# Patient Record
Sex: FEMALE | Race: WHITE | NOT HISPANIC OR LATINO | Employment: OTHER | URBAN - METROPOLITAN AREA
[De-identification: names, ages, dates, MRNs, and addresses within clinical notes are randomized per-mention and may not be internally consistent; named-entity substitution may affect disease eponyms.]

---

## 2018-04-02 ENCOUNTER — OFFICE VISIT (OUTPATIENT)
Dept: PRIMARY CARE | Facility: CLINIC | Age: 83
End: 2018-04-02
Payer: MEDICARE

## 2018-04-02 VITALS
TEMPERATURE: 98.2 F | HEIGHT: 61 IN | WEIGHT: 129 LBS | HEART RATE: 92 BPM | SYSTOLIC BLOOD PRESSURE: 128 MMHG | OXYGEN SATURATION: 98 % | BODY MASS INDEX: 24.35 KG/M2 | DIASTOLIC BLOOD PRESSURE: 84 MMHG | RESPIRATION RATE: 16 BRPM

## 2018-04-02 DIAGNOSIS — M54.16 LUMBAR RADICULOPATHY, ACUTE: Primary | ICD-10-CM

## 2018-04-02 PROBLEM — J30.9 ALLERGIC RHINITIS: Chronic | Status: ACTIVE | Noted: 2018-04-02

## 2018-04-02 PROBLEM — M48.061 LUMBAR SPINAL STENOSIS: Chronic | Status: ACTIVE | Noted: 2018-04-02

## 2018-04-02 PROBLEM — M48.061 LUMBAR SPINAL STENOSIS: Status: ACTIVE | Noted: 2018-04-02

## 2018-04-02 PROBLEM — G47.00 INSOMNIA: Chronic | Status: ACTIVE | Noted: 2018-04-02

## 2018-04-02 PROBLEM — J44.9 CHRONIC OBSTRUCTIVE PULMONARY DISEASE (CMS/HCC): Status: ACTIVE | Noted: 2018-04-02

## 2018-04-02 PROBLEM — M81.0 OSTEOPOROSIS: Chronic | Status: ACTIVE | Noted: 2018-04-02

## 2018-04-02 PROBLEM — B44.81 ALLERGIC BRONCHOPULMONARY ASPERGILLOSIS (CMS/HCC): Status: ACTIVE | Noted: 2018-04-02

## 2018-04-02 PROBLEM — G47.00 INSOMNIA: Status: ACTIVE | Noted: 2018-04-02

## 2018-04-02 PROBLEM — B44.81 ALLERGIC BRONCHOPULMONARY ASPERGILLOSIS (CMS/HCC): Chronic | Status: ACTIVE | Noted: 2018-04-02

## 2018-04-02 PROBLEM — I10 HYPERTENSION: Chronic | Status: ACTIVE | Noted: 2018-04-02

## 2018-04-02 PROBLEM — G47.33 OBSTRUCTIVE SLEEP APNEA SYNDROME: Status: ACTIVE | Noted: 2018-04-02

## 2018-04-02 PROBLEM — I10 HYPERTENSION: Status: ACTIVE | Noted: 2018-04-02

## 2018-04-02 PROBLEM — J44.9 CHRONIC OBSTRUCTIVE PULMONARY DISEASE (CMS/HCC): Chronic | Status: ACTIVE | Noted: 2018-04-02

## 2018-04-02 PROBLEM — G47.33 OBSTRUCTIVE SLEEP APNEA SYNDROME: Chronic | Status: ACTIVE | Noted: 2018-04-02

## 2018-04-02 PROBLEM — M81.0 OSTEOPOROSIS: Status: ACTIVE | Noted: 2018-04-02

## 2018-04-02 PROBLEM — J30.9 ALLERGIC RHINITIS: Status: ACTIVE | Noted: 2018-04-02

## 2018-04-02 PROCEDURE — 99214 OFFICE O/P EST MOD 30 MIN: CPT | Performed by: FAMILY MEDICINE

## 2018-04-02 RX ORDER — AMLODIPINE BESYLATE 10 MG/1
10 TABLET ORAL DAILY
COMMUNITY
Start: 2016-08-16 | End: 2021-03-01 | Stop reason: ALTCHOICE

## 2018-04-02 RX ORDER — HYDROGEN PEROXIDE 3 %
20 SOLUTION, NON-ORAL MISCELLANEOUS DAILY PRN
COMMUNITY
End: 2023-08-21

## 2018-04-02 RX ORDER — FLUTICASONE PROPIONATE 50 MCG
2 SPRAY, SUSPENSION (ML) NASAL DAILY
COMMUNITY
Start: 2015-03-04 | End: 2019-06-08

## 2018-04-02 RX ORDER — DULOXETIN HYDROCHLORIDE 20 MG/1
20 CAPSULE, DELAYED RELEASE ORAL 2 TIMES DAILY
COMMUNITY
Start: 2017-03-01 | End: 2018-04-02 | Stop reason: ALTCHOICE

## 2018-04-02 RX ORDER — VIT C/E/ZN/COPPR/LUTEIN/ZEAXAN 250MG-90MG
1 CAPSULE ORAL DAILY
COMMUNITY
Start: 2011-06-30

## 2018-04-02 RX ORDER — TIOTROPIUM BROMIDE 18 UG/1
1 CAPSULE ORAL; RESPIRATORY (INHALATION) DAILY
COMMUNITY
Start: 2014-10-14 | End: 2018-04-02 | Stop reason: ALTCHOICE

## 2018-04-02 RX ORDER — DIAZEPAM 5 MG/1
5 TABLET ORAL EVERY 12 HOURS PRN
Qty: 28 TABLET | Refills: 0 | Status: SHIPPED | OUTPATIENT
Start: 2018-04-02 | End: 2019-06-20 | Stop reason: ALTCHOICE

## 2018-04-02 RX ORDER — BUDESONIDE AND FORMOTEROL FUMARATE DIHYDRATE 160; 4.5 UG/1; UG/1
2 AEROSOL RESPIRATORY (INHALATION) 2 TIMES DAILY
Refills: 11 | COMMUNITY
Start: 2017-12-28 | End: 2019-06-08

## 2018-04-02 RX ORDER — CLOBETASOL PROPIONATE 0.5 MG/G
1 CREAM TOPICAL 3 TIMES DAILY
Refills: 3 | COMMUNITY
Start: 2018-02-20 | End: 2018-04-02 | Stop reason: SINTOL

## 2018-04-02 RX ORDER — LORAZEPAM 1 MG/1
1 TABLET ORAL NIGHTLY PRN
Refills: 5 | COMMUNITY
Start: 2018-03-05 | End: 2018-04-12 | Stop reason: SDUPTHER

## 2018-04-02 RX ORDER — DEXTROMETHORPHAN HYDROBROMIDE, GUAIFENESIN 5; 100 MG/5ML; MG/5ML
2 LIQUID ORAL 3 TIMES DAILY
COMMUNITY
End: 2019-06-08

## 2018-04-02 RX ORDER — DOCUSATE SODIUM 100 MG/1
100 CAPSULE, LIQUID FILLED ORAL 2 TIMES DAILY PRN
COMMUNITY

## 2018-04-02 RX ORDER — PSYLLIUM HUSK 0.4 G
1 CAPSULE ORAL DAILY
COMMUNITY
Start: 2011-06-30

## 2018-04-02 RX ORDER — POLYETHYLENE GLYCOL 3350 17 G/17G
17 POWDER, FOR SOLUTION ORAL DAILY
COMMUNITY
End: 2018-04-02 | Stop reason: ALTCHOICE

## 2018-04-02 RX ORDER — MONTELUKAST SODIUM 10 MG/1
10 TABLET ORAL DAILY
COMMUNITY
Start: 2015-10-05 | End: 2018-04-02 | Stop reason: ALTCHOICE

## 2018-04-02 ASSESSMENT — ENCOUNTER SYMPTOMS
BACK PAIN: 1
PARESTHESIAS: 0
JOINT SWELLING: 0
FEVER: 0
PARESIS: 0
NUMBNESS: 0
WEAKNESS: 1
CHILLS: 0
LEG PAIN: 1
ROS GI COMMENTS: NO FECAL INCONTINENCE.
DIFFICULTY URINATING: 0
NECK PAIN: 0
NECK STIFFNESS: 0
TINGLING: 0

## 2018-04-02 ASSESSMENT — ACTIVITIES OF DAILY LIVING (ADL)
PATIENT'S MEMORY ADEQUATE TO SAFELY COMPLETE DAILY ACTIVITIES?: YES
ADEQUATE_TO_COMPLETE_ADL: YES

## 2018-04-02 NOTE — PROGRESS NOTES
Henry Linares MD    [facility]  [address], [address]  [place] [address]  Phone: [phone]  Fax: [phone]     History of Present Illness   Subjective     Patient ID: Madelyn Ruiz is a 87 y.o. female.    Back Pain   This is a new problem. The pain is present in the gluteal. The quality of the pain is described as aching (Sharp). The pain radiates to the right thigh and left thigh. The pain is at a severity of 10/10. The pain is severe. The symptoms are aggravated by standing, sitting and coughing (Sneezing). Associated symptoms include leg pain and weakness. Pertinent negatives include no fever, numbness, paresis, paresthesias or tingling. She has tried NSAIDs and heat for the symptoms. The treatment provided mild relief.   No injury.   Past Medical/Surgical/Family/Social History     The following have been reviewed and updated as appropriate in this visit:  Allergies  Meds  Problems         Past Medical History:   Diagnosis Date   • Allergic bronchopulmonary aspergillosis (CMS/[facility]) ([facility]) 4/2/2018    Allergist: Dr. Arriaza. Stable FEV1 in 8/2015. IgE levels and eosinophils stable in 8/2015. Managed with Fusenra and Symbicort.   • Allergic rhinitis 4/2/2018    Pulmonologist: Dr. Walker. Managed with Flonase.   • Chronic obstructive pulmonary disease (CMS/[facility]) ([facility]) 4/2/2018    Pulmonologist: Dr. Walker. Seen on PFTs in hospital in 2014. Controlled with Symbicort.   • Hypertension 4/2/2018    Managed on Amlodipine. Advised to follow a low sodium diet and keep BMI < 25. Advised to exercise for 2.5 hours per week. Instructed to take home blood pressure readings and call if consistently above 140/90.    • Insomnia 4/2/2018    Managed with Lorazepam as needed.   • Lumbar spinal stenosis 4/2/2018    Neurosurgeon: Dr. Miguel. MRI with Central and lateral recess spinal stenosis. Has Spondylolisthesis at L4/L5. S/P epidural injection by Dr. Martin with some relief in  past. Had Lumbar fusion and Lumbar laminectomy by Dr. Miguel in 4/2015.   • Obstructive sleep apnea syndrome 4/2/2018    Mild STACIA noted in sleep study 7/2015. Treated with nasal CPAP automatic with pressure range 5-10 CM H2O.   • Osteoporosis 4/2/2018    Rheumatologist: Dr. Bradley. Dexa scan 7/2016 with osteoporosis of lumbar spine LS-2.5, LH-2.1, RH -2.2. Has been on Fosamax in the past for 8 years. Worsened by Prednisone in past. Continue vitamin D, calcium and weight bearing exercise. DEXA in 7/2017 with LS -1.7, LH -2.2, and RH -2.0. Next due in 7/2019. Seen by Dr. Bradley in 8/2015 who agreed with initiating Prolia.   • Vertebral compression fracture (CMS/HCC) (HCC)     At T12 level       Past Surgical History:   Procedure Laterality Date   • APPENDECTOMY     • CARPAL TUNNEL RELEASE Bilateral    • CATARACT EXTRACTION W/  INTRAOCULAR LENS IMPLANT Bilateral    • HYSTERECTOMY  1972   • LUMBAR LAMINECTOMY  04/21/2015    S/P lumbar fusion   • TONSILLECTOMY         Family History   Problem Relation Age of Onset   • Glaucoma Mother    • Macular degeneration Mother    • Hypertension Mother    • Lung cancer Father    • Heart disease Brother    • Breast cancer Mother's Sister        Social History     Social History   • Marital status:      Spouse name: N/A   • Number of children: N/A   • Years of education: N/A     Occupational History   • Retired      Former Teacher     Social History Main Topics   • Smoking status: Never Smoker   • Smokeless tobacco: Never Used   • Alcohol use Yes      Comment: Rarely   • Drug use: No   • Sexual activity: Not on file     Other Topics Concern   • Not on file     Social History Narrative   • No narrative on file      Allergies and Medications     Allergies   Allergen Reactions   • Mepolizumab Rash   • Aspirin      Other reaction(s): Vomiting   • Morphine      Other reaction(s): Vomiting   • Oxycodone      Other reaction(s): Vomiting         Current Outpatient Prescriptions:   •   acetaminophen (TYLENOL ARTHRITIS PAIN) 650 mg 8 hr tablet, Take 2 tablets by mouth 3 (three) times a day., Disp: , Rfl:   •  amLODIPine (NORVASC) 10 mg tablet, Take 10 mg by mouth daily., Disp: , Rfl:   •  benralizumab 30 mg/mL syringe, Inject 30 mg under the skin See administration instructions for further information. Use as directed by Dr. Arriaza, Disp: , Rfl:   •  calcium carbonate (calcium carbonate) 600 mg calcium (1,500 mg) tablet, Take 1 tablet by mouth 2 (two) times a day., Disp: , Rfl:   •  cholecalciferol, vitamin D3, (VITAMIN D3) 2,000 unit tablet, Take 2,000 IU by mouth daily., Disp: , Rfl:   •  denosumab (PROLIA) 60 mg/mL syringe, Inject 60 mg under the skin See administration instructions for further information. Take every 6 months, Disp: , Rfl:   •  docusate sodium (COLACE) 100 mg capsule, Take 100 mg by mouth daily as needed for constipation., Disp: , Rfl:   •  esomeprazole (NexIUM) 20 mg capsule, Take 20 mg by mouth daily. Take 30 minutes prior to meal., Disp: , Rfl:   •  fluticasone (FLONASE) 50 mcg/actuation nasal spray, Administer 2 puffs into each nostril daily., Disp: , Rfl:   •  FOLIC ACID/MULTIVIT,IRON, (CENTRUM ORAL), Take 1 tablet by mouth daily., Disp: , Rfl:   •  LORazepam (ATIVAN) 1 mg tablet, Take 1 mg by mouth nightly as needed for sleep., Disp: , Rfl: 5  •  SYMBICORT 160-4.5 mcg/actuation inhaler, Inhale 2 puffs 2 (two) times a day., Disp: , Rfl: 11  •  diazePAM (VALIUM) 5 mg tablet, Take 1 tablet (5 mg total) by mouth every 12 (twelve) hours as needed for anxiety for up to 14 days., Disp: 28 tablet, Rfl: 0   Review of Systems       Review of Systems   Constitutional: Negative for chills and fever.   Gastrointestinal:        No fecal incontinence.   Genitourinary: Negative for difficulty urinating.        No incontinence.   Musculoskeletal: Positive for back pain and gait problem. Negative for joint swelling, neck pain and neck stiffness.   Neurological: Positive for weakness.  Negative for tingling, numbness and paresthesias.      Physical Examination       Objective     Vitals:    04/02/18 1314   BP: 128/84   Pulse: 92   Resp: 16   Temp: 36.8 °C (98.2 °F)   SpO2: 98%       Physical Exam   Constitutional: She is cooperative.   Neck: Full passive range of motion without pain.   Musculoskeletal:        Lumbar back: She exhibits tenderness and spasm. She exhibits normal range of motion, no bony tenderness and no deformity.   SLR bilaterally is negative   Neurological: She is alert. She has normal strength. Gait normal.   Bilateral Lower extremity motor strength is 5/5. Positive SLR bilaterally.      Laboratory Results     Lab Results   Component Value Date    WBC 5.23 08/29/2017    HGB 12.2 08/29/2017    HCT 37.2 08/29/2017    MCV 93.9 08/29/2017     08/29/2017        Chemistry        Component Value Date/Time     08/03/2017 1155    K 3.9 08/03/2017 1155     08/03/2017 1155    CO2 25 08/03/2017 1155    BUN 15 08/03/2017 1155    CREATININE 1.0 08/03/2017 1155        Component Value Date/Time    CALCIUM 9.0 08/03/2017 1155    ALKPHOS 95 08/03/2017 1155    AST 17 08/03/2017 1155    ALT 15 08/03/2017 1155    BILITOT 0.6 08/03/2017 1155            Lab Results   Component Value Date    CHOL 248 (H) 11/18/2015     Lab Results   Component Value Date    HDL 92 11/18/2015     Lab Results   Component Value Date    LDLCALC 133 (H) 11/18/2015     Lab Results   Component Value Date    TRIG 117 11/18/2015     No results found for: CHOLHDL    Lab Results   Component Value Date    TSH 2.08 11/18/2015       Lab Results   Component Value Date    HGBA1C 5.6 11/18/2015        Assessment and Plan       Assessment/Plan     Problem List     Lumbar radiculopathy, acute - Primary    Current Assessment & Plan     History of lumbar spinal stenosis. Now worse. Will get MRI of lumbar spine to further evaluate. Further recommendations to follow. Use Diazepam up to 2 times per day for its muscle relaxant  effect. Cautioned regarding driving.         Relevant Orders    MRI LUMBAR SPINE WITHOUT CONTRAST          No Follow-up on file.    Orders Placed This Encounter   Procedures   • MRI LUMBAR SPINE WITHOUT CONTRAST     Standing Status:   Future     Standing Expiration Date:   4/2/2019     Order Specific Question:   Does the Patient have a Pacemaker?     Answer:   No     Order Specific Question:   Does the patient have any metal implanted in the body from surgery?     Answer:   No        Henry Linares MD  4/2/2018

## 2018-04-02 NOTE — ASSESSMENT & PLAN NOTE
History of lumbar spinal stenosis. Now worse. Will get MRI of lumbar spine to further evaluate. Further recommendations to follow. Use Diazepam up to 2 times per day for its muscle relaxant effect. Cautioned regarding driving.

## 2018-04-03 ENCOUNTER — APPOINTMENT (OUTPATIENT)
Dept: RADIOLOGY | Facility: CLINIC | Age: 83
End: 2018-04-03
Attending: FAMILY MEDICINE
Payer: MEDICARE

## 2018-04-03 DIAGNOSIS — M54.16 LUMBAR RADICULOPATHY, ACUTE: ICD-10-CM

## 2018-04-05 ENCOUNTER — TELEPHONE (OUTPATIENT)
Dept: PRIMARY CARE | Facility: CLINIC | Age: 83
End: 2018-04-05

## 2018-04-05 NOTE — TELEPHONE ENCOUNTER
Pt left vm regarding her mri results.. She is still having the leg pain no change since her ov per patient.

## 2018-04-06 NOTE — TELEPHONE ENCOUNTER
Discussed results of MRI with patient today.  She has worsening spinal stenosis at L3-L4.  This correlates with her pain in the upper thigh region.  Referred to Dr. Wesley You for further evaluation and possible epidural steroid injection.

## 2018-04-12 DIAGNOSIS — F41.9 ANXIETY: Primary | ICD-10-CM

## 2018-04-13 RX ORDER — LORAZEPAM 1 MG/1
TABLET ORAL
Qty: 30 TABLET | Refills: 5 | Status: SHIPPED | OUTPATIENT
Start: 2018-04-13 | End: 2018-10-12 | Stop reason: SDUPTHER

## 2018-04-20 ENCOUNTER — APPOINTMENT (OUTPATIENT)
Dept: LAB | Facility: HOSPITAL | Age: 83
End: 2018-04-20
Attending: INTERNAL MEDICINE
Payer: MEDICARE

## 2018-04-20 ENCOUNTER — TRANSCRIBE ORDERS (OUTPATIENT)
Dept: LAB | Facility: HOSPITAL | Age: 83
End: 2018-04-20

## 2018-04-20 DIAGNOSIS — Z79.899 NEED FOR PROPHYLACTIC CHEMOTHERAPY: ICD-10-CM

## 2018-04-20 DIAGNOSIS — M81.0 SENILE OSTEOPOROSIS: ICD-10-CM

## 2018-04-20 DIAGNOSIS — E55.9 AVITAMINOSIS D: ICD-10-CM

## 2018-04-20 DIAGNOSIS — M81.0 SENILE OSTEOPOROSIS: Primary | ICD-10-CM

## 2018-04-20 LAB
25(OH)D3 SERPL-MCNC: 59 NG/ML (ref 30–100)
ALBUMIN SERPL-MCNC: 4 G/DL (ref 3.4–5)
ALP SERPL-CCNC: 33 IU/L (ref 35–126)
ALT SERPL-CCNC: 18 IU/L (ref 11–54)
ANION GAP SERPL CALC-SCNC: 6 MEQ/L (ref 3–15)
AST SERPL-CCNC: 17 IU/L (ref 15–41)
BASOPHILS # BLD: 0 K/UL (ref 0.01–0.1)
BASOPHILS NFR BLD: 0 %
BILIRUB SERPL-MCNC: 0.5 MG/DL (ref 0.3–1.2)
BUN SERPL-MCNC: 19 MG/DL (ref 8–20)
CALCIUM SERPL-MCNC: 9.9 MG/DL (ref 8.9–10.3)
CHLORIDE SERPL-SCNC: 105 MMOL/L (ref 98–109)
CO2 SERPL-SCNC: 29 MMOL/L (ref 22–32)
CREAT SERPL-MCNC: 0.7 MG/DL (ref 0.6–1.1)
DIFFERENTIAL METHOD BLD: ABNORMAL
EOSINOPHIL # BLD: 0 K/UL (ref 0.04–0.36)
EOSINOPHIL NFR BLD: 0 %
ERYTHROCYTE [DISTWIDTH] IN BLOOD BY AUTOMATED COUNT: 14.3 % (ref 11.7–14.4)
GFR SERPL CREATININE-BSD FRML MDRD: >60 ML/MIN/1.73M*2
GLUCOSE SERPL-MCNC: 81 MG/DL (ref 70–99)
HCT VFR BLDCO AUTO: 39 % (ref 35–45)
HGB BLD-MCNC: 12.9 G/DL (ref 11.8–15.7)
IMM GRANULOCYTES # BLD AUTO: 0.05 K/UL (ref 0–0.08)
IMM GRANULOCYTES NFR BLD AUTO: 0.7 %
LYMPHOCYTES # BLD: 1.69 K/UL (ref 1.2–3.5)
LYMPHOCYTES NFR BLD: 23.9 %
MCH RBC QN AUTO: 31.2 PG (ref 28–33.2)
MCHC RBC AUTO-ENTMCNC: 33.1 G/DL (ref 32.2–35.5)
MCV RBC AUTO: 94.2 FL (ref 83–98)
MONOCYTES # BLD: 0.83 K/UL (ref 0.28–0.8)
MONOCYTES NFR BLD: 11.7 %
NEUTROPHILS # BLD: 4.51 K/UL (ref 1.7–7)
NEUTS SEG NFR BLD: 63.7 %
NRBC BLD-RTO: 0 %
PDW BLD AUTO: 10.8 FL (ref 9.4–12.3)
PLATELET # BLD AUTO: 282 K/UL (ref 150–369)
POTASSIUM SERPL-SCNC: 4.4 MMOL/L (ref 3.6–5.1)
PROT SERPL-MCNC: 6.1 G/DL (ref 6–8.2)
RBC # BLD AUTO: 4.14 M/UL (ref 3.93–5.22)
SODIUM SERPL-SCNC: 140 MMOL/L (ref 136–144)
WBC # BLD AUTO: 7.08 K/UL (ref 3.8–10.5)

## 2018-04-20 PROCEDURE — 82306 VITAMIN D 25 HYDROXY: CPT

## 2018-04-20 PROCEDURE — 85025 COMPLETE CBC W/AUTO DIFF WBC: CPT

## 2018-04-20 PROCEDURE — 36415 COLL VENOUS BLD VENIPUNCTURE: CPT

## 2018-04-20 PROCEDURE — 80053 COMPREHEN METABOLIC PANEL: CPT

## 2018-05-03 ENCOUNTER — APPOINTMENT (OUTPATIENT)
Dept: RADIOLOGY | Facility: CLINIC | Age: 83
End: 2018-05-03
Attending: FAMILY MEDICINE
Payer: MEDICARE

## 2018-05-03 ENCOUNTER — CLINICAL SUPPORT (OUTPATIENT)
Dept: LAB | Facility: CLINIC | Age: 83
End: 2018-05-03
Attending: FAMILY MEDICINE
Payer: MEDICARE

## 2018-05-03 ENCOUNTER — OFFICE VISIT (OUTPATIENT)
Dept: PRIMARY CARE | Facility: CLINIC | Age: 83
End: 2018-05-03
Payer: MEDICARE

## 2018-05-03 VITALS
RESPIRATION RATE: 16 BRPM | HEIGHT: 61 IN | WEIGHT: 126.2 LBS | DIASTOLIC BLOOD PRESSURE: 74 MMHG | HEART RATE: 97 BPM | SYSTOLIC BLOOD PRESSURE: 128 MMHG | OXYGEN SATURATION: 95 % | TEMPERATURE: 97.8 F | BODY MASS INDEX: 23.83 KG/M2

## 2018-05-03 DIAGNOSIS — R10.84 GENERALIZED ABDOMINAL PAIN: ICD-10-CM

## 2018-05-03 DIAGNOSIS — R10.84 GENERALIZED ABDOMINAL PAIN: Primary | ICD-10-CM

## 2018-05-03 LAB
ALBUMIN SERPL-MCNC: 3.9 G/DL (ref 3.4–5)
ALP SERPL-CCNC: 55 IU/L (ref 35–126)
ALT SERPL-CCNC: 15 IU/L (ref 11–54)
AMYLASE SERPL-CCNC: 29 U/L (ref 28–100)
ANION GAP SERPL CALC-SCNC: 9 MEQ/L (ref 3–15)
AST SERPL-CCNC: 18 IU/L (ref 15–41)
BASOPHILS # BLD: 0.01 K/UL (ref 0.01–0.1)
BASOPHILS NFR BLD: 0.2 %
BILIRUB SERPL-MCNC: 0.5 MG/DL (ref 0.3–1.2)
BUN SERPL-MCNC: 18 MG/DL (ref 8–20)
CALCIUM SERPL-MCNC: 8.9 MG/DL (ref 8.9–10.3)
CHLORIDE SERPL-SCNC: 102 MMOL/L (ref 98–109)
CO2 SERPL-SCNC: 26 MMOL/L (ref 22–32)
CREAT SERPL-MCNC: 1.1 MG/DL (ref 0.6–1.1)
DIFFERENTIAL METHOD BLD: ABNORMAL
EOSINOPHIL # BLD: 0 K/UL (ref 0.04–0.36)
EOSINOPHIL NFR BLD: 0 %
ERYTHROCYTE [DISTWIDTH] IN BLOOD BY AUTOMATED COUNT: 14.4 % (ref 11.7–14.4)
GFR SERPL CREATININE-BSD FRML MDRD: 47.1 ML/MIN/1.73M*2
GLUCOSE SERPL-MCNC: 88 MG/DL (ref 70–99)
HCT VFR BLDCO AUTO: 39.6 % (ref 35–45)
HGB BLD-MCNC: 12.6 G/DL (ref 11.8–15.7)
IMM GRANULOCYTES # BLD AUTO: 0.04 K/UL (ref 0–0.08)
IMM GRANULOCYTES NFR BLD AUTO: 0.6 %
LIPASE SERPL-CCNC: 17 U/L (ref 20–51)
LYMPHOCYTES # BLD: 1.04 K/UL (ref 1.2–3.5)
LYMPHOCYTES NFR BLD: 16.1 %
MCH RBC QN AUTO: 31.3 PG (ref 28–33.2)
MCHC RBC AUTO-ENTMCNC: 31.8 G/DL (ref 32.2–35.5)
MCV RBC AUTO: 98.3 FL (ref 83–98)
MONOCYTES # BLD: 0.49 K/UL (ref 0.28–0.8)
MONOCYTES NFR BLD: 7.6 %
NEUTROPHILS # BLD: 4.86 K/UL (ref 1.7–7)
NEUTS SEG NFR BLD: 75.5 %
NRBC BLD-RTO: 0 %
PDW BLD AUTO: 10.6 FL (ref 9.4–12.3)
PLATELET # BLD AUTO: 231 K/UL (ref 150–369)
POTASSIUM SERPL-SCNC: 4.1 MMOL/L (ref 3.6–5.1)
PROT SERPL-MCNC: 6.2 G/DL (ref 6–8.2)
RBC # BLD AUTO: 4.03 M/UL (ref 3.93–5.22)
SODIUM SERPL-SCNC: 137 MMOL/L (ref 136–144)
WBC # BLD AUTO: 6.44 K/UL (ref 3.8–10.5)

## 2018-05-03 PROCEDURE — 82150 ASSAY OF AMYLASE: CPT

## 2018-05-03 PROCEDURE — 99214 OFFICE O/P EST MOD 30 MIN: CPT | Performed by: FAMILY MEDICINE

## 2018-05-03 PROCEDURE — 83690 ASSAY OF LIPASE: CPT

## 2018-05-03 PROCEDURE — 80053 COMPREHEN METABOLIC PANEL: CPT

## 2018-05-03 PROCEDURE — 36415 COLL VENOUS BLD VENIPUNCTURE: CPT

## 2018-05-03 PROCEDURE — 74022 RADEX COMPL AQT ABD SERIES: CPT

## 2018-05-03 PROCEDURE — 85025 COMPLETE CBC W/AUTO DIFF WBC: CPT

## 2018-05-03 ASSESSMENT — ENCOUNTER SYMPTOMS
CONSTIPATION: 1
FREQUENCY: 0
FLANK PAIN: 0
ABDOMINAL PAIN: 1
VOMITING: 0
DYSURIA: 0
FEVER: 0
DIARRHEA: 0
UNEXPECTED WEIGHT CHANGE: 0
COUGH: 0
ABDOMINAL DISTENTION: 0
NAUSEA: 1
BLOOD IN STOOL: 0
HEMATURIA: 0
SHORTNESS OF BREATH: 0

## 2018-05-03 NOTE — ASSESSMENT & PLAN NOTE
Unclear cause at this time. Check cbc, cmp, amylase, and lipase. Check obstruction series. Further recommendations to follow.

## 2018-05-03 NOTE — PROGRESS NOTES
"   Henry Linares MD    [facility]  [address], [address]  [place] [address]  Phone: [phone]  Fax: [phone]     History of Present Illness   Subjective     Patient ID: Madelyn Ruiz is a 87 y.o. female.    Abdominal Pain   This is a new problem. Episode onset: 3 days ago. The onset quality is gradual. The problem has been gradually worsening. The pain is located in the epigastric region. The abdominal pain does not radiate. Associated symptoms include constipation and nausea. Pertinent negatives include no diarrhea, dysuria, fever, frequency, hematuria or vomiting. Associated symptoms comments: Bloated, Had nausea yesterday, \"gassy\". Nothing aggravates the pain.      Past Medical/Surgical/Family/Social History     The following have been reviewed and updated as appropriate in this visit:  Meds         Past Medical History:   Diagnosis Date   • Allergic bronchopulmonary aspergillosis (CMS/[facility]) ([facility]) 4/2/2018    Allergist: Dr. Arriaza. Stable FEV1 in 8/2015. IgE levels and eosinophils stable in 8/2015. Managed with Fusenra and Symbicort.   • Allergic rhinitis 4/2/2018    Pulmonologist: Dr. Walker. Managed with Flonase.   • Chronic obstructive pulmonary disease (CMS/[facility]) ([facility]) 4/2/2018    Pulmonologist: Dr. Walker. Seen on PFTs in hospital in 2014. Controlled with Symbicort.   • Hypertension 4/2/2018    Managed on Amlodipine. Advised to follow a low sodium diet and keep BMI < 25. Advised to exercise for 2.5 hours per week. Instructed to take home blood pressure readings and call if consistently above 140/90.    • Insomnia 4/2/2018    Managed with Lorazepam as needed.   • Lumbar spinal stenosis 4/2/2018    Neurosurgeon: Dr. Miguel. MRI with Central and lateral recess spinal stenosis. Has Spondylolisthesis at L4/L5. S/P epidural injection by Dr. Martin with some relief in past. Had Lumbar fusion and Lumbar laminectomy by Dr. Miguel in 4/2015.   • Obstructive sleep apnea " syndrome 4/2/2018    Mild STACIA noted in sleep study 7/2015. Treated with nasal CPAP automatic with pressure range 5-10 CM H2O.   • Osteoporosis 4/2/2018    Rheumatologist: Dr. Bradley. Dexa scan 7/2016 with osteoporosis of lumbar spine LS-2.5, LH-2.1, RH -2.2. Has been on Fosamax in the past for 8 years. Worsened by Prednisone in past. Continue vitamin D, calcium and weight bearing exercise. DEXA in 7/2017 with LS -1.7, LH -2.2, and RH -2.0. Next due in 7/2019. Seen by Dr. Bradley in 8/2015 who agreed with initiating Prolia.   • Vertebral compression fracture (CMS/HCC) (HCC)     At T12 level       Past Surgical History:   Procedure Laterality Date   • APPENDECTOMY     • CARPAL TUNNEL RELEASE Bilateral    • CATARACT EXTRACTION W/  INTRAOCULAR LENS IMPLANT Bilateral    • HYSTERECTOMY  1972   • LUMBAR LAMINECTOMY  04/21/2015    S/P lumbar fusion   • TONSILLECTOMY         Family History   Problem Relation Age of Onset   • Glaucoma Mother    • Macular degeneration Mother    • Hypertension Mother    • Lung cancer Father    • Heart disease Brother    • Breast cancer Mother's Sister        Social History     Social History   • Marital status:      Spouse name: N/A   • Number of children: N/A   • Years of education: N/A     Occupational History   • Retired      Former Teacher     Social History Main Topics   • Smoking status: Never Smoker   • Smokeless tobacco: Never Used   • Alcohol use Yes      Comment: Rarely   • Drug use: No   • Sexual activity: Not on file     Other Topics Concern   • Not on file     Social History Narrative   • No narrative on file      Allergies and Medications     Allergies   Allergen Reactions   • Mepolizumab Rash   • Aspirin      Other reaction(s): Vomiting   • Morphine      Other reaction(s): Vomiting   • Oxycodone      Other reaction(s): Vomiting         Current Outpatient Prescriptions:   •  acetaminophen (TYLENOL ARTHRITIS PAIN) 650 mg 8 hr tablet, Take 2 tablets by mouth 3 (three) times  a day., Disp: , Rfl:   •  amLODIPine (NORVASC) 10 mg tablet, Take 10 mg by mouth daily., Disp: , Rfl:   •  benralizumab 30 mg/mL syringe, Inject 30 mg under the skin See administration instructions for further information. Use as directed by Dr. Arriaza, Disp: , Rfl:   •  calcium carbonate (calcium carbonate) 600 mg calcium (1,500 mg) tablet, Take 1 tablet by mouth 2 (two) times a day., Disp: , Rfl:   •  cholecalciferol, vitamin D3, (VITAMIN D3) 2,000 unit tablet, Take 2,000 IU by mouth daily., Disp: , Rfl:   •  denosumab (PROLIA) 60 mg/mL syringe, Inject 60 mg under the skin See administration instructions for further information. Take every 6 months, Disp: , Rfl:   •  docusate sodium (COLACE) 100 mg capsule, Take 100 mg by mouth daily as needed for constipation., Disp: , Rfl:   •  esomeprazole (NexIUM) 20 mg capsule, Take 20 mg by mouth daily. Take 30 minutes prior to meal., Disp: , Rfl:   •  fluticasone (FLONASE) 50 mcg/actuation nasal spray, Administer 2 puffs into each nostril daily., Disp: , Rfl:   •  FOLIC ACID/MULTIVIT,IRON, (CENTRUM ORAL), Take 1 tablet by mouth daily., Disp: , Rfl:   •  LORazepam (ATIVAN) 1 mg tablet, TAKE 1 TABLET BY MOUTH AT BEDTIME AS NEEDED, Disp: 30 tablet, Rfl: 5  •  SYMBICORT 160-4.5 mcg/actuation inhaler, Inhale 2 puffs 2 (two) times a day., Disp: , Rfl: 11   Review of Systems       Review of Systems   Constitutional: Negative for fever and unexpected weight change.   Respiratory: Negative for cough and shortness of breath.    Cardiovascular: Negative for chest pain.   Gastrointestinal: Positive for abdominal pain, constipation and nausea. Negative for abdominal distention, blood in stool, diarrhea and vomiting.   Genitourinary: Negative for decreased urine volume, dysuria, flank pain, frequency, hematuria and urgency.   Skin: Negative for pallor.      Physical Examination       Objective     Vitals:    05/03/18 1328   BP: 128/74   Pulse: 97   Resp: 16   Temp: 36.6 °C (97.8 °F)  "  SpO2: 95%       Wt Readings from Last 3 Encounters:   05/03/18 57.2 kg (126 lb 3.2 oz)   04/03/18 57.6 kg (127 lb)   04/02/18 58.5 kg (129 lb)       Ht Readings from Last 3 Encounters:   05/03/18 1.549 m (5' 1\")   04/02/18 1.549 m (5' 1\")       BP Readings from Last 3 Encounters:   05/03/18 128/74   04/02/18 128/84       Physical Exam   Constitutional: She appears well-developed and well-nourished. She is cooperative.  Non-toxic appearance. No distress.   Cardiovascular: Normal rate, regular rhythm, S1 normal and S2 normal.  Exam reveals no S3 and no S4.    No murmur heard.  Pulmonary/Chest: Effort normal and breath sounds normal. No tachypnea. No respiratory distress.   Abdominal: Soft. Normal aorta and bowel sounds are normal. She exhibits no distension, no abdominal bruit, no ascites, no pulsatile midline mass and no mass. There is no hepatosplenomegaly. There is generalized tenderness. There is no rebound, no guarding, no CVA tenderness, no tenderness at McBurney's point and negative Campuzano's sign. Hernia confirmed negative in the ventral area.   Has generalized tenderness  But worse in the epigastric region.   Lymphadenopathy:   No edema bilateral    Neurological: She is alert.      Laboratory Results     Lab Results   Component Value Date    WBC 7.08 04/20/2018    HGB 12.9 04/20/2018    HCT 39.0 04/20/2018    MCV 94.2 04/20/2018     04/20/2018          Chemistry        Component Value Date/Time     04/20/2018 1107    K 4.4 04/20/2018 1107     04/20/2018 1107    CO2 29 04/20/2018 1107    BUN 19 04/20/2018 1107    CREATININE 0.7 04/20/2018 1107        Component Value Date/Time    CALCIUM 9.9 04/20/2018 1107    ALKPHOS 33 (L) 04/20/2018 1107    AST 17 04/20/2018 1107    ALT 18 04/20/2018 1107    BILITOT 0.5 04/20/2018 1107            Lab Results   Component Value Date    GLUCOSE 81 04/20/2018    CALCIUM 9.9 04/20/2018     04/20/2018    K 4.4 04/20/2018    CO2 29 04/20/2018     " 04/20/2018    BUN 19 04/20/2018    CREATININE 0.7 04/20/2018       Lab Results   Component Value Date    CHOL 248 (H) 11/18/2015     Lab Results   Component Value Date    HDL 92 11/18/2015     Lab Results   Component Value Date    LDLCALC 133 (H) 11/18/2015     Lab Results   Component Value Date    TRIG 117 11/18/2015     No results found for: CHOLHDL    Lab Results   Component Value Date    TSH 2.08 11/18/2015       Lab Results   Component Value Date    HGBA1C 5.6 11/18/2015      Assessment and Plan       Assessment/Plan     Problem List        Other    Generalized abdominal pain - Primary    Current Assessment & Plan     Unclear cause at this time. Check cbc, cmp, amylase, and lipase. Check obstruction series. Further recommendations to follow.                No Follow-up on file.    No orders of the defined types were placed in this encounter.       Henry Linares MD  5/3/2018

## 2018-05-04 NOTE — PROGRESS NOTES
Discuss results of the x-ray with the patient.  Told the patient that she may have a localized ileus or mild enterocolitis.  She was informed that if she gets worse over the weekend she should go to urgent care or the emergency room for further evaluation.  The best next step would to be repeating the obstruction series to see if it is worsening in any way.  She agrees with the plan of action at this time.

## 2018-06-06 ENCOUNTER — OFFICE VISIT (OUTPATIENT)
Dept: PRIMARY CARE | Facility: CLINIC | Age: 83
End: 2018-06-06
Attending: FAMILY MEDICINE
Payer: MEDICARE

## 2018-06-06 VITALS
HEART RATE: 84 BPM | SYSTOLIC BLOOD PRESSURE: 118 MMHG | RESPIRATION RATE: 16 BRPM | OXYGEN SATURATION: 98 % | DIASTOLIC BLOOD PRESSURE: 76 MMHG | WEIGHT: 127 LBS | TEMPERATURE: 97.9 F | HEIGHT: 61 IN | BODY MASS INDEX: 23.98 KG/M2

## 2018-06-06 DIAGNOSIS — B44.81 ALLERGIC BRONCHOPULMONARY ASPERGILLOSIS (CMS/HCC): Chronic | ICD-10-CM

## 2018-06-06 DIAGNOSIS — J43.9 PULMONARY EMPHYSEMA, UNSPECIFIED EMPHYSEMA TYPE (CMS/HCC): Chronic | ICD-10-CM

## 2018-06-06 DIAGNOSIS — Z00.00 MEDICARE ANNUAL WELLNESS VISIT, INITIAL: Primary | ICD-10-CM

## 2018-06-06 PROCEDURE — G0439 PPPS, SUBSEQ VISIT: HCPCS | Performed by: FAMILY MEDICINE

## 2018-06-06 RX ORDER — NORTRIPTYLINE HYDROCHLORIDE 10 MG/1
3 CAPSULE ORAL NIGHTLY
Refills: 0 | COMMUNITY
Start: 2018-04-27 | End: 2018-06-06 | Stop reason: SINTOL

## 2018-06-06 RX ORDER — GABAPENTIN 300 MG/1
1 CAPSULE ORAL 3 TIMES DAILY
Refills: 0 | COMMUNITY
Start: 2018-05-09 | End: 2018-06-06 | Stop reason: SINTOL

## 2018-06-06 ASSESSMENT — ENCOUNTER SYMPTOMS
ABDOMINAL PAIN: 0
CONFUSION: 0
HEMATURIA: 0
VOMITING: 0
CHILLS: 0
EYE ITCHING: 0
TROUBLE SWALLOWING: 0
COUGH: 0
SPEECH DIFFICULTY: 0
NECK PAIN: 0
DYSURIA: 0
HEADACHES: 0
DIARRHEA: 0
SINUS PRESSURE: 0
DYSPHORIC MOOD: 0
FEVER: 0
DIFFICULTY URINATING: 0
EYE DISCHARGE: 0
APPETITE CHANGE: 0
SHORTNESS OF BREATH: 0
SORE THROAT: 0
FATIGUE: 0
NUMBNESS: 0
LIGHT-HEADEDNESS: 0
ARTHRALGIAS: 0
SLEEP DISTURBANCE: 0
FREQUENCY: 0
MYALGIAS: 0
BRUISES/BLEEDS EASILY: 0
DIZZINESS: 0
UNEXPECTED WEIGHT CHANGE: 0
WHEEZING: 0
CHEST TIGHTNESS: 0
BLOOD IN STOOL: 0
EYE PAIN: 0
BACK PAIN: 0
PALPITATIONS: 0
JOINT SWELLING: 0
RHINORRHEA: 0
CONSTIPATION: 0
WEAKNESS: 0
NERVOUS/ANXIOUS: 0
EYE REDNESS: 0
NAUSEA: 0

## 2018-06-06 ASSESSMENT — MINI COG
TOTAL SCORE: 5
COMPLETED: YES

## 2018-06-06 NOTE — PROGRESS NOTES
Subjective     Madelyn Ruiz is a 87 y.o. female who presents for an initial preventive physical exam.     Comprehensive Medical and Social History  Patient Active Problem List   Diagnosis   • Allergic bronchopulmonary aspergillosis (CMS/[facility]) ([facility])   • Allergic rhinitis   • Asthma   • Chronic obstructive pulmonary disease (CMS/[facility]) ([facility])   • Hypertension   • Insomnia   • Lumbar spinal stenosis   • Obstructive sleep apnea syndrome   • Osteoporosis   • Lumbar radiculopathy, acute   • Generalized abdominal pain   • Medicare annual wellness visit, initial     Past Medical History:   Diagnosis Date   • Allergic bronchopulmonary aspergillosis (CMS/[facility]) ([facility]) 4/2/2018    Allergist: Dr. Arriaza. Stable FEV1 in 8/2015. IgE levels and eosinophils stable in 8/2015. Managed with Fusenra and Symbicort.   • Allergic rhinitis 4/2/2018    Pulmonologist: Dr. Walker. Managed with Flonase.   • Chronic obstructive pulmonary disease (CMS/[facility]) ([facility]) 4/2/2018    Pulmonologist: Dr. Walker. Seen on PFTs in hospital in 2014. Controlled with Symbicort.   • Hypertension 4/2/2018    Managed on Amlodipine. Advised to follow a low sodium diet and keep BMI < 25. Advised to exercise for 2.5 hours per week. Instructed to take home blood pressure readings and call if consistently above 140/90.    • Insomnia 4/2/2018    Managed with Lorazepam as needed.   • Lumbar spinal stenosis 4/2/2018    Neurosurgeon: Dr. Miguel. MRI with Central and lateral recess spinal stenosis. Has Spondylolisthesis at L4/L5. S/P epidural injection by Dr. Martin with some relief in past. Had Lumbar fusion and Lumbar laminectomy by Dr. Miguel in 4/2015.   • Obstructive sleep apnea syndrome 4/2/2018    Mild STACIA noted in sleep study 7/2015. Treated with nasal CPAP automatic with pressure range 5-10 CM H2O.   • Osteoporosis 4/2/2018    Rheumatologist: Dr. Bradley. Dexa scan 7/2016 with osteoporosis of lumbar spine LS-2.5, LH-2.1, RH -2.2. Has been on Fosamax in the past  for 8 years. Worsened by Prednisone in past. Continue vitamin D, calcium and weight bearing exercise. DEXA in 7/2017 with LS -1.7, LH -2.2, and RH -2.0. Next due in 7/2019. Seen by Dr. Bradley in 8/2015 who agreed with initiating Prolia.   • Vertebral compression fracture (CMS/HCC) (HCC)     At T12 level     Past Surgical History:   Procedure Laterality Date   • APPENDECTOMY     • CARPAL TUNNEL RELEASE Bilateral    • CATARACT EXTRACTION W/  INTRAOCULAR LENS IMPLANT Bilateral    • HYSTERECTOMY  1972   • LUMBAR LAMINECTOMY  04/21/2015    S/P lumbar fusion   • TONSILLECTOMY       Allergies   Allergen Reactions   • Mepolizumab Rash   • Aspirin      Other reaction(s): Vomiting   • Morphine      Other reaction(s): Vomiting   • Oxycodone      Other reaction(s): Vomiting     Current Outpatient Prescriptions   Medication Sig Dispense Refill   • acetaminophen (TYLENOL ARTHRITIS PAIN) 650 mg 8 hr tablet Take 2 tablets by mouth 3 (three) times a day.     • amLODIPine (NORVASC) 10 mg tablet Take 10 mg by mouth daily.     • benralizumab 30 mg/mL syringe Inject 30 mg under the skin See administration instructions for further information. Use as directed by Dr. Arriaza     • calcium carbonate (calcium carbonate) 600 mg calcium (1,500 mg) tablet Take 1 tablet by mouth 2 (two) times a day.     • cholecalciferol, vitamin D3, (VITAMIN D3) 2,000 unit tablet Take 2,000 IU by mouth daily.     • denosumab (PROLIA) 60 mg/mL syringe Inject 60 mg under the skin See administration instructions for further information. Take every 6 months     • docusate sodium (COLACE) 100 mg capsule Take 100 mg by mouth daily as needed for constipation.     • esomeprazole (NexIUM) 20 mg capsule Take 20 mg by mouth daily. Take 30 minutes prior to meal.     • fluticasone (FLONASE) 50 mcg/actuation nasal spray Administer 2 puffs into each nostril daily.     • FOLIC ACID/MULTIVIT,IRON, (CENTRUM ORAL) Take 1 tablet by mouth daily.     • LORazepam (ATIVAN) 1 mg  tablet TAKE 1 TABLET BY MOUTH AT BEDTIME AS NEEDED 30 tablet 5   • SYMBICORT 160-4.5 mcg/actuation inhaler Inhale 2 puffs 2 (two) times a day.  11     No current facility-administered medications for this visit.      Social History     Social History   • Marital status:      Spouse name: N/A   • Number of children: N/A   • Years of education: N/A     Occupational History   • Retired      Former Teacher     Social History Main Topics   • Smoking status: Never Smoker   • Smokeless tobacco: Never Used   • Alcohol use Yes      Comment: Rarely   • Drug use: No   • Sexual activity: Not on file     Other Topics Concern   • Not on file     Social History Narrative   • No narrative on file     Review of Systems   Constitutional: Negative for appetite change, chills, fatigue, fever and unexpected weight change.   HENT: Negative for congestion, ear pain, hearing loss, nosebleeds, postnasal drip, rhinorrhea, sinus pressure, sneezing, sore throat and trouble swallowing.    Eyes: Negative for pain, discharge, redness, itching and visual disturbance.   Respiratory: Negative for cough, chest tightness, shortness of breath and wheezing.    Cardiovascular: Negative for chest pain, palpitations and leg swelling.   Gastrointestinal: Negative for abdominal pain, blood in stool, constipation, diarrhea, nausea and vomiting.   Endocrine: Negative for cold intolerance and heat intolerance.   Genitourinary: Negative for difficulty urinating, dysuria, frequency, hematuria, urgency, vaginal bleeding and vaginal discharge.   Musculoskeletal: Negative for arthralgias, back pain, gait problem, joint swelling, myalgias and neck pain.   Skin: Negative for rash.   Allergic/Immunologic: Negative for environmental allergies.   Neurological: Negative for dizziness, syncope, speech difficulty, weakness, light-headedness, numbness and headaches.   Hematological: Does not bruise/bleed easily.   Psychiatric/Behavioral: Negative for confusion,  "dysphoric mood and sleep disturbance. The patient is not nervous/anxious.          Objective   Vitals  Vitals:    06/06/18 1037   BP: 118/76   BP Location: Left upper arm   Patient Position: Sitting   Pulse: 84   Resp: 16   Temp: 36.6 °C (97.9 °F)   TempSrc: Oral   SpO2: 98%   Weight: 57.6 kg (127 lb)   Height: 1.549 m (5' 1\")     Body mass index is 24 kg/m².    Physical Exam   Constitutional: She appears well-developed and well-nourished.   HENT:   Head: Normocephalic and atraumatic.   Right Ear: Tympanic membrane, external ear and ear canal normal.   Left Ear: Tympanic membrane, external ear and ear canal normal.   Nose: No mucosal edema or rhinorrhea. Right sinus exhibits no maxillary sinus tenderness and no frontal sinus tenderness. Left sinus exhibits no maxillary sinus tenderness and no frontal sinus tenderness.   Mouth/Throat: Uvula is midline and mucous membranes are normal. No oral lesions. No oropharyngeal exudate or posterior oropharyngeal erythema. No tonsillar exudate.   Eyes: EOM and lids are normal. Pupils are equal, round, and reactive to light. Right eye exhibits no discharge and no exudate. Left eye exhibits no discharge and no exudate. Right conjunctiva is not injected. Left conjunctiva is not injected. No scleral icterus.   Neck: Normal range of motion. Neck supple. Carotid bruit is not present. No thyroid mass and no thyromegaly present.   Cardiovascular: Normal rate, regular rhythm and normal heart sounds.  Exam reveals no S3 and no S4.    No murmur heard.  Pulses:       Popliteal pulses are 2+ on the right side, and 2+ on the left side.        Dorsalis pedis pulses are 2+ on the right side, and 2+ on the left side.   Pulmonary/Chest: Effort normal and breath sounds normal. She exhibits no tenderness.   Abdominal: Soft. Normal appearance and bowel sounds are normal. There is no hepatosplenomegaly. There is no tenderness. There is no rebound and no guarding. No hernia.   Lymphadenopathy:     She " has no cervical adenopathy.   Neurological: She is alert. She has normal strength.   Skin: Skin is warm and dry. No rash noted.   Psychiatric: She has a normal mood and affect. Her speech is normal and behavior is normal. Judgment and thought content normal. Cognition and memory are normal.         Advanced Care Plan  Does patient have advance directive?: Yes       Patient has Advance Directive: Patient has Advance Directive, has not brought in                             PHQ  Over the Past 2 Weeks, Have You Felt Down, Depressed or Hopeless?: no  Over the Past 2 Weeks, Have You Felt Little Interest or Pleasure In Doing Things?: no                                          Mini Cog  Completed: Yes  Score: 5  Result: Negative    Get Up and Go  Result: Pass      STEADI Falls Risk  One or more falls in the last year: No           Has trouble stepping up onto a curb: No   Advised to use a cane or walker to get around safely: No   Often has to rush to the toilet: No   Feels unsteady when walking: No   Has lost some feeling in feet: No   Often feels sad or depressed: No   Steadies self on furniture while walking at home: No   Takes medication that makes him/her feel lightheaded or more tired than usual: No   Worried about falling: No   Takes medicine to sleep or improve mood: No   Needs to push with hands when rising from a chair: No   Falls screen completed: Yes     Hearing and Vision Screening  No exam data present      Immunization History   Administered Date(s) Administered   • Influenza Split High Dose Preservative Free IM 11/03/2011, 11/13/2012, 10/09/2013, 10/30/2014, 11/17/2015, 09/30/2016, 09/25/2017   • Influenza Vaccine Quadrivalent 3 Yr And Older 09/18/2017   • Influenza, Unspecified 10/30/2014, 11/17/2015, 09/30/2016, 09/25/2017   • MMRV 06/05/2014   • Pneumococcal Conjugate 08/09/1996   • Pneumococcal Conjugate 13-Valent 11/17/2015   • Pneumococcal Polysaccharide 10/30/2014   • Tdap 03/22/2012   • Varicella  06/05/2014   • Zoster 01/02/2006, 01/02/2016     Health Maintenance   Topic Date Due   • Medicare Annual Wellness VIsit  06/30/2012   • Annual Falls Risk Screening  06/06/2019   • DTaP, Tdap, and Td Vaccines (2 - Td) 03/22/2022   • HPV Vaccines  Aged Out   • Meningococcal Vaccine  Aged Out   • Zoster Vaccines  Completed   • HIB Vaccines  Aged Out   • IPV Vaccines  Aged Out   • Influenza Vaccine  Completed   • Pneumococcal PPSV23/PCV13 65+ Years/ Low and Medium Risk  Completed           Assessment/Plan   Problem List Items Addressed This Visit        Other    Allergic bronchopulmonary aspergillosis (CMS/[facility]) ([facility]) (Chronic)    Chronic obstructive pulmonary disease (CMS/[facility]) ([facility]) (Chronic)    Medicare annual wellness visit, initial - Primary     The patient is currently stable. Bloodwork was not needed at this time. The patient should walk for 2.5 hours per week. All the recommend vaccinations are up to date. Should get Shingrix at pharmacy. The  patient should be evaluated by the ophthalmologist every year and dentist every 6 months. The patient was advised to protect the skin by applying suntan lotion containing an SPF > 30 every 2 hours while in sun or after swimming. Instructed to avoid prolonged direct sun exposure as much as possible. The patient's body mass index is normal. The patient's gynecological examination and mammogram are no longer needed. The patient's DEXA scan is up to date. The patient's colonoscopy is not needed based upon age. Advised to consume 1000 mg of calcium daily through the diet. If the patient is unable to consume 1000 mg through the diet, then taking calcium supplements is an acceptable alternative. The patient was informed not to consume more than 500 mg of a calcium supplement at a time.                      See Patient Instructions (the written plan) which was given to the patient for PPPS and health risk factors with interventions.

## 2018-06-06 NOTE — PATIENT INSTRUCTIONS
Women's Personalized Prevention Plan Services (PPPS)      Preventive Services Checklist (Assumes Average Risk Unless Otherwise Noted)  Preventive Service Target Population and Frequency Last Done Date Due   Abd Aortic Aneurysm Screening Family history of AAA (covered once)  Not needed   Alcohol Misuse Screening As necessary for those at risk (covered annually) 06/2018 06/2019   Breast Cancer Screening Mammogram every 1-2yrs 50-69yo; every 1-2yrs 35-48yo if patient desires after weighing potential harms and benefits (covered once 35-38yo, annually >=41yo)  Declines at this time   Cholesterol Screening Both risk factors: 1) >=19yo and 2)  increased risk coronary artery disease (covered every 5 years) 11/2015 Not needed   Colorectal Cancer Screening >=51yo: Colonoscopy every 10 years, FIT annually or Cologuard every 3 years (up to 86yo for Cologuard)  Not needed   Diabetes Screening Any 1 risk factor: hypertension, dyslipidemia, obesity, high glucose; or Any 2 risk factors: >=66 yo, overweight, family history diabetes, history gestational diabetes or delivery of infant >9lbs (covered every 6 months) 05/2018 05/2019   Glaucoma Screening Any 1 risk factor: 1) diabetes, 2) family history glaucoma, 3)  >=51yo, 4)  American >=66yo (covered annually) 01/2018 01/2019   Hepatitis C Screening Any 1 risk factor: 1) born between 5292-0010, 2) blood transfusion before 1992, 3) current or past injection drug use (covered once for average risk, annually for high risk)  Not needed   Lung Cancer Screening Low-dose chest CT if all 3 risk factors: 1) 55-78yo, 2) smoker or quit within last 15y, 3) >=30 pack years (covered annually)  Not needed   Osteoporosis Screening >=66yo (covered every 24 months)  <66yo if any 1 risk factor: 1) estrogen deficient and at risk for osteoporosis; 2) established osteoporosis on treatment; 3) x-rays show possible osteoporosis, osteopenia or vertebral  "fractures; 4) on steroid or planning to start; 5) primary hyperparathyroidism (covered as medically necessary) 07/18/2017 07/2019   Sexually Transmitted Diseases (STDs) As necessary chlamydia, gonorrhea, syphilis, hepatitis B (covered annually if not pregnant, more often in pregnancy)  HIV if any 1 risk factor present: 1) <14yo or >64yo and at increased risk, 2) 15-64yo and ask for it, or 3) pregnant (covered annually if not pregnant, up to 3x in pregnancy)  Low risk   Vaccine: Hepatitis B As necessary if medium or high risk (series covered once)  Not needed   Vaccine: Influenza All patients without contraindications (covered annually.) Fall 2017 Fall 2018   Vaccine: Pneumococcal Pneumovax + Prevnar (both covered, >=11 months apart) Prevnar   11/17/2015  Pneumovax  10/30/2014 Done   Vaccine: Zoster (Shingles) >= 59yo without contraindications (covered once by Part D) 1/2006 Should get Shingrix at pharmacy   Other Services TDAP 03/22/2012 03/2022            Women's Personalized Prevention Plan Services (PPPS)              Prints to [facility]                                         Health Risk Factors and Interventions  \"x\" Indicates risk is associated with this patient Risk Factor Recommended Interventions    n/a Obesity     Hypertension Low sodium diet and continue medication   n/a Diabetes    n/a Fall Risk    n/a Tobacco Use     Other:        The patient is currently stable. Bloodwork was not needed at this time. The patient should walk for 2.5 hours per week. All the recommend vaccinations are up to date. Should get Shingrix at pharmacy. The  patient should be evaluated by the ophthalmologist every year and dentist every 6 months. The patient was advised to protect the skin by applying suntan lotion containing an SPF > 30 every 2 hours while in sun or after swimming. Instructed to avoid prolonged direct sun exposure as much as possible. The patient's body mass index is normal. The patient's gynecological examination and " mammogram are no longer needed. The patient's DEXA scan is up to date. The patient's colonoscopy is not needed based upon age. Advised to consume 1000 mg of calcium daily through the diet. If the patient is unable to consume 1000 mg through the diet, then taking calcium supplements is an acceptable alternative. The patient was informed not to consume more than 500 mg of a calcium supplement at a time.

## 2018-06-06 NOTE — ASSESSMENT & PLAN NOTE
The patient is currently stable. Bloodwork was not needed at this time. The patient should walk for 2.5 hours per week. All the recommend vaccinations are up to date. Should get Shingrix at pharmacy. The  patient should be evaluated by the ophthalmologist every year and dentist every 6 months. The patient was advised to protect the skin by applying suntan lotion containing an SPF > 30 every 2 hours while in sun or after swimming. Instructed to avoid prolonged direct sun exposure as much as possible. The patient's body mass index is normal. The patient's gynecological examination and mammogram are no longer needed. The patient's DEXA scan is up to date. The patient's colonoscopy is not needed based upon age. Advised to consume 1000 mg of calcium daily through the diet. If the patient is unable to consume 1000 mg through the diet, then taking calcium supplements is an acceptable alternative. The patient was informed not to consume more than 500 mg of a calcium supplement at a time.

## 2018-07-12 ENCOUNTER — TRANSCRIBE ORDERS (OUTPATIENT)
Dept: LAB | Facility: CLINIC | Age: 83
End: 2018-07-12

## 2018-07-12 ENCOUNTER — APPOINTMENT (OUTPATIENT)
Dept: LAB | Facility: CLINIC | Age: 83
End: 2018-07-12
Attending: DERMATOLOGY
Payer: MEDICARE

## 2018-07-12 DIAGNOSIS — D68.62 LUPUS ANTICOAGULANT SYNDROME (CMS/HCC): ICD-10-CM

## 2018-07-12 DIAGNOSIS — L30.8 OTHER SPECIFIED DERMATITIS (CODE): ICD-10-CM

## 2018-07-12 DIAGNOSIS — I77.6 ARTERITIS (CMS/HCC): ICD-10-CM

## 2018-07-12 DIAGNOSIS — Z11.59 ENCOUNTER FOR SCREENING FOR OTHER VIRAL DISEASES (CODE): ICD-10-CM

## 2018-07-12 DIAGNOSIS — R79.82 ELEVATED C-REACTIVE PROTEIN (CRP): ICD-10-CM

## 2018-07-12 DIAGNOSIS — L40.59 OTHER PSORIATIC ARTHROPATHY: ICD-10-CM

## 2018-07-12 DIAGNOSIS — I77.6 ARTERITIS (CMS/HCC): Primary | ICD-10-CM

## 2018-07-12 LAB
ALBUMIN SERPL-MCNC: 4.4 G/DL (ref 3.4–5)
ALP SERPL-CCNC: 47 IU/L (ref 35–126)
ALT SERPL-CCNC: 23 IU/L (ref 11–54)
ANION GAP SERPL CALC-SCNC: 9 MEQ/L (ref 3–15)
AST SERPL-CCNC: 24 IU/L (ref 15–41)
BACTERIA URNS QL MICRO: ABNORMAL /UL
BILIRUB SERPL-MCNC: 0.4 MG/DL (ref 0.3–1.2)
BILIRUB UR QL STRIP.AUTO: NEGATIVE MG/DL
BUN SERPL-MCNC: 17 MG/DL (ref 8–20)
CALCIUM SERPL-MCNC: 10.4 MG/DL (ref 8.9–10.3)
CAOX CRY URNS QL MICRO: 2 /HPF
CHLORIDE SERPL-SCNC: 108 MMOL/L (ref 98–109)
CLARITY UR REFRACT.AUTO: CLEAR
CO2 SERPL-SCNC: 23 MMOL/L (ref 22–32)
COLOR UR AUTO: YELLOW
CREAT SERPL-MCNC: 0.8 MG/DL (ref 0.6–1.1)
ERYTHROCYTE [DISTWIDTH] IN BLOOD BY AUTOMATED COUNT: 13.7 % (ref 11.7–14.4)
ERYTHROCYTE [SEDIMENTATION RATE] IN BLOOD BY WESTERGREN METHOD: 16 MM/HR
GFR SERPL CREATININE-BSD FRML MDRD: >60 ML/MIN/1.73M*2
GLUCOSE SERPL-MCNC: 110 MG/DL (ref 70–99)
GLUCOSE UR STRIP.AUTO-MCNC: NEGATIVE MG/DL
HCT VFR BLDCO AUTO: 42.2 % (ref 35–45)
HGB BLD-MCNC: 13.6 G/DL (ref 11.8–15.7)
HGB UR QL STRIP.AUTO: ABNORMAL
HYALINE CASTS #/AREA URNS LPF: ABNORMAL /LPF
KETONES UR STRIP.AUTO-MCNC: NEGATIVE MG/DL
LEUKOCYTE ESTERASE UR QL STRIP.AUTO: NEGATIVE
MCH RBC QN AUTO: 30.4 PG (ref 28–33.2)
MCHC RBC AUTO-ENTMCNC: 32.2 G/DL (ref 32.2–35.5)
MCV RBC AUTO: 94.4 FL (ref 83–98)
NITRITE UR QL STRIP.AUTO: NEGATIVE
PDW BLD AUTO: 10.5 FL (ref 9.4–12.3)
PH UR STRIP.AUTO: 6 [PH]
PLATELET # BLD AUTO: 293 K/UL (ref 150–369)
POTASSIUM SERPL-SCNC: 4.7 MMOL/L (ref 3.6–5.1)
PROT SERPL-MCNC: 6.2 G/DL (ref 6–8.2)
PROT UR QL STRIP.AUTO: NEGATIVE
RBC # BLD AUTO: 4.47 M/UL (ref 3.93–5.22)
RBC #/AREA URNS HPF: ABNORMAL /HPF
SODIUM SERPL-SCNC: 140 MMOL/L (ref 136–144)
SP GR UR REFRACT.AUTO: 1.02
SQUAMOUS URNS QL MICRO: 1 /HPF
UROBILINOGEN UR STRIP-ACNC: 0.2 EU/DL
WBC # BLD AUTO: 6.58 K/UL (ref 3.8–10.5)
WBC #/AREA URNS HPF: ABNORMAL /HPF

## 2018-07-12 PROCEDURE — 85302 CLOT INHIBIT PROT C ANTIGEN: CPT

## 2018-07-12 PROCEDURE — 86706 HEP B SURFACE ANTIBODY: CPT

## 2018-07-12 PROCEDURE — 86618 LYME DISEASE ANTIBODY: CPT

## 2018-07-12 PROCEDURE — 81001 URINALYSIS AUTO W/SCOPE: CPT

## 2018-07-12 PROCEDURE — 85305 CLOT INHIBIT PROT S TOTAL: CPT

## 2018-07-12 PROCEDURE — 86038 ANTINUCLEAR ANTIBODIES: CPT

## 2018-07-12 PROCEDURE — 36415 COLL VENOUS BLD VENIPUNCTURE: CPT

## 2018-07-12 PROCEDURE — 83516 IMMUNOASSAY NONANTIBODY: CPT

## 2018-07-12 PROCEDURE — 85027 COMPLETE CBC AUTOMATED: CPT

## 2018-07-12 PROCEDURE — 86431 RHEUMATOID FACTOR QUANT: CPT

## 2018-07-12 PROCEDURE — 85303 CLOT INHIBIT PROT C ACTIVITY: CPT

## 2018-07-12 PROCEDURE — 80053 COMPREHEN METABOLIC PANEL: CPT

## 2018-07-12 PROCEDURE — 86160 COMPLEMENT ANTIGEN: CPT

## 2018-07-12 PROCEDURE — 85305 CLOT INHIBIT PROT S TOTAL: CPT | Mod: 59

## 2018-07-12 PROCEDURE — 85652 RBC SED RATE AUTOMATED: CPT

## 2018-07-12 PROCEDURE — 86803 HEPATITIS C AB TEST: CPT

## 2018-07-12 PROCEDURE — 83516 IMMUNOASSAY NONANTIBODY: CPT | Mod: 59

## 2018-07-13 ENCOUNTER — APPOINTMENT (OUTPATIENT)
Dept: LAB | Facility: HOSPITAL | Age: 83
End: 2018-07-13
Attending: DERMATOLOGY
Payer: MEDICARE

## 2018-07-13 ENCOUNTER — TRANSCRIBE ORDERS (OUTPATIENT)
Dept: LAB | Facility: HOSPITAL | Age: 83
End: 2018-07-13

## 2018-07-13 ENCOUNTER — LAB REQUISITION (OUTPATIENT)
Dept: LAB | Facility: HOSPITAL | Age: 83
End: 2018-07-13
Payer: MEDICARE

## 2018-07-13 DIAGNOSIS — D68.62 LUPUS ANTICOAGULANT SYNDROME (CMS/HCC): ICD-10-CM

## 2018-07-13 DIAGNOSIS — Z11.59 SCREENING FOR VIRAL DISEASE: ICD-10-CM

## 2018-07-13 DIAGNOSIS — I77.6 ARTERITIS (CMS/HCC): Primary | ICD-10-CM

## 2018-07-13 DIAGNOSIS — L30.8 OTHER SPECIFIED DERMATITIS (CODE): ICD-10-CM

## 2018-07-13 DIAGNOSIS — L30.8: ICD-10-CM

## 2018-07-13 DIAGNOSIS — I77.6 ARTERITIS (CMS/HCC): ICD-10-CM

## 2018-07-13 LAB
ANA SER QL IA: NEGATIVE
B BURGDOR AB SER IA-ACNC: 0.23 RATIO
C3 SERPL-MCNC: 89 MG/DL (ref 80–200)
C4 SERPL-MCNC: 17 MG/DL (ref 16–45)
HBV SURFACE AB SER QL: NONREACTIVE MIU/ML
HCV AB SER QL: NONREACTIVE S/CO
RHEUMATOID FACT SERPL-ACNC: <20 IU/ML

## 2018-07-13 PROCEDURE — 82595 ASSAY OF CRYOGLOBULIN: CPT

## 2018-07-13 PROCEDURE — 36415 COLL VENOUS BLD VENIPUNCTURE: CPT

## 2018-07-13 PROCEDURE — 85610 PROTHROMBIN TIME: CPT | Performed by: DERMATOLOGY

## 2018-07-15 LAB — CRYOGLOB SER QL 3D COLD INC: NEGATIVE

## 2018-07-17 LAB
MYELOPEROXIDASE AB SER IA-ACNC: <0.3 U/ML
PROTEINASE3 AB SER IA-ACNC: <0.7 U/ML

## 2018-07-18 LAB
APTT 1H NP PPP: 26.7 SEC (ref 23–35)
APTT PPP: 22 SEC. (ref 23–35)
PROT C AG ACT/NOR PPP IA: 146 % (ref 70–140)
PROT S AG ACT/NOR PPP IA: 132 % (ref 70–140)
PROTHROMBIN TIME: 12.1 SEC. (ref 12.2–14.5)
SCREEN DRVVT: 32 SEC. (ref 31–44)

## 2018-07-19 LAB
PROT C ACT/NOR PPP: 177 % (ref 85–185)
PROT S ACT/NOR PPP: 128 % (ref 65–140)

## 2018-07-20 LAB — LA PPP-IMP: NORMAL

## 2018-08-05 ENCOUNTER — TELEPHONE (OUTPATIENT)
Dept: PRIMARY CARE | Facility: CLINIC | Age: 83
End: 2018-08-05

## 2018-08-06 NOTE — TELEPHONE ENCOUNTER
I called patient she spoke with the on call physician she has sqiggly lines both eyes hasn't had since she has an appt with dr lopez's associate tomorrow.   She just wanted to let you know she will call with an update tomorrow.     Patient called back today she wanted to let you know that she saw the ophthalmologist and she was told she had a Ocular Migraine and no need to call her back.

## 2018-09-13 ENCOUNTER — TELEPHONE (OUTPATIENT)
Dept: PRIMARY CARE | Facility: CLINIC | Age: 83
End: 2018-09-13

## 2018-09-13 PROBLEM — R10.84 GENERALIZED ABDOMINAL PAIN: Status: RESOLVED | Noted: 2018-05-03 | Resolved: 2018-09-13

## 2018-09-13 NOTE — TELEPHONE ENCOUNTER
Patient told that she can use clobetasol cream for the rest of her body for the rash.  She should not apply it to the face.  She can get over-the-counter 1% hydrocortisone cream to use in that area.  If the rash persists and does not resolve should be seen in the office for further evaluation.

## 2018-09-13 NOTE — TELEPHONE ENCOUNTER
Pt has has a rash on her arm ,legs face and chest. This has been going on for 3 days and was told to reach out to her PCP for further attention per .     Pt applied Clobetasol cream 0.05 % to the rash jennifer and that helps . Pt would like a callback

## 2018-10-12 DIAGNOSIS — F41.9 ANXIETY: ICD-10-CM

## 2018-10-15 RX ORDER — LORAZEPAM 1 MG/1
TABLET ORAL
Qty: 30 TABLET | Refills: 5 | Status: SHIPPED | OUTPATIENT
Start: 2018-10-15 | End: 2019-04-23 | Stop reason: SDUPTHER

## 2018-10-16 ENCOUNTER — TRANSCRIBE ORDERS (OUTPATIENT)
Dept: SCHEDULING | Age: 83
End: 2018-10-16

## 2018-10-16 DIAGNOSIS — M47.27 OTHER SPONDYLOSIS WITH RADICULOPATHY, LUMBOSACRAL REGION: Primary | ICD-10-CM

## 2018-10-17 ENCOUNTER — HOSPITAL ENCOUNTER (OUTPATIENT)
Dept: RADIOLOGY | Facility: CLINIC | Age: 83
Discharge: HOME | End: 2018-10-17
Attending: NEUROLOGICAL SURGERY
Payer: MEDICARE

## 2018-10-17 DIAGNOSIS — M47.27 OTHER SPONDYLOSIS WITH RADICULOPATHY, LUMBOSACRAL REGION: ICD-10-CM

## 2018-11-13 ENCOUNTER — TELEPHONE (OUTPATIENT)
Dept: PRIMARY CARE | Facility: CLINIC | Age: 83
End: 2018-11-13

## 2018-11-13 NOTE — TELEPHONE ENCOUNTER
Patient called - She's interested in getting a life alert/medical alert button system installed in her home.     She is asking for your recommendation on which company to use.

## 2018-11-14 NOTE — TELEPHONE ENCOUNTER
I left a detailed message on pt's voice mail One Call Alert 24/7 monitoring [phone] or [employer]   I asked patient to confirm this message and if she has any questions.

## 2018-11-14 NOTE — TELEPHONE ENCOUNTER
Patient called back to confirm that she received your voice mail. She confirmed the 1-800 # and will call to set it up.

## 2019-04-23 DIAGNOSIS — F41.9 ANXIETY: ICD-10-CM

## 2019-04-23 RX ORDER — LORAZEPAM 1 MG/1
TABLET ORAL
Qty: 30 TABLET | Refills: 5 | Status: SHIPPED | OUTPATIENT
Start: 2019-04-23 | End: 2019-10-24 | Stop reason: SDUPTHER

## 2019-04-25 ENCOUNTER — TELEPHONE (OUTPATIENT)
Dept: PRIMARY CARE | Facility: CLINIC | Age: 84
End: 2019-04-25

## 2019-04-25 NOTE — TELEPHONE ENCOUNTER
Patient was calling about the Shingrix   I left a detailed message on patient's cell that she has Medicare and she has to get it at the pharmacy she would have to try several pharmacy because it has been on  backorder for some time .  I asked patient to call and speak with me I will be here to 430 pm.

## 2019-05-02 NOTE — TELEPHONE ENCOUNTER
· I took a call from patient's daughter Shelley Bingham seeing if the prescription for the Shingrix would be faxed today I told her it will be faxed within the next 30 minutes she was fine with that I faxed the prescription I received the conformation I scanned in the chart.

## 2019-05-02 NOTE — TELEPHONE ENCOUNTER
· Patient is requesting a Prescription to be faxed to [facility] to have her Shingrix admiinistered by them.There Fax # [phone].   · I will call her it is faxed and I have received the conifrmation.

## 2019-05-29 ENCOUNTER — TELEPHONE (OUTPATIENT)
Dept: PRIMARY CARE | Facility: CLINIC | Age: 84
End: 2019-05-29

## 2019-05-29 NOTE — TELEPHONE ENCOUNTER
· I received vaccine information today from [facility] she received her Shingrix #1 I needed to clarify the date it was 5-24-19 I spoke with Martha .

## 2019-06-08 ENCOUNTER — HOSPITAL ENCOUNTER (INPATIENT)
Facility: HOSPITAL | Age: 84
LOS: 4 days | Discharge: HOME | DRG: 175 | End: 2019-06-12
Attending: EMERGENCY MEDICINE | Admitting: INTERNAL MEDICINE
Payer: MEDICARE

## 2019-06-08 ENCOUNTER — APPOINTMENT (EMERGENCY)
Dept: RADIOLOGY | Facility: HOSPITAL | Age: 84
DRG: 175 | End: 2019-06-08
Attending: EMERGENCY MEDICINE
Payer: MEDICARE

## 2019-06-08 DIAGNOSIS — R09.02 HYPOXIA: Primary | ICD-10-CM

## 2019-06-08 DIAGNOSIS — J45.51 SEVERE PERSISTENT ASTHMA WITH EXACERBATION: ICD-10-CM

## 2019-06-08 PROBLEM — J45.901 ASTHMA EXACERBATION: Status: ACTIVE | Noted: 2019-06-08

## 2019-06-08 PROBLEM — K21.9 GERD (GASTROESOPHAGEAL REFLUX DISEASE): Status: ACTIVE | Noted: 2019-06-08

## 2019-06-08 LAB
ANION GAP SERPL CALC-SCNC: 12 MEQ/L (ref 3–15)
BASE EXCESS BLDV CALC-SCNC: -2.3 MEQ/L
BASOPHILS # BLD: 0 K/UL (ref 0.01–0.1)
BASOPHILS NFR BLD: 0 %
BNP SERPL-MCNC: 111 PG/ML
BUN SERPL-MCNC: 27 MG/DL (ref 8–20)
CALCIUM SERPL-MCNC: 9.4 MG/DL (ref 8.9–10.3)
CHLORIDE SERPL-SCNC: 106 MEQ/L (ref 98–109)
CO2 BLDV-SCNC: 23.7 MEQ/L (ref 22–32)
CO2 SERPL-SCNC: 20 MEQ/L (ref 22–32)
CREAT SERPL-MCNC: 1 MG/DL
DIFFERENTIAL METHOD BLD: ABNORMAL
EOSINOPHIL # BLD: 0 K/UL (ref 0.04–0.36)
EOSINOPHIL NFR BLD: 0 %
ERYTHROCYTE [DISTWIDTH] IN BLOOD BY AUTOMATED COUNT: 14 % (ref 11.7–14.4)
FIO2 ON VENT: ABNORMAL %
GFR SERPL CREATININE-BSD FRML MDRD: 52.4 ML/MIN/1.73M*2
GLUCOSE SERPL-MCNC: 154 MG/DL (ref 70–99)
HCO3 BLDV-SCNC: 22.5 MEQ/L (ref 21–28)
HCT VFR BLDCO AUTO: 42.2 %
HGB BLD-MCNC: 13.7 G/DL
IMM GRANULOCYTES # BLD AUTO: 0.05 K/UL (ref 0–0.08)
IMM GRANULOCYTES NFR BLD AUTO: 0.6 %
INHALED O2 CONCENTRATION: ABNORMAL %
LYMPHOCYTES # BLD: 0.59 K/UL (ref 1.2–3.5)
LYMPHOCYTES NFR BLD: 7.5 %
MCH RBC QN AUTO: 30.8 PG (ref 28–33.2)
MCHC RBC AUTO-ENTMCNC: 32.5 G/DL (ref 32.2–35.5)
MCV RBC AUTO: 94.8 FL (ref 83–98)
MONOCYTES # BLD: 0.26 K/UL (ref 0.28–0.8)
MONOCYTES NFR BLD: 3.3 %
NEUTROPHILS # BLD: 6.97 K/UL (ref 1.7–7)
NEUTS SEG NFR BLD: 88.6 %
NRBC BLD-RTO: 0 %
PCO2 BLDV: 38 MM HG (ref 41–51)
PDW BLD AUTO: 10 FL (ref 9.4–12.3)
PH BLDV: 7.38 [PH] (ref 7.32–7.42)
PLATELET # BLD AUTO: 304 K/UL
PO2 BLDV: 61 MM HG (ref 25–40)
POTASSIUM SERPL-SCNC: 3.8 MEQ/L (ref 3.6–5.1)
RBC # BLD AUTO: 4.45 M/UL (ref 3.93–5.22)
SODIUM SERPL-SCNC: 138 MEQ/L (ref 136–144)
TROPONIN I SERPL-MCNC: <0.02 NG/ML
WBC # BLD AUTO: 7.87 K/UL

## 2019-06-08 PROCEDURE — 80048 BASIC METABOLIC PNL TOTAL CA: CPT | Performed by: PHYSICIAN ASSISTANT

## 2019-06-08 PROCEDURE — 96374 THER/PROPH/DIAG INJ IV PUSH: CPT

## 2019-06-08 PROCEDURE — 71046 X-RAY EXAM CHEST 2 VIEWS: CPT

## 2019-06-08 PROCEDURE — 99223 1ST HOSP IP/OBS HIGH 75: CPT | Performed by: INTERNAL MEDICINE

## 2019-06-08 PROCEDURE — 99285 EMERGENCY DEPT VISIT HI MDM: CPT | Mod: 25

## 2019-06-08 PROCEDURE — 83880 ASSAY OF NATRIURETIC PEPTIDE: CPT | Performed by: PHYSICIAN ASSISTANT

## 2019-06-08 PROCEDURE — 94640 AIRWAY INHALATION TREATMENT: CPT

## 2019-06-08 PROCEDURE — 84484 ASSAY OF TROPONIN QUANT: CPT | Performed by: PHYSICIAN ASSISTANT

## 2019-06-08 PROCEDURE — 63700000 HC SELF-ADMINISTRABLE DRUG: Performed by: INTERNAL MEDICINE

## 2019-06-08 PROCEDURE — 85025 COMPLETE CBC W/AUTO DIFF WBC: CPT | Performed by: PHYSICIAN ASSISTANT

## 2019-06-08 PROCEDURE — 36415 COLL VENOUS BLD VENIPUNCTURE: CPT | Performed by: PHYSICIAN ASSISTANT

## 2019-06-08 PROCEDURE — 63600000 HC DRUGS/DETAIL CODE: Performed by: PHYSICIAN ASSISTANT

## 2019-06-08 PROCEDURE — 82803 BLOOD GASES ANY COMBINATION: CPT | Performed by: PHYSICIAN ASSISTANT

## 2019-06-08 PROCEDURE — 25000000 HC PHARMACY GENERAL: Performed by: INTERNAL MEDICINE

## 2019-06-08 PROCEDURE — 25000000 HC PHARMACY GENERAL: Performed by: PHYSICIAN ASSISTANT

## 2019-06-08 PROCEDURE — 21400000 HC ROOM AND CARE CCU/INTERMEDIATE

## 2019-06-08 PROCEDURE — 93005 ELECTROCARDIOGRAM TRACING: CPT | Performed by: HOSPITALIST

## 2019-06-08 RX ORDER — FLUTICASONE PROPIONATE 50 MCG
1 SPRAY, SUSPENSION (ML) NASAL DAILY
Status: DISCONTINUED | OUTPATIENT
Start: 2019-06-09 | End: 2019-06-12 | Stop reason: HOSPADM

## 2019-06-08 RX ORDER — BENZONATATE 100 MG/1
100 CAPSULE ORAL EVERY 6 HOURS PRN
Status: DISCONTINUED | OUTPATIENT
Start: 2019-06-08 | End: 2019-06-10

## 2019-06-08 RX ORDER — DEXTROSE 50 % IN WATER (D50W) INTRAVENOUS SYRINGE
25 AS NEEDED
Status: DISCONTINUED | OUTPATIENT
Start: 2019-06-08 | End: 2019-06-12 | Stop reason: HOSPADM

## 2019-06-08 RX ORDER — BENZONATATE 200 MG/1
CAPSULE ORAL
COMMUNITY
Start: 2019-06-08 | End: 2023-08-21

## 2019-06-08 RX ORDER — CETIRIZINE HYDROCHLORIDE 5 MG/1
5 TABLET ORAL DAILY
Status: DISCONTINUED | OUTPATIENT
Start: 2019-06-08 | End: 2019-06-12 | Stop reason: HOSPADM

## 2019-06-08 RX ORDER — ACETAMINOPHEN 650 MG/1
650 SUPPOSITORY RECTAL EVERY 4 HOURS PRN
Status: DISCONTINUED | OUTPATIENT
Start: 2019-06-08 | End: 2019-06-12 | Stop reason: HOSPADM

## 2019-06-08 RX ORDER — DULOXETIN HYDROCHLORIDE 60 MG/1
60 CAPSULE, DELAYED RELEASE ORAL NIGHTLY
Refills: 0 | COMMUNITY
Start: 2019-03-22

## 2019-06-08 RX ORDER — NITROGLYCERIN 0.4 MG/1
0.4 TABLET SUBLINGUAL EVERY 5 MIN PRN
Status: DISCONTINUED | OUTPATIENT
Start: 2019-06-08 | End: 2019-06-12 | Stop reason: HOSPADM

## 2019-06-08 RX ORDER — IPRATROPIUM BROMIDE AND ALBUTEROL SULFATE 2.5; .5 MG/3ML; MG/3ML
3 SOLUTION RESPIRATORY (INHALATION) ONCE
Status: COMPLETED | OUTPATIENT
Start: 2019-06-08 | End: 2019-06-08

## 2019-06-08 RX ORDER — FLUTICASONE PROPIONATE 50 MCG
1 SPRAY, SUSPENSION (ML) NASAL DAILY PRN
COMMUNITY
End: 2023-08-21

## 2019-06-08 RX ORDER — LORAZEPAM 1 MG/1
1 TABLET ORAL NIGHTLY PRN
Status: DISCONTINUED | OUTPATIENT
Start: 2019-06-08 | End: 2019-06-12 | Stop reason: HOSPADM

## 2019-06-08 RX ORDER — CELECOXIB 200 MG/1
200 CAPSULE ORAL 2 TIMES DAILY
Status: DISCONTINUED | OUTPATIENT
Start: 2019-06-08 | End: 2019-06-12 | Stop reason: HOSPADM

## 2019-06-08 RX ORDER — GUAIFENESIN 600 MG/1
1200 TABLET, EXTENDED RELEASE ORAL 2 TIMES DAILY
Status: DISCONTINUED | OUTPATIENT
Start: 2019-06-08 | End: 2019-06-12 | Stop reason: HOSPADM

## 2019-06-08 RX ORDER — ATROPINE SULFATE 0.1 MG/ML
0.5 INJECTION INTRAVENOUS EVERY 5 MIN PRN
Status: DISCONTINUED | OUTPATIENT
Start: 2019-06-08 | End: 2019-06-12 | Stop reason: HOSPADM

## 2019-06-08 RX ORDER — DOCUSATE SODIUM 100 MG/1
100 CAPSULE, LIQUID FILLED ORAL NIGHTLY
Status: DISCONTINUED | OUTPATIENT
Start: 2019-06-08 | End: 2019-06-12 | Stop reason: HOSPADM

## 2019-06-08 RX ORDER — MINERAL OIL
180 ENEMA (ML) RECTAL NIGHTLY
COMMUNITY

## 2019-06-08 RX ORDER — ALENDRONATE SODIUM 70 MG/1
TABLET ORAL
Refills: 11 | COMMUNITY
Start: 2019-04-13 | End: 2019-06-08

## 2019-06-08 RX ORDER — DULOXETIN HYDROCHLORIDE 60 MG/1
60 CAPSULE, DELAYED RELEASE ORAL NIGHTLY
Status: DISCONTINUED | OUTPATIENT
Start: 2019-06-08 | End: 2019-06-12 | Stop reason: HOSPADM

## 2019-06-08 RX ORDER — ACETAMINOPHEN 325 MG/1
650 TABLET ORAL EVERY 4 HOURS PRN
Status: DISCONTINUED | OUTPATIENT
Start: 2019-06-08 | End: 2019-06-12 | Stop reason: HOSPADM

## 2019-06-08 RX ORDER — IPRATROPIUM BROMIDE AND ALBUTEROL SULFATE 2.5; .5 MG/3ML; MG/3ML
3 SOLUTION RESPIRATORY (INHALATION)
Status: DISCONTINUED | OUTPATIENT
Start: 2019-06-08 | End: 2019-06-12 | Stop reason: HOSPADM

## 2019-06-08 RX ORDER — ENOXAPARIN SODIUM 100 MG/ML
40 INJECTION SUBCUTANEOUS
Status: DISCONTINUED | OUTPATIENT
Start: 2019-06-09 | End: 2019-06-09

## 2019-06-08 RX ORDER — ACETAMINOPHEN 650 MG/20.3ML
650 LIQUID ORAL EVERY 4 HOURS PRN
Status: DISCONTINUED | OUTPATIENT
Start: 2019-06-08 | End: 2019-06-12 | Stop reason: HOSPADM

## 2019-06-08 RX ORDER — ALBUTEROL SULFATE 0.83 MG/ML
2.5 SOLUTION RESPIRATORY (INHALATION) ONCE
Status: COMPLETED | OUTPATIENT
Start: 2019-06-08 | End: 2019-06-08

## 2019-06-08 RX ORDER — IBUPROFEN 200 MG
16-32 TABLET ORAL AS NEEDED
Status: DISCONTINUED | OUTPATIENT
Start: 2019-06-08 | End: 2019-06-12 | Stop reason: HOSPADM

## 2019-06-08 RX ORDER — PANTOPRAZOLE SODIUM 40 MG/1
40 TABLET, DELAYED RELEASE ORAL DAILY
Status: DISCONTINUED | OUTPATIENT
Start: 2019-06-09 | End: 2019-06-12 | Stop reason: HOSPADM

## 2019-06-08 RX ORDER — DIPHENHYDRAMINE HCL 25 MG
25 CAPSULE ORAL NIGHTLY
Status: DISCONTINUED | OUTPATIENT
Start: 2019-06-08 | End: 2019-06-12 | Stop reason: HOSPADM

## 2019-06-08 RX ORDER — IPRATROPIUM BROMIDE 0.5 MG/2.5ML
0.5 SOLUTION RESPIRATORY (INHALATION) CONTINUOUS
Status: DISCONTINUED | OUTPATIENT
Start: 2019-06-08 | End: 2019-06-12 | Stop reason: HOSPADM

## 2019-06-08 RX ORDER — DIPHENHYDRAMINE HCL 25 MG
25 CAPSULE ORAL NIGHTLY
COMMUNITY
End: 2022-08-01 | Stop reason: HOSPADM

## 2019-06-08 RX ORDER — CELECOXIB 200 MG/1
CAPSULE ORAL
Refills: 0 | COMMUNITY
Start: 2019-05-20 | End: 2019-06-12 | Stop reason: HOSPADM

## 2019-06-08 RX ORDER — AMLODIPINE BESYLATE 10 MG/1
10 TABLET ORAL DAILY
Status: DISCONTINUED | OUTPATIENT
Start: 2019-06-09 | End: 2019-06-12 | Stop reason: HOSPADM

## 2019-06-08 RX ORDER — DEXTROSE 40 %
15-30 GEL (GRAM) ORAL AS NEEDED
Status: DISCONTINUED | OUTPATIENT
Start: 2019-06-08 | End: 2019-06-12 | Stop reason: HOSPADM

## 2019-06-08 RX ORDER — ALENDRONATE SODIUM 70 MG/1
70 TABLET ORAL WEEKLY
COMMUNITY
End: 2020-01-29 | Stop reason: ALTCHOICE

## 2019-06-08 RX ORDER — BUDESONIDE 0.5 MG/2ML
0.5 INHALANT ORAL
Status: DISCONTINUED | OUTPATIENT
Start: 2019-06-08 | End: 2019-06-12 | Stop reason: HOSPADM

## 2019-06-08 RX ADMIN — DULOXETINE HYDROCHLORIDE 60 MG: 60 CAPSULE, DELAYED RELEASE ORAL at 23:33

## 2019-06-08 RX ADMIN — ALBUTEROL SULFATE 2.5 MG: 2.5 SOLUTION RESPIRATORY (INHALATION) at 17:20

## 2019-06-08 RX ADMIN — DIPHENHYDRAMINE HYDROCHLORIDE 25 MG: 25 CAPSULE ORAL at 23:33

## 2019-06-08 RX ADMIN — CELECOXIB 200 MG: 200 CAPSULE ORAL at 23:33

## 2019-06-08 RX ADMIN — LORAZEPAM 1 MG: 1 TABLET ORAL at 23:33

## 2019-06-08 RX ADMIN — BUDESONIDE 0.5 MG: 0.5 INHALANT RESPIRATORY (INHALATION) at 23:20

## 2019-06-08 RX ADMIN — IPRATROPIUM BROMIDE AND ALBUTEROL SULFATE 3 ML: 2.5; .5 SOLUTION RESPIRATORY (INHALATION) at 18:45

## 2019-06-08 RX ADMIN — IPRATROPIUM BROMIDE AND ALBUTEROL SULFATE 3 ML: 2.5; .5 SOLUTION RESPIRATORY (INHALATION) at 23:21

## 2019-06-08 RX ADMIN — METHYLPREDNISOLONE SODIUM SUCCINATE 60 MG: 125 INJECTION, POWDER, FOR SOLUTION INTRAMUSCULAR; INTRAVENOUS at 17:35

## 2019-06-08 RX ADMIN — DOCUSATE SODIUM 100 MG: 100 CAPSULE, LIQUID FILLED ORAL at 23:33

## 2019-06-08 RX ADMIN — IPRATROPIUM BROMIDE 0.5 MG: 0.5 SOLUTION RESPIRATORY (INHALATION) at 17:20

## 2019-06-08 RX ADMIN — GUAIFENESIN 1200 MG: 600 TABLET, EXTENDED RELEASE ORAL at 23:34

## 2019-06-08 RX ADMIN — BENZONATATE 100 MG: 100 CAPSULE ORAL at 23:34

## 2019-06-08 ASSESSMENT — ENCOUNTER SYMPTOMS
HEMATURIA: 0
CHILLS: 0
FEVER: 0
COLOR CHANGE: 0
DIZZINESS: 0
PALPITATIONS: 0
VOMITING: 0
SHORTNESS OF BREATH: 1
BACK PAIN: 0
SORE THROAT: 0
SEIZURES: 0
ABDOMINAL PAIN: 0
COUGH: 1
DYSURIA: 0
ARTHRALGIAS: 0

## 2019-06-08 ASSESSMENT — COGNITIVE AND FUNCTIONAL STATUS - GENERAL
CLIMB 3 TO 5 STEPS WITH RAILING: 4 - NONE
TOILETING: 4 - NONE
DRESSING REGULAR UPPER BODY CLOTHING: 4 - NONE
WALKING IN HOSPITAL ROOM: 4 - NONE
DRESSING REGULAR LOWER BODY CLOTHING: 4 - NONE
HELP NEEDED FOR BATHING: 4 - NONE
MOVING TO AND FROM BED TO CHAIR: 4 - NONE
STANDING UP FROM CHAIR USING ARMS: 4 - NONE
EATING MEALS: 4 - NONE
HELP NEEDED FOR PERSONAL GROOMING: 4 - NONE

## 2019-06-08 NOTE — ED PROVIDER NOTES
HPI     Chief Complaint   Patient presents with   • Cough       Pt is an 89 y/o female, PMHx of allergic bronchopulmonary aspergillosis, COPD vs asthma- [facility] HTN, STACIA, p/w c/o cough, sob. Pt states she has had 1 week of cough productive of white sputum. She feels SOB at baseline and it is worse w/ exertion. She denies any chest pain, palpitations, dizziness, leg swelling, fever. She has however noticed 10 pound weight gain in past 7 months since starting Celebrex. She takes Fasengra for her asthma IM injection every 2 months but was 3 weeks late to get most recent injection. She is taking tessalon perles and is on 4th day of prednisone 30mg.            Patient History     Past Medical History:   Diagnosis Date   • Allergic bronchopulmonary aspergillosis (CMS/Formerly [facility]) 4/2/2018    Allergist: Dr. Arriaza. Stable FEV1 in 8/2015. IgE levels and eosinophils stable in 8/2015. Managed with Fusenra and Symbicort.   • Allergic rhinitis 4/2/2018    Pulmonologist: Dr. Walker. Managed with Flonase.   • Chronic obstructive pulmonary disease (CMS/[facility]ly [facility]) ([facility]ly [facility]) 4/2/2018    Pulmonologist: Dr. Walker. Seen on PFTs in hospital in 2014. Controlled with Symbicort.   • Hypertension 4/2/2018    Managed on Amlodipine. Advised to follow a low sodium diet and keep BMI < 25. Advised to exercise for 2.5 hours per week. Instructed to take home blood pressure readings and call if consistently above 140/90.    • Insomnia 4/2/2018    Managed with Lorazepam as needed.   • Lumbar spinal stenosis 4/2/2018    Neurosurgeon: Dr. Miguel. MRI with Central and lateral recess spinal stenosis. Has Spondylolisthesis at L4/L5. S/P epidural injection by Dr. Martin with some relief in past. Had Lumbar fusion and Lumbar laminectomy by Dr. Miguel in 4/2015.   • Obstructive sleep apnea syndrome 4/2/2018    Mild STACIA noted in sleep study 7/2015. Treated with nasal CPAP automatic with pressure range 5-10 CM H2O.   • Osteoporosis 4/2/2018     Rheumatologist: Dr. Bradley. Dexa scan 7/2016 with osteoporosis of lumbar spine LS-2.5, LH-2.1, RH -2.2. Has been on Fosamax in the past for 8 years. Worsened by Prednisone in past. Continue vitamin D, calcium and weight bearing exercise. DEXA in 7/2017 with LS -1.7, LH -2.2, and RH -2.0. Next due in 7/2019. Seen by Dr. Bradley in 8/2015 who agreed with initiating Prolia.   • Vertebral compression fracture (CMS/HCC) (HCC)     At T12 level       Past Surgical History:   Procedure Laterality Date   • APPENDECTOMY     • CARPAL TUNNEL RELEASE Bilateral    • CATARACT EXTRACTION W/  INTRAOCULAR LENS IMPLANT Bilateral    • HYSTERECTOMY  1972   • LUMBAR LAMINECTOMY  04/21/2015    S/P lumbar fusion   • TONSILLECTOMY         Family History   Problem Relation Age of Onset   • Glaucoma Mother    • Macular degeneration Mother    • Hypertension Mother    • Lung cancer Father    • Heart disease Brother    • Breast cancer Mother's Sister        Social History   Substance Use Topics   • Smoking status: Never Smoker   • Smokeless tobacco: Never Used   • Alcohol use Yes      Comment: Rarely       Systems Reviewed from Nursing Triage:  Tobacco  Allergies  Meds  Problems  Med Hx  Surg Hx  Soc Hx         Review of Systems     Review of Systems   Constitutional: Negative for chills and fever.   HENT: Negative for ear pain and sore throat.    Eyes: Negative for visual disturbance.   Respiratory: Positive for cough and shortness of breath.    Cardiovascular: Negative for chest pain, palpitations and leg swelling.   Gastrointestinal: Negative for abdominal pain and vomiting.   Genitourinary: Negative for dysuria and hematuria.   Musculoskeletal: Negative for arthralgias and back pain.   Skin: Negative for color change and rash.   Neurological: Negative for dizziness, seizures and syncope.   All other systems reviewed and are negative.       Physical Exam     ED Triage Vitals [06/08/19 1639]   Temp Heart Rate Resp BP SpO2   37.2 °C  (98.9 °F) (!) 105 (!) 28 113/73 (!) 89 %      Temp Source Heart Rate Source Patient Position BP Location FiO2 (%) (Set)   Tympanic -- -- -- --                 Patient Vitals for the past 24 hrs:   BP Temp Temp src Pulse Resp SpO2 Weight   06/08/19 1712 - - - - - 95 % -   06/08/19 1639 113/73 37.2 °C (98.9 °F) Tympanic (!) 105 (!) 28 (!) 89 % 56.7 kg (125 lb)           Physical Exam   Constitutional: She is oriented to person, place, and time. She appears well-developed and well-nourished. No distress.   Mildly tachypneic but speaks in full sentences   HENT:   Head: Normocephalic and atraumatic.   Eyes: Conjunctivae are normal.   Neck: Normal range of motion.   Cardiovascular: Normal rate, regular rhythm and intact distal pulses.    Pulmonary/Chest: No accessory muscle usage. Tachypnea noted. She is in respiratory distress. She has wheezes. She has rhonchi.   Abdominal: Soft. She exhibits no distension and no mass. There is no tenderness.   Musculoskeletal: Normal range of motion. She exhibits no edema, tenderness or deformity.   Neurological: She is alert and oriented to person, place, and time.   No motor deficit   Skin: Skin is warm and dry.   Psychiatric: She has a normal mood and affect. Her behavior is normal.   Nursing note and vitals reviewed.           Procedures    ED Course & MDM     Labs Reviewed   BASIC METABOLIC PANEL - Abnormal        Result Value    Sodium 138      Potassium 3.8      Chloride 106      CO2 20 (*)     BUN 27 (*)     Creatinine 1.0      Glucose 154 (*)     Calcium 9.4      eGFR 52.4 (*)     Anion Gap 12     B-TYPE NATRIURETIC PEPTIDE - Abnormal      (*)    BLOOD GAS, VENOUS - Abnormal     pH, Venous 7.38      pCO2, Venous 38 (*)     pO2, Venous 61 (*)     HCO3, Venous 22.5      Base Excess, Venous -2.3      Source Of Oxygen 2L      TCO2, Venous 23.7      FIO2 2L     DIFF COUNT - Abnormal     Differential Type Auto      nRBC 0.0      Immature Granulocytes 0.6      Neutrophils 88.6       Lymphocytes 7.5      Monocytes 3.3      Eosinophils 0.0      Basophils 0.0      Immature Granulocytes, Absolute 0.05      Neutrophils, Absolute 6.97      Lymphocytes, Absolute 0.59 (*)     Monocytes, Absolute 0.26 (*)     Eosinophils, Absolute 0.00 (*)     Basophils, Absolute 0.00 (*)    TROPONIN I - Normal    Troponin I <0.02     CBC - Normal    WBC 7.87      RBC 4.45      Hemoglobin 13.7      Hematocrit 42.2      MCV 94.8      MCH 30.8      MCHC 32.5      RDW 14.0      Platelets 304      MPV 10.0     CBC AND DIFFERENTIAL    Narrative:     The following orders were created for panel order CBC and differential.  Procedure                               Abnormality         Status                     ---------                               -----------         ------                     CBC[[id number]]                           Normal              Final result               Diff Count[[id number]]                    Abnormal            Final result                 Please view results for these tests on the individual orders.   RAINBOW DRAW PANEL    Narrative:     The following orders were created for panel order [place] Draw Panel.  Procedure                               Abnormality         Status                     ---------                               -----------         ------                     RAINBOW LT BLUE[[id number]]                                   In process                 [place] Blood Culture[[id number]]                             In process                   Please view results for these tests on the individual orders.   RAINBOW LT BLUE   RAINBOW BLOOD CULTURE       X-RAY CHEST 2 VIEWS    (Results Pending)         MDM  5:05 PM  Pt tachypneic with diffuse wheezing/rhonchi seems c/w asthma exacerbation. Will give nebs, iv steroids, and re-assess. No need for BIPAP or NRB on initial eval. Will check labs, ekg, cxr and monitor     7:59 PM  Re-evaluated and pt subjectively feels improved although O2 88-92%  on RA (per daughter baseline high 90's). Agreeable to admission, d/w on call [place], Dr. Gan at [facility], and recommends continuing nebs, steroids prn       Clinical Impressions as of Jun 08 2020   Hypoxia   Severe persistent asthma with exacerbation        Wallace Austin, CECILIO CALLAHAN  06/08/19 2020

## 2019-06-09 ENCOUNTER — APPOINTMENT (INPATIENT)
Dept: RADIOLOGY | Facility: HOSPITAL | Age: 84
DRG: 175 | End: 2019-06-09
Attending: HOSPITALIST
Payer: MEDICARE

## 2019-06-09 PROBLEM — J96.01 ACUTE HYPOXEMIC RESPIRATORY FAILURE (CMS/HCC): Status: ACTIVE | Noted: 2019-06-08

## 2019-06-09 PROBLEM — R09.02 HYPOXIA: Status: RESOLVED | Noted: 2019-06-08 | Resolved: 2019-06-09

## 2019-06-09 PROBLEM — R93.89 ABNORMAL CHEST X-RAY: Status: ACTIVE | Noted: 2019-06-09

## 2019-06-09 LAB
ATRIAL RATE: 101
P AXIS: 52
PR INTERVAL: 124
QRS DURATION: 78
QT INTERVAL: 352
QTC CALCULATION(BAZETT): 456
R AXIS: 11
T WAVE AXIS: 29
VENTRICULAR RATE: 101

## 2019-06-09 PROCEDURE — 99233 SBSQ HOSP IP/OBS HIGH 50: CPT | Performed by: HOSPITALIST

## 2019-06-09 PROCEDURE — 200200 PR NO CHARGE: Performed by: HOSPITALIST

## 2019-06-09 PROCEDURE — 25000000 HC PHARMACY GENERAL: Performed by: INTERNAL MEDICINE

## 2019-06-09 PROCEDURE — 63700000 HC SELF-ADMINISTRABLE DRUG: Performed by: HOSPITALIST

## 2019-06-09 PROCEDURE — 21400000 HC ROOM AND CARE CCU/INTERMEDIATE

## 2019-06-09 PROCEDURE — 63600000 HC DRUGS/DETAIL CODE: Performed by: INTERNAL MEDICINE

## 2019-06-09 PROCEDURE — 63600105 HC IODINE BASED CONTRAST: Performed by: HOSPITALIST

## 2019-06-09 PROCEDURE — 71260 CT THORAX DX C+: CPT

## 2019-06-09 PROCEDURE — 63700000 HC SELF-ADMINISTRABLE DRUG: Performed by: INTERNAL MEDICINE

## 2019-06-09 RX ADMIN — IPRATROPIUM BROMIDE AND ALBUTEROL SULFATE 3 ML: 2.5; .5 SOLUTION RESPIRATORY (INHALATION) at 20:32

## 2019-06-09 RX ADMIN — BENZONATATE 100 MG: 100 CAPSULE ORAL at 11:26

## 2019-06-09 RX ADMIN — LORAZEPAM 1 MG: 1 TABLET ORAL at 22:01

## 2019-06-09 RX ADMIN — APIXABAN 10 MG: 5 TABLET, FILM COATED ORAL at 15:00

## 2019-06-09 RX ADMIN — AMLODIPINE BESYLATE 10 MG: 10 TABLET ORAL at 11:29

## 2019-06-09 RX ADMIN — DULOXETINE HYDROCHLORIDE 60 MG: 60 CAPSULE, DELAYED RELEASE ORAL at 22:01

## 2019-06-09 RX ADMIN — APIXABAN 10 MG: 5 TABLET, FILM COATED ORAL at 20:31

## 2019-06-09 RX ADMIN — IPRATROPIUM BROMIDE AND ALBUTEROL SULFATE 3 ML: 2.5; .5 SOLUTION RESPIRATORY (INHALATION) at 04:30

## 2019-06-09 RX ADMIN — PANTOPRAZOLE SODIUM 40 MG: 40 TABLET, DELAYED RELEASE ORAL at 11:25

## 2019-06-09 RX ADMIN — METHYLPREDNISOLONE SODIUM SUCCINATE 30 MG: 40 INJECTION, POWDER, FOR SOLUTION INTRAMUSCULAR; INTRAVENOUS at 01:21

## 2019-06-09 RX ADMIN — IOHEXOL 75 ML: 300 INJECTION, SOLUTION INTRAVENOUS at 12:00

## 2019-06-09 RX ADMIN — FLUTICASONE PROPIONATE 1 SPRAY: 50 SPRAY, METERED NASAL at 11:26

## 2019-06-09 RX ADMIN — BENZONATATE 100 MG: 100 CAPSULE ORAL at 22:04

## 2019-06-09 RX ADMIN — GUAIFENESIN 1200 MG: 600 TABLET, EXTENDED RELEASE ORAL at 11:27

## 2019-06-09 RX ADMIN — DIPHENHYDRAMINE HYDROCHLORIDE 25 MG: 25 CAPSULE ORAL at 22:01

## 2019-06-09 RX ADMIN — GUAIFENESIN 1200 MG: 600 TABLET, EXTENDED RELEASE ORAL at 20:32

## 2019-06-09 RX ADMIN — METHYLPREDNISOLONE SODIUM SUCCINATE 30 MG: 40 INJECTION, POWDER, FOR SOLUTION INTRAMUSCULAR; INTRAVENOUS at 15:01

## 2019-06-09 RX ADMIN — IPRATROPIUM BROMIDE AND ALBUTEROL SULFATE 3 ML: 2.5; .5 SOLUTION RESPIRATORY (INHALATION) at 15:02

## 2019-06-09 RX ADMIN — BUDESONIDE 0.5 MG: 0.5 INHALANT RESPIRATORY (INHALATION) at 20:32

## 2019-06-09 RX ADMIN — CELECOXIB 200 MG: 200 CAPSULE ORAL at 11:27

## 2019-06-09 RX ADMIN — BUDESONIDE 0.5 MG: 0.5 INHALANT RESPIRATORY (INHALATION) at 11:35

## 2019-06-09 RX ADMIN — IPRATROPIUM BROMIDE AND ALBUTEROL SULFATE 3 ML: 2.5; .5 SOLUTION RESPIRATORY (INHALATION) at 11:35

## 2019-06-09 RX ADMIN — CELECOXIB 200 MG: 200 CAPSULE ORAL at 20:32

## 2019-06-09 NOTE — NURSING NOTE
Patient's daughter and granddaughter at bedside. Family members had many questions about diagnosis and treatment for patient. Ok per patient to discuss plan of care. Written education reviewed on pulmonary embolisms and Eliquis. Reviewed plan of care. All questions answered. Patient's daughter verbalized that she was appreciative and had a good understanding of the patient's situation. Encouraged patient and family members to reach out if they had any further questions.

## 2019-06-09 NOTE — ASSESSMENT & PLAN NOTE
Follows with Dr. Miguel, neurosurgery, and Dr. You, pain management  Continue CELEBREX and CYMBALTA

## 2019-06-09 NOTE — PLAN OF CARE
Problem: Patient Care Overview  Goal: Plan of Care Review  Outcome: Ongoing (interventions implemented as appropriate)   06/09/19 1956   Coping/Psychosocial   Plan Of Care Reviewed With patient   Plan of Care Review   Progress no change   Outcome Summary CT chest RLL PE. See provider orders for eliquis initiation       Problem: Chronic Obstructive Pulmonary Disease (Adult)  Goal: Signs and Symptoms of Listed Potential Problems Will be Absent, Minimized or Managed (Chronic Obstructive Pulmonary Disease)  Outcome: Ongoing (interventions implemented as appropriate)      Problem: Asthma (Adult)  Goal: Signs and Symptoms of Listed Potential Problems Will be Absent, Minimized or Managed (Asthma)  Outcome: Ongoing (interventions implemented as appropriate)

## 2019-06-09 NOTE — PROGRESS NOTES
"   Hospital Medicine Service -  Daily Progress Note       SUBJECTIVE   Interval History: Pt reports feeling better than on admission. She says she sounded \"like a train\" on arrival to ED. Denies dyspnea at rest currently     OBJECTIVE      Vital signs in last 24 hours:  Temp:  [36.3 °C (97.4 °F)-37.2 °C (98.9 °F)] 37 °C (98.6 °F)  Heart Rate:  [] 108  Resp:  [16-28] 22  BP: (113-163)/(68-85) 163/78  No intake or output data in the 24 hours ending 06/09/19 1016    PHYSICAL EXAMINATION      Physical Exam   Constitutional: No distress.   HENT:   Head: Normocephalic and atraumatic.   Neck: Neck supple.   Cardiovascular: Normal heart sounds.    tachycardic   Pulmonary/Chest: Effort normal. No respiratory distress.   Exp wheezes and rhonchi  No rales  No distress   Abdominal: Soft. Bowel sounds are normal. There is no tenderness.   Musculoskeletal: She exhibits no edema.   Neurological: She is alert.   tremulous   Skin: Skin is warm and dry. No rash noted.   Nursing note and vitals reviewed.     LINES, CATHETERS, DRAINS, AIRWAYS, AND WOUNDS   Lines, Drains, Airways, Wounds:  Peripheral IV 06/08/19 Left Hand (Active)   Number of days: 1       Comments:      LABS / IMAGING / TELE      Labs  I have reviewed the patient's labs to the time of note. No new clinical concern.    Imaging  I have independently reviewed the pertinent imaging from the last 24 hrs.    ECG/Telemetry  Patient is not on telemetry.    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, known to Dr Arriaza at [place]  Recently discontinued symbicort by Dr Arriaza   Doesn't have rescue inhaler or nebs at home - will consult CM for neb  Continue DUONEB QID, SOLUMEDROL 30mg q 12hrs, PULMICORT BID, MUCINEX BID, TESSALON PERELES prn     Abnormal chest x-ray   Assessment & Plan    Will get CT chest to eval " abnormality on CXR  Sputum culture  Hold antibiotics unless she spikes a fever, develops leukocytosis or condition worsens despite above treatment plan     GERD (gastroesophageal reflux disease)   Assessment & Plan    Continue PPI     Lumbar spinal stenosis   Assessment & Plan    Follows with Dr. Miguel, neurosurgery, and Dr. You, pain management  Continue CELEBREX and CYMBALTA       Essential hypertension   Assessment & Plan    Continue AMLODIPINE     Allergic rhinitis   Assessment & Plan    Continue H1B and Benadryl          VTE Assessment: [place] VTE Score: 4  VTE Prophylaxis Plan: lovenox  Code Status: Full Code  Estimated Discharge Date: 6/10/2019  Disposition Planning: to home in 1-2 days     Ly Kapadia, DO  6/9/2019

## 2019-06-09 NOTE — CONSULTS
The patient is a 88-year-old female who was admitted to [facility] yesterday.  At that time she presented to the emergency room with increasing shortness of breath associated with a cough.    Over the course the last week the patient is noted increasing fatigue.  She also is noted an increase in her shortness of breath with less activity.  She noted a cough which was productive of a clear phlegm.  She denied coughing of blood.  She denied any fevers ..  She denied any chest pain or palpitations.   She contacted her pulmonologist, Dr. Arriaza who referred her to the emergency room her symptoms did not improve.  The patient had some steroids at home from prior therapy, she self medicated with some steroids without improvement     She then using Symbicort.  This had been   held 1 month  ago   by Dr Arriaza.  Again without improvement Therefore, due to the shortness of breath, cough and some audible wheezing she presented to the ER for evaluation in the emergency room a chest x-ray was obtained.  This revealed a nodular opacity in the left.  She then underwent a CT scan of the chest this morning.  This revealed an acute proximal segmental right upper lobe pulmonary embolism with low clot burden.  There was no evidence of right heart strain.  There are healing rib fractures with callus formation.  There was a nonspecific new 12 mm superior segment right lower lobe groundglass focus.    The patient denied coughing of blood.  She denied any chest pain on deep inspiration.  She denied any prior history of bleeding or clotting disorders.  She denied any known family history of bleeding or clotting disorders.  She states she had been ambulatory and at her baseline level of activity.  She does not take any hormonal therapy .,She denied any recent travel history    Due to the PE we are asked to evaluate the patient    PMH-allergic rhinitis, ABPA, hypertension, spinal stenosis, sleep apnea, osteoporosis    PSH-status post  appendectomy, status post bilateral carpal tunnel repair, status post bilateral cataracts, status post hysterectomy, status post lumbar laminectomy, status post tonsillectomy    SH-The patient lives alone with assistance from her children.  She denies any tobacco use.  She has   at least one glass of wine daily.  She is a retired teacher    FH-Mother  at age 97 of old age.  Father  of lung cancer with a history of tobacco use.  One brother alive with heart disease.  One brother .  One daughter  of heart disease.  Five children alive and well.  There is no extended family history of blood clotting disorders that she   is aware of.    Allergies- morphine, oxycodone, mepolizimib    Medications-Norvasc 10 mg daily, Eliquis 10 mg twice daily, Pulmicort, Celebrex 200 mg twice daily, Zyrtec 5 mg daily, Benadryl 25 mg at bedtime, Colace 100 mg daily Cymbalta 60 mg at bedtime Flonase, Mucinex 1200 mg twice daily, duo nebs, Solu-Medrol, Protonix 40 mg daily    Review of systems-markable for the increasing shortness of breath.  Has been associated with cough productive of a clear phlegm.  This is increased over the last week.  She also notes increasing fatigue.  She denies any blurry vision or double vision.  She denies any change in her hearing.  She denies coughing of blood.  She denies any chest pain or palpitations.  She denies any difficulty swallowing.  She denies any nausea or vomiting.  She denies any abdominal pain or cramping.  She denies any bright red blood per rectum or black tarry stool.  She denies any change in the caliber of her stool.  She denies any difficulty urinating or burning on urination.  She denies seeing any urine.  She denies any loss of bowel or bladder control.  She denies any weight loss.  She denies any bruising or bleeding.  She denies any ravel history.  She denies any leg swelling.  She denies any history of trauma.  She has not been sedentary otherwise  unremarkable    Physical exam awake and alert female looking younger than her stated age.  Skin: Warm dry.  No rash.  No petechiae.  No ecchymoses  HEENT: Sclera white conjunctiva pink.  No thrush  Heart: Regular rate and rhythm  Lungs: Without rales rhonchi or wheeze  Abdomen: Soft.  Bowel sounds present.  Rotund.  No hepatosplenomegaly.  Incisions well-healed No tenderness.  No rebound  Extremities: Without cyanosis clubbing or edema.  No tenderness.  No cords  Back: Without CVA or spinal tenderness.  Well-healed incision  Lymphatic: Without enlargements in the axillary inguinal cervical or supraclavicular spaces  Breast: Without masses or discharge  Neurologic: Nonfocal    Laboratory data-sodium 138 potassium 3.8 chloride 106 CO2 20 BUN 27 creatinine 1.0 glucose 154 calcium 9.4 ammonia <0,02  WBC 7.87 hemoglobin 13.7 hematocrit 42.2 MCV 95 4000.  88 segs 7 lymphs 3 mono    Problem #1 pulmonary embolism-the etiology is not clear.  She had not been sedentary   There is no history of trauma.  She had not taken hormonal therapy.  There is no family history    Therefore, we will obtain a hypercoagulable work-up for an otherwise unprovoked clot.  She has been placed on liquids as per the [facility] physician.  This seems appropriate.  I would obtain a baseline echocardiogram although there is no evidence of heart strain on the CT scan.  I would obtain a doppler ultrasound of her lower extremities to rule out a DVT    At the minimum the patient would be recommended to take 6 months of anticoagulation pending the further work-up.  She will however discuss this further with her primary pulmonologist Dr. Arriaza   I would consider a follow-up CT scan at a 4 to 6-month interval..The patient was informed that should she ever have a second clot she would likely need lifelong anticoagulation.    If she is ever in a prolonged sedentary position she should notify 1 of her physicians for pulse prophylactic intervention    Problem  #2ABPA-remains on therapy as per Dr. Arriaza at [facility] with fasema     Problem #3 shortness of breath-he has been placed on steroids.  She remains on inhalers as well.  She is on Mucinex.    Problem #4 history of spinal stenosis-undergoing surgical intervention.  I would have her out of bed as tolerated.    Problem #5 osteoporosis-this managed by her primary physician team.    Problem #6 elevated glucose-we will need to be monitored carefully with the addition of steroids    Problem #7 elevated BUN-encourage oral hydration.    Problem #8 fatigue-this likely exacerbated by the PE.  Will be monitored.  I would have a physical therapy evaluation when she has stabilized    Problem #9 level of care-the patient is full code

## 2019-06-09 NOTE — H&P
Hospital Medicine Service -  History & Physical        CHIEF COMPLAINT   SOB     HISTORY OF PRESENT ILLNESS      Madelyn Ruiz is a 88 y.o. female with a past medical history of ABPA, allergic rhinitis, asthma follows with Dr. Arriaza at [place] and gets Faserna every other month, HTN, spinal stenosis who presents with SOB.    Patient's daughter and son-in-law are bedside.  Patient states that all week she has been coughing and wheezing.  The cough is productive of clear sputum.  She says she feels very tired.  Per daughter she has had shortness of breath with light activity.  Denies any fevers and has occasional chills.  On Wednesday she started herself on leftover prednisone 3-10 mg tablets daily for the past 4 days and Tessalon Perles.  They attempted to call Dr. Arriaza's office yesterday but was unable to reach anyone.    Patient states that she saw Dr. Arriaza about a month ago around May 13.  At that time she had PFTs done and Dr. Arriaza took her off of Symbicort saying he thought it had no therapeutic value at the dose she was that but she could use it as needed.  She also received her Faserna injection but it was 3 weeks late due to insurance issues.  She says a week after stopping the Symbicort she felt like she needed with oxygen when breathing so she started to take Symbicort again but not on a consistent basis and she saw a small difference.  She does have a rescue inhaler but has  so did not use that.  She came in today because of continued shortness of breath and wheezing.    She denies chest pain.  Denies abdominal pain, nausea/vomiting/diarrhea, dark or tarry or bloody stools or urinary symptoms.  She has had a headache since her shortness of breath started.  She also knows she right ear pain with coughing which has improved.    Last time she was on steroids was last .    In ED she was given neb treatment and 60 mg of IV Solu-Medrol.  She states that audible wheezing and shortness of  breath has improved.    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 Arriaza. Stable FEV1 in 8/2015. IgE levels and eosinophils stable in 8/2015. Managed with Faserna and Symbicort.   • Allergic rhinitis 4/2/2018    Pulmonologist: Dr. Walker. Managed with Flonase.   • Asthma     per Dr. Ray Arriaza [facility]. Gets Faserna injections every other month   • Chronic obstructive pulmonary disease (CMS/HCC) ([facility]) 4/2/2018    Pulmonologist: Dr. Walker. Seen on PFTs in hospital in 2014. Controlled with Symbicort.   • Hypertension 4/2/2018    Managed on Amlodipine. Advised to follow a low sodium diet and keep BMI < 25. Advised to exercise for 2.5 hours per week. Instructed to take home blood pressure readings and call if consistently above 140/90.    • Insomnia 4/2/2018    Managed with Lorazepam as needed.   • Lumbar spinal stenosis 4/2/2018    Neurosurgeon: Dr. Miguel. MRI with Central and lateral recess spinal stenosis. Has Spondylolisthesis at L4/L5. S/P epidural injection by Dr. Martin with some relief in past. Had Lumbar fusion and Lumbar laminectomy by Dr. Miguel in 4/2015.   • Obstructive sleep apnea syndrome 4/2/2018    Mild STACIA noted in sleep study 7/2015. Treated with nasal CPAP automatic with pressure range 5-10 CM H2Ox couple months. Off now   • Osteoporosis 4/2/2018    Rheumatologist: Dr. Bradley. Dexa scan 7/2016 with osteoporosis of lumbar spine LS-2.5, LH-2.1, RH -2.2. Has been on Fosamax in the past for 8 years. Worsened by Prednisone in past. Continue vitamin D, calcium and weight bearing exercise. DEXA in 7/2017 with LS -1.7, LH -2.2, and RH -2.0. Next due in 7/2019. Seen by Dr. Bradley in 8/2015 who agreed with initiating Prolia.   • Vertebral compression fracture (CMS/[facility]) ([facility])     At T12 level       Past Surgical History:   Procedure Laterality Date   • APPENDECTOMY     •  CARPAL TUNNEL RELEASE Bilateral    • CATARACT EXTRACTION W/  INTRAOCULAR LENS IMPLANT Bilateral    • HYSTERECTOMY  1972   • LUMBAR LAMINECTOMY  04/21/2015    S/P lumbar fusion   • TONSILLECTOMY         PCP: Henry Linares MD    MEDICATIONS      Prior to Admission medications      Prescriptions Prior to Admission   Medication Sig Dispense Refill Last Dose   • alendronate (FOSAMAX) 70 mg tablet Take 70 mg by mouth once a week. Take on an empty stomach and do not lie down for the next 30 min. On WEdnesdays      • benzonatate (TESSALON) 200 mg capsule TAKE 1 CAPSULE BY MOUTH 3 TIMES DAILY AS NEEDED FOR COUGH.      • diphenhydrAMINE (BENADRYL) 25 mg capsule Take 25 mg by mouth nightly.      • fexofenadine (ALLEGRA) 180 mg tablet Take 180 mg by mouth daily.      • fluticasone propionate (FLONASE) 50 mcg/actuation nasal spray Administer 1 spray into each nostril daily.      • amLODIPine (NORVASC) 10 mg tablet Take 10 mg by mouth daily.      • calcium carbonate (calcium carbonate) 600 mg calcium (1,500 mg) tablet Take 1 tablet by mouth 2 (two) times a day.      • celecoxib (celeBREX) 200 mg capsule TAKE 1 CAPSULE(S) TWICE A DAY BY ORAL ROUTE WITH MEALS.  0    • cholecalciferol, vitamin D3, (VITAMIN D3 ORAL) Take by mouth daily.        • docusate sodium (COLACE) 100 mg capsule Take 100 mg by mouth nightly.        • DULoxetine (CYMBALTA) 60 mg capsule TAKE 1 CAPSULE BY MOUTH EVERYDAY AT BEDTIME  0    • esomeprazole (NexIUM) 20 mg capsule Take 20 mg by mouth daily. Take 30 minutes prior to meal.      • FOLIC ACID/MULTIVIT,IRON, (CENTRUM ORAL) Take 1 tablet by mouth daily.      • LORazepam (ATIVAN) 1 mg tablet TAKE 1 TABLET BY MOUTH EVERY DAY AT BEDTIME AS NEEDED 30 tablet 5          ALLERGIES      Mepolizumab; Morphine; and Oxycodone    FAMILY HISTORY      Family History   Problem Relation Age of Onset   • Glaucoma Mother    • Macular degeneration Mother    • Hypertension Mother    • Lung cancer Father    • Heart disease  Brother    • Breast cancer Mother's Sister        SOCIAL HISTORY      Social History     Social History   • Marital status:      Spouse name: N/A   • Number of children: N/A   • Years of education: N/A     Occupational History   • Retired      Former Teacher     Social History Main Topics   • Smoking status: Never Smoker   • Smokeless tobacco: Never Used   • Alcohol use Yes      Comment: Rarely   • Drug use: No   • Sexual activity: Not Asked     Other Topics Concern   • None     Social History Narrative   • None       REVIEW OF SYSTEMS      All other systems reviewed and negative except as noted in HPI    PHYSICAL EXAMINATION      Temp:  [37.2 °C (98.9 °F)] 37.2 °C (98.9 °F)  Heart Rate:  [105-110] 110  Resp:  [22-28] 22  BP: (113-134)/(68-73) 134/68  Body mass index is 23.62 kg/m².    Physical Exam   Constitutional: She is oriented to person, place, and time. She appears well-developed and well-nourished. No distress.   HENT:   Head: Normocephalic and atraumatic.   Mouth/Throat: Oropharynx is clear and moist. No oropharyngeal exudate.   Eyes: Pupils are equal, round, and reactive to light. Conjunctivae and EOM are normal. No scleral icterus.   Neck: Normal range of motion. Neck supple. No JVD present.   Cardiovascular: Regular rhythm, normal heart sounds and intact distal pulses.    No murmur heard.  Pulmonary/Chest: No respiratory distress. She has wheezes. She has no rales.   Bilateral mild inspiratory and expiratory wheezes   Abdominal: Soft. Bowel sounds are normal. She exhibits no distension. There is no tenderness. There is no rebound and no guarding.   Musculoskeletal: Normal range of motion. She exhibits no edema.   Neurological: She is alert and oriented to person, place, and time. No cranial nerve deficit.   No focal deficits   Skin: Skin is warm and dry.   Psychiatric: She has a normal mood and affect.   Nursing note and vitals reviewed.      LABS / IMAGING / EKG        Labs  I have reviewed the  patient's pertinent labs.  Significant abnormals are BUN 27, .    Imaging  I have independently reviewed the patient's pertinent imaging for this hospital visit.  CXR reviewed by me- no focal lung opacities. Heart size normal     ECG/Telemetry  I have independently reviewed the ECG.   June 8 2019 16:57PM- Sinus tachy 101bpm. Normal intervals. No acute ischemic changes    ASSESSMENT AND PLAN           Hypoxia   Assessment & Plan    A: SpO2 89% on RA at rest.   On 2L - 3L NC sating ~ 97%    P:   Wean off O2 as tolerated.  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  Likely exacerbation multifactorial- received Fasenra 3 weeks late, taken off SYMBICORT in mid May and later with intermittent use, also recent weather changes.   Doesn't have rescue inhaler or nebs at home    CXR- no focal opacities noted  Pulse ox on RA at rest- 89 %    P:  Continue nebs- DUONEB QID  SOLUMEDROL 30mg q 12hrs  PULMICORT BID  MUCINEX BID  TESSALON PERELES prn    Advised patient to get new Rx for ALBUTEROL inhaler and neb to use at home if needed.  Continue to follow up with Dr. Arriaza        Essential hypertension   Assessment & Plan    Continue AMLODIPINE     Allergic rhinitis   Assessment & Plan    Continue H1B and Benadryl     GERD (gastroesophageal reflux disease)   Assessment & Plan    Continue PPI     Lumbar spinal stenosis   Assessment & Plan    Follows with Dr. Miguel, neurosurgery, and Dr. You, pain management    Continue CELEBREX and CYMBALTA  Patient concerned about taking TYLENOL because she was told not to take it with use of CELEBREX. She also was told not to take NSAIDS. I advised her TYLENOL is ok to take but she can confirm with   Avelino if she is concerned          VTE Assessment: Padua VTE Score: 4  VTE Prophylaxis Plan: LOVENOX  Code Status: Full Code  Estimated Discharge Date: 6/10/2019  Disposition Plannin to 2 day stay     Dorothy Mcintosh DO  2019

## 2019-06-09 NOTE — PROGRESS NOTES
Called by radiology for results of CT chest. Pt with RLL pulmonary embolism, moderate size with no evidence of heart strain. Abnormality on CXR likely anterior rib fracture with callous formation. Results reviewed with pt and we discussed starting a blood thinner. Will start eliquis 10 mg BID x 7 days for loading dose and consult hematology. Will hold off on echocardiogram unless recommended by heme

## 2019-06-09 NOTE — ED ATTESTATION NOTE
I have personally seen and examined the patient.  I reviewed and agree with physician assistant / nurse practitioner’s assessment and plan of care, with the following exceptions: None  My examination, assessment, and plan of care of Madelyn Ruiz is as follows:      Hx eosinophilic asthma, pulm at [facility]  Doing well until one week ago  Started with productive cough, wheezing, tachypnea, fatigue  Started self on tessalon and steroids Wednesday  NO resolution- comes to ED for worsening exertional dyspnea, wheezing  Hypoxia upon arrival  Diffuse wheeze  RRR  NO LE edema    CXR- no infiltrate  Labs noted  Started on steroids, nebs with some improvement, but not resolution  Will require oxygen support, frequent nebs, steroids.    I was physically present for the key/critical portions of the following procedures: None       Erika Huber DO  06/08/19 2052

## 2019-06-09 NOTE — ASSESSMENT & PLAN NOTE
Will get CT chest to eval abnormality on CXR  Sputum culture  Hold antibiotics unless she spikes a fever, develops leukocytosis or condition worsens despite above treatment plan

## 2019-06-09 NOTE — ASSESSMENT & PLAN NOTE
A: SpO2 89% on RA at rest.   On 2L - 3L NC sating ~ 97%    P:   Wean off O2 as tolerated.  Check ambulatory pulse ox on room air prior to discharge

## 2019-06-09 NOTE — PLAN OF CARE
Problem: Patient Care Overview  Goal: Plan of Care Review  Outcome: Ongoing (interventions implemented as appropriate)   06/09/19 0511   Coping/Psychosocial   Plan Of Care Reviewed With patient   Plan of Care Review   Progress no change   Outcome Summary vss; denies pain; 2L NC; iv steroids; nebs; independent in room       Problem: Chronic Obstructive Pulmonary Disease (Adult)  Goal: Signs and Symptoms of Listed Potential Problems Will be Absent, Minimized or Managed (Chronic Obstructive Pulmonary Disease)  Outcome: Ongoing (interventions implemented as appropriate)      Problem: Asthma (Adult)  Goal: Signs and Symptoms of Listed Potential Problems Will be Absent, Minimized or Managed (Asthma)  Outcome: Ongoing (interventions implemented as appropriate)

## 2019-06-09 NOTE — ASSESSMENT & PLAN NOTE
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

## 2019-06-10 PROBLEM — I26.99 PULMONARY EMBOLISM (CMS/HCC): Status: ACTIVE | Noted: 2019-06-09

## 2019-06-10 LAB
ANION GAP SERPL CALC-SCNC: 14 MEQ/L (ref 3–15)
BUN SERPL-MCNC: 22 MG/DL (ref 8–20)
CALCIUM SERPL-MCNC: 9.3 MG/DL (ref 8.9–10.3)
CHLORIDE SERPL-SCNC: 105 MEQ/L (ref 98–109)
CO2 SERPL-SCNC: 20 MEQ/L (ref 22–32)
CREAT SERPL-MCNC: 0.8 MG/DL
ERYTHROCYTE [DISTWIDTH] IN BLOOD BY AUTOMATED COUNT: 14.2 % (ref 11.7–14.4)
F2 C.20210G>A GENO BLD/T: NORMAL
F5 P.R506Q BLD/T QL: NORMAL
GFR SERPL CREATININE-BSD FRML MDRD: >60 ML/MIN/1.73M*2
GLUCOSE SERPL-MCNC: 82 MG/DL (ref 70–99)
HCT VFR BLDCO AUTO: 41.3 %
HGB BLD-MCNC: 13.6 G/DL
MCH RBC QN AUTO: 31.4 PG (ref 28–33.2)
MCHC RBC AUTO-ENTMCNC: 32.9 G/DL (ref 32.2–35.5)
MCV RBC AUTO: 95.4 FL (ref 83–98)
PDW BLD AUTO: 9.7 FL (ref 9.4–12.3)
PLATELET # BLD AUTO: 296 K/UL
POTASSIUM SERPL-SCNC: 4.4 MEQ/L (ref 3.6–5.1)
RBC # BLD AUTO: 4.33 M/UL (ref 3.93–5.22)
SODIUM SERPL-SCNC: 139 MEQ/L (ref 136–144)
TSH SERPL DL<=0.05 MIU/L-ACNC: 0.32 MIU/L (ref 0.34–5.6)
WBC # BLD AUTO: 10.29 K/UL

## 2019-06-10 PROCEDURE — 85613 RUSSELL VIPER VENOM DILUTED: CPT | Performed by: INTERNAL MEDICINE

## 2019-06-10 PROCEDURE — 63700000 HC SELF-ADMINISTRABLE DRUG: Performed by: HOSPITALIST

## 2019-06-10 PROCEDURE — 99233 SBSQ HOSP IP/OBS HIGH 50: CPT | Performed by: HOSPITALIST

## 2019-06-10 PROCEDURE — 21400000 HC ROOM AND CARE CCU/INTERMEDIATE

## 2019-06-10 PROCEDURE — 86146 BETA-2 GLYCOPROTEIN ANTIBODY: CPT | Performed by: INTERNAL MEDICINE

## 2019-06-10 PROCEDURE — 84165 PROTEIN E-PHORESIS SERUM: CPT | Performed by: INTERNAL MEDICINE

## 2019-06-10 PROCEDURE — 85305 CLOT INHIBIT PROT S TOTAL: CPT | Performed by: INTERNAL MEDICINE

## 2019-06-10 PROCEDURE — 84443 ASSAY THYROID STIM HORMONE: CPT | Performed by: INTERNAL MEDICINE

## 2019-06-10 PROCEDURE — 63700000 HC SELF-ADMINISTRABLE DRUG: Performed by: INTERNAL MEDICINE

## 2019-06-10 PROCEDURE — 81241 F5 GENE: CPT | Performed by: INTERNAL MEDICINE

## 2019-06-10 PROCEDURE — 80048 BASIC METABOLIC PNL TOTAL CA: CPT | Performed by: HOSPITALIST

## 2019-06-10 PROCEDURE — 36415 COLL VENOUS BLD VENIPUNCTURE: CPT | Performed by: INTERNAL MEDICINE

## 2019-06-10 PROCEDURE — 85302 CLOT INHIBIT PROT C ANTIGEN: CPT | Performed by: INTERNAL MEDICINE

## 2019-06-10 PROCEDURE — 81240 F2 GENE: CPT | Performed by: INTERNAL MEDICINE

## 2019-06-10 PROCEDURE — 85611 PROTHROMBIN TEST: CPT | Performed by: INTERNAL MEDICINE

## 2019-06-10 PROCEDURE — 86334 IMMUNOFIX E-PHORESIS SERUM: CPT | Performed by: INTERNAL MEDICINE

## 2019-06-10 PROCEDURE — 85730 THROMBOPLASTIN TIME PARTIAL: CPT | Performed by: INTERNAL MEDICINE

## 2019-06-10 PROCEDURE — 85027 COMPLETE CBC AUTOMATED: CPT | Performed by: HOSPITALIST

## 2019-06-10 PROCEDURE — 85300 ANTITHROMBIN III ACTIVITY: CPT | Performed by: INTERNAL MEDICINE

## 2019-06-10 PROCEDURE — 63600000 HC DRUGS/DETAIL CODE: Performed by: INTERNAL MEDICINE

## 2019-06-10 PROCEDURE — 85598 HEXAGNAL PHOSPH PLTLT NEUTRL: CPT | Performed by: INTERNAL MEDICINE

## 2019-06-10 PROCEDURE — 85303 CLOT INHIBIT PROT C ACTIVITY: CPT | Performed by: INTERNAL MEDICINE

## 2019-06-10 PROCEDURE — 25000000 HC PHARMACY GENERAL: Performed by: INTERNAL MEDICINE

## 2019-06-10 RX ORDER — BENZONATATE 100 MG/1
200 CAPSULE ORAL EVERY 6 HOURS PRN
Status: DISCONTINUED | OUTPATIENT
Start: 2019-06-10 | End: 2019-06-12 | Stop reason: HOSPADM

## 2019-06-10 RX ADMIN — GUAIFENESIN 1200 MG: 600 TABLET, EXTENDED RELEASE ORAL at 21:06

## 2019-06-10 RX ADMIN — METHYLPREDNISOLONE SODIUM SUCCINATE 30 MG: 40 INJECTION, POWDER, FOR SOLUTION INTRAMUSCULAR; INTRAVENOUS at 13:30

## 2019-06-10 RX ADMIN — IPRATROPIUM BROMIDE AND ALBUTEROL SULFATE 3 ML: 2.5; .5 SOLUTION RESPIRATORY (INHALATION) at 04:46

## 2019-06-10 RX ADMIN — GUAIFENESIN 1200 MG: 600 TABLET, EXTENDED RELEASE ORAL at 09:20

## 2019-06-10 RX ADMIN — BUDESONIDE 0.5 MG: 0.5 INHALANT RESPIRATORY (INHALATION) at 21:07

## 2019-06-10 RX ADMIN — DIPHENHYDRAMINE HYDROCHLORIDE 25 MG: 25 CAPSULE ORAL at 21:05

## 2019-06-10 RX ADMIN — FLUTICASONE PROPIONATE 1 SPRAY: 50 SPRAY, METERED NASAL at 09:18

## 2019-06-10 RX ADMIN — METHYLPREDNISOLONE SODIUM SUCCINATE 30 MG: 40 INJECTION, POWDER, FOR SOLUTION INTRAMUSCULAR; INTRAVENOUS at 00:39

## 2019-06-10 RX ADMIN — CELECOXIB 200 MG: 200 CAPSULE ORAL at 09:19

## 2019-06-10 RX ADMIN — BUDESONIDE 0.5 MG: 0.5 INHALANT RESPIRATORY (INHALATION) at 13:27

## 2019-06-10 RX ADMIN — DULOXETINE HYDROCHLORIDE 60 MG: 60 CAPSULE, DELAYED RELEASE ORAL at 21:05

## 2019-06-10 RX ADMIN — IPRATROPIUM BROMIDE AND ALBUTEROL SULFATE 3 ML: 2.5; .5 SOLUTION RESPIRATORY (INHALATION) at 16:32

## 2019-06-10 RX ADMIN — APIXABAN 10 MG: 5 TABLET, FILM COATED ORAL at 09:19

## 2019-06-10 RX ADMIN — AMLODIPINE BESYLATE 10 MG: 10 TABLET ORAL at 09:18

## 2019-06-10 RX ADMIN — APIXABAN 10 MG: 5 TABLET, FILM COATED ORAL at 21:05

## 2019-06-10 RX ADMIN — IPRATROPIUM BROMIDE AND ALBUTEROL SULFATE 3 ML: 2.5; .5 SOLUTION RESPIRATORY (INHALATION) at 21:06

## 2019-06-10 RX ADMIN — IPRATROPIUM BROMIDE AND ALBUTEROL SULFATE 3 ML: 2.5; .5 SOLUTION RESPIRATORY (INHALATION) at 00:40

## 2019-06-10 RX ADMIN — CELECOXIB 200 MG: 200 CAPSULE ORAL at 21:06

## 2019-06-10 RX ADMIN — IPRATROPIUM BROMIDE AND ALBUTEROL SULFATE 3 ML: 2.5; .5 SOLUTION RESPIRATORY (INHALATION) at 13:28

## 2019-06-10 RX ADMIN — CETIRIZINE HYDROCHLORIDE 5 MG: 5 TABLET ORAL at 09:20

## 2019-06-10 RX ADMIN — BENZONATATE 200 MG: 100 CAPSULE ORAL at 21:05

## 2019-06-10 RX ADMIN — BENZONATATE 100 MG: 100 CAPSULE ORAL at 09:23

## 2019-06-10 RX ADMIN — PANTOPRAZOLE SODIUM 40 MG: 40 TABLET, DELAYED RELEASE ORAL at 09:20

## 2019-06-10 NOTE — PROGRESS NOTES
Patient: Madelyn Ruiz  Location: [address] [address]  MRN: [id number]  Today's date: 6/10/2019    Attempted to see patient for therapy. Unable due to patient at test or procedure (to rule out  B DVT ).

## 2019-06-10 NOTE — PROGRESS NOTES
Patient: Madelyn Ruiz  Location: [address] [address]  MRN: [id number]  Today's date: 6/10/2019    Attempted to see patient for therapy. Unable due to patient at test or procedure (r/o DVT).

## 2019-06-10 NOTE — PROGRESS NOTES
Hospital Medicine Service -  Daily Progress Note       SUBJECTIVE   Interval History: Feeling better today, coughing more overnight asking for increased tessalon dose. No new complaints/concerns.      OBJECTIVE      Vital signs in last 24 hours:  Temp:  [36.4 °C (97.5 °F)-37.2 °C (98.9 °F)] 36.4 °C (97.5 °F)  Heart Rate:  [] 110  Resp:  [18] 18  BP: (124-150)/(72-92) 127/74    Intake/Output Summary (Last 24 hours) at 06/10/19 1625  Last data filed at 06/10/19 1400   Gross per 24 hour   Intake             1440 ml   Output                0 ml   Net             1440 ml       PHYSICAL EXAMINATION      Physical Exam   Constitutional: She is oriented to person, place, and time. She appears well-developed and well-nourished. No distress.   HENT:   Head: Normocephalic and atraumatic.   Mouth/Throat: Oropharynx is clear and moist.   Eyes: Conjunctivae are normal. No scleral icterus.   Neck: Neck supple. No JVD present.   Cardiovascular: Regular rhythm and normal heart sounds.  Tachycardia present.    No murmur heard.  Pulmonary/Chest: Effort normal. She has wheezes (RUL/RLL). She has no rales.   + scattered ronchi   Abdominal: Soft. Bowel sounds are normal. She exhibits no distension. There is no tenderness.   Musculoskeletal: She exhibits no edema, tenderness or deformity.   Neurological: She is alert and oriented to person, place, and time. No cranial nerve deficit.   Skin: Skin is warm and dry. Capillary refill takes less than 2 seconds. No rash noted. No erythema.   Psychiatric: She has a normal mood and affect. Her behavior is normal.      LINES, CATHETERS, DRAINS, AIRWAYS, AND WOUNDS   Lines, Drains, Airways, Wounds:  Peripheral IV 06/08/19 Left Hand (Active)   Number of days: 2       Comments: PIV for access     LABS / IMAGING / TELE      Labs    Results from last 7 days  Lab Units 06/10/19  1108   SODIUM mEQ/L 139   POTASSIUM mEQ/L 4.4   CHLORIDE mEQ/L 105   CO2 mEQ/L 20*   BUN mg/dL 22*   CREATININE mg/dL  0.8   GLUCOSE mg/dL 82   CALCIUM mg/dL 9.3       Results from last 7 days  Lab Units 06/10/19  1108   WBC K/uL 10.29   HEMOGLOBIN g/dL 13.6   HEMATOCRIT % 41.3   PLATELETS K/uL 296         Imaging  X-ray Chest 2 Views    Result Date: 6/9/2019  Narrative: CLINICAL HISTORY: Cough COMMENT: PA and lateral views of the chest were obtained and compared to prior study from 2018. There is a nodular opacity in the left lung base on the frontal view.  There is no pleural effusion or pneumothorax.  There is no focal consolidation.  There is surgical hardware in the lumbar spine.     Impression: IMPRESSION: Nodular opacity in the left midlung field.  Further evaluation with chest CT may be helpful.    Ct Chest With Iv Contrast    Result Date: 6/9/2019  Narrative: CLINICAL HISTORY: Left lung nodular opacity on chest radiograph.  Acute hypoxemic respiratory failure. COMPARISON: Chest radiograph 6/8/2019.  CT chest 9/11/2014 COMMENT: TECHNIQUE: CT of the chest was performed with the patient in the supine position. Images reconstructed/reformatted in the axial, coronal and  sagittal plane. CT DOSE:  One or more dose reduction techniques (e.g. automated exposure control, adjustment of the mA and/or kV according to patient size, use of iterative reconstruction technique) utilized for this examination. INTRAVENOUS CONTRAST: 75 mL of Omnipaque 350 intravenous contrast was administered without event. LUNG, LARGE AIRWAYS AND PLEURA: There is no nodule/mass corresponding to nodular opacity seen on prior chest radiograph.  Mild bilateral lower lobe as well as right upper lobe and medial segment right middle lobe linear scarring and/or subsegmental atelectasis.  There is a 12 mm groundglass focus involving the medial aspect of the superior segment of the right lower lobe abutting the pleura, best seen on image 33 of the axial lung series, likely infectious or inflammatory in etiology.  Stable nodular right apical pleural parenchymal  scarring.  Stable chronic small focus of mucus plugging is seen in the lateral segment right lower lobe (series 1 image 81). CHEST WALL AND LOWER NECK: within normal limits. MEDIASTINUM AND AMADA: No lymphadenopathy.  Esophageal wall thickening, likely related to gastroesophageal reflux disease. HEART: Left atrial enlargement with overall top normal heart size.  No right ventricular enlargement or bowing of the interventricular septum. No pericardial effusion.  Aortic and mitral valve annular calcifications.  No evidence for right heart strain. VESSELS: There are central filling defects in the right right upper lobe proximal segmental pulmonary arterial branches, most compatible with acute emboli.  There are mild linear central filling defects in the right lower lobe pulmonary arterial branch with slight involvement of the posterior segment right lower lobe pulmonary arterial branch, suggestive of subacute to chronic pulmonary embolism with element of recanalization.  Main pulmonary trunk remains normal in caliber.  No thoracic aortic aneurysm or dissection, noting atherosclerotic calcification of the thoracic aorta and coronary arteries. 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  Chronic mild wedge compression deformity of the T2 and T12 vertebral bodies.     Impression: IMPRESSION: 1.  Acute proximal segmental right upper lobe pulmonary emboli, noting overall very low clot burden.  No evidence for right heart strain. 2.  Old healed left fourth, fifth and sixth rib fractures with callus formation. This corresponds to the nodular opacity seen on chest radiograph. 3.  Nonspecific new 12 mm superior segment right lower lobe groundglass focus, almost certainly postinfectious/postinflammatory,  but can be followed per current 2017 Fleischner guidelines. Fleischner Society 2017 Guidelines for Management of Incidentally Detected Pulmonary Nodules in Adults. (Subsolid Nodules) Single ground glass: <6 mm - No routine follow-up >/= 6 mm - CT at 6-12 months to confirm persistence, then CT every 2 years until 5 years Finding:    Acute pulmonary embolism   Acuity: Critical  Status:  CLOSED The results were critically read back with Dr. Ly Kapadia on 6/9/2019 11:46 AM. I certify that I have reviewed this examination and agree with this report. Donald Rinaldi MD      ECG/Telemetry  I have independently reviewed the telemetry. Significant findings include Sinus tachycardia .    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, known to Dr Arriaza at [place]  - Continue ICS/IBDA  - Supplemental O2 to maintain spO2 > 90%   - Sputum cx   - IV steroids - taper down at d/c      Pulmonary embolism (CMS/HCC) (HCC)   Assessment & Plan    - CT chest showed moderate PE  - Heme-onc consulted   - Continue on Eliquis 10mg BID x 7 days, then 5mg BID thereafter  - Follow-up with pulmonary outpatient for repeat CT scan in 4-6months  - LE US pending, TTE pending      GERD (gastroesophageal reflux disease)   Assessment & Plan    - Continue PPI      Lumbar spinal stenosis   Assessment & Plan    - Follows with Dr. Miguel, neurosurgery, and Dr. You, pain management  - Continue celebrex, cymbalta   - PT/OT        Essential hypertension   Assessment & Plan    - Continue amlodipine  - BP controlled           VTE Assessment: Padua VTE Score: 4  VTE Prophylaxis Plan: Eliquis  Code Status: Full Code  Estimated Discharge Date: 6/12/2019  Disposition Planning: Improving, anticipate another 1-2 days of hospitalization      Tamela Hernandez,   6/10/2019

## 2019-06-10 NOTE — PATIENT CARE CONFERENCE
Care Progression Rounds Note  Date: 6/10/2019  Time: 10:27 AM     Patient Name: Madelyn Ruiz     Medical Record Number: [id number]   YOB: 1930  Sex: Female      Room/Bed: 05    Admitting Diagnosis: Hypoxia [R09.02]  Severe persistent asthma with exacerbation [J45.51]   Admit Date/Time: 6/8/2019  4:47 PM    Primary Diagnosis: Acute hypoxemic respiratory failure (CMS/HCC)  Principal Problem: Acute hypoxemic respiratory failure (CMS/HCC)    GMLOS: pending  Anticipated Discharge Date: 6/10/2019    AM-PAC  Mobility Score: 24    Discharge Planning:  Concerns To Be Addressed: no discharge needs identified    Barriers to Discharge:  Barriers to Discharge: Test pending, Medical issues not resolved  Comment: echo; B/L CHASITY CONTEH; positive Darrin    Participants:  social work/services, , dietitian/nutrition services, nursing, physical therapy

## 2019-06-10 NOTE — ASSESSMENT & PLAN NOTE
- Follows with Dr. Miguel, neurosurgery, and Dr. You, pain management  - Continue celebrex, cymbalta   - PT/OT

## 2019-06-10 NOTE — PLAN OF CARE
Problem: Patient Care Overview  Goal: Plan of Care Review  Outcome: Ongoing (interventions implemented as appropriate)   06/10/19 0494   Coping/Psychosocial   Plan Of Care Reviewed With patient;daughter   Plan of Care Review   Progress progress toward functional goals as expected   Outcome Summary VSS. Continues on eliquis.        Problem: Chronic Obstructive Pulmonary Disease (Adult)  Goal: Signs and Symptoms of Listed Potential Problems Will be Absent, Minimized or Managed (Chronic Obstructive Pulmonary Disease)  Outcome: Ongoing (interventions implemented as appropriate)      Problem: Asthma (Adult)  Goal: Signs and Symptoms of Listed Potential Problems Will be Absent, Minimized or Managed (Asthma)  Outcome: Ongoing (interventions implemented as appropriate)

## 2019-06-10 NOTE — ASSESSMENT & PLAN NOTE
- CT chest showed moderate PE  - Heme-onc consulted   - Continue on Eliquis 10mg BID x 7 days, then 5mg BID thereafter  - Follow-up with pulmonary outpatient for repeat CT scan in 4-6months  - LE US pending, TTE pending

## 2019-06-10 NOTE — PROGRESS NOTES
The patient continues to complain of a cough.  It is productive of a clear phlegm.  She denies coughing of blood.  She states at home her cough is controlled with Tessalon 200 mg Here at the hospital she is on 100 mg tablets. She denies any increasing shortness of breath,in  fact she feels her breathing may be improved..  She was able to sleep relatively flat in bed.  She denies any back pain or bone pain.  She denies any fevers or chills.  She denies any nausea or vomiting.  She is not enjoying the food at the hospital.  She has not had a recent bowel movement does not feel constipated.  She denies any difficulty urinating or burning on urination.     Physical exam awake and alert female looking younger than her stated age.Afebrile  Skin: Warm dry.  No rash.  No petechiae.  No ecchymoses  HEENT: Sclera white conjunctiva pink.  No thrush  Heart: Regular rate and rhythm  Lungs: Without rales rhonchi or wheeze  Abdomen: Soft.  Bowel sounds present.  Rotund.  No hepatosplenomegaly.  Incisions well-healed No tenderness.  No rebound  Extremities: Without cyanosis clubbing or edema.  No tenderness.  No cords     Laboratory data-TSH-0.32     Problem #1 pulmonary embolism-the etiology is not clear.  She had not been sedentary   There is no history of trauma.  She had not taken hormonal therapy.  There is no family history     Therefore, we will obtain a hypercoagulable work-up for an otherwise unprovoked clot.  She has been placed on eliquis  as per the [facility] physician.  This seems appropriate.  I would obtain a baseline echocardiogram although there is no evidence of heart strain on the CT scan.  I would obtain a doppler ultrasound of her lower extremities to rule out a DVT.  Otherwise we have no indication of where the clot began     At the minimum the patient would be recommended to take 6 months of anticoagulation pending the further work-up.  She will however discuss this further with her primary pulmonologist Dr. Arriaza    I would consider a follow-up CT scan at a 4 to 6-month interval..The patient was informed that should she ever have a second clot she would likely need lifelong anticoagulation.     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 Arriaza at [facility] with fasema      Problem #3 shortness of breath-she has been placed on steroids.  She remains on inhalers as well.  She is on Mucinex.  She does feel improved this morning.  However she continues to have a cough.  She is asking to have the Tessalon increased to 200 mg tablets.     Problem #4 history of spinal stenosis-  I would have her out of bed as tolerated.     Problem #5 osteoporosis-this managed by her primary physician team.     Problem #6 elevated glucose-we will need to be monitored carefully with the addition of steroids     Problem #7 elevated BUN-I would encourage oral hydration.     Problem #8 fatigue-this likely exacerbated by the PE.   .  I would have a physical therapy evaluation when she has stabilized.  Her TSH is low.     Problem #9 level of care-the patient is full code

## 2019-06-10 NOTE — ASSESSMENT & PLAN NOTE
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

## 2019-06-10 NOTE — PLAN OF CARE
Problem: Patient Care Overview  Goal: Discharge Needs Assessment  Outcome: Ongoing (interventions implemented as appropriate)   06/10/19 1417   DC Needs Assessment   Concerns To Be Addressed care coordination/care conferences   Readmission Within The Last 30 Days no previous admission in last 30 days   Provider Choice List(s) Given yes   Anticipated Discharge Disposition home with home health services   Type of Home Care Services nursing   Community Agency Name(s) [facility]   Current Discharge Risk lives alone   Current Health   Anticipated Changes Related to Illness none   Activity/Self Care ROS   Equipment Currently Used at Home none   Met with patient and daughter Brigid ( also goes by Demetria). Patient lives alone, independent, drives. Patient denies having any dme, had home care in the remote past. Patient confirmed pcp, confirmed pharmacy is [facility]. Patient started on Eliquis, patient confirmed her prescription plan is [employer]. Agrees to home care, referred to [facility].

## 2019-06-11 ENCOUNTER — APPOINTMENT (INPATIENT)
Dept: CARDIOLOGY | Facility: HOSPITAL | Age: 84
DRG: 175 | End: 2019-06-11
Attending: HOSPITALIST
Payer: MEDICARE

## 2019-06-11 LAB
ALBUMIN MFR UR ELPH: 54.2 % (ref 48–62)
ALBUMIN SERPL ELPH-MCNC: 3.2 G/DL (ref 3.2–4.5)
ALBUMIN/GLOB SERPL: 1.2 {RATIO} (ref 1.1–2.1)
ALPHA1 GLOB MFR SERPL ELPH: 3.4 % (ref 2.1–4.8)
ALPHA1 GLOB SERPL ELPH-MCNC: 0.2 G/DL (ref 0.15–0.32)
ALPHA2 GLOB MFR UR ELPH: 17.6 % (ref 9.7–16.2)
ALPHA2 GLOB SERPL ELPH-MCNC: 1.04 G/DL (ref 0.7–1.1)
AORTIC ROOT ANNULUS - M-MODE: 2.8 CM
B-GLOBULIN SERPL ELPH-MCNC: 0.89 G/DL (ref 0.73–1.3)
BETA1 GLOB MFR UR ELPH: 15.1 % (ref 10.8–17.9)
BSA FOR ECHO PROCEDURE: 1.59 M2
BSA FOR ECHO PROCEDURE: 1.59 M2
E WAVE DECELERATION TIME: 127 MS
E/A RATIO: 0.6
E/E' RATIO: 7.8
E/LAT E' RATIO: 5.5
FRACTIONAL SHORTENING: 39.9 %
GAMMA GLOB MFR UR ELPH: 9.8 % (ref 9–23)
GAMMA GLOB SERPL ELPH-MCNC: 0.58 G/DL (ref 0.6–1.6)
INTERVENTRICULAR SEPTUM: 0.92 CM
IVC-EXP: 0.76 CM
IVC-INS: 0.31 CM
LAD 2D: 3.9 CM
LEFT INTERNAL DIMENSION IN SYSTOLE: 2.56 CM (ref 2.28–3.44)
LEFT VENTRICULAR INTERNAL DIMENSION IN DIASTOLE: 4.26 CM (ref 3.83–5.31)
LEFT VENTRICULAR POSTERIOR WALL IN END DIASTOLE: 0.77 CM (ref 0.49–0.91)
LVOT 2D: 1.6 CM
LVOT A: 2.01 CM2
MV E'TISSUE VEL-LAT: 0.08 M/S
MV E'TISSUE VEL-MED: 0.06 M/S
MV PEAK A VEL: 0.7 M/S
MV PEAK E VEL: 0.43 M/S
MV VALVE AREA P 1/2 METHOD: 5.95 CM2
POSTERIOR WALL: 0.77 CM
PROT PATTERN SERPL ELPH-IMP: NORMAL
PROT SERPL-MCNC: 5.9 G/DL (ref 6–8.2)
TR MAX PG: 23 MMHG
TRICUSPID VALVE PEAK REGURGITATION VELOCITY: 2.41 M/S
Z-SCORE OF LEFT VENTRICULAR DIMENSION IN END DIASTOLE: -0.57
Z-SCORE OF LEFT VENTRICULAR DIMENSION IN END SYSTOLE: -0.7
Z-SCORE OF LEFT VENTRICULAR POSTERIOR WALL IN END DIASTOLE: 0.73

## 2019-06-11 PROCEDURE — 21400000 HC ROOM AND CARE CCU/INTERMEDIATE

## 2019-06-11 PROCEDURE — 97165 OT EVAL LOW COMPLEX 30 MIN: CPT | Mod: GO

## 2019-06-11 PROCEDURE — 93306 TTE W/DOPPLER COMPLETE: CPT

## 2019-06-11 PROCEDURE — 63600000 HC DRUGS/DETAIL CODE: Performed by: INTERNAL MEDICINE

## 2019-06-11 PROCEDURE — 25000000 HC PHARMACY GENERAL: Performed by: INTERNAL MEDICINE

## 2019-06-11 PROCEDURE — 93970 EXTREMITY STUDY: CPT

## 2019-06-11 PROCEDURE — 63700000 HC SELF-ADMINISTRABLE DRUG: Performed by: INTERNAL MEDICINE

## 2019-06-11 PROCEDURE — 63700000 HC SELF-ADMINISTRABLE DRUG: Performed by: HOSPITALIST

## 2019-06-11 PROCEDURE — 99232 SBSQ HOSP IP/OBS MODERATE 35: CPT | Performed by: HOSPITALIST

## 2019-06-11 PROCEDURE — 97161 PT EVAL LOW COMPLEX 20 MIN: CPT | Mod: GP

## 2019-06-11 RX ORDER — PREDNISONE 10 MG/1
40 TABLET ORAL DAILY
Status: DISCONTINUED | OUTPATIENT
Start: 2019-06-12 | End: 2019-06-12 | Stop reason: HOSPADM

## 2019-06-11 RX ADMIN — APIXABAN 10 MG: 5 TABLET, FILM COATED ORAL at 20:40

## 2019-06-11 RX ADMIN — GUAIFENESIN 1200 MG: 600 TABLET, EXTENDED RELEASE ORAL at 20:41

## 2019-06-11 RX ADMIN — IPRATROPIUM BROMIDE AND ALBUTEROL SULFATE 3 ML: 2.5; .5 SOLUTION RESPIRATORY (INHALATION) at 17:52

## 2019-06-11 RX ADMIN — DOCUSATE SODIUM 100 MG: 100 CAPSULE, LIQUID FILLED ORAL at 20:41

## 2019-06-11 RX ADMIN — CELECOXIB 200 MG: 200 CAPSULE ORAL at 20:40

## 2019-06-11 RX ADMIN — IPRATROPIUM BROMIDE AND ALBUTEROL SULFATE 3 ML: 2.5; .5 SOLUTION RESPIRATORY (INHALATION) at 10:07

## 2019-06-11 RX ADMIN — AMLODIPINE BESYLATE 10 MG: 10 TABLET ORAL at 10:05

## 2019-06-11 RX ADMIN — METHYLPREDNISOLONE SODIUM SUCCINATE 30 MG: 40 INJECTION, POWDER, FOR SOLUTION INTRAMUSCULAR; INTRAVENOUS at 01:22

## 2019-06-11 RX ADMIN — FLUTICASONE PROPIONATE 1 SPRAY: 50 SPRAY, METERED NASAL at 10:06

## 2019-06-11 RX ADMIN — BUDESONIDE 0.5 MG: 0.5 INHALANT RESPIRATORY (INHALATION) at 20:41

## 2019-06-11 RX ADMIN — METHYLPREDNISOLONE SODIUM SUCCINATE 30 MG: 40 INJECTION, POWDER, FOR SOLUTION INTRAMUSCULAR; INTRAVENOUS at 13:47

## 2019-06-11 RX ADMIN — IPRATROPIUM BROMIDE AND ALBUTEROL SULFATE 3 ML: 2.5; .5 SOLUTION RESPIRATORY (INHALATION) at 01:22

## 2019-06-11 RX ADMIN — BUDESONIDE 0.5 MG: 0.5 INHALANT RESPIRATORY (INHALATION) at 10:07

## 2019-06-11 RX ADMIN — GUAIFENESIN 1200 MG: 600 TABLET, EXTENDED RELEASE ORAL at 10:05

## 2019-06-11 RX ADMIN — DIPHENHYDRAMINE HYDROCHLORIDE 25 MG: 25 CAPSULE ORAL at 20:41

## 2019-06-11 RX ADMIN — IPRATROPIUM BROMIDE AND ALBUTEROL SULFATE 3 ML: 2.5; .5 SOLUTION RESPIRATORY (INHALATION) at 13:47

## 2019-06-11 RX ADMIN — DULOXETINE HYDROCHLORIDE 60 MG: 60 CAPSULE, DELAYED RELEASE ORAL at 22:14

## 2019-06-11 RX ADMIN — PANTOPRAZOLE SODIUM 40 MG: 40 TABLET, DELAYED RELEASE ORAL at 10:06

## 2019-06-11 RX ADMIN — CETIRIZINE HYDROCHLORIDE 5 MG: 5 TABLET ORAL at 10:06

## 2019-06-11 RX ADMIN — IPRATROPIUM BROMIDE AND ALBUTEROL SULFATE 3 ML: 2.5; .5 SOLUTION RESPIRATORY (INHALATION) at 20:41

## 2019-06-11 RX ADMIN — IPRATROPIUM BROMIDE AND ALBUTEROL SULFATE 3 ML: 2.5; .5 SOLUTION RESPIRATORY (INHALATION) at 04:46

## 2019-06-11 RX ADMIN — CELECOXIB 200 MG: 200 CAPSULE ORAL at 10:06

## 2019-06-11 RX ADMIN — APIXABAN 10 MG: 5 TABLET, FILM COATED ORAL at 10:06

## 2019-06-11 ASSESSMENT — COGNITIVE AND FUNCTIONAL STATUS - GENERAL
HELP NEEDED FOR PERSONAL GROOMING: 4 - NONE
DRESSING REGULAR UPPER BODY CLOTHING: 3 - A LITTLE
WALKING IN HOSPITAL ROOM: 4 - NONE
HELP NEEDED FOR BATHING: 3 - A LITTLE
CLIMB 3 TO 5 STEPS WITH RAILING: 3 - A LITTLE
DRESSING REGULAR LOWER BODY CLOTHING: 4 - NONE
STANDING UP FROM CHAIR USING ARMS: 4 - NONE
EATING MEALS: 4 - NONE
TOILETING: 4 - NONE
MOVING TO AND FROM BED TO CHAIR: 4 - NONE

## 2019-06-11 NOTE — PATIENT CARE CONFERENCE
Care Progression Rounds Note  Date: 6/11/2019  Time: 10:27 AM     Patient Name: Madelyn Ruiz     Medical Record Number: [id number]   YOB: 1930  Sex: Female      Room/Bed: 0549    Admitting Diagnosis: Hypoxia [R09.02]  Severe persistent asthma with exacerbation [J45.51]   Admit Date/Time: 6/8/2019  4:47 PM    Primary Diagnosis: Acute hypoxemic respiratory failure (CMS/HCC)  Principal Problem: Acute hypoxemic respiratory failure (CMS/HCC)    GMLOS: 4.3  Anticipated Discharge Date: 6/12/2019    AM-PAC  Mobility Score: 24    Discharge Planning:  Concerns To Be Addressed: care coordination/care conferences  Anticipated Discharge Disposition: home with home health services  Type of Home Care Services: nursing    Barriers to Discharge:  Barriers to Discharge: Test pending, Medical issues not resolved, Consult pending  Comment: needs room air SAT checked on ambulation, needs PT c/s    Participants:  , dietitian/nutrition services, nursing, physical therapy

## 2019-06-11 NOTE — HOSPITAL COURSE
Madelyn ANDERSON is a 88 y.o. female admitted on 6/8/2019 with Hypoxia [R09.02]  Severe persistent asthma with exacerbation [J45.51]. Principal problem is Acute hypoxemic respiratory failure (CMS/[facility]).    Madelyn ANDERSON has a past medical history of Allergic bronchopulmonary aspergillosis (CMS/[facility]) (04/02/2018); Allergic rhinitis (4/2/2018); Asthma; Chronic obstructive pulmonary disease (CMS/[facility]) ([facility]) (4/2/2018); Hypertension (4/2/2018); Insomnia (4/2/2018); Lumbar spinal stenosis (4/2/2018); Obstructive sleep apnea syndrome (4/2/2018); Osteoporosis (4/2/2018); and Vertebral compression fracture (CMS/[facility]) ([facility]).

## 2019-06-11 NOTE — PLAN OF CARE
Problem: Patient Care Overview  Goal: Plan of Care Review  Outcome: Ongoing (interventions implemented as appropriate)   06/11/19 1430   Coping/Psychosocial   Plan Of Care Reviewed With patient;family   Plan of Care Review   Outcome Summary PT Syed 6/11 - Pt. grossly close supervision for functional mobility 2* doing activity on Room air for first time. pt. remained at 96% on Room air throughout all activity. Recommend d/c home w/ home health.       Problem: Chronic Obstructive Pulmonary Disease (Adult)  Goal: Signs and Symptoms of Listed Potential Problems Will be Absent, Minimized or Managed (Chronic Obstructive Pulmonary Disease)  Outcome: Ongoing (interventions implemented as appropriate)      Problem: Acute Therapy Services Goal & Intervention Plan  Goal: Gait Training Goal  Outcome: Ongoing (interventions implemented as appropriate)    Goal: Stairs Goal  Outcome: Ongoing (interventions implemented as appropriate)

## 2019-06-11 NOTE — PATIENT CARE CONFERENCE
Care Progression Rounds Note  Date: 6/11/2019  Time: 2:51 PM     Patient Name: Madelyn Ruiz     Medical Record Number: [id number]   YOB: 1930  Sex: Female      Room/Bed: 0549    Admitting Diagnosis: Hypoxia [R09.02]  Severe persistent asthma with exacerbation [J45.51]   Admit Date/Time: 6/8/2019  4:47 PM    Primary Diagnosis: Acute hypoxemic respiratory failure (CMS/HCC)  Principal Problem: Acute hypoxemic respiratory failure (CMS/HCC)    GMLOS: 4.3  Anticipated Discharge Date: 6/11/2019    AM-PAC  Mobility Score: 23    Discharge Planning:  Concerns To Be Addressed: care coordination/care conferences  Anticipated Discharge Disposition: home with home health services  Type of Home Care Services: nursing    Barriers to Discharge:  Barriers to Discharge: None  Comment: Room air SAT 95% while ambulating, possible d/c later today    Participants:  nursing

## 2019-06-11 NOTE — PATIENT CARE CONFERENCE
Care Progression Rounds Note  Date: 6/11/2019  Time: 10:27 AM     Patient Name: Madelyn Ruiz     Medical Record Number: [id number]   YOB: 1930  Sex: Female      Room/Bed: 0549    Admitting Diagnosis: Hypoxia [R09.02]  Severe persistent asthma with exacerbation [J45.51]   Admit Date/Time: 6/8/2019  4:47 PM    Primary Diagnosis: Acute hypoxemic respiratory failure (CMS/HCC)  Principal Problem: Acute hypoxemic respiratory failure (CMS/HCC)    GMLOS: 4.3  Anticipated Discharge Date: 6/12/2019    AM-PAC  Mobility Score: 24    Discharge Planning:  Concerns To Be Addressed: care coordination/care conferences  Anticipated Discharge Disposition: home with home health services  Type of Home Care Services: nursing    Barriers to Discharge:  Barriers to Discharge: Test pending, Medical issues not resolved, Consult pending  Comment: needs room air SAT checked on ambulation, needs PT c/s    Participants:  , dietitian/nutrition services, nursing, physical therapy

## 2019-06-11 NOTE — PLAN OF CARE
Problem: Patient Care Overview  Goal: Plan of Care Review  Outcome: Ongoing (interventions implemented as appropriate)   06/11/19 1539   Coping/Psychosocial   Plan Of Care Reviewed With patient   Plan of Care Review   Progress improving   Outcome Summary Pt. maintaining O2 saturation of 95% while ambulating on room air.       Problem: Chronic Obstructive Pulmonary Disease (Adult)  Goal: Signs and Symptoms of Listed Potential Problems Will be Absent, Minimized or Managed (Chronic Obstructive Pulmonary Disease)  Outcome: Ongoing (interventions implemented as appropriate)   06/11/19 1539   Chronic Obstructive Pulmonary Disease   Problems Assessed (Chronic Obstructive Pulmonary Disease (COPD)) all   Problems Present (Chronic Obstructive Pulmonary Disease (COPD)) situational response       Problem: Asthma (Adult)  Goal: Signs and Symptoms of Listed Potential Problems Will be Absent, Minimized or Managed (Asthma)  Outcome: Ongoing (interventions implemented as appropriate)   06/11/19 1539   Asthma   Problems Assessed (Asthma) all   Problems Present (Asthma) situational response

## 2019-06-11 NOTE — PROGRESS NOTES
The patient continues to complain of a cough productive of a clear phlegm.  She denies coughing of blood.    However, overall it is improved.  She does continue on Tessalon as needed.  She denies any increasing shortness of breath,in  fact she feels her breathing may be improved.  She does continue on O2 supplements..  She was able to sleep relatively flat in bed.  She denies any back pain or bone pain.  She denies any fevers or chills.  She denies any nausea or vomiting.  She is not enjoying the food at the hospital.  She has not had a recent bowel movement does not feel constipated.  She denies any difficulty urinating or burning on urination.     Physical exam awake and alert female looking younger than her stated age.Afebrile  Skin: Warm dry.  No rash.  No petechiae.  No ecchymoses  HEENT: Sclera white conjunctiva pink.  No thrush  Heart: Regular rate and rhythm  Lungs: Without rales rhonchi or wheeze  Abdomen: Soft.  Bowel sounds present.  Rotund.  No hepatosplenomegaly.  Incisions well-healed No tenderness.  No rebound  Extremities: Without cyanosis clubbing or edema.  No tenderness.  No cords     Laboratory data-- , factor V Leiden -negative prothrombin gene mutation-negative     Problem #1 pulmonary embolism-the etiology is not clear.  She had not been sedentary   There is no history of trauma.  She had not taken hormonal therapy.  There is no family history     Therefore, we will obtain a hypercoagulable work-up for an otherwise unprovoked clot.  She has been placed on eliquis  as per the [facility] physician.  This seems appropriate.  I would obtain a baseline echocardiogram although there is no evidence of heart strain on the CT scan.  I would obtain a doppler ultrasound of her lower extremities to rule out a DVT.  Otherwise we have no indication of where the clot began     At the minimum the patient would be recommended to take 6 months of anticoagulation pending the further work-up.  She will  however discuss this further with her primary pulmonologist Dr. Arriaza   I would consider a follow-up CT scan at a 4 to 6-month interval..The patient was informed that should she ever have a second clot she would likely need lifelong anticoagulation.     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 Arriaza at [facility] with fasema      Problem #3 shortness of breath-she has been placed on steroids.  She remains on inhalers as well.  She is on Mucinex.  She does feel improved this morning.  However she continues to have a cough.    I would check her oxygen saturation on room air at rest and with ambulation.  She previously did not have home O2.     Problem #4 history of spinal stenosis-  I would have her out of bed as tolerated.     Problem #5 osteoporosis-this managed by her primary physician team.     Problem #6 elevated glucose-we will need to be monitored carefully with the addition of steroids     Problem #7 elevated BUN-I would encourage oral hydration.     Problem #8 fatigue-this likely exacerbated by the PE.   .  I would have a physical therapy evaluation when she has stabilized.  Her TSH is low.     Problem #9 level of care-the patient is full code

## 2019-06-11 NOTE — PROGRESS NOTES
Patient: Madelyn Ruiz  Location: [facility] 5A 0549  MRN: [id number]  Today's date: 6/11/2019    Pt. Returned to bed w/ radiology in room to begin test.  No acute distress      Hospital Course  Madelyn ANDERSON is a 88 y.o. female admitted on 6/8/2019 with Hypoxia [R09.02]  Severe persistent asthma with exacerbation [J45.51]. Principal problem is Acute hypoxemic respiratory failure (CMS/[facility]).    Madelyn ANDERSON has a past medical history of Allergic bronchopulmonary aspergillosis (CMS/[facility]) (04/02/2018); Allergic rhinitis (4/2/2018); Asthma; Chronic obstructive pulmonary disease (CMS/[facility]) ([facility]) (4/2/2018); Hypertension (4/2/2018); Insomnia (4/2/2018); Lumbar spinal stenosis (4/2/2018); Obstructive sleep apnea syndrome (4/2/2018); Osteoporosis (4/2/2018); and Vertebral compression fracture (CMS/[facility]) ([facility]).          Therapy Pain/Vitals     Row Name 06/11/19 1048 06/11/19 1108       Pain/Comfort/Sleep    Pain Charting Type Pain Assessment  --    Presence of Pain denies  --    Pain Rating (0-10): Rest 0  --    Pain Rating (0-10): Activity 0  --       Vital Signs    Pulse  -- (!)  120    SpO2 98 % 96 %    Patient Activity At rest Other (Comment)   Amb. ~180ft + 4steps    Oxygen Therapy Supplemental oxygen None (Room air)    O2 Delivery Method Nasal cannula  --    O2 Flow Rate (L/min) 2 L/min  --    /67 (!)  184/83   nurse made aware of high BP    Patient Position Lying Lying       Patient Observation    Observations  -- Denies dizziness/LH          Prior Living Environment      Most Recent Value   Living Environment Comment  Pt lives in a town house alone with 3 DANNY(railing), FFSU, and stall shower (with GB), Pt dtr/son-in-law lives in [address] [facility] 2nd floor HAN,and 2 DANNY   Equipment Currently Used at Home  none          Prior Level of Function      Most Recent Value   Ambulation  independent   Transferring  independent   Toileting  independent   Bathing  independent   Dressing  independent   Eating  independent  "  Communication  understands/communicates without difficulty   Swallowing  swallows foods/liquids without difficulty   Equipment Currently Used at Home  none   Prior Functional Level Comment  Pt previuosly IND with ADLs/IADLs, dtr visits frequently and helps with IADLs occasionally                PT Evaluation - 06/11/19 1048        Session Details    Document Type initial evaluation    Mode of Treatment co-treatment;physical therapy    Patient/Family Observations pt. received in bed. Son-In-Law in room       Time Calculation    Start Time 1048    Stop Time 1109    Time Calculation (min) 21 min       General Information    Patient Profile Reviewed? yes    Existing Precautions/Restrictions fall       Coping Strategies    Trust Relationship/Rapport care explained;questions encouraged;thoughts/feelings acknowledged       Orientation Log    Comment AAOx3       Cognition/Psychosocial    Safety Awareness intact       Sensory    Sensory General Assessment no sensation deficits identified       Range of Motion (ROM)    Comment, General Range of Motion BLE WFL       Manual Muscle Testing (MMT)    Comment BLE WFL -- grossly 4/5       Bed Mobility    [place], Supine to Sit independent    [place], Sit to Supine independent    Assistive Device (Bed Mobility) head of bed elevated    Comment (Bed Mobility) pt. indep. w/ managing bed mobility.        Sit to Stand Transfer    [place], Sit to Stand Transfer distant supervision;verbal cues    Verbal Cues safety    Assistive Device --   no AD       Stand to Sit Transfer    [place], Stand to Sit Transfer distant supervision;verbal cues    Verbal Cues safety    Assistive Device --   no Ad       Gait Training    [place], Gait close supervision    Assistive Device --   no Ad    Distance in Feet 180 feet    Gait Pattern Utilized step-through    Gait Deviations Identified decreased gait speed    Comment pt. amb. slower than reported \"normal\" speed. pt. able to amb " ~180 ft w/ close supervision 2* walking on room air for first time.        Stairs Training    Early, Stairs close supervision    Assistive Device railing    Handrail Location left side (ascending);right side (descending)    Number of Stairs 4    Ascending Stairs Technique step-over-step    Descending Stairs Technique step-to-step    Comment pt. able to maintain SpO2 ~96% on Room air throughout Amb. & stairs activity       AM-PAC (TM) - Mobility (Current Function)    Turning from your back to your side while in a flat bed without using bedrails? 4 - None    Moving from lying on your back to sitting on the side of a flat bed without using bedrails? 4 - None    Moving to and from a bed to a chair? 4 - None    Standing up from a chair using your arms? 4 - None    To walk in a hospital room? 4 - None    Climbing 3-5 steps with a railing? 3 - A Little    AM-PAC (TM) Mobility Score 23       PT Clinical Impression    Patient's Goals For Discharge return to all previous roles/activities    Family Goals For Discharge patient able to return to all previous activities/roles    Plan For Care Reviewed: Physical Therapy PT plan for care discussed with patient;PT plan for care discussed with family    System Pathology/Pathophysiology Noted pulmonary;musculoskeletal    Impairments Found (PT Eval) aerobic capacity/endurance;gait, locomotion, and balance    Functional Limitations in Following Categories (PT Eval) self-care;home management    Rehab Potential/Prognosis good, to achieve stated therapy goals    PT Frequency of Treatment 3-5 times per week    Problem List impaired balance;impaired motor control   dec. aerobic capacity    Anticipated Equipment Needs at Discharge none    PT Recommended Discharge Disposition home with home health    Daily Outcome Statement pt. grossly close supervision for functional mobility 2* activity on Room air for first time at [facility]. pt. very independent and seems antsy to get moving and keep  moving. Recommend d/c home w/ home health          Pain Assessment/Intervention  Pain Charting Type: Pain Assessment             Education provided this session. See the Patient Education summary report for full details.    PT Care Plan Goals      Most Recent Value   Stair Goal, PT   PT STG: Stairs  modified independence   PT STG: Number of Stairs  10   PT STG Assistive Device: Stairs  railing, left, railing, right      PT Care Plan Goals      Most Recent Value   Gait Goal   Level of Cross  independent   Assistive Device: Gait Training  -- [no AD]   Distance Goal: Gait Training (feet)  100 feet

## 2019-06-11 NOTE — PROGRESS NOTES
Patient started on Eliquis. This is listed as formulary for her prescription coverage. Phoned patient's pharmacy , Eliquis is in stock.

## 2019-06-11 NOTE — PLAN OF CARE
Problem: Patient Care Overview  Goal: Plan of Care Review  Outcome: Ongoing (interventions implemented as appropriate)   06/11/19 1045   Coping/Psychosocial   Plan Of Care Reviewed With patient   Plan of Care Review   Progress progress toward functional goals as expected   Outcome Summary OT eval complete. Pt supervision with functional transfers and LB ADLs. Santiago with bed mobility with use of bed rails and HOB elevated. Pt would benefit from returning to her own home with assist/home health to increase safety and maximize IND with ADLs/Reduce the risk of falls.      Goal: Individualization & Mutuality  Outcome: Outcome(s) Achieved Date Met: 06/11/19      Problem: Acute Therapy Services Goal & Intervention Plan  Goal: Bed Mobility Goal  Outcome: Ongoing (interventions implemented as appropriate)    Goal: Lower Body Dressing Goal  Outcome: Ongoing (interventions implemented as appropriate)    Goal: Transfer Training Goal  Outcome: Ongoing (interventions implemented as appropriate)      Problem: Self-Care Deficit (Adult,Obstetrics,Pediatric)  Goal: Identify Related Risk Factors and Signs and Symptoms  Outcome: Outcome(s) Achieved Date Met: 06/11/19

## 2019-06-11 NOTE — PROGRESS NOTES
Patient: Madelyn Ruiz  Location: [facility] 5A 0549  MRN: [id number]  Today's date: 6/11/2019     Pt left supine in recliner with call bell nearby. Hand off to nursing.     Hospital Course  Madelyn ANDERSON is a 88 y.o. female admitted on 6/8/2019 with Hypoxia [R09.02]  Severe persistent asthma with exacerbation [J45.51]. Principal problem is Acute hypoxemic respiratory failure (CMS/[facility]).    Madelyn ANDERSON has a past medical history of Allergic bronchopulmonary aspergillosis (CMS/[facility]) (04/02/2018); Allergic rhinitis (4/2/2018); Asthma; Chronic obstructive pulmonary disease (CMS/HCC) ([facility]) (4/2/2018); Hypertension (4/2/2018); Insomnia (4/2/2018); Lumbar spinal stenosis (4/2/2018); Obstructive sleep apnea syndrome (4/2/2018); Osteoporosis (4/2/2018); and Vertebral compression fracture (CMS/[facility]) ([facility]).          Therapy Pain/Vitals     Row Name 06/11/19 1048 06/11/19 1108       Pain/Comfort/Sleep    Pain Charting Type Pain Assessment  --    Presence of Pain denies  --    Pain Rating (0-10): Rest 0  --    Pain Rating (0-10): Activity 0  --       Vital Signs    Pulse  -- (!)  120    SpO2 98 % 96 %    Patient Activity At rest Other (Comment)   Amb. ~180ft + 4steps    Oxygen Therapy Supplemental oxygen None (Room air)    O2 Delivery Method Nasal cannula  --    O2 Flow Rate (L/min) 2 L/min  --    /67 (!)  184/83   nurse made aware of high BP    Patient Position Lying Lying       Patient Observation    Observations  -- Denies dizziness/LH          Prior Living Environment      Most Recent Value   Living Environment Comment  Pt lives in a town house alone with 3 DANYN(railing), FFSU, and stall shower (with GB), Pt dtr/son-in-law lives in [address] [facility] 2nd floor HAN,and 2 DANNY   Equipment Currently Used at Home  none          Prior Level of Function      Most Recent Value   Ambulation  independent   Transferring  independent   Toileting  independent   Bathing  independent   Dressing  independent   Eating  independent    Communication  understands/communicates without difficulty   Swallowing  swallows foods/liquids without difficulty   Equipment Currently Used at Home  none   Prior Functional Level Comment  Pt previously IND with ADLs/IADLs, dtr visits frequently and helps with IADLs occasionally                OT Evaluation - 06/11/19 1045        Session Details    Document Type initial evaluation    Mode of Treatment occupational therapy    Patient/Family Observations Pt recieved supine in head elevated bed.        Time Calculation    Start Time 1045    Stop Time 1108    Time Calculation (min) 23 min       General Information    Patient Profile Reviewed? yes    Existing Precautions/Restrictions fall       Coping Strategies    Trust Relationship/Rapport care explained       Orientation Log    Comment AAOx3       Cognition/Psychosocial    Safety Awareness intact       Sensory    Sensory General Assessment no sensation deficits identified       Range of Motion (ROM)    General Range of Motion no range of motion deficits identified    Comment, General Range of Motion B UE WFL       Manual Muscle Testing (MMT)    General MMT Assessment no strength deficits identified    Comment B UE: grossly 4/5       Bed Mobility    Bed Mobility supine to sit;sit to supine    [place], Supine to Sit modified independence    [place], Sit to Supine modified independence    Assistive Device (Bed Mobility) head of bed elevated;bed rails    Comment (Bed Mobility) Pt Melissa with bed mobility with head of bed elevated and use of bed rails.        Transfers    Comment Pt supervision with functional transfers with no AD. VCs for technique and safety.       Sit to Stand Transfer    [place], Sit to Stand Transfer verbal cues;1 person assist;supervision    Verbal Cues safety;technique;hand placement    Comment no AD, Pt instructed on safety techniques with STS transfers to prevent falls.       Stand to Sit Transfer    [place], Stand to Sit  Transfer supervision;1 person assist;verbal cues    Verbal Cues technique;safety;hand placement    Comment no AD       Lower Body Dressing    Lower Body Dressing Tasks don;doff;socks    Lower Body Dressing Position edge of bed sitting    Lower Body Dressing [place] supervision       AM-PAC (TM) - ADL (Current Function)    Putting on and taking off regular lower body clothing? 4 - None    Bathing? 3 - A Little    Toileting? 4 - None    Putting on/taking off regular upper body clothing? 3 - A Little   2* to line mgmt    How much help for taking care of personal grooming? 4 - None    Eating meals? 4 - None    AM-PAC (TM) ADL Score 22       OT Clinical Impression    Patient's Goals For Discharge return to all previous roles/activities    Plan For Care Reviewed: Occupational Therapy OT plan for care discussed with patient    Impairments Found (OT Eval) aerobic capacity/endurance    Rehab Potential/Prognosis: Occupational Therapy good, to achieve stated therapy goals    OT Frequency of Treatment 3-5 times per week    Problem List: Occupational Therapy strength decreased;impaired balance    Anticipated Equipment Needs at Discharge bathing equipment;bedside commode;dressing equipment;tub bench    OT Recommended Discharge Disposition home with assist;home with home health    Daily Outcome Statement [facility] 22. OT eval complete. Pt supervision with functional transfers and LB ADLs. [place] with bed mobility with use of bed rails and HOB elevated. Pt would benefit from returning to her own home with assist/home health to increase safety and maximize IND with ADLs/Reduce the risk of falls.                         Education provided this session. See the Patient Education summary report for full details.    OT Care Plan Goals      Most Recent Value   Bed Mobility Goal   Time to Achieve Goal: Bed Mobility  by discharge   Goal Activity: Bed Mobility  all bed mobility activities   Level of [place] Goal: Bed Mobility   independent   Goal: Bed Mobility  no AD used   Goal Outcome: Bed Mobility  goal ongoing   Lower Body Dressing Goal   Time to Achieve Goal: Lower Body Dressing  by discharge   Level of [place]  independent   Goal Outcome: Lower Body Dressing  goal ongoing   Transfer Goal   Time to Achieve Goal: Transfer Training  by discharge   Goal Activity: Transfer Training  all transfers   Level of [place] Goal: Transfer Training  independent   Goal: Transfer Training  Utilizing ECon techniques   Goal Outcome: Transfer Training  goal ongoing

## 2019-06-11 NOTE — PATIENT CARE CONFERENCE
Care Progression Rounds Note  Date: 6/11/2019  Time: 10:39 AM     Patient Name: Madelyn Ruiz     Medical Record Number: [id number]   YOB: 1930  Sex: Female      Room/Bed: 0549    Admitting Diagnosis: Hypoxia [R09.02]  Severe persistent asthma with exacerbation [J45.51]   Admit Date/Time: 6/8/2019  4:47 PM    Primary Diagnosis: Acute hypoxemic respiratory failure (CMS/HCC)  Principal Problem: Acute hypoxemic respiratory failure (CMS/HCC)    GMLOS: 4.3  Anticipated Discharge Date: 6/12/2019    AM-PAC  Mobility Score: 24    Discharge Planning:  Concerns To Be Addressed: care coordination/care conferences  Anticipated Discharge Disposition: home with home health services  Type of Home Care Services: nursing    Barriers to Discharge:  Barriers to Discharge: Test pending, Medical issues not resolved, Consult pending  Comment: needs room air SAT checked on ambulation, needs PT c/s    Participants:  , dietitian/nutrition services, nursing, physical therapy

## 2019-06-11 NOTE — PLAN OF CARE
Problem: Patient Care Overview  Goal: Plan of Care Review  Outcome: Ongoing (interventions implemented as appropriate)   06/11/19 0518   Coping/Psychosocial   Plan Of Care Reviewed With patient;family   Plan of Care Review   Progress progress toward functional goals as expected   Outcome Summary Patient remains on 2L NC. She is receiving breathing treatments around the clock. Also still receiving solumedrol. VSS      Goal: Individualization & Mutuality  Outcome: Ongoing (interventions implemented as appropriate)      Problem: Chronic Obstructive Pulmonary Disease (Adult)  Goal: Signs and Symptoms of Listed Potential Problems Will be Absent, Minimized or Managed (Chronic Obstructive Pulmonary Disease)  Outcome: Ongoing (interventions implemented as appropriate)      Problem: Asthma (Adult)  Goal: Signs and Symptoms of Listed Potential Problems Will be Absent, Minimized or Managed (Asthma)  Outcome: Ongoing (interventions implemented as appropriate)

## 2019-06-12 VITALS
HEART RATE: 98 BPM | BODY MASS INDEX: 24.35 KG/M2 | HEIGHT: 61 IN | SYSTOLIC BLOOD PRESSURE: 127 MMHG | WEIGHT: 129 LBS | DIASTOLIC BLOOD PRESSURE: 76 MMHG | TEMPERATURE: 98.7 F | OXYGEN SATURATION: 99 % | RESPIRATION RATE: 20 BRPM

## 2019-06-12 LAB
ANION GAP SERPL CALC-SCNC: 9 MEQ/L (ref 3–15)
APTT PPP: 32 SEC (ref 23–35)
BUN SERPL-MCNC: 15 MG/DL (ref 8–20)
CALCIUM SERPL-MCNC: 9 MG/DL (ref 8.9–10.3)
CHLORIDE SERPL-SCNC: 103 MEQ/L (ref 98–109)
CO2 SERPL-SCNC: 28 MEQ/L (ref 22–32)
CONFIRM APTT STACLOT: NEGATIVE
CONFIRM DRVVT/NORMAL: <=1.1 %
CREAT SERPL-MCNC: 0.8 MG/DL
ERYTHROCYTE [DISTWIDTH] IN BLOOD BY AUTOMATED COUNT: 14 % (ref 11.7–14.4)
GFR SERPL CREATININE-BSD FRML MDRD: >60 ML/MIN/1.73M*2
GLUCOSE SERPL-MCNC: 112 MG/DL (ref 70–99)
HCT VFR BLDCO AUTO: 44 %
HGB BLD-MCNC: 13.9 G/DL
INTERPRETATION SERPL IFE-IMP: NORMAL
MCH RBC QN AUTO: 30.5 PG (ref 28–33.2)
MCHC RBC AUTO-ENTMCNC: 31.6 G/DL (ref 32.2–35.5)
MCV RBC AUTO: 96.7 FL (ref 83–98)
PDW BLD AUTO: 9.9 FL (ref 9.4–12.3)
PLATELET # BLD AUTO: 279 K/UL
POTASSIUM SERPL-SCNC: 3.8 MEQ/L (ref 3.6–5.1)
PROT C AG ACT/NOR PPP IA: 116 % (ref 70–140)
PROT S AG ACT/NOR PPP IA: 118 % NORMAL (ref 70–140)
PROTHROMBIN TIME: 16.4 SEC (ref 12.2–14.5)
PT IMM NP PPP: 14.4 SEC (ref 12.2–14.5)
RBC # BLD AUTO: 4.55 M/UL (ref 3.93–5.22)
SCREEN DRVVT: 66 SEC (ref 31–44)
SODIUM SERPL-SCNC: 140 MEQ/L (ref 136–144)
WBC # BLD AUTO: 9.55 K/UL

## 2019-06-12 PROCEDURE — 63700000 HC SELF-ADMINISTRABLE DRUG: Performed by: HOSPITALIST

## 2019-06-12 PROCEDURE — 85027 COMPLETE CBC AUTOMATED: CPT | Performed by: HOSPITALIST

## 2019-06-12 PROCEDURE — 99239 HOSP IP/OBS DSCHRG MGMT >30: CPT | Performed by: HOSPITALIST

## 2019-06-12 PROCEDURE — 63700000 HC SELF-ADMINISTRABLE DRUG: Performed by: INTERNAL MEDICINE

## 2019-06-12 PROCEDURE — 25000000 HC PHARMACY GENERAL: Performed by: INTERNAL MEDICINE

## 2019-06-12 PROCEDURE — 80048 BASIC METABOLIC PNL TOTAL CA: CPT | Performed by: HOSPITALIST

## 2019-06-12 PROCEDURE — 36415 COLL VENOUS BLD VENIPUNCTURE: CPT | Performed by: HOSPITALIST

## 2019-06-12 RX ORDER — GUAIFENESIN 600 MG/1
1200 TABLET, EXTENDED RELEASE ORAL 2 TIMES DAILY
Qty: 28 TABLET | Refills: 0 | Status: SHIPPED | OUTPATIENT
Start: 2019-06-12 | End: 2019-06-20 | Stop reason: ALTCHOICE

## 2019-06-12 RX ORDER — ALBUTEROL SULFATE 0.83 MG/ML
2.5 SOLUTION RESPIRATORY (INHALATION) EVERY 6 HOURS PRN
Qty: 125 VIAL | Refills: 0 | Status: SHIPPED | OUTPATIENT
Start: 2019-06-12 | End: 2023-08-21

## 2019-06-12 RX ORDER — ALBUTEROL SULFATE 90 UG/1
2 INHALANT RESPIRATORY (INHALATION) EVERY 4 HOURS PRN
Qty: 8.5 G | Refills: 0 | Status: SHIPPED | OUTPATIENT
Start: 2019-06-12 | End: 2023-01-26 | Stop reason: SDUPTHER

## 2019-06-12 RX ORDER — PREDNISONE 10 MG/1
TABLET ORAL
Qty: 26 TABLET | Refills: 0 | Status: SHIPPED | OUTPATIENT
Start: 2019-06-12 | End: 2019-12-13

## 2019-06-12 RX ADMIN — IPRATROPIUM BROMIDE AND ALBUTEROL SULFATE 3 ML: 2.5; .5 SOLUTION RESPIRATORY (INHALATION) at 09:22

## 2019-06-12 RX ADMIN — AMLODIPINE BESYLATE 10 MG: 10 TABLET ORAL at 09:22

## 2019-06-12 RX ADMIN — APIXABAN 10 MG: 5 TABLET, FILM COATED ORAL at 09:21

## 2019-06-12 RX ADMIN — GUAIFENESIN 1200 MG: 600 TABLET, EXTENDED RELEASE ORAL at 09:22

## 2019-06-12 RX ADMIN — PANTOPRAZOLE SODIUM 40 MG: 40 TABLET, DELAYED RELEASE ORAL at 09:22

## 2019-06-12 RX ADMIN — BUDESONIDE 0.5 MG: 0.5 INHALANT RESPIRATORY (INHALATION) at 09:22

## 2019-06-12 RX ADMIN — CETIRIZINE HYDROCHLORIDE 5 MG: 5 TABLET ORAL at 09:23

## 2019-06-12 RX ADMIN — FLUTICASONE PROPIONATE 1 SPRAY: 50 SPRAY, METERED NASAL at 09:23

## 2019-06-12 RX ADMIN — PREDNISONE 40 MG: 10 TABLET ORAL at 09:21

## 2019-06-12 RX ADMIN — CELECOXIB 200 MG: 200 CAPSULE ORAL at 09:21

## 2019-06-12 RX ADMIN — IPRATROPIUM BROMIDE AND ALBUTEROL SULFATE 3 ML: 2.5; .5 SOLUTION RESPIRATORY (INHALATION) at 04:36

## 2019-06-12 RX ADMIN — IPRATROPIUM BROMIDE AND ALBUTEROL SULFATE 3 ML: 2.5; .5 SOLUTION RESPIRATORY (INHALATION) at 00:58

## 2019-06-12 NOTE — NURSING NOTE
Madelyn ANDERSON Rogeriodawnnefrancisco discharged to home via wheelchair, and is accompanied by family.   IV site removed. Dressing is clean, dry, and intact.  D/c instructions reviewed with patient, who verbalizes understanding.    Belongings and d/c instructions sent with patient.   VSS. Patient denies pain.

## 2019-06-12 NOTE — ASSESSMENT & PLAN NOTE
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 Arriaza

## 2019-06-12 NOTE — PROGRESS NOTES
The patient overall feels improved.  Her pulse ox was checked on room air at rest and with ambulation and was adequate.  She is therefore no longer on oxygen supplementation.  She does continue to  complain of a cough productive of a clear phlegm.  She denies coughing of blood.    However, overall it is improved.  She does continue on Tessalon as needed      She denies any back pain or bone pain.  She denies any fevers or chills.  She denies any nausea or vomiting.  She is not enjoying the food at the hospital.  Denies any abdominal pain or cramping.  She denies any difficulty urinating or burning on urinationn.     Physical exam awake and alert female looking younger than her stated age.Afebrile  Skin: Warm dry.  No rash.  No petechiae.  No ecchymoses  HEENT: Sclera white conjunctiva pink.  No thrush  Heart: Regular rate and rhythm  Lungs: Bilateral wheezing.  No rales or rhonchi  Abdomen: Soft.  Bowel sounds present.  Rotund.  No hepatosplenomegaly.  Incisions well-healed No tenderness.  No rebound  Extremities: Without cyanosis clubbing or edema.  No tenderness.  No cords     Laboratory data--sodium 140 potassium 3.8 chloride 103 CO2 28 BUN 15 creatinine 0.8 calcium 9.0  WBC 9.5 hemoglobin 13.9 hematocrit 44 MCV 96 platelets 279,000,  Factor V Leiden -negative prothrombin gene mutation-negative   SPEP-hypogammaglobulinemia, IEP pending     Problem #1 pulmonary embolism-the etiology is not clear.  She had not been sedentary   There was no history of trauma.  She had not taken hormonal therapy.  There is no family history    To date the hypercoagulable work-up is pending    She is currently on Eliquis.  She is tolerating this well.  A Doppler ultrasound showed no evidence of DVT.  The echocardiogram showed no evidence of right heart strain.  The source of the clot remains elusive.  Abdominal imaging may be helpful to rule out any abdominal or pelvic source of clot.     At the minimum the patient would be  recommended to take 6 months of anticoagulation pending the further work-up.  She will however discuss this further with her primary pulmonologist Dr. Arriaza   I would consider a follow-up CT scan at a 4 to 6-month interval..The patient was informed that should she ever have a second clot she would likely need lifelong anticoagulation.     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 Arriaza at [facility] with fasema      Problem #3 shortness of breath-she has been placed on steroids.  She remains on inhalers as well.  She is on Mucinex.  She does feel improved this morning.  However she also continue on Tessalon Perles as needed for her cough.    Her pulse ox on room air at rest and with ambulation is apparently adequate off supplemental oxygen.     Problem #4 history of spinal stenosis-  I would have her out of bed as tolerated.     Problem #5 osteoporosis-this managed by her primary physician team.     Problem #6 elevated glucose-we will need to be monitored carefully with the addition of steroids     Problem #7 elevated BUN-I would encourage oral hydration.     Problem #8 fatigue-this likely exacerbated by the PE.   .  I would have a physical therapy evaluation when she has stabilized.  Her TSH is low.     Problem #9 level of care-the patient is full code

## 2019-06-12 NOTE — PLAN OF CARE
Problem: Patient Care Overview  Goal: Plan of Care Review  Outcome: Ongoing (interventions implemented as appropriate)   06/12/19 8853   Coping/Psychosocial   Plan Of Care Reviewed With patient   Plan of Care Review   Progress improving   Outcome Summary Patient remains on room air with exp wheeze. She is still receiving neb treatments around the clock. She is now transitioned to PO steroids. D/C home today.        Problem: Chronic Obstructive Pulmonary Disease (Adult)  Goal: Signs and Symptoms of Listed Potential Problems Will be Absent, Minimized or Managed (Chronic Obstructive Pulmonary Disease)  Outcome: Ongoing (interventions implemented as appropriate)      Problem: Asthma (Adult)  Goal: Signs and Symptoms of Listed Potential Problems Will be Absent, Minimized or Managed (Asthma)  Outcome: Ongoing (interventions implemented as appropriate)

## 2019-06-12 NOTE — ASSESSMENT & PLAN NOTE
- Follows with Dr. Miguel, neurosurgery, and Dr. You, pain management  - Continue celebrex, cymbalta   - PT/OT, rec home with home health

## 2019-06-12 NOTE — PROGRESS NOTES
[facility]  Received referral via [facility]  Pt aware of choice, chooses [facility]  Normally lives alone, but will be staying with her daughter, Shelley Raya, initially  Referral completed for SN and PT with 06/13/2019 home care contact  Jesi Khoury RN pager [phone]             Addendum  Pt has been ordered Nebulizer treatments for home  She has an Nebulizer, but cannot remember where it is  Checked with Nimisha Anne at [facility]  Per Nimisha the original Neb was from [facility] and it is too soon to have a new one that is covered by Medicare  Informed pt's daughter, Shelley Raya, that if they cannot find pt's Neb, they will need to private pay for one through the Internet or at a pharmacy  Jesi Khoury RN pager [phone]

## 2019-06-12 NOTE — PROGRESS NOTES
Hospital Medicine Service -  Daily Progress Note       SUBJECTIVE   Interval History: Feeling better even today, off of supplemental O2. Visitors at bedside. No acute complaints/concerns.      OBJECTIVE      Vital signs in last 24 hours:  Temp:  [36.3 °C (97.4 °F)-37.1 °C (98.7 °F)] 36.7 °C (98.1 °F)  Heart Rate:  [] 106  Resp:  [18-20] 18  BP: (125-184)/(65-94) 163/94    Intake/Output Summary (Last 24 hours) at 06/11/19 2022  Last data filed at 06/11/19 1854   Gross per 24 hour   Intake              800 ml   Output                0 ml   Net              800 ml       PHYSICAL EXAMINATION      Physical Exam   Constitutional: She is oriented to person, place, and time. She appears well-developed and well-nourished. No distress.   HENT:   Head: Normocephalic and atraumatic.   Mouth/Throat: Oropharynx is clear and moist.   Eyes: Conjunctivae are normal. No scleral icterus.   Neck: Neck supple. No JVD present.   Cardiovascular: Regular rhythm and normal heart sounds. Normal heart rate  No murmur heard.  Pulmonary/Chest: Effort normal. She has no wheezes. She has no rales.   + scattered ronchi   Abdominal: Soft. Bowel sounds are normal. She exhibits no distension. There is no tenderness.   Musculoskeletal: She exhibits no edema, tenderness or deformity.   Neurological: She is alert and oriented to person, place, and time. No cranial nerve deficit.   Skin: Skin is warm and dry. Capillary refill takes less than 2 seconds. No rash noted. No erythema.   Psychiatric: She has a normal mood and affect. Her behavior is normal.   LINES, CATHETERS, DRAINS, AIRWAYS, AND WOUNDS   Lines, Drains, Airways, Wounds:  Peripheral IV 06/08/19 Left Hand (Active)   Number of days: 2       Comments: PIV for access     LABS / IMAGING / TELE      Labs    Results from last 7 days  Lab Units 06/10/19  1108   SODIUM mEQ/L 139   POTASSIUM mEQ/L 4.4   CHLORIDE mEQ/L 105   CO2 mEQ/L 20*   BUN mg/dL 22*   CREATININE mg/dL 0.8   GLUCOSE mg/dL 82    CALCIUM mg/dL 9.3       Results from last 7 days  Lab Units 06/10/19  1108   WBC K/uL 10.29   HEMOGLOBIN g/dL 13.6   HEMATOCRIT % 41.3   PLATELETS K/uL 296         Imaging  X-ray Chest 2 Views    Result Date: 6/9/2019  Narrative: CLINICAL HISTORY: Cough COMMENT: PA and lateral views of the chest were obtained and compared to prior study from 2018. There is a nodular opacity in the left lung base on the frontal view.  There is no pleural effusion or pneumothorax.  There is no focal consolidation.  There is surgical hardware in the lumbar spine.     Impression: IMPRESSION: Nodular opacity in the left midlung field.  Further evaluation with chest CT may be helpful.    Ct Chest With Iv Contrast    Result Date: 6/9/2019  Narrative: CLINICAL HISTORY: Left lung nodular opacity on chest radiograph.  Acute hypoxemic respiratory failure. COMPARISON: Chest radiograph 6/8/2019.  CT chest 9/11/2014 COMMENT: TECHNIQUE: CT of the chest was performed with the patient in the supine position. Images reconstructed/reformatted in the axial, coronal and  sagittal plane. CT DOSE:  One or more dose reduction techniques (e.g. automated exposure control, adjustment of the mA and/or kV according to patient size, use of iterative reconstruction technique) utilized for this examination. INTRAVENOUS CONTRAST: 75 mL of Omnipaque 350 intravenous contrast was administered without event. LUNG, LARGE AIRWAYS AND PLEURA: There is no nodule/mass corresponding to nodular opacity seen on prior chest radiograph.  Mild bilateral lower lobe as well as right upper lobe and medial segment right middle lobe linear scarring and/or subsegmental atelectasis.  There is a 12 mm groundglass focus involving the medial aspect of the superior segment of the right lower lobe abutting the pleura, best seen on image 33 of the axial lung series, likely infectious or inflammatory in etiology.  Stable nodular right apical pleural parenchymal scarring.  Stable chronic  small focus of mucus plugging is seen in the lateral segment right lower lobe (series 1 image 81). CHEST WALL AND LOWER NECK: within normal limits. MEDIASTINUM AND AMADA: No lymphadenopathy.  Esophageal wall thickening, likely related to gastroesophageal reflux disease. HEART: Left atrial enlargement with overall top normal heart size.  No right ventricular enlargement or bowing of the interventricular septum. No pericardial effusion.  Aortic and mitral valve annular calcifications.  No evidence for right heart strain. VESSELS: There are central filling defects in the right right upper lobe proximal segmental pulmonary arterial branches, most compatible with acute emboli.  There are mild linear central filling defects in the right lower lobe pulmonary arterial branch with slight involvement of the posterior segment right lower lobe pulmonary arterial branch, suggestive of subacute to chronic pulmonary embolism with element of recanalization.  Main pulmonary trunk remains normal in caliber.  No thoracic aortic aneurysm or dissection, noting atherosclerotic calcification of the thoracic aorta and coronary arteries. 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  Chronic mild wedge compression deformity of the T2 and T12 vertebral bodies.     Impression: IMPRESSION: 1.  Acute proximal segmental right upper lobe pulmonary emboli, noting overall very low clot burden.  No evidence for right heart strain. 2.  Old healed left fourth, fifth and sixth rib fractures with callus formation. This corresponds to the nodular opacity seen on chest radiograph. 3.  Nonspecific new 12 mm superior segment right lower lobe groundglass focus, almost certainly postinfectious/postinflammatory, but can be followed per  current 2017 Fleischner guidelines. Fleischner Society 2017 Guidelines for Management of Incidentally Detected Pulmonary Nodules in Adults. (Subsolid Nodules) Single ground glass: <6 mm - No routine follow-up >/= 6 mm - CT at 6-12 months to confirm persistence, then CT every 2 years until 5 years Finding:    Acute pulmonary embolism   Acuity: Critical  Status:  CLOSED The results were critically read back with Dr. Ly Kapadia on 6/9/2019 11:46 AM. I certify that I have reviewed this examination and agree with this report. Donald Rinaldi MD      ECG/Telemetry  I have independently reviewed the telemetry. Significant findings include Sinus tachycardia .    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, known to Dr Arriaza at [place]  - Continue ICS/IBDA  - Supplemental O2 to maintain spO2 > 90% - oxygenating well on RA toda  - Taper to oral steroids today for likely d/c tomorrow if continues to do well     Pulmonary embolism (CMS/HCC) (HCC)   Assessment & Plan    - CT chest showed moderate PE  - Heme-onc consulted   - Continue on Eliquis 10mg BID x 7 days, then 5mg BID thereafter  - Follow-up with pulmonary outpatient for repeat CT scan in 4-6months  - LE US negative, TTE with preserved EF, mild diastolic dysfunction     GERD (gastroesophageal reflux disease)   Assessment & Plan    - Continue PPI      Lumbar spinal stenosis   Assessment & Plan    - Follows with Dr. Miguel, neurosurgery, and Dr. You, pain management  - Continue celebrex, cymbalta   - PT/OT        Essential hypertension   Assessment & Plan    - Continue amlodipine  - BP controlled           VTE Assessment: [place] VTE Score: 4  VTE Prophylaxis Plan: Eliquis  Code Status: Full Code  Estimated Discharge Date: 6/12/2019  Disposition Planning: Improving, likely d/c tomorrow      Tamela Hernandez, DO  6/11/2019

## 2019-06-12 NOTE — PATIENT CARE CONFERENCE
Care Progression Rounds Note  Date: 6/12/2019  Time: 10:34 AM     Patient Name: Madelyn Ruiz     Medical Record Number: [id number]   YOB: 1930  Sex: Female      Room/Bed: 0549    Admitting Diagnosis: Hypoxia [R09.02]  Severe persistent asthma with exacerbation [J45.51]   Admit Date/Time: 6/8/2019  4:47 PM    Primary Diagnosis: Acute hypoxemic respiratory failure (CMS/HCC)  Principal Problem: Acute hypoxemic respiratory failure (CMS/HCC)    GMLOS: 4.3  Anticipated Discharge Date: 6/12/2019    AM-PAC  Mobility Score: 23    Discharge Planning:  Concerns To Be Addressed: care coordination/care conferences  Anticipated Discharge Disposition: home with home health services  Type of Home Care Services: nursing    Barriers to Discharge:  Barriers to Discharge: None  Comment: Room air SAT 95% while ambulating, possible d/c later today    Participants:  , advanced practice provider, social work/services, nursing, dietitian/nutrition services, physical therapy

## 2019-06-12 NOTE — ASSESSMENT & PLAN NOTE
- CT chest showed moderate PE  - Heme-onc input reviewed and appreciated, recommend at least 6 months of anticoagulation pending further work-up  - Continue on Eliquis 10mg BID x 7 days, then 5mg BID thereafter  - Follow-up with pulmonary outpatient for repeat CT scan in 4-6months  - LE US negative, TTE with preserved EF, mild diastolic dysfunction  - Hypercoagulable work up pending - f/u outpatient  - patient to follow up with her Pulmonologist and PCP. Can f/u with Heme/Onc as needed

## 2019-06-12 NOTE — DISCHARGE INSTRUCTIONS
ELIQUIS dosing for pulmonary embolism -take 2 tablets (10 mg) twice daily for 7 more doses THEN take 1 tablet (5 mg) twice daily.    Hematology recommends you continue Eliquis for at least 6 months pending further work-up.  However your pulmonologist can determine the duration.  You will need a repeat CT chest in 4-6 months.    You can follow-up with your primary care provider or hematology (Dr. Bhakta) for pending hypercoagulable work-up.    Celebrex has been discontinued as this could increase your risk of bleeding with the Eliquis.  Discussed with your physician an alternative medication.    You may take acetaminophen (Tylenol) as needed for pain.  Follow package directions.    Prednisone taper -   PREDNISONE TAPERING INSTRUCTIONS    40mg (4 pills) daily for 2 days, then  30mg (3 pills) daily for 3 days, then  20mg (2 pills) daily for 3 days, then  10mg (1 pill) daily for 3 days, then stop.  Take with FOOD.    In addition to Pulmonary Embolism, the following was also noted on results:   - Nonspecific new 12 mm superior segment right lower lobe groundglass focus,  almost certainly postinfectious/postinflammatory, but can be followed per  current 2017 Fleischner guidelines.      Fleischner Society 2017 Guidelines for Management of Incidentally Detected  Pulmonary Nodules in Adults. (Subsolid Nodules)    Single ground glass:  <6 mm - No routine follow-up  >/= 6 mm - CT at 6-12 months to confirm persistence, then CT every 2 years until  5 years - This can be done with your Pulmonologist

## 2019-06-12 NOTE — DISCHARGE SUMMARY
Hospital Medicine Service -  Inpatient Discharge Summary        BRIEF OVERVIEW   Admitting Provider: Dorothy Mcintosh DO  Attending Provider: Tamela Hernandez DO Attending phys phone: [phone]    PCP: Henry Linares [phone]    Admission Date: 6/8/2019  Discharge Date: 6/12/2019     DISCHARGE DIAGNOSES      Primary Discharge Diagnosis  Acute hypoxemic respiratory failure (CMS/HCC)    Secondary Discharge Diagnoses  Active Hospital Problems    Diagnosis Date Noted   • Acute hypoxemic respiratory failure (CMS/HCC) 06/08/2019     Priority: High   • Pulmonary embolism (CMS/HCC) ([facility]) 06/09/2019     Priority: Medium   • GERD (gastroesophageal reflux disease) 06/08/2019   • Essential hypertension 04/02/2018   • Lumbar spinal stenosis 04/02/2018      Resolved Hospital Problems    Diagnosis Date Noted Date Resolved   • Hypoxia 06/08/2019 06/09/2019       Problem List on Day of Discharge  * Acute hypoxemic respiratory failure (CMS/[facility])   Assessment & Plan    - Stable, on Room Air  - Likely multifactorial w/ asthma exacerbation, pulmonary embolism   - 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 Arriaza     Pulmonary embolism (CMS/[facility]) ([facility])   Assessment & Plan    - CT chest showed moderate PE  - Heme-onc input reviewed and appreciated, recommend at least 6 months of anticoagulation pending further work-up  - Continue on Eliquis 10mg BID x 7 days, then 5mg BID thereafter  - Follow-up with pulmonary outpatient for repeat CT scan in 4-6months  - LE US negative, TTE with preserved EF, mild diastolic dysfunction  - Hypercoagulable work up pending - f/u outpatient  - patient to follow up with her Pulmonologist and PCP. Can f/u with Heme/Onc as needed     GERD (gastroesophageal reflux disease)   Assessment & Plan    - Continue PPI      Lumbar spinal stenosis   Assessment & Plan    -  Follows with Dr. Miguel, neurosurgery, and Dr. You, pain management  - Continue celebrex, cymbalta   - PT/OT, rec home with home health       Essential hypertension   Assessment & Plan    - Continue amlodipine  - BP controlled        SUMMARY OF HOSPITALIZATION      Presenting Problem/History of Present Illness  Hypoxia    This is a 88 y.o. year-old female admitted on 2019 with Hypoxia [R09.02]  Severe persistent asthma with exacerbation [J45.51].    Madelyn Ruiz is a 88 y.o. female with a past medical history of ABPA, allergic rhinitis, asthma follows with Dr. Arriaza at [place] and gets Faserna every other month, HTN, spinal stenosis who presented to [facility] on 2019 with SOB.    The patient reported coughing and wheezing x1 week.  She also felt very fatigued.    Patient states that she saw Dr. Arriaza about a month ago around May 13.  At that time she had PFTs done and Dr. Arriaza took her off of Symbicort saying he thought it had no therapeutic value at the dose she was that but she could use it as needed.  She also received her Faserna injection but it was 3 weeks late due to insurance issues.  She says a week after stopping the Symbicort she felt like she needed with oxygen when breathing so she started to take Symbicort again but not on a consistent basis and she saw a small difference.  She does have a rescue inhaler but has  so did not use that.    Hospital Course    In the emergency room, patient was given a nebulizer treatment and 60 mg of IV Solu-Medrol.  She reported some improvement in her symptoms.  Chest x-ray showed no focal consolidation, nodular opacity in the left lung base on frontal review -no further evaluation was chest CT may be helpful..  The patient was admitted for asthma exacerbation.  She was placed on IV steroids, duo nebs and Pulmicort nebs.  CT chest was completed and revealed acute proximal segmental right upper lobe pulmonary emboli, no evidence for  right heart strain.  On Eliquis 10 mg twice daily x7 days then 5 mg twice daily.  Hematology was consulted and hypercoagulable work-up was ordered and is pending.  Bilateral lower extremities were negative for DVT.  Echocardiogram was unremarkable.  Patient's symptoms have been improving.  She has been stable on room air x24 hours and has no pain.    She is stable for discharge home on Eliquis.  She will continue prednisone taper on discharge.    She is instructed to follow-up with her primary care provider or hematology for results of hypercoagulable work-up.  She will follow-up with her pulmonologist who will determine duration of anticoagulation and monitor follow-up CT chest in 4 to 6 months.    Exam on Day of Discharge  Physical Exam   Constitutional: Oriented to person, place, and time. Elderly female. Appears well-developed and well-nourished. No distress.   HENT:   Head: Normocephalic and atraumatic.   Eyes: No scleral icterus.   Cardiovascular: Normal rate and regular rhythm.    Pulmonary/Chest: some coarseness.  No wheezing apprciated  Abdominal: Soft. Bowel sounds are normal.   Musculoskeletal:  no edema.   Neurological: alert and oriented to person, place, and time.   Skin: Skin is warm and dry.   Psychiatric: Normal mood and affect      Consults During Admission  IP CONSULT TO HEMATOLOGY    DISCHARGE MEDICATIONS        Medication List      START taking these medications    * albuterol HFA 90 mcg/actuation inhaler  Commonly known as:  VENTOLIN HFA  Inhale 2 puffs every 4 (four) hours as needed for wheezing or shortness of breath.  Dose:  2 puff     * albuterol 2.5 mg /3 mL (0.083 %) nebulizer solution  Take 3 mL (2.5 mg total) by nebulization every 6 (six) hours as needed for wheezing or shortness of breath.  Dose:  2.5 mg     apixaban 5 mg tablet  Commonly known as:  ELIQUIS  Take 2 tablets (10 mg) by mouth 2 (tow) times daily x 7 doses THEN take 1 tablet (5 mg) 2 times daily     guaiFENesin 600 mg 12  hr tablet  Commonly known as:  MUCINEX  Take 2 tablets (1,200 mg total) by mouth 2 (two) times a day for 7 days.  Dose:  1200 mg     predniSONE 10 mg tablet  Commonly known as:  DELTASONE  40 mg daily x 2 days, 30 mg daily x 3 days, 20 mg daily x 3 days, 10 mg daily x 3 days        * This list has 2 medication(s) that are the same as other medications prescribed for you. Read the directions carefully, and ask your doctor or other care provider to review them with you.            CONTINUE taking these medications    alendronate 70 mg tablet  Commonly known as:  FOSAMAX  Take 70 mg by mouth once a week. Take on an empty stomach and do not lie down for the next 30 min. On WEdnesdays  Dose:  70 mg     amLODIPine 10 mg tablet  Commonly known as:  NORVASC  Take 10 mg by mouth daily.  Dose:  10 mg     benzonatate 200 mg capsule  Commonly known as:  TESSALON  TAKE 1 CAPSULE BY MOUTH 3 TIMES DAILY AS NEEDED FOR COUGH.     calcium carbonate 600 mg calcium (1,500 mg) tablet  Take 1 tablet by mouth 2 (two) times a day.  Dose:  1 tablet  Generic drug:  calcium carbonate     CENTRUM ORAL  Take 1 tablet by mouth daily.  Dose:  1 tablet     COLACE 100 mg capsule  Take 100 mg by mouth nightly.  Dose:  100 mg  Generic drug:  docusate sodium     diphenhydrAMINE 25 mg capsule  Commonly known as:  BENADRYL  Take 25 mg by mouth nightly.  Dose:  25 mg     DULoxetine 60 mg capsule  Commonly known as:  CYMBALTA  TAKE 1 CAPSULE BY MOUTH EVERYDAY AT BEDTIME     esomeprazole 20 mg capsule  Commonly known as:  NexIUM  Take 20 mg by mouth daily. Take 30 minutes prior to meal.  Dose:  20 mg     fexofenadine 180 mg tablet  Commonly known as:  ALLEGRA  Take 180 mg by mouth daily.  Dose:  180 mg     fluticasone propionate 50 mcg/actuation nasal spray  Commonly known as:  FLONASE  Administer 1 spray into each nostril daily.  Dose:  1 spray     LORazepam 1 mg tablet  Commonly known as:  ATIVAN  TAKE 1 TABLET BY MOUTH EVERY DAY AT BEDTIME AS NEEDED      VITAMIN D3 ORAL  Take by mouth daily.        STOP taking these medications    celecoxib 200 mg capsule  Commonly known as:  celeBREX            Instructions for after discharge     Call provider for:  difficulty breathing, headache or visual disturbances       Call provider for:  extreme fatigue       Call provider for:  severe uncontrolled pain       Discharge diet       Diet Type / Texture:  Regular    Follow-up with Provider:       Instructions for follow-up:  in 2-3 weeks    Follow-up with Provider:       Instructions for follow-up:  as needed - see below    Follow-up with primary physician (PCP)       Instructions for follow-up:  within 1 week    Post-Discharge Activity: Normal activity as tolerated.       Normal activity as tolerated.             PROCEDURES / LABS / IMAGING        Other Procedures    6/11/2019 - Ultrasound venous leg bilateral  Interpretation Summary     No evidence of deep vein thrombus (DVT) in either lower extremity.         6/11/2019 - Transthoracic echocardiogram  Interpretation Summary     · Estimated EF = 55- 60%. Grade I diastolic dysfunction. Dysfunction consistent with impaired relaxation.  · Normal-sized right ventricular cavity with preserved systolic function.       Pertinent Labs    Results from last 7 days  Lab Units 06/12/19  0418 06/10/19  1108 06/08/19  1714   WBC K/uL 9.55 10.29 7.87   HEMOGLOBIN g/dL 13.9 13.6 13.7   HEMATOCRIT % 44.0 41.3 42.2   PLATELETS K/uL 279 296 304         Results from last 7 days  Lab Units 06/12/19  0418 06/10/19  1108 06/08/19  1714   SODIUM mEQ/L 140 139 138   POTASSIUM mEQ/L 3.8 4.4 3.8   CHLORIDE mEQ/L 103 105 106   CO2 mEQ/L 28 20* 20*   BUN mg/dL 15 22* 27*   CREATININE mg/dL 0.8 0.8 1.0   GLUCOSE mg/dL 112* 82 154*   CALCIUM mg/dL 9.0 9.3 9.4           Pertinent Imaging  X-ray Chest 2 Views    Result Date: 6/9/2019  IMPRESSION: Nodular opacity in the left midlung field.  Further evaluation with chest CT may be helpful.    Ct Chest With Iv  Contrast    Result Date: 6/9/2019  IMPRESSION: 1.  Acute proximal segmental right upper lobe pulmonary emboli, noting overall very low clot burden.  No evidence for right heart strain. 2.  Old healed left fourth, fifth and sixth rib fractures with callus formation. This corresponds to the nodular opacity seen on chest radiograph. 3.  Nonspecific new 12 mm superior segment right lower lobe groundglass focus, almost certainly postinfectious/postinflammatory, but can be followed per current 2017 Fleischner guidelines. Fleischner Society 2017 Guidelines for Management of Incidentally Detected Pulmonary Nodules in Adults. (Subsolid Nodules) Single ground glass: <6 mm - No routine follow-up >/= 6 mm - CT at 6-12 months to confirm persistence, then CT every 2 years until 5 years Finding:    Acute pulmonary embolism   Acuity: Critical  Status:  CLOSED The results were critically read back with Dr. Ly Kapadia on 6/9/2019 11:46 AM. I certify that I have reviewed this examination and agree with this report. Donald Rinaldi MD      OUTPATIENT  FOLLOW-UP / REFERRALS / PENDING TESTS        Outpatient Follow-Up Appointments  PCP  Pulmonary  Hematology      Referrals  Home Care    Test Results Pending at Discharge  Unresulted Labs     Start     Ordered    06/12/19 0926  STACLOT  Once      06/12/19 0925    06/10/19 0600  Protein Elect, Serum w/reflx Immuno  Morning draw      06/09/19 1300    06/10/19 0600  Antithrombin III Activity  Morning draw      06/09/19 1300    06/10/19 0600  Beta-2 glycoprotein antibodies  Morning draw      06/09/19 1300    06/10/19 0600  Lupus Anticoagulant w/ Intrpn  Morning draw      06/09/19 1300    06/10/19 0600  Protein C activity  Morning draw      06/09/19 1300    06/10/19 0600  Protein C Antigen  Morning draw      06/09/19 1300    06/10/19 0600  Protein S activity  Morning draw      06/09/19 1300    06/10/19 0600  Protein S Antigen Total  Morning draw      06/09/19 1300    06/10/19 0600  Lupus  Anticoagulant Interpretation  PROCEDURE ONCE      06/10/19 0000    06/09/19 1018  Sputum culture / smear Expectorated Sputum  Once      06/09/19 1017    06/09/19 1018  Sputum Gram Stain (Lab Only) Expectorated Sputum  PROCEDURE ONCE      06/09/19 1017          Important Issues to Address in Follow-Up  Follow up for hypercoagulable work up    DISCHARGE DISPOSITION      Disposition: Home     Code Status At Discharge: Full Code    Physician Order for Life-Sustaining Treatment Document Status      No documents found

## 2019-06-13 ENCOUNTER — PATIENT OUTREACH (OUTPATIENT)
Dept: CASE MANAGEMENT | Facility: CLINIC | Age: 84
End: 2019-06-13

## 2019-06-13 LAB
AT III ACT/NOR PPP CHRO: 115 % (ref 90–120)
B2 GLYCOPROT1 IGA SERPL IA-ACNC: <9.4 SAU
B2 GLYCOPROT1 IGG SERPL IA-ACNC: <9.4 SGU
B2 GLYCOPROT1 IGM SERPL IA-ACNC: <9.4 SMU
LA PPP-IMP: NORMAL
PROT C ACT/NOR PPP: 166 % (ref 85–185)
PROT S ACT/NOR PPP: 94 % (ref 65–140)

## 2019-06-17 NOTE — PROGRESS NOTES
NAME: Madelyn Ruiz    MRN: [id number]    YOB: 1930    EVENT DETAILS    Discharging Facility: [facility]  Date of Admission: 06/08/19  Date of Discharge: 06/12/19  Discharge Instructions Reviewed?: Yes         Reason for Admission:  Acute Hypoxemic Respiratory Failure, PE      HPI: Spoke with daughter. Patient went to the ED with SOB & fatigue . She had been coughing and wheezing x 1 week.    Patient was taken off of daily Symbicort in May but the patient was instructed to use it PRN.  She also received her Fasenra injection  3 weeks late due to insurance issues.  Chest x-ray showed no focal consolidation, nodular opacity in the left lung base on frontal review .  CT chest  revealed acute proximal segmental right upper lobe pulmonary emboli, no evidence for right heart strain. Bilateral lower extremities were negative for DVT.       Patient is not reporting fever, chills, weakness, dizziness, lightheadedness,  chest pain, increased shortness of breath, nausea, vomiting, constipation or diarrhea.    Patient is concerned about stopping the Celebrex because the medication was managing her spinal stenosis pain.     Patient is tolerating food and fluids. Patient was able to locate & is using her nebulizer.         MEDICATION REVIEW:    Reported by:: Family Member (Spoke with daughter, Susan Raya)  Prescriptions Filled?: Yes     Was a medication discrepancy indentified?: No     Medication understanding?: Yes     Medication Adherence?: Yes     Any side effects from medication?: No       Medication review Reviewed, see medication history    Additional Comments: Discontinue: Celebrex 200 mg      FOLLOW-UP TESTS/PROCEDURES: CT Chest in 4-6 months        HOME MANAGEMENT    Living Arrangement: Alone (Staying with daughter)       HOME CARE SERVICES    Receiving Home Care Services: Yes  Type of Home Care Services: Home PT, Home Nursing  Home Care Agency: [facility]        DURABLE MEDICAL EQUIPMENT    Durable  Medical Equipment: Other (Nebulizer)  Oxygen Use: No            BARRIERS TO CARE    SELF-CARE    Living Arrangement: Alone (Staying with daughter)       SOCIOECONOMIC    Financial Problems?: No     Transportation Issues?: No          INTERVENTION/CARE COORDINATION    Interventions/ Care Coordination: Assisted patient in the scheduling of an appointment     PCP Appt: 06/201/2019    Specialist Appt.: Dr. Arriaza: 6/24/2019    PLAN OF CARE:    Reviewed signs/symptoms of worsening condition or complication that necessitate a call to the Physician's office.  Educated patient on access to care.  RN phone number given for future care management needs.       Anjelica Davis RN

## 2019-06-17 NOTE — PROGRESS NOTES
NAME: Madelyn Ruiz    MRN: [id number]    YOB: 1930    EVENT DETAILS    Discharging Facility: [facility]  Date of Admission: 06/08/19  Date of Discharge: 06/12/19            Reason for Admission:  Hypoxia      I made 2 attempts for a TCM post hospital discharge outreach to this patient. The attempts were made within the 48 hour period post D/C. I was not able to speak with the patient but the attempts satisfied the initial requirement for transitions of care.       PLAN OF CARE:      RN phone number given for future care management needs.       Anjelica Davis RN

## 2019-06-20 ENCOUNTER — OFFICE VISIT (OUTPATIENT)
Dept: PRIMARY CARE | Facility: CLINIC | Age: 84
End: 2019-06-20
Payer: MEDICARE

## 2019-06-20 VITALS
SYSTOLIC BLOOD PRESSURE: 138 MMHG | OXYGEN SATURATION: 97 % | HEIGHT: 61 IN | DIASTOLIC BLOOD PRESSURE: 90 MMHG | HEART RATE: 81 BPM | BODY MASS INDEX: 24.43 KG/M2 | WEIGHT: 129.4 LBS | RESPIRATION RATE: 16 BRPM | TEMPERATURE: 98 F

## 2019-06-20 DIAGNOSIS — I26.99 OTHER ACUTE PULMONARY EMBOLISM WITHOUT ACUTE COR PULMONALE: Primary | ICD-10-CM

## 2019-06-20 PROBLEM — K21.9 GERD (GASTROESOPHAGEAL REFLUX DISEASE): Chronic | Status: ACTIVE | Noted: 2019-06-08

## 2019-06-20 PROBLEM — M54.16 LUMBAR RADICULOPATHY, ACUTE: Status: RESOLVED | Noted: 2018-04-02 | Resolved: 2019-06-20

## 2019-06-20 PROBLEM — J96.01 ACUTE HYPOXEMIC RESPIRATORY FAILURE (CMS/HCC): Status: RESOLVED | Noted: 2019-06-08 | Resolved: 2019-06-20

## 2019-06-20 PROCEDURE — 99495 TRANSJ CARE MGMT MOD F2F 14D: CPT | Performed by: FAMILY MEDICINE

## 2019-06-20 ASSESSMENT — ENCOUNTER SYMPTOMS
CONSTIPATION: 0
HEMATURIA: 0
VOMITING: 0
ABDOMINAL PAIN: 0
COUGH: 0
BLOOD IN STOOL: 0
FLANK PAIN: 0
WHEEZING: 0
DIARRHEA: 0
STRIDOR: 0
FEVER: 0
CHILLS: 0
SHORTNESS OF BREATH: 0
CHEST TIGHTNESS: 0
BRUISES/BLEEDS EASILY: 0
FATIGUE: 0
HEADACHES: 0
PALPITATIONS: 0
NAUSEA: 0
DYSURIA: 0

## 2019-06-20 NOTE — PATIENT INSTRUCTIONS
Problem List Items Addressed This Visit     Pulmonary embolism (CMS/HCC) (HCC) - Primary     Stable at this time. Continue Eliquis 2 times per day at 5 mg. Follow up with Dr. Bhakta for results of hypercoagulable workup. No active bleeding.  No oxygen requirement. Complete prednisone taper. Call if any worsening

## 2019-06-20 NOTE — PROGRESS NOTES
Henry Linares MD    [facility]  [address], [address]  [place] [address]  Phone: [phone]  Fax: [phone]     History of Present Illness     Subjective     Patient ID: Madelyn Ruiz is a 88 y.o. female.    Madelyn Ruiz is a 88 y.o. female with a past medical history of ABPA, allergic rhinitis, asthma follows with Dr. Arriaza at [facility] and gets Faserna every other month, HTN, spinal stenosis who presented to [facility] on 2019 with SOB.    The patient reported coughing and wheezing x1 week.  She also felt very fatigued.     Patient states that she saw Dr. Arriaza about a month ago around May 13.  At that time she had PFTs done and Dr. Arriaza took her off of Symbicort saying he thought it had no therapeutic value at the dose she was that but she could use it as needed.  She also received her Faserna injection but it was 3 weeks late due to insurance issues.  She says a week after stopping the Symbicort she felt like she needed with oxygen when breathing so she started to take Symbicort again but not on a consistent basis and she saw a small difference.  She does have a rescue inhaler but has  so did not use that.     Hospital Course     In the emergency room, patient was given a nebulizer treatment and 60 mg of IV Solu-Medrol.  She reported some improvement in her symptoms.  Chest x-ray showed no focal consolidation, nodular opacity in the left lung base on frontal review -no further evaluation was chest CT may be helpful..  The patient was admitted for asthma exacerbation.  She was placed on IV steroids, duo nebs and Pulmicort nebs.  CT chest was completed and revealed acute proximal segmental right upper lobe pulmonary emboli, no evidence for right heart strain.  On Eliquis 10 mg twice daily x7 days then 5 mg twice daily.  Hematology was consulted and hypercoagulable work-up was ordered and is pending.  Bilateral lower extremities were  negative for DVT.  Echocardiogram was unremarkable.  Patient's symptoms have been improving.  She has been stable on room air x24 hours and has no pain.     She is stable for discharge home on Eliquis.  She will continue prednisone taper on discharge.     She is instructed to follow-up with her primary care provider or hematology for results of hypercoagulable work-up.  She will follow-up with her pulmonologist who will determine duration of anticoagulation and monitor follow-up CT chest in 4 to 6 months.    Since discharge, no bleeding from Eliquis. No hematuria or in the stool. No epistaxis. Dyspnea is improving. No fever, or chills or sweats. No nausea or vomiting. Normal appetite. No chest pain. Tapering off Prednisone. Last dose is on 06/23/2019. Using Albuterol as needed. No sputum. No antibiotics.          Past Medical/Surgical/Family/Social History       The following have been reviewed and updated as appropriate in this visit:  Allergies  Meds  Problems         Past Medical History:   Diagnosis Date   • Allergic bronchopulmonary aspergillosis (CMS/LTAC, located within [facility]) 04/02/2018    Allergist: Dr. Arriaza. Stable FEV1 in 8/2015. IgE levels and eosinophils stable in 8/2015. Managed with Faserna and Symbicort.   • Allergic rhinitis 4/2/2018    Pulmonologist: Dr. Walker. Managed with Flonase.   • Asthma     per Dr. Ray Arriaza [facility]. Gets Faserna injections every other month   • Chronic obstructive pulmonary disease (CMS/[facility], located within [facility]) (LTAC, located within [facility]) 4/2/2018    Pulmonologist: Dr. Walker. Seen on PFTs in hospital in 2014. Controlled with Symbicort.   • Hypertension 4/2/2018    Managed on Amlodipine. Advised to follow a low sodium diet and keep BMI < 25. Advised to exercise for 2.5 hours per week. Instructed to take home blood pressure readings and call if consistently above 140/90.    • Insomnia 4/2/2018    Managed with Lorazepam as needed.   • Lumbar spinal stenosis 4/2/2018    Neurosurgeon: Dr. Miguel. MRI with Central and lateral recess  spinal stenosis. Has Spondylolisthesis at L4/L5. S/P epidural injection by Dr. Martin with some relief in past. Had Lumbar fusion and Lumbar laminectomy by Dr. Miguel in 4/2015.   • Obstructive sleep apnea syndrome 4/2/2018    Mild STACIA noted in sleep study 7/2015. Treated with nasal CPAP automatic with pressure range 5-10 CM H2Ox couple months. Off now   • Osteoporosis 4/2/2018    Rheumatologist: Dr. Bradley.  Dexa scan 7/2016 with osteoporosis of lumbar spine LS-2.5, LH-2.1, RH -2.2.  Has been on Fosamax in the past for 8 years.  Worsened by Prednisone in past.  Continue vitamin D, calcium and weight bearing exercise. DEXA in 7/2017 with LS -1.7, LH -2.2, and RH -2.0.  Next due in 7/2019.  Seen by Dr. Bradley in 8/2015 who agreed with initiating Prolia. Took for 2 years. N   • Vertebral compression fracture (CMS/HCC) (HCC)     At T12 level       Past Surgical History:   Procedure Laterality Date   • APPENDECTOMY     • CARPAL TUNNEL RELEASE Bilateral    • CATARACT EXTRACTION W/  INTRAOCULAR LENS IMPLANT Bilateral    • HYSTERECTOMY  1972   • LUMBAR LAMINECTOMY  04/21/2015    S/P lumbar fusion   • TONSILLECTOMY         Family History   Problem Relation Age of Onset   • Glaucoma Mother    • Macular degeneration Mother    • Hypertension Mother    • Lung cancer Father    • Heart disease Brother    • Breast cancer Mother's Sister        Social History     Social History   • Marital status:      Spouse name: N/A   • Number of children: N/A   • Years of education: N/A     Occupational History   • Retired      Former Teacher     Social History Main Topics   • Smoking status: Never Smoker   • Smokeless tobacco: Never Used   • Alcohol use Yes      Comment: Rarely   • Drug use: No   • Sexual activity: Not on file     Other Topics Concern   • Not on file     Social History Narrative   • No narrative on file       Patient Care Team:  Henry Linares MD as PCP - General  Ray Arriaza MD as Referring Physician  Livia  Aye NATION MD as Consulting Physician (Hematology and Oncology)     Allergies and Medications       Allergies   Allergen Reactions   • Mepolizumab Rash   • Morphine      Other reaction(s): Vomiting   • Oxycodone      Other reaction(s): Vomiting       Current Outpatient Prescriptions   Medication Sig Dispense Refill   • albuterol 2.5 mg /3 mL (0.083 %) nebulizer solution Take 3 mL (2.5 mg total) by nebulization every 6 (six) hours as needed for wheezing or shortness of breath. 125 vial 0   • albuterol HFA (VENTOLIN HFA) 90 mcg/actuation inhaler Inhale 2 puffs every 4 (four) hours as needed for wheezing or shortness of breath. 8.5 g 0   • alendronate (FOSAMAX) 70 mg tablet Take 70 mg by mouth once a week. Take on an empty stomach and do not lie down for the next 30 min. On WEdnesdays     • amLODIPine (NORVASC) 10 mg tablet Take 10 mg by mouth daily.     • apixaban (ELIQUIS) 5 mg tablet Take 2 tablets (10 mg) by mouth 2 (tow) times daily x 7 doses THEN take 1 tablet (5 mg) 2 times daily 60 tablet 0   • benralizumab (FASENRA) 30 mg/mL syringe subcutaneous syringe Inject 30 mg under the skin every 2 (two) months.     • benzonatate (TESSALON) 200 mg capsule TAKE 1 CAPSULE BY MOUTH 3 TIMES DAILY AS NEEDED FOR COUGH.     • calcium carbonate (calcium carbonate) 600 mg calcium (1,500 mg) tablet Take 1 tablet by mouth 2 (two) times a day.     • cholecalciferol, vitamin D3, (VITAMIN D3 ORAL) Take by mouth daily.       • diphenhydrAMINE (BENADRYL) 25 mg capsule Take 25 mg by mouth nightly.     • docusate sodium (COLACE) 100 mg capsule Take 100 mg by mouth nightly.       • DULoxetine (CYMBALTA) 60 mg capsule TAKE 1 CAPSULE BY MOUTH EVERYDAY AT BEDTIME  0   • esomeprazole (NexIUM) 20 mg capsule Take 20 mg by mouth daily. Take 30 minutes prior to meal.     • fexofenadine (ALLEGRA) 180 mg tablet Take 180 mg by mouth daily.     • fluticasone propionate (FLONASE) 50 mcg/actuation nasal spray Administer 1 spray into each nostril daily.   "   • FOLIC ACID/MULTIVIT,IRON, (CENTRUM ORAL) Take 1 tablet by mouth daily.     • LORazepam (ATIVAN) 1 mg tablet TAKE 1 TABLET BY MOUTH EVERY DAY AT BEDTIME AS NEEDED 30 tablet 5   • predniSONE (DELTASONE) 10 mg tablet 40 mg daily x 2 days, 30 mg daily x 3 days, 20 mg daily x 3 days, 10 mg daily x 3 days 26 tablet 0     No current facility-administered medications for this visit.           Review of Systems       Review of Systems   Constitutional: Negative for chills, fatigue and fever.   Respiratory: Negative for cough, chest tightness, shortness of breath, wheezing and stridor.    Cardiovascular: Negative for chest pain and palpitations.   Gastrointestinal: Negative for abdominal pain, blood in stool, constipation, diarrhea, nausea and vomiting.   Genitourinary: Negative for dysuria, flank pain, hematuria and urgency.   Neurological: Negative for headaches.   Hematological: Does not bruise/bleed easily.        Physical Examination       Objective     Vitals:    06/20/19 1419   BP: 138/90   Pulse: 81   Resp: 16   Temp: 36.7 °C (98 °F)   SpO2: 97%       Pulse Readings from Last 5 Encounters:   06/20/19 81   06/12/19 98   06/06/18 84   05/03/18 97   04/02/18 92       Wt Readings from Last 5 Encounters:   06/20/19 58.7 kg (129 lb 6.4 oz)   06/11/19 58.5 kg (129 lb)   10/17/18 57.6 kg (127 lb)   06/06/18 57.6 kg (127 lb)   05/03/18 57.2 kg (126 lb 3.2 oz)       Ht Readings from Last 5 Encounters:   06/20/19 1.549 m (5' 1\")   06/11/19 1.549 m (5' 0.98\")   06/06/18 1.549 m (5' 1\")   05/03/18 1.549 m (5' 1\")   04/02/18 1.549 m (5' 1\")       BP Readings from Last 5 Encounters:   06/20/19 138/90   06/12/19 127/76   06/06/18 118/76   05/03/18 128/74   04/02/18 128/84       Physical Exam   Constitutional: She is oriented to person, place, and time. She appears well-developed and well-nourished.   Cardiovascular: Normal rate, regular rhythm and normal heart sounds.  Exam reveals no gallop.    No murmur " heard.  Pulmonary/Chest: Effort normal and breath sounds normal. No respiratory distress. She has no rales.   Abdominal: Soft. Bowel sounds are normal. She exhibits no distension. There is no tenderness. There is no rebound and no guarding.   Neurological: She is alert and oriented to person, place, and time.   Psychiatric: She has a normal mood and affect. Her behavior is normal. Judgment and thought content normal.        Laboratory Results     Lab Results   Component Value Date    WBC 9.55 06/12/2019    WBC 10.29 06/10/2019    WBC 7.87 06/08/2019    HGB 13.9 06/12/2019    HGB 13.6 06/10/2019    HGB 13.7 06/08/2019    HCT 44.0 06/12/2019    HCT 41.3 06/10/2019    HCT 42.2 06/08/2019    MCV 96.7 06/12/2019    MCV 95.4 06/10/2019    MCV 94.8 06/08/2019     06/12/2019     06/10/2019     06/08/2019        Lab Results   Component Value Date    GLUCOSE 112 (H) 06/12/2019    GLUCOSE 82 06/10/2019    GLUCOSE 154 (H) 06/08/2019    CALCIUM 9.0 06/12/2019    CALCIUM 9.3 06/10/2019    CALCIUM 9.4 06/08/2019     06/12/2019     06/10/2019     06/08/2019    K 3.8 06/12/2019    K 4.4 06/10/2019    K 3.8 06/08/2019    CO2 28 06/12/2019    CO2 20 (L) 06/10/2019    CO2 20 (L) 06/08/2019     06/12/2019     06/10/2019     06/08/2019    BUN 15 06/12/2019    BUN 22 (H) 06/10/2019    BUN 27 (H) 06/08/2019    CREATININE 0.8 06/12/2019    CREATININE 0.8 06/10/2019    CREATININE 1.0 06/08/2019    ALKPHOS 47 07/12/2018    ALKPHOS 55 05/03/2018    ALKPHOS 33 (L) 04/20/2018    AST 24 07/12/2018    AST 18 05/03/2018    AST 17 04/20/2018    ALT 23 07/12/2018    ALT 15 05/03/2018    ALT 18 04/20/2018    BILITOT 0.4 07/12/2018    BILITOT 0.5 05/03/2018    BILITOT 0.5 04/20/2018    ALBUMIN 4.4 07/12/2018    ALBUMIN 3.9 05/03/2018    ALBUMIN 4.0 04/20/2018       Lab Results   Component Value Date    CHOL 248 (H) 11/18/2015     Lab Results   Component Value Date    HDL 92 11/18/2015     Lab  Results   Component Value Date    LDLCALC 133 (H) 11/18/2015     Lab Results   Component Value Date    TRIG 117 11/18/2015       Lab Results   Component Value Date    TSH 0.32 (L) 06/10/2019    TSH 2.08 11/18/2015    TSH 2.16 08/21/2015       Lab Results   Component Value Date    HGBA1C 5.6 11/18/2015    HGBA1C 5.6 04/08/2015       Lab Results   Component Value Date    HEPCAB Nonreactive 07/12/2018     Results for orders placed during the hospital encounter of 06/08/19   X-RAY CHEST 2 VIEWS    Narrative CLINICAL HISTORY: Cough    COMMENT: PA and lateral views of the chest were obtained and compared to prior  study from 2018.    There is a nodular opacity in the left lung base on the frontal view.  There is  no pleural effusion or pneumothorax.  There is no focal consolidation.  There is  surgical hardware in the lumbar spine.      Impression IMPRESSION: Nodular opacity in the left midlung field.  Further evaluation with  chest CT may be helpful.     Results for orders placed during the hospital encounter of 06/08/19   CT CHEST WITH IV CONTRAST    Narrative CLINICAL HISTORY: Left lung nodular opacity on chest radiograph.  Acute  hypoxemic respiratory failure.    COMPARISON: Chest radiograph 6/8/2019.  CT chest 9/11/2014    COMMENT:    TECHNIQUE: CT of the chest was performed with the patient in the supine  position. Images reconstructed/reformatted in the axial, coronal and  sagittal  plane.  CT DOSE:  One or more dose reduction techniques (e.g. automated exposure  control, adjustment of the mA and/or kV according to patient size, use of  iterative reconstruction technique) utilized for this examination.  INTRAVENOUS CONTRAST: 75 mL of Omnipaque 350 intravenous contrast was  administered without event.    LUNG, LARGE AIRWAYS AND PLEURA: There is no nodule/mass corresponding to nodular  opacity seen on prior chest radiograph.  Mild bilateral lower lobe as well as  right upper lobe and medial segment right middle lobe  linear scarring and/or  subsegmental atelectasis.  There is a 12 mm groundglass focus involving the  medial aspect of the superior segment of the right lower lobe abutting the  pleura, best seen on image 33 of the axial lung series, likely infectious or  inflammatory in etiology.  Stable nodular right apical pleural parenchymal  scarring.  Stable chronic small focus of mucus plugging is seen in the lateral  segment right lower lobe (series 1 image 81).  CHEST WALL AND LOWER NECK: within normal limits.  MEDIASTINUM AND AMADA: No lymphadenopathy.  Esophageal wall thickening, likely  related to gastroesophageal reflux disease.  HEART: Left atrial enlargement with overall top normal heart size.  No right  ventricular enlargement or bowing of the interventricular septum. No pericardial  effusion.  Aortic and mitral valve annular calcifications.  No evidence for  right heart strain.  VESSELS: There are central filling defects in the right right upper lobe  proximal segmental pulmonary arterial branches, most compatible with acute  emboli.  There are mild linear central filling defects in the right lower lobe  pulmonary arterial branch with slight involvement of the posterior segment right  lower lobe pulmonary arterial branch, suggestive of subacute to chronic  pulmonary embolism with element of recanalization.  Main pulmonary trunk remains  normal in caliber.  No thoracic aortic aneurysm or dissection, noting  atherosclerotic calcification of the thoracic aorta and coronary arteries.  UPPER ABDOMEN: Stable large hepatic cyst measuring up to 5.4 cm, unchanged since  2014  BONES: Healing anterior left rib fractures involving the fourth, fifth and six  ribs, with prominent callus formation which likely accounts for nodular opacity  seen on chest radiograph.  Chronic mild wedge compression deformity of the T2  and T12 vertebral bodies.      Impression IMPRESSION:  1.  Acute proximal segmental right upper lobe pulmonary  emboli, noting overall  very low clot burden.  No evidence for right heart strain.  2.  Old healed left fourth, fifth and sixth rib fractures with callus formation.  This corresponds to the nodular opacity seen on chest radiograph.  3.  Nonspecific new 12 mm superior segment right lower lobe groundglass focus,  almost certainly postinfectious/postinflammatory, but can be followed per  current 2017 Fleischner guidelines.      Fleischner Society 2017 Guidelines for Management of Incidentally Detected  Pulmonary Nodules in Adults. (Subsolid Nodules)    Single ground glass:  <6 mm - No routine follow-up  >/= 6 mm - CT at 6-12 months to confirm persistence, then CT every 2 years until  5 years      Finding:    Acute pulmonary embolism   Acuity: Critical  Status:  CLOSED    The results were critically read back with Dr. Ly Kapadia on 6/9/2019 11:46  AM.    I certify that I have reviewed this examination and agree with this report.  Donald Rinaldi MD         Venous ultrasound was negative for DVT.      Results for orders placed during the hospital encounter of 06/08/19   Transthoracic Echo (TTE) Complete    Narrative · Estimated EF = 55- 60%. Grade I diastolic dysfunction. Dysfunction   consistent with impaired relaxation.  · Normal-sized right ventricular cavity with preserved systolic function.          .    Immunization History   Administered Date(s) Administered   • Influenza Split High Dose Preservative Free IM 11/03/2011, 11/13/2012, 10/09/2013, 10/30/2014, 11/17/2015, 09/30/2016, 09/25/2017   • Influenza Vaccine Quadrivalent 3 Yr And Older 09/18/2017, 10/22/2018   • Influenza, Unspecified 10/30/2014, 11/17/2015, 09/30/2016, 09/25/2017   • MMRV 06/05/2014   • Pneumococcal Conjugate 08/09/1996   • Pneumococcal Conjugate 13-Valent 11/17/2015   • Pneumococcal Polysaccharide 10/30/2014   • Tdap 03/22/2012   • Varicella 06/05/2014   • Zoster 01/02/2006, 01/02/2016   • Zoster Vaccine Recombinant Adjuvanted (Shingrix)  2019         Health Maintenance Topics with due status: Overdue       Topic Date Due    Annual Falls Risk Screening 1996    Mammogram 10/22/2018    Medicare Annual Wellness Visit 2019     Health Maintenance Topics with due status: Not Due       Topic Last Completion Date    DTaP, Tdap, and Td Vaccines 2012    DEXA Scan 2017    Zoster Vaccine 2019     Health Maintenance Topics with due status: Completed / Addressed / Aged Out       Topic Last Completion Date    Varicella Vaccines 2014    Pneumococcal 2015    Pneumococcal (65 years and older) 2015    Influenza Vaccine 10/22/2018    Meningococcal ACWY Aged Out    HIB Vaccines Aged Out    IPV Vaccines Aged Out    HPV Vaccines Aged Out        Assessment and Plan       Assessment/Plan     Problem List Items Addressed This Visit     Pulmonary embolism (CMS/HCC) (HCC) - Primary    Current Assessment & Plan     Stable at this time. Continue Eliquis 2 times per day at 5 mg. Follow up with Dr. Bhakta for results of hypercoagulable workup. No active bleeding.  No oxygen requirement. Complete prednisone taper. Call if any worsening               Return if symptoms worsen or fail to improve.    No orders of the defined types were placed in this encounter.             Henry Linares MD    2019       Ultrasound venous leg bilateral   Order# [id number]   Reading physician: Raza Chery MD Ordering physician: Tamela Hernandez DO Study date: 19   Patient Information     Name  Madelyn Ruiz MRN  [id number] Sex  Female   1930   Department     Name Address Phone    [place] [address] [facility] [address] [phone]    Reason For Exam   Priority: Routine   PE; r/o DVT   Interpretation Summary     No evidence of deep vein thrombus (DVT) in either lower extremity.   Study Details     Study Details Venous duplex examination using B-mode, color Doppler and spectral Doppler.     "  Vascular Findings     Right Lower Venous All veins demonstrate normal flow and wall to wall coaptation and no evidence of echogenic filling defects.      Left Lower Venous All veins demonstrate normal flow and wall to wall coaptation and no evidence of echogenic filling defects.                                    Vitals     Height Weight BSA (Calculated - sq m) BP Pulse   1.549 m (5' 0.98\") 58.5 kg (129 lb) 1.59 sq meters 127/76 98   Performing Sonographer/Tech     Performed by: Aye Griggs RVT on 6/11/2019 10:07 AM   Signed     Electronically signed by Raza Chery MD on 6/11/19 at 1653 EDT     Results for orders placed during the hospital encounter of 06/08/19   Ultrasound venous leg bilateral    Narrative No evidence of deep vein thrombus (DVT) in either lower extremity.       "

## 2019-06-20 NOTE — ASSESSMENT & PLAN NOTE
Stable at this time. Continue Eliquis 2 times per day at 5 mg. Follow up with Dr. Bhakta for results of hypercoagulable workup. No active bleeding.  No oxygen requirement. Complete prednisone taper. Call if any worsening

## 2019-06-26 ENCOUNTER — TELEPHONE (OUTPATIENT)
Dept: PRIMARY CARE | Facility: CLINIC | Age: 84
End: 2019-06-26

## 2019-06-26 NOTE — TELEPHONE ENCOUNTER
· I called Evita Brown back I told her per Dr Linares unknown cause at this time awaiting hyper-coaguable workup She said that patient is being discharged from home care still doing physical therapy and she is going to follow up with Dr Bhakat

## 2019-06-26 NOTE — TELEPHONE ENCOUNTER
· I took a call from Evita Brown [facility] she is at the patient's home and she wanted to ask patient had a Pulmonary Embolism of unknown cause Was it a Cardiac Event or Afib ? She can be reached on her cell [phone].

## 2019-07-26 ENCOUNTER — TRANSCRIBE ORDERS (OUTPATIENT)
Dept: SCHEDULING | Age: 84
End: 2019-07-26

## 2019-07-26 DIAGNOSIS — M81.0 AGE-RELATED OSTEOPOROSIS WITHOUT CURRENT PATHOLOGICAL FRACTURE: Primary | ICD-10-CM

## 2019-07-30 ENCOUNTER — HOSPITAL ENCOUNTER (OUTPATIENT)
Dept: RADIOLOGY | Facility: CLINIC | Age: 84
Discharge: HOME | End: 2019-07-30
Attending: INTERNAL MEDICINE
Payer: MEDICARE

## 2019-07-30 DIAGNOSIS — M81.0 AGE-RELATED OSTEOPOROSIS WITHOUT CURRENT PATHOLOGICAL FRACTURE: ICD-10-CM

## 2019-07-30 PROCEDURE — 77080 DXA BONE DENSITY AXIAL: CPT

## 2019-08-02 ENCOUNTER — TELEPHONE (OUTPATIENT)
Dept: PRIMARY CARE | Facility: CLINIC | Age: 84
End: 2019-08-02

## 2019-08-02 NOTE — TELEPHONE ENCOUNTER
Josephine Ratliff from [facility] HIM medical records in [place] wanted to let Dr Linares know that she is going to scan medical records from Dr Aye Bhakta into the patient's chart. She wanted to email them to me but I told her I cannot accept emailed records. I asked her if she could fax them but she told me she rather scan them.

## 2019-10-24 DIAGNOSIS — F41.9 ANXIETY: ICD-10-CM

## 2019-10-24 RX ORDER — LORAZEPAM 1 MG/1
TABLET ORAL
Qty: 30 TABLET | Refills: 5 | Status: SHIPPED | OUTPATIENT
Start: 2019-10-24 | End: 2020-04-23

## 2019-10-24 NOTE — TELEPHONE ENCOUNTER
· Lorazepam 1 mg  4-23-19 #30 5 RF '  · Dr Linares I tried to search this in the [facility] I logged out 3 x they are telling to change criteria which I have and I also put patient's address in and nothing my apologize.

## 2019-10-24 NOTE — TELEPHONE ENCOUNTER
Medicine Refill Request    Last Office Visit: 6/20/2019  Next Office Visit: 10/25/2019        Current Outpatient Medications:   •  albuterol 2.5 mg /3 mL (0.083 %) nebulizer solution, Take 3 mL (2.5 mg total) by nebulization every 6 (six) hours as needed for wheezing or shortness of breath., Disp: 125 vial, Rfl: 0  •  albuterol HFA (VENTOLIN HFA) 90 mcg/actuation inhaler, Inhale 2 puffs every 4 (four) hours as needed for wheezing or shortness of breath., Disp: 8.5 g, Rfl: 0  •  alendronate (FOSAMAX) 70 mg tablet, Take 70 mg by mouth once a week. Take on an empty stomach and do not lie down for the next 30 min. On WEdnesdays, Disp: , Rfl:   •  amLODIPine (NORVASC) 10 mg tablet, Take 10 mg by mouth daily., Disp: , Rfl:   •  apixaban (ELIQUIS) 5 mg tablet, Take 2 tablets (10 mg) by mouth 2 (tow) times daily x 7 doses THEN take 1 tablet (5 mg) 2 times daily, Disp: 60 tablet, Rfl: 0  •  benralizumab (FASENRA) 30 mg/mL syringe subcutaneous syringe, Inject 30 mg under the skin every 2 (two) months., Disp: , Rfl:   •  benzonatate (TESSALON) 200 mg capsule, TAKE 1 CAPSULE BY MOUTH 3 TIMES DAILY AS NEEDED FOR COUGH., Disp: , Rfl:   •  calcium carbonate (calcium carbonate) 600 mg calcium (1,500 mg) tablet, Take 1 tablet by mouth 2 (two) times a day., Disp: , Rfl:   •  cholecalciferol, vitamin D3, (VITAMIN D3 ORAL), Take by mouth daily.  , Disp: , Rfl:   •  diphenhydrAMINE (BENADRYL) 25 mg capsule, Take 25 mg by mouth nightly., Disp: , Rfl:   •  docusate sodium (COLACE) 100 mg capsule, Take 100 mg by mouth nightly.  , Disp: , Rfl:   •  DULoxetine (CYMBALTA) 60 mg capsule, TAKE 1 CAPSULE BY MOUTH EVERYDAY AT BEDTIME, Disp: , Rfl: 0  •  esomeprazole (NexIUM) 20 mg capsule, Take 20 mg by mouth daily. Take 30 minutes prior to meal., Disp: , Rfl:   •  fexofenadine (ALLEGRA) 180 mg tablet, Take 180 mg by mouth daily., Disp: , Rfl:   •  fluticasone propionate (FLONASE) 50 mcg/actuation nasal spray, Administer 1 spray into each nostril  daily., Disp: , Rfl:   •  FOLIC ACID/MULTIVIT,IRON, (CENTRUM ORAL), Take 1 tablet by mouth daily., Disp: , Rfl:   •  LORazepam (ATIVAN) 1 mg tablet, TAKE 1 TABLET BY MOUTH EVERY DAY AT BEDTIME AS NEEDED, Disp: 30 tablet, Rfl: 5  •  predniSONE (DELTASONE) 10 mg tablet, 40 mg daily x 2 days, 30 mg daily x 3 days, 20 mg daily x 3 days, 10 mg daily x 3 days, Disp: 26 tablet, Rfl: 0      BP Readings from Last 3 Encounters:   06/20/19 138/90   06/12/19 127/76   06/06/18 118/76       Recent Lab results:  Lab Results   Component Value Date    CHOL 248 (H) 11/18/2015   ,   Lab Results   Component Value Date    HDL 92 11/18/2015   ,   Lab Results   Component Value Date    LDLCALC 133 (H) 11/18/2015   ,   Lab Results   Component Value Date    TRIG 117 11/18/2015        Lab Results   Component Value Date    GLUCOSE 112 (H) 06/12/2019   ,   Lab Results   Component Value Date    HGBA1C 5.6 11/18/2015         Lab Results   Component Value Date    CREATININE 0.8 06/12/2019       Lab Results   Component Value Date    TSH 0.32 (L) 06/10/2019

## 2019-10-25 ENCOUNTER — CLINICAL SUPPORT (OUTPATIENT)
Dept: PRIMARY CARE | Facility: CLINIC | Age: 84
End: 2019-10-25
Payer: MEDICARE

## 2019-10-25 VITALS — TEMPERATURE: 98.8 F

## 2019-10-25 DIAGNOSIS — Z23 NEED FOR INFLUENZA VACCINATION: Primary | ICD-10-CM

## 2019-10-25 PROCEDURE — 90653 IIV ADJUVANT VACCINE IM: CPT | Performed by: FAMILY MEDICINE

## 2019-10-25 PROCEDURE — G0008 ADMIN INFLUENZA VIRUS VAC: HCPCS | Performed by: FAMILY MEDICINE

## 2019-12-02 ENCOUNTER — TRANSCRIBE ORDERS (OUTPATIENT)
Dept: LAB | Facility: HOSPITAL | Age: 84
End: 2019-12-02

## 2019-12-02 ENCOUNTER — APPOINTMENT (OUTPATIENT)
Dept: LAB | Facility: HOSPITAL | Age: 84
End: 2019-12-02
Attending: INTERNAL MEDICINE
Payer: MEDICARE

## 2019-12-02 DIAGNOSIS — I26.99 OTHER PULMONARY EMBOLISM WITHOUT ACUTE COR PULMONALE: Primary | ICD-10-CM

## 2019-12-02 DIAGNOSIS — M81.0 AGE-RELATED OSTEOPOROSIS WITHOUT CURRENT PATHOLOGICAL FRACTURE: ICD-10-CM

## 2019-12-02 DIAGNOSIS — M48.062 SPINAL STENOSIS, LUMBAR REGION WITH NEUROGENIC CLAUDICATION: ICD-10-CM

## 2019-12-02 DIAGNOSIS — I26.99 OTHER PULMONARY EMBOLISM WITHOUT ACUTE COR PULMONALE: ICD-10-CM

## 2019-12-02 DIAGNOSIS — M81.0 AGE-RELATED OSTEOPOROSIS WITHOUT CURRENT PATHOLOGICAL FRACTURE: Primary | ICD-10-CM

## 2019-12-02 DIAGNOSIS — Z79.899 OTHER LONG TERM (CURRENT) DRUG THERAPY: ICD-10-CM

## 2019-12-02 LAB
ALBUMIN SERPL-MCNC: 4 G/DL (ref 3.4–5)
ALP SERPL-CCNC: 54 IU/L (ref 35–126)
ALT SERPL-CCNC: 25 IU/L (ref 11–54)
ANION GAP SERPL CALC-SCNC: 9 MEQ/L (ref 3–15)
AST SERPL-CCNC: 20 IU/L (ref 15–41)
BASOPHILS # BLD: 0 K/UL (ref 0.01–0.1)
BASOPHILS NFR BLD: 0 %
BILIRUB SERPL-MCNC: 0.5 MG/DL (ref 0.3–1.2)
BUN SERPL-MCNC: 22 MG/DL (ref 8–20)
CALCIUM SERPL-MCNC: 9.1 MG/DL (ref 8.9–10.3)
CHLORIDE SERPL-SCNC: 107 MEQ/L (ref 98–109)
CO2 SERPL-SCNC: 23 MEQ/L (ref 22–32)
CREAT SERPL-MCNC: 0.9 MG/DL
DIFFERENTIAL METHOD BLD: ABNORMAL
EOSINOPHIL # BLD: 0 K/UL (ref 0.04–0.36)
EOSINOPHIL NFR BLD: 0 %
ERYTHROCYTE [DISTWIDTH] IN BLOOD BY AUTOMATED COUNT: 13.6 % (ref 11.7–14.4)
GFR SERPL CREATININE-BSD FRML MDRD: 59.1 ML/MIN/1.73M*2
GLUCOSE SERPL-MCNC: 80 MG/DL (ref 70–99)
HCT VFR BLDCO AUTO: 39.2 %
HGB BLD-MCNC: 13.1 G/DL (ref 11.8–15.7)
IMM GRANULOCYTES # BLD AUTO: 0.06 K/UL (ref 0–0.08)
IMM GRANULOCYTES NFR BLD AUTO: 0.7 %
LYMPHOCYTES # BLD: 1.61 K/UL (ref 1.2–3.5)
LYMPHOCYTES NFR BLD: 19.3 %
MCH RBC QN AUTO: 32.1 PG (ref 28–33.2)
MCHC RBC AUTO-ENTMCNC: 33.4 G/DL (ref 32.2–35.5)
MCV RBC AUTO: 96.1 FL (ref 83–98)
MONOCYTES # BLD: 0.8 K/UL (ref 0.28–0.8)
MONOCYTES NFR BLD: 9.6 %
NEUTROPHILS # BLD: 5.87 K/UL (ref 1.7–7)
NEUTS SEG NFR BLD: 70.4 %
NRBC BLD-RTO: 0 %
PDW BLD AUTO: 10.1 FL (ref 9.4–12.3)
PLATELET # BLD AUTO: 280 K/UL
POTASSIUM SERPL-SCNC: 4.1 MEQ/L (ref 3.6–5.1)
PROT SERPL-MCNC: 6.3 G/DL (ref 6–8.2)
RBC # BLD AUTO: 4.08 M/UL (ref 3.93–5.22)
SODIUM SERPL-SCNC: 139 MEQ/L (ref 136–144)
WBC # BLD AUTO: 8.34 K/UL

## 2019-12-02 PROCEDURE — 80053 COMPREHEN METABOLIC PANEL: CPT

## 2019-12-02 PROCEDURE — 82523 COLLAGEN CROSSLINKS: CPT

## 2019-12-02 PROCEDURE — 84165 PROTEIN E-PHORESIS SERUM: CPT

## 2019-12-02 PROCEDURE — 36415 COLL VENOUS BLD VENIPUNCTURE: CPT

## 2019-12-02 PROCEDURE — 86147 CARDIOLIPIN ANTIBODY EA IG: CPT | Mod: 59

## 2019-12-02 PROCEDURE — 86334 IMMUNOFIX E-PHORESIS SERUM: CPT

## 2019-12-02 PROCEDURE — 85025 COMPLETE CBC W/AUTO DIFF WBC: CPT

## 2019-12-02 PROCEDURE — 82784 ASSAY IGA/IGD/IGG/IGM EACH: CPT

## 2019-12-02 PROCEDURE — 83883 ASSAY NEPHELOMETRY NOT SPEC: CPT

## 2019-12-03 LAB
ALBUMIN MFR UR ELPH: 53.4 % (ref 48–62)
ALBUMIN SERPL ELPH-MCNC: 3.37 G/DL (ref 3.2–4.5)
ALBUMIN/GLOB SERPL: 1.2 {RATIO} (ref 1.1–2.1)
ALPHA1 GLOB MFR SERPL ELPH: 3.3 % (ref 2.1–4.8)
ALPHA1 GLOB SERPL ELPH-MCNC: 0.21 G/DL (ref 0.15–0.32)
ALPHA2 GLOB MFR UR ELPH: 17.7 % (ref 9.7–16.2)
ALPHA2 GLOB SERPL ELPH-MCNC: 1.11 G/DL (ref 0.7–1.1)
B-GLOBULIN SERPL ELPH-MCNC: 0.92 G/DL (ref 0.73–1.3)
BETA1 GLOB MFR UR ELPH: 14.5 % (ref 10.8–17.9)
GAMMA GLOB MFR UR ELPH: 11.1 % (ref 9–23)
GAMMA GLOB SERPL ELPH-MCNC: 0.7 G/DL (ref 0.6–1.6)
IGA SERPL-MCNC: 91 MG/DL (ref 85–385)
IGG SERPL-MCNC: 474 MG/DL (ref 750–1560)
IGM SERPL-MCNC: 60 MG/DL (ref 46–295)
PROT PATTERN SERPL ELPH-IMP: NORMAL
PROT SERPL-MCNC: 6.3 G/DL (ref 6–8.2)

## 2019-12-04 LAB
COLLAGEN CTX SERPL-MCNC: 476 PG/ML
INTERPRETATION SERPL IFE-IMP: NORMAL

## 2019-12-05 LAB
CARDIOLIPIN IGA SER IA-ACNC: <9.4 APL U/ML
CARDIOLIPIN IGG SER IA-ACNC: <9.4 GPL U/ML
CARDIOLIPIN IGM SER IA-ACNC: <9.4 MPL U/ML

## 2019-12-13 ENCOUNTER — TRANSCRIBE ORDERS (OUTPATIENT)
Dept: SCHEDULING | Age: 84
End: 2019-12-13

## 2019-12-13 DIAGNOSIS — I26.99 OTHER PULMONARY EMBOLISM WITHOUT ACUTE COR PULMONALE: Primary | ICD-10-CM

## 2019-12-16 ENCOUNTER — HOSPITAL ENCOUNTER (OUTPATIENT)
Dept: RADIOLOGY | Facility: HOSPITAL | Age: 84
Discharge: HOME | End: 2019-12-16
Attending: INTERNAL MEDICINE
Payer: MEDICARE

## 2019-12-16 DIAGNOSIS — I26.99 OTHER PULMONARY EMBOLISM WITHOUT ACUTE COR PULMONALE: ICD-10-CM

## 2019-12-16 LAB
KAPPA LC SERPL-MCNC: 11.56 MG/L (ref 8.96–34.28)
KAPPA LC/LAMBDA SER: 1.18 {RATIO} (ref 0.64–1.83)
LAMBDA LC SERPL-MCNC: 9.8 MG/L (ref 5.7–26.3)

## 2019-12-16 PROCEDURE — 63600105 HC IODINE BASED CONTRAST: Performed by: INTERNAL MEDICINE

## 2019-12-16 PROCEDURE — 71260 CT THORAX DX C+: CPT

## 2019-12-16 RX ADMIN — IOHEXOL 110 ML: 300 INJECTION, SOLUTION INTRAVENOUS at 08:45

## 2020-01-29 RX ORDER — ZOLEDRONIC ACID 5 MG/100ML
5 INJECTION, SOLUTION INTRAVENOUS SEE ADMIN INSTRUCTIONS
COMMUNITY
End: 2023-08-21

## 2020-06-19 ENCOUNTER — HOSPITAL ENCOUNTER (OUTPATIENT)
Dept: RADIOLOGY | Facility: CLINIC | Age: 85
Discharge: HOME | End: 2020-06-19
Attending: NURSE PRACTITIONER
Payer: MEDICARE

## 2020-06-19 ENCOUNTER — TRANSCRIBE ORDERS (OUTPATIENT)
Dept: RADIOLOGY | Facility: CLINIC | Age: 85
End: 2020-06-19

## 2020-06-19 ENCOUNTER — TRANSCRIBE ORDERS (OUTPATIENT)
Dept: LAB | Facility: CLINIC | Age: 85
End: 2020-06-19

## 2020-06-19 ENCOUNTER — APPOINTMENT (OUTPATIENT)
Dept: LAB | Facility: CLINIC | Age: 85
End: 2020-06-19
Attending: INTERNAL MEDICINE
Payer: MEDICARE

## 2020-06-19 DIAGNOSIS — M54.50 LOW BACK PAIN: Primary | ICD-10-CM

## 2020-06-19 DIAGNOSIS — I26.99 OTHER PULMONARY EMBOLISM WITHOUT ACUTE COR PULMONALE: ICD-10-CM

## 2020-06-19 DIAGNOSIS — I26.99 OTHER PULMONARY EMBOLISM WITHOUT ACUTE COR PULMONALE: Primary | ICD-10-CM

## 2020-06-19 DIAGNOSIS — M54.50 LOW BACK PAIN: ICD-10-CM

## 2020-06-19 LAB
ALBUMIN SERPL-MCNC: 3.9 G/DL (ref 3.4–5)
ALP SERPL-CCNC: 43 IU/L (ref 35–126)
ALT SERPL-CCNC: 25 IU/L (ref 11–54)
ANION GAP SERPL CALC-SCNC: 8 MEQ/L (ref 3–15)
AST SERPL-CCNC: 22 IU/L (ref 15–41)
BASOPHILS # BLD: 0 K/UL (ref 0.01–0.1)
BASOPHILS NFR BLD: 0 %
BILIRUB SERPL-MCNC: 0.8 MG/DL (ref 0.3–1.2)
BUN SERPL-MCNC: 16 MG/DL (ref 8–20)
CALCIUM SERPL-MCNC: 9.1 MG/DL (ref 8.9–10.3)
CHLORIDE SERPL-SCNC: 109 MEQ/L (ref 98–109)
CO2 SERPL-SCNC: 25 MEQ/L (ref 22–32)
CREAT SERPL-MCNC: 0.9 MG/DL (ref 0.6–1.1)
DIFFERENTIAL METHOD BLD: ABNORMAL
EOSINOPHIL # BLD: 0 K/UL (ref 0.04–0.36)
EOSINOPHIL NFR BLD: 0 %
ERYTHROCYTE [DISTWIDTH] IN BLOOD BY AUTOMATED COUNT: 14.5 % (ref 11.7–14.4)
GFR SERPL CREATININE-BSD FRML MDRD: 59.1 ML/MIN/1.73M*2
GLUCOSE SERPL-MCNC: 88 MG/DL (ref 70–99)
HCT VFR BLDCO AUTO: 39 % (ref 35–45)
HGB BLD-MCNC: 12.5 G/DL (ref 11.8–15.7)
IMM GRANULOCYTES # BLD AUTO: 0.05 K/UL (ref 0–0.08)
IMM GRANULOCYTES NFR BLD AUTO: 0.9 %
LYMPHOCYTES # BLD: 1.44 K/UL (ref 1.2–3.5)
LYMPHOCYTES NFR BLD: 25.2 %
MCH RBC QN AUTO: 32.1 PG (ref 28–33.2)
MCHC RBC AUTO-ENTMCNC: 32.1 G/DL (ref 32.2–35.5)
MCV RBC AUTO: 100.3 FL (ref 83–98)
MONOCYTES # BLD: 0.6 K/UL (ref 0.28–0.8)
MONOCYTES NFR BLD: 10.5 %
NEUTROPHILS # BLD: 3.63 K/UL (ref 1.7–7)
NEUTS SEG NFR BLD: 63.4 %
NRBC BLD-RTO: 0 %
PDW BLD AUTO: 10.1 FL (ref 9.4–12.3)
PLATELET # BLD AUTO: 314 K/UL (ref 150–369)
POTASSIUM SERPL-SCNC: 4.3 MEQ/L (ref 3.6–5.1)
PROT SERPL-MCNC: 5.9 G/DL (ref 6–8.2)
RBC # BLD AUTO: 3.89 M/UL (ref 3.93–5.22)
SODIUM SERPL-SCNC: 142 MEQ/L (ref 136–144)
WBC # BLD AUTO: 5.72 K/UL (ref 3.8–10.5)

## 2020-06-19 PROCEDURE — 85025 COMPLETE CBC W/AUTO DIFF WBC: CPT

## 2020-06-19 PROCEDURE — 86334 IMMUNOFIX E-PHORESIS SERUM: CPT

## 2020-06-19 PROCEDURE — 82784 ASSAY IGA/IGD/IGG/IGM EACH: CPT

## 2020-06-19 PROCEDURE — 80053 COMPREHEN METABOLIC PANEL: CPT

## 2020-06-19 PROCEDURE — 36415 COLL VENOUS BLD VENIPUNCTURE: CPT

## 2020-06-19 PROCEDURE — 72110 X-RAY EXAM L-2 SPINE 4/>VWS: CPT

## 2020-06-20 LAB
IGA SERPL-MCNC: 107 MG/DL (ref 84.5–499)
IGG SERPL-MCNC: 444 MG/DL (ref 537–1535)
IGM SERPL-MCNC: 63 MG/DL (ref 35–242)

## 2020-06-22 ENCOUNTER — APPOINTMENT (OUTPATIENT)
Dept: LAB | Facility: CLINIC | Age: 85
End: 2020-06-22
Attending: INTERNAL MEDICINE
Payer: MEDICARE

## 2020-06-22 ENCOUNTER — TRANSCRIBE ORDERS (OUTPATIENT)
Dept: SCHEDULING | Age: 85
End: 2020-06-22

## 2020-06-22 DIAGNOSIS — I26.99 OTHER PULMONARY EMBOLISM WITHOUT ACUTE COR PULMONALE: ICD-10-CM

## 2020-06-22 DIAGNOSIS — I26.99 OTHER PULMONARY EMBOLISM WITHOUT ACUTE COR PULMONALE: Primary | ICD-10-CM

## 2020-06-22 LAB — INTERPRETATION SERPL IFE-IMP: NORMAL

## 2020-06-22 PROCEDURE — 85300 ANTITHROMBIN III ACTIVITY: CPT

## 2020-06-22 PROCEDURE — 86334 IMMUNOFIX E-PHORESIS SERUM: CPT

## 2020-06-22 PROCEDURE — 83883 ASSAY NEPHELOMETRY NOT SPEC: CPT

## 2020-06-22 PROCEDURE — 36415 COLL VENOUS BLD VENIPUNCTURE: CPT

## 2020-06-23 LAB
KAPPA LC SERPL-MCNC: 12.32 MG/L (ref 8.96–34.28)
KAPPA LC/LAMBDA SER: 1.45 {RATIO} (ref 0.64–1.83)
LAMBDA LC SERPL-MCNC: 8.5 MG/L (ref 5.7–26.3)

## 2020-06-24 LAB — INTERPRETATION SERPL IFE-IMP: NORMAL

## 2020-06-25 LAB — AT III ACT/NOR PPP CHRO: 100 % (ref 90–120)

## 2020-06-29 ENCOUNTER — OFFICE VISIT (OUTPATIENT)
Dept: PRIMARY CARE | Facility: CLINIC | Age: 85
End: 2020-06-29
Payer: MEDICARE

## 2020-06-29 VITALS
WEIGHT: 134.2 LBS | OXYGEN SATURATION: 98 % | BODY MASS INDEX: 25.34 KG/M2 | SYSTOLIC BLOOD PRESSURE: 132 MMHG | DIASTOLIC BLOOD PRESSURE: 90 MMHG | RESPIRATION RATE: 16 BRPM | HEIGHT: 61 IN | HEART RATE: 98 BPM | TEMPERATURE: 97.9 F

## 2020-06-29 DIAGNOSIS — B44.81 ALLERGIC BRONCHOPULMONARY ASPERGILLOSIS (CMS/HCC): Chronic | ICD-10-CM

## 2020-06-29 DIAGNOSIS — I26.99 OTHER ACUTE PULMONARY EMBOLISM WITHOUT ACUTE COR PULMONALE: Chronic | ICD-10-CM

## 2020-06-29 DIAGNOSIS — I70.0 CALCIFICATION OF AORTA (CMS/HCC): Primary | Chronic | ICD-10-CM

## 2020-06-29 DIAGNOSIS — J43.9 PULMONARY EMPHYSEMA, UNSPECIFIED EMPHYSEMA TYPE (CMS/HCC): Chronic | ICD-10-CM

## 2020-06-29 PROCEDURE — 99213 OFFICE O/P EST LOW 20 MIN: CPT | Performed by: FAMILY MEDICINE

## 2020-06-29 RX ORDER — DEXAMETHASONE 4 MG/1
2 TABLET ORAL 2 TIMES DAILY
COMMUNITY
Start: 2020-04-23 | End: 2023-01-26 | Stop reason: ENTERED-IN-ERROR

## 2020-06-29 RX ORDER — TIOTROPIUM BROMIDE INHALATION SPRAY 3.12 UG/1
2 SPRAY, METERED RESPIRATORY (INHALATION) DAILY
COMMUNITY
Start: 2020-03-31

## 2020-06-29 RX ORDER — PREGABALIN 25 MG/1
50 CAPSULE ORAL 2 TIMES DAILY
COMMUNITY
End: 2023-02-10 | Stop reason: ALTCHOICE

## 2020-06-29 RX ORDER — AZITHROMYCIN 250 MG/1
250 TABLET, FILM COATED ORAL 3 TIMES WEEKLY
Qty: 36 TABLET | Refills: 0 | Status: SHIPPED | OUTPATIENT
Start: 2020-06-29 | End: 2021-03-01 | Stop reason: ALTCHOICE

## 2020-06-29 RX ORDER — DULOXETIN HYDROCHLORIDE 30 MG/1
30 CAPSULE, DELAYED RELEASE ORAL DAILY
COMMUNITY
Start: 2020-05-05 | End: 2023-01-26 | Stop reason: ALTCHOICE

## 2020-06-29 ASSESSMENT — ENCOUNTER SYMPTOMS
COUGH: 0
SHORTNESS OF BREATH: 0
ARTHRALGIAS: 0
SPEECH DIFFICULTY: 0
HEADACHES: 0
WHEEZING: 0
CONFUSION: 0
DIZZINESS: 0
UNEXPECTED WEIGHT CHANGE: 0
PALPITATIONS: 0
MYALGIAS: 0
CHEST TIGHTNESS: 0
WEAKNESS: 0
LIGHT-HEADEDNESS: 0

## 2020-06-29 NOTE — PATIENT INSTRUCTIONS
Problem List Items Addressed This Visit     Allergic bronchopulmonary aspergillosis (CMS/HCC) (Chronic)     Stable         Relevant Medications    FLOVENT  mcg/actuation inhaler    SPIRIVA RESPIMAT 2.5 mcg/actuation mist inhaler    Chronic obstructive pulmonary disease (CMS/HCC) (Chronic)     Stable         Relevant Medications    FLOVENT  mcg/actuation inhaler    SPIRIVA RESPIMAT 2.5 mcg/actuation mist inhaler    Pulmonary embolism (CMS/HCC) (Chronic)     Improved at this time. Now off Apixaban.         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

## 2020-06-29 NOTE — PROGRESS NOTES
Henry Linares MD    [facility]  [address], [address]  [place] [address]  Phone: [phone]  Fax: [phone]     History of Present Illness     Subjective     Patient ID: Madelyn Ruiz is a 89 y.o. female.    Patient with aortic calcifications of the abdominal aorta. Here for evaluation.       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 Arriaza.  Stable FEV1 in 8/2015.  IgE levels and eosinophils stable in 8/2015.  Managed with Fasenra, Azithromycin and Flovent.   • Allergic rhinitis 4/2/2018    Pulmonologist: Dr. Walker. Managed with Flonase.   • Calcification of aorta (CMS/HCC) 6/29/2020    Seen incidentally on CT scan.   • Chronic obstructive pulmonary disease (CMS/[facility]) 4/2/2018    Pulmonologist: Dr. Walker.  Seen on PFTs in hospital in 2014.  Controlled with Spiriva and FLovent.   • GERD (gastroesophageal reflux disease) 6/8/2019    Managed with Esomeprazole.  Should take medication 30 minutes prior to meal.  Advised to avoid caffeine, carbonated beverages, and should not eat within 3 hours of going to sleep.  Advised to reduce BMI < 25.    • Hypertension 4/2/2018    Managed on Amlodipine. Advised to follow a low sodium diet and keep BMI < 25. Advised to exercise for 2.5 hours per week. Instructed to take home blood pressure readings and call if consistently above 140/90.    • Insomnia 4/2/2018    Managed with Lorazepam as needed.   • Lumbar spinal stenosis 4/2/2018    Neurosurgeon: Dr. Miguel. MRI with Central and lateral recess spinal stenosis. Has Spondylolisthesis at L4/L5. S/P epidural injection by Dr. Martin with some relief in past. Had Lumbar fusion and Lumbar laminectomy by Dr. Miguel in 4/2015.   • Obstructive sleep  apnea syndrome 4/2/2018    Mild STACIA noted in sleep study 7/2015. Treated with nasal CPAP automatic with pressure range 5-10 CM H2Ox couple months. Off now   • Osteoporosis 4/2/2018    Rheumatologist: Dr. Bradley.  Dexa scan 7/2016 with osteoporosis of lumbar spine LS-2.5, LH-2.1, RH -2.2.  Has been on Fosamax in the past for 8 years.  Worsened by Prednisone in past.  Continue vitamin D, calcium and weight bearing exercise. DEXA in 7/2017 with LS -1.7, LH -2.2, and RH -2.0.  Next due in 7/2019.  Seen by Dr. Bradley in 8/2015 who agreed with initiating Prolia. Took for 2 years. N   • Pulmonary embolism (CMS/HCC) 6/9/2019    Hematologist: Dr. Bhakta Diagnosed in 06/2019 in right upper lobe. Vascular ultrasound negative of bilateral lower extremities. Managed on Eliquis. Hypercoagulable workup was negative for beta-2 glycoprotein's, RIGO, Antithrombin III, lupus anticoagulant, protein C activity protein S activity, and prothrombin gene mutation, and factor V Leiden. Now off Apixaban as CT scan in 12/2019 was without pu   • Vertebral compression fracture (CMS/HCC)     At T12 level       Past Surgical History:   Procedure Laterality Date   • APPENDECTOMY     • CARPAL TUNNEL RELEASE Bilateral    • CATARACT EXTRACTION W/  INTRAOCULAR LENS IMPLANT Bilateral    • HYSTERECTOMY  1972   • LUMBAR LAMINECTOMY  04/21/2015    S/P lumbar fusion   • TONSILLECTOMY         Family History   Problem Relation Age of Onset   • Glaucoma Biological Mother    • Macular degeneration Biological Mother    • Hypertension Biological Mother    • Lung cancer Biological Father    • Heart disease Biological Brother    • Breast cancer Mother's Sister        Social History     Tobacco Use   • Smoking status: Never Smoker   • Smokeless tobacco: Never Used   Substance Use Topics   • Alcohol use: Yes     Comment: Rarely   • Drug use: No       Patient Care Team:  Henry Linares MD as PCP - General  Ray Arriaza MD as Referring Physician  Aye Bhakta  MD as Consulting Physician (Hematology and Oncology)  Wesley Walker MD as Consulting Physician (Pulmonary)  Fausto Bradley MD as Consulting Physician (Rheumatology)     Allergies and Medications       Allergies   Allergen Reactions   • Mepolizumab Rash   • Morphine      Other reaction(s): Vomiting   • Oxycodone      Other reaction(s): Vomiting       Current Outpatient Medications   Medication Sig Dispense Refill   • albuterol 2.5 mg /3 mL (0.083 %) nebulizer solution Take 3 mL (2.5 mg total) by nebulization every 6 (six) hours as needed for wheezing or shortness of breath. 125 vial 0   • albuterol HFA (VENTOLIN HFA) 90 mcg/actuation inhaler Inhale 2 puffs every 4 (four) hours as needed for wheezing or shortness of breath. 8.5 g 0   • amLODIPine (NORVASC) 10 mg tablet Take 10 mg by mouth daily.     • azithromycin (ZITHROMAX) 250 mg tablet Take 1 tablet (250 mg total) by mouth 3 (three) times a week (Mon, Wed, Fri). Take 2 tablets the first day, then 1 tablet daily for 4 days. 36 tablet 0   • benralizumab (FASENRA) 30 mg/mL syringe subcutaneous syringe Inject 30 mg under the skin every 2 (two) months.     • benzonatate (TESSALON) 200 mg capsule TAKE 1 CAPSULE BY MOUTH 3 TIMES DAILY AS NEEDED FOR COUGH.     • calcium carbonate (calcium carbonate) 600 mg calcium (1,500 mg) tablet Take 1 tablet by mouth 2 (two) times a day.     • cholecalciferol, vitamin D3, (VITAMIN D3 ORAL) Take by mouth daily.       • diphenhydrAMINE (BENADRYL) 25 mg capsule Take 25 mg by mouth nightly.     • docusate sodium (COLACE) 100 mg capsule Take 100 mg by mouth nightly.       • DULoxetine (CYMBALTA) 30 mg capsule Take 30 mg by mouth daily.     • DULoxetine (CYMBALTA) 60 mg capsule TAKE 1 CAPSULE BY MOUTH EVERYDAY AT BEDTIME  0   • esomeprazole (NexIUM) 20 mg capsule Take 20 mg by mouth daily. Take 30 minutes prior to meal.     • fexofenadine (ALLEGRA) 180 mg tablet Take 180 mg by mouth daily.     • FLOVENT  mcg/actuation inhaler  "Inhale 2 puffs 2 (two) times a day.     • fluticasone propionate (FLONASE) 50 mcg/actuation nasal spray Administer 1 spray into each nostril daily.     • FOLIC ACID/MULTIVIT,IRON, (CENTRUM ORAL) Take 1 tablet by mouth daily.     • LORazepam (ATIVAN) 1 mg tablet TAKE 1 TABLET BY MOUTH EVERY DAY AT BEDTIME AS NEEDED 30 tablet 5   • pregabalin (LYRICA) 25 mg capsule Take 25 mg by mouth nightly.     • SPIRIVA RESPIMAT 2.5 mcg/actuation mist inhaler Inhale 2 puffs daily.     • zoledronic acid (RECLAST) IVPB Infuse 5 mg into a venous catheter See admin instr. 5 mg once yearly       No current facility-administered medications for this visit.           Review of Systems       Review of Systems   Constitutional: Negative for unexpected weight change.   Eyes: Negative for visual disturbance.   Respiratory: Negative for cough, chest tightness, shortness of breath and wheezing.    Cardiovascular: Negative for chest pain, palpitations and leg swelling.   Musculoskeletal: Negative for arthralgias and myalgias.   Neurological: Negative for dizziness, syncope, speech difficulty, weakness, light-headedness and headaches.   Psychiatric/Behavioral: Negative for confusion.        Physical Examination       Objective     Vitals:    06/29/20 1059   BP: 132/90   Pulse: 98   Resp: 16   Temp: 36.6 °C (97.9 °F)   SpO2: 98%       Pulse Readings from Last 5 Encounters:   06/29/20 98   06/20/19 81   06/12/19 98   06/06/18 84   05/03/18 97       Wt Readings from Last 5 Encounters:   06/29/20 60.9 kg (134 lb 3.2 oz)   06/20/19 58.7 kg (129 lb 6.4 oz)   06/11/19 58.5 kg (129 lb)   10/17/18 57.6 kg (127 lb)   06/06/18 57.6 kg (127 lb)       Ht Readings from Last 5 Encounters:   06/29/20 1.537 m (5' 0.5\")   06/20/19 1.549 m (5' 1\")   06/11/19 1.549 m (5' 0.98\")   06/06/18 1.549 m (5' 1\")   05/03/18 1.549 m (5' 1\")       BP Readings from Last 5 Encounters:   06/29/20 132/90   06/20/19 138/90   06/12/19 127/76   06/06/18 118/76   05/03/18 128/74 "       BMI Readings from Last 4 Encounters:   06/29/20 25.78 kg/m²   06/20/19 24.45 kg/m²   06/11/19 24.39 kg/m²   10/17/18 24.00 kg/m²       Physical Exam   Constitutional: She appears well-developed and well-nourished. She is cooperative.   Neck: No JVD present. Carotid bruit is not present.   Cardiovascular: Normal rate, regular rhythm, S1 normal, S2 normal and normal heart sounds. Exam reveals no gallop, no S3 and no S4.   No murmur heard.  Pulses:       Dorsalis pedis pulses are 2+ on the right side, and 2+ on the left side.        Posterior tibial pulses are 2+ on the right side, and 2+ on the left side.   No edema bilaterally   Pulmonary/Chest: Effort normal and breath sounds normal. No accessory muscle usage. No respiratory distress. She has no wheezes. She has no rhonchi. She has no rales.   Abdominal: Soft. Normal aorta and bowel sounds are normal. There is no splenomegaly or hepatomegaly. There is no tenderness. There is no rebound and no guarding.   Musculoskeletal: She exhibits no edema.   Neurological: She is alert.        Laboratory Results     Lab Results   Component Value Date    WBC 5.72 06/19/2020    WBC 8.34 12/02/2019    WBC 9.55 06/12/2019    HGB 12.5 06/19/2020    HGB 13.1 12/02/2019    HGB 13.9 06/12/2019    HCT 39.0 06/19/2020    HCT 39.2 12/02/2019    HCT 44.0 06/12/2019    .3 (H) 06/19/2020    MCV 96.1 12/02/2019    MCV 96.7 06/12/2019     06/19/2020     12/02/2019     06/12/2019        Lab Results   Component Value Date    GLUCOSE 88 06/19/2020    GLUCOSE 80 12/02/2019    GLUCOSE 112 (H) 06/12/2019    CALCIUM 9.1 06/19/2020    CALCIUM 9.1 12/02/2019    CALCIUM 9.0 06/12/2019     06/19/2020     12/02/2019     06/12/2019    K 4.3 06/19/2020    K 4.1 12/02/2019    K 3.8 06/12/2019    CO2 25 06/19/2020    CO2 23 12/02/2019    CO2 28 06/12/2019     06/19/2020     12/02/2019     06/12/2019    BUN 16 06/19/2020    BUN 22 (H)  12/02/2019    BUN 15 06/12/2019    CREATININE 0.9 06/19/2020    CREATININE 0.9 12/02/2019    CREATININE 0.8 06/12/2019    ALKPHOS 43 06/19/2020    ALKPHOS 54 12/02/2019    ALKPHOS 47 07/12/2018    AST 22 06/19/2020    AST 20 12/02/2019    AST 24 07/12/2018    ALT 25 06/19/2020    ALT 25 12/02/2019    ALT 23 07/12/2018    BILITOT 0.8 06/19/2020    BILITOT 0.5 12/02/2019    BILITOT 0.4 07/12/2018    ALBUMIN 3.9 06/19/2020    ALBUMIN 4.0 12/02/2019    ALBUMIN 4.4 07/12/2018       Lab Results   Component Value Date    CHOL 248 (H) 11/18/2015     Lab Results   Component Value Date    HDL 92 11/18/2015     Lab Results   Component Value Date    LDLCALC 133 (H) 11/18/2015     Lab Results   Component Value Date    TRIG 117 11/18/2015       Lab Results   Component Value Date    TSH 0.32 (L) 06/10/2019    TSH 2.08 11/18/2015    TSH 2.16 08/21/2015     Lab Results   Component Value Date    FREET4 0.79 11/18/2015         Lab Results   Component Value Date    HGBA1C 5.6 11/18/2015    HGBA1C 5.6 04/08/2015         Lab Results   Component Value Date    HEPCAB Nonreactive 07/12/2018       Immunization History   Administered Date(s) Administered   • Influenza Split High Dose Preservative Free IM 11/03/2011, 11/13/2012, 10/09/2013, 10/30/2014, 11/17/2015, 09/30/2016, 09/25/2017   • Influenza Vaccine 65 And Older Preservative Free 10/25/2019   • Influenza Vaccine Quadrivalent 3 Yr And Older 09/18/2017, 10/22/2018   • Influenza, Unspecified 10/30/2014, 11/17/2015, 09/30/2016, 09/25/2017   • MMRV 06/05/2014   • Pneumococcal Conjugate 08/09/1996   • Pneumococcal Conjugate 13-Valent 11/17/2015   • Pneumococcal Polysaccharide 10/30/2014   • Tdap 03/22/2012   • Varicella 06/05/2014   • Zoster 01/02/2006, 01/02/2016   • Zoster Vaccine Recombinant Adjuvanted (Shingrix) 05/24/2019, 09/20/2019         Health Maintenance Topics with due status: Overdue       Topic Date Due    Medicare Annual Wellness Visit 06/06/2019     Health Maintenance Topics  with due status: Not Due       Topic Last Completion Date    DTaP, Tdap, and Td Vaccines 03/22/2012    DEXA Scan 07/30/2019    Annual Falls Risk Screening 06/29/2020     Health Maintenance Topics with due status: Completed       Topic Last Completion Date    Pneumococcal (65 years and older) 11/17/2015    Zoster Vaccine 09/20/2019    Influenza Vaccine 10/25/2019     Health Maintenance Topics with due status: Aged Out       Topic Date Due    Meningococcal ACWY Aged Out    Varicella Vaccines Aged Out    HIB Vaccines Aged Out    IPV Vaccines Aged Out    HPV Vaccines Aged Out    Pneumococcal Aged Out     Health Maintenance Topics with due status: Discontinued       Topic Date Due    Mammogram Discontinued        Assessment and Plan       Assessment/Plan     Problem List Items Addressed This Visit     Allergic bronchopulmonary aspergillosis (CMS/HCC) (Chronic)    Overview     · Allergist: Dr. Arriaza.   · Stable FEV1 in 8/2015.   · IgE levels and eosinophils stable in 8/2015.   · Managed with Fasenra, Azithromycin and Flovent.         Current Assessment & Plan     Stable         Relevant Medications    FLOVENT  mcg/actuation inhaler    SPIRIVA RESPIMAT 2.5 mcg/actuation mist inhaler    Chronic obstructive pulmonary disease (CMS/HCC) (Chronic)    Overview     · Pulmonologist: Dr. Walker.   · Seen on PFTs in hospital in 2014.   · Controlled with Spiriva and FLovent.         Current Assessment & Plan     Stable         Relevant Medications    FLOVENT  mcg/actuation inhaler    SPIRIVA RESPIMAT 2.5 mcg/actuation mist inhaler    Pulmonary embolism (CMS/HCC) (Chronic)    Overview     · Hematologist: Dr. Bhakta  · Diagnosed in 06/2019 in right upper lobe.  · Vascular ultrasound negative of bilateral lower extremities.  · Managed on Eliquis.  · Hypercoagulable workup was negative for beta-2 glycoprotein's, RIGO, Antithrombin III, lupus anticoagulant, protein C activity protein S activity, and prothrombin gene  mutation, and factor V Leiden.  · Now off Apixaban as CT scan in 12/2019 was without pulmonary embolism.         Current Assessment & Plan     Improved at this time. Now off Apixaban.         Calcification of aorta (CMS/HCC) - Primary (Chronic)    Overview     · Seen incidentally on CT scan.         Current Assessment & Plan     Stable and no intervention needed.               Return in about 3 months (around 9/29/2020) for Medicare annual wellness exam.    No orders of the defined types were placed in this encounter.             Henry Linares MD    6/29/2020

## 2020-08-03 ENCOUNTER — TELEMEDICINE (OUTPATIENT)
Dept: PRIMARY CARE | Facility: CLINIC | Age: 85
End: 2020-08-03
Payer: MEDICARE

## 2020-08-03 DIAGNOSIS — R49.0 HOARSENESS OF VOICE: Primary | ICD-10-CM

## 2020-08-03 PROCEDURE — 99442 PR PHYS/QHP TELEPHONE EVALUATION 11-20 MIN: CPT | Mod: 95 | Performed by: FAMILY MEDICINE

## 2020-08-03 RX ORDER — DILTIAZEM HYDROCHLORIDE 60 MG/1
2 TABLET, FILM COATED ORAL 2 TIMES DAILY
COMMUNITY
Start: 2020-07-15

## 2020-08-03 ASSESSMENT — ENCOUNTER SYMPTOMS
CHILLS: 0
SINUS PRESSURE: 0
MYALGIAS: 0
DIARRHEA: 0
ABDOMINAL PAIN: 0
FEVER: 0
SHORTNESS OF BREATH: 0
FATIGUE: 0
CHEST TIGHTNESS: 0
COUGH: 1
VOMITING: 0
WHEEZING: 0
ARTHRALGIAS: 0
SORE THROAT: 0
NAUSEA: 0
EYE REDNESS: 0
NECK STIFFNESS: 0
RHINORRHEA: 0
SINUS PAIN: 0
CONFUSION: 0

## 2020-08-03 NOTE — PROGRESS NOTES
Henry Linares MD    [facility]  [address], [address]  [place] [address]  Phone: [phone]  Fax: [phone]     History of Present Illness     Subjective     Patient ID: Madelyn Ruiz is a 89 y.o. female.    Verification of Patient Location:  The patient affirms they are currently located in the following state:[place]     Request for Consent:  You and I are about to have a telemedicine check-in or visit. This is allowed because you are already my patient, and you have requested it.  This telemedicine visit will be billed to your health insurance or you, if you are self-insured.  You understand you will be responsible for any copayments or coinsurances that apply to your telemedicine visit.  Before starting our telemedicine visit, I am required to get your consent for this virtual check-in or visit by telemedicine. Do you consent?      Patient Response to Request for Consent: Yes        URI    This is a new problem. Episode onset: 2 weeks ago. There has been no fever. Associated symptoms include coughing. Pertinent negatives include no abdominal pain, chest pain, congestion, diarrhea, ear pain, nausea, rash, rhinorrhea, sinus pain, sneezing, sore throat, vomiting or wheezing. Associated symptoms comments: Hoarseness. Treatments tried: albuerol and symbicort. also Spiriva.         Review of Systems   Constitutional: Negative for chills, fatigue and fever.   HENT: Negative for congestion, ear pain, rhinorrhea, sinus pressure, sinus pain, sneezing and sore throat.         Hoarseness   Eyes: Negative for redness.   Respiratory: Positive for cough. Negative for chest tightness, shortness of breath and wheezing.    Cardiovascular: Negative for chest pain.   Gastrointestinal: Negative for abdominal pain, diarrhea, nausea and vomiting.   Musculoskeletal: Negative for arthralgias, myalgias and neck stiffness.   Skin: Negative for rash.   Psychiatric/Behavioral:  Negative for confusion.          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 Arriaza.  Stable FEV1 in 8/2015.  IgE levels and eosinophils stable in 8/2015.  Managed with Fasenra, Azithromycin and Flovent.   • Allergic rhinitis 4/2/2018    Pulmonologist: Dr. Walker. Managed with Flonase.   • Calcification of aorta (CMS/[facility]) 6/29/2020    Seen incidentally on CT scan.   • Chronic obstructive pulmonary disease (CMS/[facility]) 4/2/2018    Pulmonologist: Dr. Walker.  Seen on PFTs in hospital in 2014.  Controlled with Spiriva and FLovent.   • GERD (gastroesophageal reflux disease) 6/8/2019    Managed with Esomeprazole.  Should take medication 30 minutes prior to meal.  Advised to avoid caffeine, carbonated beverages, and should not eat within 3 hours of going to sleep.  Advised to reduce BMI < 25.    • Hypertension 4/2/2018    Managed on Amlodipine. Advised to follow a low sodium diet and keep BMI < 25. Advised to exercise for 2.5 hours per week. Instructed to take home blood pressure readings and call if consistently above 140/90.    • Insomnia 4/2/2018    Managed with Lorazepam as needed.   • Lumbar spinal stenosis 4/2/2018    Neurosurgeon: Dr. Miguel. MRI with Central and lateral recess spinal stenosis. Has Spondylolisthesis at L4/L5. S/P epidural injection by Dr. Martin with some relief in past. Had Lumbar fusion and Lumbar laminectomy by Dr. Miguel in 4/2015.   • Obstructive sleep apnea syndrome 4/2/2018    Mild STACIA noted in sleep study 7/2015. Treated with nasal CPAP automatic with pressure range 5-10 CM H2Ox couple months. Off now   • Osteoporosis 4/2/2018    Rheumatologist: Dr. Bradley.  Dexa scan 7/2016 with osteoporosis of lumbar spine LS-2.5, LH-2.1, RH -2.2.  Has been on Fosamax in  the past for 8 years.  Worsened by Prednisone in past.  Continue vitamin D, calcium and weight bearing exercise. DEXA in 7/2017 with LS -1.7, LH -2.2, and RH -2.0.  Next due in 7/2019.  Seen by Dr. Bradley in 8/2015 who agreed with initiating Prolia. Took for 2 years. N   • Pulmonary embolism (CMS/HCC) 6/9/2019    Hematologist: Dr. Bhakta Diagnosed in 06/2019 in right upper lobe. Vascular ultrasound negative of bilateral lower extremities. Managed on Eliquis. Hypercoagulable workup was negative for beta-2 glycoprotein's, RIGO, Antithrombin III, lupus anticoagulant, protein C activity protein S activity, and prothrombin gene mutation, and factor V Leiden. Now off Apixaban as CT scan in 12/2019 was without pu   • Vertebral compression fracture (CMS/HCC)     At T12 level       Past Surgical History:   Procedure Laterality Date   • APPENDECTOMY     • CARPAL TUNNEL RELEASE Bilateral    • CATARACT EXTRACTION W/  INTRAOCULAR LENS IMPLANT Bilateral    • HYSTERECTOMY  1972   • LUMBAR LAMINECTOMY  04/21/2015    S/P lumbar fusion   • TONSILLECTOMY         Family History   Problem Relation Age of Onset   • Glaucoma Biological Mother    • Macular degeneration Biological Mother    • Hypertension Biological Mother    • Lung cancer Biological Father    • Heart disease Biological Brother    • Breast cancer Mother's Sister        Social History     Tobacco Use   • Smoking status: Never Smoker   • Smokeless tobacco: Never Used   Substance Use Topics   • Alcohol use: Yes     Comment: Rarely   • Drug use: No       Patient Care Team:  Henry Linares MD as PCP - General  Ray Arriaza MD as Referring Physician  Aye Bhakta MD as Consulting Physician (Hematology and Oncology)  Wesley Walker MD as Consulting Physician (Pulmonary)  Fausto Bradley MD as Consulting Physician (Rheumatology)     Allergies and Medications       Allergies   Allergen Reactions   • Mepolizumab Rash   • Morphine      Other reaction(s): Vomiting   •  Oxycodone      Other reaction(s): Vomiting       Current Outpatient Medications   Medication Sig Dispense Refill   • albuterol 2.5 mg /3 mL (0.083 %) nebulizer solution Take 3 mL (2.5 mg total) by nebulization every 6 (six) hours as needed for wheezing or shortness of breath. 125 vial 0   • albuterol HFA (VENTOLIN HFA) 90 mcg/actuation inhaler Inhale 2 puffs every 4 (four) hours as needed for wheezing or shortness of breath. 8.5 g 0   • amLODIPine (NORVASC) 10 mg tablet Take 10 mg by mouth daily.     • azithromycin (ZITHROMAX) 250 mg tablet Take 1 tablet (250 mg total) by mouth 3 (three) times a week (Mon, Wed, Fri). Take 2 tablets the first day, then 1 tablet daily for 4 days. 36 tablet 0   • benralizumab (FASENRA) 30 mg/mL syringe subcutaneous syringe Inject 30 mg under the skin every 2 (two) months.     • benzonatate (TESSALON) 200 mg capsule TAKE 1 CAPSULE BY MOUTH 3 TIMES DAILY AS NEEDED FOR COUGH.     • calcium carbonate (calcium carbonate) 600 mg calcium (1,500 mg) tablet Take 1 tablet by mouth 2 (two) times a day.     • cholecalciferol, vitamin D3, (VITAMIN D3 ORAL) Take by mouth daily.       • diphenhydrAMINE (BENADRYL) 25 mg capsule Take 25 mg by mouth nightly.     • docusate sodium (COLACE) 100 mg capsule Take 100 mg by mouth nightly.       • DULoxetine (CYMBALTA) 30 mg capsule Take 30 mg by mouth daily.     • DULoxetine (CYMBALTA) 60 mg capsule TAKE 1 CAPSULE BY MOUTH EVERYDAY AT BEDTIME  0   • esomeprazole (NexIUM) 20 mg capsule Take 20 mg by mouth daily. Take 30 minutes prior to meal.     • fexofenadine (ALLEGRA) 180 mg tablet Take 180 mg by mouth daily.     • FLOVENT  mcg/actuation inhaler Inhale 2 puffs 2 (two) times a day.     • fluticasone propionate (FLONASE) 50 mcg/actuation nasal spray Administer 1 spray into each nostril daily.     • FOLIC ACID/MULTIVIT,IRON, (CENTRUM ORAL) Take 1 tablet by mouth daily.     • LORazepam (ATIVAN) 1 mg tablet TAKE 1 TABLET BY MOUTH EVERY DAY AT BEDTIME  "AS NEEDED 30 tablet 5   • pregabalin (LYRICA) 25 mg capsule Take 25 mg by mouth nightly.     • SPIRIVA RESPIMAT 2.5 mcg/actuation mist inhaler Inhale 2 puffs daily.     • SYMBICORT 80-4.5 mcg/actuation inhaler Inhale 2 puffs 2 (two) times a day.     • zoledronic acid (RECLAST) IVPB Infuse 5 mg into a venous catheter See admin instr. 5 mg once yearly       No current facility-administered medications for this visit.                 Physical Examination       Objective     There were no vitals filed for this visit.    Pulse Readings from Last 5 Encounters:   06/29/20 98   06/20/19 81   06/12/19 98   06/06/18 84   05/03/18 97       Wt Readings from Last 5 Encounters:   06/29/20 60.9 kg (134 lb 3.2 oz)   06/20/19 58.7 kg (129 lb 6.4 oz)   06/11/19 58.5 kg (129 lb)   10/17/18 57.6 kg (127 lb)   06/06/18 57.6 kg (127 lb)       Ht Readings from Last 5 Encounters:   06/29/20 1.537 m (5' 0.5\")   06/20/19 1.549 m (5' 1\")   06/11/19 1.549 m (5' 0.98\")   06/06/18 1.549 m (5' 1\")   05/03/18 1.549 m (5' 1\")       BP Readings from Last 5 Encounters:   06/29/20 132/90   06/20/19 138/90   06/12/19 127/76   06/06/18 118/76   05/03/18 128/74       BMI Readings from Last 4 Encounters:   06/29/20 25.78 kg/m²   06/20/19 24.45 kg/m²   06/11/19 24.39 kg/m²   10/17/18 24.00 kg/m²          Laboratory Results     Lab Results   Component Value Date    WBC 5.72 06/19/2020    WBC 8.34 12/02/2019    WBC 9.55 06/12/2019    HGB 12.5 06/19/2020    HGB 13.1 12/02/2019    HGB 13.9 06/12/2019    HCT 39.0 06/19/2020    HCT 39.2 12/02/2019    HCT 44.0 06/12/2019    .3 (H) 06/19/2020    MCV 96.1 12/02/2019    MCV 96.7 06/12/2019     06/19/2020     12/02/2019     06/12/2019        Lab Results   Component Value Date    GLUCOSE 88 06/19/2020    GLUCOSE 80 12/02/2019    GLUCOSE 112 (H) 06/12/2019    CALCIUM 9.1 06/19/2020    CALCIUM 9.1 12/02/2019    CALCIUM 9.0 06/12/2019     06/19/2020     12/02/2019     " 06/12/2019    K 4.3 06/19/2020    K 4.1 12/02/2019    K 3.8 06/12/2019    CO2 25 06/19/2020    CO2 23 12/02/2019    CO2 28 06/12/2019     06/19/2020     12/02/2019     06/12/2019    BUN 16 06/19/2020    BUN 22 (H) 12/02/2019    BUN 15 06/12/2019    CREATININE 0.9 06/19/2020    CREATININE 0.9 12/02/2019    CREATININE 0.8 06/12/2019    ALKPHOS 43 06/19/2020    ALKPHOS 54 12/02/2019    ALKPHOS 47 07/12/2018    AST 22 06/19/2020    AST 20 12/02/2019    AST 24 07/12/2018    ALT 25 06/19/2020    ALT 25 12/02/2019    ALT 23 07/12/2018    BILITOT 0.8 06/19/2020    BILITOT 0.5 12/02/2019    BILITOT 0.4 07/12/2018    ALBUMIN 3.9 06/19/2020    ALBUMIN 4.0 12/02/2019    ALBUMIN 4.4 07/12/2018       Lab Results   Component Value Date    CHOL 248 (H) 11/18/2015     Lab Results   Component Value Date    HDL 92 11/18/2015     Lab Results   Component Value Date    LDLCALC 133 (H) 11/18/2015     Lab Results   Component Value Date    TRIG 117 11/18/2015       Lab Results   Component Value Date    TSH 0.32 (L) 06/10/2019    TSH 2.08 11/18/2015    TSH 2.16 08/21/2015     Lab Results   Component Value Date    FREET4 0.79 11/18/2015       Lab Results   Component Value Date    HGBA1C 5.6 11/18/2015    HGBA1C 5.6 04/08/2015         Lab Results   Component Value Date    HEPCAB Nonreactive 07/12/2018       No results found for: MG      Immunization History   Administered Date(s) Administered   • Influenza Split High Dose Preservative Free IM 11/03/2011, 11/13/2012, 10/09/2013, 10/30/2014, 11/17/2015, 09/30/2016, 09/25/2017   • Influenza Vaccine 65 And Older Preservative Free 10/25/2019   • Influenza Vaccine Quadrivalent 3 Yr And Older 09/18/2017, 10/22/2018   • Influenza, Unspecified 10/30/2014, 11/17/2015, 09/30/2016, 09/25/2017   • MMRV 06/05/2014   • Pneumococcal Conjugate 08/09/1996   • Pneumococcal Conjugate 13-Valent 11/17/2015   • Pneumococcal Polysaccharide 10/30/2014   • Tdap 03/22/2012   • Varicella 06/05/2014   •  Zoster 01/02/2006, 01/02/2016   • Zoster Vaccine Recombinant Adjuvanted (Shingrix) 05/24/2019, 09/20/2019         Health Maintenance Topics with due status: Overdue       Topic Date Due    Medicare Annual Wellness Visit 06/06/2019    Influenza Vaccine 08/01/2020     Health Maintenance Topics with due status: Not Due       Topic Last Completion Date    DTaP, Tdap, and Td Vaccines 03/22/2012    DEXA Scan 07/30/2019     Health Maintenance Topics with due status: Completed       Topic Last Completion Date    Pneumococcal (65 years and older) 11/17/2015    Zoster Vaccine 09/20/2019    Annual Falls Risk Screening 06/29/2020     Health Maintenance Topics with due status: Aged Out       Topic Date Due    Meningococcal ACWY Aged Out    Varicella Vaccines Aged Out    HIB Vaccines Aged Out    IPV Vaccines Aged Out    HPV Vaccines Aged Out    Pneumococcal Aged Out     Health Maintenance Topics with due status: Discontinued       Topic Date Due    Mammogram Discontinued        Assessment and Plan       Assessment/Plan     Problem List Items Addressed This Visit     Hoarseness of voice - Primary    Current Assessment & Plan     Patient with hoarseness of the voice of unclear etiology.  Does not appear infectious at this time.  Already takes medication for reflux.  Has been on 2 steroid inhalers per her pulmonologist.  Perhaps she has thrush of the vocal cords.  I think the next step is to get visualization of the vocal cords.  Patient has been referred to ear nose and throat for evaluation and nasolaryngoscopy.  Await work-up and recommendations.               Return if symptoms worsen or fail to improve.    No orders of the defined types were placed in this encounter.             Henry Linares MD    8/3/2020        Time Spent in Medical Discussion During This Encounter:  14 minutes

## 2020-08-03 NOTE — PATIENT INSTRUCTIONS
Problem List Items Addressed This Visit     Hoarseness of voice - Primary     Patient with hoarseness of the voice of unclear etiology.  Does not appear infectious at this time.  Already takes medication for reflux.  Has been on 2 steroid inhalers per her pulmonologist.  Perhaps she has thrush of the vocal cords.  I think the next step is to get visualization of the vocal cords.  Patient has been referred to ear nose and throat for evaluation and nasolaryngoscopy.  Await work-up and recommendations.

## 2020-08-03 NOTE — ASSESSMENT & PLAN NOTE
Patient with hoarseness of the voice of unclear etiology.  Does not appear infectious at this time.  Already takes medication for reflux.  Has been on 2 steroid inhalers per her pulmonologist.  Perhaps she has thrush of the vocal cords.  I think the next step is to get visualization of the vocal cords.  Patient has been referred to ear nose and throat for evaluation and nasolaryngoscopy.  Await work-up and recommendations.

## 2020-09-30 DIAGNOSIS — F41.9 ANXIETY: ICD-10-CM

## 2020-09-30 RX ORDER — LORAZEPAM 1 MG/1
1 TABLET ORAL NIGHTLY PRN
Qty: 30 TABLET | Refills: 5 | Status: SHIPPED | OUTPATIENT
Start: 2020-09-30 | End: 2021-04-22 | Stop reason: SDUPTHER

## 2020-10-02 ENCOUNTER — DOCUMENTATION (OUTPATIENT)
Dept: PRIMARY CARE | Facility: CLINIC | Age: 85
End: 2020-10-02

## 2020-10-02 ENCOUNTER — OFFICE VISIT (OUTPATIENT)
Dept: PRIMARY CARE | Facility: CLINIC | Age: 85
End: 2020-10-02
Payer: MEDICARE

## 2020-10-02 VITALS
HEIGHT: 61 IN | SYSTOLIC BLOOD PRESSURE: 118 MMHG | HEART RATE: 101 BPM | BODY MASS INDEX: 24.84 KG/M2 | RESPIRATION RATE: 16 BRPM | OXYGEN SATURATION: 98 % | WEIGHT: 131.6 LBS | DIASTOLIC BLOOD PRESSURE: 86 MMHG | TEMPERATURE: 98 F

## 2020-10-02 DIAGNOSIS — Z23 NEED FOR INFLUENZA VACCINATION: ICD-10-CM

## 2020-10-02 DIAGNOSIS — I10 ESSENTIAL HYPERTENSION: ICD-10-CM

## 2020-10-02 DIAGNOSIS — Z00.00 MEDICARE ANNUAL WELLNESS VISIT, INITIAL: Primary | ICD-10-CM

## 2020-10-02 PROBLEM — J43.9 PULMONARY EMPHYSEMA (CMS/HCC): Status: ACTIVE | Noted: 2018-04-02

## 2020-10-02 PROCEDURE — G0439 PPPS, SUBSEQ VISIT: HCPCS | Performed by: FAMILY MEDICINE

## 2020-10-02 PROCEDURE — G0008 ADMIN INFLUENZA VIRUS VAC: HCPCS | Performed by: FAMILY MEDICINE

## 2020-10-02 PROCEDURE — 90653 IIV ADJUVANT VACCINE IM: CPT | Performed by: FAMILY MEDICINE

## 2020-10-02 SDOH — HEALTH STABILITY: MENTAL HEALTH: HOW OFTEN DO YOU HAVE SIX OR MORE DRINKS ON ONE OCCASION?: NEVER

## 2020-10-02 SDOH — HEALTH STABILITY: MENTAL HEALTH: HOW OFTEN DO YOU HAVE A DRINK CONTAINING ALCOHOL?: MONTHLY OR LESS

## 2020-10-02 SDOH — ECONOMIC STABILITY: FOOD INSECURITY: WITHIN THE PAST 12 MONTHS, YOU WORRIED THAT YOUR FOOD WOULD RUN OUT BEFORE YOU GOT THE MONEY TO BUY MORE.: NEVER TRUE

## 2020-10-02 SDOH — ECONOMIC STABILITY: FOOD INSECURITY: WITHIN THE PAST 12 MONTHS, THE FOOD YOU BOUGHT JUST DIDN'T LAST AND YOU DIDN'T HAVE MONEY TO GET MORE.: NEVER TRUE

## 2020-10-02 SDOH — HEALTH STABILITY: PHYSICAL HEALTH: ON AVERAGE, HOW MANY DAYS PER WEEK DO YOU ENGAGE IN MODERATE TO STRENUOUS EXERCISE (LIKE A BRISK WALK)?: 0 DAYS

## 2020-10-02 SDOH — HEALTH STABILITY: MENTAL HEALTH: HOW MANY DRINKS CONTAINING ALCOHOL DO YOU HAVE ON A TYPICAL DAY WHEN YOU ARE DRINKING?: 1 OR 2

## 2020-10-02 SDOH — ECONOMIC STABILITY: FOOD INSECURITY: HOW HARD IS IT FOR YOU TO PAY FOR THE VERY BASICS LIKE FOOD, HOUSING, MEDICAL CARE, AND HEATING?: NOT HARD AT ALL

## 2020-10-02 SDOH — HEALTH STABILITY: PHYSICAL HEALTH: ON AVERAGE, HOW MANY MINUTES DO YOU ENGAGE IN EXERCISE AT THIS LEVEL?: 0 MIN

## 2020-10-02 SDOH — ECONOMIC STABILITY: TRANSPORTATION INSECURITY: IN THE PAST 12 MONTHS, HAS LACK OF TRANSPORTATION KEPT YOU FROM MEDICAL APPOINTMENTS OR FROM GETTING MEDICATIONS?: NO

## 2020-10-02 ASSESSMENT — ENCOUNTER SYMPTOMS
APPETITE CHANGE: 0
LIGHT-HEADEDNESS: 0
SHORTNESS OF BREATH: 0
JOINT SWELLING: 0
DIFFICULTY URINATING: 0
UNEXPECTED WEIGHT CHANGE: 0
CHILLS: 0
SLEEP DISTURBANCE: 0
NUMBNESS: 0
RHINORRHEA: 0
NECK PAIN: 0
CONFUSION: 0
COUGH: 0
FATIGUE: 0
WEAKNESS: 0
DIARRHEA: 0
EYE ITCHING: 0
CHEST TIGHTNESS: 0
HEADACHES: 0
FEVER: 0
PALPITATIONS: 0
EYE DISCHARGE: 0
WHEEZING: 0
BACK PAIN: 0
EYE PAIN: 0
VOMITING: 0
TROUBLE SWALLOWING: 0
DYSPHORIC MOOD: 0
BLOOD IN STOOL: 0
NAUSEA: 0
FREQUENCY: 0
DIZZINESS: 0
NERVOUS/ANXIOUS: 0
HEMATURIA: 0
DYSURIA: 0
SORE THROAT: 0
SPEECH DIFFICULTY: 0
MYALGIAS: 0
EYE REDNESS: 0
SINUS PRESSURE: 0
ABDOMINAL PAIN: 0
BRUISES/BLEEDS EASILY: 0
ARTHRALGIAS: 0
CONSTIPATION: 0

## 2020-10-02 ASSESSMENT — ACTIVITIES OF DAILY LIVING (ADL): LACK_OF_TRANSPORTATION: NO

## 2020-10-02 ASSESSMENT — MINI COG
COMPLETED: YES
TOTAL SCORE: 5

## 2020-10-02 ASSESSMENT — PATIENT HEALTH QUESTIONNAIRE - PHQ9: DEPRESSION UNABLE TO ASSESS: NO

## 2020-10-02 NOTE — PROGRESS NOTES
Subjective     Madelyn Ruiz is a 89 y.o. female who presents for a subsequent annual wellness visit.     Patient Care Team:  Henry Linares MD as PCP - General  Ray Arriaza MD as Referring Physician  Aye Bhakta MD as Consulting Physician (Hematology and Oncology)  Wesley Walker MD as Consulting Physician (Pulmonary)  Fausto Bradley MD as Consulting Physician (Rheumatology)    Comprehensive Medical and Social History  Patient Active Problem List   Diagnosis   • Allergic bronchopulmonary aspergillosis (CMS/HCC)   • Asthma   • Pulmonary emphysema (CMS/HCC)   • Essential hypertension   • Insomnia   • Lumbar spinal stenosis   • Obstructive sleep apnea syndrome   • Osteoporosis   • Medicare annual wellness visit, initial   • GERD (gastroesophageal reflux disease)   • Pulmonary embolism (CMS/HCC)   • Calcification of aorta (CMS/HCC)   • Hoarseness of voice     Past Medical History:   Diagnosis Date   • Allergic bronchopulmonary aspergillosis (CMS/HCC) 4/2/2018    Allergist: Dr. Arriaza.  Stable FEV1 in 8/2015.  IgE levels and eosinophils stable in 8/2015.  Managed with Fasenra, Azithromycin and Flovent.   • Allergic rhinitis 4/2/2018    Pulmonologist: Dr. Walker. Managed with Flonase.   • Calcification of aorta (CMS/HCC) 6/29/2020    Seen incidentally on CT scan.   • Chronic obstructive pulmonary disease (CMS/[facility]) 4/2/2018    Pulmonologist: Dr. Walker.  Seen on PFTs in hospital in 2014.  Controlled with Spiriva and FLovent.   • GERD (gastroesophageal reflux disease) 6/8/2019    Managed with Esomeprazole.  Should take medication 30 minutes prior to meal.  Advised to avoid caffeine, carbonated beverages, and should not eat within 3 hours of going to sleep.  Advised to reduce BMI < 25.    • Hypertension 4/2/2018    Managed on Amlodipine. Advised to follow a low sodium diet and keep BMI < 25. Advised to exercise for 2.5 hours per week. Instructed to take home blood pressure readings and call if  consistently above 140/90.    • Insomnia 4/2/2018    Managed with Lorazepam as needed.   • Lumbar spinal stenosis 4/2/2018    Neurosurgeon: Dr. Miguel. MRI with Central and lateral recess spinal stenosis. Has Spondylolisthesis at L4/L5. S/P epidural injection by Dr. Martin with some relief in past. Had Lumbar fusion and Lumbar laminectomy by Dr. Miguel in 4/2015.   • Obstructive sleep apnea syndrome 4/2/2018    Mild STACIA noted in sleep study 7/2015. Treated with nasal CPAP automatic with pressure range 5-10 CM H2Ox couple months. Off now   • Osteoporosis 4/2/2018    Rheumatologist: Dr. Bradley.  Dexa scan 7/2016 with osteoporosis of lumbar spine LS-2.5, LH-2.1, RH -2.2.  Has been on Fosamax in the past for 8 years.  Worsened by Prednisone in past.  Continue vitamin D, calcium and weight bearing exercise. DEXA in 7/2017 with LS -1.7, LH -2.2, and RH -2.0.  Next due in 7/2019.  Seen by Dr. Bradley in 8/2015 who agreed with initiating Prolia. Took for 2 years. N   • Pulmonary embolism (CMS/HCC) 6/9/2019    Hematologist: Dr. Bhakta Diagnosed in 06/2019 in right upper lobe. Vascular ultrasound negative of bilateral lower extremities. Managed on Eliquis. Hypercoagulable workup was negative for beta-2 glycoprotein's, RIGO, Antithrombin III, lupus anticoagulant, protein C activity protein S activity, and prothrombin gene mutation, and factor V Leiden. Now off Apixaban as CT scan in 12/2019 was without pu   • Vertebral compression fracture (CMS/HCC)     At T12 level     Past Surgical History:   Procedure Laterality Date   • APPENDECTOMY     • CARPAL TUNNEL RELEASE Bilateral    • CATARACT EXTRACTION W/  INTRAOCULAR LENS IMPLANT Bilateral    • HYSTERECTOMY  1972   • LUMBAR LAMINECTOMY  04/21/2015    S/P lumbar fusion   • TONSILLECTOMY       Allergies   Allergen Reactions   • Mepolizumab Rash   • Morphine      Other reaction(s): Vomiting   • Oxycodone      Other reaction(s): Vomiting     Current Outpatient Medications    Medication Sig Dispense Refill   • albuterol 2.5 mg /3 mL (0.083 %) nebulizer solution Take 3 mL (2.5 mg total) by nebulization every 6 (six) hours as needed for wheezing or shortness of breath. 125 vial 0   • albuterol HFA (VENTOLIN HFA) 90 mcg/actuation inhaler Inhale 2 puffs every 4 (four) hours as needed for wheezing or shortness of breath. 8.5 g 0   • amLODIPine (NORVASC) 10 mg tablet Take 10 mg by mouth daily.     • azithromycin (ZITHROMAX) 250 mg tablet Take 1 tablet (250 mg total) by mouth 3 (three) times a week (Mon, Wed, Fri). Take 2 tablets the first day, then 1 tablet daily for 4 days. 36 tablet 0   • benralizumab (FASENRA) 30 mg/mL syringe subcutaneous syringe Inject 30 mg under the skin every 2 (two) months.     • benzonatate (TESSALON) 200 mg capsule TAKE 1 CAPSULE BY MOUTH 3 TIMES DAILY AS NEEDED FOR COUGH.     • calcium carbonate (calcium carbonate) 600 mg calcium (1,500 mg) tablet Take 1 tablet by mouth 2 (two) times a day.     • cholecalciferol, vitamin D3, (VITAMIN D3 ORAL) Take by mouth daily.       • diphenhydrAMINE (BENADRYL) 25 mg capsule Take 25 mg by mouth nightly.     • docusate sodium (COLACE) 100 mg capsule Take 100 mg by mouth nightly.       • DULoxetine (CYMBALTA) 30 mg capsule Take 30 mg by mouth daily.     • DULoxetine (CYMBALTA) 60 mg capsule TAKE 1 CAPSULE BY MOUTH EVERYDAY AT BEDTIME  0   • esomeprazole (NexIUM) 20 mg capsule Take 20 mg by mouth daily. Take 30 minutes prior to meal.     • fexofenadine (ALLEGRA) 180 mg tablet Take 180 mg by mouth daily.     • FLOVENT  mcg/actuation inhaler Inhale 2 puffs 2 (two) times a day.     • fluticasone propionate (FLONASE) 50 mcg/actuation nasal spray Administer 1 spray into each nostril daily.     • FOLIC ACID/MULTIVIT,IRON, (CENTRUM ORAL) Take 1 tablet by mouth daily.     • LORazepam (ATIVAN) 1 mg tablet Take 1 tablet (1 mg total) by mouth nightly as needed for anxiety or sleep. 30 tablet 5   • pregabalin (LYRICA) 25 mg  capsule Take 25 mg by mouth nightly.     • SPIRIVA RESPIMAT 2.5 mcg/actuation mist inhaler Inhale 2 puffs daily.     • SYMBICORT 80-4.5 mcg/actuation inhaler Inhale 2 puffs 2 (two) times a day.     • zoledronic acid (RECLAST) IVPB Infuse 5 mg into a venous catheter See admin instr. 5 mg once yearly       No current facility-administered medications for this visit.      Social History     Tobacco Use   • Smoking status: Never Smoker   • Smokeless tobacco: Never Used   Substance Use Topics   • Alcohol use: Yes     Frequency: Monthly or less     Drinks per session: 1 or 2     Binge frequency: Never     Comment: Rarely   • Drug use: No     Family History   Problem Relation Age of Onset   • Glaucoma Biological Mother    • Macular degeneration Biological Mother    • Hypertension Biological Mother    • Lung cancer Biological Father    • Heart disease Biological Brother    • Breast cancer Mother's Sister    • No Known Problems Biological Son    • No Known Problems Biological Son    • No Known Problems Biological Daughter    • No Known Problems Biological Daughter    • No Known Problems Biological Daughter      Review of Systems   Constitutional: Negative for appetite change, chills, fatigue, fever and unexpected weight change.   HENT: Negative for congestion, ear pain, hearing loss, nosebleeds, postnasal drip, rhinorrhea, sinus pressure, sneezing, sore throat and trouble swallowing.    Eyes: Negative for pain, discharge, redness, itching and visual disturbance.   Respiratory: Negative for cough, chest tightness, shortness of breath and wheezing.    Cardiovascular: Negative for chest pain, palpitations and leg swelling.   Gastrointestinal: Negative for abdominal pain, blood in stool, constipation, diarrhea, nausea and vomiting.   Endocrine: Negative for cold intolerance and heat intolerance.   Genitourinary: Negative for difficulty urinating, dysuria, frequency, hematuria, urgency, vaginal bleeding and vaginal discharge.  "  Musculoskeletal: Negative for arthralgias, back pain, gait problem, joint swelling, myalgias and neck pain.   Skin: Negative for rash.   Allergic/Immunologic: Negative for environmental allergies.   Neurological: Negative for dizziness, syncope, speech difficulty, weakness, light-headedness, numbness and headaches.   Hematological: Does not bruise/bleed easily.   Psychiatric/Behavioral: Negative for confusion, dysphoric mood and sleep disturbance. The patient is not nervous/anxious.          Objective   Vitals  Vitals:    10/02/20 1014   BP: 118/86   Pulse: (!) 101   Resp: 16   Temp: 36.7 °C (98 °F)   TempSrc: Oral   SpO2: 98%   Weight: 59.7 kg (131 lb 9.6 oz)  Comment: with shoes   Height: 1.537 m (5' 0.5\")  Comment: with shoes     Body mass index is 25.28 kg/m².    Wt Readings from Last 3 Encounters:   10/02/20 59.7 kg (131 lb 9.6 oz)   06/29/20 60.9 kg (134 lb 3.2 oz)   06/20/19 58.7 kg (129 lb 6.4 oz)         Physical Exam  Constitutional:       Appearance: Normal appearance. She is well-developed.   HENT:      Head: Normocephalic and atraumatic.      Right Ear: Tympanic membrane, ear canal and external ear normal.      Left Ear: Tympanic membrane, ear canal and external ear normal.      Mouth/Throat:      Pharynx: No oropharyngeal exudate or posterior oropharyngeal erythema.   Eyes:      General: Lids are normal.      Conjunctiva/sclera: Conjunctivae normal.      Pupils: Pupils are equal, round, and reactive to light.   Neck:      Musculoskeletal: Full passive range of motion without pain, normal range of motion and neck supple.      Thyroid: No thyromegaly.      Vascular: No carotid bruit.   Cardiovascular:      Rate and Rhythm: Normal rate and regular rhythm.      Pulses:           Popliteal pulses are 2+ on the right side and 2+ on the left side.        Dorsalis pedis pulses are 2+ on the right side and 2+ on the left side.      Heart sounds: Normal heart sounds. No murmur. No gallop.    Pulmonary:      " Effort: Pulmonary effort is normal. No tachypnea, accessory muscle usage or respiratory distress.      Breath sounds: Normal breath sounds. No decreased breath sounds, wheezing, rhonchi or rales.   Abdominal:      General: Bowel sounds are normal. There is no distension.      Palpations: Abdomen is soft.      Tenderness: There is no abdominal tenderness. There is no guarding or rebound.      Hernia: No hernia is present.   Lymphadenopathy:      Cervical: No cervical adenopathy.   Skin:     General: Skin is warm and dry.      Findings: No rash.   Neurological:      Mental Status: She is alert.   Psychiatric:         Mood and Affect: Mood is not anxious or depressed.         Speech: Speech normal.         Behavior: Behavior normal. Behavior is cooperative.         Advanced Care Plan  Does patient have advance directive?: Yes(patient brought today)       Patient has Advance Directive: Advance Directive in physical chart                             PHQ  Will the patient answer the depression risk questions?: No                                                 Mini Cog  Completed: Yes  Score: 5  Result: Negative      Get Up and Go  Result: Pass    STEADI Falls Risk  One or more falls in the last year: No           Has trouble stepping up onto a curb: No   Advised to use a cane or walker to get around safely: No   Often has to rush to the toilet: No   Feels unsteady when walking: No   Has lost some feeling in feet: No   Often feels sad or depressed: No   Steadies self on furniture while walking at home: No   Takes medication that makes him/her feel lightheaded or more tired than usual: No   Worried about falling: No   Takes medicine to sleep or improve mood: No   Needs to push with hands when rising from a chair: No   Falls screen completed: Yes     Immunization History   Administered Date(s) Administered   • Influenza Split High Dose Preservative Free IM 11/03/2011, 11/13/2012, 10/09/2013, 10/30/2014, 11/17/2015,  09/30/2016, 09/25/2017   • Influenza Vaccine 65 And Older Preservative Free 10/25/2019   • Influenza Vaccine Quadrivalent 3 Yr And Older 09/18/2017, 10/22/2018   • Influenza, Unspecified 10/30/2014, 11/17/2015, 09/30/2016, 09/25/2017   • MMRV 06/05/2014   • Pneumococcal Conjugate 08/09/1996   • Pneumococcal Conjugate 13-Valent 11/17/2015   • Pneumococcal Polysaccharide 10/30/2014, 09/11/2017   • Tdap 03/22/2012   • Varicella 06/05/2014   • Zoster 01/02/2006, 01/02/2016   • Zoster Vaccine Recombinant Adjuvanted (Shingrix) 05/24/2019, 09/20/2019       Health Maintenance Topics with due status: Overdue       Topic Date Due    Medicare Annual Wellness Visit 06/06/2019    Influenza Vaccine 08/01/2020     Health Maintenance Topics with due status: Not Due       Topic Last Completion Date    DTaP, Tdap, and Td Vaccines 03/22/2012    DEXA Scan 07/30/2019     Health Maintenance Topics with due status: Completed       Topic Last Completion Date    Pneumococcal (65 years and older) 09/11/2017    Hepatitis C Screening 07/12/2018    Zoster Vaccine 09/20/2019    Annual Falls Risk Screening 10/02/2020     Health Maintenance Topics with due status: Aged Out       Topic Date Due    Meningococcal ACWY Aged Out    Varicella Vaccines Aged Out    HIB Vaccines Aged Out    IPV Vaccines Aged Out    HPV Vaccines Aged Out    Pneumococcal Aged Out     Health Maintenance Topics with due status: Discontinued       Topic Date Due    Mammogram Discontinued       Lab Results   Component Value Date    WBC 5.72 06/19/2020    WBC 8.34 12/02/2019    WBC 9.55 06/12/2019    HGB 12.5 06/19/2020    HGB 13.1 12/02/2019    HGB 13.9 06/12/2019    HCT 39.0 06/19/2020    HCT 39.2 12/02/2019    HCT 44.0 06/12/2019    .3 (H) 06/19/2020    MCV 96.1 12/02/2019    MCV 96.7 06/12/2019     06/19/2020     12/02/2019     06/12/2019       Lab Results   Component Value Date    GLUCOSE 88 06/19/2020    GLUCOSE 80 12/02/2019    GLUCOSE 112 (H)  06/12/2019    CALCIUM 9.1 06/19/2020    CALCIUM 9.1 12/02/2019    CALCIUM 9.0 06/12/2019     06/19/2020     12/02/2019     06/12/2019    K 4.3 06/19/2020    K 4.1 12/02/2019    K 3.8 06/12/2019    CO2 25 06/19/2020    CO2 23 12/02/2019    CO2 28 06/12/2019     06/19/2020     12/02/2019     06/12/2019    BUN 16 06/19/2020    BUN 22 (H) 12/02/2019    BUN 15 06/12/2019    CREATININE 0.9 06/19/2020    CREATININE 0.9 12/02/2019    CREATININE 0.8 06/12/2019       Lab Results   Component Value Date    ALT 25 06/19/2020    ALT 25 12/02/2019    ALT 23 07/12/2018    AST 22 06/19/2020    AST 20 12/02/2019    AST 24 07/12/2018    ALKPHOS 43 06/19/2020    ALKPHOS 54 12/02/2019    ALKPHOS 47 07/12/2018    BILITOT 0.8 06/19/2020    BILITOT 0.5 12/02/2019    BILITOT 0.4 07/12/2018       Lab Results   Component Value Date    CHOL 248 (H) 11/18/2015     Lab Results   Component Value Date    HDL 92 11/18/2015     Lab Results   Component Value Date    LDLCALC 133 (H) 11/18/2015     Lab Results   Component Value Date    TRIG 117 11/18/2015       Lab Results   Component Value Date    TSH 0.32 (L) 06/10/2019    TSH 2.08 11/18/2015    TSH 2.16 08/21/2015       Lab Results   Component Value Date    HEPCAB Nonreactive 07/12/2018       Hearing and Vision Screening  No exam data present  See HRA for relevant hearing screening response.    Assessment/Plan   Diagnoses and all orders for this visit:    Medicare annual wellness visit, initial (Primary)  Assessment & Plan:  · The patient is currently stable.   · Bloodwork was ordered including a complete blood count, complete metabolic profile, thyroid stimulating hormone.   · Further recommendations will be provided to the patient based on the results of the blood tests.   · The patient should walk for 2.5 hours per week.   · All the recommend vaccinations are not up to date. FLU shot given today.   · The patient should be evaluated by the ophthalmologist every  year and dentist every 6 months.   · The patient was advised to protect the skin by applying suntan lotion containing an SPF > 30 every 2 hours while in sun or after swimming. Instructed to avoid prolonged direct sun exposure as much as possible.   · The patient's body mass index is normal.   · The patient's DEXA scan is up to date.   · Advised to consume 1000 mg of calcium daily through the diet. If the patient is unable to consume 1000 mg through the diet, then taking calcium supplements is an acceptable alternative. The patient was informed not to consume more than 500 mg of a calcium supplement at a time.         Essential hypertension  -     CBC and Differential; Future  -     Comprehensive metabolic panel; Future  -     TSH 3rd Generation; Future    Need for influenza vaccination  -     Influenza vaccine 65 and older IM preservative free      See Patient Instructions (the written plan) which was given to the patient for PPPS and health risk factors with interventions.

## 2020-10-02 NOTE — PROGRESS NOTES
Coding Clarification ([facility]) - [facility]  Note       DATE: 10/2/2020    RE: Madelyn ANDERSON Sara  : 1930      Under the direction of Henry Linares MD, the following adjustments were made to this patients Problem List:        Specified Code: J43.9 Code Description: Pulmonary emphysema, unspecified emphysema type   Clarification Type EMR System Encounter Type Date of Service Provider   Code Specificity Epic Office visit 20 Henry Linares   Rationale: The documentation in the encounter states Pulmonary emphysema, unspecified emphysema type        Original Code: F44.9

## 2020-10-02 NOTE — ASSESSMENT & PLAN NOTE
· The patient is currently stable.   · Bloodwork was ordered including a complete blood count, complete metabolic profile, thyroid stimulating hormone.   · Further recommendations will be provided to the patient based on the results of the blood tests.   · The patient should walk for 2.5 hours per week.   · All the recommend vaccinations are not up to date. FLU shot given today.   · The patient should be evaluated by the ophthalmologist every year and dentist every 6 months.   · The patient was advised to protect the skin by applying suntan lotion containing an SPF > 30 every 2 hours while in sun or after swimming. Instructed to avoid prolonged direct sun exposure as much as possible.   · The patient's body mass index is normal.   · The patient's DEXA scan is up to date.   · Advised to consume 1000 mg of calcium daily through the diet. If the patient is unable to consume 1000 mg through the diet, then taking calcium supplements is an acceptable alternative. The patient was informed not to consume more than 500 mg of a calcium supplement at a time.

## 2020-10-02 NOTE — PATIENT INSTRUCTIONS
Women's Personalized Prevention Plan Services (PPPS)       Preventive Services Checklist (Assumes Average Risk Unless Otherwise Noted)  Preventive Service Target Population and Frequency Last Done Date Due   Abd Aortic Aneurysm Screening Family history of AAA (covered once)  Not needed     Alcohol Misuse Screening As necessary for those at risk (covered annually) 10/02/2020 10/2021   Breast Cancer Screening Mammogram every 1-2yrs 50-71yo; every 1-2yrs 35-50yo if patient desires after weighing potential harms and benefits (covered once 35-40yo, annually >=41yo)  Not needed    Cholesterol Screening Both risk factors: 1) >=19yo and 2)  increased risk coronary artery disease (covered every 5 years)  Not needed    Colorectal Cancer Screening >=51yo: Colonoscopy every 10 years, FIT annually or Cologuard every 3 years (up to 86yo for Cologuard)  Not needed    Diabetes Screening Any 1 risk factor: hypertension, dyslipidemia, obesity, high glucose; or Any 2 risk factors: >=64 yo, overweight, family history diabetes, history gestational diabetes or delivery of infant >9lbs (covered every 6 months)  Ordered    Glaucoma Screening Any 1 risk factor: 1) diabetes, 2) family history glaucoma, 3)  >=51yo, 4)  American >=64yo (covered annually)  12/2020   Hepatitis C Screening Any 1 risk factor: 1) born between 5188-4388, 2) blood transfusion before 1992, 3) current or past injection drug use (covered once for average risk, annually for high risk)  07/12/2018   Lung Cancer Screening Low-dose chest CT if all 3 risk factors: 1) 55-78yo, 2) smoker or quit within last 15y, 3) >=30 pack years (covered annually)  Not needed    Osteoporosis Screening >=64yo (covered every 24 months)  <64yo if any 1 risk factor: 1) estrogen deficient and at risk for osteoporosis; 2) established osteoporosis on treatment; 3) x-rays show possible osteoporosis, osteopenia or vertebral fractures; 4) on steroid or planning to start; 5)  "primary hyperparathyroidism (covered as medically necessary) 07/30/2019 07/2021   Sexually Transmitted Diseases (STDs) As necessary chlamydia, gonorrhea, syphilis, hepatitis B (covered annually if not pregnant, more often in pregnancy)  HIV if any 1 risk factor present: 1) <14yo or >64yo and at increased risk, 2) 15-64yo and ask for it, or 3) pregnant (covered annually if not pregnant, up to 3x in pregnancy)  Low Risk    Vaccine: Hepatitis B As necessary if medium or high risk (series covered once)   Not needed     Vaccine: Influenza All patients without contraindications (covered annually.) 10/02/2020 Fall 2021   Vaccine: Pneumococcal Pneumovax + Prevnar (both covered, >=11 months apart) Prevnar   11/17/2015  Pneumovax  10/30/2014   Done    Vaccine: Shingrix (Shingles) >= 51yo without contraindications             (2 doses, 2 months apart) Zostavax  2006  Shingrix   05/24/2019 09/20/2019 Done    TDAP Every 10 years   03/22/2012 03/2022                                    Women's Personalized Prevention Plan Services (PPPS)                                                          Prints to [facility]                                          Health Risk Factors and Interventions  \"x\" Indicates risk is associated with this patient Risk Factor Recommended Interventions    N/A   Obesity     x Hypertension Low sodium diet and take medication     N/A  Diabetes    N/A  Fall Risk    N/A  Tobacco Use                 Problem List Items Addressed This Visit     Essential hypertension - Primary    Relevant Orders    CBC and Differential    Comprehensive metabolic panel    TSH 3rd Generation      Other Visit Diagnoses     Need for influenza vaccination        Relevant Orders    Influenza vaccine 65 and older IM preservative free              Health Risk Factors with Personalized Education:  ----------------------------------------------------------------------------------------------------------------------  Stress " management:  Understanding Your Stress  · Think about how you know when you’re stressed.  · Think about how your thoughts or behaviors are different when you’re stressed.  · Think about what triggers stress for you.  · Think about how you handle stress, and whether you’re making unhealthy choices in response to stress (like smoking, drinking alcohol or overeating).  Managing Stress  · Take a break from the stressful situation.  · Reduce your stress levels by exercising, talking with family/friends, ensuring adequate sleep.  · Consider meditation or yoga.  · Make sure you plan time to do things you enjoy.  · Let your PCP know if you’re having problems limiting your stress.  ----------------------------------------------------------------------------------------------------------------------  Controlling Your Blood Pressure  · Maintain a normal weight (body mass index between 18.5 and 24.9).  · Eat more fruit, vegetables and low-fat dairy.  · Eat less saturated fat and total fat.  · Lower your sodium (salt) intake.  Try to stay under 1500 mg per day, but if you cannot get your intake to be that low, at least lower it by 1000 mg.  · Stay active.  Try to get at least 90 to 150 minutes of exercise per week.  Try brisk walking, swimming, bicycling or dancing.  · Limit alcohol intake.  When you do consume alcohol, drink no more than 1 drink per day.  · If you have been prescribed medication, take it regularly and exactly as prescribed.  Let your PCP know if you have any problems or questions about your medication.  · Check your blood pressure at home or at the store.  Write down your readings and share them with your PCP  ----------------------------------------------------------------------------------------------------------------------  Controlling Your Cholesterol  · Reduce the amount of saturated and trans fat in your diet.  Limit intake of red meat.  Consume only low-fat or non-fat/skim dairy.  Limit fried food.   Cook with vegetable oils.  · Reduce your intake of sugary foods, sugary drinks and alcohol.  · Eat a diet high in fruit, vegetables and whole grains.  · Get protein from fish, poultry and a small portion of nuts.  · Stay active.  Try to get at least 90 to 150 minutes of exercise per week.  Try brisk walking, swimming, bicycling or dancing.  · Maintain a healthy weight by balancing your diet and exercise.  · If you have been prescribed medication, take it regularly and exactly as prescribed. Let your PCP know if you have any problems or questions about your medication.  · It’s important to know your cholesterol numbers.  When recommended by your PCP, get the cholesterol blood test.  ---------------------------------------------------------------------------------------------------------------------   Controlling Your Osteoporosis, Strengthening Your Bones  · Try to get at least 90 to 150 minutes of weight-bearing exercise per week.  · Ensure intake of at least 1200mg of calcium per day.  Eat foods high in calcium like milk and other dairy, green vegetables, fruit, canned fish with soft and edible bones, nuts, calcium-set tofu.  Some foods are calcium-fortified, like bread, cereal, fruit juices and mineral water.  · Help your body make vitamin D by getting 10-15 minutes per day of sunlight.    · Ensure intake of at least 600IU of vitamin D per day.  Eat foods high in vitamin D like oily fish (salmon, sardines, mackerel) and eggs.  Some foods are fortified with vitamin D, like dairy and cereals.  · Avoid high amounts of caffeine and salt, since they can cause the body to loose calcium.  · Limit alcohol intake, since it is associated with weaker bones and is associated with falls and fractures.  · Limit intake of fizzy drinks.  · If you have been prescribed medication, take it regularly and exactly as prescribed.  Let your PCP know if you have any problems or questions about your  medication  ----------------------------------------------------------------------------------------------------------------------  Reducing Your Risk of Falls  · Tell your PCP if any of your medications make you feel tired, dizzy, lightheaded or off-balance.  · Maintain coordination, flexibility and balance by ensuring regular physical activity.  · Limit alcohol intake to 1 drink per day.  Consider avoiding all alcohol intake.  · Ensure good vision.  Visit an ophthalmologist or optometrist regularly for vision screening or to make sure your glasses / contact lens prescription is correct.  If you need glasses or contacts, wear them.  When you get new glasses or contacts, take time to get used to them.  Do not wear sunglasses or tinted lenses when indoors.  · Ensure good hearing.  Have your hearing checked if you are having trouble hearing, or family and friends think you cannot hear them.  If you need a hearing aid, be sure it fits well and wear it.  · Get enough rest.  Ensure about 7-9 hours of sleep every day.  · Get up slowly from your bed or chairs.  Do not start walking until you are sure you feel steady.  · Wear non-skid, rubber-soled, low-heeled shoes.  Do not walk in socks, or in shoes and slippers with smooth soles.  · If your PCP or therapist recommends using a cane or walker, use it regularly.  · Make your home safer.  Increase lighting throughout the house, especially at the top and bottom of stairs.  Ensure lighting is easily turned on when getting up in the middle of the night.  Make sure there are two secure rails on all stairs.  Install grab bars in the bathtub / shower and near the toilet.  Consider using a shower chair and / or a hand-held shower.  · Spread sand or salt on icy surfaces.  Beware of wet surfaces, which can be icy.  · Tell your PCP if you have fallen.

## 2020-10-14 ENCOUNTER — APPOINTMENT (OUTPATIENT)
Dept: LAB | Facility: CLINIC | Age: 85
End: 2020-10-14
Attending: FAMILY MEDICINE
Payer: MEDICARE

## 2020-10-14 DIAGNOSIS — I10 ESSENTIAL HYPERTENSION: ICD-10-CM

## 2020-10-14 LAB
ALBUMIN SERPL-MCNC: 3.9 G/DL (ref 3.4–5)
ALP SERPL-CCNC: 46 IU/L (ref 35–126)
ALT SERPL-CCNC: 20 IU/L (ref 11–54)
ANION GAP SERPL CALC-SCNC: 14 MEQ/L (ref 3–15)
AST SERPL-CCNC: 19 IU/L (ref 15–41)
BASOPHILS # BLD: 0.01 K/UL (ref 0.01–0.1)
BASOPHILS NFR BLD: 0.1 %
BILIRUB SERPL-MCNC: 0.5 MG/DL (ref 0.3–1.2)
BUN SERPL-MCNC: 17 MG/DL (ref 8–20)
CALCIUM SERPL-MCNC: 9.3 MG/DL (ref 8.9–10.3)
CHLORIDE SERPL-SCNC: 105 MEQ/L (ref 98–109)
CO2 SERPL-SCNC: 20 MEQ/L (ref 22–32)
CREAT SERPL-MCNC: 1 MG/DL (ref 0.6–1.1)
DIFFERENTIAL METHOD BLD: ABNORMAL
EOSINOPHIL # BLD: 0 K/UL (ref 0.04–0.36)
EOSINOPHIL NFR BLD: 0 %
ERYTHROCYTE [DISTWIDTH] IN BLOOD BY AUTOMATED COUNT: 14.3 % (ref 11.7–14.4)
GFR SERPL CREATININE-BSD FRML MDRD: 52.2 ML/MIN/1.73M*2
GLUCOSE SERPL-MCNC: 117 MG/DL (ref 70–99)
HCT VFR BLDCO AUTO: 41.9 % (ref 35–45)
HGB BLD-MCNC: 13.1 G/DL (ref 11.8–15.7)
IMM GRANULOCYTES # BLD AUTO: 0.06 K/UL (ref 0–0.08)
IMM GRANULOCYTES NFR BLD AUTO: 0.9 %
LYMPHOCYTES # BLD: 1.48 K/UL (ref 1.2–3.5)
LYMPHOCYTES NFR BLD: 21.9 %
MCH RBC QN AUTO: 32.3 PG (ref 28–33.2)
MCHC RBC AUTO-ENTMCNC: 31.3 G/DL (ref 32.2–35.5)
MCV RBC AUTO: 103.2 FL (ref 83–98)
MONOCYTES # BLD: 0.47 K/UL (ref 0.28–0.8)
MONOCYTES NFR BLD: 7 %
NEUTROPHILS # BLD: 4.74 K/UL (ref 1.7–7)
NEUTS SEG NFR BLD: 70.1 %
NRBC BLD-RTO: 0 %
PDW BLD AUTO: 10.6 FL (ref 9.4–12.3)
PLATELET # BLD AUTO: 262 K/UL (ref 150–369)
POTASSIUM SERPL-SCNC: 3.7 MEQ/L (ref 3.6–5.1)
PROT SERPL-MCNC: 5.8 G/DL (ref 6–8.2)
RBC # BLD AUTO: 4.06 M/UL (ref 3.93–5.22)
SODIUM SERPL-SCNC: 139 MEQ/L (ref 136–144)
TSH SERPL DL<=0.05 MIU/L-ACNC: 1.91 MIU/L (ref 0.34–5.6)
WBC # BLD AUTO: 6.76 K/UL (ref 3.8–10.5)

## 2020-10-14 PROCEDURE — 84443 ASSAY THYROID STIM HORMONE: CPT

## 2020-10-14 PROCEDURE — 36415 COLL VENOUS BLD VENIPUNCTURE: CPT

## 2020-10-14 PROCEDURE — 80053 COMPREHEN METABOLIC PANEL: CPT

## 2020-10-14 PROCEDURE — 85025 COMPLETE CBC W/AUTO DIFF WBC: CPT

## 2020-10-15 ENCOUNTER — DOCUMENTATION (OUTPATIENT)
Dept: PRIMARY CARE | Facility: CLINIC | Age: 85
End: 2020-10-15

## 2020-10-15 NOTE — PROGRESS NOTES
Patient notified of blood test results.  All tests are stable except for glucose of 117.  However patient was not fasting for this test.  Therefore not concerning.  No new changes recommended at this time.

## 2021-01-27 ENCOUNTER — TELEPHONE (OUTPATIENT)
Dept: PRIMARY CARE | Facility: CLINIC | Age: 86
End: 2021-01-27

## 2021-01-28 NOTE — TELEPHONE ENCOUNTER
· Patient's daughter Shelley Raya was calling regarding the covid vaccine and she had sign her Mom up last week   · I left a message with Shelley Raya that I will follow up with her regarding her message .  · I took a call from the patient this morning and explained to her that she will receive a ticket via her My Chart and to check it daily for the covid vaccine and that is where she will be able to do so I told her that I didn't want to leave a long detailed message on her daughter's cell last evening and I was going to call her back today she said no need to she will make her aware of.

## 2021-01-29 DIAGNOSIS — Z23 ENCOUNTER FOR IMMUNIZATION: ICD-10-CM

## 2021-02-05 ENCOUNTER — IMMUNIZATION (OUTPATIENT)
Dept: IMMUNIZATION | Facility: CLINIC | Age: 86
End: 2021-02-05

## 2021-03-01 ENCOUNTER — OFFICE VISIT (OUTPATIENT)
Dept: PRIMARY CARE | Facility: CLINIC | Age: 86
End: 2021-03-01
Payer: MEDICARE

## 2021-03-01 VITALS
RESPIRATION RATE: 16 BRPM | BODY MASS INDEX: 24.84 KG/M2 | WEIGHT: 131.6 LBS | TEMPERATURE: 98.3 F | OXYGEN SATURATION: 96 % | HEIGHT: 61 IN | SYSTOLIC BLOOD PRESSURE: 130 MMHG | HEART RATE: 88 BPM | DIASTOLIC BLOOD PRESSURE: 74 MMHG

## 2021-03-01 DIAGNOSIS — I26.99 OTHER ACUTE PULMONARY EMBOLISM WITHOUT ACUTE COR PULMONALE: Primary | Chronic | ICD-10-CM

## 2021-03-01 DIAGNOSIS — I10 ESSENTIAL HYPERTENSION: Chronic | ICD-10-CM

## 2021-03-01 DIAGNOSIS — J43.9 PULMONARY EMPHYSEMA, UNSPECIFIED EMPHYSEMA TYPE (CMS/HCC): ICD-10-CM

## 2021-03-01 PROBLEM — R49.0 HOARSENESS OF VOICE: Status: RESOLVED | Noted: 2020-08-03 | Resolved: 2021-03-01

## 2021-03-01 PROCEDURE — 99213 OFFICE O/P EST LOW 20 MIN: CPT | Performed by: FAMILY MEDICINE

## 2021-03-01 RX ORDER — HYDROCHLOROTHIAZIDE 12.5 MG/1
12.5 TABLET ORAL DAILY
Qty: 90 TABLET | Refills: 3 | Status: SHIPPED | OUTPATIENT
Start: 2021-03-01 | End: 2021-06-24 | Stop reason: ALTCHOICE

## 2021-03-01 RX ORDER — TRAMADOL HYDROCHLORIDE 50 MG/1
50 TABLET ORAL DAILY PRN
COMMUNITY
Start: 2021-01-16 | End: 2023-08-24 | Stop reason: HOSPADM

## 2021-03-01 ASSESSMENT — ENCOUNTER SYMPTOMS
PALPITATIONS: 0
UNEXPECTED WEIGHT CHANGE: 0
NAUSEA: 0
SHORTNESS OF BREATH: 0
ABDOMINAL PAIN: 0
VOMITING: 0
COUGH: 0
DIARRHEA: 0
FEVER: 0
FATIGUE: 0
CHILLS: 0
CHEST TIGHTNESS: 0
CONSTIPATION: 0
WHEEZING: 0

## 2021-03-01 NOTE — PATIENT INSTRUCTIONS
Problem List Items Addressed This Visit     Essential hypertension (Chronic)     Leg swelling most likely due to Amlodipine. Stop Amlodipine. Start HCTZ 12.5 mg once daily.         Relevant Medications    hydrochlorothiazide (HYDRODIURIL) 12.5 mg tablet    Pulmonary embolism (CMS/HCC) - Primary (Chronic)     Stable off medication          Pulmonary emphysema (CMS/HCC)     Follow up with Dr. Walker as needed.

## 2021-03-01 NOTE — PROGRESS NOTES
Henry Linares MD    [facility]  [address], [address]  [place] [address]  Phone: [phone]  Fax: [phone]     History of Present Illness     Subjective     Patient ID: Madelyn Ruiz is a 89 y.o. female.    Edema of both legs and feet over last 8 weeks. No pain. No redness. No chest pain or shortness of breath. No injury to leg. On Amlodipine. Worse at end of the day and better in the am. No history of deep venous thrombosis.        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 Arriaza.  Stable FEV1 in 8/2015.  IgE levels and eosinophils stable in 8/2015.  Managed with Fasenra, Azithromycin and Flovent.   • Allergic rhinitis 4/2/2018    Pulmonologist: Dr. Walker. Managed with Flonase.   • Calcification of aorta (CMS/formerly [facility]) 6/29/2020    Seen incidentally on CT scan.   • Chronic obstructive pulmonary disease (CMS/formerly [facility]) 4/2/2018    Pulmonologist: Dr. Walker.  Seen on PFTs in hospital in 2014.  Controlled with Spiriva and FLovent.   • Essential hypertension 4/2/2018    Managed on HCTZ.  Advised to follow a low sodium diet and keep BMI < 25.  Advised to exercise for 2.5 hours per week.  Instructed to take home blood pressure readings and call if consistently above 140/90.     • GERD (gastroesophageal reflux disease) 6/8/2019    Managed with Esomeprazole.  Should take medication 30 minutes prior to meal.  Advised to avoid caffeine, carbonated beverages, and should not eat within 3 hours of going to sleep.  Advised to reduce BMI < 25.    • Hypertension 4/2/2018    Managed on Amlodipine. Advised to follow a low sodium diet and keep BMI < 25. Advised to exercise for 2.5 hours per week. Instructed to take home blood pressure readings and call  if consistently above 140/90.    • Insomnia 4/2/2018    Managed with Lorazepam as needed.   • Lumbar spinal stenosis 4/2/2018    Neurosurgeon: Dr. Miguel. MRI with Central and lateral recess spinal stenosis. Has Spondylolisthesis at L4/L5. S/P epidural injection by Dr. Martin with some relief in past. Had Lumbar fusion and Lumbar laminectomy by Dr. Miguel in 4/2015.   • Obstructive sleep apnea syndrome 4/2/2018    Mild STACIA noted in sleep study 7/2015. Treated with nasal CPAP automatic with pressure range 5-10 CM H2Ox couple months. Off now   • Osteoporosis 4/2/2018    Rheumatologist: Dr. Bradley.  Dexa scan 7/2016 with osteoporosis of lumbar spine LS-2.5, LH-2.1, RH -2.2.  Has been on Fosamax in the past for 8 years.  Worsened by Prednisone in past.  Continue vitamin D, calcium and weight bearing exercise. DEXA in 7/2017 with LS -1.7, LH -2.2, and RH -2.0.  Next due in 7/2019.  Seen by Dr. Bradley in 8/2015 who agreed with initiating Prolia. Took for 2 years. N   • Pulmonary embolism (CMS/HCC) 6/9/2019    Hematologist: Dr. Bhakta Diagnosed in 06/2019 in right upper lobe. Vascular ultrasound negative of bilateral lower extremities. Managed on Eliquis. Hypercoagulable workup was negative for beta-2 glycoprotein's, RIGO, Antithrombin III, lupus anticoagulant, protein C activity protein S activity, and prothrombin gene mutation, and factor V Leiden. Now off Apixaban as CT scan in 12/2019 was without pu   • Vertebral compression fracture (CMS/HCC)     At T12 level       Past Surgical History:   Procedure Laterality Date   • APPENDECTOMY     • CARPAL TUNNEL RELEASE Bilateral    • CATARACT EXTRACTION W/  INTRAOCULAR LENS IMPLANT Bilateral    • HYSTERECTOMY  1972   • LUMBAR LAMINECTOMY  04/21/2015    S/P lumbar fusion   • TONSILLECTOMY         Family History   Problem Relation Age of Onset   • Glaucoma Biological Mother    • Macular degeneration Biological Mother    • Hypertension Biological Mother    • Lung cancer  Biological Father    • Heart disease Biological Brother    • Breast cancer Mother's Sister    • No Known Problems Biological Son    • No Known Problems Biological Son    • No Known Problems Biological Daughter    • No Known Problems Biological Daughter    • No Known Problems Biological Daughter        Social History     Tobacco Use   • Smoking status: Never Smoker   • Smokeless tobacco: Never Used   Substance Use Topics   • Alcohol use: Yes     Frequency: Monthly or less     Drinks per session: 1 or 2     Binge frequency: Never     Comment: Rarely   • Drug use: No       Patient Care Team:  Henry Linares MD as PCP - General  Ray Arriaza MD as Referring Physician  Aye Bhakta MD as Consulting Physician (Hematology and Oncology)  Wesley Walker MD as Consulting Physician (Pulmonary)  Fausto Bradley MD as Consulting Physician (Rheumatology)     Allergies and Medications       Allergies   Allergen Reactions   • Mepolizumab Rash   • Morphine      Other reaction(s): Vomiting   • Oxycodone      Other reaction(s): Vomiting       Current Outpatient Medications   Medication Sig Dispense Refill   • albuterol 2.5 mg /3 mL (0.083 %) nebulizer solution Take 3 mL (2.5 mg total) by nebulization every 6 (six) hours as needed for wheezing or shortness of breath. 125 vial 0   • albuterol HFA (VENTOLIN HFA) 90 mcg/actuation inhaler Inhale 2 puffs every 4 (four) hours as needed for wheezing or shortness of breath. 8.5 g 0   • benralizumab (FASENRA) 30 mg/mL syringe subcutaneous syringe Inject 30 mg under the skin every 2 (two) months.     • benzonatate (TESSALON) 200 mg capsule TAKE 1 CAPSULE BY MOUTH 3 TIMES DAILY AS NEEDED FOR COUGH.     • calcium carbonate (calcium carbonate) 600 mg calcium (1,500 mg) tablet Take 1 tablet by mouth 2 (two) times a day.     • cholecalciferol, vitamin D3, (VITAMIN D3 ORAL) Take by mouth daily.       • diphenhydrAMINE (BENADRYL) 25 mg capsule Take 25 mg by mouth nightly.     • docusate  sodium (COLACE) 100 mg capsule Take 100 mg by mouth nightly.       • DULoxetine (CYMBALTA) 30 mg capsule Take 30 mg by mouth daily.     • DULoxetine (CYMBALTA) 60 mg capsule TAKE 1 CAPSULE BY MOUTH EVERYDAY AT BEDTIME  0   • esomeprazole (NexIUM) 20 mg capsule Take 20 mg by mouth daily. Take 30 minutes prior to meal.     • fexofenadine (ALLEGRA) 180 mg tablet Take 180 mg by mouth daily.     • FLOVENT  mcg/actuation inhaler Inhale 2 puffs 2 (two) times a day.     • fluticasone propionate (FLONASE) 50 mcg/actuation nasal spray Administer 1 spray into each nostril daily.     • FOLIC ACID/MULTIVIT,IRON, (CENTRUM ORAL) Take 1 tablet by mouth daily.     • LORazepam (ATIVAN) 1 mg tablet Take 1 tablet (1 mg total) by mouth nightly as needed for anxiety or sleep. 30 tablet 5   • pregabalin (LYRICA) 25 mg capsule Take 25 mg by mouth nightly.     • SPIRIVA RESPIMAT 2.5 mcg/actuation mist inhaler Inhale 2 puffs daily.     • SYMBICORT 80-4.5 mcg/actuation inhaler Inhale 2 puffs 2 (two) times a day.     • traMADoL (ULTRAM) 50 mg tablet Take 50 mg by mouth daily as needed.     • zoledronic acid (RECLAST) IVPB Infuse 5 mg into a venous catheter See admin instr. 5 mg once yearly     • hydrochlorothiazide (HYDRODIURIL) 12.5 mg tablet Take 1 tablet (12.5 mg total) by mouth daily. 90 tablet 3     No current facility-administered medications for this visit.           Review of Systems       Review of Systems   Constitutional: Negative for chills, fatigue, fever and unexpected weight change.   Respiratory: Negative for cough, chest tightness, shortness of breath and wheezing.    Cardiovascular: Positive for leg swelling. Negative for chest pain and palpitations.   Gastrointestinal: Negative for abdominal pain, constipation, diarrhea, nausea and vomiting.        Physical Examination       Objective     Vitals:    03/01/21 1133   BP: 130/74   Pulse: 88   Resp: 16   Temp: 36.8 °C (98.3 °F)   SpO2: 96%       Pulse Readings from  "Last 5 Encounters:   03/01/21 88   10/02/20 (!) 101   06/29/20 98   06/20/19 81   06/12/19 98       Wt Readings from Last 5 Encounters:   03/01/21 59.7 kg (131 lb 9.6 oz)   10/02/20 59.7 kg (131 lb 9.6 oz)   06/29/20 60.9 kg (134 lb 3.2 oz)   06/20/19 58.7 kg (129 lb 6.4 oz)   06/11/19 58.5 kg (129 lb)       Ht Readings from Last 5 Encounters:   03/01/21 1.537 m (5' 0.5\")   10/02/20 1.537 m (5' 0.5\")   06/29/20 1.537 m (5' 0.5\")   06/20/19 1.549 m (5' 1\")   06/11/19 1.549 m (5' 0.98\")       BP Readings from Last 5 Encounters:   03/01/21 130/74   10/02/20 118/86   06/29/20 132/90   06/20/19 138/90   06/12/19 127/76       BMI Readings from Last 4 Encounters:   03/01/21 25.28 kg/m²   10/02/20 25.28 kg/m²   06/29/20 25.78 kg/m²   06/20/19 24.45 kg/m²       Physical Exam  Constitutional:       Appearance: Normal appearance.   Cardiovascular:      Rate and Rhythm: Normal rate and regular rhythm.      Heart sounds: Normal heart sounds. No murmur. No gallop.    Pulmonary:      Effort: Pulmonary effort is normal. No respiratory distress.      Breath sounds: Normal breath sounds. No wheezing, rhonchi or rales.   Musculoskeletal:      Right lower leg: Edema present.      Left lower leg: Edema present.      Comments: 1 plus pitting edema bilaterally of feet.   Neurological:      Mental Status: She is alert.          Laboratory Results     Lab Results   Component Value Date    WBC 6.76 10/14/2020    WBC 5.72 06/19/2020    WBC 8.34 12/02/2019    HGB 13.1 10/14/2020    HGB 12.5 06/19/2020    HGB 13.1 12/02/2019    HCT 41.9 10/14/2020    HCT 39.0 06/19/2020    HCT 39.2 12/02/2019    .2 (H) 10/14/2020    .3 (H) 06/19/2020    MCV 96.1 12/02/2019     10/14/2020     06/19/2020     12/02/2019        Lab Results   Component Value Date    GLUCOSE 117 (H) 10/14/2020    GLUCOSE 88 06/19/2020    GLUCOSE 80 12/02/2019    CALCIUM 9.3 10/14/2020    CALCIUM 9.1 06/19/2020    CALCIUM 9.1 12/02/2019     " 10/14/2020     06/19/2020     12/02/2019    K 3.7 10/14/2020    K 4.3 06/19/2020    K 4.1 12/02/2019    CO2 20 (L) 10/14/2020    CO2 25 06/19/2020    CO2 23 12/02/2019     10/14/2020     06/19/2020     12/02/2019    BUN 17 10/14/2020    BUN 16 06/19/2020    BUN 22 (H) 12/02/2019    CREATININE 1.0 10/14/2020    CREATININE 0.9 06/19/2020    CREATININE 0.9 12/02/2019    ALKPHOS 46 10/14/2020    ALKPHOS 43 06/19/2020    ALKPHOS 54 12/02/2019    AST 19 10/14/2020    AST 22 06/19/2020    AST 20 12/02/2019    ALT 20 10/14/2020    ALT 25 06/19/2020    ALT 25 12/02/2019    BILITOT 0.5 10/14/2020    BILITOT 0.8 06/19/2020    BILITOT 0.5 12/02/2019    ALBUMIN 3.9 10/14/2020    ALBUMIN 3.9 06/19/2020    ALBUMIN 4.0 12/02/2019       Lab Results   Component Value Date    CHOL 248 (H) 11/18/2015     Lab Results   Component Value Date    HDL 92 11/18/2015     Lab Results   Component Value Date    LDLCALC 133 (H) 11/18/2015     Lab Results   Component Value Date    TRIG 117 11/18/2015       Lab Results   Component Value Date    TSH 1.91 10/14/2020    TSH 0.32 (L) 06/10/2019    TSH 2.08 11/18/2015     Lab Results   Component Value Date    FREET4 0.79 11/18/2015       Lab Results   Component Value Date    HGBA1C 5.6 11/18/2015    HGBA1C 5.6 04/08/2015       Lab Results   Component Value Date    HEPCAB Nonreactive 07/12/2018       Immunization History   Administered Date(s) Administered   • Influenza Split High Dose Preservative Free IM 11/03/2011, 11/13/2012, 10/09/2013, 10/30/2014, 11/17/2015, 09/30/2016, 09/25/2017   • Influenza Vaccine 65 And Older Preservative Free 10/25/2019, 10/02/2020   • Influenza Vaccine Quadrivalent 3 Yr And Older 09/18/2017, 10/22/2018   • Influenza, Unspecified 10/30/2014, 11/17/2015, 09/30/2016, 09/25/2017   • MMRV 06/05/2014   • Pneumococcal Conjugate 08/09/1996   • Pneumococcal Conjugate 13-Valent 11/17/2015   • Pneumococcal Polysaccharide 10/30/2014, 09/11/2017   •  Sars-cov-2 (Covid-19) Vaccine, Moderna 02/05/2021   • Tdap 03/22/2012   • Varicella 06/05/2014   • Zoster 01/02/2006, 01/02/2016   • Zoster Vaccine Recombinant Adjuvanted (Shingrix) 05/24/2019, 09/20/2019         Health Maintenance Topics with due status: Due Soon       Topic Date Due    COVID-19 Vaccine 03/05/2021     Health Maintenance Topics with due status: Not Due       Topic Last Completion Date    DTaP, Tdap, and Td Vaccines 03/22/2012    DEXA Scan 07/30/2019    Medicare Annual Wellness Visit 10/02/2020     Health Maintenance Topics with due status: Completed       Topic Last Completion Date    Pneumococcal (65 years and older) 09/11/2017    Hepatitis C Screening 07/12/2018    Zoster Vaccine 09/20/2019    Influenza Vaccine 10/02/2020    Annual Falls Risk Screening 03/01/2021     Health Maintenance Topics with due status: Aged Out       Topic Date Due    Meningococcal ACWY Aged Out    Varicella Vaccines Aged Out    HIB Vaccines Aged Out    IPV Vaccines Aged Out    HPV Vaccines Aged Out    Pneumococcal Aged Out     Health Maintenance Topics with due status: Discontinued       Topic Date Due    Mammogram Discontinued        Assessment and Plan       Assessment/Plan     Problem List Items Addressed This Visit     Essential hypertension (Chronic)    Overview     · Managed on HCTZ.   · Advised to follow a low sodium diet and keep BMI < 25.   · Advised to exercise for 2.5 hours per week.   · Instructed to take home blood pressure readings and call if consistently above 140/90.             Current Assessment & Plan     Leg swelling most likely due to Amlodipine. Stop Amlodipine. Start HCTZ 12.5 mg once daily.         Relevant Medications    hydrochlorothiazide (HYDRODIURIL) 12.5 mg tablet    Pulmonary embolism (CMS/HCC) - Primary (Chronic)    Overview     · Hematologist: Dr. Bhakta  · Diagnosed in 06/2019 in right upper lobe.  · Vascular ultrasound negative of bilateral lower extremities.  · Managed on  Eliquis.  · Hypercoagulable workup was negative for beta-2 glycoprotein's, RIGO, Antithrombin III, lupus anticoagulant, protein C activity protein S activity, and prothrombin gene mutation, and factor V Leiden.  · Now off Apixaban as CT scan in 12/2019 was without pulmonary embolism.         Current Assessment & Plan     Stable off medication          Pulmonary emphysema (CMS/HCC)    Overview     · Pulmonologist: Dr. Walker.   · Seen on PFTs in hospital in 2014.   · Controlled with Spiriva and FLovent.         Current Assessment & Plan     Follow up with Dr. Walker as needed.               No follow-ups on file.    No orders of the defined types were placed in this encounter.             Henry Linares MD    3/1/2021

## 2021-03-05 ENCOUNTER — IMMUNIZATION (OUTPATIENT)
Dept: IMMUNIZATION | Facility: CLINIC | Age: 86
End: 2021-03-05
Attending: FAMILY MEDICINE

## 2021-03-18 ENCOUNTER — TELEPHONE (OUTPATIENT)
Dept: PRIMARY CARE | Facility: CLINIC | Age: 86
End: 2021-03-18

## 2021-03-18 NOTE — TELEPHONE ENCOUNTER
· I called the patient she said that she noticed soon after she was getting headaches while taking the hydrochlorothiazide 12.5 mg she doesn't think it is from any other medication they are constant and all day they wake her up in the morning she has been taking tylenol arthritis the max dose a day of 6 tablets she said that it is a side effect of the medication and she wanted to know if she can switch medications because it is helping her ankles and feet .

## 2021-03-18 NOTE — TELEPHONE ENCOUNTER
Patient will hold off on hydrochlorothiazide for the weekend which is 4 days.  She will call me after that to let me know whether the headaches are still happening or if they have resolved.  Also will let me know if there is any swelling of the legs.  Further recommendations will be given at that time.

## 2021-03-22 RX ORDER — AMLODIPINE BESYLATE 5 MG/1
5 TABLET ORAL DAILY
Qty: 30 TABLET | Refills: 11 | Status: SHIPPED | OUTPATIENT
Start: 2021-03-22 | End: 2021-04-22 | Stop reason: ALTCHOICE

## 2021-03-22 NOTE — TELEPHONE ENCOUNTER
We will try amlodipine again but use the lower dose of 5 mg once daily.  Patient to call with blood pressure readings and if she develops edema.

## 2021-03-22 NOTE — TELEPHONE ENCOUNTER
· I called the patient she said that she stopped the Hctz and amlodipine as well she said that her swelling has went down and since she stopped the blood pressure medication she noticed it was higher in the morning and lower during the day  159/54 , 133/76 , 130/76 , 140/80 , 131/73 she said that the headaches were constant while on the medication and since she has spinal stenosis she does get a headache from taking that medication . I told her when you are available you will address she can be reached at [phone] She said that she is sorry that she misunderstood and went off both medications.

## 2021-04-08 ENCOUNTER — TELEPHONE (OUTPATIENT)
Dept: PRIMARY CARE | Facility: CLINIC | Age: 86
End: 2021-04-08

## 2021-04-08 NOTE — TELEPHONE ENCOUNTER
· I called the patient to clarify what she was particularly asking she said that she knew that you were out last week on holiday and she is still having issues with her blood pressure and leg swelling she said that when she takes the amlodipine 10 mg it increases her leg swelling when she takes the amlodipine 5 mg she has issues with her blood pressure she would like to know what she should be doing regarding both

## 2021-04-20 ENCOUNTER — TRANSCRIBE ORDERS (OUTPATIENT)
Dept: SCHEDULING | Age: 86
End: 2021-04-20

## 2021-04-20 ENCOUNTER — DOCUMENTATION (OUTPATIENT)
Dept: PRIMARY CARE | Facility: CLINIC | Age: 86
End: 2021-04-20

## 2021-04-20 ENCOUNTER — TELEPHONE (OUTPATIENT)
Dept: PRIMARY CARE | Facility: CLINIC | Age: 86
End: 2021-04-20

## 2021-04-20 ENCOUNTER — APPOINTMENT (OUTPATIENT)
Dept: LAB | Facility: HOSPITAL | Age: 86
End: 2021-04-20
Attending: INTERNAL MEDICINE
Payer: MEDICARE

## 2021-04-20 DIAGNOSIS — I26.99 OTHER PULMONARY EMBOLISM WITHOUT ACUTE COR PULMONALE: Primary | ICD-10-CM

## 2021-04-20 DIAGNOSIS — M81.8 OTHER OSTEOPOROSIS WITHOUT CURRENT PATHOLOGICAL FRACTURE: ICD-10-CM

## 2021-04-20 DIAGNOSIS — I26.99 OTHER PULMONARY EMBOLISM WITHOUT ACUTE COR PULMONALE: ICD-10-CM

## 2021-04-20 PROBLEM — N18.31 CHRONIC KIDNEY DISEASE, STAGE 3A (CMS/HCC): Status: ACTIVE | Noted: 2021-04-20

## 2021-04-20 LAB
25(OH)D3 SERPL-MCNC: 50 NG/ML (ref 30–100)
ALBUMIN SERPL-MCNC: 4.2 G/DL (ref 3.4–5)
ALP SERPL-CCNC: 49 IU/L (ref 35–126)
ALT SERPL-CCNC: 19 IU/L (ref 11–54)
ANION GAP SERPL CALC-SCNC: 10 MEQ/L (ref 3–15)
AST SERPL-CCNC: 20 IU/L (ref 15–41)
BASOPHILS # BLD: 0 K/UL (ref 0.01–0.1)
BASOPHILS NFR BLD: 0 %
BILIRUB SERPL-MCNC: 0.7 MG/DL (ref 0.3–1.2)
BUN SERPL-MCNC: 14 MG/DL (ref 8–20)
CALCIUM SERPL-MCNC: 9.1 MG/DL (ref 8.9–10.3)
CHLORIDE SERPL-SCNC: 104 MEQ/L (ref 98–109)
CO2 SERPL-SCNC: 26 MEQ/L (ref 22–32)
CREAT SERPL-MCNC: 0.9 MG/DL (ref 0.6–1.1)
DIFFERENTIAL METHOD BLD: ABNORMAL
EOSINOPHIL # BLD: 0 K/UL (ref 0.04–0.36)
EOSINOPHIL NFR BLD: 0 %
ERYTHROCYTE [DISTWIDTH] IN BLOOD BY AUTOMATED COUNT: 14 % (ref 11.7–14.4)
GFR SERPL CREATININE-BSD FRML MDRD: 59 ML/MIN/1.73M*2
GLUCOSE SERPL-MCNC: 94 MG/DL (ref 70–99)
HCT VFR BLDCO AUTO: 38.8 % (ref 35–45)
HGB BLD-MCNC: 12.8 G/DL (ref 11.8–15.7)
IMM GRANULOCYTES # BLD AUTO: 0.04 K/UL (ref 0–0.08)
IMM GRANULOCYTES NFR BLD AUTO: 0.7 %
LYMPHOCYTES # BLD: 1.44 K/UL (ref 1.2–3.5)
LYMPHOCYTES NFR BLD: 23.8 %
MCH RBC QN AUTO: 31.4 PG (ref 28–33.2)
MCHC RBC AUTO-ENTMCNC: 33 G/DL (ref 32.2–35.5)
MCV RBC AUTO: 95.3 FL (ref 83–98)
MONOCYTES # BLD: 0.7 K/UL (ref 0.28–0.8)
MONOCYTES NFR BLD: 11.6 %
NEUTROPHILS # BLD: 3.86 K/UL (ref 1.7–7)
NEUTROPHILS # BLD: 3.86 K/UL (ref 1.7–7)
NEUTS SEG NFR BLD: 63.9 %
NRBC BLD-RTO: 0 %
PDW BLD AUTO: 9.5 FL (ref 9.4–12.3)
PLATELET # BLD AUTO: 263 K/UL (ref 150–369)
POTASSIUM SERPL-SCNC: 3.7 MEQ/L (ref 3.6–5.1)
PROT SERPL-MCNC: 6.6 G/DL (ref 6–8.2)
RBC # BLD AUTO: 4.07 M/UL (ref 3.93–5.22)
SODIUM SERPL-SCNC: 140 MEQ/L (ref 136–144)
WBC # BLD AUTO: 6.04 K/UL (ref 3.8–10.5)

## 2021-04-20 PROCEDURE — 82784 ASSAY IGA/IGD/IGG/IGM EACH: CPT

## 2021-04-20 PROCEDURE — 83883 ASSAY NEPHELOMETRY NOT SPEC: CPT

## 2021-04-20 PROCEDURE — 80053 COMPREHEN METABOLIC PANEL: CPT

## 2021-04-20 PROCEDURE — 36415 COLL VENOUS BLD VENIPUNCTURE: CPT

## 2021-04-20 PROCEDURE — 85025 COMPLETE CBC W/AUTO DIFF WBC: CPT

## 2021-04-20 PROCEDURE — 82306 VITAMIN D 25 HYDROXY: CPT

## 2021-04-20 NOTE — TELEPHONE ENCOUNTER
· I called denae back informed her that she can either go to urgent care today or be seen tomorrow patient doesn't have a cardiologist   · Denae said that they were able to have her seen [place] the [place] for an ekg because this was a new issue so they only performed an EKG at Dr Dickson's office ( she said that she will fax over by the end of business today ) she said that she will speak with Dr Bhakta today to see if the patient can be tomorrow of Thursday and if anything changes she will call the office .

## 2021-04-20 NOTE — TELEPHONE ENCOUNTER
· I took a call from Anel at [facility];s office patient is their now she has an irregular heartbeat and she wanted the patient seen today I looked at the schedules no availability she said she will speak with the doctor and call us back if necessary

## 2021-04-20 NOTE — PROGRESS NOTES
Coding Clarification ([facility]) - [facility]  Note       DATE: 2021    RE: Madelyn ANDERSON Sara  : 1930      Under the direction of Henry Linares MD, the following adjustments were made to this patients Problem List:    New Code: N18.31           Code Description: Chronic kidney disease, stage 3a  Clarification Type EMR System Encounter Type Date of Service Provider   New Code Epic Lab Note  10/14/2020  Henry Linares MD   Rationale: Chronic kidney disease, stage 3a is suspected based on recent eGFR value 52.2 found in Lab note on 10/14/2020,diagnosed by Henry Linares MD

## 2021-04-21 LAB
IGA SERPL-MCNC: 103 MG/DL (ref 84.5–499)
IGG SERPL-MCNC: 455 MG/DL (ref 537–1535)
IGM SERPL-MCNC: 62 MG/DL (ref 35–242)
KAPPA LC SERPL-MCNC: 12.72 MG/L (ref 8.96–34.28)
KAPPA LC/LAMBDA SER: 1.25 {RATIO} (ref 0.64–1.83)
LAMBDA LC SERPL-MCNC: 10.2 MG/L (ref 5.7–26.3)

## 2021-04-22 ENCOUNTER — OFFICE VISIT (OUTPATIENT)
Dept: PRIMARY CARE | Facility: CLINIC | Age: 86
End: 2021-04-22
Payer: MEDICARE

## 2021-04-22 VITALS
OXYGEN SATURATION: 98 % | SYSTOLIC BLOOD PRESSURE: 130 MMHG | WEIGHT: 129.8 LBS | HEIGHT: 61 IN | DIASTOLIC BLOOD PRESSURE: 74 MMHG | BODY MASS INDEX: 24.51 KG/M2 | RESPIRATION RATE: 16 BRPM | HEART RATE: 98 BPM | TEMPERATURE: 98.1 F

## 2021-04-22 DIAGNOSIS — F41.9 ANXIETY: ICD-10-CM

## 2021-04-22 DIAGNOSIS — I10 ESSENTIAL HYPERTENSION: Primary | Chronic | ICD-10-CM

## 2021-04-22 PROCEDURE — 99213 OFFICE O/P EST LOW 20 MIN: CPT | Performed by: FAMILY MEDICINE

## 2021-04-22 RX ORDER — LORAZEPAM 1 MG/1
1 TABLET ORAL NIGHTLY PRN
Qty: 30 TABLET | Refills: 5 | Status: SHIPPED | OUTPATIENT
Start: 2021-04-22 | End: 2021-11-23

## 2021-04-22 ASSESSMENT — ENCOUNTER SYMPTOMS
WEAKNESS: 0
NUMBNESS: 0
CHEST TIGHTNESS: 0
COUGH: 0
SHORTNESS OF BREATH: 0
PALPITATIONS: 0
SPEECH DIFFICULTY: 0
DIZZINESS: 0
HEADACHES: 0
NAUSEA: 0
CONFUSION: 0
LIGHT-HEADEDNESS: 0
VOMITING: 0

## 2021-04-22 NOTE — PATIENT INSTRUCTIONS
Problem List Items Addressed This Visit     Essential hypertension - Primary (Chronic)     Blood pressure is controlled at this time.  Continue present medication.  No side effects from medication.  Reviewed labs with patient.  Follow low sodium diet.  Eat a banana a day.  Increase water intake.             Other Visit Diagnoses     Anxiety        Relevant Medications    LORazepam (ATIVAN) 1 mg tablet

## 2021-04-22 NOTE — ASSESSMENT & PLAN NOTE
Blood pressure is controlled at this time.  Continue present medication.  No side effects from medication.  Reviewed labs with patient.  Follow low sodium diet.  Eat a banana a day.  Increase water intake.

## 2021-04-22 NOTE — PROGRESS NOTES
Henry Linares MD    [facility]  [address], [address]  [place] [address]  Phone: [phone]  Fax: [phone]     History of Present Illness     Subjective     Patient ID: Madelyn Ruiz is a 89 y.o. female.    Patient with history of bilateral leg swelling. On hctz now and off of Amlodipine. Swelling much improved. CBC and complete metabolic profile done on 04/20/2021 was normal. Blood pressure readings at home ok.        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 Arriaza.  Stable FEV1 in 8/2015.  IgE levels and eosinophils stable in 8/2015.  Managed with Fasenra, Azithromycin and Flovent.   • Allergic rhinitis 4/2/2018    Pulmonologist: Dr. Walker. Managed with Flonase.   • Calcification of aorta (CMS/[facility]) 6/29/2020    Seen incidentally on CT scan.   • Chronic obstructive pulmonary disease (CMS/[facility]) 4/2/2018    Pulmonologist: Dr. Walker.  Seen on PFTs in hospital in 2014.  Controlled with Spiriva and FLovent.   • Essential hypertension 4/2/2018    Managed on HCTZ.  Advised to follow a low sodium diet and keep BMI < 25.  Advised to exercise for 2.5 hours per week.  Instructed to take home blood pressure readings and call if consistently above 140/90.     • GERD (gastroesophageal reflux disease) 6/8/2019    Managed with Esomeprazole.  Should take medication 30 minutes prior to meal.  Advised to avoid caffeine, carbonated beverages, and should not eat within 3 hours of going to sleep.  Advised to reduce BMI < 25.    • Hypertension 4/2/2018    Managed on Amlodipine. Advised to follow a low sodium diet and keep BMI < 25. Advised to exercise for 2.5 hours per week. Instructed to take home blood pressure readings and call if consistently above 140/90.    • Insomnia  4/2/2018    Managed with Lorazepam as needed.   • Lumbar spinal stenosis 4/2/2018    Neurosurgeon: Dr. Miguel. MRI with Central and lateral recess spinal stenosis. Has Spondylolisthesis at L4/L5. S/P epidural injection by Dr. Martin with some relief in past. Had Lumbar fusion and Lumbar laminectomy by Dr. Miguel in 4/2015.   • Obstructive sleep apnea syndrome 4/2/2018    Mild STACIA noted in sleep study 7/2015. Treated with nasal CPAP automatic with pressure range 5-10 CM H2Ox couple months. Off now   • Osteoporosis 4/2/2018    Rheumatologist: Dr. Bradley.  Dexa scan 7/2016 with osteoporosis of lumbar spine LS-2.5, LH-2.1, RH -2.2.  Has been on Fosamax in the past for 8 years.  Worsened by Prednisone in past.  Continue vitamin D, calcium and weight bearing exercise. DEXA in 7/2017 with LS -1.7, LH -2.2, and RH -2.0.  Next due in 7/2019.  Seen by Dr. Bradley in 8/2015 who agreed with initiating Prolia. Took for 2 years. N   • Pulmonary embolism (CMS/HCC) 6/9/2019    Hematologist: Dr. Bhakta Diagnosed in 06/2019 in right upper lobe. Vascular ultrasound negative of bilateral lower extremities. Managed on Eliquis. Hypercoagulable workup was negative for beta-2 glycoprotein's, RIGO, Antithrombin III, lupus anticoagulant, protein C activity protein S activity, and prothrombin gene mutation, and factor V Leiden. Now off Apixaban as CT scan in 12/2019 was without pu   • Vertebral compression fracture (CMS/HCC)     At T12 level       Past Surgical History:   Procedure Laterality Date   • APPENDECTOMY     • CARPAL TUNNEL RELEASE Bilateral    • CATARACT EXTRACTION W/  INTRAOCULAR LENS IMPLANT Bilateral    • HYSTERECTOMY  1972   • LUMBAR LAMINECTOMY  04/21/2015    S/P lumbar fusion   • TONSILLECTOMY         Family History   Problem Relation Age of Onset   • Glaucoma Biological Mother    • Macular degeneration Biological Mother    • Hypertension Biological Mother    • Lung cancer Biological Father    • Heart disease Biological  Brother    • Breast cancer Mother's Sister    • No Known Problems Biological Son    • No Known Problems Biological Son    • No Known Problems Biological Daughter    • No Known Problems Biological Daughter    • No Known Problems Biological Daughter        Social History     Tobacco Use   • Smoking status: Never Smoker   • Smokeless tobacco: Never Used   Vaping Use   • Vaping Use: Never used   Substance Use Topics   • Alcohol use: Yes     Comment: Rarely   • Drug use: No       Patient Care Team:  Henry Linares MD as PCP - General  Ray Arriaza MD as Referring Physician  Aye Bhakta MD as Consulting Physician (Hematology and Oncology)  Wesley Walker MD as Consulting Physician (Pulmonary)  Fausto Bradley MD as Consulting Physician (Rheumatology)     Allergies and Medications       Allergies   Allergen Reactions   • Mepolizumab Rash   • Morphine      Other reaction(s): Vomiting   • Oxycodone      Other reaction(s): Vomiting       Current Outpatient Medications   Medication Sig Dispense Refill   • albuterol 2.5 mg /3 mL (0.083 %) nebulizer solution Take 3 mL (2.5 mg total) by nebulization every 6 (six) hours as needed for wheezing or shortness of breath. 125 vial 0   • albuterol HFA (VENTOLIN HFA) 90 mcg/actuation inhaler Inhale 2 puffs every 4 (four) hours as needed for wheezing or shortness of breath. 8.5 g 0   • benralizumab (FASENRA) 30 mg/mL syringe subcutaneous syringe Inject 30 mg under the skin every 2 (two) months.     • benzonatate (TESSALON) 200 mg capsule TAKE 1 CAPSULE BY MOUTH 3 TIMES DAILY AS NEEDED FOR COUGH.     • calcium carbonate (calcium carbonate) 600 mg calcium (1,500 mg) tablet Take 1 tablet by mouth 2 (two) times a day.     • cholecalciferol, vitamin D3, (VITAMIN D3 ORAL) Take by mouth daily.       • diphenhydrAMINE (BENADRYL) 25 mg capsule Take 25 mg by mouth nightly.     • docusate sodium (COLACE) 100 mg capsule Take 100 mg by mouth nightly.       • DULoxetine (CYMBALTA) 30 mg  capsule Take 30 mg by mouth daily.     • DULoxetine (CYMBALTA) 60 mg capsule TAKE 1 CAPSULE BY MOUTH EVERYDAY AT BEDTIME  0   • esomeprazole (NexIUM) 20 mg capsule Take 20 mg by mouth daily. Take 30 minutes prior to meal.     • fexofenadine (ALLEGRA) 180 mg tablet Take 180 mg by mouth daily.     • FLOVENT  mcg/actuation inhaler Inhale 2 puffs 2 (two) times a day.     • fluticasone propionate (FLONASE) 50 mcg/actuation nasal spray Administer 1 spray into each nostril daily.     • FOLIC ACID/MULTIVIT,IRON, (CENTRUM ORAL) Take 1 tablet by mouth daily.     • hydrochlorothiazide (HYDRODIURIL) 12.5 mg tablet Take 1 tablet (12.5 mg total) by mouth daily. 90 tablet 3   • LORazepam (ATIVAN) 1 mg tablet Take 1 tablet (1 mg total) by mouth nightly as needed for anxiety or sleep. 30 tablet 5   • pregabalin (LYRICA) 25 mg capsule Take 25 mg by mouth nightly.     • SPIRIVA RESPIMAT 2.5 mcg/actuation mist inhaler Inhale 2 puffs daily.     • SYMBICORT 80-4.5 mcg/actuation inhaler Inhale 2 puffs 2 (two) times a day.     • traMADoL (ULTRAM) 50 mg tablet Take 50 mg by mouth daily as needed.     • zoledronic acid (RECLAST) IVPB Infuse 5 mg into a venous catheter See admin instr. 5 mg once yearly       No current facility-administered medications for this visit.          Review of Systems       Review of Systems   Eyes: Negative for visual disturbance.   Respiratory: Negative for cough, chest tightness and shortness of breath.    Cardiovascular: Negative for chest pain, palpitations and leg swelling.   Gastrointestinal: Negative for nausea and vomiting.   Neurological: Negative for dizziness, syncope, speech difficulty, weakness, light-headedness, numbness and headaches.   Psychiatric/Behavioral: Negative for confusion.        Physical Examination       Objective     Vitals:    04/22/21 1023   BP: 130/74   Pulse: 98   Resp: 16   Temp: 36.7 °C (98.1 °F)   SpO2: 98%       Pulse Readings from Last 5 Encounters:   04/22/21 98  "  03/01/21 88   10/02/20 (!) 101   06/29/20 98   06/20/19 81       Wt Readings from Last 5 Encounters:   04/22/21 58.9 kg (129 lb 12.8 oz)   03/01/21 59.7 kg (131 lb 9.6 oz)   10/02/20 59.7 kg (131 lb 9.6 oz)   06/29/20 60.9 kg (134 lb 3.2 oz)   06/20/19 58.7 kg (129 lb 6.4 oz)       Ht Readings from Last 5 Encounters:   04/22/21 1.537 m (5' 0.5\")   03/01/21 1.537 m (5' 0.5\")   10/02/20 1.537 m (5' 0.5\")   06/29/20 1.537 m (5' 0.5\")   06/20/19 1.549 m (5' 1\")       BP Readings from Last 5 Encounters:   04/22/21 130/74   03/01/21 130/74   10/02/20 118/86   06/29/20 132/90   06/20/19 138/90       BMI Readings from Last 4 Encounters:   04/22/21 24.93 kg/m²   03/01/21 25.28 kg/m²   10/02/20 25.28 kg/m²   06/29/20 25.78 kg/m²       Physical Exam  Constitutional:       General: She is not in acute distress.     Appearance: Normal appearance. She is well-developed. She is not ill-appearing, toxic-appearing or diaphoretic.   Neck:      Vascular: No carotid bruit or JVD.   Cardiovascular:      Rate and Rhythm: Normal rate and regular rhythm.      Pulses:           Dorsalis pedis pulses are 2+ on the right side and 2+ on the left side.        Posterior tibial pulses are 2+ on the right side and 2+ on the left side.      Heart sounds: Normal heart sounds, S1 normal and S2 normal. No murmur heard.   No gallop. No S3 or S4 sounds.       Comments: No edema bilaterally, PACs  Pulmonary:      Effort: Pulmonary effort is normal. No accessory muscle usage or respiratory distress.      Breath sounds: Normal breath sounds. No wheezing, rhonchi or rales.   Abdominal:      General: Bowel sounds are normal.      Palpations: Abdomen is soft. There is no hepatomegaly or splenomegaly.      Tenderness: There is no abdominal tenderness. There is no guarding or rebound.   Musculoskeletal:      Right lower leg: No edema.      Left lower leg: No edema.   Neurological:      Mental Status: She is alert.   Psychiatric:         Mood and Affect: Mood " normal.         Behavior: Behavior is cooperative.          Laboratory Results     Lab Results   Component Value Date    WBC 6.04 04/20/2021    WBC 6.76 10/14/2020    WBC 5.72 06/19/2020    HGB 12.8 04/20/2021    HGB 13.1 10/14/2020    HGB 12.5 06/19/2020    HCT 38.8 04/20/2021    HCT 41.9 10/14/2020    HCT 39.0 06/19/2020    MCV 95.3 04/20/2021    .2 (H) 10/14/2020    .3 (H) 06/19/2020     04/20/2021     10/14/2020     06/19/2020        Lab Results   Component Value Date    GLUCOSE 94 04/20/2021    GLUCOSE 117 (H) 10/14/2020    GLUCOSE 88 06/19/2020    CALCIUM 9.1 04/20/2021    CALCIUM 9.3 10/14/2020    CALCIUM 9.1 06/19/2020     04/20/2021     10/14/2020     06/19/2020    K 3.7 04/20/2021    K 3.7 10/14/2020    K 4.3 06/19/2020    CO2 26 04/20/2021    CO2 20 (L) 10/14/2020    CO2 25 06/19/2020     04/20/2021     10/14/2020     06/19/2020    BUN 14 04/20/2021    BUN 17 10/14/2020    BUN 16 06/19/2020    CREATININE 0.9 04/20/2021    CREATININE 1.0 10/14/2020    CREATININE 0.9 06/19/2020    ALKPHOS 49 04/20/2021    ALKPHOS 46 10/14/2020    ALKPHOS 43 06/19/2020    AST 20 04/20/2021    AST 19 10/14/2020    AST 22 06/19/2020    ALT 19 04/20/2021    ALT 20 10/14/2020    ALT 25 06/19/2020    BILITOT 0.7 04/20/2021    BILITOT 0.5 10/14/2020    BILITOT 0.8 06/19/2020    ALBUMIN 4.2 04/20/2021    ALBUMIN 3.9 10/14/2020    ALBUMIN 3.9 06/19/2020       Lab Results   Component Value Date    CHOL 248 (H) 11/18/2015     Lab Results   Component Value Date    HDL 92 11/18/2015     Lab Results   Component Value Date    LDLCALC 133 (H) 11/18/2015     Lab Results   Component Value Date    TRIG 117 11/18/2015       Lab Results   Component Value Date    TSH 1.91 10/14/2020    TSH 0.32 (L) 06/10/2019    TSH 2.08 11/18/2015     Lab Results   Component Value Date    FREET4 0.79 11/18/2015         Lab Results   Component Value Date    HGBA1C 5.6 11/18/2015    HGBA1C 5.6  04/08/2015       Lab Results   Component Value Date    HEPCAB Nonreactive 07/12/2018              Immunization History   Administered Date(s) Administered   • Influenza Split High Dose Preservative Free IM 11/03/2011, 11/13/2012, 10/09/2013, 10/30/2014, 11/17/2015, 09/30/2016, 09/25/2017   • Influenza Vaccine 65 And Older Preservative Free 10/25/2019, 10/02/2020   • Influenza Vaccine Quadrivalent 3 Yr And Older 09/18/2017, 10/22/2018   • Influenza, Unspecified 10/30/2014, 11/17/2015, 09/30/2016, 09/25/2017   • MMRV 06/05/2014   • Pneumococcal Conjugate 08/09/1996   • Pneumococcal Conjugate 13-Valent 11/17/2015   • Pneumococcal Polysaccharide 10/30/2014, 09/11/2017   • Sars-cov-2 (Covid-19) Vaccine, Moderna 02/05/2021, 03/05/2021   • Tdap 03/22/2012   • Varicella 06/05/2014   • Zoster 01/02/2006, 01/02/2016   • Zoster Vaccine Recombinant Adjuvanted (Shingrix) 05/24/2019, 09/20/2019         Health Maintenance Topics with due status: Not Due       Topic Last Completion Date    DTaP, Tdap, and Td Vaccines 03/22/2012    DEXA Scan 07/30/2019    Medicare Annual Wellness Visit 10/02/2020     Health Maintenance Topics with due status: Completed       Topic Last Completion Date    Pneumococcal 09/11/2017    Pneumococcal (65 years and older) 09/11/2017    Hepatitis C Screening 07/12/2018    Zoster Vaccine 09/20/2019    Influenza Vaccine 10/02/2020    Annual Falls Risk Screening 03/01/2021    COVID-19 Vaccine 03/05/2021     Health Maintenance Topics with due status: Aged Out       Topic Date Due    Meningococcal ACWY Aged Out    Varicella Vaccines Aged Out    HIB Vaccines Aged Out    IPV Vaccines Aged Out    HPV Vaccines Aged Out     Health Maintenance Topics with due status: Discontinued       Topic Date Due    Mammogram Discontinued        Assessment and Plan       Assessment/Plan     Problem List Items Addressed This Visit     Essential hypertension - Primary (Chronic)    Overview     · Managed on HCTZ.   · Advised to follow  a low sodium diet and keep BMI < 25.   · Advised to exercise for 2.5 hours per week.   · Instructed to take home blood pressure readings and call if consistently above 140/90.             Current Assessment & Plan     Blood pressure is controlled at this time.  Continue present medication.  No side effects from medication.  Reviewed labs with patient.  Follow low sodium diet.  Eat a banana a day.  Increase water intake.             Other Visit Diagnoses     Anxiety        Relevant Medications    LORazepam (ATIVAN) 1 mg tablet          No follow-ups on file.    No orders of the defined types were placed in this encounter.             Henry Linares MD    4/22/2021

## 2021-06-02 ENCOUNTER — TELEPHONE (OUTPATIENT)
Dept: PRIMARY CARE | Facility: CLINIC | Age: 86
End: 2021-06-02

## 2021-06-02 NOTE — TELEPHONE ENCOUNTER
· I left a message on patient's cell she had left a message regarding her ankle swelling and headache I need to speak with her to clarify some information if she could call me back at her earliest convenience .

## 2021-06-03 NOTE — TELEPHONE ENCOUNTER
· I called the patient informed her that dr rudd would like to see here in the office tomorrow she said that she will be coming with Demetria mcdaniels .

## 2021-06-03 NOTE — TELEPHONE ENCOUNTER
She should be seen in the office so I can evaluate her. My next opening is tomorrow. Schedule her then.

## 2021-06-03 NOTE — TELEPHONE ENCOUNTER
· I called the patient she said that yesterday she had a headache that woke her up out of her sleep early morning and she was vomiting she said that it felt like a migraine that she used to get 20 some years ago she not sure what to do or how to proceed also she still has the bilateral ankle swelling and she would like to discuss all of this when you are available

## 2021-06-04 ENCOUNTER — OFFICE VISIT (OUTPATIENT)
Dept: PRIMARY CARE | Facility: CLINIC | Age: 86
End: 2021-06-04
Payer: MEDICARE

## 2021-06-04 VITALS
HEART RATE: 91 BPM | SYSTOLIC BLOOD PRESSURE: 138 MMHG | RESPIRATION RATE: 16 BRPM | OXYGEN SATURATION: 98 % | TEMPERATURE: 98 F | DIASTOLIC BLOOD PRESSURE: 86 MMHG

## 2021-06-04 DIAGNOSIS — I10 ESSENTIAL HYPERTENSION: Primary | Chronic | ICD-10-CM

## 2021-06-04 DIAGNOSIS — R51.9 NONINTRACTABLE HEADACHE, UNSPECIFIED CHRONICITY PATTERN, UNSPECIFIED HEADACHE TYPE: ICD-10-CM

## 2021-06-04 PROBLEM — N18.31 CHRONIC KIDNEY DISEASE, STAGE 3A (CMS/HCC): Status: RESOLVED | Noted: 2021-04-20 | Resolved: 2021-06-04

## 2021-06-04 PROCEDURE — 99214 OFFICE O/P EST MOD 30 MIN: CPT | Performed by: FAMILY MEDICINE

## 2021-06-04 RX ORDER — VALSARTAN 40 MG/1
40 TABLET ORAL DAILY
Qty: 30 TABLET | Refills: 11 | Status: SHIPPED | OUTPATIENT
Start: 2021-06-04 | End: 2021-10-06 | Stop reason: ALTCHOICE

## 2021-06-04 RX ORDER — AZITHROMYCIN 250 MG/1
250 TABLET, FILM COATED ORAL DAILY
COMMUNITY
Start: 2021-06-03 | End: 2021-10-06 | Stop reason: ALTCHOICE

## 2021-06-04 ASSESSMENT — ENCOUNTER SYMPTOMS
PALPITATIONS: 0
FEVER: 0
CHILLS: 0
SINUS PRESSURE: 0
WEAKNESS: 0
COUGH: 0
NAUSEA: 1
DIAPHORESIS: 0
SPEECH DIFFICULTY: 0
SHORTNESS OF BREATH: 0
PHOTOPHOBIA: 1
FACIAL ASYMMETRY: 0
HEADACHES: 1
SORE THROAT: 0
NUMBNESS: 0
CHEST TIGHTNESS: 0
VOMITING: 1
BLURRED VISION: 0
MIGRAINE HEADACHES: 1

## 2021-06-04 NOTE — PROGRESS NOTES
Henry Linares MD    [facility]  [address], [address]  [place] [address]  Phone: [phone]  Fax: [phone]     History of Present Illness     Subjective     Patient ID: Madelyn Ruiz is a 89 y.o. female.    Headache   This is a new problem. The pain is located in the left unilateral region. The quality of the pain is described as throbbing, pulsating and sharp. Associated symptoms include nausea, photophobia and vomiting. Pertinent negatives include no blurred vision, coughing, fever, numbness, phonophobia, sinus pressure, sore throat or weakness. Associated symptoms comments: No dysarthria. Exacerbated by: Starting HCTZ and stopping Amlodipine. She has tried acetaminophen for the symptoms. Her past medical history is significant for migraine headaches.     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 Arriaza.  Stable FEV1 in 8/2015.  IgE levels and eosinophils stable in 8/2015.  Managed with Fasenra, Azithromycin and Flovent.   • Allergic rhinitis 4/2/2018    Pulmonologist: Dr. Walker. Managed with Flonase.   • Calcification of aorta (CMS/Formerly [facility]) 6/29/2020    Seen incidentally on CT scan.   • Chronic obstructive pulmonary disease (CMS/Formerly [facility]) 4/2/2018    Pulmonologist: Dr. Walker.  Seen on PFTs in hospital in 2014.  Controlled with Spiriva and FLovent.   • Essential hypertension 4/2/2018    Managed on HCTZ.  Advised to follow a low sodium diet and keep BMI < 25.  Advised to exercise for 2.5 hours per week.  Instructed to take home blood pressure readings and call if consistently above 140/90.     • GERD (gastroesophageal reflux disease) 6/8/2019    Managed with Esomeprazole.  Should take medication  30 minutes prior to meal.  Advised to avoid caffeine, carbonated beverages, and should not eat within 3 hours of going to sleep.  Advised to reduce BMI < 25.    • Hypertension 4/2/2018    Managed on Amlodipine. Advised to follow a low sodium diet and keep BMI < 25. Advised to exercise for 2.5 hours per week. Instructed to take home blood pressure readings and call if consistently above 140/90.    • Insomnia 4/2/2018    Managed with Lorazepam as needed.   • Lumbar spinal stenosis 4/2/2018    Neurosurgeon: Dr. Miguel. MRI with Central and lateral recess spinal stenosis. Has Spondylolisthesis at L4/L5. S/P epidural injection by Dr. Martin with some relief in past. Had Lumbar fusion and Lumbar laminectomy by Dr. Miguel in 4/2015.   • Obstructive sleep apnea syndrome 4/2/2018    Mild STACIA noted in sleep study 7/2015. Treated with nasal CPAP automatic with pressure range 5-10 CM H2Ox couple months. Off now   • Osteoporosis 4/2/2018    Rheumatologist: Dr. Bradley.  Dexa scan 7/2016 with osteoporosis of lumbar spine LS-2.5, LH-2.1, RH -2.2.  Has been on Fosamax in the past for 8 years.  Worsened by Prednisone in past.  Continue vitamin D, calcium and weight bearing exercise. DEXA in 7/2017 with LS -1.7, LH -2.2, and RH -2.0.  Next due in 7/2019.  Seen by Dr. Bradley in 8/2015 who agreed with initiating Prolia. Took for 2 years. N   • Pulmonary embolism (CMS/HCC) 6/9/2019    Hematologist: Dr. Bhakta Diagnosed in 06/2019 in right upper lobe. Vascular ultrasound negative of bilateral lower extremities. Managed on Eliquis. Hypercoagulable workup was negative for beta-2 glycoprotein's, RIGO, Antithrombin III, lupus anticoagulant, protein C activity protein S activity, and prothrombin gene mutation, and factor V Leiden. Now off Apixaban as CT scan in 12/2019 was without pu   • Vertebral compression fracture (CMS/HCC)     At T12 level       Past Surgical History:   Procedure Laterality Date   • APPENDECTOMY     • CARPAL TUNNEL  RELEASE Bilateral    • CATARACT EXTRACTION W/  INTRAOCULAR LENS IMPLANT Bilateral    • HYSTERECTOMY  1972   • LUMBAR LAMINECTOMY  04/21/2015    S/P lumbar fusion   • TONSILLECTOMY         Family History   Problem Relation Age of Onset   • Glaucoma Biological Mother    • Macular degeneration Biological Mother    • Hypertension Biological Mother    • Lung cancer Biological Father    • Heart disease Biological Brother    • Breast cancer Mother's Sister    • No Known Problems Biological Son    • No Known Problems Biological Son    • No Known Problems Biological Daughter    • No Known Problems Biological Daughter    • No Known Problems Biological Daughter        Social History     Tobacco Use   • Smoking status: Never Smoker   • Smokeless tobacco: Never Used   Vaping Use   • Vaping Use: Never used   Substance Use Topics   • Alcohol use: Yes     Comment: Rarely   • Drug use: No       Patient Care Team:  Henry Linares MD as PCP - General  Ray Arriaza MD as Referring Physician  Aye Bhakta MD as Consulting Physician (Hematology and Oncology)  Wesley Walker MD as Consulting Physician (Pulmonary)  Fausto Bradley MD as Consulting Physician (Rheumatology)     Allergies and Medications       Allergies   Allergen Reactions   • Mepolizumab Rash   • Morphine      Other reaction(s): Vomiting   • Oxycodone      Other reaction(s): Vomiting       Current Outpatient Medications   Medication Sig Dispense Refill   • albuterol 2.5 mg /3 mL (0.083 %) nebulizer solution Take 3 mL (2.5 mg total) by nebulization every 6 (six) hours as needed for wheezing or shortness of breath. 125 vial 0   • albuterol HFA (VENTOLIN HFA) 90 mcg/actuation inhaler Inhale 2 puffs every 4 (four) hours as needed for wheezing or shortness of breath. 8.5 g 0   • azithromycin (ZITHROMAX) 250 mg tablet Take 250 mg by mouth daily.     • benralizumab (FASENRA) 30 mg/mL syringe subcutaneous syringe Inject 30 mg under the skin every 2 (two) months.     •  benzonatate (TESSALON) 200 mg capsule TAKE 1 CAPSULE BY MOUTH 3 TIMES DAILY AS NEEDED FOR COUGH.     • calcium carbonate (calcium carbonate) 600 mg calcium (1,500 mg) tablet Take 1 tablet by mouth 2 (two) times a day.     • cholecalciferol, vitamin D3, (VITAMIN D3 ORAL) Take by mouth daily.       • diphenhydrAMINE (BENADRYL) 25 mg capsule Take 25 mg by mouth nightly.     • docusate sodium (COLACE) 100 mg capsule Take 100 mg by mouth nightly.       • DULoxetine (CYMBALTA) 30 mg capsule Take 30 mg by mouth daily.     • DULoxetine (CYMBALTA) 60 mg capsule TAKE 1 CAPSULE BY MOUTH EVERYDAY AT BEDTIME  0   • esomeprazole (NexIUM) 20 mg capsule Take 20 mg by mouth daily. Take 30 minutes prior to meal.     • fexofenadine (ALLEGRA) 180 mg tablet Take 180 mg by mouth daily.     • FLOVENT  mcg/actuation inhaler Inhale 2 puffs 2 (two) times a day.     • fluticasone propionate (FLONASE) 50 mcg/actuation nasal spray Administer 1 spray into each nostril daily.     • FOLIC ACID/MULTIVIT,IRON, (CENTRUM ORAL) Take 1 tablet by mouth daily.     • hydrochlorothiazide (HYDRODIURIL) 12.5 mg tablet Take 1 tablet (12.5 mg total) by mouth daily. 90 tablet 3   • LORazepam (ATIVAN) 1 mg tablet Take 1 tablet (1 mg total) by mouth nightly as needed for anxiety or sleep. 30 tablet 5   • pregabalin (LYRICA) 25 mg capsule Take 25 mg by mouth nightly.     • SPIRIVA RESPIMAT 2.5 mcg/actuation mist inhaler Inhale 2 puffs daily.     • SYMBICORT 80-4.5 mcg/actuation inhaler Inhale 2 puffs 2 (two) times a day.     • traMADoL (ULTRAM) 50 mg tablet Take 50 mg by mouth daily as needed.     • zoledronic acid (RECLAST) IVPB Infuse 5 mg into a venous catheter See admin instr. 5 mg once yearly     • valsartan (DIOVAN) 40 mg tablet Take 1 tablet (40 mg total) by mouth daily. 30 tablet 11     No current facility-administered medications for this visit.          Review of Systems       Review of Systems   Constitutional: Negative for chills,  "diaphoresis and fever.   HENT: Negative for sinus pressure and sore throat.    Eyes: Positive for photophobia. Negative for blurred vision.   Respiratory: Negative for cough, chest tightness and shortness of breath.    Cardiovascular: Negative for chest pain and palpitations.   Gastrointestinal: Positive for nausea and vomiting.   Neurological: Positive for headaches. Negative for syncope, facial asymmetry, speech difficulty, weakness and numbness.        Physical Examination       Objective     Vitals:    06/04/21 1006   BP: 138/86   Pulse: 91   Resp: 16   Temp: 36.7 °C (98 °F)   SpO2: 98%       Pulse Readings from Last 5 Encounters:   06/04/21 91   04/22/21 98   03/01/21 88   10/02/20 (!) 101   06/29/20 98       Wt Readings from Last 5 Encounters:   04/22/21 58.9 kg (129 lb 12.8 oz)   03/01/21 59.7 kg (131 lb 9.6 oz)   10/02/20 59.7 kg (131 lb 9.6 oz)   06/29/20 60.9 kg (134 lb 3.2 oz)   06/20/19 58.7 kg (129 lb 6.4 oz)       Ht Readings from Last 5 Encounters:   04/22/21 1.537 m (5' 0.5\")   03/01/21 1.537 m (5' 0.5\")   10/02/20 1.537 m (5' 0.5\")   06/29/20 1.537 m (5' 0.5\")   06/20/19 1.549 m (5' 1\")       BP Readings from Last 5 Encounters:   06/04/21 138/86   04/22/21 130/74   03/01/21 130/74   10/02/20 118/86   06/29/20 132/90       BMI Readings from Last 4 Encounters:   04/22/21 24.93 kg/m²   03/01/21 25.28 kg/m²   10/02/20 25.28 kg/m²   06/29/20 25.78 kg/m²       Physical Exam  Constitutional:       General: She is not in acute distress.     Appearance: Normal appearance. She is well-developed. She is not ill-appearing, toxic-appearing or diaphoretic.   HENT:      Right Ear: Tympanic membrane, ear canal and external ear normal.      Left Ear: Tympanic membrane, ear canal and external ear normal.      Nose: No mucosal edema, congestion or rhinorrhea.      Right Sinus: No maxillary sinus tenderness or frontal sinus tenderness.      Left Sinus: No maxillary sinus tenderness or frontal sinus tenderness.      " Mouth/Throat:      Pharynx: Uvula midline. No oropharyngeal exudate, posterior oropharyngeal erythema or uvula swelling.      Tonsils: No tonsillar exudate.   Eyes:      General: Lids are normal.         Right eye: No discharge.         Left eye: No discharge.      Conjunctiva/sclera:      Right eye: Right conjunctiva is not injected. No exudate.     Left eye: Left conjunctiva is not injected. No exudate.     Pupils: Pupils are equal, round, and reactive to light.   Neck:      Thyroid: No thyroid mass or thyromegaly.   Cardiovascular:      Heart sounds: S1 normal and S2 normal.   Pulmonary:      Effort: No tachypnea or accessory muscle usage.   Musculoskeletal:      Cervical back: Normal range of motion and neck supple.   Neurological:      General: No focal deficit present.      Mental Status: She is alert and oriented to person, place, and time.      Cranial Nerves: No cranial nerve deficit.      Coordination: Coordination normal. Finger-Nose-Finger Test normal.      Gait: Gait normal.      Comments: 5/5 bilateral upper extremity motor strength   5/5 bilateral lower extremity motor strength    Psychiatric:         Mood and Affect: Mood normal.         Behavior: Behavior normal. Behavior is cooperative.         Thought Content: Thought content normal.         Judgment: Judgment normal.          Laboratory Results     Lab Results   Component Value Date    WBC 6.04 04/20/2021    WBC 6.76 10/14/2020    WBC 5.72 06/19/2020    HGB 12.8 04/20/2021    HGB 13.1 10/14/2020    HGB 12.5 06/19/2020    HCT 38.8 04/20/2021    HCT 41.9 10/14/2020    HCT 39.0 06/19/2020    MCV 95.3 04/20/2021    .2 (H) 10/14/2020    .3 (H) 06/19/2020     04/20/2021     10/14/2020     06/19/2020        Lab Results   Component Value Date    GLUCOSE 94 04/20/2021    GLUCOSE 117 (H) 10/14/2020    GLUCOSE 88 06/19/2020    CALCIUM 9.1 04/20/2021    CALCIUM 9.3 10/14/2020    CALCIUM 9.1 06/19/2020     04/20/2021      10/14/2020     06/19/2020    K 3.7 04/20/2021    K 3.7 10/14/2020    K 4.3 06/19/2020    CO2 26 04/20/2021    CO2 20 (L) 10/14/2020    CO2 25 06/19/2020     04/20/2021     10/14/2020     06/19/2020    BUN 14 04/20/2021    BUN 17 10/14/2020    BUN 16 06/19/2020    CREATININE 0.9 04/20/2021    CREATININE 1.0 10/14/2020    CREATININE 0.9 06/19/2020    ALKPHOS 49 04/20/2021    ALKPHOS 46 10/14/2020    ALKPHOS 43 06/19/2020    AST 20 04/20/2021    AST 19 10/14/2020    AST 22 06/19/2020    ALT 19 04/20/2021    ALT 20 10/14/2020    ALT 25 06/19/2020    BILITOT 0.7 04/20/2021    BILITOT 0.5 10/14/2020    BILITOT 0.8 06/19/2020    ALBUMIN 4.2 04/20/2021    ALBUMIN 3.9 10/14/2020    ALBUMIN 3.9 06/19/2020       Lab Results   Component Value Date    CHOL 248 (H) 11/18/2015     Lab Results   Component Value Date    HDL 92 11/18/2015     Lab Results   Component Value Date    LDLCALC 133 (H) 11/18/2015     Lab Results   Component Value Date    TRIG 117 11/18/2015       Lab Results   Component Value Date    TSH 1.91 10/14/2020    TSH 0.32 (L) 06/10/2019    TSH 2.08 11/18/2015     Lab Results   Component Value Date    FREET4 0.79 11/18/2015       Lab Results   Component Value Date    HGBA1C 5.6 11/18/2015    HGBA1C 5.6 04/08/2015       Lab Results   Component Value Date    HEPCAB Nonreactive 07/12/2018           Immunization History   Administered Date(s) Administered   • Influenza Split High Dose Preservative Free IM 11/03/2011, 11/13/2012, 10/09/2013, 10/30/2014, 11/17/2015, 09/30/2016, 09/25/2017   • Influenza Vaccine 65 And Older Preservative Free 10/25/2019, 10/02/2020   • Influenza Vaccine Quadrivalent 3 Yr And Older 09/18/2017, 10/22/2018   • Influenza, Unspecified 10/30/2014, 11/17/2015, 09/30/2016, 09/25/2017   • MMRV 06/05/2014   • Pneumococcal Conjugate 08/09/1996   • Pneumococcal Conjugate 13-Valent 11/17/2015   • Pneumococcal Polysaccharide 10/30/2014, 09/11/2017   • Sars-cov-2  (Covid-19) Vaccine, Moderna 02/05/2021, 03/05/2021   • Tdap 03/22/2012   • Varicella 06/05/2014   • Zoster 01/02/2006, 01/02/2016   • Zoster Vaccine Recombinant Adjuvanted (Shingrix) 05/24/2019, 09/20/2019         Health Maintenance Topics with due status: Not Due       Topic Last Completion Date    DTaP, Tdap, and Td Vaccines 03/22/2012    DEXA Scan 07/30/2019    Medicare Annual Wellness Visit 10/02/2020     Health Maintenance Topics with due status: Completed       Topic Last Completion Date    Pneumococcal 09/11/2017    Pneumococcal (65 years and older) 09/11/2017    Hepatitis C Screening 07/12/2018    Zoster Vaccine 09/20/2019    Influenza Vaccine 10/02/2020    Annual Falls Risk Screening 03/01/2021    COVID-19 Vaccine 03/05/2021     Health Maintenance Topics with due status: Aged Out       Topic Date Due    Meningococcal ACWY Aged Out    Varicella Vaccines Aged Out    HIB Vaccines Aged Out    IPV Vaccines Aged Out    HPV Vaccines Aged Out     Health Maintenance Topics with due status: Discontinued       Topic Date Due    Mammogram Discontinued        Assessment and Plan       Assessment/Plan     Problem List Items Addressed This Visit     Essential hypertension - Primary (Chronic)    Overview     · Managed on HCTZ.   · Advised to follow a low sodium diet and keep BMI < 25.   · Advised to exercise for 2.5 hours per week.   · Instructed to take home blood pressure readings and call if consistently above 140/90.             Current Assessment & Plan     Unable to tolerate HCTZ due to headache and Amlodipine due to edema. Will try Valsartan 40 mg once daily. Check basic metabolic profile in one month. Check homoe blood pressure.          Relevant Medications    valsartan (DIOVAN) 40 mg tablet    Other Relevant Orders    Basic metabolic panel    Nonintractable headache    Current Assessment & Plan     No neurological findings today. Most likely secondary to HCTZ since onset of headache started with onset of taking  HCTZ. If persists despite stopping HCTZ will consider MRI of brain.                Return if symptoms worsen or fail to improve.    Orders Placed This Encounter   Procedures   • Basic metabolic panel     Standing Status:   Future     Standing Expiration Date:   6/4/2022     Order Specific Question:   Release to patient     Answer:   Immediate              Henry Linares MD    6/4/2021

## 2021-06-04 NOTE — PATIENT INSTRUCTIONS
Problem List Items Addressed This Visit     Essential hypertension - Primary (Chronic)     Unable to tolerate HCTZ due to headache and Amlodipine due to edema. Will try Valsartan 40 mg once daily. Check basic metabolic profile in one month. Check homoe blood pressure.          Relevant Medications    valsartan (DIOVAN) 40 mg tablet    Other Relevant Orders    Basic metabolic panel    Nonintractable headache     No neurological findings today. Most likely secondary to HCTZ since onset of headache started with onset of taking HCTZ. If persists despite stopping HCTZ will consider MRI of brain.

## 2021-06-04 NOTE — ASSESSMENT & PLAN NOTE
No neurological findings today. Most likely secondary to HCTZ since onset of headache started with onset of taking HCTZ. If persists despite stopping HCTZ will consider MRI of brain.

## 2021-06-04 NOTE — ASSESSMENT & PLAN NOTE
Unable to tolerate HCTZ due to headache and Amlodipine due to edema. Will try Valsartan 40 mg once daily. Check basic metabolic profile in one month. Check homoe blood pressure.

## 2021-06-24 ENCOUNTER — TELEPHONE (OUTPATIENT)
Dept: PRIMARY CARE | Facility: CLINIC | Age: 86
End: 2021-06-24

## 2021-06-24 RX ORDER — FUROSEMIDE 20 MG/1
20 TABLET ORAL DAILY
Qty: 30 TABLET | Refills: 1 | Status: SHIPPED | OUTPATIENT
Start: 2021-06-24 | End: 2021-10-06 | Stop reason: ALTCHOICE

## 2021-06-24 NOTE — TELEPHONE ENCOUNTER
The edema in her legs, ankle and feet are getting worse in both of her legs.  The patient can be reached at [phone]

## 2021-06-30 ENCOUNTER — APPOINTMENT (OUTPATIENT)
Dept: LAB | Facility: CLINIC | Age: 86
End: 2021-06-30
Attending: FAMILY MEDICINE
Payer: MEDICARE

## 2021-06-30 DIAGNOSIS — I10 ESSENTIAL HYPERTENSION: Chronic | ICD-10-CM

## 2021-06-30 LAB
ANION GAP SERPL CALC-SCNC: 12 MEQ/L (ref 3–15)
BUN SERPL-MCNC: 15 MG/DL (ref 8–20)
CALCIUM SERPL-MCNC: 9.2 MG/DL (ref 8.9–10.3)
CHLORIDE SERPL-SCNC: 107 MEQ/L (ref 98–109)
CO2 SERPL-SCNC: 23 MEQ/L (ref 22–32)
CREAT SERPL-MCNC: 0.9 MG/DL (ref 0.6–1.1)
GFR SERPL CREATININE-BSD FRML MDRD: 59 ML/MIN/1.73M*2
GLUCOSE SERPL-MCNC: 114 MG/DL (ref 70–99)
POTASSIUM SERPL-SCNC: 4.4 MEQ/L (ref 3.6–5.1)
SODIUM SERPL-SCNC: 142 MEQ/L (ref 136–144)

## 2021-06-30 PROCEDURE — 80048 BASIC METABOLIC PNL TOTAL CA: CPT

## 2021-06-30 PROCEDURE — 36415 COLL VENOUS BLD VENIPUNCTURE: CPT

## 2021-07-01 ENCOUNTER — DOCUMENTATION (OUTPATIENT)
Dept: PRIMARY CARE | Facility: CLINIC | Age: 86
End: 2021-07-01

## 2021-07-01 ENCOUNTER — TRANSCRIBE ORDERS (OUTPATIENT)
Dept: SCHEDULING | Age: 86
End: 2021-07-01

## 2021-07-01 DIAGNOSIS — M81.0 AGE-RELATED OSTEOPOROSIS WITHOUT CURRENT PATHOLOGICAL FRACTURE: Primary | ICD-10-CM

## 2021-07-19 ENCOUNTER — TELEPHONE (OUTPATIENT)
Dept: PRIMARY CARE | Facility: CLINIC | Age: 86
End: 2021-07-19

## 2021-07-19 NOTE — TELEPHONE ENCOUNTER
Pt informed. She will follow up with Dr. Linares on Friday with progress off of it as she feels her edema and BP are controlled.

## 2021-07-19 NOTE — TELEPHONE ENCOUNTER
Pt stated that she is having problems with the water pill.  Whenever pt takes it she has headaches all day and all night.  Pt also stated that she didn't take any today.  Pt do take tynenol but it only help for a short period of time.  Pt is going away in a couple of weeks and would like something different to take that would help but not have her in pain.

## 2021-07-19 NOTE — TELEPHONE ENCOUNTER
It appears from Dr Linares notes that he stopped HCTZ due to HAs then added Furosemide due to edema. If feels that BP and edema are ok and HAs left off HCTZ then can hold Furosemide and call w progress on edema and BP off it and if HAs leave so he can consider nxt step

## 2021-07-26 NOTE — TELEPHONE ENCOUNTER
· I called the patient I left a message on her voice mail .  · I called the patient at her daughters because she said she would be there for dinner I spoke with her she misunderstood that you were out this week and that is why she didn't call on Friday . She said that she stopped the furosemide and she hasn't had any headaches she is wondering if she should be taking something else she said that she checked her blood pressure this morning and it was 153/89 and then at 2 pm it was 144/94 she is at her daughter's [place] for dinner she is reachable on her cell [phone]

## 2021-07-26 NOTE — TELEPHONE ENCOUNTER
Spoke with patient.  Has headaches also with furosemide.  Has stopped it.  Has been unable to tolerate hydrochlorothiazide and valsartan.  We will shoot for a target of blood pressure below 150/90 given her age.  She will follow these over the next 2 weeks.  If it is consistently above 150/90 then we will reintroduce another antihypertensive medication.

## 2021-08-10 ENCOUNTER — TRANSCRIBE ORDERS (OUTPATIENT)
Dept: REGISTRATION | Facility: CLINIC | Age: 86
End: 2021-08-10

## 2021-08-10 ENCOUNTER — APPOINTMENT (OUTPATIENT)
Dept: LAB | Facility: CLINIC | Age: 86
End: 2021-08-10
Attending: PHYSICAL MEDICINE & REHABILITATION
Payer: MEDICARE

## 2021-08-10 ENCOUNTER — HOSPITAL ENCOUNTER (OUTPATIENT)
Dept: RADIOLOGY | Facility: CLINIC | Age: 86
Discharge: HOME | End: 2021-08-10
Attending: INTERNAL MEDICINE
Payer: MEDICARE

## 2021-08-10 DIAGNOSIS — M81.0 AGE-RELATED OSTEOPOROSIS WITHOUT CURRENT PATHOLOGICAL FRACTURE: ICD-10-CM

## 2021-08-10 DIAGNOSIS — G89.29 OTHER CHRONIC PAIN: ICD-10-CM

## 2021-08-10 DIAGNOSIS — G89.29 OTHER CHRONIC PAIN: Primary | ICD-10-CM

## 2021-08-10 PROCEDURE — 80307 DRUG TEST PRSMV CHEM ANLYZR: CPT | Mod: GZ

## 2021-08-10 PROCEDURE — 77086 VRT FRACTURE ASSMT VIA DXA: CPT

## 2021-08-11 ENCOUNTER — TRANSCRIBE ORDERS (OUTPATIENT)
Dept: SCHEDULING | Age: 86
End: 2021-08-11

## 2021-08-11 DIAGNOSIS — M81.0 AGE-RELATED OSTEOPOROSIS WITHOUT CURRENT PATHOLOGICAL FRACTURE: Primary | ICD-10-CM

## 2021-08-11 LAB
AMPHET UR QL SCN: NOT DETECTED
BARBITURATES UR QL SCN: NOT DETECTED
BENZODIAZ UR QL SCN: NOT DETECTED
CANNABINOIDS UR QL SCN: NOT DETECTED
COCAINE UR QL SCN: NOT DETECTED
OPIATES UR QL SCN: NOT DETECTED
PCP UR QL SCN: NOT DETECTED

## 2021-09-07 ENCOUNTER — TELEPHONE (OUTPATIENT)
Dept: PRIMARY CARE | Facility: CLINIC | Age: 86
End: 2021-09-07

## 2021-09-07 NOTE — TELEPHONE ENCOUNTER
· I called the patient she was calling for several reasons she is due for her MAW last 10-2-20 and also about her blood pressure she isn't having any issues since the medication was changed at different times of the day her readings may run in the 150's and she said that she was told by dr rudd to monitor which she does she has an omron machine and per her last visit she is to check at home she said that she will keep the log I told her to bring the log and the machine at her appointment for her MAW so that dr rudd can compare .   · Can you schedule this appointment when you are available TY

## 2021-10-06 ENCOUNTER — OFFICE VISIT (OUTPATIENT)
Dept: PRIMARY CARE | Facility: CLINIC | Age: 86
End: 2021-10-06
Payer: MEDICARE

## 2021-10-06 VITALS
SYSTOLIC BLOOD PRESSURE: 160 MMHG | RESPIRATION RATE: 16 BRPM | WEIGHT: 128 LBS | DIASTOLIC BLOOD PRESSURE: 84 MMHG | TEMPERATURE: 98.1 F | BODY MASS INDEX: 24.59 KG/M2 | OXYGEN SATURATION: 95 % | HEART RATE: 89 BPM

## 2021-10-06 DIAGNOSIS — K21.9 GASTROESOPHAGEAL REFLUX DISEASE, UNSPECIFIED WHETHER ESOPHAGITIS PRESENT: Chronic | ICD-10-CM

## 2021-10-06 DIAGNOSIS — I10 ESSENTIAL HYPERTENSION: Primary | Chronic | ICD-10-CM

## 2021-10-06 PROCEDURE — G0008 ADMIN INFLUENZA VIRUS VAC: HCPCS | Performed by: FAMILY MEDICINE

## 2021-10-06 PROCEDURE — 99213 OFFICE O/P EST LOW 20 MIN: CPT | Mod: 25 | Performed by: FAMILY MEDICINE

## 2021-10-06 PROCEDURE — 90694 VACC AIIV4 NO PRSRV 0.5ML IM: CPT | Performed by: FAMILY MEDICINE

## 2021-10-06 ASSESSMENT — ENCOUNTER SYMPTOMS
NAUSEA: 0
COUGH: 0
WEAKNESS: 0
NUMBNESS: 0
UNEXPECTED WEIGHT CHANGE: 0
SHORTNESS OF BREATH: 0
VOMITING: 0
PALPITATIONS: 0
DIZZINESS: 0
CONFUSION: 0
SPEECH DIFFICULTY: 0
CHEST TIGHTNESS: 0
MYALGIAS: 0
WHEEZING: 0
ARTHRALGIAS: 0
LIGHT-HEADEDNESS: 0
HYPERTENSION: 1
HEADACHES: 0

## 2021-10-06 NOTE — PATIENT INSTRUCTIONS
Problem List Items Addressed This Visit     Essential hypertension - Primary (Chronic)     Blood pressure elevated. Has not tolerated medications in past. Home blood pressure reading are ok. Follow low sodium diet. Walking daily. Recheck blood pressure at upcoming preop exam in 11/2021         GERD (gastroesophageal reflux disease) (Chronic)

## 2021-10-06 NOTE — ASSESSMENT & PLAN NOTE
Blood pressure elevated. Has not tolerated medications in past. Home blood pressure reading are ok. Follow low sodium diet. Walking daily. Recheck blood pressure at upcoming preop exam in 11/2021

## 2021-10-06 NOTE — PROGRESS NOTES
Hnery Linares MD    [facility]  [address], [address]  [place] [address]  Phone: [phone]  Fax: [phone]     History of Present Illness     Subjective     Patient ID: Madelyn Ruiz is a 89 y.o. female.    Hypertension  This is a chronic problem. Pertinent negatives include no chest pain, headaches, palpitations or shortness of breath.        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 Arriaza.  Stable FEV1 in 8/2015.  IgE levels and eosinophils stable in 8/2015.  Managed with Fasenra, Azithromycin and Flovent.   • Allergic rhinitis 4/2/2018    Pulmonologist: Dr. Walker. Managed with Flonase.   • Calcification of aorta (CMS/[facility]) 6/29/2020    Seen incidentally on CT scan.   • Chronic obstructive pulmonary disease (CMS/[facility]) 4/2/2018    Pulmonologist: Dr. Walker.  Seen on PFTs in hospital in 2014.  Controlled with Spiriva and FLovent.   • Essential hypertension 4/2/2018    Managed on no medications at this time. Advised to follow a low sodium diet and keep BMI < 25.  Advised to exercise for 2.5 hours per week.  Instructed to take home blood pressure readings and call if consistently above 140/90.     • GERD (gastroesophageal reflux disease) 6/8/2019    Managed with Esomeprazole.  Should take medication 30 minutes prior to meal.  Advised to avoid caffeine, carbonated beverages, and should not eat within 3 hours of going to sleep.  Advised to reduce BMI < 25.    • Insomnia 4/2/2018    Managed with Lorazepam as needed.   • Lumbar spinal stenosis 4/2/2018    Neurosurgeon: Dr. Miguel. MRI with Central and lateral recess spinal stenosis. Has Spondylolisthesis at L4/L5. S/P epidural injection by Dr. Martin with some relief in past. Had  Lumbar fusion and Lumbar laminectomy by Dr. Miguel in 4/2015.   • Obstructive sleep apnea syndrome 4/2/2018    Mild STACIA noted in sleep study 7/2015. Treated with nasal CPAP automatic with pressure range 5-10 CM H2Ox couple months. Off now   • Osteoporosis 4/2/2018    Rheumatologist: Dr. Bradley.  Dexa scan 7/2016 with osteoporosis of lumbar spine LS-2.5, LH-2.1, RH -2.2.  Has been on Fosamax in the past for 8 years.  Worsened by Prednisone in past.  Continue vitamin D, calcium and weight bearing exercise. DEXA in 7/2017 with LS -1.7, LH -2.2, and RH -2.0.  Next due in 7/2019.  Seen by Dr. Bradley in 8/2015 who agreed with initiating Prolia. Took for 2 years. N   • Pulmonary embolism (CMS/HCC) 6/9/2019    Hematologist: Dr. Bhakta Diagnosed in 06/2019 in right upper lobe. Vascular ultrasound negative of bilateral lower extremities. Managed on Eliquis. Hypercoagulable workup was negative for beta-2 glycoprotein's, RIGO, Antithrombin III, lupus anticoagulant, protein C activity protein S activity, and prothrombin gene mutation, and factor V Leiden. Now off Apixaban as CT scan in 12/2019 was without pu   • Vertebral compression fracture (CMS/HCC)     At T12 level       Past Surgical History:   Procedure Laterality Date   • APPENDECTOMY     • CARPAL TUNNEL RELEASE Bilateral    • CATARACT EXTRACTION W/  INTRAOCULAR LENS IMPLANT Bilateral    • HYSTERECTOMY  1972   • LUMBAR LAMINECTOMY  04/21/2015    S/P lumbar fusion   • TONSILLECTOMY         Family History   Problem Relation Age of Onset   • Glaucoma Biological Mother    • Macular degeneration Biological Mother    • Hypertension Biological Mother    • Lung cancer Biological Father    • Heart disease Biological Brother    • Breast cancer Mother's Sister    • No Known Problems Biological Son    • No Known Problems Biological Son    • No Known Problems Biological Daughter    • No Known Problems Biological Daughter    • No Known Problems Biological Daughter        Social  History     Tobacco Use   • Smoking status: Never Smoker   • Smokeless tobacco: Never Used   Vaping Use   • Vaping Use: Never used   Substance Use Topics   • Alcohol use: Yes     Comment: Rarely   • Drug use: No       Patient Care Team:  Henry Linares MD as PCP - General  CriRay padron MD as Referring Physician  Aye Bhakta MD as Consulting Physician (Hematology and Oncology)  Wesley Walker MD as Consulting Physician (Pulmonary)  Fausto Bradley MD as Consulting Physician (Rheumatology)     Allergies and Medications       Allergies   Allergen Reactions   • Mepolizumab Rash   • Morphine      Other reaction(s): Vomiting   • Oxycodone      Other reaction(s): Vomiting       Current Outpatient Medications   Medication Sig Dispense Refill   • albuterol 2.5 mg /3 mL (0.083 %) nebulizer solution Take 3 mL (2.5 mg total) by nebulization every 6 (six) hours as needed for wheezing or shortness of breath. 125 vial 0   • albuterol HFA (VENTOLIN HFA) 90 mcg/actuation inhaler Inhale 2 puffs every 4 (four) hours as needed for wheezing or shortness of breath. 8.5 g 0   • benralizumab (FASENRA) 30 mg/mL syringe subcutaneous syringe Inject 30 mg under the skin every 2 (two) months.     • benzonatate (TESSALON) 200 mg capsule TAKE 1 CAPSULE BY MOUTH 3 TIMES DAILY AS NEEDED FOR COUGH.     • calcium carbonate (calcium carbonate) 600 mg calcium (1,500 mg) tablet Take 1 tablet by mouth 2 (two) times a day.     • cholecalciferol, vitamin D3, (VITAMIN D3 ORAL) Take by mouth daily.       • diphenhydrAMINE (BENADRYL) 25 mg capsule Take 25 mg by mouth nightly.     • docusate sodium (COLACE) 100 mg capsule Take 100 mg by mouth nightly.       • DULoxetine (CYMBALTA) 30 mg capsule Take 30 mg by mouth daily.     • DULoxetine (CYMBALTA) 60 mg capsule TAKE 1 CAPSULE BY MOUTH EVERYDAY AT BEDTIME  0   • esomeprazole (NexIUM) 20 mg capsule Take 20 mg by mouth daily. Take 30 minutes prior to meal.     • fexofenadine (ALLEGRA) 180 mg tablet  Take 180 mg by mouth daily.     • FLOVENT  mcg/actuation inhaler Inhale 2 puffs 2 (two) times a day.     • fluticasone propionate (FLONASE) 50 mcg/actuation nasal spray Administer 1 spray into each nostril daily.     • FOLIC ACID/MULTIVIT,IRON, (CENTRUM ORAL) Take 1 tablet by mouth daily.     • LORazepam (ATIVAN) 1 mg tablet Take 1 tablet (1 mg total) by mouth nightly as needed for anxiety or sleep. 30 tablet 5   • pregabalin (LYRICA) 25 mg capsule Take 25 mg by mouth nightly.     • SPIRIVA RESPIMAT 2.5 mcg/actuation mist inhaler Inhale 2 puffs daily.     • SYMBICORT 80-4.5 mcg/actuation inhaler Inhale 2 puffs 2 (two) times a day.     • traMADoL (ULTRAM) 50 mg tablet Take 50 mg by mouth daily as needed.     • zoledronic acid (RECLAST) IVPB Infuse 5 mg into a venous catheter See admin instr. 5 mg once yearly       No current facility-administered medications for this visit.          Review of Systems       Review of Systems   Constitutional: Negative for unexpected weight change.   Eyes: Negative for visual disturbance.   Respiratory: Negative for cough, chest tightness, shortness of breath and wheezing.    Cardiovascular: Negative for chest pain, palpitations and leg swelling.   Gastrointestinal: Negative for nausea and vomiting.   Musculoskeletal: Negative for arthralgias and myalgias.   Neurological: Negative for dizziness, syncope, speech difficulty, weakness, light-headedness, numbness and headaches.   Psychiatric/Behavioral: Negative for confusion.        Physical Examination       Objective     Vitals:    10/06/21 1338   BP: (!) 160/84   Pulse: 89   Resp: 16   Temp: 36.7 °C (98.1 °F)   SpO2: 95%       Pulse Readings from Last 5 Encounters:   10/06/21 89   06/04/21 91   04/22/21 98   03/01/21 88   10/02/20 (!) 101       Wt Readings from Last 5 Encounters:   10/06/21 58.1 kg (128 lb)   04/22/21 58.9 kg (129 lb 12.8 oz)   03/01/21 59.7 kg (131 lb 9.6 oz)   10/02/20 59.7 kg (131 lb 9.6 oz)   06/29/20  "60.9 kg (134 lb 3.2 oz)       Ht Readings from Last 5 Encounters:   04/22/21 1.537 m (5' 0.5\")   03/01/21 1.537 m (5' 0.5\")   10/02/20 1.537 m (5' 0.5\")   06/29/20 1.537 m (5' 0.5\")   06/20/19 1.549 m (5' 1\")       BP Readings from Last 5 Encounters:   10/06/21 (!) 160/84   06/04/21 138/86   04/22/21 130/74   03/01/21 130/74   10/02/20 118/86       BMI Readings from Last 4 Encounters:   10/06/21 24.59 kg/m²   04/22/21 24.93 kg/m²   03/01/21 25.28 kg/m²   10/02/20 25.28 kg/m²       Physical Exam  Constitutional:       General: She is not in acute distress.     Appearance: Normal appearance. She is well-developed. She is not ill-appearing, toxic-appearing or diaphoretic.   Neck:      Vascular: No carotid bruit or JVD.   Cardiovascular:      Rate and Rhythm: Normal rate and regular rhythm.      Pulses:           Dorsalis pedis pulses are 2+ on the right side and 2+ on the left side.        Posterior tibial pulses are 2+ on the right side and 2+ on the left side.      Heart sounds: Normal heart sounds, S1 normal and S2 normal. No murmur heard.  No gallop. No S3 or S4 sounds.       Comments: No edema bilaterally  Pulmonary:      Effort: Pulmonary effort is normal. No accessory muscle usage or respiratory distress.      Breath sounds: Normal breath sounds. No wheezing, rhonchi or rales.   Abdominal:      General: Bowel sounds are normal.      Palpations: Abdomen is soft. There is no hepatomegaly or splenomegaly.      Tenderness: There is no abdominal tenderness. There is no guarding or rebound.   Musculoskeletal:      Right lower leg: No edema.      Left lower leg: No edema.   Neurological:      Mental Status: She is alert.   Psychiatric:         Mood and Affect: Mood normal.         Behavior: Behavior is cooperative.          Laboratory Results     Lab Results   Component Value Date    WBC 6.04 04/20/2021    WBC 6.76 10/14/2020    WBC 5.72 06/19/2020    HGB 12.8 04/20/2021    HGB 13.1 10/14/2020    HGB 12.5 06/19/2020    " HCT 38.8 04/20/2021    HCT 41.9 10/14/2020    HCT 39.0 06/19/2020    MCV 95.3 04/20/2021    .2 (H) 10/14/2020    .3 (H) 06/19/2020     04/20/2021     10/14/2020     06/19/2020        Lab Results   Component Value Date    GLUCOSE 114 (H) 06/30/2021    GLUCOSE 94 04/20/2021    GLUCOSE 117 (H) 10/14/2020    CALCIUM 9.2 06/30/2021    CALCIUM 9.1 04/20/2021    CALCIUM 9.3 10/14/2020     06/30/2021     04/20/2021     10/14/2020    K 4.4 06/30/2021    K 3.7 04/20/2021    K 3.7 10/14/2020    CO2 23 06/30/2021    CO2 26 04/20/2021    CO2 20 (L) 10/14/2020     06/30/2021     04/20/2021     10/14/2020    BUN 15 06/30/2021    BUN 14 04/20/2021    BUN 17 10/14/2020    CREATININE 0.9 06/30/2021    CREATININE 0.9 04/20/2021    CREATININE 1.0 10/14/2020    ALKPHOS 49 04/20/2021    ALKPHOS 46 10/14/2020    ALKPHOS 43 06/19/2020    AST 20 04/20/2021    AST 19 10/14/2020    AST 22 06/19/2020    ALT 19 04/20/2021    ALT 20 10/14/2020    ALT 25 06/19/2020    BILITOT 0.7 04/20/2021    BILITOT 0.5 10/14/2020    BILITOT 0.8 06/19/2020    ALBUMIN 4.2 04/20/2021    ALBUMIN 3.9 10/14/2020    ALBUMIN 3.9 06/19/2020       Lab Results   Component Value Date    CHOL 248 (H) 11/18/2015     Lab Results   Component Value Date    HDL 92 11/18/2015     Lab Results   Component Value Date    LDLCALC 133 (H) 11/18/2015     Lab Results   Component Value Date    TRIG 117 11/18/2015     Lab Results   Component Value Date    TSH 1.91 10/14/2020    TSH 0.32 (L) 06/10/2019    TSH 2.08 11/18/2015     Lab Results   Component Value Date    FREET4 0.79 11/18/2015     Lab Results   Component Value Date    HGBA1C 5.6 11/18/2015    HGBA1C 5.6 04/08/2015       Lab Results   Component Value Date    HEPCAB Nonreactive 07/12/2018       Immunization History   Administered Date(s) Administered   • Influenza Split High Dose Preservative Free IM 11/03/2011, 11/13/2012, 10/09/2013, 10/30/2014, 11/17/2015,  09/30/2016, 09/25/2017   • Influenza Vaccine 65 And Older Preservative Free 10/25/2019, 10/02/2020   • Influenza Vaccine Quadrivalent 3 Yr And Older 09/18/2017, 10/22/2018   • Influenza, Unspecified 10/30/2014, 11/17/2015, 09/30/2016, 09/25/2017   • MMRV 06/05/2014   • Pneumococcal Conjugate 08/09/1996   • Pneumococcal Conjugate 13-Valent 11/17/2015   • Pneumococcal Polysaccharide 10/30/2014, 09/11/2017   • Sars-cov-2 (Covid-19) Vaccine, Moderna 02/05/2021, 03/05/2021   • Tdap 03/22/2012   • Varicella 06/05/2014   • Zoster 01/02/2006, 01/02/2016   • Zoster Vaccine Recombinant Adjuvanted (Shingrix) 05/24/2019, 09/20/2019         Health Maintenance Topics with due status: Overdue       Topic Date Due    DEXA Scan 07/30/2021    Influenza Vaccine 08/01/2021    Medicare Annual Wellness Visit 10/02/2021     Health Maintenance Topics with due status: Not Due       Topic Last Completion Date    DTaP, Tdap, and Td Vaccines 03/22/2012     Health Maintenance Topics with due status: Completed       Topic Last Completion Date    Pneumococcal 09/11/2017    Pneumococcal (65 years and older) 09/11/2017    Hepatitis C Screening 07/12/2018    Zoster Vaccine 09/20/2019    Annual Falls Risk Screening 03/01/2021    COVID-19 Vaccine 03/05/2021     Health Maintenance Topics with due status: Aged Out       Topic Date Due    Meningococcal ACWY Aged Out    HIB Vaccines Aged Out    IPV Vaccines Aged Out    HPV Vaccines Aged Out     Health Maintenance Topics with due status: Discontinued       Topic Date Due    Mammogram Discontinued        Assessment and Plan       Assessment/Plan     Problem List Items Addressed This Visit     Essential hypertension - Primary (Chronic)    Overview     · Managed on no medications at this time.  · Advised to follow a low sodium diet and keep BMI < 25.   · Advised to exercise for 2.5 hours per week.   · Instructed to take home blood pressure readings and call if consistently above 140/90.             Current  Assessment & Plan     Blood pressure elevated. Has not tolerated medications in past. Home blood pressure reading are ok. Follow low sodium diet. Walking daily. Recheck blood pressure at upcoming preop exam in 11/2021         GERD (gastroesophageal reflux disease) (Chronic)    Overview     · Managed with Esomeprazole.   · Should take medication 30 minutes prior to meal.   · Advised to avoid caffeine, carbonated beverages, and should not eat within 3 hours of going to sleep.   · Advised to reduce BMI < 25.                 Return for  Preop in early November.    Orders Placed This Encounter   Procedures   • Influenza vaccine 65 and older IM preservative free              Henry Linares MD    10/6/2021

## 2021-10-21 ENCOUNTER — APPOINTMENT (OUTPATIENT)
Dept: LAB | Facility: HOSPITAL | Age: 86
End: 2021-10-21
Attending: INTERNAL MEDICINE
Payer: MEDICARE

## 2021-10-21 ENCOUNTER — TRANSCRIBE ORDERS (OUTPATIENT)
Dept: LAB | Facility: HOSPITAL | Age: 86
End: 2021-10-21
Payer: MEDICARE

## 2021-10-21 DIAGNOSIS — D47.2 MONOCLONAL GAMMOPATHY: ICD-10-CM

## 2021-10-21 DIAGNOSIS — D47.2 MONOCLONAL GAMMOPATHY: Primary | ICD-10-CM

## 2021-10-21 LAB
ALBUMIN SERPL-MCNC: 4.1 G/DL (ref 3.4–5)
ALP SERPL-CCNC: 41 IU/L (ref 35–126)
ALT SERPL-CCNC: 19 IU/L (ref 11–54)
ANION GAP SERPL CALC-SCNC: 10 MEQ/L (ref 3–15)
AST SERPL-CCNC: 19 IU/L (ref 15–41)
BASOPHILS # BLD: 0.01 K/UL (ref 0.01–0.1)
BASOPHILS NFR BLD: 0.2 %
BILIRUB SERPL-MCNC: 1.1 MG/DL (ref 0.3–1.2)
BUN SERPL-MCNC: 16 MG/DL (ref 8–20)
CALCIUM SERPL-MCNC: 9.4 MG/DL (ref 8.9–10.3)
CHLORIDE SERPL-SCNC: 106 MEQ/L (ref 98–109)
CO2 SERPL-SCNC: 26 MEQ/L (ref 22–32)
CREAT SERPL-MCNC: 0.8 MG/DL (ref 0.6–1.1)
DIFFERENTIAL METHOD BLD: ABNORMAL
EOSINOPHIL # BLD: 0 K/UL (ref 0.04–0.36)
EOSINOPHIL NFR BLD: 0 %
ERYTHROCYTE [DISTWIDTH] IN BLOOD BY AUTOMATED COUNT: 14.6 % (ref 11.7–14.4)
GFR SERPL CREATININE-BSD FRML MDRD: >60 ML/MIN/1.73M*2
GLUCOSE SERPL-MCNC: 96 MG/DL (ref 70–99)
HCT VFR BLDCO AUTO: 40.9 % (ref 35–45)
HGB BLD-MCNC: 12.9 G/DL (ref 11.8–15.7)
IMM GRANULOCYTES # BLD AUTO: 0.03 K/UL (ref 0–0.08)
IMM GRANULOCYTES NFR BLD AUTO: 0.5 %
LYMPHOCYTES # BLD: 1.66 K/UL (ref 1.2–3.5)
LYMPHOCYTES NFR BLD: 25.1 %
MCH RBC QN AUTO: 31.2 PG (ref 28–33.2)
MCHC RBC AUTO-ENTMCNC: 31.5 G/DL (ref 32.2–35.5)
MCV RBC AUTO: 99 FL (ref 83–98)
MONOCYTES # BLD: 0.63 K/UL (ref 0.28–0.8)
MONOCYTES NFR BLD: 9.5 %
NEUTROPHILS # BLD: 4.29 K/UL (ref 1.7–7)
NEUTROPHILS # BLD: 4.29 K/UL (ref 1.7–7)
NEUTS SEG NFR BLD: 64.7 %
NRBC BLD-RTO: 0 %
PDW BLD AUTO: 9.5 FL (ref 9.4–12.3)
PLATELET # BLD AUTO: 232 K/UL (ref 150–369)
POTASSIUM SERPL-SCNC: 4.3 MEQ/L (ref 3.6–5.1)
PROT SERPL-MCNC: 6.4 G/DL (ref 6–8.2)
RBC # BLD AUTO: 4.13 M/UL (ref 3.93–5.22)
SODIUM SERPL-SCNC: 142 MEQ/L (ref 136–144)
WBC # BLD AUTO: 6.62 K/UL (ref 3.8–10.5)

## 2021-10-21 PROCEDURE — 83883 ASSAY NEPHELOMETRY NOT SPEC: CPT

## 2021-10-21 PROCEDURE — 82784 ASSAY IGA/IGD/IGG/IGM EACH: CPT | Mod: 59

## 2021-10-21 PROCEDURE — 36415 COLL VENOUS BLD VENIPUNCTURE: CPT

## 2021-10-21 PROCEDURE — 85025 COMPLETE CBC W/AUTO DIFF WBC: CPT

## 2021-10-21 PROCEDURE — 80053 COMPREHEN METABOLIC PANEL: CPT

## 2021-10-22 LAB
IGA SERPL-MCNC: 117 MG/DL (ref 84.5–499)
IGG SERPL-MCNC: 427 MG/DL (ref 537–1535)
IGM SERPL-MCNC: 77 MG/DL (ref 35–242)
KAPPA LC SERPL-MCNC: 11.79 MG/L (ref 8.96–34.28)
KAPPA LC/LAMBDA SER: 1.36 {RATIO} (ref 0.64–1.83)
LAMBDA LC SERPL-MCNC: 8.7 MG/L (ref 5.7–26.3)

## 2021-10-27 ENCOUNTER — TELEPHONE (OUTPATIENT)
Dept: PRIMARY CARE | Facility: CLINIC | Age: 86
End: 2021-10-27

## 2021-10-27 NOTE — TELEPHONE ENCOUNTER
· I called dr brendon smith's office I spoke with maddie I told her the situation she said that the dr is in surgery I then spoke with another maddie  who said that the dr is in surgery today and the nurse is detained and the Dr didn't sign off on the office visit so at this point there is nothing that can be done and patient can either wait for a call back or reschedule the blood work ;.  · I spoke with patient's daughter rosalia informed her she said she will reschedule ,

## 2021-10-29 ENCOUNTER — TRANSCRIBE ORDERS (OUTPATIENT)
Dept: LAB | Facility: CLINIC | Age: 86
End: 2021-10-29
Payer: MEDICARE

## 2021-10-29 ENCOUNTER — APPOINTMENT (OUTPATIENT)
Dept: LAB | Facility: CLINIC | Age: 86
End: 2021-10-29
Attending: PLASTIC SURGERY
Payer: MEDICARE

## 2021-10-29 DIAGNOSIS — M62.81 MUSCLE WEAKNESS (GENERALIZED): Primary | ICD-10-CM

## 2021-10-29 DIAGNOSIS — H02.34: ICD-10-CM

## 2021-10-29 DIAGNOSIS — H02.35: ICD-10-CM

## 2021-10-29 DIAGNOSIS — M62.81 MUSCLE WEAKNESS (GENERALIZED): ICD-10-CM

## 2021-10-29 PROCEDURE — 36415 COLL VENOUS BLD VENIPUNCTURE: CPT

## 2021-10-29 PROCEDURE — 83519 RIA NONANTIBODY: CPT

## 2021-11-04 LAB — ACHR BIND AB SER-SCNC: <0.3 NMOL/L

## 2021-11-12 ENCOUNTER — CONSULT (OUTPATIENT)
Dept: PRIMARY CARE | Facility: CLINIC | Age: 86
End: 2021-11-12
Payer: MEDICARE

## 2021-11-12 VITALS
HEIGHT: 61 IN | WEIGHT: 128.6 LBS | TEMPERATURE: 98 F | RESPIRATION RATE: 16 BRPM | DIASTOLIC BLOOD PRESSURE: 80 MMHG | SYSTOLIC BLOOD PRESSURE: 130 MMHG | OXYGEN SATURATION: 98 % | BODY MASS INDEX: 24.28 KG/M2 | HEART RATE: 84 BPM

## 2021-11-12 DIAGNOSIS — Z01.818 PREOP EXAMINATION: ICD-10-CM

## 2021-11-12 DIAGNOSIS — R39.9 URINARY TRACT INFECTION SYMPTOMS: Primary | ICD-10-CM

## 2021-11-12 PROCEDURE — 99214 OFFICE O/P EST MOD 30 MIN: CPT | Performed by: FAMILY MEDICINE

## 2021-11-12 PROCEDURE — 87086 URINE CULTURE/COLONY COUNT: CPT | Performed by: FAMILY MEDICINE

## 2021-11-12 RX ORDER — AZITHROMYCIN 250 MG/1
250 TABLET, FILM COATED ORAL DAILY
COMMUNITY
Start: 2021-11-11 | End: 2022-08-01 | Stop reason: ALTCHOICE

## 2021-11-12 ASSESSMENT — ENCOUNTER SYMPTOMS
CONSTIPATION: 0
WEAKNESS: 0
ABDOMINAL PAIN: 0
HEMATURIA: 0
MYALGIAS: 0
FREQUENCY: 0
ARTHRALGIAS: 0
TROUBLE SWALLOWING: 0
BRUISES/BLEEDS EASILY: 0
UNEXPECTED WEIGHT CHANGE: 0
FEVER: 0
CHILLS: 0
RHINORRHEA: 0
EYE DISCHARGE: 0
CONFUSION: 0
EYE REDNESS: 0
DYSURIA: 1
DIARRHEA: 0
NAUSEA: 0
DIZZINESS: 0
DYSPHORIC MOOD: 0
BACK PAIN: 0
NUMBNESS: 0
WHEEZING: 0
BLOOD IN STOOL: 0
CHEST TIGHTNESS: 0
COUGH: 0
EYE ITCHING: 0
FATIGUE: 0
SORE THROAT: 0
SLEEP DISTURBANCE: 0
EYE PAIN: 0
NECK PAIN: 0
SINUS PRESSURE: 0
APPETITE CHANGE: 0
NERVOUS/ANXIOUS: 0
PALPITATIONS: 0
HEADACHES: 0
SHORTNESS OF BREATH: 0
LIGHT-HEADEDNESS: 0
DIFFICULTY URINATING: 0
SPEECH DIFFICULTY: 0
VOMITING: 0
JOINT SWELLING: 0

## 2021-11-12 NOTE — ASSESSMENT & PLAN NOTE
The patient presents at the request of Dr. Middleton a preoperative evaluation prior to bilateral upper and lower blepharoplasty and ptosis repair bilateral  on 11/30/2021. Has a history of decreased vision in the both eye. No redness. No eye discharge.  No blood work or EKG needed. Patient may proceed with proposed surgical procedure. No aspirin or non-steroidal anti-inflammatory medications.

## 2021-11-12 NOTE — PATIENT INSTRUCTIONS
Problem List Items Addressed This Visit     Preop examination - Primary     The patient presents at the request of Dr. Middleton a preoperative evaluation prior to bilateral upper and lower blepharoplasty and ptosis repair bilateral  on 11/30/2021. Has a history of decreased vision in the both eye. No redness. No eye discharge.  No blood work or EKG needed. Patient may proceed with proposed surgical procedure. No aspirin or non-steroidal anti-inflammatory medications.

## 2021-11-12 NOTE — PROGRESS NOTES
Henry Linares MD    [facility]  [address]  [place] [address]  Phone: [phone]  Fax: [phone]     History of Present Illness     Subjective     Patient ID: Madelyn Ruiz is a 89 y.o. female.    The patient presents at the request of Dr. Middleton a preoperative evaluation prior to bilateral upper and lower blepharoplasty and ptosis repair bilateral  on 11/30/2021. Has a history of decreased vision in the both eye. No redness. No eye discharge.           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 Arriaza.  Stable FEV1 in 8/2015.  IgE levels and eosinophils stable in 8/2015.  Managed with Fasenra, Azithromycin and Flovent.   • Allergic rhinitis 4/2/2018    Pulmonologist: Dr. Walker. Managed with Flonase.   • Calcification of aorta (CMS/[facility]) 6/29/2020    Seen incidentally on CT scan.   • Chronic obstructive pulmonary disease (CMS/[facility]) 4/2/2018    Pulmonologist: Dr. Walker.  Seen on PFTs in hospital in 2014.  Controlled with Spiriva and FLovent.   • Essential hypertension 4/2/2018    Managed on no medications at this time. Advised to follow a low sodium diet and keep BMI < 25.  Advised to exercise for 2.5 hours per week.  Instructed to take home blood pressure readings and call if consistently above 140/90.     • GERD (gastroesophageal reflux disease) 6/8/2019    Managed with Esomeprazole.  Should take medication 30 minutes prior to meal.  Advised to avoid caffeine, carbonated beverages, and should not eat within 3 hours of going to sleep.  Advised to reduce BMI < 25.    • Insomnia 4/2/2018    Managed with Lorazepam as needed.   • Lumbar spinal stenosis 4/2/2018    Neurosurgeon: Dr. Miguel. MRI with Central and lateral  recess spinal stenosis. Has Spondylolisthesis at L4/L5. S/P epidural injection by Dr. Martin with some relief in past. Had Lumbar fusion and Lumbar laminectomy by Dr. Miguel in 4/2015.   • Obstructive sleep apnea syndrome 4/2/2018    Mild STACIA noted in sleep study 7/2015. Treated with nasal CPAP automatic with pressure range 5-10 CM H2Ox couple months. Off now   • Osteoporosis 4/2/2018    Rheumatologist: Dr. Bradley.  Dexa scan 7/2016 with osteoporosis of lumbar spine LS-2.5, LH-2.1, RH -2.2.  Has been on Fosamax in the past for 8 years.  Worsened by Prednisone in past.  Continue vitamin D, calcium and weight bearing exercise. DEXA in 7/2017 with LS -1.7, LH -2.2, and RH -2.0.  Next due in 7/2019.  Seen by Dr. Bradley in 8/2015 who agreed with initiating Prolia. Took for 2 years. N   • Pulmonary embolism (CMS/HCC) 6/9/2019    Hematologist: Dr. Bhakta Diagnosed in 06/2019 in right upper lobe. Vascular ultrasound negative of bilateral lower extremities. Managed on Eliquis. Hypercoagulable workup was negative for beta-2 glycoprotein's, RIGO, Antithrombin III, lupus anticoagulant, protein C activity protein S activity, and prothrombin gene mutation, and factor V Leiden. Now off Apixaban as CT scan in 12/2019 was without pu   • Vertebral compression fracture (CMS/HCC)     At T12 level       Past Surgical History:   Procedure Laterality Date   • APPENDECTOMY     • CARPAL TUNNEL RELEASE Bilateral    • CATARACT EXTRACTION W/  INTRAOCULAR LENS IMPLANT Bilateral    • HYSTERECTOMY  1972   • LUMBAR LAMINECTOMY  04/21/2015    S/P lumbar fusion   • TONSILLECTOMY         Family History   Problem Relation Age of Onset   • Glaucoma Biological Mother    • Macular degeneration Biological Mother    • Hypertension Biological Mother    • Lung cancer Biological Father    • Heart disease Biological Brother    • Breast cancer Mother's Sister    • No Known Problems Biological Son    • No Known Problems Biological Son    • No Known Problems  Biological Daughter    • No Known Problems Biological Daughter    • No Known Problems Biological Daughter        Social History     Tobacco Use   • Smoking status: Never Smoker   • Smokeless tobacco: Never Used   Vaping Use   • Vaping Use: Never used   Substance Use Topics   • Alcohol use: Yes     Comment: Rarely   • Drug use: No       Patient Care Team:  Henry Linares MD as PCP - General  Ray Arriaza MD as Referring Physician  Aye Bhakta MD as Consulting Physician (Hematology and Oncology)  Wesley Walker MD as Consulting Physician (Pulmonary)  Fausto Bradley MD as Consulting Physician (Rheumatology)     Allergies and Medications       Allergies   Allergen Reactions   • Mepolizumab Rash   • Morphine      Other reaction(s): Vomiting   • Oxycodone      Other reaction(s): Vomiting       Current Outpatient Medications   Medication Sig Dispense Refill   • albuterol 2.5 mg /3 mL (0.083 %) nebulizer solution Take 3 mL (2.5 mg total) by nebulization every 6 (six) hours as needed for wheezing or shortness of breath. 125 vial 0   • albuterol HFA (VENTOLIN HFA) 90 mcg/actuation inhaler Inhale 2 puffs every 4 (four) hours as needed for wheezing or shortness of breath. 8.5 g 0   • azithromycin 250 mg tablet Take 250 mg by mouth daily.     • benralizumab (FASENRA) 30 mg/mL syringe subcutaneous syringe Inject 30 mg under the skin every 2 (two) months.     • benzonatate (TESSALON) 200 mg capsule TAKE 1 CAPSULE BY MOUTH 3 TIMES DAILY AS NEEDED FOR COUGH.     • calcium carbonate (calcium carbonate) 600 mg calcium (1,500 mg) tablet Take 1 tablet by mouth 2 (two) times a day.     • cholecalciferol, vitamin D3, (VITAMIN D3 ORAL) Take by mouth daily.       • diphenhydrAMINE (BENADRYL) 25 mg capsule Take 25 mg by mouth nightly.     • docusate sodium (COLACE) 100 mg capsule Take 100 mg by mouth nightly.       • DULoxetine (CYMBALTA) 30 mg capsule Take 30 mg by mouth daily.     • DULoxetine (CYMBALTA) 60 mg capsule TAKE 1  CAPSULE BY MOUTH EVERYDAY AT BEDTIME  0   • esomeprazole (NexIUM) 20 mg capsule Take 20 mg by mouth daily. Take 30 minutes prior to meal.     • fexofenadine (ALLEGRA) 180 mg tablet Take 180 mg by mouth daily.     • FLOVENT  mcg/actuation inhaler Inhale 2 puffs 2 (two) times a day.     • fluticasone propionate (FLONASE) 50 mcg/actuation nasal spray Administer 1 spray into each nostril daily.     • FOLIC ACID/MULTIVIT,IRON, (CENTRUM ORAL) Take 1 tablet by mouth daily.     • LORazepam (ATIVAN) 1 mg tablet Take 1 tablet (1 mg total) by mouth nightly as needed for anxiety or sleep. 30 tablet 5   • pregabalin (LYRICA) 25 mg capsule Take 25 mg by mouth nightly.     • SPIRIVA RESPIMAT 2.5 mcg/actuation mist inhaler Inhale 2 puffs daily.     • SYMBICORT 80-4.5 mcg/actuation inhaler Inhale 2 puffs 2 (two) times a day.     • traMADoL (ULTRAM) 50 mg tablet Take 50 mg by mouth daily as needed.     • zoledronic acid (RECLAST) IVPB Infuse 5 mg into a venous catheter See admin instr. 5 mg once yearly       No current facility-administered medications for this visit.          Review of Systems       Review of Systems   Constitutional: Negative for appetite change, chills, fatigue, fever and unexpected weight change.   HENT: Negative for congestion, ear pain, hearing loss, nosebleeds, postnasal drip, rhinorrhea, sinus pressure, sneezing, sore throat and trouble swallowing.    Eyes: Negative for pain, discharge, redness, itching and visual disturbance.   Respiratory: Negative for cough, chest tightness, shortness of breath and wheezing.    Cardiovascular: Negative for chest pain, palpitations and leg swelling.   Gastrointestinal: Negative for abdominal pain, blood in stool, constipation, diarrhea, nausea and vomiting.   Endocrine: Negative for cold intolerance and heat intolerance.   Genitourinary: Positive for dysuria. Negative for difficulty urinating, frequency, hematuria, urgency, vaginal bleeding and vaginal  "discharge.   Musculoskeletal: Negative for arthralgias, back pain, gait problem, joint swelling, myalgias and neck pain.   Skin: Negative for rash.   Allergic/Immunologic: Negative for environmental allergies.   Neurological: Negative for dizziness, syncope, speech difficulty, weakness, light-headedness, numbness and headaches.   Hematological: Does not bruise/bleed easily.   Psychiatric/Behavioral: Negative for confusion, dysphoric mood and sleep disturbance. The patient is not nervous/anxious.         Physical Examination       Objective     Vitals:    11/12/21 1051   BP: 130/80   Pulse: 84   Resp: 16   Temp: 36.7 °C (98 °F)   SpO2: 98%       Pulse Readings from Last 5 Encounters:   11/12/21 84   10/06/21 89   06/04/21 91   04/22/21 98   03/01/21 88       Wt Readings from Last 5 Encounters:   11/12/21 58.3 kg (128 lb 9.6 oz)   10/06/21 58.1 kg (128 lb)   04/22/21 58.9 kg (129 lb 12.8 oz)   03/01/21 59.7 kg (131 lb 9.6 oz)   10/02/20 59.7 kg (131 lb 9.6 oz)       Ht Readings from Last 5 Encounters:   11/12/21 1.537 m (5' 0.5\")   04/22/21 1.537 m (5' 0.5\")   03/01/21 1.537 m (5' 0.5\")   10/02/20 1.537 m (5' 0.5\")   06/29/20 1.537 m (5' 0.5\")       BP Readings from Last 5 Encounters:   11/12/21 130/80   10/06/21 (!) 160/84   06/04/21 138/86   04/22/21 130/74   03/01/21 130/74       BMI Readings from Last 4 Encounters:   11/12/21 24.70 kg/m²   10/06/21 24.59 kg/m²   04/22/21 24.93 kg/m²   03/01/21 25.28 kg/m²       Physical Exam  Constitutional:       General: She is not in acute distress.     Appearance: Normal appearance. She is well-developed. She is not ill-appearing, toxic-appearing or diaphoretic.   HENT:      Head: Normocephalic and atraumatic.      Right Ear: Tympanic membrane, ear canal and external ear normal.      Left Ear: Tympanic membrane, ear canal and external ear normal.      Nose: No mucosal edema, congestion or rhinorrhea.      Right Sinus: No maxillary sinus tenderness or frontal sinus tenderness. "      Left Sinus: No maxillary sinus tenderness or frontal sinus tenderness.      Mouth/Throat:      Mouth: No oral lesions.      Pharynx: Uvula midline. No oropharyngeal exudate or posterior oropharyngeal erythema.      Tonsils: No tonsillar exudate.   Eyes:      General: Lids are normal. No scleral icterus.        Right eye: No discharge.         Left eye: No discharge.      Conjunctiva/sclera:      Right eye: Right conjunctiva is not injected. No exudate.     Left eye: Left conjunctiva is not injected. No exudate.     Pupils: Pupils are equal, round, and reactive to light.   Neck:      Thyroid: No thyroid mass or thyromegaly.      Vascular: No carotid bruit.   Cardiovascular:      Rate and Rhythm: Normal rate and regular rhythm.      Pulses:           Popliteal pulses are 2+ on the right side and 2+ on the left side.        Dorsalis pedis pulses are 2+ on the right side and 2+ on the left side.      Heart sounds: Normal heart sounds. No murmur heard.  No gallop. No S3 or S4 sounds.    Pulmonary:      Effort: Pulmonary effort is normal. No respiratory distress.      Breath sounds: Normal breath sounds. No decreased breath sounds, wheezing, rhonchi or rales.   Chest:      Chest wall: No tenderness.   Abdominal:      General: Bowel sounds are normal. There is no distension.      Palpations: Abdomen is soft.      Tenderness: There is no abdominal tenderness. There is no guarding or rebound.      Hernia: No hernia is present.   Musculoskeletal:      Cervical back: Normal range of motion and neck supple.      Right lower leg: No edema.      Left lower leg: No edema.   Lymphadenopathy:      Cervical: No cervical adenopathy.   Skin:     General: Skin is warm and dry.      Findings: No rash.   Neurological:      Mental Status: She is alert.   Psychiatric:         Mood and Affect: Mood normal.         Speech: Speech normal.         Behavior: Behavior normal.         Thought Content: Thought content normal.         Judgment:  Judgment normal.          Laboratory Results     Lab Results   Component Value Date    WBC 6.62 10/21/2021    WBC 6.04 04/20/2021    WBC 6.76 10/14/2020    HGB 12.9 10/21/2021    HGB 12.8 04/20/2021    HGB 13.1 10/14/2020    HCT 40.9 10/21/2021    HCT 38.8 04/20/2021    HCT 41.9 10/14/2020    MCV 99.0 (H) 10/21/2021    MCV 95.3 04/20/2021    .2 (H) 10/14/2020     10/21/2021     04/20/2021     10/14/2020        Lab Results   Component Value Date    GLUCOSE 96 10/21/2021    GLUCOSE 114 (H) 06/30/2021    GLUCOSE 94 04/20/2021    CALCIUM 9.4 10/21/2021    CALCIUM 9.2 06/30/2021    CALCIUM 9.1 04/20/2021     10/21/2021     06/30/2021     04/20/2021    K 4.3 10/21/2021    K 4.4 06/30/2021    K 3.7 04/20/2021    CO2 26 10/21/2021    CO2 23 06/30/2021    CO2 26 04/20/2021     10/21/2021     06/30/2021     04/20/2021    BUN 16 10/21/2021    BUN 15 06/30/2021    BUN 14 04/20/2021    CREATININE 0.8 10/21/2021    CREATININE 0.9 06/30/2021    CREATININE 0.9 04/20/2021    ALKPHOS 41 10/21/2021    ALKPHOS 49 04/20/2021    ALKPHOS 46 10/14/2020    AST 19 10/21/2021    AST 20 04/20/2021    AST 19 10/14/2020    ALT 19 10/21/2021    ALT 19 04/20/2021    ALT 20 10/14/2020    BILITOT 1.1 10/21/2021    BILITOT 0.7 04/20/2021    BILITOT 0.5 10/14/2020    ALBUMIN 4.1 10/21/2021    ALBUMIN 4.2 04/20/2021    ALBUMIN 3.9 10/14/2020       Lab Results   Component Value Date    CHOL 248 (H) 11/18/2015     Lab Results   Component Value Date    HDL 92 11/18/2015     Lab Results   Component Value Date    LDLCALC 133 (H) 11/18/2015     Lab Results   Component Value Date    TRIG 117 11/18/2015       Lab Results   Component Value Date    TSH 1.91 10/14/2020    TSH 0.32 (L) 06/10/2019    TSH 2.08 11/18/2015     Lab Results   Component Value Date    FREET4 0.79 11/18/2015       Lab Results   Component Value Date    HGBA1C 5.6 11/18/2015    HGBA1C 5.6 04/08/2015       Lab Results   Component  Value Date    HEPCAB Nonreactive 07/12/2018       Immunization History   Administered Date(s) Administered   • INFLUENZA VACCINE QUAD ADJUVANTED 65 and OLDER 10/06/2021   • Influenza Split High Dose Preservative Free IM 11/03/2011, 11/13/2012, 10/09/2013, 10/30/2014, 11/17/2015, 09/30/2016, 09/25/2017   • Influenza Vaccine 65 And Older Preservative Free 10/25/2019, 10/02/2020   • Influenza Vaccine Quadrivalent 3 Yr And Older 09/18/2017, 10/22/2018   • Influenza, Unspecified 10/30/2014, 11/17/2015, 09/30/2016, 09/25/2017   • MMRV 06/05/2014   • Pneumococcal Conjugate 08/09/1996   • Pneumococcal Conjugate 13-Valent 11/17/2015   • Pneumococcal Polysaccharide 10/30/2014, 09/11/2017   • Sars-cov-2 (Covid-19) Vaccine, Moderna 02/05/2021, 03/05/2021   • Tdap 03/22/2012   • Varicella 06/05/2014   • Zoster 01/02/2006, 01/02/2016   • Zoster Vaccine Recombinant Adjuvanted (Shingrix) 05/24/2019, 09/20/2019         Health Maintenance Topics with due status: Overdue       Topic Date Due    DEXA Scan 07/30/2021    COVID-19 Vaccine 09/05/2021    Medicare Annual Wellness Visit 10/02/2021     Health Maintenance Topics with due status: Not Due       Topic Last Completion Date    DTaP, Tdap, and Td Vaccines 03/22/2012     Health Maintenance Topics with due status: Completed       Topic Last Completion Date    Pneumococcal 09/11/2017    Pneumococcal (65 years and older) 09/11/2017    Hepatitis C Screening 07/12/2018    Zoster Vaccine 09/20/2019    Annual Falls Risk Screening 03/01/2021    Influenza Vaccine 10/06/2021     Health Maintenance Topics with due status: Aged Out       Topic Date Due    Meningococcal ACWY Aged Out    HIB Vaccines Aged Out    IPV Vaccines Aged Out    HPV Vaccines Aged Out     Health Maintenance Topics with due status: Discontinued       Topic Date Due    Mammogram Discontinued      Assessment and Plan       Assessment/Plan     Problem List Items Addressed This Visit     Preop examination - Primary    Current  Assessment & Plan     The patient presents at the request of Dr. Middleton a preoperative evaluation prior to bilateral upper and lower blepharoplasty and ptosis repair bilateral  on 11/30/2021. Has a history of decreased vision in the both eye. No redness. No eye discharge.  No blood work or EKG needed. Patient may proceed with proposed surgical procedure. No aspirin or non-steroidal anti-inflammatory medications.               Return if symptoms worsen or fail to improve.    No orders of the defined types were placed in this encounter.             Henry Linares MD    11/12/2021

## 2021-11-13 LAB — BACTERIA UR CULT: NORMAL

## 2021-11-15 ENCOUNTER — DOCUMENTATION (OUTPATIENT)
Dept: PRIMARY CARE | Facility: CLINIC | Age: 86
End: 2021-11-15

## 2021-11-15 NOTE — RESULT ENCOUNTER NOTE
Patient notified that urine culture was negative.  Patient to call back if has any further questions.  Left message on voicemail.

## 2021-11-17 ENCOUNTER — IMMUNIZATION (OUTPATIENT)
Dept: IMMUNIZATION | Facility: CLINIC | Age: 86
End: 2021-11-17
Payer: MEDICARE

## 2021-11-17 PROCEDURE — 91306 SARS-COV-2 VACCINE BOOSTER MODERNA: CPT | Performed by: FAMILY MEDICINE

## 2021-11-17 PROCEDURE — 0064A SARS-COV-2 VACCINE BOOSTER MODERNA: CPT | Performed by: FAMILY MEDICINE

## 2021-11-20 DIAGNOSIS — F41.9 ANXIETY: ICD-10-CM

## 2021-11-23 RX ORDER — LORAZEPAM 1 MG/1
1 TABLET ORAL NIGHTLY PRN
Qty: 30 TABLET | Refills: 1 | Status: SHIPPED | OUTPATIENT
Start: 2021-11-23 | End: 2022-02-16

## 2021-11-23 NOTE — TELEPHONE ENCOUNTER
LORazepam (ATIVAN) refill  Last Office Visit: 10/6/21  Last Telemed Visit: 8/3/20  Next Office Visit: none seen  PDMP:  LF 10/12/21  Qty 30  0 RF    Routed to Dr Joseph, as Dr Linares is not available.    ------------------------------------------------------------------------    Medicine Refill Request    Last Office Visit: 10/6/2021  Last Telemedicine Visit: 8/3/2020 Henry Linares MD    Next Office Visit: Visit date not found  Next Telemedicine Visit: Visit date not found         Current Outpatient Medications:   •  albuterol 2.5 mg /3 mL (0.083 %) nebulizer solution, Take 3 mL (2.5 mg total) by nebulization every 6 (six) hours as needed for wheezing or shortness of breath., Disp: 125 vial, Rfl: 0  •  albuterol HFA (VENTOLIN HFA) 90 mcg/actuation inhaler, Inhale 2 puffs every 4 (four) hours as needed for wheezing or shortness of breath., Disp: 8.5 g, Rfl: 0  •  azithromycin 250 mg tablet, Take 250 mg by mouth daily., Disp: , Rfl:   •  benralizumab (FASENRA) 30 mg/mL syringe subcutaneous syringe, Inject 30 mg under the skin every 2 (two) months., Disp: , Rfl:   •  benzonatate (TESSALON) 200 mg capsule, TAKE 1 CAPSULE BY MOUTH 3 TIMES DAILY AS NEEDED FOR COUGH., Disp: , Rfl:   •  calcium carbonate (calcium carbonate) 600 mg calcium (1,500 mg) tablet, Take 1 tablet by mouth 2 (two) times a day., Disp: , Rfl:   •  cholecalciferol, vitamin D3, (VITAMIN D3 ORAL), Take by mouth daily.  , Disp: , Rfl:   •  diphenhydrAMINE (BENADRYL) 25 mg capsule, Take 25 mg by mouth nightly., Disp: , Rfl:   •  docusate sodium (COLACE) 100 mg capsule, Take 100 mg by mouth nightly.  , Disp: , Rfl:   •  DULoxetine (CYMBALTA) 30 mg capsule, Take 30 mg by mouth daily., Disp: , Rfl:   •  DULoxetine (CYMBALTA) 60 mg capsule, TAKE 1 CAPSULE BY MOUTH EVERYDAY AT BEDTIME, Disp: , Rfl: 0  •  esomeprazole (NexIUM) 20 mg capsule, Take 20 mg by mouth daily. Take 30 minutes prior to meal., Disp: , Rfl:   •  fexofenadine (ALLEGRA) 180 mg tablet, Take 180  mg by mouth daily., Disp: , Rfl:   •  FLOVENT  mcg/actuation inhaler, Inhale 2 puffs 2 (two) times a day., Disp: , Rfl:   •  fluticasone propionate (FLONASE) 50 mcg/actuation nasal spray, Administer 1 spray into each nostril daily., Disp: , Rfl:   •  FOLIC ACID/MULTIVIT,IRON, (CENTRUM ORAL), Take 1 tablet by mouth daily., Disp: , Rfl:   •  LORazepam (ATIVAN) 1 mg tablet, Take 1 tablet (1 mg total) by mouth nightly as needed for anxiety or sleep., Disp: 30 tablet, Rfl: 5  •  pregabalin (LYRICA) 25 mg capsule, Take 25 mg by mouth nightly., Disp: , Rfl:   •  SPIRIVA RESPIMAT 2.5 mcg/actuation mist inhaler, Inhale 2 puffs daily., Disp: , Rfl:   •  SYMBICORT 80-4.5 mcg/actuation inhaler, Inhale 2 puffs 2 (two) times a day., Disp: , Rfl:   •  traMADoL (ULTRAM) 50 mg tablet, Take 50 mg by mouth daily as needed., Disp: , Rfl:   •  zoledronic acid (RECLAST) IVPB, Infuse 5 mg into a venous catheter See admin instr. 5 mg once yearly, Disp: , Rfl:       BP Readings from Last 3 Encounters:   11/12/21 130/80   10/06/21 (!) 160/84   06/04/21 138/86       Recent Lab results:  Lab Results   Component Value Date    CHOL 248 (H) 11/18/2015   ,   Lab Results   Component Value Date    HDL 92 11/18/2015   ,   Lab Results   Component Value Date    LDLCALC 133 (H) 11/18/2015   ,   Lab Results   Component Value Date    TRIG 117 11/18/2015        Lab Results   Component Value Date    GLUCOSE 96 10/21/2021   ,   Lab Results   Component Value Date    HGBA1C 5.6 11/18/2015         Lab Results   Component Value Date    CREATININE 0.8 10/21/2021       Lab Results   Component Value Date    TSH 1.91 10/14/2020

## 2022-04-21 ENCOUNTER — TELEPHONE (OUTPATIENT)
Dept: PRIMARY CARE | Facility: CLINIC | Age: 87
End: 2022-04-21
Payer: MEDICARE

## 2022-04-21 NOTE — TELEPHONE ENCOUNTER
Pt LVM stating she fell while gardening and bruised the side of her body including her ribs, she thinks she has a hematoma and would like to be seen.   Pt scheduled for in office visit.

## 2022-04-25 ENCOUNTER — OFFICE VISIT (OUTPATIENT)
Dept: PRIMARY CARE | Facility: CLINIC | Age: 87
End: 2022-04-25
Payer: MEDICARE

## 2022-04-25 VITALS
WEIGHT: 122.6 LBS | HEART RATE: 99 BPM | DIASTOLIC BLOOD PRESSURE: 88 MMHG | SYSTOLIC BLOOD PRESSURE: 142 MMHG | HEIGHT: 61 IN | BODY MASS INDEX: 23.15 KG/M2 | RESPIRATION RATE: 18 BRPM | OXYGEN SATURATION: 97 %

## 2022-04-25 DIAGNOSIS — R05.9 COUGH: ICD-10-CM

## 2022-04-25 DIAGNOSIS — T14.8XXA HEMATOMA: Primary | ICD-10-CM

## 2022-04-25 DIAGNOSIS — J43.9 PULMONARY EMPHYSEMA, UNSPECIFIED EMPHYSEMA TYPE (CMS/HCC): ICD-10-CM

## 2022-04-25 DIAGNOSIS — I26.99 OTHER ACUTE PULMONARY EMBOLISM WITHOUT ACUTE COR PULMONALE: ICD-10-CM

## 2022-04-25 PROCEDURE — 99214 OFFICE O/P EST MOD 30 MIN: CPT | Performed by: INTERNAL MEDICINE

## 2022-04-25 RX ORDER — NEOMYCIN SULFATE, POLYMYXIN B SULFATE, AND DEXAMETHASONE 3.5; 10000; 1 MG/G; [USP'U]/G; MG/G
OINTMENT OPHTHALMIC
COMMUNITY
End: 2022-08-01 | Stop reason: ALTCHOICE

## 2022-04-25 RX ORDER — ERYTHROMYCIN 5 MG/G
OINTMENT OPHTHALMIC
COMMUNITY
End: 2022-10-19 | Stop reason: ALTCHOICE

## 2022-04-25 RX ORDER — LIFITEGRAST 50 MG/ML
SOLUTION/ DROPS OPHTHALMIC
COMMUNITY
End: 2022-08-01 | Stop reason: ALTCHOICE

## 2022-04-25 RX ORDER — OFLOXACIN 3 MG/ML
SOLUTION/ DROPS OPHTHALMIC
COMMUNITY
Start: 2022-03-07 | End: 2023-08-21

## 2022-04-25 RX ORDER — ASPIRIN 81 MG/1
81 TABLET ORAL DAILY
COMMUNITY
End: 2023-02-22 | Stop reason: HOSPADM

## 2022-04-25 NOTE — ASSESSMENT & PLAN NOTE
Patient with chronic allergic bronchopulmonary aspergillosis on Flovent, Fasenra and azithromycin daily who has had 1 week of cough-mildly productive of clear mucous.  Denies shortness of breath, fever, congestion, sore throat.  Vaccinated against COVID x3.  Lungs are clear on exam, no wheezing.  Normal O2.  Reassuring history and physical.  Viral vs allergic etiology.  - Continue flovent as prescribed  - Albuterol inhaler PRN  - Continue benzonatate TID PRN  - Continue flonase 2 sprays each nostril daily  - Advised to call for any changes-shortness of breath, worsening cough, fever

## 2022-04-25 NOTE — PROGRESS NOTES
Ly Topete MD    [facility]  [address], [address]  [place] [address]  Phone: [phone]  Fax: [phone]     History of Present Illness     Subjective     Patient ID: Madelyn Ruiz is a 89 y.o. female.    Pt comes in for cough and hematoma.  She came in with her daughter.    7 days ago was taking a planter outside and slipped in the rain causing bruising on both legs.  Has a hematoma on the right lower leg which evolved into a blood blister.  No fevers, leg pain other than bruising.  Able to bear weight and walk without any new difficulties.    Cough.  Keeping it at bay with nebulizer and cough medication.  Started a week or so ago.  Albuterol using it twice a day.  Does not usually use it.  Not short of breath.  Bringing up some clear phlegm.  No fevers, sinus pressure, sore throat, fever.  Has had COVID vaccine and booster.  Taking benzonatate three times per day, helpful.  Took delsym last night.       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 Arriaza.  Stable FEV1 in 8/2015.  IgE levels and eosinophils stable in 8/2015.  Managed with Fasenra, Azithromycin and Flovent.   • Allergic rhinitis 04/02/2018    Pulmonologist: Dr. Walker. Managed with Flonase.   • Calcification of aorta (CMS/[facility]) 06/29/2020    Seen incidentally on CT scan.   • Chronic obstructive pulmonary disease (CMS/[facility]) 04/02/2018    Pulmonologist: Dr. Walker.  Seen on PFTs in hospital in 2014.  Controlled with Spiriva and FLovent.   • Essential hypertension 04/02/2018    Managed on no medications at this time. Advised to follow a low sodium diet and keep BMI < 25.  Advised to exercise for 2.5 hours per week.  Instructed to take home blood pressure readings and call if  consistently above 140/90.     • GERD (gastroesophageal reflux disease) 06/08/2019    Managed with Esomeprazole.  Should take medication 30 minutes prior to meal.  Advised to avoid caffeine, carbonated beverages, and should not eat within 3 hours of going to sleep.  Advised to reduce BMI < 25.    • Insomnia 04/02/2018    Managed with Lorazepam as needed.   • Lumbar spinal stenosis 04/02/2018    Neurosurgeon: Dr. Miguel. MRI with Central and lateral recess spinal stenosis. Has Spondylolisthesis at L4/L5. S/P epidural injection by Dr. Martin with some relief in past. Had Lumbar fusion and Lumbar laminectomy by Dr. Miguel in 4/2015.   • Macular degeneration disease    • Obstructive sleep apnea syndrome 04/02/2018    Mild STACIA noted in sleep study 7/2015. Treated with nasal CPAP automatic with pressure range 5-10 CM H2Ox couple months. Off now   • Osteoporosis 04/02/2018    Rheumatologist: Dr. Bradley.  Dexa scan 7/2016 with osteoporosis of lumbar spine LS-2.5, LH-2.1, RH -2.2.  Has been on Fosamax in the past for 8 years.  Worsened by Prednisone in past.  Continue vitamin D, calcium and weight bearing exercise. DEXA in 7/2017 with LS -1.7, LH -2.2, and RH -2.0.  Next due in 7/2019.  Seen by Dr. Bradley in 8/2015 who agreed with initiating Prolia. Took for 2 years. N   • Pulmonary embolism (CMS/HCC) 06/09/2019    Hematologist: Dr. Bhakta Diagnosed in 06/2019 in right upper lobe. Vascular ultrasound negative of bilateral lower extremities. Managed on Eliquis. Hypercoagulable workup was negative for beta-2 glycoprotein's, RIGO, Antithrombin III, lupus anticoagulant, protein C activity protein S activity, and prothrombin gene mutation, and factor V Leiden. Now off Apixaban as CT scan in 12/2019 was without pu   • Vertebral compression fracture (CMS/HCC)     At T12 level       Past Surgical History:   Procedure Laterality Date   • APPENDECTOMY     • CARPAL TUNNEL RELEASE Bilateral    • CATARACT EXTRACTION W/  INTRAOCULAR  LENS IMPLANT Bilateral    • HYSTERECTOMY  1972   • LUMBAR LAMINECTOMY  04/21/2015    S/P lumbar fusion   • TONSILLECTOMY         Family History   Problem Relation Age of Onset   • Glaucoma Biological Mother    • Macular degeneration Biological Mother    • Hypertension Biological Mother    • Lung cancer Biological Father    • Heart disease Biological Brother    • Breast cancer Mother's Sister    • No Known Problems Biological Son    • No Known Problems Biological Son    • No Known Problems Biological Daughter    • No Known Problems Biological Daughter    • No Known Problems Biological Daughter        Social History     Tobacco Use   • Smoking status: Never Smoker   • Smokeless tobacco: Never Used   Vaping Use   • Vaping Use: Never used   Substance Use Topics   • Alcohol use: Yes     Comment: Rarely   • Drug use: No       Patient Care Team:  Henry Linares MD as PCP - General  Ray Arriaza MD as Referring Physician  Aye Bhakta MD as Consulting Physician (Hematology and Oncology)  Wesley Walker MD as Consulting Physician (Pulmonary)  Fausto Bradley MD as Consulting Physician (Rheumatology)  Kleiner, Robert C, MD as Referring Physician     Allergies and Medications       Allergies   Allergen Reactions   • Mepolizumab Rash   • Morphine      Other reaction(s): Vomiting   • Oxycodone      Other reaction(s): Vomiting       Current Outpatient Medications   Medication Sig Dispense Refill   • albuterol 2.5 mg /3 mL (0.083 %) nebulizer solution Take 3 mL (2.5 mg total) by nebulization every 6 (six) hours as needed for wheezing or shortness of breath. 125 vial 0   • albuterol HFA (VENTOLIN HFA) 90 mcg/actuation inhaler Inhale 2 puffs every 4 (four) hours as needed for wheezing or shortness of breath. 8.5 g 0   • aspirin 81 mg enteric coated tablet Take 81 mg by mouth daily.     • azithromycin 250 mg tablet Take 250 mg by mouth daily.     • benralizumab (FASENRA) 30 mg/mL syringe subcutaneous syringe Inject 30 mg  under the skin every 2 (two) months.     • benzonatate (TESSALON) 200 mg capsule TAKE 1 CAPSULE BY MOUTH 3 TIMES DAILY AS NEEDED FOR COUGH.     • calcium carbonate 600 mg calcium (1,500 mg) tablet Take 1 tablet by mouth 2 (two) times a day.     • cholecalciferol, vitamin D3, (VITAMIN D3 ORAL) Take by mouth daily.       • diphenhydrAMINE (BENADRYL) 25 mg capsule Take 25 mg by mouth nightly.     • docusate sodium (COLACE) 100 mg capsule Take 100 mg by mouth nightly.     • DULoxetine (CYMBALTA) 30 mg capsule Take 30 mg by mouth daily.     • DULoxetine (CYMBALTA) 60 mg capsule TAKE 1 CAPSULE BY MOUTH EVERYDAY AT BEDTIME  0   • erythromycin (ILOTYCIN) 5 mg/gram (0.5 %) ophthalmic ointment erythromycin 5 mg/gram (0.5 %) eye ointment     • esomeprazole (NexIUM) 20 mg capsule Take 20 mg by mouth daily. Take 30 minutes prior to meal.     • fexofenadine (ALLEGRA) 180 mg tablet Take 180 mg by mouth daily.     • FLOVENT  mcg/actuation inhaler Inhale 2 puffs 2 (two) times a day.     • fluticasone propionate (FLONASE) 50 mcg/actuation nasal spray Administer 1 spray into each nostril daily.     • FOLIC ACID/MULTIVIT,IRON, (CENTRUM ORAL) Take 1 tablet by mouth daily.     • lifitegrast (XIIDRA) 5 % dropperette dropperette Xiidra 5 % eye drops in a dropperette     • LORazepam (ATIVAN) 1 mg tablet TAKE 1 TABLET BY MOUTH NIGHTLY AS NEEDED FOR ANXIETY OR SLEEP. 30 tablet 5   • christine-poly-dex (MAXITROL) 3.5 mg/g-10,000 unit/g-0.1 % ointment neomycin 3.5 mg/g-polymyxin B 10,000 unit/g-dexameth 0.1 % eye oint     • ofloxacin (OCUFLOX) 0.3 % ophthalmic solution INSTILL 1 DROP LEFT EYE EVERY TWO HOURS WHILE AWAKE     • pregabalin (LYRICA) 25 mg capsule Take 25 mg by mouth nightly.     • SPIRIVA RESPIMAT 2.5 mcg/actuation mist inhaler Inhale 2 puffs daily.     • SYMBICORT 80-4.5 mcg/actuation inhaler Inhale 2 puffs 2 (two) times a day.     • traMADoL (ULTRAM) 50 mg tablet Take 50 mg by mouth daily as needed.     • zoledronic acid  "(RECLAST) IVPB Infuse 5 mg into a venous catheter See admin instr. 5 mg once yearly       No current facility-administered medications for this visit.          Review of Systems       Review of Systems     Physical Examination       Objective     Vitals:    04/25/22 0934   BP: (!) 142/88   Pulse: 99   Resp: 18   SpO2: 97%       Pulse Readings from Last 5 Encounters:   04/25/22 99   11/12/21 84   10/06/21 89   06/04/21 91   04/22/21 98       Wt Readings from Last 5 Encounters:   04/25/22 55.6 kg (122 lb 9.6 oz)   11/12/21 58.3 kg (128 lb 9.6 oz)   10/06/21 58.1 kg (128 lb)   04/22/21 58.9 kg (129 lb 12.8 oz)   03/01/21 59.7 kg (131 lb 9.6 oz)       Ht Readings from Last 5 Encounters:   04/25/22 1.537 m (5' 0.5\")   11/12/21 1.537 m (5' 0.5\")   04/22/21 1.537 m (5' 0.5\")   03/01/21 1.537 m (5' 0.5\")   10/02/20 1.537 m (5' 0.5\")       BP Readings from Last 5 Encounters:   04/25/22 (!) 142/88   11/12/21 130/80   10/06/21 (!) 160/84   06/04/21 138/86   04/22/21 130/74       BMI Readings from Last 4 Encounters:   04/25/22 23.55 kg/m²   11/12/21 24.70 kg/m²   10/06/21 24.59 kg/m²   04/22/21 24.93 kg/m²       Physical Exam  Constitutional:       Appearance: Normal appearance.   HENT:      Nose: Nose normal.      Mouth/Throat:      Mouth: Mucous membranes are moist.   Eyes:      Conjunctiva/sclera: Conjunctivae normal.   Cardiovascular:      Rate and Rhythm: Normal rate and regular rhythm.      Heart sounds: Normal heart sounds. No murmur heard.  Pulmonary:      Effort: Pulmonary effort is normal.      Breath sounds: Normal breath sounds. No wheezing.   Skin:     Comments: Echymoses of both lower legs.  An approx 7 cm diameter superficial hematoma/blood blister on the right lower leg.  Skin is intact.  No surrounding redness or warmth.   Neurological:      Mental Status: She is alert.   Psychiatric:         Mood and Affect: Mood normal.         Behavior: Behavior normal.         Thought Content: Thought content normal.        "   Laboratory Results     Lab Results   Component Value Date    WBC 6.62 10/21/2021    WBC 6.04 04/20/2021    WBC 6.76 10/14/2020    HGB 12.9 10/21/2021    HGB 12.8 04/20/2021    HGB 13.1 10/14/2020    HCT 40.9 10/21/2021    HCT 38.8 04/20/2021    HCT 41.9 10/14/2020    MCV 99.0 (H) 10/21/2021    MCV 95.3 04/20/2021    .2 (H) 10/14/2020     10/21/2021     04/20/2021     10/14/2020        Lab Results   Component Value Date    GLUCOSE 96 10/21/2021    GLUCOSE 114 (H) 06/30/2021    GLUCOSE 94 04/20/2021    CALCIUM 9.4 10/21/2021    CALCIUM 9.2 06/30/2021    CALCIUM 9.1 04/20/2021     10/21/2021     06/30/2021     04/20/2021    K 4.3 10/21/2021    K 4.4 06/30/2021    K 3.7 04/20/2021    CO2 26 10/21/2021    CO2 23 06/30/2021    CO2 26 04/20/2021     10/21/2021     06/30/2021     04/20/2021    BUN 16 10/21/2021    BUN 15 06/30/2021    BUN 14 04/20/2021    CREATININE 0.8 10/21/2021    CREATININE 0.9 06/30/2021    CREATININE 0.9 04/20/2021    ALKPHOS 41 10/21/2021    ALKPHOS 49 04/20/2021    ALKPHOS 46 10/14/2020    AST 19 10/21/2021    AST 20 04/20/2021    AST 19 10/14/2020    ALT 19 10/21/2021    ALT 19 04/20/2021    ALT 20 10/14/2020    BILITOT 1.1 10/21/2021    BILITOT 0.7 04/20/2021    BILITOT 0.5 10/14/2020    ALBUMIN 4.1 10/21/2021    ALBUMIN 4.2 04/20/2021    ALBUMIN 3.9 10/14/2020       Lab Results   Component Value Date    CHOL 248 (H) 11/18/2015     Lab Results   Component Value Date    HDL 92 11/18/2015     Lab Results   Component Value Date    LDLCALC 133 (H) 11/18/2015     Lab Results   Component Value Date    TRIG 117 11/18/2015       The ASCVD Risk score (Denny LOVE Jr., et al., 2013) failed to calculate for the following reasons:    The 2013 ASCVD risk score is only valid for ages 40 to 79    Lab Results   Component Value Date    TSH 1.91 10/14/2020    TSH 0.32 (L) 06/10/2019    TSH 2.08 11/18/2015     Lab Results   Component Value Date    FREET4  0.79 11/18/2015         Lab Results   Component Value Date    HGBA1C 5.6 11/18/2015    HGBA1C 5.6 04/08/2015       No results found for: CREATUR  No results found for: MICROALBUR  No results found for: ALBCRET    Lab Results   Component Value Date    HEPCAB Nonreactive 07/12/2018       No results found for: MG         Immunization History   Administered Date(s) Administered   • INFLUENZA VACCINE QUAD ADJUVANTED 65 and OLDER 10/06/2021   • Influenza Split High Dose Preservative Free IM 11/03/2011, 11/13/2012, 10/09/2013, 10/30/2014, 11/17/2015, 09/30/2016, 09/25/2017   • Influenza Vaccine 65 And Older Preservative Free 10/25/2019, 10/02/2020   • Influenza Vaccine Quadrivalent 3 Yr And Older 09/18/2017, 10/22/2018   • Influenza, Unspecified 10/30/2014, 11/17/2015, 09/30/2016, 09/25/2017   • MMRV 06/05/2014   • Pneumococcal Conjugate 08/09/1996   • Pneumococcal Conjugate 13-Valent 11/17/2015   • Pneumococcal Polysaccharide 10/30/2014, 09/11/2017   • Sars-cov-2 (Covid-19) Vaccine, Moderna 02/05/2021, 03/05/2021   • Sars-cov-2 (Covid-19) Vaccine, Moderna Booster 11/17/2021   • Tdap 03/22/2012   • Varicella 06/05/2014   • Zoster 01/02/2006, 01/02/2016   • Zoster Vaccine Recombinant Adjuvanted (Shingrix) 05/24/2019, 09/20/2019         Health Maintenance Topics with due status: Overdue       Topic Date Due    DEXA Scan 07/30/2021    Medicare Annual Wellness Visit 10/02/2021    Annual Falls Risk Screening Never done    DTaP, Tdap, and Td Vaccines 03/22/2022     Health Maintenance Topics with due status: Completed       Topic Last Completion Date    Pneumococcal (65 years and older) 09/11/2017    Hepatitis C Screening 07/12/2018    Zoster Vaccine 09/20/2019    Influenza Vaccine 10/06/2021    COVID-19 Vaccine 11/17/2021     Health Maintenance Topics with due status: Aged Out       Topic Date Due    Meningococcal ACWY Aged Out    HIB Vaccines Aged Out    IPV Vaccines Aged Out    HPV Vaccines Aged Out     Health Maintenance  Topics with due status: Discontinued       Topic Date Due    Breast Cancer Screening Discontinued        Assessment and Plan       Assessment/Plan     Problem List Items Addressed This Visit        Respiratory    Pulmonary emphysema (CMS/HCC)    Overview     · Pulmonologist: Dr. Walker.   · Seen on PFTs in hospital in 2014.   · Controlled with Spiriva and FLovent.           Current Assessment & Plan     Stable, continues to see pulmonologist           Cough    Current Assessment & Plan     Patient with chronic allergic bronchopulmonary aspergillosis on Flovent, Fasenra and azithromycin daily who has had 1 week of cough-mildly productive of clear mucous.  Denies shortness of breath, fever, congestion, sore throat.  Vaccinated against COVID x3.  Lungs are clear on exam, no wheezing.  Normal O2.  Reassuring history and physical.  Viral vs allergic etiology.  - Continue flovent as prescribed  - Albuterol inhaler PRN  - Continue benzonatate TID PRN  - Continue flonase 2 sprays each nostril daily  - Advised to call for any changes-shortness of breath, worsening cough, fever              Circulatory    Pulmonary embolism (CMS/HCC) (Chronic)    Overview     · Hematologist: Dr. Bhakta  · Diagnosed in 06/2019 in right upper lobe.  · Vascular ultrasound negative of bilateral lower extremities.  · Managed on Eliquis.  · Hypercoagulable workup was negative for beta-2 glycoprotein's, RIGO, Antithrombin III, lupus anticoagulant, protein C activity protein S activity, and prothrombin gene mutation, and factor V Leiden.  · Now off Apixaban as CT scan in 12/2019 was without pulmonary embolism.           Current Assessment & Plan     Continues to be monitored by Dr. Oglesby.  Off of AC.              Other    Hematoma - Primary    Current Assessment & Plan     Pt had a fall 7 days ago in the rain when lifting a heavy pot. Echymoses on both legs, hematoma on the right lower leg.  One exam, the hematoma is superficial with risk of rupture  but it is intact.  On aspirin daily but no longer on AC.  - Discussed that I did not recommend lancing the blister due to risk of infection-advised covering it with gauze to protect it  - If it does rupture, advised to keep it clean just with very gentle soap and water in the shower and covering it with a nonstick bandage.  Advised watching for any surrounding redness or pain and call if this occurs.  - No pain with walking or bearing weight-no concerns for fracture.                 No follow-ups on file.    No orders of the defined types were placed in this encounter.             Ly Topete MD    4/25/2022

## 2022-04-25 NOTE — ASSESSMENT & PLAN NOTE
Pt had a fall 7 days ago in the rain when lifting a heavy pot. Echymoses on both legs, hematoma on the right lower leg.  One exam, the hematoma is superficial with risk of rupture but it is intact.  On aspirin daily but no longer on AC.  - Discussed that I did not recommend lancing the blister due to risk of infection-advised covering it with gauze to protect it  - If it does rupture, advised to keep it clean just with very gentle soap and water in the shower and covering it with a nonstick bandage.  Advised watching for any surrounding redness or pain and call if this occurs.  - No pain with walking or bearing weight-no concerns for fracture.

## 2022-04-28 ENCOUNTER — TRANSCRIBE ORDERS (OUTPATIENT)
Dept: LAB | Facility: HOSPITAL | Age: 87
End: 2022-04-28

## 2022-04-28 ENCOUNTER — DOCUMENTATION (OUTPATIENT)
Dept: PRIMARY CARE | Facility: CLINIC | Age: 87
End: 2022-04-28
Payer: MEDICARE

## 2022-04-28 ENCOUNTER — APPOINTMENT (OUTPATIENT)
Dept: LAB | Facility: HOSPITAL | Age: 87
End: 2022-04-28
Attending: INTERNAL MEDICINE
Payer: MEDICARE

## 2022-04-28 DIAGNOSIS — M81.8 OTHER OSTEOPOROSIS WITHOUT CURRENT PATHOLOGICAL FRACTURE: ICD-10-CM

## 2022-04-28 DIAGNOSIS — D47.2 MONOCLONAL GAMMOPATHY: ICD-10-CM

## 2022-04-28 DIAGNOSIS — D47.2 MONOCLONAL GAMMOPATHY: Primary | ICD-10-CM

## 2022-04-28 PROBLEM — Z86.711 PERSONAL HISTORY OF PULMONARY EMBOLISM: Status: ACTIVE | Noted: 2019-06-09

## 2022-04-28 LAB
ALBUMIN SERPL-MCNC: 3.9 G/DL (ref 3.4–5)
ALP SERPL-CCNC: 75 IU/L (ref 35–126)
ALT SERPL-CCNC: 20 IU/L (ref 11–54)
ANION GAP SERPL CALC-SCNC: 7 MEQ/L (ref 3–15)
AST SERPL-CCNC: 17 IU/L (ref 15–41)
BASOPHILS # BLD: 0.01 K/UL (ref 0.01–0.1)
BASOPHILS NFR BLD: 0.1 %
BILIRUB SERPL-MCNC: 0.6 MG/DL (ref 0.3–1.2)
BUN SERPL-MCNC: 21 MG/DL (ref 8–20)
CALCIUM SERPL-MCNC: 9.2 MG/DL (ref 8.9–10.3)
CHLORIDE SERPL-SCNC: 104 MEQ/L (ref 98–109)
CO2 SERPL-SCNC: 27 MEQ/L (ref 22–32)
CREAT SERPL-MCNC: 0.8 MG/DL (ref 0.6–1.1)
DIFFERENTIAL METHOD BLD: ABNORMAL
EOSINOPHIL # BLD: 0 K/UL (ref 0.04–0.36)
EOSINOPHIL NFR BLD: 0 %
ERYTHROCYTE [DISTWIDTH] IN BLOOD BY AUTOMATED COUNT: 14.1 % (ref 11.7–14.4)
GFR SERPL CREATININE-BSD FRML MDRD: >60 ML/MIN/1.73M*2
GLUCOSE SERPL-MCNC: 94 MG/DL (ref 70–99)
HCT VFR BLDCO AUTO: 38.7 % (ref 35–45)
HGB BLD-MCNC: 12.3 G/DL (ref 11.8–15.7)
IMM GRANULOCYTES # BLD AUTO: 0.03 K/UL (ref 0–0.08)
IMM GRANULOCYTES NFR BLD AUTO: 0.4 %
LYMPHOCYTES # BLD: 1.21 K/UL (ref 1.2–3.5)
LYMPHOCYTES NFR BLD: 16.3 %
MCH RBC QN AUTO: 31.2 PG (ref 28–33.2)
MCHC RBC AUTO-ENTMCNC: 31.8 G/DL (ref 32.2–35.5)
MCV RBC AUTO: 98.2 FL (ref 83–98)
MONOCYTES # BLD: 0.57 K/UL (ref 0.28–0.8)
MONOCYTES NFR BLD: 7.7 %
NEUTROPHILS # BLD: 5.6 K/UL (ref 1.7–7)
NEUTROPHILS # BLD: 5.6 K/UL (ref 1.7–7)
NEUTS SEG NFR BLD: 75.5 %
NRBC BLD-RTO: 0 %
PDW BLD AUTO: 9.3 FL (ref 9.4–12.3)
PLATELET # BLD AUTO: 292 K/UL (ref 150–369)
POTASSIUM SERPL-SCNC: 4.2 MEQ/L (ref 3.6–5.1)
PROT SERPL-MCNC: 6.3 G/DL (ref 6–8.2)
RBC # BLD AUTO: 3.94 M/UL (ref 3.93–5.22)
SODIUM SERPL-SCNC: 138 MEQ/L (ref 136–144)
WBC # BLD AUTO: 7.42 K/UL (ref 3.8–10.5)

## 2022-04-28 PROCEDURE — 36415 COLL VENOUS BLD VENIPUNCTURE: CPT

## 2022-04-28 PROCEDURE — 85025 COMPLETE CBC W/AUTO DIFF WBC: CPT

## 2022-04-28 PROCEDURE — 82784 ASSAY IGA/IGD/IGG/IGM EACH: CPT | Mod: 59

## 2022-04-28 PROCEDURE — 83521 IG LIGHT CHAINS FREE EACH: CPT

## 2022-04-28 PROCEDURE — 80053 COMPREHEN METABOLIC PANEL: CPT

## 2022-04-28 PROCEDURE — 84165 PROTEIN E-PHORESIS SERUM: CPT

## 2022-04-28 NOTE — PROGRESS NOTES
"  Specified Code:Z86.711 Code Description:Personal history of pulmonary embolism   Clarification Type EMR System Encounter Type Date of Service Provider   Code Specificity Epic Progress notes 11/12/2021 Henry Linares MD   Rationale: Patient has Active PE on PL.  Based on DOS 11/12/2021 by Henry Linares MD  the record indicates history of PE.  May we recommend \"History of PE\"       Original Code: I26.99: Pulmonary embolism         "

## 2022-04-29 LAB
ALBUMIN MFR UR ELPH: 63.2 %
ALBUMIN SERPL ELPH-MCNC: 3.79 G/DL (ref 3.2–4.9)
ALBUMIN/GLOB SERPL: 1.7 {RATIO} (ref 1.1–2.1)
ALPHA1 GLOB MFR SERPL ELPH: 3 %
ALPHA1 GLOB SERPL ELPH-MCNC: 0.18 G/DL (ref 0.08–0.23)
ALPHA2 GLOB MFR UR ELPH: 15.1 %
ALPHA2 GLOB SERPL ELPH-MCNC: 0.91 G/DL (ref 0.45–0.92)
B-GLOBULIN SERPL ELPH-MCNC: 0.65 G/DL (ref 0.5–1.03)
BETA1 GLOB MFR UR ELPH: 10.8 %
GAMMA GLOB MFR UR ELPH: 7.9 %
GAMMA GLOB SERPL ELPH-MCNC: 0.47 G/DL (ref 0.6–1.6)
IGA SERPL-MCNC: 140 MG/DL (ref 84.5–499)
IGG SERPL-MCNC: 462 MG/DL (ref 537–1535)
IGM SERPL-MCNC: 58 MG/DL (ref 35–242)
KAPPA LC SERPL-MCNC: 13.71 MG/L (ref 8.96–34.28)
KAPPA LC/LAMBDA SER: 1.33 {RATIO} (ref 0.64–1.83)
LAMBDA LC SERPL-MCNC: 10.3 MG/L (ref 5.7–26.3)
PROT PATTERN SERPL ELPH-IMP: NORMAL
PROT SERPL-MCNC: 6 G/DL (ref 6–8.2)

## 2022-05-10 ENCOUNTER — OFFICE VISIT (OUTPATIENT)
Dept: PRIMARY CARE | Facility: CLINIC | Age: 87
End: 2022-05-10
Payer: MEDICARE

## 2022-05-10 VITALS
OXYGEN SATURATION: 99 % | HEART RATE: 80 BPM | SYSTOLIC BLOOD PRESSURE: 168 MMHG | TEMPERATURE: 97.5 F | DIASTOLIC BLOOD PRESSURE: 90 MMHG | RESPIRATION RATE: 16 BRPM

## 2022-05-10 DIAGNOSIS — T14.8XXA HEMATOMA: Primary | ICD-10-CM

## 2022-05-10 PROCEDURE — 99213 OFFICE O/P EST LOW 20 MIN: CPT | Performed by: FAMILY MEDICINE

## 2022-05-10 ASSESSMENT — ENCOUNTER SYMPTOMS
FEVER: 0
CHILLS: 0
WOUND: 1

## 2022-05-10 NOTE — ASSESSMENT & PLAN NOTE
Improving but still present. No sign of infection. Large eschar overlying hematoma. Applied triple antibiotic ointment and dressing. If stops moving in right direction will refer to wound care center for further treatment.

## 2022-05-10 NOTE — PROGRESS NOTES
Henry Linares MD    [facility]  [address], [address]  [place] [address]  Phone: [phone]  Fax: [phone]     History of Present Illness     Subjective     Patient ID: Madelyn Ruiz is a 89 y.o. female.    S/P injury to right side and right lower leg. Occurred 3 weeks ago. Had large hematoma on distal right lower leg. Improving at this time. Now with scab. No fever, chills, or sweats. No purulent discharge and no redness.        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 Arriaza.  Stable FEV1 in 8/2015.  IgE levels and eosinophils stable in 8/2015.  Managed with Fasenra, Azithromycin and Flovent.   • Allergic rhinitis 04/02/2018    Pulmonologist: Dr. Walker. Managed with Flonase.   • Calcification of aorta (CMS/[facility]) 06/29/2020    Seen incidentally on CT scan.   • Chronic obstructive pulmonary disease (CMS/[facility]) 04/02/2018    Pulmonologist: Dr. Walker.  Seen on PFTs in hospital in 2014.  Controlled with Spiriva and FLovent.   • Essential hypertension 04/02/2018    Managed on no medications at this time. Advised to follow a low sodium diet and keep BMI < 25.  Advised to exercise for 2.5 hours per week.  Instructed to take home blood pressure readings and call if consistently above 140/90.     • GERD (gastroesophageal reflux disease) 06/08/2019    Managed with Esomeprazole.  Should take medication 30 minutes prior to meal.  Advised to avoid caffeine, carbonated beverages, and should not eat within 3 hours of going to sleep.  Advised to reduce BMI < 25.    • Insomnia 04/02/2018    Managed with Lorazepam as needed.   • Lumbar spinal stenosis 04/02/2018    Neurosurgeon: Dr. Miguel. MRI with Central and lateral recess spinal  stenosis. Has Spondylolisthesis at L4/L5. S/P epidural injection by Dr. Martin with some relief in past. Had Lumbar fusion and Lumbar laminectomy by Dr. Miguel in 4/2015.   • Macular degeneration disease    • Obstructive sleep apnea syndrome 04/02/2018    Mild STACIA noted in sleep study 7/2015. Treated with nasal CPAP automatic with pressure range 5-10 CM H2Ox couple months. Off now   • Osteoporosis 04/02/2018    Rheumatologist: Dr. Bradley.  Dexa scan 7/2016 with osteoporosis of lumbar spine LS-2.5, LH-2.1, RH -2.2.  Has been on Fosamax in the past for 8 years.  Worsened by Prednisone in past.  Continue vitamin D, calcium and weight bearing exercise. DEXA in 7/2017 with LS -1.7, LH -2.2, and RH -2.0.  Next due in 7/2019.  Seen by Dr. Bradley in 8/2015 who agreed with initiating Prolia. Took for 2 years. N   • Pulmonary embolism (CMS/HCC) 06/09/2019    Hematologist: Dr. Bhakta Diagnosed in 06/2019 in right upper lobe. Vascular ultrasound negative of bilateral lower extremities. Managed on Eliquis. Hypercoagulable workup was negative for beta-2 glycoprotein's, RIGO, Antithrombin III, lupus anticoagulant, protein C activity protein S activity, and prothrombin gene mutation, and factor V Leiden. Now off Apixaban as CT scan in 12/2019 was without pu   • Vertebral compression fracture (CMS/HCC)     At T12 level       Past Surgical History:   Procedure Laterality Date   • APPENDECTOMY     • CARPAL TUNNEL RELEASE Bilateral    • CATARACT EXTRACTION W/  INTRAOCULAR LENS IMPLANT Bilateral    • HYSTERECTOMY  1972   • LUMBAR LAMINECTOMY  04/21/2015    S/P lumbar fusion   • TONSILLECTOMY         Family History   Problem Relation Age of Onset   • Glaucoma Biological Mother    • Macular degeneration Biological Mother    • Hypertension Biological Mother    • Lung cancer Biological Father    • Heart disease Biological Brother    • Breast cancer Mother's Sister    • No Known Problems Biological Son    • No Known Problems Biological Son     • No Known Problems Biological Daughter    • No Known Problems Biological Daughter    • No Known Problems Biological Daughter        Social History     Tobacco Use   • Smoking status: Never Smoker   • Smokeless tobacco: Never Used   Vaping Use   • Vaping Use: Never used   Substance Use Topics   • Alcohol use: Yes     Comment: Rarely   • Drug use: No       Patient Care Team:  Henry Linares MD as PCP - General  Ray Arriaza MD as Referring Physician  Aye Bhakta MD as Consulting Physician (Hematology and Oncology)  Wesley Walker MD as Consulting Physician (Pulmonary)  Fausto Bradley MD as Consulting Physician (Rheumatology)  Kleiner, Robert C, MD as Referring Physician     Allergies and Medications       Allergies   Allergen Reactions   • Mepolizumab Rash   • Morphine      Other reaction(s): Vomiting   • Oxycodone      Other reaction(s): Vomiting       Current Outpatient Medications   Medication Sig Dispense Refill   • albuterol 2.5 mg /3 mL (0.083 %) nebulizer solution Take 3 mL (2.5 mg total) by nebulization every 6 (six) hours as needed for wheezing or shortness of breath. 125 vial 0   • albuterol HFA (VENTOLIN HFA) 90 mcg/actuation inhaler Inhale 2 puffs every 4 (four) hours as needed for wheezing or shortness of breath. 8.5 g 0   • aspirin 81 mg enteric coated tablet Take 81 mg by mouth daily.     • azithromycin 250 mg tablet Take 250 mg by mouth daily.     • benralizumab (FASENRA) 30 mg/mL syringe subcutaneous syringe Inject 30 mg under the skin every 2 (two) months.     • benzonatate (TESSALON) 200 mg capsule TAKE 1 CAPSULE BY MOUTH 3 TIMES DAILY AS NEEDED FOR COUGH.     • calcium carbonate 600 mg calcium (1,500 mg) tablet Take 1 tablet by mouth 2 (two) times a day.     • cholecalciferol, vitamin D3, (VITAMIN D3 ORAL) Take by mouth daily.       • diphenhydrAMINE (BENADRYL) 25 mg capsule Take 25 mg by mouth nightly.     • docusate sodium (COLACE) 100 mg capsule Take 100 mg by mouth nightly.      • DULoxetine (CYMBALTA) 30 mg capsule Take 30 mg by mouth daily.     • DULoxetine (CYMBALTA) 60 mg capsule TAKE 1 CAPSULE BY MOUTH EVERYDAY AT BEDTIME  0   • erythromycin (ILOTYCIN) 5 mg/gram (0.5 %) ophthalmic ointment erythromycin 5 mg/gram (0.5 %) eye ointment     • esomeprazole (NexIUM) 20 mg capsule Take 20 mg by mouth daily. Take 30 minutes prior to meal.     • fexofenadine (ALLEGRA) 180 mg tablet Take 180 mg by mouth daily.     • FLOVENT  mcg/actuation inhaler Inhale 2 puffs 2 (two) times a day.     • fluticasone propionate (FLONASE) 50 mcg/actuation nasal spray Administer 1 spray into each nostril daily.     • FOLIC ACID/MULTIVIT,IRON, (CENTRUM ORAL) Take 1 tablet by mouth daily.     • lifitegrast (XIIDRA) 5 % dropperette dropperette Xiidra 5 % eye drops in a dropperette     • LORazepam (ATIVAN) 1 mg tablet TAKE 1 TABLET BY MOUTH NIGHTLY AS NEEDED FOR ANXIETY OR SLEEP. 30 tablet 5   • christine-poly-dex (MAXITROL) 3.5 mg/g-10,000 unit/g-0.1 % ointment neomycin 3.5 mg/g-polymyxin B 10,000 unit/g-dexameth 0.1 % eye oint     • ofloxacin (OCUFLOX) 0.3 % ophthalmic solution INSTILL 1 DROP LEFT EYE EVERY TWO HOURS WHILE AWAKE     • pregabalin (LYRICA) 25 mg capsule Take 25 mg by mouth nightly.     • SPIRIVA RESPIMAT 2.5 mcg/actuation mist inhaler Inhale 2 puffs daily.     • SYMBICORT 80-4.5 mcg/actuation inhaler Inhale 2 puffs 2 (two) times a day.     • traMADoL (ULTRAM) 50 mg tablet Take 50 mg by mouth daily as needed.     • zoledronic acid (RECLAST) IVPB Infuse 5 mg into a venous catheter See admin instr. 5 mg once yearly       No current facility-administered medications for this visit.          Review of Systems       Review of Systems   Constitutional: Negative for chills and fever.   Skin: Positive for wound.        Physical Examination       Objective     Vitals:    05/10/22 1106   BP: (!) 168/90   Pulse: 80   Resp: 16   Temp: 36.4 °C (97.5 °F)   SpO2: 99%       Pulse Readings from Last 5  "Encounters:   05/10/22 80   04/25/22 99   11/12/21 84   10/06/21 89   06/04/21 91       Wt Readings from Last 5 Encounters:   04/25/22 55.6 kg (122 lb 9.6 oz)   11/12/21 58.3 kg (128 lb 9.6 oz)   10/06/21 58.1 kg (128 lb)   04/22/21 58.9 kg (129 lb 12.8 oz)   03/01/21 59.7 kg (131 lb 9.6 oz)       Ht Readings from Last 5 Encounters:   04/25/22 1.537 m (5' 0.5\")   11/12/21 1.537 m (5' 0.5\")   04/22/21 1.537 m (5' 0.5\")   03/01/21 1.537 m (5' 0.5\")   10/02/20 1.537 m (5' 0.5\")       BP Readings from Last 5 Encounters:   05/10/22 (!) 168/90   04/25/22 (!) 142/88   11/12/21 130/80   10/06/21 (!) 160/84   06/04/21 138/86       BMI Readings from Last 4 Encounters:   04/25/22 23.55 kg/m²   11/12/21 24.70 kg/m²   10/06/21 24.59 kg/m²   04/22/21 24.93 kg/m²       Physical Exam  Constitutional:       General: She is not in acute distress.     Appearance: Normal appearance. She is not ill-appearing.   Skin:     Comments: 1/2 dollar size eschar overlying the area where hematoma was present. No purulent discharge or erythema.    Neurological:      Mental Status: She is alert.          Laboratory Results     Lab Results   Component Value Date    WBC 7.42 04/28/2022    WBC 6.62 10/21/2021    WBC 6.04 04/20/2021    HGB 12.3 04/28/2022    HGB 12.9 10/21/2021    HGB 12.8 04/20/2021    HCT 38.7 04/28/2022    HCT 40.9 10/21/2021    HCT 38.8 04/20/2021    MCV 98.2 (H) 04/28/2022    MCV 99.0 (H) 10/21/2021    MCV 95.3 04/20/2021     04/28/2022     10/21/2021     04/20/2021        Lab Results   Component Value Date    GLUCOSE 94 04/28/2022    GLUCOSE 96 10/21/2021    GLUCOSE 114 (H) 06/30/2021    CALCIUM 9.2 04/28/2022    CALCIUM 9.4 10/21/2021    CALCIUM 9.2 06/30/2021     04/28/2022     10/21/2021     06/30/2021    K 4.2 04/28/2022    K 4.3 10/21/2021    K 4.4 06/30/2021    CO2 27 04/28/2022    CO2 26 10/21/2021    CO2 23 06/30/2021     04/28/2022     10/21/2021     06/30/2021    " BUN 21 (H) 04/28/2022    BUN 16 10/21/2021    BUN 15 06/30/2021    CREATININE 0.8 04/28/2022    CREATININE 0.8 10/21/2021    CREATININE 0.9 06/30/2021    ALKPHOS 75 04/28/2022    ALKPHOS 41 10/21/2021    ALKPHOS 49 04/20/2021    AST 17 04/28/2022    AST 19 10/21/2021    AST 20 04/20/2021    ALT 20 04/28/2022    ALT 19 10/21/2021    ALT 19 04/20/2021    BILITOT 0.6 04/28/2022    BILITOT 1.1 10/21/2021    BILITOT 0.7 04/20/2021    ALBUMIN 3.9 04/28/2022    ALBUMIN 4.1 10/21/2021    ALBUMIN 4.2 04/20/2021       Lab Results   Component Value Date    CHOL 248 (H) 11/18/2015     Lab Results   Component Value Date    HDL 92 11/18/2015     Lab Results   Component Value Date    LDLCALC 133 (H) 11/18/2015     Lab Results   Component Value Date    TRIG 117 11/18/2015       Lab Results   Component Value Date    TSH 1.91 10/14/2020    TSH 0.32 (L) 06/10/2019    TSH 2.08 11/18/2015     Lab Results   Component Value Date    FREET4 0.79 11/18/2015         Lab Results   Component Value Date    HGBA1C 5.6 11/18/2015    HGBA1C 5.6 04/08/2015       Lab Results   Component Value Date    HEPCAB Nonreactive 07/12/2018           Immunization History   Administered Date(s) Administered   • INFLUENZA VACCINE QUAD ADJUVANTED 65 and OLDER 10/06/2021   • Influenza Split High Dose Preservative Free IM 11/03/2011, 11/13/2012, 10/09/2013, 10/30/2014, 11/17/2015, 09/30/2016, 09/25/2017   • Influenza Vaccine 65 And Older Preservative Free 10/25/2019, 10/02/2020   • Influenza Vaccine Quadrivalent 3 Yr And Older 09/18/2017, 10/22/2018   • Influenza, Unspecified 10/30/2014, 11/17/2015, 09/30/2016, 09/25/2017   • MMRV 06/05/2014   • Pneumococcal Conjugate 08/09/1996   • Pneumococcal Conjugate 13-Valent 11/17/2015   • Pneumococcal Polysaccharide 10/30/2014, 09/11/2017   • Sars-cov-2 (Covid-19) Vaccine, Moderna 02/05/2021, 03/05/2021   • Sars-cov-2 (Covid-19) Vaccine, Moderna Booster 11/17/2021   • Tdap 03/22/2012   • Varicella 06/05/2014   • Zoster  01/02/2006, 01/02/2016   • Zoster Vaccine Recombinant Adjuvanted (Shingrix) 05/24/2019, 09/20/2019         Health Maintenance Topics with due status: Overdue       Topic Date Due    DEXA Scan 07/30/2021    Medicare Annual Wellness Visit 10/02/2021    DTaP, Tdap, and Td Vaccines 03/22/2022     Health Maintenance Topics with due status: Completed       Topic Last Completion Date    Pneumococcal (65 years and older) 09/11/2017    Hepatitis C Screening 07/12/2018    Zoster Vaccine 09/20/2019    Influenza Vaccine 10/06/2021    COVID-19 Vaccine 11/17/2021    Annual Falls Risk Screening 05/10/2022     Health Maintenance Topics with due status: Aged Out       Topic Date Due    Meningococcal ACWY Aged Out    HIB Vaccines Aged Out    IPV Vaccines Aged Out    HPV Vaccines Aged Out     Health Maintenance Topics with due status: Discontinued       Topic Date Due    Breast Cancer Screening Discontinued        Assessment and Plan       Assessment/Plan     Problem List Items Addressed This Visit     Hematoma - Primary    Current Assessment & Plan     Improving but still present. No sign of infection. Large eschar overlying hematoma. Applied triple antibiotic ointment and dressing. If stops moving in right direction will refer to wound care center for further treatment.                  Return if symptoms worsen or fail to improve.    No orders of the defined types were placed in this encounter.             Henry Linares MD    5/10/2022

## 2022-05-10 NOTE — PATIENT INSTRUCTIONS
Problem List Items Addressed This Visit       Hematoma - Primary     Improving but still present. No sign of infection. Large eschar overlying hematoma. Applied triple antibiotic ointment and dressing. If stops moving in right direction will refer to wound care center for further treatment.

## 2022-05-20 ENCOUNTER — TELEPHONE (OUTPATIENT)
Dept: PRIMARY CARE | Facility: CLINIC | Age: 87
End: 2022-05-20
Payer: MEDICARE

## 2022-05-20 NOTE — TELEPHONE ENCOUNTER
The patient wants to know who she would see next for the hematoma on her leg.  Please call the patient at [phone]

## 2022-05-20 NOTE — TELEPHONE ENCOUNTER
Would refer patient to [facility] [place] [facility] for treatment.    [address], [place] [address]    [phone]

## 2022-05-20 NOTE — TELEPHONE ENCOUNTER
Pt called in to report a hematoma on her leg in the ankle area. She wanted to speak with you and see if she needs to see wound care for further evaluation

## 2022-05-23 NOTE — TELEPHONE ENCOUNTER
· I looked the [facility] wound center I called [phone] I called the patient apologized for the delay told her to press prompt #1 for an appointment any problems there after to call the office .

## 2022-05-23 NOTE — TELEPHONE ENCOUNTER
Pt stated that she has been calling the wound specialist since Friday and no luck with getting onto any one   Pt would like to know what should she do next

## 2022-05-25 PROBLEM — S80.11XA HEMATOMA OF RIGHT LOWER LEG: Status: ACTIVE | Noted: 2022-05-25

## 2022-05-26 ENCOUNTER — OFFICE VISIT (OUTPATIENT)
Dept: WOUND CARE | Facility: HOSPITAL | Age: 87
End: 2022-05-26
Attending: PLASTIC SURGERY
Payer: MEDICARE

## 2022-05-26 VITALS — RESPIRATION RATE: 16 BRPM | TEMPERATURE: 97 F | HEART RATE: 72 BPM

## 2022-05-26 DIAGNOSIS — S81.801A OPEN WOUND OF RIGHT LOWER LEG, INITIAL ENCOUNTER: Primary | ICD-10-CM

## 2022-05-26 PROCEDURE — A6212 FOAM DRG <=16 SQ IN W/BORDER: HCPCS

## 2022-05-26 PROCEDURE — 99204 OFFICE O/P NEW MOD 45 MIN: CPT | Performed by: PLASTIC SURGERY

## 2022-05-26 PROCEDURE — G0463 HOSPITAL OUTPT CLINIC VISIT: HCPCS

## 2022-05-26 PROCEDURE — G0463 HOSPITAL OUTPT CLINIC VISIT: HCPCS | Performed by: PLASTIC SURGERY

## 2022-05-26 RX ORDER — SILVER SULFADIAZINE 10 G/1000G
CREAM TOPICAL DAILY
Qty: 50 G | Refills: 1 | Status: SHIPPED | OUTPATIENT
Start: 2022-05-26 | End: 2022-08-01 | Stop reason: ALTCHOICE

## 2022-05-26 ASSESSMENT — ENCOUNTER SYMPTOMS
BRUISES/BLEEDS EASILY: 1
NEUROLOGICAL NEGATIVE: 1
SHORTNESS OF BREATH: 0
PSYCHIATRIC NEGATIVE: 1
FATIGUE: 0
ALLERGIC/IMMUNOLOGIC COMMENTS: SEE ALLERGY LISTING
CHILLS: 0
ENDOCRINE NEGATIVE: 1
ARTHRALGIAS: 0
COUGH: 0
EYES NEGATIVE: 1
FEVER: 0
WOUND: 1
GASTROINTESTINAL NEGATIVE: 1

## 2022-05-26 NOTE — PATIENT INSTRUCTIONS
Wound Healing Center Instructions    MEDICATIONS     Medication Note  Continue present medications as prescribed by the Wound Healing Center or other physicians you see. To avoid any problems keep the Wound Healing Center informed each visit of any medications changes that occur.     ++Please  Silvadene cream from your pharmacy++    ++[facility] will contact you in approx. 72 hours++      WOUND CARE     Clean Wound with: Saline Solution, Soap and Water, and Showering (Dove, dial or ivory soap)  Treatment 1   Location: Right leg  Dressing: Apply silvadene cream to adaptic (oil mesh gauze).Cover with gauze, peterson, secure with tape. Apply tubigrip in AM, off in PM.  Dressing Care Frequency: Every other day  Care Provider: Family and Visiting Nurse      COMPRESSION THERAPY     Tubigrip Stockings  Both Legs Size E  Apply Tubigrips (cotton/compression stockinette) DAILY.  Apply stockings upon arising in the morning to obtain the best results.  Remove stockings at bedtime once your legs are elevated.  Do not allow the stockinette to roll down as it can reduce or interrupt the blood flow in your limb.  Always wear the appropriate size that is recommended for you at the [facility].  Tubigrips can be washed by hand with soap and water and hang to dry overnight.     Basic Principles        Wash your hands thoroughly with soap and water and after each dressing change. If someone other than the patient changes the dressing, it’s best to wear disposable gloves.   Do not get the wound or dressing wet.   To shower: remove the dressing, shower with soap and water (including washing the wound - do not use a washcloth), air dry, then redress the wound.  Do not take a tub bath  Keep all your dressings in a clean, covered container at home to avoid dust and contamination.  Discard used dressings in a plastic bag or covered trash container.  Check your wound and the surrounding skin at each dressing change for redness,  warmth, swelling, increased pain, foul odor, fever, pus or abnormal drainage or discharge.  Notify Wound Healing Center if any of these changes occur - [phone].        Nutrition  Eat a well, balanced diet with adequate protein to support wound healing.   Take a multivitamin every day. Adequate nutrition supports healing and new tissue growth.  All diabetic patients should strive to keep blood sugars within a normal, practical range.   Elevated blood sugars can delay your wound healing.        ACTIVITY     Elevate legs above level of heart   Elevate your legs above the level of your heart for specific time periods during the day on several firm pillows or a foam leg wedge  Use of a recliner chair at home can often help in the appropriate elevation of your legs above the level of your heart  Normal activity then rest and elevation  May shower  May excercise  May walk    MOBILITY     Cane

## 2022-05-26 NOTE — ASSESSMENT & PLAN NOTE
The assessment demonstrates a stable noninfected probable full-thickness skin eschar wound right lateral calf.  Madelyn and SHANT discussed considerations of aggressive treatment which would require OR surgical debridement under general anesthesia with application of a split-thickness skin graft and we both agreed that we would prefer to utilize a more conservative wound care dressing regimen at this point.  We are engaging home care nursing assisted dressing care with at least every other day wound dressing care utilizing mild soap and water cleanse and application of a layer of SSD cream with Adaptic occlusive dressing and over application supportive knee-high Tubigrip stockings.  Madelyn has been instructed in the wound care regimen and will be assisted with home care and we are recommending a wound center follow-up in 6 days or sooner if necessary to reassess her and maintain close observation.

## 2022-05-26 NOTE — PROGRESS NOTES
Patient ID: Madelyn Ruiz                            : 1930  MRN: [id number]                                            Visit Date: 2022  Encounter Provider: Anil Jones Jr  Referring Provider: No ref. provider found    Subjective      HPI  Madelyn is a 89 y.o. old female with a chief compliant of Traumatic Wound   presenting today for initial evaluation management of the hematoma/eschar wound of the right lower calf status post slipping on wet pavement and sustaining a contusion injury approximately 4 weeks ago.  She had been evaluated by Dr Topete most recently by Dr. Linares with referral to the wound care center for further ongoing wound management.  Wound management has been maintained with occlusive triple antibiotic ointment dressing care regimen.  Madelyn is not currently on anticoagulant medication other than aspirin 81 mg/day..  He has more recently been leaving the wound open to the air to dry out and has a dry stable eschar over the lateral calf wound.  She is seen at the Center today accompanied by her son, Christ.    The following have been reviewed and updated as appropriate in this visit:          Past Medical History:  has a past medical history of Allergic bronchopulmonary aspergillosis (CMS/[facility]) (2018), Allergic rhinitis (2018), Calcification of aorta (CMS/[facility]) (2020), Chronic obstructive pulmonary disease (CMS/[facility]) (2018), Essential hypertension (2018), GERD (gastroesophageal reflux disease) (2019), Insomnia (2018), Lumbar spinal stenosis (2018), Macular degeneration disease, Obstructive sleep apnea syndrome (2018), Osteoporosis (2018), Pulmonary embolism (CMS/[facility]) (2019), and Vertebral compression fracture (CMS/[facility]).  Past Surgical History:  has a past surgical history that includes Tonsillectomy; Appendectomy; Lumbar laminectomy (2015); Carpal tunnel release (Bilateral); Hysterectomy (); and Cataract  extraction w/  intraocular lens implant (Bilateral).  Social History:   Social History     Tobacco Use   • Smoking status: Never Smoker   • Smokeless tobacco: Never Used   Vaping Use   • Vaping Use: Never used   Substance Use Topics   • Alcohol use: Yes     Comment: Rarely   • Drug use: No     Family History: family history includes Breast cancer in her mother's sister; Glaucoma in her biological mother; Heart disease in her biological brother; Hypertension in her biological mother; Lung cancer in her biological father; Macular degeneration in her biological mother; No Known Problems in her biological daughter, biological daughter, biological daughter, biological son, and biological son.  Medications:   Current Outpatient Medications:   •  silver sulfadiazine (SILVADENE) 1 % cream, Apply topically daily. Apply daily or every other day to right calf wound, Disp: 50 g, Rfl: 1  •  albuterol 2.5 mg /3 mL (0.083 %) nebulizer solution, Take 3 mL (2.5 mg total) by nebulization every 6 (six) hours as needed for wheezing or shortness of breath., Disp: 125 vial, Rfl: 0  •  albuterol HFA (VENTOLIN HFA) 90 mcg/actuation inhaler, Inhale 2 puffs every 4 (four) hours as needed for wheezing or shortness of breath., Disp: 8.5 g, Rfl: 0  •  aspirin 81 mg enteric coated tablet, Take 81 mg by mouth daily., Disp: , Rfl:   •  azithromycin 250 mg tablet, Take 250 mg by mouth daily., Disp: , Rfl:   •  benralizumab (FASENRA) 30 mg/mL syringe subcutaneous syringe, Inject 30 mg under the skin every 2 (two) months., Disp: , Rfl:   •  benzonatate (TESSALON) 200 mg capsule, TAKE 1 CAPSULE BY MOUTH 3 TIMES DAILY AS NEEDED FOR COUGH., Disp: , Rfl:   •  calcium carbonate 600 mg calcium (1,500 mg) tablet, Take 1 tablet by mouth 2 (two) times a day., Disp: , Rfl:   •  cholecalciferol, vitamin D3, (VITAMIN D3 ORAL), Take by mouth daily.  , Disp: , Rfl:   •  diphenhydrAMINE (BENADRYL) 25 mg capsule, Take 25 mg by mouth nightly., Disp: , Rfl:   •   docusate sodium (COLACE) 100 mg capsule, Take 100 mg by mouth nightly., Disp: , Rfl:   •  DULoxetine (CYMBALTA) 30 mg capsule, Take 30 mg by mouth daily., Disp: , Rfl:   •  DULoxetine (CYMBALTA) 60 mg capsule, TAKE 1 CAPSULE BY MOUTH EVERYDAY AT BEDTIME, Disp: , Rfl: 0  •  erythromycin (ILOTYCIN) 5 mg/gram (0.5 %) ophthalmic ointment, erythromycin 5 mg/gram (0.5 %) eye ointment, Disp: , Rfl:   •  esomeprazole (NexIUM) 20 mg capsule, Take 20 mg by mouth daily. Take 30 minutes prior to meal., Disp: , Rfl:   •  fexofenadine (ALLEGRA) 180 mg tablet, Take 180 mg by mouth daily., Disp: , Rfl:   •  FLOVENT  mcg/actuation inhaler, Inhale 2 puffs 2 (two) times a day., Disp: , Rfl:   •  fluticasone propionate (FLONASE) 50 mcg/actuation nasal spray, Administer 1 spray into each nostril daily., Disp: , Rfl:   •  FOLIC ACID/MULTIVIT,IRON, (CENTRUM ORAL), Take 1 tablet by mouth daily., Disp: , Rfl:   •  lifitegrast (XIIDRA) 5 % dropperette dropperette, Xiidra 5 % eye drops in a dropperette, Disp: , Rfl:   •  LORazepam (ATIVAN) 1 mg tablet, TAKE 1 TABLET BY MOUTH NIGHTLY AS NEEDED FOR ANXIETY OR SLEEP., Disp: 30 tablet, Rfl: 5  •  christine-poly-dex (MAXITROL) 3.5 mg/g-10,000 unit/g-0.1 % ointment, neomycin 3.5 mg/g-polymyxin B 10,000 unit/g-dexameth 0.1 % eye oint, Disp: , Rfl:   •  ofloxacin (OCUFLOX) 0.3 % ophthalmic solution, INSTILL 1 DROP LEFT EYE EVERY TWO HOURS WHILE AWAKE, Disp: , Rfl:   •  pregabalin (LYRICA) 25 mg capsule, Take 25 mg by mouth nightly., Disp: , Rfl:   •  SPIRIVA RESPIMAT 2.5 mcg/actuation mist inhaler, Inhale 2 puffs daily., Disp: , Rfl:   •  SYMBICORT 80-4.5 mcg/actuation inhaler, Inhale 2 puffs 2 (two) times a day., Disp: , Rfl:   •  traMADoL (ULTRAM) 50 mg tablet, Take 50 mg by mouth daily as needed., Disp: , Rfl:   •  zoledronic acid (RECLAST) IVPB, Infuse 5 mg into a venous catheter See admin instr. 5 mg once yearly, Disp: , Rfl:     Allergies: is allergic to mepolizumab, morphine, and  oxycodone.     Review of Systems   Constitutional: Negative for chills, fatigue and fever.   HENT: Negative.    Eyes: Negative.    Respiratory: Negative for cough and shortness of breath.         Maintained on Symbicort   Cardiovascular: Positive for leg swelling. Negative for chest pain.   Gastrointestinal: Negative.    Endocrine: Negative.    Genitourinary: Negative.    Musculoskeletal: Negative for arthralgias.   Skin: Positive for wound. Negative for rash.   Allergic/Immunologic:        See allergy listing   Neurological: Negative.    Hematological: Bruises/bleeds easily.   Psychiatric/Behavioral: Negative.      Objective   Visit Vitals  Pulse 72   Temp 36.1 °C (97 °F) (Temporal)   Resp 16       Physical Exam  Constitutional:       General: She is not in acute distress.     Appearance: Normal appearance. She is not ill-appearing.      Comments: Appears younger than stated age   HENT:      Head: Normocephalic and atraumatic.      Mouth/Throat:      Mouth: Mucous membranes are moist.      Pharynx: Oropharynx is clear. No oropharyngeal exudate or posterior oropharyngeal erythema.   Eyes:      General: No scleral icterus.     Extraocular Movements: Extraocular movements intact.      Conjunctiva/sclera: Conjunctivae normal.   Cardiovascular:      Rate and Rhythm: Normal rate and regular rhythm.      Heart sounds: No murmur heard.  Pulmonary:      Effort: Pulmonary effort is normal.      Breath sounds: Normal breath sounds. No wheezing or rhonchi.   Abdominal:      General: There is no distension.      Palpations: Abdomen is soft. There is no mass.      Tenderness: There is no abdominal tenderness.   Musculoskeletal:      Right lower leg: Edema present.   Lymphadenopathy:      Cervical: No cervical adenopathy.   Skin:     Capillary Refill: Capillary refill takes less than 2 seconds. There is bounding palpable right dorsalis pedis and posterior tibial pulses     Findings: Bruising present. No erythema.      Comments:  There is an oval configured dry eschar without underlying collection or hematoma at the right lower lateral calf area without any underlying signs of compartment syndrome or soft tissue superficial or deep infection.  There is no active drainage.   Neurological:      General: No focal deficit present.      Mental Status: She is alert and oriented to person, place, and time.   Psychiatric:         Mood and Affect: Mood normal.         Behavior: Behavior normal.           Stable eschar site of previous hematoma        Full-thickness eschar wound    Assessment/Plan    Diagnosis Plan   1. Open wound of right lower leg, initial encounter  silver sulfadiazine (SILVADENE) 1 % cream     Problem List Items Addressed This Visit        Musculoskeletal    Open wound of right lower leg - Primary     The assessment demonstrates a stable noninfected probable full-thickness skin eschar wound right lateral calf.  Madelyn and SHANT discussed considerations of aggressive treatment which would require OR surgical debridement under general anesthesia with application of a split-thickness skin graft and we both agreed that we would prefer to utilize a more conservative wound care dressing regimen at this point.  We are engaging home care nursing assisted dressing care with at least every other day wound dressing care utilizing mild soap and water cleanse and application of a layer of SSD cream with Adaptic occlusive dressing and over application supportive knee-high Tubigrip stockings.  Madelyn has been instructed in the wound care regimen and will be assisted with home care and we are recommending a wound center follow-up in 6 days or sooner if necessary to reassess her and maintain close observation.           Relevant Medications    silver sulfadiazine (SILVADENE) 1 % cream          Return in about 6 days (around 6/1/2022).     Anil Jones Jr, MD

## 2022-05-26 NOTE — LETTER
May 26, 2022     Henry Linares MD  [address]  [address]  [facility] [address]    Patient: Madelyn Ruiz  YOB: 1930  Date of Visit: 2022      Dear Dr. Linares:    Thank you for referring Madelyn Ruiz to me for evaluation. Below are my notes for this consultation.    If you have questions, please do not hesitate to call me. I look forward to following your patient along with you.         Sincerely,        Anil Jones Jr, MD        CC: No Recipients  Anil Jones Jr., MD  2022 11:48 AM  Sign when Signing Visit  Patient ID: Madelyn Ruiz                            : 1930  MRN: [id number]                                            Visit Date: 2022  Encounter Provider: Anil Jones Jr  Referring Provider: No ref. provider found    Subjective      HPI  Madelyn is a 89 y.o. old female with a chief compliant of Traumatic Wound   presenting today for initial evaluation management of the hematoma/eschar wound of the right lower calf status post slipping on wet pavement and sustaining a contusion injury approximately 4 weeks ago.  She had been evaluated by Dr Topete most recently by Dr. Linares with referral to the wound care center for further ongoing wound management.  Wound management has been maintained with occlusive triple antibiotic ointment dressing care regimen.  Madelyn is not currently on anticoagulant medication other than aspirin 81 mg/day..  He has more recently been leaving the wound open to the air to dry out and has a dry stable eschar over the lateral calf wound.  She is seen at the Center today accompanied by her son, Christ.    The following have been reviewed and updated as appropriate in this visit:          Past Medical History:  has a past medical history of Allergic bronchopulmonary aspergillosis (CMS/[facility]) (2018), Allergic rhinitis (2018), Calcification of aorta (CMS/[facility]) (2020), Chronic obstructive pulmonary disease  (CMS/[facility]) (04/02/2018), Essential hypertension (04/02/2018), GERD (gastroesophageal reflux disease) (06/08/2019), Insomnia (04/02/2018), Lumbar spinal stenosis (04/02/2018), Macular degeneration disease, Obstructive sleep apnea syndrome (04/02/2018), Osteoporosis (04/02/2018), Pulmonary embolism (CMS/[facility]) (06/09/2019), and Vertebral compression fracture (CMS/[facility]).  Past Surgical History:  has a past surgical history that includes Tonsillectomy; Appendectomy; Lumbar laminectomy (04/21/2015); Carpal tunnel release (Bilateral); Hysterectomy (1972); and Cataract extraction w/  intraocular lens implant (Bilateral).  Social History:   Social History     Tobacco Use   • Smoking status: Never Smoker   • Smokeless tobacco: Never Used   Vaping Use   • Vaping Use: Never used   Substance Use Topics   • Alcohol use: Yes     Comment: Rarely   • Drug use: No     Family History: family history includes Breast cancer in her mother's sister; Glaucoma in her biological mother; Heart disease in her biological brother; Hypertension in her biological mother; Lung cancer in her biological father; Macular degeneration in her biological mother; No Known Problems in her biological daughter, biological daughter, biological daughter, biological son, and biological son.  Medications:   Current Outpatient Medications:   •  silver sulfadiazine (SILVADENE) 1 % cream, Apply topically daily. Apply daily or every other day to right calf wound, Disp: 50 g, Rfl: 1  •  albuterol 2.5 mg /3 mL (0.083 %) nebulizer solution, Take 3 mL (2.5 mg total) by nebulization every 6 (six) hours as needed for wheezing or shortness of breath., Disp: 125 vial, Rfl: 0  •  albuterol HFA (VENTOLIN HFA) 90 mcg/actuation inhaler, Inhale 2 puffs every 4 (four) hours as needed for wheezing or shortness of breath., Disp: 8.5 g, Rfl: 0  •  aspirin 81 mg enteric coated tablet, Take 81 mg by mouth daily., Disp: , Rfl:   •  azithromycin 250 mg tablet, Take 250 mg by mouth daily.,  Disp: , Rfl:   •  benralizumab (FASENRA) 30 mg/mL syringe subcutaneous syringe, Inject 30 mg under the skin every 2 (two) months., Disp: , Rfl:   •  benzonatate (TESSALON) 200 mg capsule, TAKE 1 CAPSULE BY MOUTH 3 TIMES DAILY AS NEEDED FOR COUGH., Disp: , Rfl:   •  calcium carbonate 600 mg calcium (1,500 mg) tablet, Take 1 tablet by mouth 2 (two) times a day., Disp: , Rfl:   •  cholecalciferol, vitamin D3, (VITAMIN D3 ORAL), Take by mouth daily.  , Disp: , Rfl:   •  diphenhydrAMINE (BENADRYL) 25 mg capsule, Take 25 mg by mouth nightly., Disp: , Rfl:   •  docusate sodium (COLACE) 100 mg capsule, Take 100 mg by mouth nightly., Disp: , Rfl:   •  DULoxetine (CYMBALTA) 30 mg capsule, Take 30 mg by mouth daily., Disp: , Rfl:   •  DULoxetine (CYMBALTA) 60 mg capsule, TAKE 1 CAPSULE BY MOUTH EVERYDAY AT BEDTIME, Disp: , Rfl: 0  •  erythromycin (ILOTYCIN) 5 mg/gram (0.5 %) ophthalmic ointment, erythromycin 5 mg/gram (0.5 %) eye ointment, Disp: , Rfl:   •  esomeprazole (NexIUM) 20 mg capsule, Take 20 mg by mouth daily. Take 30 minutes prior to meal., Disp: , Rfl:   •  fexofenadine (ALLEGRA) 180 mg tablet, Take 180 mg by mouth daily., Disp: , Rfl:   •  FLOVENT  mcg/actuation inhaler, Inhale 2 puffs 2 (two) times a day., Disp: , Rfl:   •  fluticasone propionate (FLONASE) 50 mcg/actuation nasal spray, Administer 1 spray into each nostril daily., Disp: , Rfl:   •  FOLIC ACID/MULTIVIT,IRON, (CENTRUM ORAL), Take 1 tablet by mouth daily., Disp: , Rfl:   •  lifitegrast (XIIDRA) 5 % dropperette dropperette, Xiidra 5 % eye drops in a dropperette, Disp: , Rfl:   •  LORazepam (ATIVAN) 1 mg tablet, TAKE 1 TABLET BY MOUTH NIGHTLY AS NEEDED FOR ANXIETY OR SLEEP., Disp: 30 tablet, Rfl: 5  •  christine-poly-dex (MAXITROL) 3.5 mg/g-10,000 unit/g-0.1 % ointment, neomycin 3.5 mg/g-polymyxin B 10,000 unit/g-dexameth 0.1 % eye oint, Disp: , Rfl:   •  ofloxacin (OCUFLOX) 0.3 % ophthalmic solution, INSTILL 1 DROP LEFT EYE EVERY TWO HOURS WHILE  AWAKE, Disp: , Rfl:   •  pregabalin (LYRICA) 25 mg capsule, Take 25 mg by mouth nightly., Disp: , Rfl:   •  SPIRIVA RESPIMAT 2.5 mcg/actuation mist inhaler, Inhale 2 puffs daily., Disp: , Rfl:   •  SYMBICORT 80-4.5 mcg/actuation inhaler, Inhale 2 puffs 2 (two) times a day., Disp: , Rfl:   •  traMADoL (ULTRAM) 50 mg tablet, Take 50 mg by mouth daily as needed., Disp: , Rfl:   •  zoledronic acid (RECLAST) IVPB, Infuse 5 mg into a venous catheter See admin instr. 5 mg once yearly, Disp: , Rfl:     Allergies: is allergic to mepolizumab, morphine, and oxycodone.     Review of Systems   Constitutional: Negative for chills, fatigue and fever.   HENT: Negative.    Eyes: Negative.    Respiratory: Negative for cough and shortness of breath.         Maintained on Symbicort   Cardiovascular: Positive for leg swelling. Negative for chest pain.   Gastrointestinal: Negative.    Endocrine: Negative.    Genitourinary: Negative.    Musculoskeletal: Negative for arthralgias.   Skin: Positive for wound. Negative for rash.   Allergic/Immunologic:        See allergy listing   Neurological: Negative.    Hematological: Bruises/bleeds easily.   Psychiatric/Behavioral: Negative.      Objective   Visit Vitals  Pulse 72   Temp 36.1 °C (97 °F) (Temporal)   Resp 16       Physical Exam  Constitutional:       General: She is not in acute distress.     Appearance: Normal appearance. She is not ill-appearing.      Comments: Appears younger than stated age   HENT:      Head: Normocephalic and atraumatic.      Mouth/Throat:      Mouth: Mucous membranes are moist.      Pharynx: Oropharynx is clear. No oropharyngeal exudate or posterior oropharyngeal erythema.   Eyes:      General: No scleral icterus.     Extraocular Movements: Extraocular movements intact.      Conjunctiva/sclera: Conjunctivae normal.   Cardiovascular:      Rate and Rhythm: Normal rate and regular rhythm.      Heart sounds: No murmur heard.  Pulmonary:      Effort: Pulmonary effort is  normal.      Breath sounds: Normal breath sounds. No wheezing or rhonchi.   Abdominal:      General: There is no distension.      Palpations: Abdomen is soft. There is no mass.      Tenderness: There is no abdominal tenderness.   Musculoskeletal:      Right lower leg: Edema present.   Lymphadenopathy:      Cervical: No cervical adenopathy.   Skin:     Capillary Refill: Capillary refill takes less than 2 seconds. There is bounding palpable right dorsalis pedis and posterior tibial pulses     Findings: Bruising present. No erythema.      Comments: There is an oval configured dry eschar without underlying collection or hematoma at the right lower lateral calf area without any underlying signs of compartment syndrome or soft tissue superficial or deep infection.  There is no active drainage.   Neurological:      General: No focal deficit present.      Mental Status: She is alert and oriented to person, place, and time.   Psychiatric:         Mood and Affect: Mood normal.         Behavior: Behavior normal.           Stable eschar site of previous hematoma        Full-thickness eschar wound    Assessment/Plan    Diagnosis Plan   1. Open wound of right lower leg, initial encounter  silver sulfadiazine (SILVADENE) 1 % cream     Problem List Items Addressed This Visit        Musculoskeletal    Open wound of right lower leg - Primary     The assessment demonstrates a stable noninfected probable full-thickness skin eschar wound right lateral calf.  Madelyn and SHANT discussed considerations of aggressive treatment which would require OR surgical debridement under general anesthesia with application of a split-thickness skin graft and we both agreed that we would prefer to utilize a more conservative wound care dressing regimen at this point.  We are engaging home care nursing assisted dressing care with at least every other day wound dressing care utilizing mild soap and water cleanse and application of a layer of SSD cream with  Adaptic occlusive dressing and over application supportive knee-high Tubigrip stockings.  Madelyn has been instructed in the wound care regimen and will be assisted with home care and we are recommending a wound center follow-up in 6 days or sooner if necessary to reassess her and maintain close observation.           Relevant Medications    silver sulfadiazine (SILVADENE) 1 % cream          Return in about 6 days (around 6/1/2022).     Anil Jones Jr, MD

## 2022-06-01 ENCOUNTER — OFFICE VISIT (OUTPATIENT)
Dept: WOUND CARE | Facility: HOSPITAL | Age: 87
End: 2022-06-01
Attending: PLASTIC SURGERY
Payer: MEDICARE

## 2022-06-01 VITALS — TEMPERATURE: 97.7 F | RESPIRATION RATE: 16 BRPM

## 2022-06-01 DIAGNOSIS — S81.801D OPEN WOUND OF RIGHT LOWER LEG, SUBSEQUENT ENCOUNTER: Primary | ICD-10-CM

## 2022-06-01 PROCEDURE — 99213 OFFICE O/P EST LOW 20 MIN: CPT | Performed by: PLASTIC SURGERY

## 2022-06-01 PROCEDURE — G0463 HOSPITAL OUTPT CLINIC VISIT: HCPCS | Performed by: PLASTIC SURGERY

## 2022-06-01 PROCEDURE — G0463 HOSPITAL OUTPT CLINIC VISIT: HCPCS

## 2022-06-01 PROCEDURE — 27200112 HC AQUACELL SILVER 4X4

## 2022-06-01 RX ORDER — HONEY 100 %
PASTE (ML) TOPICAL DAILY
Qty: 44 ML | Refills: 3 | Status: SHIPPED | OUTPATIENT
Start: 2022-06-01 | End: 2022-08-01 | Stop reason: ALTCHOICE

## 2022-06-01 ASSESSMENT — ENCOUNTER SYMPTOMS
SHORTNESS OF BREATH: 0
HEMATOLOGIC/LYMPHATIC NEGATIVE: 1
FATIGUE: 0
WOUND: 1
CHILLS: 0
GASTROINTESTINAL NEGATIVE: 1
COUGH: 0
ARTHRALGIAS: 0
ENDOCRINE NEGATIVE: 1
FEVER: 0
PSYCHIATRIC NEGATIVE: 1
ALLERGIC/IMMUNOLOGIC COMMENTS: SEE ALLERGY LISTING
NEUROLOGICAL NEGATIVE: 1
EYES NEGATIVE: 1

## 2022-06-01 NOTE — PROGRESS NOTES
Patient ID: Madelyn Ruiz                            : 1930  MRN: [id number]                                            Visit Date: 2022  Encounter Provider: Anil Jones Jr  Referring Provider: No ref. provider found    Subjective      HPI  Madelyn is a 89 y.o. old female with a chief compliant of Wound Care   presenting today for ongoing evaluation and management of a dry eschar wound area overlying a previous large right lower calf hematoma.  We had initiated a course of daily wound dressing care with wound cleanse and utilizing topical SSD cream with Adaptic and occlusive dressing care..  She is seen at the wound center today accompanied by her daughter, Shelley Raya.    The following have been reviewed and updated as appropriate in this visit:          Past Medical History:  has a past medical history of Allergic bronchopulmonary aspergillosis (CMS/HCC) (2018), Allergic rhinitis (2018), Calcification of aorta (CMS/[facility]) (2020), Chronic obstructive pulmonary disease (CMS/[facility]) (2018), Essential hypertension (2018), GERD (gastroesophageal reflux disease) (2019), Insomnia (2018), Lumbar spinal stenosis (2018), Macular degeneration disease, Obstructive sleep apnea syndrome (2018), Osteoporosis (2018), Pulmonary embolism (CMS/[facility]) (2019), and Vertebral compression fracture (CMS/[facility]).  Past Surgical History:  has a past surgical history that includes Tonsillectomy; Appendectomy; Lumbar laminectomy (2015); Carpal tunnel release (Bilateral); Hysterectomy (); and Cataract extraction w/  intraocular lens implant (Bilateral).  Social History:   Social History     Tobacco Use   • Smoking status: Never Smoker   • Smokeless tobacco: Never Used   Vaping Use   • Vaping Use: Never used   Substance Use Topics   • Alcohol use: Yes     Comment: Rarely   • Drug use: No     Family History: family history includes Breast cancer in her mother's  sister; Glaucoma in her biological mother; Heart disease in her biological brother; Hypertension in her biological mother; Lung cancer in her biological father; Macular degeneration in her biological mother; No Known Problems in her biological daughter, biological daughter, biological daughter, biological son, and biological son.  Medications:   Current Outpatient Medications:   •  honey (MEDIHONEY, HONEY,) 100 % paste, Apply topically daily. Apply every other day to right leg wound, Disp: 44 mL, Rfl: 3  •  albuterol 2.5 mg /3 mL (0.083 %) nebulizer solution, Take 3 mL (2.5 mg total) by nebulization every 6 (six) hours as needed for wheezing or shortness of breath., Disp: 125 vial, Rfl: 0  •  albuterol HFA (VENTOLIN HFA) 90 mcg/actuation inhaler, Inhale 2 puffs every 4 (four) hours as needed for wheezing or shortness of breath., Disp: 8.5 g, Rfl: 0  •  aspirin 81 mg enteric coated tablet, Take 81 mg by mouth daily., Disp: , Rfl:   •  azithromycin 250 mg tablet, Take 250 mg by mouth daily., Disp: , Rfl:   •  benralizumab (FASENRA) 30 mg/mL syringe subcutaneous syringe, Inject 30 mg under the skin every 2 (two) months., Disp: , Rfl:   •  benzonatate (TESSALON) 200 mg capsule, TAKE 1 CAPSULE BY MOUTH 3 TIMES DAILY AS NEEDED FOR COUGH., Disp: , Rfl:   •  calcium carbonate 600 mg calcium (1,500 mg) tablet, Take 1 tablet by mouth 2 (two) times a day., Disp: , Rfl:   •  cholecalciferol, vitamin D3, (VITAMIN D3 ORAL), Take by mouth daily.  , Disp: , Rfl:   •  diphenhydrAMINE (BENADRYL) 25 mg capsule, Take 25 mg by mouth nightly., Disp: , Rfl:   •  docusate sodium (COLACE) 100 mg capsule, Take 100 mg by mouth nightly., Disp: , Rfl:   •  DULoxetine (CYMBALTA) 30 mg capsule, Take 30 mg by mouth daily., Disp: , Rfl:   •  DULoxetine (CYMBALTA) 60 mg capsule, TAKE 1 CAPSULE BY MOUTH EVERYDAY AT BEDTIME, Disp: , Rfl: 0  •  erythromycin (ILOTYCIN) 5 mg/gram (0.5 %) ophthalmic ointment, erythromycin 5 mg/gram (0.5 %) eye ointment,  Disp: , Rfl:   •  esomeprazole (NexIUM) 20 mg capsule, Take 20 mg by mouth daily. Take 30 minutes prior to meal., Disp: , Rfl:   •  fexofenadine (ALLEGRA) 180 mg tablet, Take 180 mg by mouth daily., Disp: , Rfl:   •  FLOVENT  mcg/actuation inhaler, Inhale 2 puffs 2 (two) times a day., Disp: , Rfl:   •  fluticasone propionate (FLONASE) 50 mcg/actuation nasal spray, Administer 1 spray into each nostril daily., Disp: , Rfl:   •  FOLIC ACID/MULTIVIT,IRON, (CENTRUM ORAL), Take 1 tablet by mouth daily., Disp: , Rfl:   •  lifitegrast (XIIDRA) 5 % dropperette dropperette, Xiidra 5 % eye drops in a dropperette, Disp: , Rfl:   •  LORazepam (ATIVAN) 1 mg tablet, TAKE 1 TABLET BY MOUTH NIGHTLY AS NEEDED FOR ANXIETY OR SLEEP., Disp: 30 tablet, Rfl: 5  •  christine-poly-dex (MAXITROL) 3.5 mg/g-10,000 unit/g-0.1 % ointment, neomycin 3.5 mg/g-polymyxin B 10,000 unit/g-dexameth 0.1 % eye oint, Disp: , Rfl:   •  ofloxacin (OCUFLOX) 0.3 % ophthalmic solution, INSTILL 1 DROP LEFT EYE EVERY TWO HOURS WHILE AWAKE, Disp: , Rfl:   •  pregabalin (LYRICA) 25 mg capsule, Take 25 mg by mouth nightly., Disp: , Rfl:   •  silver sulfadiazine (SILVADENE) 1 % cream, Apply topically daily. Apply daily or every other day to right calf wound, Disp: 50 g, Rfl: 1  •  SPIRIVA RESPIMAT 2.5 mcg/actuation mist inhaler, Inhale 2 puffs daily., Disp: , Rfl:   •  SYMBICORT 80-4.5 mcg/actuation inhaler, Inhale 2 puffs 2 (two) times a day., Disp: , Rfl:   •  traMADoL (ULTRAM) 50 mg tablet, Take 50 mg by mouth daily as needed., Disp: , Rfl:   •  zoledronic acid (RECLAST) IVPB, Infuse 5 mg into a venous catheter See admin instr. 5 mg once yearly, Disp: , Rfl:     Allergies: is allergic to mepolizumab, morphine, and oxycodone.     Review of Systems   Constitutional: Negative for chills, fatigue and fever.   HENT: Negative.    Eyes: Negative.    Respiratory: Negative for cough and shortness of breath.    Cardiovascular: Positive for leg swelling. Negative for  chest pain.   Gastrointestinal: Negative.    Endocrine: Negative.    Musculoskeletal: Negative for arthralgias.   Skin: Positive for wound. Negative for rash.   Allergic/Immunologic:        See allergy listing   Neurological: Negative.    Hematological: Negative.    Psychiatric/Behavioral: Negative.      Objective   There were no vitals taken for this visit.    Physical Exam  Constitutional:       General: She is not in acute distress.     Appearance: Normal appearance. She is not ill-appearing.   HENT:      Head: Normocephalic and atraumatic.   Eyes:      General: No scleral icterus.     Extraocular Movements: Extraocular movements intact.      Conjunctiva/sclera: Conjunctivae normal.   Pulmonary:      Effort: Pulmonary effort is normal.   Musculoskeletal:      Right lower leg: Edema present.   Skin:     Capillary Refill: Capillary refill takes less than 2 seconds.      Findings: No erythema or rash.      Comments: The eschar was  and removed with sterile instruments and underlying tissue demonstrates moderate degree of tissue slough still with no active bleeding and no residual hematoma.  The wound has decreased in size   Neurological:      General: No focal deficit present.      Mental Status: She is alert and oriented to person, place, and time.   Psychiatric:         Mood and Affect: Mood normal.         Behavior: Behavior normal.           Wound after eschar removal    Assessment/Plan    Diagnosis Plan   1. Open wound of right lower leg, subsequent encounter       Problem List Items Addressed This Visit        Musculoskeletal    Open wound of right lower leg - Primary     After application of topical 2% lidocaine gel, sterile instruments were used to remove  layer of eschar demonstrating moderate tissue slough removed at the wound base.  We are adjusting the home care nursing dressing regimen to perform every other day wound cleanse with application of a layer of Medihoney paste and Aquacel  silver sheeting with occlusive gauze dressing and over application of knee-high Tubigrip stocking support with recommended wound center follow-up to see Dr. Edge in 5 days.                 Return in about 1 week (around 6/8/2022).     Anil Jones Jr, MD

## 2022-06-01 NOTE — ASSESSMENT & PLAN NOTE
After application of topical 2% lidocaine gel, sterile instruments were used to remove  layer of eschar demonstrating moderate tissue slough removed at the wound base.  We are adjusting the home care nursing dressing regimen to perform every other day wound cleanse with application of a layer of Medihoney paste and Aquacel silver sheeting with occlusive gauze dressing and over application of knee-high Tubigrip stocking support with recommended wound center follow-up to see Dr. Edge in 5 days.

## 2022-06-01 NOTE — PATIENT INSTRUCTIONS
Wound Healing Center Instructions    MEDICATIONS     Medication Note  Continue present medications as prescribed by the Wound Healing Center or other physicians you see. To avoid any problems keep the Wound Healing Center informed each visit of any medications changes that occur.       WOUND CARE     Clean Wound with: Saline Solution, Soap and Water, and Showering (Dove, dial or ivory soap)  Treatment 1   Location: Right leg  Dressing: Apply medihoney, silver alginate, gauze, peterson. Apply tubigrip in AM, off in PM.  Dressing Care Frequency: Every other day  Care Provider: Family and Visiting Nurse      COMPRESSION THERAPY     Tubigrip Stockings  Both Legs Size E  Apply Tubigrips (cotton/compression stockinette) DAILY.  Apply stockings upon arising in the morning to obtain the best results.  Remove stockings at bedtime once your legs are elevated.  Do not allow the stockinette to roll down as it can reduce or interrupt the blood flow in your limb.  Always wear the appropriate size that is recommended for you at the [facility].  Tubigrips can be washed by hand with soap and water and hang to dry overnight.     Basic Principles        Wash your hands thoroughly with soap and water and after each dressing change. If someone other than the patient changes the dressing, it’s best to wear disposable gloves.   Do not get the wound or dressing wet.   To shower: remove the dressing, shower with soap and water (including washing the wound - do not use a washcloth), air dry, then redress the wound.  Do not take a tub bath  Keep all your dressings in a clean, covered container at home to avoid dust and contamination.  Discard used dressings in a plastic bag or covered trash container.  Check your wound and the surrounding skin at each dressing change for redness, warmth, swelling, increased pain, foul odor, fever, pus or abnormal drainage or discharge.  Notify Wound Healing Center if any of these changes occur -  [phone].        Nutrition  Eat a well, balanced diet with adequate protein to support wound healing.   Take a multivitamin every day. Adequate nutrition supports healing and new tissue growth.  All diabetic patients should strive to keep blood sugars within a normal, practical range.   Elevated blood sugars can delay your wound healing.        ACTIVITY     Elevate legs above level of heart   Elevate your legs above the level of your heart for specific time periods during the day on several firm pillows or a foam leg wedge  Use of a recliner chair at home can often help in the appropriate elevation of your legs above the level of your heart  Normal activity then rest and elevation  May shower  May excercise  May walk    MOBILITY     Cane

## 2022-06-07 ENCOUNTER — OFFICE VISIT (OUTPATIENT)
Dept: WOUND CARE | Facility: HOSPITAL | Age: 87
End: 2022-06-07
Attending: PLASTIC SURGERY
Payer: MEDICARE

## 2022-06-07 VITALS — RESPIRATION RATE: 18 BRPM | TEMPERATURE: 97.5 F

## 2022-06-07 DIAGNOSIS — S81.801D OPEN WOUND OF RIGHT LOWER LEG, SUBSEQUENT ENCOUNTER: Primary | ICD-10-CM

## 2022-06-07 PROCEDURE — 27200112 HC AQUACELL SILVER 4X4

## 2022-06-07 PROCEDURE — 99213 OFFICE O/P EST LOW 20 MIN: CPT | Performed by: PLASTIC SURGERY

## 2022-06-07 PROCEDURE — G0463 HOSPITAL OUTPT CLINIC VISIT: HCPCS

## 2022-06-07 PROCEDURE — G0463 HOSPITAL OUTPT CLINIC VISIT: HCPCS | Performed by: PLASTIC SURGERY

## 2022-06-07 ASSESSMENT — ENCOUNTER SYMPTOMS
WEAKNESS: 1
WOUND: 1

## 2022-06-07 NOTE — PATIENT INSTRUCTIONS
Wound Healing Center Instructions    MEDICATIONS     Medication Note  Continue present medications as prescribed by the Wound Healing Center or other physicians you see. To avoid any problems keep the Wound Healing Center informed each visit of any medications changes that occur.       WOUND CARE     Clean Wound with: Saline Solution, Soap and Water, and Showering (Dove, dial or ivory soap)  Treatment 1   Location: Right leg  Dressing: Apply medihoney, silver alginate, gauze, peterson. Apply tubigrip in AM, off in PM.  Dressing Care Frequency: Every other day  Care Provider: Family and Visiting Nurse      COMPRESSION THERAPY     Tubigrip Stockings  Both Legs Size E  Apply Tubigrips (cotton/compression stockinette) DAILY.  Apply stockings upon arising in the morning to obtain the best results.  Remove stockings at bedtime once your legs are elevated.  Do not allow the stockinette to roll down as it can reduce or interrupt the blood flow in your limb.  Always wear the appropriate size that is recommended for you at the [facility].  Tubigrips can be washed by hand with soap and water and hang to dry overnight.     Basic Principles        Wash your hands thoroughly with soap and water and after each dressing change. If someone other than the patient changes the dressing, it’s best to wear disposable gloves.   Do not get the wound or dressing wet.   To shower: remove the dressing, shower with soap and water (including washing the wound - do not use a washcloth), air dry, then redress the wound.  Do not take a tub bath  Keep all your dressings in a clean, covered container at home to avoid dust and contamination.  Discard used dressings in a plastic bag or covered trash container.  Check your wound and the surrounding skin at each dressing change for redness, warmth, swelling, increased pain, foul odor, fever, pus or abnormal drainage or discharge.  Notify Wound Healing Center if any of these changes occur -  [phone].        Nutrition  Eat a well, balanced diet with adequate protein to support wound healing.   Take a multivitamin every day. Adequate nutrition supports healing and new tissue growth.  All diabetic patients should strive to keep blood sugars within a normal, practical range.   Elevated blood sugars can delay your wound healing.        ACTIVITY     Elevate legs above level of heart   Elevate your legs above the level of your heart for specific time periods during the day on several firm pillows or a foam leg wedge  Use of a recliner chair at home can often help in the appropriate elevation of your legs above the level of your heart  Normal activity then rest and elevation  May shower  May excercise  May walk    MOBILITY     Cane

## 2022-06-07 NOTE — PROGRESS NOTES
Patient ID: Madelyn Ruiz                            : 1930  MRN: [id number]                                            Visit Date: 2022  Encounter Provider: GIRISH Kelly  Referring Provider: No ref. provider found    Subjective      HPI  Madelyn is a 89 y.o. old female with a chief compliant of Wound Care   presenting today for: Treatment of traumatic right lower extremity wound with every other day Medihoney dressing Tubigrip compression     The following have been reviewed and updated as appropriate in this visit:          Past Medical History:  has a past medical history of Allergic bronchopulmonary aspergillosis (CMS/[facility]) (2018), Allergic rhinitis (2018), Calcification of aorta (CMS/[facility]) (2020), Chronic obstructive pulmonary disease (CMS/[facility]) (2018), Essential hypertension (2018), GERD (gastroesophageal reflux disease) (2019), Insomnia (2018), Lumbar spinal stenosis (2018), Macular degeneration disease, Obstructive sleep apnea syndrome (2018), Osteoporosis (2018), Pulmonary embolism (CMS/[facility]) (2019), and Vertebral compression fracture (CMS/[facility]).  Past Surgical History:  has a past surgical history that includes Tonsillectomy; Appendectomy; Lumbar laminectomy (2015); Carpal tunnel release (Bilateral); Hysterectomy (1972); and Cataract extraction w/  intraocular lens implant (Bilateral).  Social History:   Social History     Tobacco Use   • Smoking status: Never Smoker   • Smokeless tobacco: Never Used   Vaping Use   • Vaping Use: Never used   Substance Use Topics   • Alcohol use: Yes     Comment: Rarely   • Drug use: No     Family History: family history includes Breast cancer in her mother's sister; Glaucoma in her biological mother; Heart disease in her biological brother; Hypertension in her biological mother; Lung cancer in her biological father; Macular degeneration in her biological mother; No Known Problems  in her biological daughter, biological daughter, biological daughter, biological son, and biological son.  Medications:   Current Outpatient Medications:   •  albuterol 2.5 mg /3 mL (0.083 %) nebulizer solution, Take 3 mL (2.5 mg total) by nebulization every 6 (six) hours as needed for wheezing or shortness of breath., Disp: 125 vial, Rfl: 0  •  albuterol HFA (VENTOLIN HFA) 90 mcg/actuation inhaler, Inhale 2 puffs every 4 (four) hours as needed for wheezing or shortness of breath., Disp: 8.5 g, Rfl: 0  •  aspirin 81 mg enteric coated tablet, Take 81 mg by mouth daily., Disp: , Rfl:   •  azithromycin 250 mg tablet, Take 250 mg by mouth daily., Disp: , Rfl:   •  benralizumab (FASENRA) 30 mg/mL syringe subcutaneous syringe, Inject 30 mg under the skin every 2 (two) months., Disp: , Rfl:   •  benzonatate (TESSALON) 200 mg capsule, TAKE 1 CAPSULE BY MOUTH 3 TIMES DAILY AS NEEDED FOR COUGH., Disp: , Rfl:   •  calcium carbonate 600 mg calcium (1,500 mg) tablet, Take 1 tablet by mouth 2 (two) times a day., Disp: , Rfl:   •  cholecalciferol, vitamin D3, (VITAMIN D3 ORAL), Take by mouth daily.  , Disp: , Rfl:   •  diphenhydrAMINE (BENADRYL) 25 mg capsule, Take 25 mg by mouth nightly., Disp: , Rfl:   •  docusate sodium (COLACE) 100 mg capsule, Take 100 mg by mouth nightly., Disp: , Rfl:   •  DULoxetine (CYMBALTA) 30 mg capsule, Take 30 mg by mouth daily., Disp: , Rfl:   •  DULoxetine (CYMBALTA) 60 mg capsule, TAKE 1 CAPSULE BY MOUTH EVERYDAY AT BEDTIME, Disp: , Rfl: 0  •  erythromycin (ILOTYCIN) 5 mg/gram (0.5 %) ophthalmic ointment, erythromycin 5 mg/gram (0.5 %) eye ointment, Disp: , Rfl:   •  esomeprazole (NexIUM) 20 mg capsule, Take 20 mg by mouth daily. Take 30 minutes prior to meal., Disp: , Rfl:   •  fexofenadine (ALLEGRA) 180 mg tablet, Take 180 mg by mouth daily., Disp: , Rfl:   •  FLOVENT  mcg/actuation inhaler, Inhale 2 puffs 2 (two) times a day., Disp: , Rfl:   •  fluticasone propionate (FLONASE) 50  mcg/actuation nasal spray, Administer 1 spray into each nostril daily., Disp: , Rfl:   •  FOLIC ACID/MULTIVIT,IRON, (CENTRUM ORAL), Take 1 tablet by mouth daily., Disp: , Rfl:   •  honey (MEDIHONEY, HONEY,) 100 % paste, Apply topically daily. Apply every other day to right leg wound, Disp: 44 mL, Rfl: 3  •  lifitegrast (XIIDRA) 5 % dropperette dropperette, Xiidra 5 % eye drops in a dropperette, Disp: , Rfl:   •  LORazepam (ATIVAN) 1 mg tablet, TAKE 1 TABLET BY MOUTH NIGHTLY AS NEEDED FOR ANXIETY OR SLEEP., Disp: 30 tablet, Rfl: 5  •  christine-poly-dex (MAXITROL) 3.5 mg/g-10,000 unit/g-0.1 % ointment, neomycin 3.5 mg/g-polymyxin B 10,000 unit/g-dexameth 0.1 % eye oint, Disp: , Rfl:   •  ofloxacin (OCUFLOX) 0.3 % ophthalmic solution, INSTILL 1 DROP LEFT EYE EVERY TWO HOURS WHILE AWAKE, Disp: , Rfl:   •  pregabalin (LYRICA) 25 mg capsule, Take 25 mg by mouth nightly., Disp: , Rfl:   •  silver sulfadiazine (SILVADENE) 1 % cream, Apply topically daily. Apply daily or every other day to right calf wound, Disp: 50 g, Rfl: 1  •  SPIRIVA RESPIMAT 2.5 mcg/actuation mist inhaler, Inhale 2 puffs daily., Disp: , Rfl:   •  SYMBICORT 80-4.5 mcg/actuation inhaler, Inhale 2 puffs 2 (two) times a day., Disp: , Rfl:   •  traMADoL (ULTRAM) 50 mg tablet, Take 50 mg by mouth daily as needed., Disp: , Rfl:   •  zoledronic acid (RECLAST) IVPB, Infuse 5 mg into a venous catheter See admin instr. 5 mg once yearly, Disp: , Rfl:     Allergies: is allergic to mepolizumab, morphine, and oxycodone.     Review of Systems   Cardiovascular: Positive for leg swelling.   Skin: Positive for wound.   Neurological: Positive for weakness.   All other systems reviewed and are negative.    Objective   There were no vitals taken for this visit.    Physical Exam  Constitutional:       Appearance: Normal appearance.   HENT:      Head: Normocephalic.      Mouth/Throat:      Mouth: Mucous membranes are moist.   Eyes:      Conjunctiva/sclera: Conjunctivae normal.       Pupils: Pupils are equal, round, and reactive to light.   Cardiovascular:      Rate and Rhythm: Normal rate.   Pulmonary:      Effort: Pulmonary effort is normal.   Abdominal:      Palpations: Abdomen is soft.   Musculoskeletal:         General: Swelling present. Normal range of motion.      Cervical back: Neck supple.   Skin:     General: Skin is warm.   Neurological:      General: No focal deficit present.      Mental Status: She is alert.      Motor: Weakness present.   Psychiatric:         Mood and Affect: Mood normal.       Treatment of right leg wound.  No sign of infection.  Wound is clean with 15 blade      Assessment/Plan    Diagnosis Plan   1. Open wound of right lower leg, subsequent encounter       Problem List Items Addressed This Visit        Musculoskeletal    Open wound of right lower leg - Primary      Continue home care nursing every other day dressing change with Medihoney paste, Aquacel silver, gauze and gauze wrap and Tubigrip stocking.    Lower extremity swelling treated with elevation, fluid restriction and Tubigrip compression    No follow-ups on file.     GIRISH Kelly MD

## 2022-06-14 ENCOUNTER — OFFICE VISIT (OUTPATIENT)
Dept: WOUND CARE | Facility: HOSPITAL | Age: 87
End: 2022-06-14
Attending: PLASTIC SURGERY
Payer: MEDICARE

## 2022-06-14 VITALS — TEMPERATURE: 97 F | RESPIRATION RATE: 18 BRPM | HEART RATE: 72 BPM

## 2022-06-14 DIAGNOSIS — S81.801D OPEN WOUND OF RIGHT LOWER LEG, SUBSEQUENT ENCOUNTER: Primary | ICD-10-CM

## 2022-06-14 PROCEDURE — G0463 HOSPITAL OUTPT CLINIC VISIT: HCPCS | Performed by: PLASTIC SURGERY

## 2022-06-14 PROCEDURE — 99213 OFFICE O/P EST LOW 20 MIN: CPT | Performed by: PLASTIC SURGERY

## 2022-06-14 PROCEDURE — 27200112 HC AQUACELL SILVER 4X4

## 2022-06-14 PROCEDURE — G0463 HOSPITAL OUTPT CLINIC VISIT: HCPCS

## 2022-06-14 ASSESSMENT — ENCOUNTER SYMPTOMS
FEVER: 0
COUGH: 0
ENDOCRINE NEGATIVE: 1
ARTHRALGIAS: 0
HEMATOLOGIC/LYMPHATIC NEGATIVE: 1
SHORTNESS OF BREATH: 0
FATIGUE: 0
CHILLS: 0
PSYCHIATRIC NEGATIVE: 1
EYES NEGATIVE: 1
GASTROINTESTINAL NEGATIVE: 1
WOUND: 1
ALLERGIC/IMMUNOLOGIC COMMENTS: SEE ALLERGY LISTING
NEUROLOGICAL NEGATIVE: 1

## 2022-06-14 NOTE — ASSESSMENT & PLAN NOTE
Or application of topical 2% lidocaine gel, a small curette was used to remove areas of tissue slough demonstrating a clean granular wound base.  We are going to continue the home nursing care every other day wound cleansed with application of Medihoney paste and layer of Aquacel silver with occlusive dressing and knee-high Tubigrip support and recommended wound center follow-up in about 1 week.

## 2022-06-14 NOTE — PATIENT INSTRUCTIONS
Wound Healing Center Instructions    MEDICATIONS     Medication Note  Continue present medications as prescribed by the Wound Healing Center or other physicians you see. To avoid any problems keep the Wound Healing Center informed each visit of any medications changes that occur.       WOUND CARE     Clean Wound with: Saline Solution, Soap and Water, and Showering (Dove, dial or ivory soap)  Treatment 1   Location: Right leg  Dressing: Apply medihoney, silver alginate, gauze, peterson. Apply tubigrip in AM, off in PM.  Dressing Care Frequency: Every other day  Care Provider: Family and Visiting Nurse      COMPRESSION THERAPY     Tubigrip Stockings  Both Legs Size E  Apply Tubigrips (cotton/compression stockinette) DAILY.  Apply stockings upon arising in the morning to obtain the best results.  Remove stockings at bedtime once your legs are elevated.  Do not allow the stockinette to roll down as it can reduce or interrupt the blood flow in your limb.  Always wear the appropriate size that is recommended for you at the [facility].  Tubigrips can be washed by hand with soap and water and hang to dry overnight.     Basic Principles        Wash your hands thoroughly with soap and water and after each dressing change. If someone other than the patient changes the dressing, it’s best to wear disposable gloves.   Do not get the wound or dressing wet.   To shower: remove the dressing, shower with soap and water (including washing the wound - do not use a washcloth), air dry, then redress the wound.  Do not take a tub bath  Keep all your dressings in a clean, covered container at home to avoid dust and contamination.  Discard used dressings in a plastic bag or covered trash container.  Check your wound and the surrounding skin at each dressing change for redness, warmth, swelling, increased pain, foul odor, fever, pus or abnormal drainage or discharge.  Notify Wound Healing Center if any of these changes occur -  [phone].        Nutrition  Eat a well, balanced diet with adequate protein to support wound healing.   Take a multivitamin every day. Adequate nutrition supports healing and new tissue growth.  All diabetic patients should strive to keep blood sugars within a normal, practical range.   Elevated blood sugars can delay your wound healing.        ACTIVITY     Elevate legs above level of heart   Elevate your legs above the level of your heart for specific time periods during the day on several firm pillows or a foam leg wedge  Use of a recliner chair at home can often help in the appropriate elevation of your legs above the level of your heart  Normal activity then rest and elevation  May shower  May excercise  May walk    MOBILITY     Cane

## 2022-06-14 NOTE — PROGRESS NOTES
Patient ID: Madelyn Ruiz                            : 1930  MRN: [id number]                                            Visit Date: 2022  Encounter Provider: Anil Jones Jr  Referring Provider: No ref. provider found    Subjective      HPI  Madelyn is a 89 y.o. old female with a chief compliant of No chief complaint on file.   presenting today for ongoing evaluation and management of a right lower calf complex wound status post development of a traumatic hematoma and overlying skin eschar and slough.  He has been utilizing topical Medihoney paste with Aquacel silver and occlusive gauze Tubigrip stocking compression dressing regimen and the patient was seen in my absence on  by Dr. Edge.  His notes are reviewed.  She is accompanied today at the wound center by her daughter, Shelley Santiago.    The following have been reviewed and updated as appropriate in this visit:          Past Medical History:  has a past medical history of Allergic bronchopulmonary aspergillosis (CMS/[facility]) (2018), Allergic rhinitis (2018), Calcification of aorta (CMS/[facility]) (2020), Chronic obstructive pulmonary disease (CMS/[facility]) (2018), Essential hypertension (2018), GERD (gastroesophageal reflux disease) (2019), Insomnia (2018), Lumbar spinal stenosis (2018), Macular degeneration disease, Obstructive sleep apnea syndrome (2018), Osteoporosis (2018), Pulmonary embolism (CMS/[facility]) (2019), and Vertebral compression fracture (CMS/[facility]).  Past Surgical History:  has a past surgical history that includes Tonsillectomy; Appendectomy; Lumbar laminectomy (2015); Carpal tunnel release (Bilateral); Hysterectomy (); and Cataract extraction w/  intraocular lens implant (Bilateral).  Social History:   Social History     Tobacco Use   • Smoking status: Never Smoker   • Smokeless tobacco: Never Used   Vaping Use   • Vaping Use: Never used   Substance Use Topics    • Alcohol use: Yes     Comment: Rarely   • Drug use: No     Family History: family history includes Breast cancer in her mother's sister; Glaucoma in her biological mother; Heart disease in her biological brother; Hypertension in her biological mother; Lung cancer in her biological father; Macular degeneration in her biological mother; No Known Problems in her biological daughter, biological daughter, biological daughter, biological son, and biological son.  Medications:   Current Outpatient Medications:   •  albuterol 2.5 mg /3 mL (0.083 %) nebulizer solution, Take 3 mL (2.5 mg total) by nebulization every 6 (six) hours as needed for wheezing or shortness of breath., Disp: 125 vial, Rfl: 0  •  albuterol HFA (VENTOLIN HFA) 90 mcg/actuation inhaler, Inhale 2 puffs every 4 (four) hours as needed for wheezing or shortness of breath., Disp: 8.5 g, Rfl: 0  •  aspirin 81 mg enteric coated tablet, Take 81 mg by mouth daily., Disp: , Rfl:   •  azithromycin 250 mg tablet, Take 250 mg by mouth daily., Disp: , Rfl:   •  benralizumab (FASENRA) 30 mg/mL syringe subcutaneous syringe, Inject 30 mg under the skin every 2 (two) months., Disp: , Rfl:   •  benzonatate (TESSALON) 200 mg capsule, TAKE 1 CAPSULE BY MOUTH 3 TIMES DAILY AS NEEDED FOR COUGH., Disp: , Rfl:   •  calcium carbonate 600 mg calcium (1,500 mg) tablet, Take 1 tablet by mouth 2 (two) times a day., Disp: , Rfl:   •  cholecalciferol, vitamin D3, (VITAMIN D3 ORAL), Take by mouth daily.  , Disp: , Rfl:   •  diphenhydrAMINE (BENADRYL) 25 mg capsule, Take 25 mg by mouth nightly., Disp: , Rfl:   •  docusate sodium (COLACE) 100 mg capsule, Take 100 mg by mouth nightly., Disp: , Rfl:   •  DULoxetine (CYMBALTA) 30 mg capsule, Take 30 mg by mouth daily., Disp: , Rfl:   •  DULoxetine (CYMBALTA) 60 mg capsule, TAKE 1 CAPSULE BY MOUTH EVERYDAY AT BEDTIME, Disp: , Rfl: 0  •  erythromycin (ILOTYCIN) 5 mg/gram (0.5 %) ophthalmic ointment, erythromycin 5 mg/gram (0.5 %) eye  ointment, Disp: , Rfl:   •  esomeprazole (NexIUM) 20 mg capsule, Take 20 mg by mouth daily. Take 30 minutes prior to meal., Disp: , Rfl:   •  fexofenadine (ALLEGRA) 180 mg tablet, Take 180 mg by mouth daily., Disp: , Rfl:   •  FLOVENT  mcg/actuation inhaler, Inhale 2 puffs 2 (two) times a day., Disp: , Rfl:   •  fluticasone propionate (FLONASE) 50 mcg/actuation nasal spray, Administer 1 spray into each nostril daily., Disp: , Rfl:   •  FOLIC ACID/MULTIVIT,IRON, (CENTRUM ORAL), Take 1 tablet by mouth daily., Disp: , Rfl:   •  honey (MEDIHONEY, HONEY,) 100 % paste, Apply topically daily. Apply every other day to right leg wound, Disp: 44 mL, Rfl: 3  •  lifitegrast (XIIDRA) 5 % dropperette dropperette, Xiidra 5 % eye drops in a dropperette, Disp: , Rfl:   •  LORazepam (ATIVAN) 1 mg tablet, TAKE 1 TABLET BY MOUTH NIGHTLY AS NEEDED FOR ANXIETY OR SLEEP., Disp: 30 tablet, Rfl: 5  •  christine-poly-dex (MAXITROL) 3.5 mg/g-10,000 unit/g-0.1 % ointment, neomycin 3.5 mg/g-polymyxin B 10,000 unit/g-dexameth 0.1 % eye oint, Disp: , Rfl:   •  ofloxacin (OCUFLOX) 0.3 % ophthalmic solution, INSTILL 1 DROP LEFT EYE EVERY TWO HOURS WHILE AWAKE, Disp: , Rfl:   •  pregabalin (LYRICA) 25 mg capsule, Take 25 mg by mouth nightly., Disp: , Rfl:   •  silver sulfadiazine (SILVADENE) 1 % cream, Apply topically daily. Apply daily or every other day to right calf wound, Disp: 50 g, Rfl: 1  •  SPIRIVA RESPIMAT 2.5 mcg/actuation mist inhaler, Inhale 2 puffs daily., Disp: , Rfl:   •  SYMBICORT 80-4.5 mcg/actuation inhaler, Inhale 2 puffs 2 (two) times a day., Disp: , Rfl:   •  traMADoL (ULTRAM) 50 mg tablet, Take 50 mg by mouth daily as needed., Disp: , Rfl:   •  zoledronic acid (RECLAST) IVPB, Infuse 5 mg into a venous catheter See admin instr. 5 mg once yearly, Disp: , Rfl:     Allergies: is allergic to mepolizumab, morphine, and oxycodone.     Review of Systems   Constitutional: Negative for chills, fatigue and fever.   HENT: Negative.     Eyes: Negative.    Respiratory: Negative for cough and shortness of breath.    Cardiovascular: Positive for leg swelling. Negative for chest pain.   Gastrointestinal: Negative.    Endocrine: Negative.    Musculoskeletal: Negative for arthralgias.   Skin: Positive for wound.   Allergic/Immunologic:        See allergy listing   Neurological: Negative.    Hematological: Negative.    Psychiatric/Behavioral: Negative.      Objective   Visit Vitals  Pulse 72   Temp 36.1 °C (97 °F)   Resp 18       Physical Exam  Constitutional:       General: She is not in acute distress.     Appearance: Normal appearance. She is not ill-appearing.   HENT:      Head: Normocephalic and atraumatic.   Eyes:      General: No scleral icterus.     Extraocular Movements: Extraocular movements intact.      Conjunctiva/sclera: Conjunctivae normal.   Pulmonary:      Effort: Pulmonary effort is normal.   Musculoskeletal:      Right lower leg: Edema present.      Comments: Moderate degree right lower calf and ankle edema   Skin:     Capillary Refill: Capillary refill takes less than 2 seconds.      Findings: No erythema.      Comments: Further decrease in right lateral calf wound area with minimal wound depth and healthy appearing base granular tissue with no sign of soft tissue infection   Neurological:      General: No focal deficit present.      Mental Status: She is alert and oriented to person, place, and time.   Psychiatric:         Mood and Affect: Mood normal.         Behavior: Behavior normal.           Healing right calf wound    Assessment/Plan    Diagnosis Plan   1. Open wound of right lower leg, subsequent encounter       Problem List Items Addressed This Visit        Musculoskeletal    Open wound of right lower leg - Primary     Or application of topical 2% lidocaine gel, a small curette was used to remove areas of tissue slough demonstrating a clean granular wound base.  We are going to continue the home nursing care every other day  wound cleansed with application of Medihoney paste and layer of Aquacel silver with occlusive dressing and knee-high Tubigrip support and recommended wound center follow-up in about 1 week.                 Return in about 1 year (around 6/14/2023).     Anil Jones Jr, MD

## 2022-06-21 ENCOUNTER — OFFICE VISIT (OUTPATIENT)
Dept: WOUND CARE | Facility: HOSPITAL | Age: 87
End: 2022-06-21
Attending: PLASTIC SURGERY
Payer: MEDICARE

## 2022-06-21 VITALS — HEART RATE: 74 BPM | RESPIRATION RATE: 18 BRPM | TEMPERATURE: 98.2 F

## 2022-06-21 DIAGNOSIS — S81.801D OPEN WOUND OF RIGHT LOWER LEG, SUBSEQUENT ENCOUNTER: Primary | ICD-10-CM

## 2022-06-21 PROCEDURE — G0463 HOSPITAL OUTPT CLINIC VISIT: HCPCS

## 2022-06-21 PROCEDURE — 27200111 HC AQUACEL SILVER 2X3

## 2022-06-21 PROCEDURE — 99213 OFFICE O/P EST LOW 20 MIN: CPT | Performed by: PLASTIC SURGERY

## 2022-06-21 ASSESSMENT — ENCOUNTER SYMPTOMS
COUGH: 0
PSYCHIATRIC NEGATIVE: 1
ENDOCRINE NEGATIVE: 1
ARTHRALGIAS: 0
WOUND: 1
GASTROINTESTINAL NEGATIVE: 1
NEUROLOGICAL NEGATIVE: 1
FATIGUE: 1
EYES NEGATIVE: 1
CHILLS: 0
SHORTNESS OF BREATH: 0
ALLERGIC/IMMUNOLOGIC COMMENTS: SEE ALLERGY LISTING
FEVER: 0

## 2022-06-21 NOTE — ASSESSMENT & PLAN NOTE
After application of 2% lidocaine gel, a sterile curette was used to remove superficial fibrous granulation tissue demonstrating underlying healthy bleeding granulation tissue.  We are going to maintain the every other day home nursing care dressing regimen utilizing topical Medihoney paste and Aquacel silver sheeting with occlusive gauze dressing and over application of knee-high Tubigrip stocking support with planned wound center follow-up in about 1 week.

## 2022-06-21 NOTE — PATIENT INSTRUCTIONS
Wound Healing Center Instructions    MEDICATIONS     Medication Note  Continue present medications as prescribed by the Wound Healing Center or other physicians you see. To avoid any problems keep the Wound Healing Center informed each visit of any medications changes that occur.       WOUND CARE     Clean Wound with: Saline Solution, Soap and Water, and Showering (Dove, dial or ivory soap)  Treatment 1   Location: Right leg  Dressing: Apply medihoney, silver alginate, gauze, peterson. Apply tubigrip in AM, off in PM.  Dressing Care Frequency: Every other day  Care Provider: Family and Visiting Nurse      COMPRESSION THERAPY     Tubigrip Stockings  Both Legs Size E  Apply Tubigrips (cotton/compression stockinette) DAILY.  Apply stockings upon arising in the morning to obtain the best results.  Remove stockings at bedtime once your legs are elevated.  Do not allow the stockinette to roll down as it can reduce or interrupt the blood flow in your limb.  Always wear the appropriate size that is recommended for you at the [facility].  Tubigrips can be washed by hand with soap and water and hang to dry overnight.     Basic Principles        Wash your hands thoroughly with soap and water and after each dressing change. If someone other than the patient changes the dressing, it’s best to wear disposable gloves.   Do not get the wound or dressing wet.   To shower: remove the dressing, shower with soap and water (including washing the wound - do not use a washcloth), air dry, then redress the wound.  Do not take a tub bath  Keep all your dressings in a clean, covered container at home to avoid dust and contamination.  Discard used dressings in a plastic bag or covered trash container.  Check your wound and the surrounding skin at each dressing change for redness, warmth, swelling, increased pain, foul odor, fever, pus or abnormal drainage or discharge.  Notify Wound Healing Center if any of these changes occur -  [phone].        Nutrition  Eat a well, balanced diet with adequate protein to support wound healing.   Take a multivitamin every day. Adequate nutrition supports healing and new tissue growth.  All diabetic patients should strive to keep blood sugars within a normal, practical range.   Elevated blood sugars can delay your wound healing.        ACTIVITY     Elevate legs above level of heart   Elevate your legs above the level of your heart for specific time periods during the day on several firm pillows or a foam leg wedge  Use of a recliner chair at home can often help in the appropriate elevation of your legs above the level of your heart  Normal activity then rest and elevation  May shower  May excercise  May walk    MOBILITY     Cane

## 2022-06-21 NOTE — PROGRESS NOTES
Patient ID: Madelyn Ruiz                            : 1930  MRN: [id number]                                            Visit Date: 2022  Encounter Provider: Anil Jones Jr  Referring Provider: No ref. provider found    Subjective      HPI  Madelyn is a 89 y.o. old female with a chief compliant of Wound Care   presenting today for ongoing evaluation and management of a complex full-thickness skin and subcutaneous tissue wound of the right lateral calf status post having developed a traumatic large hematoma with skin slough.  She has been most recently managed with a course of topical Medihoney paste and Aquacel silver sheeting dressing care..  Madelyn is seen at the wound center today accompanied by her daughter, Shelley Raya.    The following have been reviewed and updated as appropriate in this visit:          Past Medical History:  has a past medical history of Allergic bronchopulmonary aspergillosis (CMS/[facility]) (2018), Allergic rhinitis (2018), Calcification of aorta (CMS/[facility]) (2020), Chronic obstructive pulmonary disease (CMS/HCC) (2018), Essential hypertension (2018), GERD (gastroesophageal reflux disease) (2019), Insomnia (2018), Lumbar spinal stenosis (2018), Macular degeneration disease, Obstructive sleep apnea syndrome (2018), Osteoporosis (2018), Pulmonary embolism (CMS/HCC) (2019), and Vertebral compression fracture (CMS/[facility]).  Past Surgical History:  has a past surgical history that includes Tonsillectomy; Appendectomy; Lumbar laminectomy (2015); Carpal tunnel release (Bilateral); Hysterectomy (); and Cataract extraction w/  intraocular lens implant (Bilateral).  Social History:   Social History     Tobacco Use   • Smoking status: Never Smoker   • Smokeless tobacco: Never Used   Vaping Use   • Vaping Use: Never used   Substance Use Topics   • Alcohol use: Yes     Comment: Rarely   • Drug use: No     Family  History: family history includes Breast cancer in her mother's sister; Glaucoma in her biological mother; Heart disease in her biological brother; Hypertension in her biological mother; Lung cancer in her biological father; Macular degeneration in her biological mother; No Known Problems in her biological daughter, biological daughter, biological daughter, biological son, and biological son.  Medications:   Current Outpatient Medications:   •  albuterol 2.5 mg /3 mL (0.083 %) nebulizer solution, Take 3 mL (2.5 mg total) by nebulization every 6 (six) hours as needed for wheezing or shortness of breath., Disp: 125 vial, Rfl: 0  •  albuterol HFA (VENTOLIN HFA) 90 mcg/actuation inhaler, Inhale 2 puffs every 4 (four) hours as needed for wheezing or shortness of breath., Disp: 8.5 g, Rfl: 0  •  aspirin 81 mg enteric coated tablet, Take 81 mg by mouth daily., Disp: , Rfl:   •  azithromycin 250 mg tablet, Take 250 mg by mouth daily., Disp: , Rfl:   •  benralizumab (FASENRA) 30 mg/mL syringe subcutaneous syringe, Inject 30 mg under the skin every 2 (two) months., Disp: , Rfl:   •  benzonatate (TESSALON) 200 mg capsule, TAKE 1 CAPSULE BY MOUTH 3 TIMES DAILY AS NEEDED FOR COUGH., Disp: , Rfl:   •  calcium carbonate 600 mg calcium (1,500 mg) tablet, Take 1 tablet by mouth 2 (two) times a day., Disp: , Rfl:   •  cholecalciferol, vitamin D3, (VITAMIN D3 ORAL), Take by mouth daily.  , Disp: , Rfl:   •  diphenhydrAMINE (BENADRYL) 25 mg capsule, Take 25 mg by mouth nightly., Disp: , Rfl:   •  docusate sodium (COLACE) 100 mg capsule, Take 100 mg by mouth nightly., Disp: , Rfl:   •  DULoxetine (CYMBALTA) 30 mg capsule, Take 30 mg by mouth daily., Disp: , Rfl:   •  DULoxetine (CYMBALTA) 60 mg capsule, TAKE 1 CAPSULE BY MOUTH EVERYDAY AT BEDTIME, Disp: , Rfl: 0  •  erythromycin (ILOTYCIN) 5 mg/gram (0.5 %) ophthalmic ointment, erythromycin 5 mg/gram (0.5 %) eye ointment, Disp: , Rfl:   •  esomeprazole (NexIUM) 20 mg capsule, Take 20 mg  by mouth daily. Take 30 minutes prior to meal., Disp: , Rfl:   •  fexofenadine (ALLEGRA) 180 mg tablet, Take 180 mg by mouth daily., Disp: , Rfl:   •  FLOVENT  mcg/actuation inhaler, Inhale 2 puffs 2 (two) times a day., Disp: , Rfl:   •  fluticasone propionate (FLONASE) 50 mcg/actuation nasal spray, Administer 1 spray into each nostril daily., Disp: , Rfl:   •  FOLIC ACID/MULTIVIT,IRON, (CENTRUM ORAL), Take 1 tablet by mouth daily., Disp: , Rfl:   •  honey (MEDIHONEY, HONEY,) 100 % paste, Apply topically daily. Apply every other day to right leg wound, Disp: 44 mL, Rfl: 3  •  lifitegrast (XIIDRA) 5 % dropperette dropperette, Xiidra 5 % eye drops in a dropperette, Disp: , Rfl:   •  LORazepam (ATIVAN) 1 mg tablet, TAKE 1 TABLET BY MOUTH NIGHTLY AS NEEDED FOR ANXIETY OR SLEEP., Disp: 30 tablet, Rfl: 5  •  christine-poly-dex (MAXITROL) 3.5 mg/g-10,000 unit/g-0.1 % ointment, neomycin 3.5 mg/g-polymyxin B 10,000 unit/g-dexameth 0.1 % eye oint, Disp: , Rfl:   •  ofloxacin (OCUFLOX) 0.3 % ophthalmic solution, INSTILL 1 DROP LEFT EYE EVERY TWO HOURS WHILE AWAKE, Disp: , Rfl:   •  pregabalin (LYRICA) 25 mg capsule, Take 25 mg by mouth nightly., Disp: , Rfl:   •  silver sulfadiazine (SILVADENE) 1 % cream, Apply topically daily. Apply daily or every other day to right calf wound, Disp: 50 g, Rfl: 1  •  SPIRIVA RESPIMAT 2.5 mcg/actuation mist inhaler, Inhale 2 puffs daily., Disp: , Rfl:   •  SYMBICORT 80-4.5 mcg/actuation inhaler, Inhale 2 puffs 2 (two) times a day., Disp: , Rfl:   •  traMADoL (ULTRAM) 50 mg tablet, Take 50 mg by mouth daily as needed., Disp: , Rfl:   •  zoledronic acid (RECLAST) IVPB, Infuse 5 mg into a venous catheter See admin instr. 5 mg once yearly, Disp: , Rfl:     Allergies: is allergic to mepolizumab, morphine, and oxycodone.     Review of Systems   Constitutional: Positive for fatigue. Negative for chills and fever.   HENT: Negative.    Eyes: Negative.    Respiratory: Negative for cough and  shortness of breath.    Cardiovascular: Positive for leg swelling. Negative for chest pain.   Gastrointestinal: Negative.    Endocrine: Negative.    Musculoskeletal: Negative for arthralgias.   Skin: Positive for wound. Negative for rash.   Allergic/Immunologic:        See allergy listing   Neurological: Negative.    Hematological:        Maintained on aspirin regimen   Psychiatric/Behavioral: Negative.      Objective   Visit Vitals  Pulse 74   Temp 36.8 °C (98.2 °F)   Resp 18       Physical Exam  Constitutional:       General: She is not in acute distress.     Appearance: Normal appearance. She is not ill-appearing.   HENT:      Head: Normocephalic and atraumatic.   Eyes:      General: No scleral icterus.     Extraocular Movements: Extraocular movements intact.      Conjunctiva/sclera: Conjunctivae normal.   Pulmonary:      Effort: Pulmonary effort is normal.   Musculoskeletal:      Right lower leg: Edema present.   Skin:     Capillary Refill: Capillary refill takes less than 2 seconds.      Findings: No erythema.      Comments: There is decreased size of the right lower lateral calf wound with essentially no significant wound depth and fibrous base granulation tissue noted without signs of soft tissue infection   Neurological:      General: No focal deficit present.      Mental Status: She is alert and oriented to person, place, and time.   Psychiatric:         Mood and Affect: Mood normal.         Behavior: Behavior normal.           Right lateral calf wound    Assessment/Plan    Diagnosis Plan   1. Open wound of right lower leg, subsequent encounter       Problem List Items Addressed This Visit        Musculoskeletal    Open wound of right lower leg - Primary     After application of 2% lidocaine gel, a sterile curette was used to remove superficial fibrous granulation tissue demonstrating underlying healthy bleeding granulation tissue.  We are going to maintain the every other day home nursing care dressing  regimen utilizing topical Medihoney paste and Aquacel silver sheeting with occlusive gauze dressing and over application of knee-high Tubigrip stocking support with planned wound center follow-up in about 1 week.                 Return in about 1 week (around 6/28/2022).     Anil Jones Jr, MD

## 2022-06-28 ENCOUNTER — OFFICE VISIT (OUTPATIENT)
Dept: WOUND CARE | Facility: HOSPITAL | Age: 87
End: 2022-06-28
Attending: PLASTIC SURGERY
Payer: MEDICARE

## 2022-06-28 VITALS — HEART RATE: 76 BPM | TEMPERATURE: 97.2 F | RESPIRATION RATE: 18 BRPM

## 2022-06-28 DIAGNOSIS — S81.801D OPEN WOUND OF RIGHT LOWER LEG, SUBSEQUENT ENCOUNTER: Primary | ICD-10-CM

## 2022-06-28 PROCEDURE — 17250 CHEM CAUT OF GRANLTJ TISSUE: CPT | Performed by: PLASTIC SURGERY

## 2022-06-28 PROCEDURE — G0463 HOSPITAL OUTPT CLINIC VISIT: HCPCS | Mod: 25 | Performed by: PLASTIC SURGERY

## 2022-06-28 PROCEDURE — 27200114 HC PROMOGRAN MATRIX SMALL

## 2022-06-28 PROCEDURE — 99213 OFFICE O/P EST LOW 20 MIN: CPT | Mod: 25 | Performed by: PLASTIC SURGERY

## 2022-06-28 PROCEDURE — G0463 HOSPITAL OUTPT CLINIC VISIT: HCPCS

## 2022-06-28 PROCEDURE — 0H5KXZZ DESTRUCTION OF RIGHT LOWER LEG SKIN, EXTERNAL APPROACH: ICD-10-PCS | Performed by: PLASTIC SURGERY

## 2022-06-28 ASSESSMENT — ENCOUNTER SYMPTOMS
FATIGUE: 0
COUGH: 0
HEMATOLOGIC/LYMPHATIC NEGATIVE: 1
ALLERGIC/IMMUNOLOGIC COMMENTS: SEE ALLERGY LISTING
EYES NEGATIVE: 1
CHILLS: 0
PSYCHIATRIC NEGATIVE: 1
ARTHRALGIAS: 0
NEUROLOGICAL NEGATIVE: 1
FEVER: 0
WOUND: 1
SHORTNESS OF BREATH: 0
GASTROINTESTINAL NEGATIVE: 1
ENDOCRINE NEGATIVE: 1

## 2022-06-28 NOTE — PROGRESS NOTES
Patient ID: Madelyn Ruiz                            : 1930  MRN: [id number]                                            Visit Date: 2022  Encounter Provider: Anil Jones Jr  Referring Provider: No ref. provider found    Subjective      HPI  Madelyn is a 89 y.o. old female with a chief compliant of Chronic Non-healing Wound   presenting today for ongoing evaluation and management of a previous hematoma associated skin slough wound of the right lateral calf managed with home care utilizing topical Medihoney paste and silver alginate occlusive dressing care with compression.  Madelyn is seen at the wound center today accompanied by her daughter, Shelley Borges.      The following have been reviewed and updated as appropriate in this visit:          Past Medical History:  has a past medical history of Allergic bronchopulmonary aspergillosis (CMS/HCC) (2018), Allergic rhinitis (2018), Calcification of aorta (CMS/[facility]) (2020), Chronic obstructive pulmonary disease (CMS/[facility]) (2018), Essential hypertension (2018), GERD (gastroesophageal reflux disease) (2019), Insomnia (2018), Lumbar spinal stenosis (2018), Macular degeneration disease, Obstructive sleep apnea syndrome (2018), Osteoporosis (2018), Pulmonary embolism (CMS/[facility]) (2019), and Vertebral compression fracture (CMS/[facility]).  Past Surgical History:  has a past surgical history that includes Tonsillectomy; Appendectomy; Lumbar laminectomy (2015); Carpal tunnel release (Bilateral); Hysterectomy (); and Cataract extraction w/  intraocular lens implant (Bilateral).  Social History:   Social History     Tobacco Use   • Smoking status: Never Smoker   • Smokeless tobacco: Never Used   Vaping Use   • Vaping Use: Never used   Substance Use Topics   • Alcohol use: Yes     Comment: Rarely   • Drug use: No     Family History: family history includes Breast cancer in her mother's sister;  Glaucoma in her biological mother; Heart disease in her biological brother; Hypertension in her biological mother; Lung cancer in her biological father; Macular degeneration in her biological mother; No Known Problems in her biological daughter, biological daughter, biological daughter, biological son, and biological son.  Medications:   Current Outpatient Medications:   •  albuterol 2.5 mg /3 mL (0.083 %) nebulizer solution, Take 3 mL (2.5 mg total) by nebulization every 6 (six) hours as needed for wheezing or shortness of breath., Disp: 125 vial, Rfl: 0  •  albuterol HFA (VENTOLIN HFA) 90 mcg/actuation inhaler, Inhale 2 puffs every 4 (four) hours as needed for wheezing or shortness of breath., Disp: 8.5 g, Rfl: 0  •  aspirin 81 mg enteric coated tablet, Take 81 mg by mouth daily., Disp: , Rfl:   •  azithromycin 250 mg tablet, Take 250 mg by mouth daily., Disp: , Rfl:   •  benralizumab (FASENRA) 30 mg/mL syringe subcutaneous syringe, Inject 30 mg under the skin every 2 (two) months., Disp: , Rfl:   •  benzonatate (TESSALON) 200 mg capsule, TAKE 1 CAPSULE BY MOUTH 3 TIMES DAILY AS NEEDED FOR COUGH., Disp: , Rfl:   •  calcium carbonate 600 mg calcium (1,500 mg) tablet, Take 1 tablet by mouth 2 (two) times a day., Disp: , Rfl:   •  cholecalciferol, vitamin D3, (VITAMIN D3 ORAL), Take by mouth daily.  , Disp: , Rfl:   •  diphenhydrAMINE (BENADRYL) 25 mg capsule, Take 25 mg by mouth nightly., Disp: , Rfl:   •  docusate sodium (COLACE) 100 mg capsule, Take 100 mg by mouth nightly., Disp: , Rfl:   •  DULoxetine (CYMBALTA) 30 mg capsule, Take 30 mg by mouth daily., Disp: , Rfl:   •  DULoxetine (CYMBALTA) 60 mg capsule, TAKE 1 CAPSULE BY MOUTH EVERYDAY AT BEDTIME, Disp: , Rfl: 0  •  erythromycin (ILOTYCIN) 5 mg/gram (0.5 %) ophthalmic ointment, erythromycin 5 mg/gram (0.5 %) eye ointment, Disp: , Rfl:   •  esomeprazole (NexIUM) 20 mg capsule, Take 20 mg by mouth daily. Take 30 minutes prior to meal., Disp: , Rfl:   •   fexofenadine (ALLEGRA) 180 mg tablet, Take 180 mg by mouth daily., Disp: , Rfl:   •  FLOVENT  mcg/actuation inhaler, Inhale 2 puffs 2 (two) times a day., Disp: , Rfl:   •  fluticasone propionate (FLONASE) 50 mcg/actuation nasal spray, Administer 1 spray into each nostril daily., Disp: , Rfl:   •  FOLIC ACID/MULTIVIT,IRON, (CENTRUM ORAL), Take 1 tablet by mouth daily., Disp: , Rfl:   •  honey (MEDIHONEY, HONEY,) 100 % paste, Apply topically daily. Apply every other day to right leg wound, Disp: 44 mL, Rfl: 3  •  lifitegrast (XIIDRA) 5 % dropperette dropperette, Xiidra 5 % eye drops in a dropperette, Disp: , Rfl:   •  LORazepam (ATIVAN) 1 mg tablet, TAKE 1 TABLET BY MOUTH NIGHTLY AS NEEDED FOR ANXIETY OR SLEEP., Disp: 30 tablet, Rfl: 5  •  christine-poly-dex (MAXITROL) 3.5 mg/g-10,000 unit/g-0.1 % ointment, neomycin 3.5 mg/g-polymyxin B 10,000 unit/g-dexameth 0.1 % eye oint, Disp: , Rfl:   •  ofloxacin (OCUFLOX) 0.3 % ophthalmic solution, INSTILL 1 DROP LEFT EYE EVERY TWO HOURS WHILE AWAKE, Disp: , Rfl:   •  pregabalin (LYRICA) 25 mg capsule, Take 25 mg by mouth nightly., Disp: , Rfl:   •  silver sulfadiazine (SILVADENE) 1 % cream, Apply topically daily. Apply daily or every other day to right calf wound, Disp: 50 g, Rfl: 1  •  SPIRIVA RESPIMAT 2.5 mcg/actuation mist inhaler, Inhale 2 puffs daily., Disp: , Rfl:   •  SYMBICORT 80-4.5 mcg/actuation inhaler, Inhale 2 puffs 2 (two) times a day., Disp: , Rfl:   •  traMADoL (ULTRAM) 50 mg tablet, Take 50 mg by mouth daily as needed., Disp: , Rfl:   •  zoledronic acid (RECLAST) IVPB, Infuse 5 mg into a venous catheter See admin instr. 5 mg once yearly, Disp: , Rfl:     Allergies: is allergic to mepolizumab, morphine, and oxycodone.     Review of Systems   Constitutional: Negative for chills, fatigue and fever.   HENT: Negative.    Eyes: Negative.    Respiratory: Negative for cough and shortness of breath.    Cardiovascular: Positive for leg swelling. Negative for chest  pain.   Gastrointestinal: Negative.    Endocrine: Negative.    Musculoskeletal: Negative for arthralgias.   Skin: Positive for wound. Negative for rash.   Allergic/Immunologic:        See allergy listing   Neurological: Negative.    Hematological: Negative.    Psychiatric/Behavioral: Negative.      Objective   Visit Vitals  Pulse 76   Temp 36.2 °C (97.2 °F)   Resp 18       Physical Exam  Vitals reviewed. Nursing note reviewed: Afebrile.   Constitutional:       General: She is not in acute distress.     Appearance: Normal appearance. She is not ill-appearing.   HENT:      Head: Normocephalic and atraumatic.   Eyes:      General: No scleral icterus.         Extraocular Movements: Extraocular movements intact.      Conjunctiva/sclera: Conjunctivae normal.   Pulmonary:      Effort: Pulmonary effort is normal.   Musculoskeletal:      Right lower leg: Edema present.   Skin:     Capillary Refill: Capillary refill takes less than 2 seconds.      Findings: No erythema.      Comments: There is more progressive wound healing with hypertrophic granulation tissue noted no sign of soft tissue infection   Neurological:      General: No focal deficit present.      Mental Status: She is alert and oriented to person, place, and time.   Psychiatric:         Mood and Affect: Mood normal.         Behavior: Behavior normal.           More hypertrophic granulation    Assessment/Plan    Diagnosis Plan   1. Open wound of right lower leg, subsequent encounter       Problem List Items Addressed This Visit        Musculoskeletal    Open wound of right lower leg - Primary     Silver nitrate was applied to areas of hypertrophic granulation tissue.  We will adjust the wound dressing care regimen to continue home nursing care every other day dressing change with wound cleanse and application single-layer Rahel sheeting and occlusive gauze with over application of knee-high Tubigrip stocking support and recommended wound center follow-up in about  2 weeks.                 No follow-ups on file.     Anil Jones Jr, MD

## 2022-06-28 NOTE — ASSESSMENT & PLAN NOTE
Silver nitrate was applied to areas of hypertrophic granulation tissue.  We will adjust the wound dressing care regimen to continue home nursing care every other day dressing change with wound cleanse and application single-layer Rahel sheeting and occlusive gauze with over application of knee-high Tubigrip stocking support and recommended wound center follow-up in about 2 weeks.

## 2022-06-28 NOTE — PATIENT INSTRUCTIONS
Wound Healing Center Instructions    MEDICATIONS     Medication Note  Continue present medications as prescribed by the Wound Healing Center or other physicians you see. To avoid any problems keep the Wound Healing Center informed each visit of any medications changes that occur.       WOUND CARE     Clean Wound with: Saline Solution, Soap and Water, and Showering (Dove, dial or ivory soap)  Treatment 1   Location: Right leg  Dressing: Apply Rahel, gauze, peterson. Apply tubigrip in AM, off in PM.  Dressing Care Frequency: Every other day  Care Provider: Visiting Nurse      COMPRESSION THERAPY     Tubigrip Stockings  Both Legs Size E  Apply Tubigrips (cotton/compression stockinette) DAILY.  Apply stockings upon arising in the morning to obtain the best results.  Remove stockings at bedtime once your legs are elevated.  Do not allow the stockinette to roll down as it can reduce or interrupt the blood flow in your limb.  Always wear the appropriate size that is recommended for you at the [facility].  Tubigrips can be washed by hand with soap and water and hang to dry overnight.     Basic Principles        Wash your hands thoroughly with soap and water and after each dressing change. If someone other than the patient changes the dressing, it’s best to wear disposable gloves.   Do not get the wound or dressing wet.   To shower: remove the dressing, shower with soap and water (including washing the wound - do not use a washcloth), air dry, then redress the wound.  Do not take a tub bath  Keep all your dressings in a clean, covered container at home to avoid dust and contamination.  Discard used dressings in a plastic bag or covered trash container.  Check your wound and the surrounding skin at each dressing change for redness, warmth, swelling, increased pain, foul odor, fever, pus or abnormal drainage or discharge.  Notify Wound Healing Center if any of these changes occur - [phone].        Nutrition  Eat a well, balanced  diet with adequate protein to support wound healing.   Take a multivitamin every day. Adequate nutrition supports healing and new tissue growth.  All diabetic patients should strive to keep blood sugars within a normal, practical range.   Elevated blood sugars can delay your wound healing.        ACTIVITY     Elevate legs above level of heart   Elevate your legs above the level of your heart for specific time periods during the day on several firm pillows or a foam leg wedge  Use of a recliner chair at home can often help in the appropriate elevation of your legs above the level of your heart  Normal activity then rest and elevation  May shower  May excercise  May walk    MOBILITY     Cane

## 2022-07-12 ENCOUNTER — OFFICE VISIT (OUTPATIENT)
Dept: WOUND CARE | Facility: HOSPITAL | Age: 87
End: 2022-07-12
Attending: PLASTIC SURGERY
Payer: MEDICARE

## 2022-07-12 VITALS — TEMPERATURE: 97.5 F

## 2022-07-12 DIAGNOSIS — S81.801D OPEN WOUND OF RIGHT LOWER LEG, SUBSEQUENT ENCOUNTER: Primary | ICD-10-CM

## 2022-07-12 PROCEDURE — 27200114 HC PROMOGRAN MATRIX SMALL

## 2022-07-12 PROCEDURE — 99213 OFFICE O/P EST LOW 20 MIN: CPT | Performed by: PLASTIC SURGERY

## 2022-07-12 PROCEDURE — G0463 HOSPITAL OUTPT CLINIC VISIT: HCPCS

## 2022-07-12 ASSESSMENT — ENCOUNTER SYMPTOMS
EYES NEGATIVE: 1
ALLERGIC/IMMUNOLOGIC COMMENTS: SEE ALLERGY LISTING
ARTHRALGIAS: 0
ENDOCRINE NEGATIVE: 1
WOUND: 1
PSYCHIATRIC NEGATIVE: 1
NEUROLOGICAL NEGATIVE: 1
GASTROINTESTINAL NEGATIVE: 1
CHILLS: 0
FEVER: 0
SHORTNESS OF BREATH: 0
COUGH: 0

## 2022-07-12 NOTE — PROGRESS NOTES
Patient ID: Madelyn Ruiz                            : 1930  MRN: [id number]                                            Visit Date: 2022  Encounter Provider: Anil Jones Jr  Referring Provider: No ref. provider found    Subjective      HPI  Madelyn is a 89 y.o. old female with a chief compliant of Chronic Non-healing Wound   presenting today for ongoing evaluation and management of a traumatic right lateral calf skin wound secondary to skin necrosis overlying a traumatic hematoma with most recent management adjusting to use of every other day home care nursing wound cleanse of this complex wound and application of a silver collagen sheeting layered gauze dressing regimen..  Is accompanied by her daughter Shelley Santiago.    The following have been reviewed and updated as appropriate in this visit:          Past Medical History:  has a past medical history of Allergic bronchopulmonary aspergillosis (CMS/HCC) (2018), Allergic rhinitis (2018), Calcification of aorta (CMS/[facility]) (2020), Chronic obstructive pulmonary disease (CMS/[facility]) (2018), Essential hypertension (2018), GERD (gastroesophageal reflux disease) (2019), Insomnia (2018), Lumbar spinal stenosis (2018), Macular degeneration disease, Obstructive sleep apnea syndrome (2018), Osteoporosis (2018), Pulmonary embolism (CMS/[facility]) (2019), and Vertebral compression fracture (CMS/[facility]).  Past Surgical History:  has a past surgical history that includes Tonsillectomy; Appendectomy; Lumbar laminectomy (2015); Carpal tunnel release (Bilateral); Hysterectomy (1972); and Cataract extraction w/  intraocular lens implant (Bilateral).  Social History:   Social History     Tobacco Use   • Smoking status: Never Smoker   • Smokeless tobacco: Never Used   Vaping Use   • Vaping Use: Never used   Substance Use Topics   • Alcohol use: Yes     Comment: Rarely   • Drug use: No     Family History:  family history includes Breast cancer in her mother's sister; Glaucoma in her biological mother; Heart disease in her biological brother; Hypertension in her biological mother; Lung cancer in her biological father; Macular degeneration in her biological mother; No Known Problems in her biological daughter, biological daughter, biological daughter, biological son, and biological son.  Medications:   Current Outpatient Medications:   •  albuterol 2.5 mg /3 mL (0.083 %) nebulizer solution, Take 3 mL (2.5 mg total) by nebulization every 6 (six) hours as needed for wheezing or shortness of breath., Disp: 125 vial, Rfl: 0  •  albuterol HFA (VENTOLIN HFA) 90 mcg/actuation inhaler, Inhale 2 puffs every 4 (four) hours as needed for wheezing or shortness of breath., Disp: 8.5 g, Rfl: 0  •  aspirin 81 mg enteric coated tablet, Take 81 mg by mouth daily., Disp: , Rfl:   •  azithromycin 250 mg tablet, Take 250 mg by mouth daily., Disp: , Rfl:   •  benralizumab (FASENRA) 30 mg/mL syringe subcutaneous syringe, Inject 30 mg under the skin every 2 (two) months., Disp: , Rfl:   •  benzonatate (TESSALON) 200 mg capsule, TAKE 1 CAPSULE BY MOUTH 3 TIMES DAILY AS NEEDED FOR COUGH., Disp: , Rfl:   •  calcium carbonate 600 mg calcium (1,500 mg) tablet, Take 1 tablet by mouth 2 (two) times a day., Disp: , Rfl:   •  cholecalciferol, vitamin D3, (VITAMIN D3 ORAL), Take by mouth daily.  , Disp: , Rfl:   •  diphenhydrAMINE (BENADRYL) 25 mg capsule, Take 25 mg by mouth nightly., Disp: , Rfl:   •  docusate sodium (COLACE) 100 mg capsule, Take 100 mg by mouth nightly., Disp: , Rfl:   •  DULoxetine (CYMBALTA) 30 mg capsule, Take 30 mg by mouth daily., Disp: , Rfl:   •  DULoxetine (CYMBALTA) 60 mg capsule, TAKE 1 CAPSULE BY MOUTH EVERYDAY AT BEDTIME, Disp: , Rfl: 0  •  erythromycin (ILOTYCIN) 5 mg/gram (0.5 %) ophthalmic ointment, erythromycin 5 mg/gram (0.5 %) eye ointment, Disp: , Rfl:   •  esomeprazole (NexIUM) 20 mg capsule, Take 20 mg by mouth  daily. Take 30 minutes prior to meal., Disp: , Rfl:   •  fexofenadine (ALLEGRA) 180 mg tablet, Take 180 mg by mouth daily., Disp: , Rfl:   •  FLOVENT  mcg/actuation inhaler, Inhale 2 puffs 2 (two) times a day., Disp: , Rfl:   •  fluticasone propionate (FLONASE) 50 mcg/actuation nasal spray, Administer 1 spray into each nostril daily., Disp: , Rfl:   •  FOLIC ACID/MULTIVIT,IRON, (CENTRUM ORAL), Take 1 tablet by mouth daily., Disp: , Rfl:   •  honey (MEDIHONEY, HONEY,) 100 % paste, Apply topically daily. Apply every other day to right leg wound, Disp: 44 mL, Rfl: 3  •  lifitegrast (XIIDRA) 5 % dropperette dropperette, Xiidra 5 % eye drops in a dropperette, Disp: , Rfl:   •  LORazepam (ATIVAN) 1 mg tablet, TAKE 1 TABLET BY MOUTH NIGHTLY AS NEEDED FOR ANXIETY OR SLEEP., Disp: 30 tablet, Rfl: 5  •  christine-poly-dex (MAXITROL) 3.5 mg/g-10,000 unit/g-0.1 % ointment, neomycin 3.5 mg/g-polymyxin B 10,000 unit/g-dexameth 0.1 % eye oint, Disp: , Rfl:   •  ofloxacin (OCUFLOX) 0.3 % ophthalmic solution, INSTILL 1 DROP LEFT EYE EVERY TWO HOURS WHILE AWAKE, Disp: , Rfl:   •  pregabalin (LYRICA) 25 mg capsule, Take 25 mg by mouth nightly., Disp: , Rfl:   •  silver sulfadiazine (SILVADENE) 1 % cream, Apply topically daily. Apply daily or every other day to right calf wound, Disp: 50 g, Rfl: 1  •  SPIRIVA RESPIMAT 2.5 mcg/actuation mist inhaler, Inhale 2 puffs daily., Disp: , Rfl:   •  SYMBICORT 80-4.5 mcg/actuation inhaler, Inhale 2 puffs 2 (two) times a day., Disp: , Rfl:   •  traMADoL (ULTRAM) 50 mg tablet, Take 50 mg by mouth daily as needed., Disp: , Rfl:   •  zoledronic acid (RECLAST) IVPB, Infuse 5 mg into a venous catheter See admin instr. 5 mg once yearly, Disp: , Rfl:     Allergies: is allergic to mepolizumab, morphine, and oxycodone.     Review of Systems   Constitutional: Negative for chills and fever.   HENT: Negative.    Eyes: Negative.    Respiratory: Negative for cough and shortness of breath.     Cardiovascular: Positive for leg swelling. Negative for chest pain.   Gastrointestinal: Negative.    Endocrine: Negative.    Musculoskeletal: Negative for arthralgias.   Skin: Positive for wound. Negative for rash.   Allergic/Immunologic:        See allergy listing   Neurological: Negative.    Hematological:        Maintained on aspirin regimen   Psychiatric/Behavioral: Negative.      Objective   Visit Vitals  Temp 36.4 °C (97.5 °F) (Temporal)       Physical Exam  Constitutional:       General: She is not in acute distress.     Appearance: Normal appearance. She is not ill-appearing.   HENT:      Head: Normocephalic and atraumatic.   Eyes:      General: No scleral icterus.     Extraocular Movements: Extraocular movements intact.      Conjunctiva/sclera: Conjunctivae normal.   Pulmonary:      Effort: Pulmonary effort is normal.   Musculoskeletal:      Right lower leg: Edema present.   Skin:     Capillary Refill: Capillary refill takes less than 2 seconds.      Findings: No erythema.      Comments: There is marked decrease in the wound size of the right lateral calf with essentially no wound depth and clean base granular tissue except for small superior wound margin area that demonstrates superficial slough   Neurological:      General: No focal deficit present.      Mental Status: She is alert and oriented to person, place, and time.   Psychiatric:         Mood and Affect: Mood normal.         Behavior: Behavior normal.           Progressive healing right calf wound    Assessment/Plan    Diagnosis Plan   1. Open wound of right lower leg, subsequent encounter       Problem List Items Addressed This Visit        Musculoskeletal    Open wound of right lower leg - Primary     After application of topical 2% lidocaine gel, small sterile curette was used to remove tissue slough and stimulate wound healing.  We are going to maintain home care nursing complex wound care every other day with wound cleanse and application of  single-layer Rahel sheeting and layer of occlusive dressing gauze pads and Tubigrip stocking support with recommended wound center follow-up in about 2 weeks.                 Return in about 2 weeks (around 7/26/2022).     Anil Jones Jr, MD

## 2022-07-12 NOTE — PATIENT INSTRUCTIONS
Wound Healing Center Instructions    MEDICATIONS     Medication Note  Continue present medications as prescribed by the Wound Healing Center or other physicians you see. To avoid any problems keep the Wound Healing Center informed each visit of any medications changes that occur.       WOUND CARE     Clean Wound with: Saline Solution, Soap and Water, and Showering (Dove, dial or ivory soap)  Treatment 1   Location: Right leg  Dressing: Apply Rahel, gauze, peterson. Apply tubigrip in AM, off in PM.  Dressing Care Frequency: Every other day  Care Provider: Visiting Nurse      COMPRESSION THERAPY     Tubigrip Stockings  Both Legs Size E  Apply Tubigrips (cotton/compression stockinette) DAILY.  Apply stockings upon arising in the morning to obtain the best results.  Remove stockings at bedtime once your legs are elevated.  Do not allow the stockinette to roll down as it can reduce or interrupt the blood flow in your limb.  Always wear the appropriate size that is recommended for you at the [facility].  Tubigrips can be washed by hand with soap and water and hang to dry overnight.     Basic Principles        Wash your hands thoroughly with soap and water and after each dressing change. If someone other than the patient changes the dressing, it’s best to wear disposable gloves.   Do not get the wound or dressing wet.   To shower: remove the dressing, shower with soap and water (including washing the wound - do not use a washcloth), air dry, then redress the wound.  Do not take a tub bath  Keep all your dressings in a clean, covered container at home to avoid dust and contamination.  Discard used dressings in a plastic bag or covered trash container.  Check your wound and the surrounding skin at each dressing change for redness, warmth, swelling, increased pain, foul odor, fever, pus or abnormal drainage or discharge.  Notify Wound Healing Center if any of these changes occur - [phone].        Nutrition  Eat a well, balanced  diet with adequate protein to support wound healing.   Take a multivitamin every day. Adequate nutrition supports healing and new tissue growth.  All diabetic patients should strive to keep blood sugars within a normal, practical range.   Elevated blood sugars can delay your wound healing.        ACTIVITY     Elevate legs above level of heart   Elevate your legs above the level of your heart for specific time periods during the day on several firm pillows or a foam leg wedge  Use of a recliner chair at home can often help in the appropriate elevation of your legs above the level of your heart  Normal activity then rest and elevation  May shower  May excercise  May walk    MOBILITY     Cane

## 2022-07-12 NOTE — ASSESSMENT & PLAN NOTE
After application of topical 2% lidocaine gel, small sterile curette was used to remove tissue slough and stimulate wound healing.  We are going to maintain home care nursing complex wound care every other day with wound cleanse and application of single-layer Rahel sheeting and layer of occlusive dressing gauze pads and Tubigrip stocking support with recommended wound center follow-up in about 2 weeks.

## 2022-07-20 ENCOUNTER — TRANSCRIBE ORDERS (OUTPATIENT)
Dept: SCHEDULING | Age: 87
End: 2022-07-20

## 2022-07-20 DIAGNOSIS — M81.0 AGE-RELATED OSTEOPOROSIS WITHOUT CURRENT PATHOLOGICAL FRACTURE: Primary | ICD-10-CM

## 2022-07-26 ENCOUNTER — OFFICE VISIT (OUTPATIENT)
Dept: WOUND CARE | Facility: HOSPITAL | Age: 87
End: 2022-07-26
Attending: PLASTIC SURGERY
Payer: MEDICARE

## 2022-07-26 VITALS — RESPIRATION RATE: 18 BRPM | TEMPERATURE: 97.9 F | HEART RATE: 80 BPM

## 2022-07-26 DIAGNOSIS — S81.801D OPEN WOUND OF RIGHT LOWER LEG, SUBSEQUENT ENCOUNTER: Primary | ICD-10-CM

## 2022-07-26 PROCEDURE — 99213 OFFICE O/P EST LOW 20 MIN: CPT | Performed by: PLASTIC SURGERY

## 2022-07-26 PROCEDURE — G0463 HOSPITAL OUTPT CLINIC VISIT: HCPCS

## 2022-07-26 PROCEDURE — 27200114 HC PROMOGRAN MATRIX SMALL

## 2022-07-26 ASSESSMENT — ENCOUNTER SYMPTOMS
PSYCHIATRIC NEGATIVE: 1
GASTROINTESTINAL NEGATIVE: 1
NEUROLOGICAL NEGATIVE: 1
EYES NEGATIVE: 1
FATIGUE: 0
CHILLS: 0
ENDOCRINE NEGATIVE: 1
ALLERGIC/IMMUNOLOGIC COMMENTS: SEE ALLERGY LISTING
WOUND: 1
SHORTNESS OF BREATH: 0
COUGH: 0
FEVER: 0
ARTHRALGIAS: 0

## 2022-07-26 NOTE — PATIENT INSTRUCTIONS
[facility] Instructions    MEDICATIONS     Medication Note  Continue present medications as prescribed by the [facility] or other physicians you see. To avoid any problems keep the [facility]aling [facility] informed each visit of any medications changes that occur.       WOUND CARE     Clean Wound with: Saline Solution, Soap and Water, and Showering (Dove, dial or ivory soap)  Treatment 1   Location: Right leg  Dressing: Apply Rahel, gauze, peterson. Apply tubigrip in AM, off in PM.  Dressing Care Frequency: Every other day  Three days a week is okay.  Care Provider: Visiting Nurse      COMPRESSION THERAPY     Tubigrip Stockings  Both Legs Size D  Apply Tubigrips (cotton/compression stockinette) DAILY.  Apply stockings upon arising in the morning to obtain the best results.  Remove stockings at bedtime once your legs are elevated.  Do not allow the stockinette to roll down as it can reduce or interrupt the blood flow in your limb.  Always wear the appropriate size that is recommended for you at the [facility].  Tubigrips can be washed by hand with soap and water and hang to dry overnight.     Basic Principles        Wash your hands thoroughly with soap and water and after each dressing change. If someone other than the patient changes the dressing, it’s best to wear disposable gloves.   Do not get the wound or dressing wet.   To shower: remove the dressing, shower with soap and water (including washing the wound - do not use a washcloth), air dry, then redress the wound.  Do not take a tub bath  Keep all your dressings in a clean, covered container at home to avoid dust and contamination.  Discard used dressings in a plastic bag or covered trash container.  Check your wound and the surrounding skin at each dressing change for redness, warmth, swelling, increased pain, foul odor, fever, pus or abnormal drainage or discharge.  Notify Wound Healing Center if any of these changes occur -  [phone].        Nutrition  Eat a well, balanced diet with adequate protein to support wound healing.   Take a multivitamin every day. Adequate nutrition supports healing and new tissue growth.  All diabetic patients should strive to keep blood sugars within a normal, practical range.   Elevated blood sugars can delay your wound healing.        ACTIVITY     Elevate legs above level of heart   Elevate your legs above the level of your heart for specific time periods during the day on several firm pillows or a foam leg wedge  Use of a recliner chair at home can often help in the appropriate elevation of your legs above the level of your heart  Normal activity then rest and elevation  May shower  May excercise  May walk    MOBILITY     Cane

## 2022-07-26 NOTE — PROGRESS NOTES
Patient ID: Madelyn Ruiz                            : 1930  MRN: [id number]                                            Visit Date: 2022  Encounter Provider: Anil Jones Jr  Referring Provider: No ref. provider found    Subjective      HPI  Madelyn is a 89 y.o. old female with a chief compliant of Wound Care   presenting today for ongoing evaluation and management of a complex right lower lateral calf wound with previous skin and subcutaneous tissue full-thickness injury secondary to a previous traumatic hematoma managed with home care nursing regimen utilizing topical collagen sheeting silver dressing care regimen.  She is accompanied by her daughter, Shelley Raya.    The following have been reviewed and updated as appropriate in this visit:          Past Medical History:  has a past medical history of Allergic bronchopulmonary aspergillosis (CMS/HCC) (2018), Allergic rhinitis (2018), Calcification of aorta (CMS/[facility]) (2020), Chronic obstructive pulmonary disease (CMS/[facility]) (2018), Essential hypertension (2018), GERD (gastroesophageal reflux disease) (2019), Insomnia (2018), Lumbar spinal stenosis (2018), Macular degeneration disease, Obstructive sleep apnea syndrome (2018), Osteoporosis (2018), Pulmonary embolism (CMS/[facility]) (2019), and Vertebral compression fracture (CMS/[facility]).  Past Surgical History:  has a past surgical history that includes Tonsillectomy; Appendectomy; Lumbar laminectomy (2015); Carpal tunnel release (Bilateral); Hysterectomy (1972); and Cataract extraction w/  intraocular lens implant (Bilateral).  Social History:   Social History     Tobacco Use   • Smoking status: Never Smoker   • Smokeless tobacco: Never Used   Vaping Use   • Vaping Use: Never used   Substance Use Topics   • Alcohol use: Yes     Comment: Rarely   • Drug use: No     Family History: family history includes Breast cancer in her mother's  sister; Glaucoma in her biological mother; Heart disease in her biological brother; Hypertension in her biological mother; Lung cancer in her biological father; Macular degeneration in her biological mother; No Known Problems in her biological daughter, biological daughter, biological daughter, biological son, and biological son.  Medications:   Current Outpatient Medications:   •  albuterol 2.5 mg /3 mL (0.083 %) nebulizer solution, Take 3 mL (2.5 mg total) by nebulization every 6 (six) hours as needed for wheezing or shortness of breath., Disp: 125 vial, Rfl: 0  •  albuterol HFA (VENTOLIN HFA) 90 mcg/actuation inhaler, Inhale 2 puffs every 4 (four) hours as needed for wheezing or shortness of breath., Disp: 8.5 g, Rfl: 0  •  aspirin 81 mg enteric coated tablet, Take 81 mg by mouth daily., Disp: , Rfl:   •  azithromycin 250 mg tablet, Take 250 mg by mouth daily., Disp: , Rfl:   •  benralizumab (FASENRA) 30 mg/mL syringe subcutaneous syringe, Inject 30 mg under the skin every 2 (two) months., Disp: , Rfl:   •  benzonatate (TESSALON) 200 mg capsule, TAKE 1 CAPSULE BY MOUTH 3 TIMES DAILY AS NEEDED FOR COUGH., Disp: , Rfl:   •  calcium carbonate 600 mg calcium (1,500 mg) tablet, Take 1 tablet by mouth 2 (two) times a day., Disp: , Rfl:   •  cholecalciferol, vitamin D3, (VITAMIN D3 ORAL), Take by mouth daily.  , Disp: , Rfl:   •  diphenhydrAMINE (BENADRYL) 25 mg capsule, Take 25 mg by mouth nightly., Disp: , Rfl:   •  docusate sodium (COLACE) 100 mg capsule, Take 100 mg by mouth nightly., Disp: , Rfl:   •  DULoxetine (CYMBALTA) 30 mg capsule, Take 30 mg by mouth daily., Disp: , Rfl:   •  DULoxetine (CYMBALTA) 60 mg capsule, TAKE 1 CAPSULE BY MOUTH EVERYDAY AT BEDTIME, Disp: , Rfl: 0  •  erythromycin (ILOTYCIN) 5 mg/gram (0.5 %) ophthalmic ointment, erythromycin 5 mg/gram (0.5 %) eye ointment, Disp: , Rfl:   •  esomeprazole (NexIUM) 20 mg capsule, Take 20 mg by mouth daily. Take 30 minutes prior to meal., Disp: , Rfl:    •  fexofenadine (ALLEGRA) 180 mg tablet, Take 180 mg by mouth daily., Disp: , Rfl:   •  FLOVENT  mcg/actuation inhaler, Inhale 2 puffs 2 (two) times a day., Disp: , Rfl:   •  fluticasone propionate (FLONASE) 50 mcg/actuation nasal spray, Administer 1 spray into each nostril daily., Disp: , Rfl:   •  FOLIC ACID/MULTIVIT,IRON, (CENTRUM ORAL), Take 1 tablet by mouth daily., Disp: , Rfl:   •  honey (MEDIHONEY, HONEY,) 100 % paste, Apply topically daily. Apply every other day to right leg wound, Disp: 44 mL, Rfl: 3  •  lifitegrast (XIIDRA) 5 % dropperette dropperette, Xiidra 5 % eye drops in a dropperette, Disp: , Rfl:   •  LORazepam (ATIVAN) 1 mg tablet, TAKE 1 TABLET BY MOUTH NIGHTLY AS NEEDED FOR ANXIETY OR SLEEP., Disp: 30 tablet, Rfl: 5  •  christine-poly-dex (MAXITROL) 3.5 mg/g-10,000 unit/g-0.1 % ointment, neomycin 3.5 mg/g-polymyxin B 10,000 unit/g-dexameth 0.1 % eye oint, Disp: , Rfl:   •  ofloxacin (OCUFLOX) 0.3 % ophthalmic solution, INSTILL 1 DROP LEFT EYE EVERY TWO HOURS WHILE AWAKE, Disp: , Rfl:   •  pregabalin (LYRICA) 25 mg capsule, Take 25 mg by mouth nightly., Disp: , Rfl:   •  silver sulfadiazine (SILVADENE) 1 % cream, Apply topically daily. Apply daily or every other day to right calf wound, Disp: 50 g, Rfl: 1  •  SPIRIVA RESPIMAT 2.5 mcg/actuation mist inhaler, Inhale 2 puffs daily., Disp: , Rfl:   •  SYMBICORT 80-4.5 mcg/actuation inhaler, Inhale 2 puffs 2 (two) times a day., Disp: , Rfl:   •  traMADoL (ULTRAM) 50 mg tablet, Take 50 mg by mouth daily as needed., Disp: , Rfl:   •  zoledronic acid (RECLAST) IVPB, Infuse 5 mg into a venous catheter See admin instr. 5 mg once yearly, Disp: , Rfl:     Allergies: is allergic to mepolizumab, morphine, and oxycodone.     Review of Systems   Constitutional: Negative for chills, fatigue and fever.   HENT: Negative.    Eyes: Negative.    Respiratory: Negative for cough and shortness of breath.    Cardiovascular: Positive for leg swelling. Negative for  chest pain.   Gastrointestinal: Negative.    Endocrine: Negative.    Musculoskeletal: Negative for arthralgias.   Skin: Positive for wound.   Allergic/Immunologic:        See allergy listing   Neurological: Negative.    Hematological:        Maintained on aspirin regimen   Psychiatric/Behavioral: Negative.      Objective   Visit Vitals  Pulse 80   Temp 36.6 °C (97.9 °F)   Resp 18       Physical Exam  Constitutional:       General: She is not in acute distress.     Appearance: Normal appearance. She is not ill-appearing.   HENT:      Head: Normocephalic and atraumatic.   Eyes:      General: No scleral icterus.     Extraocular Movements: Extraocular movements intact.      Conjunctiva/sclera: Conjunctivae normal.   Pulmonary:      Effort: Pulmonary effort is normal.   Musculoskeletal:      Right lower leg: Edema present.   Skin:     Capillary Refill: Capillary refill takes less than 2 seconds.      Findings: No erythema.      Comments: Progressive healing of the surface wound with no appreciable wound depth and clean base granular tissue   Neurological:      General: No focal deficit present.      Mental Status: She is alert and oriented to person, place, and time.   Psychiatric:         Mood and Affect: Mood normal.         Behavior: Behavior normal.           Progressive wound healing    Assessment/Plan    Diagnosis Plan   1. Open wound of right lower leg, subsequent encounter       Problem List Items Addressed This Visit        Musculoskeletal    Open wound of right lower leg - Primary     The assessment demonstrates significant progression in wound healing and we will maintain the every other day home wound nursing dressing care regimen of wound cleanse and application of Rahel sheeting with occlusive gauze dressing and over application of knee-high supportive Tubigrip stocking with recommended wound center follow-up in about 2 weeks.                 Return in about 2 weeks (around 8/9/2022).     Anil Jones  MD [facility]

## 2022-07-26 NOTE — ASSESSMENT & PLAN NOTE
The assessment demonstrates significant progression in wound healing and we will maintain the every other day home wound nursing dressing care regimen of wound cleanse and application of Rahel sheeting with occlusive gauze dressing and over application of knee-high supportive Tubigrip stocking with recommended wound center follow-up in about 2 weeks.

## 2022-08-01 ENCOUNTER — OFFICE VISIT (OUTPATIENT)
Dept: PRIMARY CARE | Facility: CLINIC | Age: 87
End: 2022-08-01
Payer: MEDICARE

## 2022-08-01 VITALS
SYSTOLIC BLOOD PRESSURE: 140 MMHG | WEIGHT: 121.4 LBS | HEART RATE: 94 BPM | TEMPERATURE: 97.3 F | DIASTOLIC BLOOD PRESSURE: 80 MMHG | OXYGEN SATURATION: 98 % | BODY MASS INDEX: 23.32 KG/M2

## 2022-08-01 DIAGNOSIS — I49.3 PREMATURE VENTRICULAR BEAT: ICD-10-CM

## 2022-08-01 DIAGNOSIS — R00.2 PALPITATION: Primary | ICD-10-CM

## 2022-08-01 PROCEDURE — 93000 ELECTROCARDIOGRAM COMPLETE: CPT

## 2022-08-01 PROCEDURE — 99214 OFFICE O/P EST MOD 30 MIN: CPT

## 2022-08-01 NOTE — PROGRESS NOTES
"   MICA Lopez  [facility]   Geriatric Medicine  [address], [address]  [place] [address]  Phone: [phone]  Fax: [phone]     History of Present Illness     89 year old female patient of Dr. Linares presenting today for evaluation of irregular heart rate. She is accompanied today by her daughter, Demetria, who provides additional history and information.    • Madelyn tells me she has been receiving home care services for wound care to the right lower extremity. Yesterday during her routine visit, the home care nurse noted an \"irregularity\" in her heart rate and recommended that she be evaluated by PCP.  • She denies any sensation of overt palpitations, heart racing, chest pain, dyspnea, SOB, CRESPO, or PND.   • No new medications or supplements. No significant dietary changes. No acute changes in stress or sleep. No changes in alcohol or caffeine intake.      Past Medical/Surgical/Family/Social History     The following have been reviewed and updated as appropriate in this visit:   Allergies  Meds  Problems           PMH  Past Medical History:   Diagnosis Date   • Allergic bronchopulmonary aspergillosis (CMS/[facility]) 04/02/2018    Allergist: Dr. Arriaza.  Stable FEV1 in 8/2015.  IgE levels and eosinophils stable in 8/2015.  Managed with Fasenra, Azithromycin and Flovent.   • Allergic rhinitis 04/02/2018    Pulmonologist: Dr. Walker. Managed with Flonase.   • Calcification of aorta (CMS/[facility]) 06/29/2020    Seen incidentally on CT scan.   • Chronic obstructive pulmonary disease (CMS/[facility]) 04/02/2018    Pulmonologist: Dr. Walker.  Seen on PFTs in hospital in 2014.  Controlled with Spiriva and FLovent.   • Essential hypertension 04/02/2018    Managed on no medications at this time. Advised to follow a low sodium diet and keep BMI < 25.  Advised to exercise for 2.5 hours per week.  Instructed to take home blood pressure readings and call if consistently above 140/90.     • GERD " (gastroesophageal reflux disease) 06/08/2019    Managed with Esomeprazole.  Should take medication 30 minutes prior to meal.  Advised to avoid caffeine, carbonated beverages, and should not eat within 3 hours of going to sleep.  Advised to reduce BMI < 25.    • Insomnia 04/02/2018    Managed with Lorazepam as needed.   • Lumbar spinal stenosis 04/02/2018    Neurosurgeon: Dr. Miguel. MRI with Central and lateral recess spinal stenosis. Has Spondylolisthesis at L4/L5. S/P epidural injection by Dr. Martin with some relief in past. Had Lumbar fusion and Lumbar laminectomy by Dr. Miguel in 4/2015.   • Macular degeneration disease    • Obstructive sleep apnea syndrome 04/02/2018    Mild STACIA noted in sleep study 7/2015. Treated with nasal CPAP automatic with pressure range 5-10 CM H2Ox couple months. Off now   • Osteoporosis 04/02/2018    Rheumatologist: Dr. Bradley.  Dexa scan 7/2016 with osteoporosis of lumbar spine LS-2.5, LH-2.1, RH -2.2.  Has been on Fosamax in the past for 8 years.  Worsened by Prednisone in past.  Continue vitamin D, calcium and weight bearing exercise. DEXA in 7/2017 with LS -1.7, LH -2.2, and RH -2.0.  Next due in 7/2019.  Seen by Dr. Bradley in 8/2015 who agreed with initiating Prolia. Took for 2 years. N   • Pulmonary embolism (CMS/HCC) 06/09/2019    Hematologist: Dr. Bhakta Diagnosed in 06/2019 in right upper lobe. Vascular ultrasound negative of bilateral lower extremities. Managed on Eliquis. Hypercoagulable workup was negative for beta-2 glycoprotein's, RIGO, Antithrombin III, lupus anticoagulant, protein C activity protein S activity, and prothrombin gene mutation, and factor V Leiden. Now off Apixaban as CT scan in 12/2019 was without pu   • Vertebral compression fracture (CMS/HCC)     At T12 level       PAST SURGICAL HX  Past Surgical History:   Procedure Laterality Date   • APPENDECTOMY     • CARPAL TUNNEL RELEASE Bilateral    • CATARACT EXTRACTION W/  INTRAOCULAR LENS IMPLANT Bilateral     • HYSTERECTOMY  1972   • LUMBAR LAMINECTOMY  04/21/2015    S/P lumbar fusion   • TONSILLECTOMY         [facility]  Family History   Problem Relation Age of Onset   • Glaucoma Biological Mother    • Macular degeneration Biological Mother    • Hypertension Biological Mother    • Lung cancer Biological Father    • Heart disease Biological Brother    • Breast cancer Mother's Sister    • No Known Problems Biological Son    • No Known Problems Biological Son    • No Known Problems Biological Daughter    • No Known Problems Biological Daughter    • No Known Problems Biological Daughter        PAST SOCIAL HX  Social History     Socioeconomic History   • Marital status:      Spouse name: Not on file   • Number of children: 5   • Years of education: Not on file   • Highest education level: Not on file   Occupational History   • Occupation: Retired     Comment: Former Teacher   Tobacco Use   • Smoking status: Never Smoker   • Smokeless tobacco: Never Used   Vaping Use   • Vaping Use: Never used   Substance and Sexual Activity   • Alcohol use: Yes     Comment: Rarely   • Drug use: No   • Sexual activity: Not on file   Other Topics Concern   • Not on file   Social History Narrative    Exercise: No formal exercise. Due to back pain from spinal stenosis.     Social Determinants of Health     Financial Resource Strain: Not on file   Food Insecurity: Not on file   Transportation Needs: Not on file   Physical Activity: Not on file   Stress: Not on file   Social Connections: Not on file   Intimate Partner Violence: Not on file   Housing Stability: Not on file        Allergies and Medications     ALLERGIES  Allergies   Allergen Reactions   • Mepolizumab Rash   • Morphine      Other reaction(s): Vomiting   • Oxycodone      Other reaction(s): Vomiting       CURRENT MEDS  Current Outpatient Medications   Medication Sig Dispense Refill   • albuterol 2.5 mg /3 mL (0.083 %) nebulizer solution Take 3 mL (2.5 mg total) by nebulization every  6 (six) hours as needed for wheezing or shortness of breath. 125 vial 0   • albuterol HFA (VENTOLIN HFA) 90 mcg/actuation inhaler Inhale 2 puffs every 4 (four) hours as needed for wheezing or shortness of breath. 8.5 g 0   • aspirin 81 mg enteric coated tablet Take 81 mg by mouth daily.     • benralizumab (FASENRA) 30 mg/mL syringe subcutaneous syringe Inject 30 mg under the skin every 2 (two) months.     • benzonatate (TESSALON) 200 mg capsule TAKE 1 CAPSULE BY MOUTH 3 TIMES DAILY AS NEEDED FOR COUGH.     • calcium carbonate 600 mg calcium (1,500 mg) tablet Take 1 tablet by mouth 2 (two) times a day.     • cholecalciferol, vitamin D3, (VITAMIN D3 ORAL) Take by mouth daily.       • docusate sodium (COLACE) 100 mg capsule Take 100 mg by mouth nightly.     • DULoxetine (CYMBALTA) 30 mg capsule Take 30 mg by mouth daily.     • DULoxetine (CYMBALTA) 60 mg capsule TAKE 1 CAPSULE BY MOUTH EVERYDAY AT BEDTIME  0   • esomeprazole (NexIUM) 20 mg capsule Take 20 mg by mouth daily. Take 30 minutes prior to meal.     • fexofenadine (ALLEGRA) 180 mg tablet Take 180 mg by mouth daily.     • FLOVENT  mcg/actuation inhaler Inhale 2 puffs 2 (two) times a day.     • fluticasone propionate (FLONASE) 50 mcg/actuation nasal spray Administer 1 spray into each nostril daily.     • FOLIC ACID/MULTIVIT,IRON, (CENTRUM ORAL) Take 1 tablet by mouth daily.     • LORazepam (ATIVAN) 1 mg tablet TAKE 1 TABLET BY MOUTH NIGHTLY AS NEEDED FOR ANXIETY OR SLEEP. 30 tablet 5   • ofloxacin (OCUFLOX) 0.3 % ophthalmic solution INSTILL 1 DROP LEFT EYE EVERY TWO HOURS WHILE AWAKE     • pregabalin (LYRICA) 25 mg capsule Take 25 mg by mouth nightly.     • SPIRIVA RESPIMAT 2.5 mcg/actuation mist inhaler Inhale 2 puffs daily.     • SYMBICORT 80-4.5 mcg/actuation inhaler Inhale 2 puffs 2 (two) times a day.     • traMADoL (ULTRAM) 50 mg tablet Take 50 mg by mouth daily as needed.     • zoledronic acid (RECLAST) IVPB Infuse 5 mg into a venous catheter  See admin instr. 5 mg once yearly     • erythromycin (ILOTYCIN) 5 mg/gram (0.5 %) ophthalmic ointment erythromycin 5 mg/gram (0.5 %) eye ointment       No current facility-administered medications for this visit.        Review of Systems     Review of Systems   • Constitutional: Negative for activity change, appetite change, chills, diaphoresis, fatigue, fever and unexpected weight change. Denies anosmia, recent infections, or any COVID-19-concerning symptoms.  • HENT: No URI symptoms. No PND, sore throat, nasal congestion.    • Eyes: No acute vision changes.   • Respiratory: No cough, SOB, CRESPO, wheezing.   • Cardiovascular: No CP, palpitations, edema, activity intolerance.  • Gastrointestinal: No abdominal pain, bowel pattern changes, n/v/d/c. No GERD at this time.  • Genitourinary: No UTI symptoms reported. No burning, frequency, urgency, hesitancy, inability to void/retention.   • Musculoskeletal: No new musculoskeletal pains, ROM changes, joint pains, neck pain, body aches. No weakness.   • Skin: No skin color changes, rashes, s/s of cellulitis or infections. No wounds or lacs.   • Neurological: Negative for dizziness, syncope, weakness, light-headedness, numbness and headaches. No new headaches or migraine syndromes.   • Hematological: Negative for adenopathy. Does not bruise/bleed easily.   • Psychiatric/Behavioral: No new stress, anxiety, depression, sleep problems, SI, HI, thoughts of self-harm.      Physical Examination     TODAY'S VITALS  Vitals:    08/01/22 1416   BP: 140/80   BP Location: Left upper arm   Patient Position: Sitting   Pulse: 94   Temp: 36.3 °C (97.3 °F)   SpO2: 98%   Weight: 55.1 kg (121 lb 6.4 oz)       BLOOD PRESSURE TRENDS  BP Readings from Last 2 Encounters:   08/01/22 140/80   05/10/22 (!) 168/90       HEIGHT & WEIGHT TRENDS  Wt Readings from Last 2 Encounters:   08/01/22 55.1 kg (121 lb 6.4 oz)   04/25/22 55.6 kg (122 lb 9.6 oz)     Ht Readings from Last 2 Encounters:   04/25/22  "1.537 m (5' 0.5\")   11/12/21 1.537 m (5' 0.5\")       Body mass index is 23.32 kg/m².     Physical Exam:    General  • NAD. Appears comfortable, relaxed. No acute pain. Well-groomed. BMI noted. See VS/Trends.   • No major weight changes.   • No activity changes.   • Appetite is baseline.   • No fever, chills, tremors.    HEENT  • Head: Normocephalic. Atraumatic.   • Ears: External ear B/L WNL. No trauma. No obvious hearing deficits or complaints. No pain.   • Eyes: Conjunctiva is WNL B/L. No trauma seen at either eye. Periorbital regions are WNL. No lid abnormalities noted. No vision complaints or changes at this time. + injection R cornea s/p injection   • Nose: No notable nasal or sinus congestion.  • Throat: No pain. No vocal hoarseness.    Chest/Respiratory  • Lungs CTA B/L. No wheezing, stridor, rhonchi, rales/crackles.   • No SOB/CRESPO. POX remains stable on RA. RR WNL.     Cardiovascular  • AVSS. No CP, palpitations, SOB, CRESPO.   • Rate and rhythm regular with occasional ectopic beat but no associated symptoms  • No obvious murmurs heard.     MSK  • Maintains normal gait. No recent falls. Does not require gait/ambulatory support at baseline.   • No acute pain or ROM complaints at this time.    Skin/Integ  • No new or recent skin infections/cellulitis. No erythema, warmth, drainage, abscesses, discharge, or other infectious lesions.   • No rashes or dry skin.     Heme  • No new or unexplained bruising or bleeding of any kind.   • No lymphadenopathy as reported above.     Neuro  • At mental/cognitive baseline. Answers all questions appropriately.   • No anosmia. No focal or generalized weakness. No objective n/t or paresthesias. No pain.   • No headaches.     Mental Health  • No acute concerns at this time.        Laboratory Results     LAST CBC/DIFF  Lab Results   Component Value Date    WBC 7.42 04/28/2022    WBC 6.62 10/21/2021    HGB 12.3 04/28/2022    HGB 12.9 10/21/2021    HCT 38.7 04/28/2022    HCT 40.9 " 10/21/2021    MCV 98.2 (H) 04/28/2022    MCV 99.0 (H) 10/21/2021     04/28/2022     10/21/2021        LAST BMP  Lab Results   Component Value Date    GLUCOSE 94 04/28/2022    CALCIUM 9.2 04/28/2022     04/28/2022    K 4.2 04/28/2022    CO2 27 04/28/2022     04/28/2022    BUN 21 (H) 04/28/2022    CREATININE 0.8 04/28/2022        LAST LFTs  Lab Results   Component Value Date    ALT 20 04/28/2022    AST 17 04/28/2022    ALKPHOS 75 04/28/2022    BILITOT 0.6 04/28/2022        LAST LIPID PANEL  Lab Results   Component Value Date    CHOL 248 (H) 11/18/2015     Lab Results   Component Value Date    HDL 92 11/18/2015     Lab Results   Component Value Date    LDLCALC 133 (H) 11/18/2015     Lab Results   Component Value Date    TRIG 117 11/18/2015       LAST THYROID STUDIES  Lab Results   Component Value Date    TSH 1.91 10/14/2020        LAST A1C  Lab Results   Component Value Date    HGBA1C 5.6 11/18/2015       IMMUNIZATIONS  Immunization History   Administered Date(s) Administered   • INFLUENZA VACCINE QUAD ADJUVANTED 65 and OLDER 10/06/2021   • Influenza Split High Dose Preservative Free IM 11/03/2011, 11/13/2012, 10/09/2013, 10/30/2014, 11/17/2015, 09/30/2016, 09/25/2017   • Influenza Vaccine 65 And Older Preservative Free 10/25/2019, 10/02/2020   • Influenza Vaccine Quadrivalent 3 Yr And Older 09/18/2017, 10/22/2018   • Influenza, Unspecified 10/30/2014, 11/17/2015, 09/30/2016, 09/25/2017   • MMRV 06/05/2014   • Pneumococcal Conjugate 08/09/1996   • Pneumococcal Conjugate 13-Valent 11/17/2015   • Pneumococcal Polysaccharide 10/30/2014, 09/11/2017   • Sars-cov-2 (Covid-19) Vaccine, Moderna 02/05/2021, 03/05/2021   • Sars-cov-2 (Covid-19) Vaccine, Moderna Booster 11/17/2021   • Tdap 03/22/2012   • Varicella 06/05/2014   • Zoster 01/02/2006, 01/02/2016   • Zoster Vaccine Recombinant Adjuvanted (Shingrix) 05/24/2019, 09/20/2019        Assessment and Plan     Assessment/Plan     Problem List Items  Addressed This Visit    None     Visit Diagnoses     Palpitation    -  Primary    Relevant Orders    ECG 12 LEAD OFFICE PERFORMED (Completed)    Premature ventricular beat        Relevant Orders    Ambulatory referral to Cardiology    Madelyn is stable today and without concerning symptoms or concerning features on exam today. Recommend non-urgent evaluation with cardiology Dr. Brooks.           Return in about 2 months (around 10/1/2022), or if symptoms worsen or fail to improve.    Orders Placed This Encounter   Procedures   • Ambulatory referral to Cardiology     Standing Status:   Future     Standing Expiration Date:   2/1/2023     Referral Priority:   Routine     Referral Type:   Consultation     Referral Reason:   Specialty Services Required     Referred to Provider:   Alfie Brooks DO     Number of Visits Requested:   10   • ECG 12 LEAD OFFICE PERFORMED     Scheduling Instructions:      PLEASE USE THIS ORDER FOR ECG'S PERFORMED IN PHYSICIAN OFFICES     Order Specific Question:   Release to patient     Answer:   Immediate        I spent 30 minutes on this date of service performing the following activities: obtaining history, performing examination, entering orders, documenting, preparing for visit, obtaining / reviewing records, providing counseling and education, independently reviewing study/studies, communicating results and coordinating care.    MICA Lopez    8/1/2022

## 2022-08-04 ENCOUNTER — TELEPHONE (OUTPATIENT)
Dept: SCHEDULING | Facility: CLINIC | Age: 87
End: 2022-08-04
Payer: MEDICARE

## 2022-08-04 NOTE — TELEPHONE ENCOUNTER
New Patient Appointment Request    Name of caller: Shelley Bingham    Reason for Visit: PVC    Insurance: Medicare and [employer]    Insurance ID #: [id number] and [id number]    Recent Procedures: No    Referred by: Henry Linares MD    Previous Cardiologist name and phone number: None    Best contact number: [phone]    Additional notes: Patient is scheduled 8/5

## 2022-08-08 ENCOUNTER — HOSPITAL ENCOUNTER (EMERGENCY)
Facility: HOSPITAL | Age: 87
Discharge: HOME | End: 2022-08-08
Attending: EMERGENCY MEDICINE
Payer: MEDICARE

## 2022-08-08 ENCOUNTER — APPOINTMENT (EMERGENCY)
Dept: RADIOLOGY | Facility: HOSPITAL | Age: 87
End: 2022-08-08
Attending: EMERGENCY MEDICINE
Payer: MEDICARE

## 2022-08-08 DIAGNOSIS — S51.019A SKIN TEAR OF ELBOW WITHOUT COMPLICATION, UNSPECIFIED LATERALITY, INITIAL ENCOUNTER: Primary | ICD-10-CM

## 2022-08-08 DIAGNOSIS — S80.02XA CONTUSION OF LEFT KNEE, INITIAL ENCOUNTER: ICD-10-CM

## 2022-08-08 PROCEDURE — 63700000 HC SELF-ADMINISTRABLE DRUG: Performed by: PHYSICIAN ASSISTANT

## 2022-08-08 PROCEDURE — 73564 X-RAY EXAM KNEE 4 OR MORE: CPT | Mod: LT

## 2022-08-08 PROCEDURE — 3E0234Z INTRODUCTION OF SERUM, TOXOID AND VACCINE INTO MUSCLE, PERCUTANEOUS APPROACH: ICD-10-PCS | Performed by: EMERGENCY MEDICINE

## 2022-08-08 PROCEDURE — 99284 EMERGENCY DEPT VISIT MOD MDM: CPT | Mod: 25

## 2022-08-08 PROCEDURE — 63600000 HC DRUGS/DETAIL CODE: Performed by: PHYSICIAN ASSISTANT

## 2022-08-08 PROCEDURE — 70450 CT HEAD/BRAIN W/O DYE: CPT | Mod: ME

## 2022-08-08 PROCEDURE — 90471 IMMUNIZATION ADMIN: CPT | Performed by: PHYSICIAN ASSISTANT

## 2022-08-08 PROCEDURE — 90715 TDAP VACCINE 7 YRS/> IM: CPT | Performed by: PHYSICIAN ASSISTANT

## 2022-08-08 RX ORDER — ACETAMINOPHEN 325 MG/1
650 TABLET ORAL ONCE
Status: COMPLETED | OUTPATIENT
Start: 2022-08-08 | End: 2022-08-08

## 2022-08-08 RX ADMIN — ACETAMINOPHEN 650 MG: 325 TABLET ORAL at 20:50

## 2022-08-08 RX ADMIN — TETANUS TOXOID, REDUCED DIPHTHERIA TOXOID AND ACELLULAR PERTUSSIS VACCINE, ADSORBED 0.5 ML: 5; 2.5; 8; 8; 2.5 SUSPENSION INTRAMUSCULAR at 19:44

## 2022-08-08 ASSESSMENT — ENCOUNTER SYMPTOMS
WEAKNESS: 0
LIGHT-HEADEDNESS: 0
NUMBNESS: 0
DIZZINESS: 0
NECK STIFFNESS: 0
ABDOMINAL PAIN: 0
HEADACHES: 0
BACK PAIN: 0
NECK PAIN: 0

## 2022-08-08 NOTE — ED PROVIDER NOTES
Emergency Medicine Note  HPI   HISTORY OF PRESENT ILLNESS     Patient reports that she was walking tripped and fell forward over a parking stop onto concrete.  Patient recalls the fall and impact.   Did not strike her head.  Complains of cuts to both elbows left knee right leg.  Patient states she has been able to walk since the fall.  Has some mild left-sided knee Pain.  Patient states that she was at [employer] to get hamburger for an early dinner, When this occurred.  Did wrap left elbow with a dressing.  She denies any loss of consciousness recalls the fall and impact.  She denies any headache or neck pain denies any chest belly or back pain.  Does admit to some left-sided knee pain.  Has been able to ambulate.  She does live alone.  She is not on blood thinners.            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 Arriaza.  Stable FEV1 in 8/2015.  IgE levels and eosinophils stable in 8/2015.  Managed with Fasenra, Azithromycin and Flovent.   • Allergic rhinitis 04/02/2018    Pulmonologist: Dr. Walkre. Managed with Flonase.   • Calcification of aorta (CMS/[facility]) 06/29/2020    Seen incidentally on CT scan.   • Chronic obstructive pulmonary disease (CMS/[facility]) 04/02/2018    Pulmonologist: Dr. Walker.  Seen on PFTs in hospital in 2014.  Controlled with Spiriva and FLovent.   • Essential hypertension 04/02/2018    Managed on no medications at this time. Advised to follow a low sodium diet and keep BMI < 25.  Advised to exercise for 2.5 hours per week.  Instructed to take home blood pressure readings and call if consistently above 140/90.     • GERD (gastroesophageal reflux disease) 06/08/2019    Managed with Esomeprazole.  Should take medication 30 minutes prior to meal.  Advised to avoid caffeine, carbonated beverages, and should not eat within 3 hours of going to sleep.   Advised to reduce BMI < 25.    • Insomnia 04/02/2018    Managed with Lorazepam as needed.   • Lumbar spinal stenosis 04/02/2018    Neurosurgeon: Dr. Miguel. MRI with Central and lateral recess spinal stenosis. Has Spondylolisthesis at L4/L5. S/P epidural injection by Dr. Martin with some relief in past. Had Lumbar fusion and Lumbar laminectomy by Dr. Miguel in 4/2015.   • Macular degeneration disease    • Obstructive sleep apnea syndrome 04/02/2018    Mild STACIA noted in sleep study 7/2015. Treated with nasal CPAP automatic with pressure range 5-10 CM H2Ox couple months. Off now   • Osteoporosis 04/02/2018    Rheumatologist: Dr. Bradley.  Dexa scan 7/2016 with osteoporosis of lumbar spine LS-2.5, LH-2.1, RH -2.2.  Has been on Fosamax in the past for 8 years.  Worsened by Prednisone in past.  Continue vitamin D, calcium and weight bearing exercise. DEXA in 7/2017 with LS -1.7, LH -2.2, and RH -2.0.  Next due in 7/2019.  Seen by Dr. Bradley in 8/2015 who agreed with initiating Prolia. Took for 2 years. N   • Pulmonary embolism (CMS/HCC) 06/09/2019    Hematologist: Dr. Bhakta Diagnosed in 06/2019 in right upper lobe. Vascular ultrasound negative of bilateral lower extremities. Managed on Eliquis. Hypercoagulable workup was negative for beta-2 glycoprotein's, RIGO, Antithrombin III, lupus anticoagulant, protein C activity protein S activity, and prothrombin gene mutation, and factor V Leiden. Now off Apixaban as CT scan in 12/2019 was without pu   • Vertebral compression fracture (CMS/HCC)     At T12 level       Past Surgical History:   Procedure Laterality Date   • APPENDECTOMY     • CARPAL TUNNEL RELEASE Bilateral    • CATARACT EXTRACTION W/  INTRAOCULAR LENS IMPLANT Bilateral    • HYSTERECTOMY  1972   • LUMBAR LAMINECTOMY  04/21/2015    S/P lumbar fusion   • TONSILLECTOMY         Family History   Problem Relation Age of Onset   • Glaucoma Biological Mother    • Macular degeneration Biological Mother    • Hypertension  Biological Mother    • Lung cancer Biological Father    • Heart disease Biological Brother    • Breast cancer Mother's Sister    • No Known Problems Biological Son    • No Known Problems Biological Son    • No Known Problems Biological Daughter    • No Known Problems Biological Daughter    • No Known Problems Biological Daughter        Social History     Tobacco Use   • Smoking status: Never Smoker   • Smokeless tobacco: Never Used   Vaping Use   • Vaping Use: Never used   Substance Use Topics   • Alcohol use: Yes     Comment: Rarely   • Drug use: No         Review of Systems   REVIEW OF SYSTEMS     Review of Systems   Cardiovascular: Negative for chest pain.   Gastrointestinal: Negative for abdominal pain.   Musculoskeletal: Negative for back pain, neck pain and neck stiffness.   Neurological: Negative for dizziness, syncope, weakness, light-headedness, numbness and headaches.         VITALS     ED Vitals    Date/Time Temp Pulse Resp BP SpO2 [facility]   08/08/22 1723 37.1 °C (98.8 °F) 92 16 174/90 96 % JBK        Pulse Ox %: 96 % (08/08/22 1920)  Pulse Ox Interpretation: Normal (08/08/22 1920)           Physical Exam   PHYSICAL EXAM     Physical Exam  Vitals and nursing note reviewed.   Constitutional:       Appearance: Normal appearance.   HENT:      Head: Normocephalic and atraumatic.   Eyes:      Conjunctiva/sclera: Conjunctivae normal.   Neck:      Comments: There is no midline cervical spine tenderness or step-off  Cardiovascular:      Rate and Rhythm: Normal rate and regular rhythm.   Pulmonary:      Effort: Pulmonary effort is normal.      Breath sounds: Normal breath sounds.   Abdominal:      General: Bowel sounds are normal.      Palpations: Abdomen is soft.   Musculoskeletal:        Arms:       Cervical back: Normal range of motion and neck supple.        Legs:    Skin:     General: Skin is warm.   Neurological:      General: No focal deficit present.      Mental Status: She is alert and oriented to person,  place, and time.      Gait: Gait is intact.      Comments: Gait was observed no limp   Psychiatric:         Mood and Affect: Mood normal.           PROCEDURES     Procedures     DATA     Results     None          Imaging Results          CT HEAD WITHOUT IV CONTRAST (Final result)  Result time 08/08/22 20:29:32    Final result                 Impression:    IMPRESSION:    1. No acute intracranial abnormality.    2. Near complete opacification of the left sphenoid sinus and left maxillary  sinus             Narrative:    CLINICAL HISTORY: Tripped over parking barrier and fell    COMMENT: CT examination of the brain was performed without intravenous contrast.  Coronal and sagittal reformats were generated.    CT DOSE:  One or more dose reduction techniques (e.g. automated exposure  control, adjustment of the mA and/or kV according to patient size, use of  iterative reconstruction technique) utilized for this examination.    Comparison is made to prior study from 2016.    There is no acute hemorrhage, midline shift, mass effect, or extra-axial fluid  collection.  There is no evidence of acute transcortical infarct.  There is near  complete opacification the left maxillary sinus with high density material  within the left maxillary sinus.  Similar high density material near complete  opacification is seen in the sphenoid sinus.  Maxillary sinus opacification is  new when compared to the prior study from 2016.                               X-RAY KNEE LEFT 4+ VIEWS (Final result)  Result time 08/08/22 19:45:05    Final result                 Impression:    IMPRESSION:  No acute fracture or dislocation. Prepatellar soft tissue swelling.             Narrative:      CLINICAL HISTORY:   WITH SUNRISE VIEW PLEASE FALL    pain    COMPARISON:   None    COMMENT:    AP, lateral and oblique views of the left knee are submitted.    There is no acute fracture.  The joint spaces are maintained.  Extensive prepatellar soft tissue  swelling is present                                No orders to display       Scoring tools                                 ED Course & MDM   MDM / ED COURSE / CLINICAL IMPRESSIONS / DISPO     [facility]    ED Course as of 08/08/22 2150   Mon Aug 08, 2022   2037 Patient regarding CT read including sinus disease.   [JR]   2129 XR L knee  IMPRESSION:  No acute fracture or dislocation. Prepatellar soft tissue swelling.    CT H  IMPRESSION:     1. No acute intracranial abnormality.     2. Near complete opacification of the left sphenoid sinus and left maxillary [PT]      ED Course User Index  [JR] Tong Benton PA C  [PT] Donald Turner,      Discharge   Final diagnoses:   [S51.019A] Skin tear of elbow without complication, unspecified laterality, initial encounter   [S80.02XA] Contusion of left knee, initial encounter            Tong Benton PA C  08/08/22 2150

## 2022-08-08 NOTE — ED ATTESTATION NOTE
I have personally seen and examined the patient.  I reviewed and agree with physician assistant / nurse practitioner’s assessment and plan of care, with the following exceptions: None  My examination, assessment, and plan of care of Madelyn Ruiz is as follows:  89-year-old female presents to the emergency department with her family members after having mechanical fall over a parking stop.  This occurred around 4:15 PM today.  Did not hit her head, but has skin tears to her elbows and pain and swelling to her left knee.  Denies chest pain or trouble breathing.  No loss of consciousness.    Vital signs noted, mildly hypertensive   Appears younger than stated age, GCS 15   Normocephalic atraumatic, no midline C-spine tenderness noted   There are superficial skin tears noted to the left and right elbow, no olecranon tenderness, no radial head tenderness  Abdomen soft nontender  No hip tenderness with passive or active range of motion bilaterally  There is swelling noted to the left knee, greatest over the patella, with area of tenderness being in the infrapatellar region  Negative drawer signs  Minimal pain with range of motion    Because of age, will obtain CT brain, will check x-ray left knee, perform local wound care, reevaluate.  Will update tetanus as well.     I was physically present for the key/critical portions of the following procedures: None    Final diagnoses:   [S51.019A] Skin tear of elbow without complication, unspecified laterality, initial encounter   L Knee contusion         Donald Turner DO  08/08/22 2945

## 2022-08-08 NOTE — DISCHARGE INSTRUCTIONS
Happy 89st birthday!!!    Please take Tylenol for pain.  Follow-up with your family doctor.  Please have wound check later this week.

## 2022-08-09 ENCOUNTER — OFFICE VISIT (OUTPATIENT)
Dept: WOUND CARE | Facility: HOSPITAL | Age: 87
End: 2022-08-09
Attending: PLASTIC SURGERY
Payer: MEDICARE

## 2022-08-09 VITALS
HEART RATE: 89 BPM | BODY MASS INDEX: 23.75 KG/M2 | SYSTOLIC BLOOD PRESSURE: 153 MMHG | WEIGHT: 121 LBS | RESPIRATION RATE: 16 BRPM | HEIGHT: 60 IN | OXYGEN SATURATION: 98 % | DIASTOLIC BLOOD PRESSURE: 103 MMHG | TEMPERATURE: 98.5 F

## 2022-08-09 VITALS — TEMPERATURE: 97.9 F

## 2022-08-09 DIAGNOSIS — S41.102A OPEN WOUND OF LEFT UPPER ARM, INITIAL ENCOUNTER: ICD-10-CM

## 2022-08-09 DIAGNOSIS — S81.802A OPEN WOUND OF LEFT LOWER LEG, INITIAL ENCOUNTER: ICD-10-CM

## 2022-08-09 DIAGNOSIS — S81.801D OPEN WOUND OF RIGHT LOWER LEG, SUBSEQUENT ENCOUNTER: Primary | ICD-10-CM

## 2022-08-09 DIAGNOSIS — S41.101A OPEN WOUND OF RIGHT UPPER ARM, INITIAL ENCOUNTER: ICD-10-CM

## 2022-08-09 PROCEDURE — G0463 HOSPITAL OUTPT CLINIC VISIT: HCPCS

## 2022-08-09 PROCEDURE — G0463 HOSPITAL OUTPT CLINIC VISIT: HCPCS | Performed by: PLASTIC SURGERY

## 2022-08-09 PROCEDURE — A6212 FOAM DRG <=16 SQ IN W/BORDER: HCPCS

## 2022-08-09 PROCEDURE — 99213 OFFICE O/P EST LOW 20 MIN: CPT | Performed by: PLASTIC SURGERY

## 2022-08-09 RX ORDER — MUPIROCIN 20 MG/G
1 OINTMENT TOPICAL 3 TIMES WEEKLY
Qty: 22 G | Refills: 1 | Status: SHIPPED | OUTPATIENT
Start: 2022-08-10 | End: 2022-08-17

## 2022-08-09 ASSESSMENT — ENCOUNTER SYMPTOMS
FEVER: 0
CHILLS: 0
PSYCHIATRIC NEGATIVE: 1
ARTHRALGIAS: 0
WOUND: 1
HEMATOLOGIC/LYMPHATIC NEGATIVE: 1
SHORTNESS OF BREATH: 0
ENDOCRINE NEGATIVE: 1
COUGH: 0
NEUROLOGICAL NEGATIVE: 1
GASTROINTESTINAL NEGATIVE: 1
EYES NEGATIVE: 1
FATIGUE: 0
ALLERGIC/IMMUNOLOGIC COMMENTS: SEE ALLERGY LISTING

## 2022-08-09 NOTE — ASSESSMENT & PLAN NOTE
We will utilize a home nursing wound care dressing regimen with wound cleanse and application of 2% mupirocin ointment with Adaptic and border gauze pad dressings.

## 2022-08-09 NOTE — PATIENT INSTRUCTIONS
W[facility] Healing Center Instructions    MEDICATIONS     Medication Note  Continue present medications as prescribed by the Wound Healing Center or other physicians you see. To avoid any problems keep the Wound Healing Center informed each visit of any medications changes that occur.       WOUND CARE     Clean Wound with: Saline Solution, Soap and Water, and Showering (Dove, dial or ivory soap)  Treatment 1   Location: Right leg, left knee, left elbow and right elbow  Dressing: Apply Mupirocinto all wounds, then cover with adaptic and apply Mepilx border dressing. Apply tubigrip in AM, off in PM.  Dressing Care Frequency: Every other day  Three days a week is okay.  Care Provider: Visiting Nurse      COMPRESSION THERAPY     Tubigrip Stockings  Both Legs Size F  Apply Tubigrips (cotton/compression stockinette) DAILY.  Apply stockings upon arising in the morning to obtain the best results.  Remove stockings at bedtime once your legs are elevated.  Do not allow the stockinette to roll down as it can reduce or interrupt the blood flow in your limb.  Always wear the appropriate size that is recommended for you at the [facility].  Tubigrips can be washed by hand with soap and water and hang to dry overnight.     Basic Principles        Wash your hands thoroughly with soap and water and after each dressing change. If someone other than the patient changes the dressing, it’s best to wear disposable gloves.   Do not get the wound or dressing wet.   To shower: remove the dressing, shower with soap and water (including washing the wound - do not use a washcloth), air dry, then redress the wound.  Do not take a tub bath  Keep all your dressings in a clean, covered container at home to avoid dust and contamination.  Discard used dressings in a plastic bag or covered trash container.  Check your wound and the surrounding skin at each dressing change for redness, warmth, swelling, increased pain, foul odor, fever, pus or abnormal drainage or  discharge.  Notify Wound Healing Center if any of these changes occur - [phone].        Nutrition  Eat a well, balanced diet with adequate protein to support wound healing.   Take a multivitamin every day. Adequate nutrition supports healing and new tissue growth.  All diabetic patients should strive to keep blood sugars within a normal, practical range.   Elevated blood sugars can delay your wound healing.        ACTIVITY     Elevate legs above level of heart   Elevate your legs above the level of your heart for specific time periods during the day on several firm pillows or a foam leg wedge  Use of a recliner chair at home can often help in the appropriate elevation of your legs above the level of your heart  Normal activity then rest and elevation  May shower  May excercise  May walk    MOBILITY     Cane

## 2022-08-09 NOTE — ASSESSMENT & PLAN NOTE
Wound demonstrates near complete healing and we are going to adjust the wound care to be in concert with the other wound care regimens performed by home nursing care 3 times per week with wound cleanse and application of topical 2% mupirocin ointment Adaptic and border gauze pad dressing with recommended wound center follow-up in about 2 weeks.

## 2022-08-09 NOTE — ASSESSMENT & PLAN NOTE
We will utilize the same wound dressing care regimen by home care nursing as outlined for the lower extremity and left arm wounds.

## 2022-08-09 NOTE — PROGRESS NOTES
Patient ID: Madelyn Ruiz                            : 1930  MRN: [id number]                                            Visit Date: 2022  Encounter Provider: Anil Jones Jr  Referring Provider: No ref. provider found    Subjective      HPI  Madelyn is a 89 y.o. old female with a chief compliant of Traumatic Wound (Left knee, both elbows and right lateral leg)   presenting today for ongoing evaluation and management of a left lower calf traumatic wound associated with hematoma formation and responding well to home care regimen utilizing Rahel sheeting with occlusive dressing care and Tubigrip pocking support.  Since her last evaluation she is sustained traumatic skin tear type wounds of the bilateral elbows and left knee.  After fall last night.  She was evaluated at the [place] ED with negative CT scan and x-ray of the left knee.  She is seen at the Center today accompanied by her daughter, Shelley Santiago.  And her granddaughter, Cyndi..      The following have been reviewed and updated as appropriate in this visit:          Past Medical History:  has a past medical history of Allergic bronchopulmonary aspergillosis (CMS/[facility]) (2018), Allergic rhinitis (2018), Calcification of aorta (CMS/[facility]) (2020), Chronic obstructive pulmonary disease (CMS/HCC) (2018), Essential hypertension (2018), GERD (gastroesophageal reflux disease) (2019), Insomnia (2018), Lumbar spinal stenosis (2018), Macular degeneration disease, Obstructive sleep apnea syndrome (2018), Osteoporosis (2018), Pulmonary embolism (CMS/[facility]) (2019), and Vertebral compression fracture (CMS/[facility]).  Past Surgical History:  has a past surgical history that includes Tonsillectomy; Appendectomy; Lumbar laminectomy (2015); Carpal tunnel release (Bilateral); Hysterectomy (); and Cataract extraction w/  intraocular lens implant (Bilateral).  Social History:   Social History      Tobacco Use   • Smoking status: Never Smoker   • Smokeless tobacco: Never Used   Vaping Use   • Vaping Use: Never used   Substance Use Topics   • Alcohol use: Yes     Comment: Rarely   • Drug use: No     Family History: family history includes Breast cancer in her mother's sister; Glaucoma in her biological mother; Heart disease in her biological brother; Hypertension in her biological mother; Lung cancer in her biological father; Macular degeneration in her biological mother; No Known Problems in her biological daughter, biological daughter, biological daughter, biological son, and biological son.  Medications:   Current Outpatient Medications:   •  [START ON 8/10/2022] mupirocin (BACTROBAN) 2 % ointment, Apply 1 application topically 3 (three) times a week (Mon, Wed, Fri) for 7 days., Disp: 22 g, Rfl: 1  •  albuterol 2.5 mg /3 mL (0.083 %) nebulizer solution, Take 3 mL (2.5 mg total) by nebulization every 6 (six) hours as needed for wheezing or shortness of breath., Disp: 125 vial, Rfl: 0  •  albuterol HFA (VENTOLIN HFA) 90 mcg/actuation inhaler, Inhale 2 puffs every 4 (four) hours as needed for wheezing or shortness of breath., Disp: 8.5 g, Rfl: 0  •  aspirin 81 mg enteric coated tablet, Take 81 mg by mouth daily., Disp: , Rfl:   •  benralizumab (FASENRA) 30 mg/mL syringe subcutaneous syringe, Inject 30 mg under the skin every 2 (two) months., Disp: , Rfl:   •  benzonatate (TESSALON) 200 mg capsule, TAKE 1 CAPSULE BY MOUTH 3 TIMES DAILY AS NEEDED FOR COUGH., Disp: , Rfl:   •  calcium carbonate 600 mg calcium (1,500 mg) tablet, Take 1 tablet by mouth 2 (two) times a day., Disp: , Rfl:   •  cholecalciferol, vitamin D3, (VITAMIN D3 ORAL), Take by mouth daily.  , Disp: , Rfl:   •  docusate sodium (COLACE) 100 mg capsule, Take 100 mg by mouth nightly., Disp: , Rfl:   •  DULoxetine (CYMBALTA) 30 mg capsule, Take 30 mg by mouth daily., Disp: , Rfl:   •  DULoxetine (CYMBALTA) 60 mg capsule, TAKE 1 CAPSULE BY MOUTH  EVERYDAY AT BEDTIME, Disp: , Rfl: 0  •  erythromycin (ILOTYCIN) 5 mg/gram (0.5 %) ophthalmic ointment, erythromycin 5 mg/gram (0.5 %) eye ointment, Disp: , Rfl:   •  esomeprazole (NexIUM) 20 mg capsule, Take 20 mg by mouth daily. Take 30 minutes prior to meal., Disp: , Rfl:   •  fexofenadine (ALLEGRA) 180 mg tablet, Take 180 mg by mouth daily., Disp: , Rfl:   •  FLOVENT  mcg/actuation inhaler, Inhale 2 puffs 2 (two) times a day., Disp: , Rfl:   •  fluticasone propionate (FLONASE) 50 mcg/actuation nasal spray, Administer 1 spray into each nostril daily., Disp: , Rfl:   •  FOLIC ACID/MULTIVIT,IRON, (CENTRUM ORAL), Take 1 tablet by mouth daily., Disp: , Rfl:   •  LORazepam (ATIVAN) 1 mg tablet, TAKE 1 TABLET BY MOUTH NIGHTLY AS NEEDED FOR ANXIETY OR SLEEP., Disp: 30 tablet, Rfl: 5  •  ofloxacin (OCUFLOX) 0.3 % ophthalmic solution, INSTILL 1 DROP LEFT EYE EVERY TWO HOURS WHILE AWAKE, Disp: , Rfl:   •  pregabalin (LYRICA) 25 mg capsule, Take 25 mg by mouth nightly., Disp: , Rfl:   •  SPIRIVA RESPIMAT 2.5 mcg/actuation mist inhaler, Inhale 2 puffs daily., Disp: , Rfl:   •  SYMBICORT 80-4.5 mcg/actuation inhaler, Inhale 2 puffs 2 (two) times a day., Disp: , Rfl:   •  traMADoL (ULTRAM) 50 mg tablet, Take 50 mg by mouth daily as needed., Disp: , Rfl:   •  zoledronic acid (RECLAST) IVPB, Infuse 5 mg into a venous catheter See admin instr. 5 mg once yearly, Disp: , Rfl:     Allergies: is allergic to mepolizumab, morphine, and oxycodone.     Review of Systems   Constitutional: Negative for chills, fatigue and fever.   HENT: Negative.    Eyes: Negative.    Respiratory: Negative for cough and shortness of breath.    Cardiovascular: Positive for leg swelling. Negative for chest pain.   Gastrointestinal: Negative.    Endocrine: Negative.    Musculoskeletal: Negative for arthralgias.   Skin: Positive for wound.   Allergic/Immunologic:        See allergy listing   Neurological: Negative.    Hematological: Negative.     Psychiatric/Behavioral: Negative.      Objective   Visit Vitals  Temp 36.6 °C (97.9 °F) (Temporal)       Physical Exam  Constitutional:       General: She is not in acute distress.     Appearance: Normal appearance. She is not ill-appearing.   HENT:      Head: Normocephalic and atraumatic.   Eyes:      General: No scleral icterus.     Extraocular Movements: Extraocular movements intact.      Conjunctiva/sclera: Conjunctivae normal.   Pulmonary:      Effort: Pulmonary effort is normal.   Musculoskeletal:      Right lower leg: Edema present.      Left lower leg: Edema present.   Skin:     Capillary Refill: Capillary refill takes less than 2 seconds.      Findings: Bruising present. No erythema.      Comments: The previous right lower lateral calf wound is near completely healed.  There are thin skin flap avulsion tears of the bilateral elbow areas and a small superficial abrasion of the left anterior knee without signs of soft tissue infection.   Neurological:      General: No focal deficit present.      Mental Status: She is alert and oriented to person, place, and time.   Psychiatric:         Mood and Affect: Mood normal.         Behavior: Behavior normal.           Right calf wound        Left knee wound      Left elbow wound        Right elbow wound    Assessment/Plan    Diagnosis Plan   1. Open wound of right lower leg, subsequent encounter  mupirocin (BACTROBAN) 2 % ointment   2. Open wound of left lower leg, initial encounter  mupirocin (BACTROBAN) 2 % ointment   3. Open wound of right upper arm, initial encounter  mupirocin (BACTROBAN) 2 % ointment   4. Open wound of left upper arm, initial encounter  mupirocin (BACTROBAN) 2 % ointment     Problem List Items Addressed This Visit        Musculoskeletal    Open wound of right lower leg - Primary     Wound demonstrates near complete healing and we are going to adjust the wound care to be in concert with the other wound care regimens performed by home nursing  care 3 times per week with wound cleanse and application of topical 2% mupirocin ointment Adaptic and border gauze pad dressing with recommended wound center follow-up in about 2 weeks.           Relevant Medications    mupirocin (BACTROBAN) 2 % ointment (Start on 8/10/2022)    Open wound of left lower leg     We will utilize a home nursing wound care dressing regimen with wound cleanse and application of 2% mupirocin ointment with Adaptic and border gauze pad dressings.           Relevant Medications    mupirocin (BACTROBAN) 2 % ointment (Start on 8/10/2022)    Open wound of right upper arm     We will utilize the same wound dressing care regimen by home care nursing as outlined for the lower extremity and left arm wounds.           Relevant Medications    mupirocin (BACTROBAN) 2 % ointment (Start on 8/10/2022)    Open wound of left upper arm     We plan on the same home nursing care wound dressings changing as performed for the right lower leg and left lower leg wounds           Relevant Medications    mupirocin (BACTROBAN) 2 % ointment (Start on 8/10/2022)          Return in about 1 week (around 8/16/2022).     Anil Jones Jr, MD

## 2022-08-09 NOTE — LETTER
2022     Henry Linares MD  [address]  [address]  [facility] [address]    Patient: Madelyn Ruiz  YOB: 1930  Date of Visit: 2022      Dear Dr. Linares:    Thank you for referring Madelyn Ruiz to me for evaluation. Below are my notes for this consultation.    If you have questions, please do not hesitate to call me. I look forward to following your patient along with you.         Sincerely,        Anil Jones Jr, MD        CC: No Recipients  Anil Jones Jr., MD  2022  2:45 PM  Sign when Signing Visit  Patient ID: Madelyn Ruiz                            : 1930  MRN: [id number]                                            Visit Date: 2022  Encounter Provider: Anil Jones Jr  Referring Provider: No ref. provider found    Subjective      HPI  Madelyn is a 89 y.o. old female with a chief compliant of Traumatic Wound (Left knee, both elbows and right lateral leg)   presenting today for ongoing evaluation and management of a left lower calf traumatic wound associated with hematoma formation and responding well to home care regimen utilizing Rahel sheeting with occlusive dressing care and Tubigrip pocking support.  Since her last evaluation she is sustained traumatic skin tear type wounds of the bilateral elbows and left knee.  After fall last night.  She was evaluated at the [place] ED with negative CT scan and x-ray of the left knee.  She is seen at the Center today accompanied by her daughter, Shelley Santiago.  And her granddaughter, Cyndi..      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  Past Surgical History:  has a past surgical history that includes Tonsillectomy; Appendectomy; Lumbar laminectomy (04/21/2015); Carpal tunnel release (Bilateral); Hysterectomy (1972); and Cataract extraction w/  intraocular lens implant (Bilateral).  Social History:   Social History     Tobacco Use   • Smoking status: Never Smoker   • Smokeless tobacco: Never Used   Vaping Use   • Vaping Use: Never used   Substance Use Topics   • Alcohol use: Yes     Comment: Rarely   • Drug use: No     Family History: family history includes Breast cancer in her mother's sister; Glaucoma in her biological mother; Heart disease in her biological brother; Hypertension in her biological mother; Lung cancer in her biological father; Macular degeneration in her biological mother; No Known Problems in her biological daughter, biological daughter, biological daughter, biological son, and biological son.  Medications:   Current Outpatient Medications:   •  [START ON 8/10/2022] mupirocin (BACTROBAN) 2 % ointment, Apply 1 application topically 3 (three) times a week (Mon, Wed, Fri) for 7 days., Disp: 22 g, Rfl: 1  •  albuterol 2.5 mg /3 mL (0.083 %) nebulizer solution, Take 3 mL (2.5 mg total) by nebulization every 6 (six) hours as needed for wheezing or shortness of breath., Disp: 125 vial, Rfl: 0  •  albuterol HFA (VENTOLIN HFA) 90 mcg/actuation inhaler, Inhale 2 puffs every 4 (four) hours as needed for wheezing or shortness of breath., Disp: 8.5 g, Rfl: 0  •  aspirin 81 mg enteric coated tablet, Take 81 mg by mouth daily., Disp: , Rfl:   •  benralizumab (FASENRA) 30 mg/mL syringe subcutaneous syringe, Inject 30 mg under the skin every 2 (two) months., Disp: , Rfl:    •  benzonatate (TESSALON) 200 mg capsule, TAKE 1 CAPSULE BY MOUTH 3 TIMES DAILY AS NEEDED FOR COUGH., Disp: , Rfl:   •  calcium carbonate 600 mg calcium (1,500 mg) tablet, Take 1 tablet by mouth 2 (two) times a day., Disp: , Rfl:   •  cholecalciferol, vitamin D3, (VITAMIN D3 ORAL), Take by mouth daily.  , Disp: , Rfl:   •  docusate sodium (COLACE) 100 mg capsule, Take 100 mg by mouth nightly., Disp: , Rfl:   •  DULoxetine (CYMBALTA) 30 mg capsule, Take 30 mg by mouth daily., Disp: , Rfl:   •  DULoxetine (CYMBALTA) 60 mg capsule, TAKE 1 CAPSULE BY MOUTH EVERYDAY AT BEDTIME, Disp: , Rfl: 0  •  erythromycin (ILOTYCIN) 5 mg/gram (0.5 %) ophthalmic ointment, erythromycin 5 mg/gram (0.5 %) eye ointment, Disp: , Rfl:   •  esomeprazole (NexIUM) 20 mg capsule, Take 20 mg by mouth daily. Take 30 minutes prior to meal., Disp: , Rfl:   •  fexofenadine (ALLEGRA) 180 mg tablet, Take 180 mg by mouth daily., Disp: , Rfl:   •  FLOVENT  mcg/actuation inhaler, Inhale 2 puffs 2 (two) times a day., Disp: , Rfl:   •  fluticasone propionate (FLONASE) 50 mcg/actuation nasal spray, Administer 1 spray into each nostril daily., Disp: , Rfl:   •  FOLIC ACID/MULTIVIT,IRON, (CENTRUM ORAL), Take 1 tablet by mouth daily., Disp: , Rfl:   •  LORazepam (ATIVAN) 1 mg tablet, TAKE 1 TABLET BY MOUTH NIGHTLY AS NEEDED FOR ANXIETY OR SLEEP., Disp: 30 tablet, Rfl: 5  •  ofloxacin (OCUFLOX) 0.3 % ophthalmic solution, INSTILL 1 DROP LEFT EYE EVERY TWO HOURS WHILE AWAKE, Disp: , Rfl:   •  pregabalin (LYRICA) 25 mg capsule, Take 25 mg by mouth nightly., Disp: , Rfl:   •  SPIRIVA RESPIMAT 2.5 mcg/actuation mist inhaler, Inhale 2 puffs daily., Disp: , Rfl:   •  SYMBICORT 80-4.5 mcg/actuation inhaler, Inhale 2 puffs 2 (two) times a day., Disp: , Rfl:   •  traMADoL (ULTRAM) 50 mg tablet, Take 50 mg by mouth daily as needed., Disp: , Rfl:   •  zoledronic acid (RECLAST) IVPB, Infuse 5 mg into a venous catheter See admin instr. 5 mg once yearly, Disp: ,  Rfl:     Allergies: is allergic to mepolizumab, morphine, and oxycodone.     Review of Systems   Constitutional: Negative for chills, fatigue and fever.   HENT: Negative.    Eyes: Negative.    Respiratory: Negative for cough and shortness of breath.    Cardiovascular: Positive for leg swelling. Negative for chest pain.   Gastrointestinal: Negative.    Endocrine: Negative.    Musculoskeletal: Negative for arthralgias.   Skin: Positive for wound.   Allergic/Immunologic:        See allergy listing   Neurological: Negative.    Hematological: Negative.    Psychiatric/Behavioral: Negative.      Objective   Visit Vitals  Temp 36.6 °C (97.9 °F) (Temporal)       Physical Exam  Constitutional:       General: She is not in acute distress.     Appearance: Normal appearance. She is not ill-appearing.   HENT:      Head: Normocephalic and atraumatic.   Eyes:      General: No scleral icterus.     Extraocular Movements: Extraocular movements intact.      Conjunctiva/sclera: Conjunctivae normal.   Pulmonary:      Effort: Pulmonary effort is normal.   Musculoskeletal:      Right lower leg: Edema present.      Left lower leg: Edema present.   Skin:     Capillary Refill: Capillary refill takes less than 2 seconds.      Findings: Bruising present. No erythema.      Comments: The previous right lower lateral calf wound is near completely healed.  There are thin skin flap avulsion tears of the bilateral elbow areas and a small superficial abrasion of the left anterior knee without signs of soft tissue infection.   Neurological:      General: No focal deficit present.      Mental Status: She is alert and oriented to person, place, and time.   Psychiatric:         Mood and Affect: Mood normal.         Behavior: Behavior normal.           Right calf wound        Left knee wound      Left elbow wound        Right elbow wound    Assessment/Plan    Diagnosis Plan   1. Open wound of right lower leg, subsequent encounter  mupirocin (BACTROBAN) 2 %  ointment   2. Open wound of left lower leg, initial encounter  mupirocin (BACTROBAN) 2 % ointment   3. Open wound of right upper arm, initial encounter  mupirocin (BACTROBAN) 2 % ointment   4. Open wound of left upper arm, initial encounter  mupirocin (BACTROBAN) 2 % ointment     Problem List Items Addressed This Visit        Musculoskeletal    Open wound of right lower leg - Primary     Wound demonstrates near complete healing and we are going to adjust the wound care to be in concert with the other wound care regimens performed by home nursing care 3 times per week with wound cleanse and application of topical 2% mupirocin ointment Adaptic and border gauze pad dressing with recommended wound center follow-up in about 2 weeks.           Relevant Medications    mupirocin (BACTROBAN) 2 % ointment (Start on 8/10/2022)    Open wound of left lower leg     We will utilize a home nursing wound care dressing regimen with wound cleanse and application of 2% mupirocin ointment with Adaptic and border gauze pad dressings.           Relevant Medications    mupirocin (BACTROBAN) 2 % ointment (Start on 8/10/2022)    Open wound of right upper arm     We will utilize the same wound dressing care regimen by home care nursing as outlined for the lower extremity and left arm wounds.           Relevant Medications    mupirocin (BACTROBAN) 2 % ointment (Start on 8/10/2022)    Open wound of left upper arm     We plan on the same home nursing care wound dressings changing as performed for the right lower leg and left lower leg wounds           Relevant Medications    mupirocin (BACTROBAN) 2 % ointment (Start on 8/10/2022)          Return in about 1 week (around 8/16/2022).     Anil Jones Jr, MD

## 2022-08-09 NOTE — ASSESSMENT & PLAN NOTE
We plan on the same home nursing care wound dressings changing as performed for the right lower leg and left lower leg wounds

## 2022-08-15 ENCOUNTER — TELEPHONE (OUTPATIENT)
Dept: PRIMARY CARE | Facility: CLINIC | Age: 87
End: 2022-08-15
Payer: MEDICARE

## 2022-08-15 NOTE — TELEPHONE ENCOUNTER
ED discharge follow up   Called pt who reported that she is doing fine now; scraped her knees and skin tear on her elbow; has followed up with the Wound Dr and has another follow up appt with the Wound  tomorrow; no questions at this time; does not feel that she needs a follow up with Dr Linares at this time. Pt expressed appreciation for the call to check on her.

## 2022-08-16 ENCOUNTER — OFFICE VISIT (OUTPATIENT)
Dept: WOUND CARE | Facility: HOSPITAL | Age: 87
End: 2022-08-16
Attending: PLASTIC SURGERY
Payer: MEDICARE

## 2022-08-16 VITALS — TEMPERATURE: 97.7 F

## 2022-08-16 DIAGNOSIS — S41.101D OPEN WOUND OF RIGHT UPPER ARM, SUBSEQUENT ENCOUNTER: ICD-10-CM

## 2022-08-16 DIAGNOSIS — S81.802D OPEN WOUND OF LEFT LOWER LEG, SUBSEQUENT ENCOUNTER: Primary | ICD-10-CM

## 2022-08-16 DIAGNOSIS — S81.801D OPEN WOUND OF RIGHT LOWER LEG, SUBSEQUENT ENCOUNTER: ICD-10-CM

## 2022-08-16 DIAGNOSIS — S41.102D OPEN WOUND OF LEFT UPPER ARM, SUBSEQUENT ENCOUNTER: ICD-10-CM

## 2022-08-16 PROCEDURE — A6212 FOAM DRG <=16 SQ IN W/BORDER: HCPCS

## 2022-08-16 PROCEDURE — G0463 HOSPITAL OUTPT CLINIC VISIT: HCPCS | Performed by: PLASTIC SURGERY

## 2022-08-16 PROCEDURE — G0463 HOSPITAL OUTPT CLINIC VISIT: HCPCS

## 2022-08-16 PROCEDURE — 99213 OFFICE O/P EST LOW 20 MIN: CPT | Performed by: PLASTIC SURGERY

## 2022-08-16 ASSESSMENT — ENCOUNTER SYMPTOMS
FATIGUE: 0
ALLERGIC/IMMUNOLOGIC COMMENTS: SEE ALLERGY LISTING
ARTHRALGIAS: 0
PSYCHIATRIC NEGATIVE: 1
GASTROINTESTINAL NEGATIVE: 1
CHILLS: 0
HEMATOLOGIC/LYMPHATIC NEGATIVE: 1
ENDOCRINE NEGATIVE: 1
WOUND: 1
NEUROLOGICAL NEGATIVE: 1
FEVER: 0
SHORTNESS OF BREATH: 0
EYES NEGATIVE: 1
COUGH: 0

## 2022-08-16 NOTE — LETTER
2022     Henry Linares MD  [address]  [address]  [facility] [address]    Patient: Madelyn Ruiz  YOB: 1930  Date of Visit: 2022      Dear Dr. Linares:    Thank you for referring Madelyn Ruiz to me for evaluation. Below are my notes for this consultation.    If you have questions, please do not hesitate to call me. I look forward to following your patient along with you.         Sincerely,        Anil Jones Jr, MD        CC: No Recipients  Anil Jones Jr., MD  2022 11:34 AM  Sign when Signing Visit  Patient ID: Madelyn Ruiz                            : 1930  MRN: [id number]                                            Visit Date: 2022  Encounter Provider: Anil Jones Jr  Referring Provider: No ref. provider found    Subjective      HPI  Madelyn is a 89 y.o. old female with a chief compliant of Traumatic Wound   presenting today for ongoing evaluation and management of traumatic wounds of the bilateral elbow and left lower extremity and near completely healed previous hematoma wound of the right lower extremity.  We have recommended utilizing topical 2% mupirocin ointment occlusive wound dressing care for all the wound areas..  She is accompanied today again by her daughter, Shelley Santiago.    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  Past Surgical History:  has a past surgical history that includes Tonsillectomy; Appendectomy; Lumbar laminectomy (04/21/2015); Carpal tunnel release (Bilateral); Hysterectomy (1972); and Cataract extraction w/  intraocular lens implant (Bilateral).  Social History:   Social History     Tobacco Use   • Smoking status: Never Smoker   • Smokeless tobacco: Never Used   Vaping Use   • Vaping Use: Never used   Substance Use Topics   • Alcohol use: Yes     Comment: Rarely   • Drug use: No     Family History: family history includes Breast cancer in her mother's sister; Glaucoma in her biological mother; Heart disease in her biological brother; Hypertension in her biological mother; Lung cancer in her biological father; Macular degeneration in her biological mother; No Known Problems in her biological daughter, biological daughter, biological daughter, biological son, and biological son.  Medications:   Current Outpatient Medications:   •  albuterol 2.5 mg /3 mL (0.083 %) nebulizer solution, Take 3 mL (2.5 mg total) by nebulization every 6 (six) hours as needed for wheezing or shortness of breath., Disp: 125 vial, Rfl: 0  •  albuterol HFA (VENTOLIN HFA) 90 mcg/actuation inhaler, Inhale 2 puffs every 4 (four) hours as needed for wheezing or shortness of breath., Disp: 8.5 g, Rfl: 0  •  aspirin 81 mg enteric coated tablet, Take 81 mg by mouth daily., Disp: , Rfl:   •  benralizumab (FASENRA) 30 mg/mL syringe subcutaneous syringe, Inject 30 mg under the skin every 2 (two) months., Disp: , Rfl:   •  benzonatate (TESSALON) 200 mg capsule, TAKE 1 CAPSULE BY MOUTH 3 TIMES DAILY AS NEEDED FOR COUGH., Disp: , Rfl:   •  calcium carbonate 600 mg calcium (1,500 mg) tablet, Take 1 tablet by mouth 2 (two) times a day., Disp: , Rfl:   •  cholecalciferol, vitamin D3, (VITAMIN D3 ORAL), Take by mouth daily.  , Disp: , Rfl:   •  docusate sodium (COLACE) 100 mg capsule, Take 100 mg by mouth nightly., Disp: ,  Rfl:   •  DULoxetine (CYMBALTA) 30 mg capsule, Take 30 mg by mouth daily., Disp: , Rfl:   •  DULoxetine (CYMBALTA) 60 mg capsule, TAKE 1 CAPSULE BY MOUTH EVERYDAY AT BEDTIME, Disp: , Rfl: 0  •  erythromycin (ILOTYCIN) 5 mg/gram (0.5 %) ophthalmic ointment, erythromycin 5 mg/gram (0.5 %) eye ointment, Disp: , Rfl:   •  esomeprazole (NexIUM) 20 mg capsule, Take 20 mg by mouth daily. Take 30 minutes prior to meal., Disp: , Rfl:   •  fexofenadine (ALLEGRA) 180 mg tablet, Take 180 mg by mouth daily., Disp: , Rfl:   •  FLOVENT  mcg/actuation inhaler, Inhale 2 puffs 2 (two) times a day., Disp: , Rfl:   •  fluticasone propionate (FLONASE) 50 mcg/actuation nasal spray, Administer 1 spray into each nostril daily., Disp: , Rfl:   •  FOLIC ACID/MULTIVIT,IRON, (CENTRUM ORAL), Take 1 tablet by mouth daily., Disp: , Rfl:   •  LORazepam (ATIVAN) 1 mg tablet, TAKE 1 TABLET BY MOUTH NIGHTLY AS NEEDED FOR ANXIETY OR SLEEP., Disp: 30 tablet, Rfl: 5  •  mupirocin (BACTROBAN) 2 % ointment, Apply 1 application topically 3 (three) times a week (Mon, Wed, Fri) for 7 days., Disp: 22 g, Rfl: 1  •  ofloxacin (OCUFLOX) 0.3 % ophthalmic solution, INSTILL 1 DROP LEFT EYE EVERY TWO HOURS WHILE AWAKE, Disp: , Rfl:   •  pregabalin (LYRICA) 25 mg capsule, Take 25 mg by mouth nightly., Disp: , Rfl:   •  SPIRIVA RESPIMAT 2.5 mcg/actuation mist inhaler, Inhale 2 puffs daily., Disp: , Rfl:   •  SYMBICORT 80-4.5 mcg/actuation inhaler, Inhale 2 puffs 2 (two) times a day., Disp: , Rfl:   •  traMADoL (ULTRAM) 50 mg tablet, Take 50 mg by mouth daily as needed., Disp: , Rfl:   •  zoledronic acid (RECLAST) IVPB, Infuse 5 mg into a venous catheter See admin instr. 5 mg once yearly, Disp: , Rfl:     Allergies: is allergic to mepolizumab, morphine, and oxycodone.     Review of Systems   Constitutional: Negative for chills, fatigue and fever.   HENT: Negative.    Eyes: Negative.    Respiratory: Negative for cough and shortness of breath.     Cardiovascular: Negative for chest pain and leg swelling.   Gastrointestinal: Negative.    Endocrine: Negative.    Musculoskeletal: Negative for arthralgias.   Skin: Positive for wound. Negative for rash.   Allergic/Immunologic:        See allergy listing   Neurological: Negative.    Hematological: Negative.    Psychiatric/Behavioral: Negative.      Objective   Visit Vitals  Temp 36.5 °C (97.7 °F) (Temporal)       Physical Exam  Constitutional:       General: She is not in acute distress.     Appearance: Normal appearance. She is not ill-appearing.   HENT:      Head: Normocephalic and atraumatic.   Eyes:      General: No scleral icterus.     Extraocular Movements: Extraocular movements intact.      Conjunctiva/sclera: Conjunctivae normal.   Pulmonary:      Effort: Pulmonary effort is normal.   Musculoskeletal:      Right lower leg: No edema.      Left lower leg: No edema.   Skin:     Capillary Refill: Capillary refill takes less than 2 seconds.      Findings: No erythema.      Comments: The original right lower lateral calf wound has completely healed.  There is progressive healing of all of the traumatic wounds including the left anterior knee bilateral elbow.   Neurological:      General: No focal deficit present.      Mental Status: She is alert and oriented to person, place, and time.   Psychiatric:         Mood and Affect: Mood normal.         Behavior: Behavior normal.           Completely healed right calf wound          Progressive  healing right elbow wound              Assessment/Plan    Diagnosis Plan   1. Open wound of left lower leg, subsequent encounter     2. Open wound of left upper arm, subsequent encounter     3. Open wound of right upper arm, subsequent encounter     4. Open wound of right lower leg, subsequent encounter       Problem List Items Addressed This Visit        Musculoskeletal    Open wound of right lower leg     Wound has completely healed and we discussed use of the Eucerin cream  type bland moisturizer application daily and as needed and otherwise no occlusive dressing care is further required.           Open wound of left lower leg - Primary     See above review and assessment plan.           Open wound of right upper arm     The assessment demonstrates near complete wound healing of all wounds of the left anterior knee and bilateral elbow areas.  We are recommending continuing home care nursing dressing changes with topical 2% mupirocin ointment Adaptic and occlusive gauze dressings continued until complete wound healing is obtained which should be within the next 5 to 7 days.  Thereafter occlusive wound care can be discontinued with recommending application of Eucerin cream moisturization at the scar areas as needed.  We otherwise feel that Madelyn can be discharged from further follow-up at the wound center but I did discuss with Madelyn and Shelley Santiago that should follow-up wound area problems be noted or incomplete healing, they should return to see us.           Open wound of left upper arm     See review and recommended assessment planned listed under right elbow wound.                 Return in about 3 weeks (around 9/6/2022).     Anil Jones Jr, MD

## 2022-08-16 NOTE — PATIENT INSTRUCTIONS
Wound Healing Center Instructions    MEDICATIONS     Medication Note  Continue present medications as prescribed by the Wound Healing Center or other physicians you see. To avoid any problems keep the Wound Healing Center informed each visit of any medications changes that occur.       WOUND CARE     Clean Wound with: Saline Solution, Soap and Water, and Showering (Dove, dial or ivory soap)  Treatment 1   Location:  left knee, left elbow and right elbow  Dressing: Apply Mupirocin to all wounds, then cover with adaptic and apply Mepilx border dressing. Apply tubigrip in AM, off in PM.  Dressing Care Frequency: Every other day  Three days a week is okay.  Care Provider: Visiting Nurse    RIGHT LEG IS HEALED- LEAVE OPEN TO AIR  COMPRESSION THERAPY     Tubigrip Stockings  Both Legs Size F  Apply Tubigrips (cotton/compression stockinette) DAILY.  Apply stockings upon arising in the morning to obtain the best results.  Remove stockings at bedtime once your legs are elevated.  Do not allow the stockinette to roll down as it can reduce or interrupt the blood flow in your limb.  Always wear the appropriate size that is recommended for you at the [facility].  Tubigrips can be washed by hand with soap and water and hang to dry overnight.     Basic Principles        Wash your hands thoroughly with soap and water and after each dressing change. If someone other than the patient changes the dressing, it’s best to wear disposable gloves.   Do not get the wound or dressing wet.   To shower: remove the dressing, shower with soap and water (including washing the wound - do not use a washcloth), air dry, then redress the wound.  Do not take a tub bath  Keep all your dressings in a clean, covered container at home to avoid dust and contamination.  Discard used dressings in a plastic bag or covered trash container.  Check your wound and the surrounding skin at each dressing change for redness, warmth, swelling, increased pain, foul odor,  fever, pus or abnormal drainage or discharge.  Notify Wound Healing Center if any of these changes occur - [phone].        Nutrition  Eat a well, balanced diet with adequate protein to support wound healing.   Take a multivitamin every day. Adequate nutrition supports healing and new tissue growth.  All diabetic patients should strive to keep blood sugars within a normal, practical range.   Elevated blood sugars can delay your wound healing.        ACTIVITY     Elevate legs above level of heart   Elevate your legs above the level of your heart for specific time periods during the day on several firm pillows or a foam leg wedge  Use of a recliner chair at home can often help in the appropriate elevation of your legs above the level of your heart  Normal activity then rest and elevation  May shower  May excercise  May walk    MOBILITY     Cane

## 2022-08-16 NOTE — PROGRESS NOTES
Patient ID: Madelyn Ruiz                            : 1930  MRN: [id number]                                            Visit Date: 2022  Encounter Provider: Anil Jones Jr  Referring Provider: No ref. provider found    Subjective      HPI  Madelyn is a 89 y.o. old female with a chief compliant of Traumatic Wound   presenting today for ongoing evaluation and management of traumatic wounds of the bilateral elbow and left lower extremity and near completely healed previous hematoma wound of the right lower extremity.  We have recommended utilizing topical 2% mupirocin ointment occlusive wound dressing care for all the wound areas..  She is accompanied today again by her daughter, Shelley Santiago.    The following have been reviewed and updated as appropriate in this visit:          Past Medical History:  has a past medical history of Allergic bronchopulmonary aspergillosis (CMS/[facility]) (2018), Allergic rhinitis (2018), Calcification of aorta (CMS/[facility]) (2020), Chronic obstructive pulmonary disease (CMS/[facility]) (2018), Essential hypertension (2018), GERD (gastroesophageal reflux disease) (2019), Insomnia (2018), Lumbar spinal stenosis (2018), Macular degeneration disease, Obstructive sleep apnea syndrome (2018), Osteoporosis (2018), Pulmonary embolism (CMS/[facility]) (2019), and Vertebral compression fracture (CMS/[facility]).  Past Surgical History:  has a past surgical history that includes Tonsillectomy; Appendectomy; Lumbar laminectomy (2015); Carpal tunnel release (Bilateral); Hysterectomy (); and Cataract extraction w/  intraocular lens implant (Bilateral).  Social History:   Social History     Tobacco Use   • Smoking status: Never Smoker   • Smokeless tobacco: Never Used   Vaping Use   • Vaping Use: Never used   Substance Use Topics   • Alcohol use: Yes     Comment: Rarely   • Drug use: No     Family History: family history includes Breast  cancer in her mother's sister; Glaucoma in her biological mother; Heart disease in her biological brother; Hypertension in her biological mother; Lung cancer in her biological father; Macular degeneration in her biological mother; No Known Problems in her biological daughter, biological daughter, biological daughter, biological son, and biological son.  Medications:   Current Outpatient Medications:   •  albuterol 2.5 mg /3 mL (0.083 %) nebulizer solution, Take 3 mL (2.5 mg total) by nebulization every 6 (six) hours as needed for wheezing or shortness of breath., Disp: 125 vial, Rfl: 0  •  albuterol HFA (VENTOLIN HFA) 90 mcg/actuation inhaler, Inhale 2 puffs every 4 (four) hours as needed for wheezing or shortness of breath., Disp: 8.5 g, Rfl: 0  •  aspirin 81 mg enteric coated tablet, Take 81 mg by mouth daily., Disp: , Rfl:   •  benralizumab (FASENRA) 30 mg/mL syringe subcutaneous syringe, Inject 30 mg under the skin every 2 (two) months., Disp: , Rfl:   •  benzonatate (TESSALON) 200 mg capsule, TAKE 1 CAPSULE BY MOUTH 3 TIMES DAILY AS NEEDED FOR COUGH., Disp: , Rfl:   •  calcium carbonate 600 mg calcium (1,500 mg) tablet, Take 1 tablet by mouth 2 (two) times a day., Disp: , Rfl:   •  cholecalciferol, vitamin D3, (VITAMIN D3 ORAL), Take by mouth daily.  , Disp: , Rfl:   •  docusate sodium (COLACE) 100 mg capsule, Take 100 mg by mouth nightly., Disp: , Rfl:   •  DULoxetine (CYMBALTA) 30 mg capsule, Take 30 mg by mouth daily., Disp: , Rfl:   •  DULoxetine (CYMBALTA) 60 mg capsule, TAKE 1 CAPSULE BY MOUTH EVERYDAY AT BEDTIME, Disp: , Rfl: 0  •  erythromycin (ILOTYCIN) 5 mg/gram (0.5 %) ophthalmic ointment, erythromycin 5 mg/gram (0.5 %) eye ointment, Disp: , Rfl:   •  esomeprazole (NexIUM) 20 mg capsule, Take 20 mg by mouth daily. Take 30 minutes prior to meal., Disp: , Rfl:   •  fexofenadine (ALLEGRA) 180 mg tablet, Take 180 mg by mouth daily., Disp: , Rfl:   •  FLOVENT  mcg/actuation inhaler, Inhale 2 puffs  2 (two) times a day., Disp: , Rfl:   •  fluticasone propionate (FLONASE) 50 mcg/actuation nasal spray, Administer 1 spray into each nostril daily., Disp: , Rfl:   •  FOLIC ACID/MULTIVIT,IRON, (CENTRUM ORAL), Take 1 tablet by mouth daily., Disp: , Rfl:   •  LORazepam (ATIVAN) 1 mg tablet, TAKE 1 TABLET BY MOUTH NIGHTLY AS NEEDED FOR ANXIETY OR SLEEP., Disp: 30 tablet, Rfl: 5  •  mupirocin (BACTROBAN) 2 % ointment, Apply 1 application topically 3 (three) times a week (Mon, Wed, Fri) for 7 days., Disp: 22 g, Rfl: 1  •  ofloxacin (OCUFLOX) 0.3 % ophthalmic solution, INSTILL 1 DROP LEFT EYE EVERY TWO HOURS WHILE AWAKE, Disp: , Rfl:   •  pregabalin (LYRICA) 25 mg capsule, Take 25 mg by mouth nightly., Disp: , Rfl:   •  SPIRIVA RESPIMAT 2.5 mcg/actuation mist inhaler, Inhale 2 puffs daily., Disp: , Rfl:   •  SYMBICORT 80-4.5 mcg/actuation inhaler, Inhale 2 puffs 2 (two) times a day., Disp: , Rfl:   •  traMADoL (ULTRAM) 50 mg tablet, Take 50 mg by mouth daily as needed., Disp: , Rfl:   •  zoledronic acid (RECLAST) IVPB, Infuse 5 mg into a venous catheter See admin instr. 5 mg once yearly, Disp: , Rfl:     Allergies: is allergic to mepolizumab, morphine, and oxycodone.     Review of Systems   Constitutional: Negative for chills, fatigue and fever.   HENT: Negative.    Eyes: Negative.    Respiratory: Negative for cough and shortness of breath.    Cardiovascular: Negative for chest pain and leg swelling.   Gastrointestinal: Negative.    Endocrine: Negative.    Musculoskeletal: Negative for arthralgias.   Skin: Positive for wound. Negative for rash.   Allergic/Immunologic:        See allergy listing   Neurological: Negative.    Hematological: Negative.    Psychiatric/Behavioral: Negative.      Objective   Visit Vitals  Temp 36.5 °C (97.7 °F) (Temporal)       Physical Exam  Constitutional:       General: She is not in acute distress.     Appearance: Normal appearance. She is not ill-appearing.   HENT:      Head:  Normocephalic and atraumatic.   Eyes:      General: No scleral icterus.     Extraocular Movements: Extraocular movements intact.      Conjunctiva/sclera: Conjunctivae normal.   Pulmonary:      Effort: Pulmonary effort is normal.   Musculoskeletal:      Right lower leg: No edema.      Left lower leg: No edema.   Skin:     Capillary Refill: Capillary refill takes less than 2 seconds.      Findings: No erythema.      Comments: The original right lower lateral calf wound has completely healed.  There is progressive healing of all of the traumatic wounds including the left anterior knee bilateral elbow.   Neurological:      General: No focal deficit present.      Mental Status: She is alert and oriented to person, place, and time.   Psychiatric:         Mood and Affect: Mood normal.         Behavior: Behavior normal.           Completely healed right calf wound          Progressive  healing right elbow wound              Assessment/Plan    Diagnosis Plan   1. Open wound of left lower leg, subsequent encounter     2. Open wound of left upper arm, subsequent encounter     3. Open wound of right upper arm, subsequent encounter     4. Open wound of right lower leg, subsequent encounter       Problem List Items Addressed This Visit        Musculoskeletal    Open wound of right lower leg     Wound has completely healed and we discussed use of the Eucerin cream type bland moisturizer application daily and as needed and otherwise no occlusive dressing care is further required.           Open wound of left lower leg - Primary     See above review and assessment plan.           Open wound of right upper arm     The assessment demonstrates near complete wound healing of all wounds of the left anterior knee and bilateral elbow areas.  We are recommending continuing home care nursing dressing changes with topical 2% mupirocin ointment Adaptic and occlusive gauze dressings continued until complete wound healing is obtained which  should be within the next 5 to 7 days.  Thereafter occlusive wound care can be discontinued with recommending application of Eucerin cream moisturization at the scar areas as needed.  We otherwise feel that Madelyn can be discharged from further follow-up at the wound center but I did discuss with Madelyn and Shelley Santiago that should follow-up wound area problems be noted or incomplete healing, they should return to see us.           Open wound of left upper arm     See review and recommended assessment planned listed under right elbow wound.                 Return in about 3 weeks (around 9/6/2022).     Anil Jones Jr, MD

## 2022-08-16 NOTE — ASSESSMENT & PLAN NOTE
Wound has completely healed and we discussed use of the Eucerin cream type bland moisturizer application daily and as needed and otherwise no occlusive dressing care is further required.

## 2022-08-23 ENCOUNTER — DOCUMENTATION (OUTPATIENT)
Dept: WOUND CARE | Facility: HOSPITAL | Age: 87
End: 2022-08-23
Payer: MEDICARE

## 2022-08-23 NOTE — PROGRESS NOTES
"[facility] called and reported patient has a skin tear left knee with a \"soft collection \" at wound site.  Patient is currently on asa 81mg.  An appointment was made for [facility] on Thursday 8/25/22 @ 1000.  Dr. Jones is aware of the above.  "

## 2022-08-25 ENCOUNTER — OFFICE VISIT (OUTPATIENT)
Dept: WOUND CARE | Facility: HOSPITAL | Age: 87
End: 2022-08-25
Attending: PLASTIC SURGERY
Payer: MEDICARE

## 2022-08-25 VITALS — TEMPERATURE: 97.7 F

## 2022-08-25 DIAGNOSIS — S81.802D OPEN WOUND OF LEFT LOWER LEG, SUBSEQUENT ENCOUNTER: ICD-10-CM

## 2022-08-25 DIAGNOSIS — S41.101D OPEN WOUND OF RIGHT UPPER ARM, SUBSEQUENT ENCOUNTER: ICD-10-CM

## 2022-08-25 DIAGNOSIS — S41.102D OPEN WOUND OF LEFT UPPER ARM, SUBSEQUENT ENCOUNTER: Primary | ICD-10-CM

## 2022-08-25 PROCEDURE — G0463 HOSPITAL OUTPT CLINIC VISIT: HCPCS

## 2022-08-25 PROCEDURE — A6212 FOAM DRG <=16 SQ IN W/BORDER: HCPCS

## 2022-08-25 PROCEDURE — 99213 OFFICE O/P EST LOW 20 MIN: CPT | Performed by: PLASTIC SURGERY

## 2022-08-25 PROCEDURE — G0463 HOSPITAL OUTPT CLINIC VISIT: HCPCS | Performed by: PLASTIC SURGERY

## 2022-08-25 ASSESSMENT — ENCOUNTER SYMPTOMS
WOUND: 1
PSYCHIATRIC NEGATIVE: 1
COUGH: 0
ENDOCRINE NEGATIVE: 1
NEUROLOGICAL NEGATIVE: 1
SHORTNESS OF BREATH: 0
FATIGUE: 0
GASTROINTESTINAL NEGATIVE: 1
FEVER: 0
ALLERGIC/IMMUNOLOGIC COMMENTS: SEE ALLERGY LISTING
EYES NEGATIVE: 1
ARTHRALGIAS: 0
CHILLS: 0
HEMATOLOGIC/LYMPHATIC NEGATIVE: 1

## 2022-08-25 NOTE — PATIENT INSTRUCTIONS
Wound Healing Center Instructions    MEDICATIONS     Medication Note  Continue present medications as prescribed by the Wound Healing Center or other physicians you see. To avoid any problems keep the Wound Healing Center informed each visit of any medications changes that occur.       WOUND CARE     Clean Wound with: Saline Solution, Soap and Water, and Showering (Dove, dial or ivory soap)  Treatment 1   Location:  left knee  Dressing: Apply Mupirocin Ointment and apply Mepilx border dressing. Apply tubigrip in AM, off in PM.  Dressing Care Frequency: 3 times per week  Care Provider: Visiting Nurse   To the  elbows, continue Mupirocin Ointment and a band aid until healed     COMPRESSION THERAPY     Tubigrip Stockings  Both Legs Size F  Apply Tubigrips (cotton/compression stockinette) DAILY.  Apply stockings upon arising in the morning to obtain the best results.  Remove stockings at bedtime once your legs are elevated.  Do not allow the stockinette to roll down as it can reduce or interrupt the blood flow in your limb.  Always wear the appropriate size that is recommended for you at the [facility].  Tubigrips can be washed by hand with soap and water and hang to dry overnight.     Basic Principles        Wash your hands thoroughly with soap and water and after each dressing change. If someone other than the patient changes the dressing, it’s best to wear disposable gloves.   Do not get the wound or dressing wet.   To shower: remove the dressing, shower with soap and water (including washing the wound - do not use a washcloth), air dry, then redress the wound.  Do not take a tub bath  Keep all your dressings in a clean, covered container at home to avoid dust and contamination.  Discard used dressings in a plastic bag or covered trash container.  Check your wound and the surrounding skin at each dressing change for redness, warmth, swelling, increased pain, foul odor, fever, pus or abnormal drainage or discharge.  Notify  Wound Healing Center if any of these changes occur - [phone].        Nutrition  Eat a well, balanced diet with adequate protein to support wound healing.   Take a multivitamin every day. Adequate nutrition supports healing and new tissue growth.  All diabetic patients should strive to keep blood sugars within a normal, practical range.   Elevated blood sugars can delay your wound healing.        ACTIVITY     Elevate legs above level of heart   Elevate your legs above the level of your heart for specific time periods during the day on several firm pillows or a foam leg wedge  Use of a recliner chair at home can often help in the appropriate elevation of your legs above the level of your heart  Normal activity then rest and elevation  May shower  May excercise  May walk    MOBILITY     Cane

## 2022-08-25 NOTE — PROGRESS NOTES
Patient ID: Madelyn Ruiz                            : 1930  MRN: [id number]                                            Visit Date: 2022  Encounter Provider: Anil Jones Jr  Referring Provider: No ref. provider found    Subjective      HPI  Madelyn is a 89 y.o. old female with a chief compliant of Traumatic Wound   presenting today for ration management of near completely healed traumatic wounds of the bilateral elbow and left anterior knee with more recently developed swelling beneath the wound of the left knee consistent with what appears to be a localized small hematoma collection without evidence of infection.  She is seen in follow-up evaluation accompanied by her granddaughter..      The following have been reviewed and updated as appropriate in this visit:          Past Medical History:  has a past medical history of Allergic bronchopulmonary aspergillosis (CMS/[facility]) (2018), Allergic rhinitis (2018), Calcification of aorta (CMS/[facility]) (2020), Chronic obstructive pulmonary disease (CMS/[facility]) (2018), Essential hypertension (2018), GERD (gastroesophageal reflux disease) (2019), Insomnia (2018), Lumbar spinal stenosis (2018), Macular degeneration disease, Obstructive sleep apnea syndrome (2018), Osteoporosis (2018), Pulmonary embolism (CMS/[facility]) (2019), and Vertebral compression fracture (CMS/[facility]).  Past Surgical History:  has a past surgical history that includes Tonsillectomy; Appendectomy; Lumbar laminectomy (2015); Carpal tunnel release (Bilateral); Hysterectomy (); and Cataract extraction w/  intraocular lens implant (Bilateral).  Social History:   Social History     Tobacco Use   • Smoking status: Never Smoker   • Smokeless tobacco: Never Used   Vaping Use   • Vaping Use: Never used   Substance Use Topics   • Alcohol use: Yes     Comment: Rarely   • Drug use: No     Family History: family history includes Breast  cancer in her mother's sister; Glaucoma in her biological mother; Heart disease in her biological brother; Hypertension in her biological mother; Lung cancer in her biological father; Macular degeneration in her biological mother; No Known Problems in her biological daughter, biological daughter, biological daughter, biological son, and biological son.  Medications:   Current Outpatient Medications:   •  albuterol 2.5 mg /3 mL (0.083 %) nebulizer solution, Take 3 mL (2.5 mg total) by nebulization every 6 (six) hours as needed for wheezing or shortness of breath., Disp: 125 vial, Rfl: 0  •  albuterol HFA (VENTOLIN HFA) 90 mcg/actuation inhaler, Inhale 2 puffs every 4 (four) hours as needed for wheezing or shortness of breath., Disp: 8.5 g, Rfl: 0  •  aspirin 81 mg enteric coated tablet, Take 81 mg by mouth daily., Disp: , Rfl:   •  benralizumab (FASENRA) 30 mg/mL syringe subcutaneous syringe, Inject 30 mg under the skin every 2 (two) months., Disp: , Rfl:   •  benzonatate (TESSALON) 200 mg capsule, TAKE 1 CAPSULE BY MOUTH 3 TIMES DAILY AS NEEDED FOR COUGH., Disp: , Rfl:   •  calcium carbonate 600 mg calcium (1,500 mg) tablet, Take 1 tablet by mouth 2 (two) times a day., Disp: , Rfl:   •  cholecalciferol, vitamin D3, (VITAMIN D3 ORAL), Take by mouth daily.  , Disp: , Rfl:   •  docusate sodium (COLACE) 100 mg capsule, Take 100 mg by mouth nightly., Disp: , Rfl:   •  DULoxetine (CYMBALTA) 30 mg capsule, Take 30 mg by mouth daily., Disp: , Rfl:   •  DULoxetine (CYMBALTA) 60 mg capsule, TAKE 1 CAPSULE BY MOUTH EVERYDAY AT BEDTIME, Disp: , Rfl: 0  •  erythromycin (ILOTYCIN) 5 mg/gram (0.5 %) ophthalmic ointment, erythromycin 5 mg/gram (0.5 %) eye ointment, Disp: , Rfl:   •  esomeprazole (NexIUM) 20 mg capsule, Take 20 mg by mouth daily. Take 30 minutes prior to meal., Disp: , Rfl:   •  fexofenadine (ALLEGRA) 180 mg tablet, Take 180 mg by mouth daily., Disp: , Rfl:   •  FLOVENT  mcg/actuation inhaler, Inhale 2 puffs  2 (two) times a day., Disp: , Rfl:   •  fluticasone propionate (FLONASE) 50 mcg/actuation nasal spray, Administer 1 spray into each nostril daily., Disp: , Rfl:   •  FOLIC ACID/MULTIVIT,IRON, (CENTRUM ORAL), Take 1 tablet by mouth daily., Disp: , Rfl:   •  LORazepam (ATIVAN) 1 mg tablet, TAKE 1 TABLET BY MOUTH NIGHTLY AS NEEDED FOR ANXIETY OR SLEEP., Disp: 30 tablet, Rfl: 5  •  ofloxacin (OCUFLOX) 0.3 % ophthalmic solution, INSTILL 1 DROP LEFT EYE EVERY TWO HOURS WHILE AWAKE, Disp: , Rfl:   •  pregabalin (LYRICA) 25 mg capsule, Take 25 mg by mouth nightly., Disp: , Rfl:   •  SPIRIVA RESPIMAT 2.5 mcg/actuation mist inhaler, Inhale 2 puffs daily., Disp: , Rfl:   •  SYMBICORT 80-4.5 mcg/actuation inhaler, Inhale 2 puffs 2 (two) times a day., Disp: , Rfl:   •  traMADoL (ULTRAM) 50 mg tablet, Take 50 mg by mouth daily as needed., Disp: , Rfl:   •  zoledronic acid (RECLAST) IVPB, Infuse 5 mg into a venous catheter See admin instr. 5 mg once yearly, Disp: , Rfl:     Allergies: is allergic to mepolizumab, morphine, and oxycodone.     Review of Systems   Constitutional: Negative for chills, fatigue and fever.   HENT: Negative.    Eyes: Negative.    Respiratory: Negative for cough and shortness of breath.    Cardiovascular: Positive for leg swelling. Negative for chest pain.   Gastrointestinal: Negative.    Endocrine: Negative.    Musculoskeletal: Negative for arthralgias.   Skin: Positive for wound. Negative for rash.   Allergic/Immunologic:        See allergy listing   Neurological: Negative.    Hematological: Negative.    Psychiatric/Behavioral: Negative.      Objective   Visit Vitals  Temp 36.5 °C (97.7 °F)       Physical Exam  Constitutional:       General: She is not in acute distress.     Appearance: Normal appearance. She is not ill-appearing.   HENT:      Head: Normocephalic and atraumatic.   Eyes:      General: No scleral icterus.     Extraocular Movements: Extraocular movements intact.      Conjunctiva/sclera:  Conjunctivae normal.   Pulmonary:      Effort: Pulmonary effort is normal.   Musculoskeletal:      Left lower leg: Edema present.   Skin:     Capillary Refill: Capillary refill takes less than 2 seconds.      Findings: No erythema.      Comments: The left anterior knee abrasion wound continues to decrease in size and remains superficial.  There is a well localized fluid collection in the subcutaneous plane most consistent with a resolving traumatic hematoma without sign of soft tissue infection.    The small eschar covered wound areas of the bilateral elbows continue to heal   Neurological:      General: No focal deficit present.      Mental Status: She is alert and oriented to person, place, and time.   Psychiatric:         Mood and Affect: Mood normal.         Behavior: Behavior normal.             Left anterior knee wound          Right elbow wound          Left elbow wound        Assessment/Plan    Diagnosis Plan   1. Open wound of left upper arm, subsequent encounter     2. Open wound of right upper arm, subsequent encounter     3. Open wound of left lower leg, subsequent encounter       Problem List Items Addressed This Visit        Musculoskeletal    Open wound of left lower leg     Assessment demonstrates progressive healing of the left knee abrasion wound with a small subcutaneous hematoma.  Care to avoid any further trauma to the area was reviewed and we will maintain the home care dressing regimen 3 times per week with wound cleanse use of topical 2% mupirocin ointment with Adaptic and border gauze pad dressing with over application of knee-high Tubigrip stocking support and recommended wound center follow-up in about 2 weeks.  We are utilizing the same wound dressing care regimen for the bilateral elbow wound areas as well.           Open wound of right upper arm     Please see above assessment and treatment plan           Open wound of left upper arm - Primary     Please see above assessment and  treatment plan                 Return in about 2 years (around 8/25/2024) for Recheck.     Anil Jones Jr, MD

## 2022-08-25 NOTE — ASSESSMENT & PLAN NOTE
Assessment demonstrates progressive healing of the left knee abrasion wound with a small subcutaneous hematoma.  Care to avoid any further trauma to the area was reviewed and we will maintain the home care dressing regimen 3 times per week with wound cleanse use of topical 2% mupirocin ointment with Adaptic and border gauze pad dressing with over application of knee-high Tubigrip stocking support and recommended wound center follow-up in about 2 weeks.  We are utilizing the same wound dressing care regimen for the bilateral elbow wound areas as well.

## 2022-09-08 ENCOUNTER — OFFICE VISIT (OUTPATIENT)
Dept: WOUND CARE | Facility: HOSPITAL | Age: 87
End: 2022-09-08
Attending: PLASTIC SURGERY
Payer: MEDICARE

## 2022-09-08 VITALS — TEMPERATURE: 97.5 F | RESPIRATION RATE: 16 BRPM

## 2022-09-08 DIAGNOSIS — S41.102D OPEN WOUND OF LEFT UPPER ARM, SUBSEQUENT ENCOUNTER: ICD-10-CM

## 2022-09-08 DIAGNOSIS — S41.101D OPEN WOUND OF RIGHT UPPER ARM, SUBSEQUENT ENCOUNTER: ICD-10-CM

## 2022-09-08 DIAGNOSIS — S81.802D OPEN WOUND OF LEFT LOWER LEG, SUBSEQUENT ENCOUNTER: Primary | ICD-10-CM

## 2022-09-08 PROCEDURE — A6212 FOAM DRG <=16 SQ IN W/BORDER: HCPCS

## 2022-09-08 PROCEDURE — 99213 OFFICE O/P EST LOW 20 MIN: CPT | Performed by: PLASTIC SURGERY

## 2022-09-08 PROCEDURE — G0463 HOSPITAL OUTPT CLINIC VISIT: HCPCS | Performed by: PLASTIC SURGERY

## 2022-09-08 PROCEDURE — G0463 HOSPITAL OUTPT CLINIC VISIT: HCPCS

## 2022-09-08 ASSESSMENT — ENCOUNTER SYMPTOMS
COUGH: 0
NEUROLOGICAL NEGATIVE: 1
ARTHRALGIAS: 0
ALLERGIC/IMMUNOLOGIC COMMENTS: SEE ALLERGY LISTING
HEMATOLOGIC/LYMPHATIC NEGATIVE: 1
EYES NEGATIVE: 1
ENDOCRINE NEGATIVE: 1
PSYCHIATRIC NEGATIVE: 1
FATIGUE: 0
SHORTNESS OF BREATH: 0
CHILLS: 0
WOUND: 1
GASTROINTESTINAL NEGATIVE: 1
FEVER: 0

## 2022-09-08 NOTE — PATIENT INSTRUCTIONS
Wound Healing Center Instructions    MEDICATIONS     Medication Note  Continue present medications as prescribed by the Wound Healing Center or other physicians you see. To avoid any problems keep the Wound Healing Center informed each visit of any medications changes that occur.       Department: Orthopedic Surgery   Dr. Pelaez or Dr. Arriola    [address], [address] [facility]  [place] [address] [phone]          WOUND CARE     Clean Wound with: Soap and Water, and Showering (Dove, dial or ivory soap)  Treatment 1   Location:  left knee  Dressing: STOP Mupirocin. Only use a protective dressing like a silicone border or mepilex on left knee as protectant over top of hematoma.   Dressing Care Frequency: Daily or as needed.   Care Provider: Family      COMPRESSION THERAPY     Tubigrip Stockings  Both Legs Size F  Apply Tubigrips (cotton/compression stockinette) DAILY.  Apply stockings upon arising in the morning to obtain the best results.  Remove stockings at bedtime once your legs are elevated.  Do not allow the stockinette to roll down as it can reduce or interrupt the blood flow in your limb.  Always wear the appropriate size that is recommended for you at the [facility].  Tubigrips can be washed by hand with soap and water and hang to dry overnight.     Basic Principles        Wash your hands thoroughly with soap and water and after each dressing change. If someone other than the patient changes the dressing, its best to wear disposable gloves.   Do not get the wound or dressing wet.   To shower: remove the dressing, shower with soap and water (including washing the wound - do not use a washcloth), air dry, then redress the wound.  Do not take a tub bath  Keep all your dressings in a clean, covered container at home to avoid dust and contamination.  Discard used dressings in a plastic bag or covered trash container.  Check your wound and the surrounding skin at each  dressing change for redness, warmth, swelling, increased pain, foul odor, fever, pus or abnormal drainage or discharge.  Notify Wound Healing Center if any of these changes occur - [phone].        Nutrition  Eat a well, balanced diet with adequate protein to support wound healing.   Take a multivitamin every day. Adequate nutrition supports healing and new tissue growth.  All diabetic patients should strive to keep blood sugars within a normal, practical range.   Elevated blood sugars can delay your wound healing.        ACTIVITY     Elevate legs above level of heart   Elevate your legs above the level of your heart for specific time periods during the day on several firm pillows or a foam leg wedge  Use of a recliner chair at home can often help in the appropriate elevation of your legs above the level of your heart  Normal activity then rest and elevation  May shower  May excercise  May walk    MOBILITY     Cane

## 2022-09-08 NOTE — ASSESSMENT & PLAN NOTE
The left knee abrasion wound is completely healed.  The hematoma appears stable and more organized and we are recommending a border gauze pad dressing to be worn during the daytime for the next 2 weeks for overlying skin protection.  Since she is having new onset pain in the area which appears to be related to knee motion, I am recommending a consultation with orthopedic surgery and she has been given the name of Dr. Pelaez.  If the skin should open and hematoma fluid began spontaneously draining she is to either contact us or go directly to the ED.  We otherwise recommend a follow-up in about 3 weeks.

## 2022-09-08 NOTE — ASSESSMENT & PLAN NOTE
The bilateral forearm and elbow wounds are completely healed and we have discussed use of application of Eucerin cream moisturizer as needed to the scar areas.

## 2022-09-08 NOTE — PROGRESS NOTES
Patient ID: Madelyn Ruiz                            : 1930  MRN: [id number]                                            Visit Date: 2022  Encounter Provider: Anil Jones Jr  Referring Provider: No ref. provider found    Subjective      HPI  Madelyn is a 89 y.o. old female with a chief compliant of Wound Care   presenting today for ongoing evaluation and management of previous traumatic wounds of the bilateral elbow/forearm areas and a new abrasion wound of the left anterior knee managed with topical 2% mupirocin ointment wound dressing care..  The wound overlying the left anterior knee has healed but Madelyn is noting more pain in the area particularly while standing and flexing extending the knee.  She is seen at the Center today accompanied by her daughter, Brigid.    The following have been reviewed and updated as appropriate in this visit:          Past Medical History:  has a past medical history of Allergic bronchopulmonary aspergillosis (CMS/[facility]) (2018), Allergic rhinitis (2018), Calcification of aorta (CMS/[facility]) (2020), Chronic obstructive pulmonary disease (CMS/[facility]) (2018), Essential hypertension (2018), GERD (gastroesophageal reflux disease) (2019), Insomnia (2018), Lumbar spinal stenosis (2018), Macular degeneration disease, Obstructive sleep apnea syndrome (2018), Osteoporosis (2018), Pulmonary embolism (CMS/[facility]) (2019), and Vertebral compression fracture (CMS/[facility]).  Past Surgical History:  has a past surgical history that includes Tonsillectomy; Appendectomy; Lumbar laminectomy (2015); Carpal tunnel release (Bilateral); Hysterectomy (1972); and Cataract extraction w/  intraocular lens implant (Bilateral).  Social History:   Social History     Tobacco Use    Smoking status: Never Smoker    Smokeless tobacco: Never Used   Vaping Use    Vaping Use: Never used   Substance Use Topics    Alcohol use: Yes      Comment: Rarely    Drug use: No     Family History: family history includes Breast cancer in her mother's sister; Glaucoma in her biological mother; Heart disease in her biological brother; Hypertension in her biological mother; Lung cancer in her biological father; Macular degeneration in her biological mother; No Known Problems in her biological daughter, biological daughter, biological daughter, biological son, and biological son.  Medications:   Current Outpatient Medications:     albuterol 2.5 mg /3 mL (0.083 %) nebulizer solution, Take 3 mL (2.5 mg total) by nebulization every 6 (six) hours as needed for wheezing or shortness of breath., Disp: 125 vial, Rfl: 0    albuterol HFA (VENTOLIN HFA) 90 mcg/actuation inhaler, Inhale 2 puffs every 4 (four) hours as needed for wheezing or shortness of breath., Disp: 8.5 g, Rfl: 0    aspirin 81 mg enteric coated tablet, Take 81 mg by mouth daily., Disp: , Rfl:     benralizumab (FASENRA) 30 mg/mL syringe subcutaneous syringe, Inject 30 mg under the skin every 2 (two) months., Disp: , Rfl:     benzonatate (TESSALON) 200 mg capsule, TAKE 1 CAPSULE BY MOUTH 3 TIMES DAILY AS NEEDED FOR COUGH., Disp: , Rfl:     calcium carbonate 600 mg calcium (1,500 mg) tablet, Take 1 tablet by mouth 2 (two) times a day., Disp: , Rfl:     cholecalciferol, vitamin D3, (VITAMIN D3 ORAL), Take by mouth daily.  , Disp: , Rfl:     docusate sodium (COLACE) 100 mg capsule, Take 100 mg by mouth nightly., Disp: , Rfl:     DULoxetine (CYMBALTA) 30 mg capsule, Take 30 mg by mouth daily., Disp: , Rfl:     DULoxetine (CYMBALTA) 60 mg capsule, TAKE 1 CAPSULE BY MOUTH EVERYDAY AT BEDTIME, Disp: , Rfl: 0    erythromycin (ILOTYCIN) 5 mg/gram (0.5 %) ophthalmic ointment, erythromycin 5 mg/gram (0.5 %) eye ointment, Disp: , Rfl:     esomeprazole (NexIUM) 20 mg capsule, Take 20 mg by mouth daily. Take 30 minutes prior to meal., Disp: , Rfl:     fexofenadine (ALLEGRA) 180 mg tablet, Take 180 mg by  mouth daily., Disp: , Rfl:     FLOVENT  mcg/actuation inhaler, Inhale 2 puffs 2 (two) times a day., Disp: , Rfl:     fluticasone propionate (FLONASE) 50 mcg/actuation nasal spray, Administer 1 spray into each nostril daily., Disp: , Rfl:     FOLIC ACID/MULTIVIT,IRON, (CENTRUM ORAL), Take 1 tablet by mouth daily., Disp: , Rfl:     LORazepam (ATIVAN) 1 mg tablet, TAKE 1 TABLET BY MOUTH NIGHTLY AS NEEDED FOR ANXIETY OR SLEEP., Disp: 30 tablet, Rfl: 5    ofloxacin (OCUFLOX) 0.3 % ophthalmic solution, INSTILL 1 DROP LEFT EYE EVERY TWO HOURS WHILE AWAKE, Disp: , Rfl:     pregabalin (LYRICA) 25 mg capsule, Take 25 mg by mouth nightly., Disp: , Rfl:     SPIRIVA RESPIMAT 2.5 mcg/actuation mist inhaler, Inhale 2 puffs daily., Disp: , Rfl:     SYMBICORT 80-4.5 mcg/actuation inhaler, Inhale 2 puffs 2 (two) times a day., Disp: , Rfl:     traMADoL (ULTRAM) 50 mg tablet, Take 50 mg by mouth daily as needed., Disp: , Rfl:     zoledronic acid (RECLAST) IVPB, Infuse 5 mg into a venous catheter See admin instr. 5 mg once yearly, Disp: , Rfl:     Allergies: is allergic to mepolizumab, morphine, and oxycodone.     Review of Systems   Constitutional: Negative for chills, fatigue and fever.   HENT: Negative.    Eyes: Negative.    Respiratory: Negative for cough and shortness of breath.    Cardiovascular: Negative for chest pain and leg swelling.   Gastrointestinal: Negative.    Endocrine: Negative.    Musculoskeletal: Negative for arthralgias.   Skin: Positive for wound.   Allergic/Immunologic:        See allergy listing   Neurological: Negative.    Hematological: Negative.    Psychiatric/Behavioral: Negative.      Objective   Visit Vitals  Temp 36.4 °C (97.5 °F) (Temporal)   Resp 16       Physical Exam  Constitutional:       General: She is not in acute distress.     Appearance: Normal appearance. She is not ill-appearing.   HENT:      Head: Normocephalic and atraumatic.   Eyes:      General: No scleral icterus.      Extraocular Movements: Extraocular movements intact.      Conjunctiva/sclera: Conjunctivae normal.   Pulmonary:      Effort: Pulmonary effort is normal.   Musculoskeletal:      Left lower leg: No edema.   Skin:     Capillary Refill: Capillary refill takes less than 2 seconds.      Findings: Bruising present. No erythema.      Comments: The subcutaneous hematoma overlying the left anterior knee is better demarcated and palpation demonstrates a more organized clot with vertical measurement of 4.5 cm with transverse measurement of 5 cm.  The overlying abrasion wound is completely healed.  There is no sign of soft tissue or hematoma infection.   Neurological:      General: No focal deficit present.      Mental Status: She is alert and oriented to person, place, and time.   Psychiatric:         Mood and Affect: Mood normal.         Behavior: Behavior normal.             Hematoma wound of left knee        Assessment/Plan    Diagnosis Plan   1. Open wound of left lower leg, subsequent encounter     2. Open wound of right upper arm, subsequent encounter     3. Open wound of left upper arm, subsequent encounter       Problem List Items Addressed This Visit        Musculoskeletal    Open wound of left lower leg - Primary     The left knee abrasion wound is completely healed.  The hematoma appears stable and more organized and we are recommending a border gauze pad dressing to be worn during the daytime for the next 2 weeks for overlying skin protection.  Since she is having new onset pain in the area which appears to be related to knee motion, I am recommending a consultation with orthopedic surgery and she has been given the name of Dr. Pelaez.  If the skin should open and hematoma fluid began spontaneously draining she is to either contact us or go directly to the ED.  We otherwise recommend a follow-up in about 3 weeks.           Open wound of right upper arm     The bilateral forearm and elbow wounds are completely  healed and we have discussed use of application of Eucerin cream moisturizer as needed to the scar areas.           Open wound of left upper arm     Please see assessment and recommended treatment plan for wound of the right upper arm.                 Return in about 3 weeks (around 9/29/2022).     Anil Jones Jr, MD

## 2022-09-20 ENCOUNTER — HOSPITAL ENCOUNTER (OUTPATIENT)
Dept: RADIOLOGY | Facility: CLINIC | Age: 87
Discharge: HOME | End: 2022-09-20
Attending: INTERNAL MEDICINE
Payer: MEDICARE

## 2022-09-20 DIAGNOSIS — M81.0 AGE-RELATED OSTEOPOROSIS WITHOUT CURRENT PATHOLOGICAL FRACTURE: ICD-10-CM

## 2022-09-20 PROCEDURE — 77080 DXA BONE DENSITY AXIAL: CPT

## 2022-09-28 PROBLEM — S80.02XA HEMATOMA OF LEFT KNEE REGION: Status: ACTIVE | Noted: 2022-09-28

## 2022-09-29 ENCOUNTER — OFFICE VISIT (OUTPATIENT)
Dept: WOUND CARE | Facility: HOSPITAL | Age: 87
End: 2022-09-29
Attending: PLASTIC SURGERY
Payer: MEDICARE

## 2022-09-29 VITALS — TEMPERATURE: 98.4 F

## 2022-09-29 DIAGNOSIS — S80.02XA HEMATOMA OF LEFT KNEE REGION: Primary | ICD-10-CM

## 2022-09-29 PROCEDURE — G0463 HOSPITAL OUTPT CLINIC VISIT: HCPCS | Performed by: PLASTIC SURGERY

## 2022-09-29 PROCEDURE — G0463 HOSPITAL OUTPT CLINIC VISIT: HCPCS

## 2022-09-29 PROCEDURE — 99213 OFFICE O/P EST LOW 20 MIN: CPT | Performed by: PLASTIC SURGERY

## 2022-09-29 ASSESSMENT — ENCOUNTER SYMPTOMS
ARTHRALGIAS: 1
EYES NEGATIVE: 1
ENDOCRINE NEGATIVE: 1
ALLERGIC/IMMUNOLOGIC COMMENTS: SEE ALLERGY LISTING
SHORTNESS OF BREATH: 0
NEUROLOGICAL NEGATIVE: 1
COUGH: 0
PSYCHIATRIC NEGATIVE: 1
FEVER: 0
CHILLS: 0
WOUND: 0
GASTROINTESTINAL NEGATIVE: 1

## 2022-09-29 NOTE — PROGRESS NOTES
Patient ID: Madelyn Ruiz                            : 1930  MRN: [id number]                                            Visit Date: 2022  Encounter Provider: Anil Jones Jr  Referring Provider: No ref. provider found    Subjective      HPI  Madelyn is a 89 y.o. old female with a chief compliant of Traumatic Wound   presenting today for ongoing evaluation and management of a localized subcutaneous hematoma area of the left prepatellar knee with a healed previous overlying abrasion wound..  Had undergone orthopedic evaluation with a visit to Dr. Pelaez and is seen today accompanied by her daughter, Shelley Santiago.  They report that orthopedic surgery felt that she did not have significant knee pathology requiring any further intervention.  She did have radiographic study and was prescribed a cylinder compression stocking use over the knee area during the daytime.  They have noticed that this is accompanied by recurrent edema of the lower calf and ankle.    The following have been reviewed and updated as appropriate in this visit:          Past Medical History:  has a past medical history of Allergic bronchopulmonary aspergillosis (CMS/[facility]) (2018), Allergic rhinitis (2018), Calcification of aorta (CMS/[facility]) (2020), Chronic obstructive pulmonary disease (CMS/[facility]) (2018), Essential hypertension (2018), GERD (gastroesophageal reflux disease) (2019), Insomnia (2018), Lumbar spinal stenosis (2018), Macular degeneration disease, Obstructive sleep apnea syndrome (2018), Osteoporosis (2018), Pulmonary embolism (CMS/[facility]) (2019), and Vertebral compression fracture (CMS/[facility]).  Past Surgical History:  has a past surgical history that includes Tonsillectomy; Appendectomy; Lumbar laminectomy (2015); Carpal tunnel release (Bilateral); Hysterectomy (); and Cataract extraction w/  intraocular lens implant (Bilateral).  Social History:    Social History     Tobacco Use    Smoking status: Never Smoker    Smokeless tobacco: Never Used   Vaping Use    Vaping Use: Never used   Substance Use Topics    Alcohol use: Yes     Comment: Rarely    Drug use: No     Family History: family history includes Breast cancer in her mother's sister; Glaucoma in her biological mother; Heart disease in her biological brother; Hypertension in her biological mother; Lung cancer in her biological father; Macular degeneration in her biological mother; No Known Problems in her biological daughter, biological daughter, biological daughter, biological son, and biological son.  Medications:   Current Outpatient Medications:     albuterol 2.5 mg /3 mL (0.083 %) nebulizer solution, Take 3 mL (2.5 mg total) by nebulization every 6 (six) hours as needed for wheezing or shortness of breath., Disp: 125 vial, Rfl: 0    albuterol HFA (VENTOLIN HFA) 90 mcg/actuation inhaler, Inhale 2 puffs every 4 (four) hours as needed for wheezing or shortness of breath., Disp: 8.5 g, Rfl: 0    aspirin 81 mg enteric coated tablet, Take 81 mg by mouth daily., Disp: , Rfl:     benralizumab (FASENRA) 30 mg/mL syringe subcutaneous syringe, Inject 30 mg under the skin every 2 (two) months., Disp: , Rfl:     benzonatate (TESSALON) 200 mg capsule, TAKE 1 CAPSULE BY MOUTH 3 TIMES DAILY AS NEEDED FOR COUGH., Disp: , Rfl:     calcium carbonate 600 mg calcium (1,500 mg) tablet, Take 1 tablet by mouth 2 (two) times a day., Disp: , Rfl:     cholecalciferol, vitamin D3, (VITAMIN D3 ORAL), Take by mouth daily.  , Disp: , Rfl:     docusate sodium (COLACE) 100 mg capsule, Take 100 mg by mouth nightly., Disp: , Rfl:     DULoxetine (CYMBALTA) 30 mg capsule, Take 30 mg by mouth daily., Disp: , Rfl:     DULoxetine (CYMBALTA) 60 mg capsule, TAKE 1 CAPSULE BY MOUTH EVERYDAY AT BEDTIME, Disp: , Rfl: 0    erythromycin (ILOTYCIN) 5 mg/gram (0.5 %) ophthalmic ointment, erythromycin 5 mg/gram (0.5 %) eye ointment,  Disp: , Rfl:     esomeprazole (NexIUM) 20 mg capsule, Take 20 mg by mouth daily. Take 30 minutes prior to meal., Disp: , Rfl:     fexofenadine (ALLEGRA) 180 mg tablet, Take 180 mg by mouth daily., Disp: , Rfl:     FLOVENT  mcg/actuation inhaler, Inhale 2 puffs 2 (two) times a day., Disp: , Rfl:     fluticasone propionate (FLONASE) 50 mcg/actuation nasal spray, Administer 1 spray into each nostril daily., Disp: , Rfl:     FOLIC ACID/MULTIVIT,IRON, (CENTRUM ORAL), Take 1 tablet by mouth daily., Disp: , Rfl:     LORazepam (ATIVAN) 1 mg tablet, TAKE 1 TABLET BY MOUTH NIGHTLY AS NEEDED FOR ANXIETY OR SLEEP., Disp: 30 tablet, Rfl: 5    ofloxacin (OCUFLOX) 0.3 % ophthalmic solution, INSTILL 1 DROP LEFT EYE EVERY TWO HOURS WHILE AWAKE, Disp: , Rfl:     pregabalin (LYRICA) 25 mg capsule, Take 25 mg by mouth nightly., Disp: , Rfl:     SPIRIVA RESPIMAT 2.5 mcg/actuation mist inhaler, Inhale 2 puffs daily., Disp: , Rfl:     SYMBICORT 80-4.5 mcg/actuation inhaler, Inhale 2 puffs 2 (two) times a day., Disp: , Rfl:     traMADoL (ULTRAM) 50 mg tablet, Take 50 mg by mouth daily as needed., Disp: , Rfl:     zoledronic acid (RECLAST) IVPB, Infuse 5 mg into a venous catheter See admin instr. 5 mg once yearly, Disp: , Rfl:     Allergies: is allergic to mepolizumab, morphine, and oxycodone.     Review of Systems   Constitutional: Negative for chills and fever.   HENT: Negative.    Eyes: Negative.    Respiratory: Negative for cough and shortness of breath.    Cardiovascular: Positive for leg swelling. Negative for chest pain.   Gastrointestinal: Negative.    Endocrine: Negative.    Musculoskeletal: Positive for arthralgias.        Notes soreness of the left knee joint   Skin: Negative for rash and wound.   Allergic/Immunologic:        See allergy listing   Neurological: Negative.    Hematological:        Maintained on aspirin regimen   Psychiatric/Behavioral: Negative.      Objective   Visit Vitals  Temp 36.9 °C  (98.4 °F)       Physical Exam  Nursing note reviewed: Afebrile.   Constitutional:       General: She is not in acute distress.     Appearance: Normal appearance. She is not ill-appearing.   HENT:      Head: Normocephalic and atraumatic.   Eyes:      General: No scleral icterus.     Extraocular Movements: Extraocular movements intact.      Conjunctiva/sclera: Conjunctivae normal.   Pulmonary:      Effort: Pulmonary effort is normal.   Musculoskeletal:      Left lower leg: Edema present.      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      Findings: No erythema or rash.      Comments: Previous abrasion wound has remained stable and completely healed and the hematoma size has decreased now measuring 3.5 cm vertical by 3 cm transverse   Neurological:      General: No focal deficit present.      Mental Status: She is alert and oriented to person, place, and time.   Psychiatric:         Mood and Affect: Mood normal.         Behavior: Behavior normal.           Decreasing size subcutaneous stable hematoma    Assessment/Plan    Diagnosis Plan   1. Hematoma of left knee region       Problem List Items Addressed This Visit        Musculoskeletal    Hematoma of left knee region - Primary     The hematoma size continues to decrease with progressive resolution.  There is no ulceration of the overlying skin recurring.  I do not feel that application of ice packs at this time would be helpful and could injure the skin.  I am in agreement with orthopedic surgery and use of a soft cylinder compression wrap during the daytime and we have dispensed a below-knee Tubigrip stocking support to wear also to control calf and ankle lymphedema.  We have discussed use of a bland Eucerin cream type moisturizer over the areas of dry skin as needed.   Madelyn will use arthritis strength Tylenol rather than Advil for concern of her requirement of aspirin therapy and previous history of GI tract ulceration.  We have recommended a wound center follow-up in 3 to 4 weeks and she will maintain the future as scheduled orthopedic surgery follow-up in 5 weeks.                 Return in about 3 weeks (around 10/20/2022) for Recheck.     Anil Jones Jr, MD

## 2022-09-29 NOTE — PATIENT INSTRUCTIONS
Wound Healing Center Instructions    MEDICATIONS     Medication Note  Continue present medications as prescribed by the Wound Healing Center or other physicians you see. To avoid any problems keep the Wound Healing Center informed each visit of any medications changes that occur.    WOUND CARE     Clean Wound with: Soap and Water, and Showering (Dove, dial or ivory soap)  Treatment 1   Location:  left knee  Dressing: Wear tubigrip stocking during the day.  May take off at night.  May discontinue Ibuprofen and resume Tylenol  for pain. May discontinue ice packs.      COMPRESSION THERAPY     Tubigrip Stockings  Both Legs Size E  Apply Tubigrips (cotton/compression stockinette) DAILY.  Apply stockings upon arising in the morning to obtain the best results.  Remove stockings at bedtime once your legs are elevated.  Do not allow the stockinette to roll down as it can reduce or interrupt the blood flow in your limb.  Always wear the appropriate size that is recommended for you at the [facility].  Tubigrips can be washed by hand with soap and water and hang to dry overnight.     Basic Principles        Wash your hands thoroughly with soap and water and after each dressing change. If someone other than the patient changes the dressing, its best to wear disposable gloves.   Do not get the wound or dressing wet.   To shower: remove the dressing, shower with soap and water (including washing the wound - do not use a washcloth), air dry, then redress the wound.  Do not take a tub bath  Keep all your dressings in a clean, covered container at home to avoid dust and contamination.  Discard used dressings in a plastic bag or covered trash container.  Check your wound and the surrounding skin at each dressing change for redness, warmth, swelling, increased pain, foul odor, fever, pus or abnormal drainage or discharge.  Notify Wound Healing Center if any of these changes occur - [phone].        Nutrition  Eat a well, balanced diet  with adequate protein to support wound healing.   Take a multivitamin every day. Adequate nutrition supports healing and new tissue growth.  All diabetic patients should strive to keep blood sugars within a normal, practical range.   Elevated blood sugars can delay your wound healing.        ACTIVITY     Elevate legs above level of heart   Elevate your legs above the level of your heart for specific time periods during the day on several firm pillows or a foam leg wedge  Use of a recliner chair at home can often help in the appropriate elevation of your legs above the level of your heart  Normal activity then rest and elevation  May shower  May excercise  May walk    MOBILITY     Cane

## 2022-09-29 NOTE — ASSESSMENT & PLAN NOTE
The hematoma size continues to decrease with progressive resolution.  There is no ulceration of the overlying skin recurring.  I do not feel that application of ice packs at this time would be helpful and could injure the skin.  I am in agreement with orthopedic surgery and use of a soft cylinder compression wrap during the daytime and we have dispensed a below-knee Tubigrip stocking support to wear also to control calf and ankle lymphedema.  We have discussed use of a bland Eucerin cream type moisturizer over the areas of dry skin as needed.  Madelyn will use arthritis strength Tylenol rather than Advil for concern of her requirement of aspirin therapy and previous history of GI tract ulceration.  We have recommended a wound center follow-up in 3 to 4 weeks and she will maintain the future as scheduled orthopedic surgery follow-up in 5 weeks.

## 2022-10-03 ENCOUNTER — TELEPHONE (OUTPATIENT)
Dept: PRIMARY CARE | Facility: CLINIC | Age: 87
End: 2022-10-03
Payer: MEDICARE

## 2022-10-03 NOTE — TELEPHONE ENCOUNTER
Pt says her knee has been in a lot of pain today was really bad pt says she called the wound doctor and they told her to call us or [facility] pt says she has an appt tomorrow FYI!!

## 2022-10-19 ENCOUNTER — OFFICE VISIT (OUTPATIENT)
Dept: PRIMARY CARE | Facility: CLINIC | Age: 87
End: 2022-10-19
Payer: MEDICARE

## 2022-10-19 VITALS
SYSTOLIC BLOOD PRESSURE: 114 MMHG | BODY MASS INDEX: 24.02 KG/M2 | RESPIRATION RATE: 16 BRPM | WEIGHT: 123 LBS | HEART RATE: 101 BPM | OXYGEN SATURATION: 96 % | DIASTOLIC BLOOD PRESSURE: 70 MMHG | TEMPERATURE: 97.9 F

## 2022-10-19 DIAGNOSIS — M25.562 CHRONIC PAIN OF LEFT KNEE: Primary | ICD-10-CM

## 2022-10-19 DIAGNOSIS — G89.29 CHRONIC PAIN OF LEFT KNEE: Primary | ICD-10-CM

## 2022-10-19 DIAGNOSIS — I10 ESSENTIAL HYPERTENSION: Chronic | ICD-10-CM

## 2022-10-19 PROBLEM — S80.02XA HEMATOMA OF LEFT KNEE REGION: Status: RESOLVED | Noted: 2022-09-28 | Resolved: 2022-10-19

## 2022-10-19 PROBLEM — S41.102A OPEN WOUND OF LEFT UPPER ARM: Status: RESOLVED | Noted: 2022-08-09 | Resolved: 2022-10-19

## 2022-10-19 PROBLEM — S81.802A OPEN WOUND OF LEFT LOWER LEG: Status: RESOLVED | Noted: 2022-08-09 | Resolved: 2022-10-19

## 2022-10-19 PROBLEM — R05.9 COUGH: Status: RESOLVED | Noted: 2022-04-25 | Resolved: 2022-10-19

## 2022-10-19 PROBLEM — S81.801A OPEN WOUND OF RIGHT LOWER LEG: Status: RESOLVED | Noted: 2022-05-26 | Resolved: 2022-10-19

## 2022-10-19 PROBLEM — S80.11XA HEMATOMA OF RIGHT LOWER LEG: Status: RESOLVED | Noted: 2022-05-25 | Resolved: 2022-10-19

## 2022-10-19 PROBLEM — S41.101A OPEN WOUND OF RIGHT UPPER ARM: Status: RESOLVED | Noted: 2022-08-09 | Resolved: 2022-10-19

## 2022-10-19 PROCEDURE — G0008 ADMIN INFLUENZA VIRUS VAC: HCPCS | Performed by: FAMILY MEDICINE

## 2022-10-19 PROCEDURE — 90694 VACC AIIV4 NO PRSRV 0.5ML IM: CPT | Performed by: FAMILY MEDICINE

## 2022-10-19 PROCEDURE — 99214 OFFICE O/P EST MOD 30 MIN: CPT | Mod: 25 | Performed by: FAMILY MEDICINE

## 2022-10-19 ASSESSMENT — ENCOUNTER SYMPTOMS
INABILITY TO BEAR WEIGHT: 0
CHILLS: 0
FEVER: 0

## 2022-10-19 NOTE — PATIENT INSTRUCTIONS
Problem List Items Addressed This Visit       Essential hypertension (Chronic)     Controlled at this time. No need for medication.         Chronic pain of left knee - Primary     Persistent left knee pain secondary to fall 2 months ago. Xray x 2 negative for fracture. Will refer to [facility] Jasmine/Jimi  For evaluation and treatment.

## 2022-10-19 NOTE — ASSESSMENT & PLAN NOTE
Persistent left knee pain secondary to fall 2 months ago. Xray x 2 negative for fracture. Will refer to [facility] Jasmine/Jimi  For evaluation and treatment.

## 2022-10-19 NOTE — PROGRESS NOTES
Henry Linares MD    [facility]  [address], [address]  [place] [address]  Phone: [phone]  Fax: [phone]     History of Present Illness     Subjective     Patient ID: Madelyn Ruiz is a 89 y.o. female.    Knee Pain   Incident onset: 08/08/2022. The injury mechanism was a fall. The pain is present in the left knee. The pain is moderate. Pertinent negatives include no inability to bear weight. The symptoms are aggravated by movement (Change in position).     Xray 08/08/2022 was negative for fracture. Seen at [facility] xray was negative.    Past Medical/Surgical/Family/Social History       The following have been reviewed and updated as appropriate in this visit:   Allergies  Meds  Problems         Past Medical History:   Diagnosis Date    Allergic bronchopulmonary aspergillosis (CMS/[facility]) 04/02/2018    Allergist: Dr. Arriaza.  Stable FEV1 in 8/2015.  IgE levels and eosinophils stable in 8/2015.  Managed with Fasenra, Azithromycin and Flovent.    Allergic rhinitis 04/02/2018    Pulmonologist: Dr. Walker. Managed with Flonase.    Calcification of aorta (CMS/[facility]) 06/29/2020    Seen incidentally on CT scan.    Chronic obstructive pulmonary disease (CMS/[facility]) 04/02/2018    Pulmonologist: Dr. Walker.  Seen on PFTs in hospital in 2014.  Controlled with Spiriva and FLovent.    Essential hypertension 04/02/2018    Managed on no medications at this time. Advised to follow a low sodium diet and keep BMI < 25.  Advised to exercise for 2.5 hours per week.  Instructed to take home blood pressure readings and call if consistently above 140/90.      GERD (gastroesophageal reflux disease) 06/08/2019    Managed with Esomeprazole.  Should take medication 30 minutes prior to meal.  Advised to avoid caffeine, carbonated beverages, and should not eat within 3 hours of going to sleep.  Advised to reduce BMI < 25.     Insomnia 04/02/2018    Managed with Lorazepam as needed.     Lumbar spinal stenosis 04/02/2018    Neurosurgeon: Dr. Miguel. MRI with Central and lateral recess spinal stenosis. Has Spondylolisthesis at L4/L5. S/P epidural injection by Dr. Martin with some relief in past. Had Lumbar fusion and Lumbar laminectomy by Dr. Miguel in 4/2015.    Macular degeneration disease     Obstructive sleep apnea syndrome 04/02/2018    Mild STACIA noted in sleep study 7/2015. Treated with nasal CPAP automatic with pressure range 5-10 CM H2Ox couple months. Off now    Osteoporosis 04/02/2018    Rheumatologist: Dr. Bradley.  Dexa scan 7/2016 with osteoporosis of lumbar spine LS-2.5, LH-2.1, RH -2.2.  Has been on Fosamax in the past for 8 years.  Worsened by Prednisone in past.  Continue vitamin D, calcium and weight bearing exercise. DEXA in 7/2017 with LS -1.7, LH -2.2, and RH -2.0.  Next due in 7/2019.  Seen by Dr. Bradley in 8/2015 who agreed with initiating Prolia. Took for 2 years. N    Pulmonary embolism (CMS/HCC) 06/09/2019    Hematologist: Dr. Bhakta Diagnosed in 06/2019 in right upper lobe. Vascular ultrasound negative of bilateral lower extremities. Managed on Eliquis. Hypercoagulable workup was negative for beta-2 glycoprotein's, RIGO, Antithrombin III, lupus anticoagulant, protein C activity protein S activity, and prothrombin gene mutation, and factor V Leiden. Now off Apixaban as CT scan in 12/2019 was without pu    Vertebral compression fracture (CMS/HCC)     At T12 level       Past Surgical History:   Procedure Laterality Date    APPENDECTOMY      CARPAL TUNNEL RELEASE Bilateral     CATARACT EXTRACTION W/  INTRAOCULAR LENS IMPLANT Bilateral     HYSTERECTOMY  1972    LUMBAR LAMINECTOMY  04/21/2015    S/P lumbar fusion    TONSILLECTOMY         Family History   Problem Relation Age of Onset    Glaucoma Biological Mother     Macular degeneration Biological Mother     Hypertension Biological Mother     Lung cancer Biological Father     Heart disease Biological Brother      Breast cancer Mother's Sister     No Known Problems Biological Son     No Known Problems Biological Son     No Known Problems Biological Daughter     No Known Problems Biological Daughter     No Known Problems Biological Daughter        Social History     Tobacco Use    Smoking status: Never    Smokeless tobacco: Never   Vaping Use    Vaping Use: Never used   Substance Use Topics    Alcohol use: Yes     Comment: Rarely    Drug use: No       Patient Care Team:  Henry Linares MD as PCP - General  Ray Arriaza MD as Referring Physician  Aye Bhakta MD as Consulting Physician (Hematology and Oncology)  Wesley Walker MD as Consulting Physician (Pulmonary)  Fausto Bradley MD as Consulting Physician (Rheumatology)  Kleiner, Robert C, MD as Referring Physician  Alfie Torres MD as Consulting Physician (Dermatology)  Fanny Pelaez MD as Consulting Physician (Orthopedic Surgery)     Allergies and Medications       Allergies   Allergen Reactions    Mepolizumab Rash    Morphine      Other reaction(s): Vomiting    Oxycodone      Other reaction(s): Vomiting       Current Outpatient Medications   Medication Sig Dispense Refill    albuterol 2.5 mg /3 mL (0.083 %) nebulizer solution Take 3 mL (2.5 mg total) by nebulization every 6 (six) hours as needed for wheezing or shortness of breath. 125 vial 0    albuterol HFA (VENTOLIN HFA) 90 mcg/actuation inhaler Inhale 2 puffs every 4 (four) hours as needed for wheezing or shortness of breath. 8.5 g 0    aspirin 81 mg enteric coated tablet Take 81 mg by mouth daily.      benralizumab (FASENRA) 30 mg/mL syringe subcutaneous syringe Inject 30 mg under the skin every 2 (two) months.      benzonatate (TESSALON) 200 mg capsule TAKE 1 CAPSULE BY MOUTH 3 TIMES DAILY AS NEEDED FOR COUGH.      calcium carbonate 600 mg calcium (1,500 mg) tablet Take 1 tablet by mouth 2 (two) times a day.      cholecalciferol, vitamin D3, (VITAMIN D3 ORAL) Take by mouth  daily.        docusate sodium (COLACE) 100 mg capsule Take 100 mg by mouth nightly.      DULoxetine (CYMBALTA) 30 mg capsule Take 30 mg by mouth daily.      DULoxetine (CYMBALTA) 60 mg capsule Take 60 mg by mouth at bedtime.   0    esomeprazole (NexIUM) 20 mg capsule Take 20 mg by mouth daily. Take 30 minutes prior to meal.      fexofenadine (ALLEGRA) 180 mg tablet Take 180 mg by mouth daily.      FLOVENT  mcg/actuation inhaler Inhale 2 puffs 2 (two) times a day.      fluticasone propionate (FLONASE) 50 mcg/actuation nasal spray Administer 1 spray into each nostril daily.      FOLIC ACID/MULTIVIT,IRON, (CENTRUM ORAL) Take 1 tablet by mouth daily.      LORazepam (ATIVAN) 1 mg tablet TAKE 1 TABLET BY MOUTH NIGHTLY AS NEEDED FOR ANXIETY OR SLEEP. 30 tablet 5    ofloxacin (OCUFLOX) 0.3 % ophthalmic solution INSTILL 1 DROP LEFT EYE EVERY TWO HOURS WHILE AWAKE      pregabalin (LYRICA) 25 mg capsule Take 25 mg by mouth nightly.      SPIRIVA RESPIMAT 2.5 mcg/actuation mist inhaler Inhale 2 puffs daily.      SYMBICORT 80-4.5 mcg/actuation inhaler Inhale 2 puffs 2 (two) times a day.      traMADoL (ULTRAM) 50 mg tablet Take 50 mg by mouth daily as needed.      zoledronic acid (RECLAST) IVPB Infuse 5 mg into a venous catheter See admin instr. 5 mg once yearly       No current facility-administered medications for this visit.          Review of Systems       Review of Systems   Constitutional: Negative for chills and fever.   Musculoskeletal:        See history of present illness. Left knee pain        Physical Examination       Objective     Vitals:    10/19/22 1110   BP: 114/70   Pulse: (!) 101   Resp: 16   Temp: 36.6 °C (97.9 °F)   SpO2: 96%       Pulse Readings from Last 5 Encounters:   10/19/22 (!) 101   08/08/22 89   08/01/22 94   07/26/22 80   06/28/22 76       Wt Readings from Last 5 Encounters:   10/19/22 55.8 kg (123 lb)   08/08/22 54.9 kg (121 lb)   08/01/22 55.1 kg (121 lb 6.4 oz)  "  04/25/22 55.6 kg (122 lb 9.6 oz)   11/12/21 58.3 kg (128 lb 9.6 oz)       Ht Readings from Last 5 Encounters:   08/08/22 1.524 m (5')   04/25/22 1.537 m (5' 0.5\")   11/12/21 1.537 m (5' 0.5\")   04/22/21 1.537 m (5' 0.5\")   03/01/21 1.537 m (5' 0.5\")       BP Readings from Last 5 Encounters:   10/19/22 114/70   08/08/22 (!) 153/103   08/01/22 140/80   05/10/22 (!) 168/90   04/25/22 (!) 142/88       BMI Readings from Last 4 Encounters:   10/19/22 24.02 kg/m²   08/08/22 23.63 kg/m²   08/01/22 23.32 kg/m²   04/25/22 23.55 kg/m²       Physical Exam  Constitutional:       Appearance: Normal appearance.   Neurological:      Mental Status: She is alert.   Psychiatric:         Mood and Affect: Mood normal.         Behavior: Behavior normal.          Laboratory Results     Lab Results   Component Value Date    WBC 7.42 04/28/2022    WBC 6.62 10/21/2021    WBC 6.04 04/20/2021    HGB 12.3 04/28/2022    HGB 12.9 10/21/2021    HGB 12.8 04/20/2021    HCT 38.7 04/28/2022    HCT 40.9 10/21/2021    HCT 38.8 04/20/2021    MCV 98.2 (H) 04/28/2022    MCV 99.0 (H) 10/21/2021    MCV 95.3 04/20/2021     04/28/2022     10/21/2021     04/20/2021        Lab Results   Component Value Date    GLUCOSE 94 04/28/2022    GLUCOSE 96 10/21/2021    GLUCOSE 114 (H) 06/30/2021    CALCIUM 9.2 04/28/2022    CALCIUM 9.4 10/21/2021    CALCIUM 9.2 06/30/2021     04/28/2022     10/21/2021     06/30/2021    K 4.2 04/28/2022    K 4.3 10/21/2021    K 4.4 06/30/2021    CO2 27 04/28/2022    CO2 26 10/21/2021    CO2 23 06/30/2021     04/28/2022     10/21/2021     06/30/2021    BUN 21 (H) 04/28/2022    BUN 16 10/21/2021    BUN 15 06/30/2021    CREATININE 0.8 04/28/2022    CREATININE 0.8 10/21/2021    CREATININE 0.9 06/30/2021    ALKPHOS 75 04/28/2022    ALKPHOS 41 10/21/2021    ALKPHOS 49 04/20/2021    AST 17 04/28/2022    AST 19 10/21/2021    AST 20 04/20/2021    ALT 20 04/28/2022    ALT 19 10/21/2021    " ALT 19 04/20/2021    BILITOT 0.6 04/28/2022    BILITOT 1.1 10/21/2021    BILITOT 0.7 04/20/2021    ALBUMIN 3.9 04/28/2022    ALBUMIN 4.1 10/21/2021    ALBUMIN 4.2 04/20/2021       Lab Results   Component Value Date    CHOL 248 (H) 11/18/2015     Lab Results   Component Value Date    HDL 92 11/18/2015     Lab Results   Component Value Date    LDLCALC 133 (H) 11/18/2015     Lab Results   Component Value Date    TRIG 117 11/18/2015       Lab Results   Component Value Date    TSH 1.91 10/14/2020    TSH 0.32 (L) 06/10/2019    TSH 2.08 11/18/2015     Lab Results   Component Value Date    FREET4 0.79 11/18/2015     Lab Results   Component Value Date    HGBA1C 5.6 11/18/2015    HGBA1C 5.6 04/08/2015     Lab Results   Component Value Date    HEPCAB Nonreactive 07/12/2018     Immunization History   Administered Date(s) Administered    INFLUENZA VACCINE QUAD ADJUVANTED 65 and OLDER 10/06/2021    Influenza Split High Dose Preservative Free IM 11/03/2011, 11/13/2012, 10/09/2013, 10/30/2014, 11/17/2015, 09/30/2016, 09/25/2017    Influenza Vaccine 65 And Older Preservative Free 10/25/2019, 10/02/2020    Influenza Vaccine Quadrivalent 3 Yr And Older 09/18/2017, 10/22/2018    Influenza, Unspecified 10/30/2014, 11/17/2015, 09/30/2016, 09/25/2017    MMRV 06/05/2014    Pneumococcal Conjugate 08/09/1996    Pneumococcal Conjugate 13-Valent 11/17/2015    Pneumococcal Polysaccharide 10/30/2014, 09/11/2017    SARS-COV-2 (COVID-19) VACCINE PFIZER BIVALENT BOOSTER 12 years and older (Blakely Cap) 09/15/2022    SARS-COV-2 (COVID-19) VACCINE, MODERNA 02/05/2021, 03/05/2021    SARS-COV-2 (COVID-19) VACCINE, MODERNA BOOSTER 11/17/2021    SARS-COV-2 (COVID19) VACCINE, PFIZER 09/15/2022    Tdap 03/22/2012, 08/08/2022    Varicella 06/05/2014    Zoster 01/02/2006, 01/02/2016    Zoster Vaccine Recombinant Adjuvanted (Shingrix) 05/24/2019, 09/20/2019         Health Maintenance Topics with due status: Overdue       Topic Date Due     Medicare Annual Wellness Visit 10/02/2021    Influenza Vaccine 08/01/2022     Health Maintenance Topics with due status: Not Due       Topic Last Completion Date    DTaP, Tdap, and Td Vaccines 08/08/2022    COVID-19 Vaccine 09/15/2022    DEXA Scan 09/20/2022     Health Maintenance Topics with due status: Completed       Topic Last Completion Date    Pneumococcal (65 years and older) 09/11/2017    Zoster Vaccine 09/20/2019    Annual Falls Risk Screening 05/26/2022     Health Maintenance Topics with due status: Aged Out       Topic Date Due    Meningococcal ACWY Aged Out    HIB Vaccines Aged Out    IPV Vaccines Aged Out    HPV Vaccines Aged Out     Health Maintenance Topics with due status: Discontinued       Topic Date Due    Breast Cancer Screening Discontinued        Assessment and Plan       Assessment/Plan     Problem List Items Addressed This Visit     Essential hypertension (Chronic)    Overview     · Managed on no medications at this time.  · Advised to follow a low sodium diet and keep BMI < 25.   · Advised to exercise for 2.5 hours per week.   · Instructed to take home blood pressure readings and call if consistently above 140/90.             Current Assessment & Plan     Controlled at this time. No need for medication.         Chronic pain of left knee - Primary    Current Assessment & Plan     Persistent left knee pain secondary to fall 2 months ago. Xray x 2 negative for fracture. Will refer to [facility] Jasmine/Jimi  For evaluation and treatment.            No follow-ups on file.    Orders Placed This Encounter   Procedures    Influenza vaccine 65 and older IM preservative free              Henry Linares MD    10/19/2022

## 2022-10-21 ENCOUNTER — TRANSCRIBE ORDERS (OUTPATIENT)
Dept: SCHEDULING | Age: 87
End: 2022-10-21

## 2022-10-21 DIAGNOSIS — S89.92XA UNSPECIFIED INJURY OF LEFT LOWER LEG, INITIAL ENCOUNTER: ICD-10-CM

## 2022-10-21 DIAGNOSIS — M25.562 PAIN IN LEFT KNEE: Primary | ICD-10-CM

## 2022-10-21 DIAGNOSIS — M25.462 EFFUSION, LEFT KNEE: ICD-10-CM

## 2022-10-24 PROBLEM — S80.02XA TRAUMATIC HEMATOMA OF LEFT KNEE: Status: ACTIVE | Noted: 2022-10-24

## 2022-10-25 ENCOUNTER — OFFICE VISIT (OUTPATIENT)
Dept: WOUND CARE | Facility: HOSPITAL | Age: 87
End: 2022-10-25
Attending: PLASTIC SURGERY
Payer: MEDICARE

## 2022-10-25 VITALS — TEMPERATURE: 98.4 F | RESPIRATION RATE: 18 BRPM

## 2022-10-25 DIAGNOSIS — S80.02XD TRAUMATIC HEMATOMA OF LEFT KNEE, SUBSEQUENT ENCOUNTER: Primary | ICD-10-CM

## 2022-10-25 PROCEDURE — 99213 OFFICE O/P EST LOW 20 MIN: CPT | Performed by: PLASTIC SURGERY

## 2022-10-25 PROCEDURE — G0463 HOSPITAL OUTPT CLINIC VISIT: HCPCS | Performed by: PLASTIC SURGERY

## 2022-10-25 PROCEDURE — G0463 HOSPITAL OUTPT CLINIC VISIT: HCPCS

## 2022-10-25 ASSESSMENT — ENCOUNTER SYMPTOMS
FEVER: 0
ENDOCRINE NEGATIVE: 1
WOUND: 0
FATIGUE: 0
ARTHRALGIAS: 1
PSYCHIATRIC NEGATIVE: 1
CHILLS: 0
NEUROLOGICAL NEGATIVE: 1
ALLERGIC/IMMUNOLOGIC COMMENTS: SEE ALLERGY LISTING
EYES NEGATIVE: 1
GASTROINTESTINAL NEGATIVE: 1
SHORTNESS OF BREATH: 0
COUGH: 0

## 2022-10-25 NOTE — PROGRESS NOTES
Patient ID: Madelyn Ruiz                            : 1930  MRN: [id number]                                            Visit Date: 10/25/2022  Encounter Provider: Anil Jones Jr  Referring Provider: No ref. provider found    Subjective      HPI  Madelyn is a 89 y.o. old female with a chief compliant of Wound Care   presenting today for ongoing evaluation and management of a previous area of skin injury overlying a traumatic left anterior knee hematoma which demonstrated progressive resolution and is also followed by orthopedic surgery, has switched care to Dr. Krause.  She is still having issues with knee pain and is scheduled for an MRI study.  This is been managed with general skin care maintenance and mild cylinder wrap compression therapy and previous orthopedic evaluation demonstrating no required intervention for her knee pain..  She is accompanied by her daughter, Brigid.    The following have been reviewed and updated as appropriate in this visit:        Past Medical History:  has a past medical history of Allergic bronchopulmonary aspergillosis (CMS/[facility]) (2018), Allergic rhinitis (2018), Calcification of aorta (CMS/[facility]) (2020), Chronic obstructive pulmonary disease (CMS/[facility]) (2018), Essential hypertension (2018), GERD (gastroesophageal reflux disease) (2019), Insomnia (2018), Lumbar spinal stenosis (2018), Macular degeneration disease, Obstructive sleep apnea syndrome (2018), Osteoporosis (2018), Pulmonary embolism (CMS/[facility]) (2019), and Vertebral compression fracture (CMS/[facility]).  Past Surgical History:  has a past surgical history that includes Tonsillectomy; Appendectomy; Lumbar laminectomy (2015); Carpal tunnel release (Bilateral); Hysterectomy (); and Cataract extraction w/  intraocular lens implant (Bilateral).  Social History:   Social History     Tobacco Use    Smoking status: Never    Smokeless tobacco:  Never   Vaping Use    Vaping Use: Never used   Substance Use Topics    Alcohol use: Yes     Comment: Rarely    Drug use: No     Family History: family history includes Breast cancer in her mother's sister; Glaucoma in her biological mother; Heart disease in her biological brother; Hypertension in her biological mother; Lung cancer in her biological father; Macular degeneration in her biological mother; No Known Problems in her biological daughter, biological daughter, biological daughter, biological son, and biological son.  Medications:   Current Outpatient Medications:     albuterol 2.5 mg /3 mL (0.083 %) nebulizer solution, Take 3 mL (2.5 mg total) by nebulization every 6 (six) hours as needed for wheezing or shortness of breath., Disp: 125 vial, Rfl: 0    albuterol HFA (VENTOLIN HFA) 90 mcg/actuation inhaler, Inhale 2 puffs every 4 (four) hours as needed for wheezing or shortness of breath., Disp: 8.5 g, Rfl: 0    aspirin 81 mg enteric coated tablet, Take 81 mg by mouth daily., Disp: , Rfl:     benralizumab (FASENRA) 30 mg/mL syringe subcutaneous syringe, Inject 30 mg under the skin every 2 (two) months., Disp: , Rfl:     benzonatate (TESSALON) 200 mg capsule, TAKE 1 CAPSULE BY MOUTH 3 TIMES DAILY AS NEEDED FOR COUGH., Disp: , Rfl:     calcium carbonate 600 mg calcium (1,500 mg) tablet, Take 1 tablet by mouth 2 (two) times a day., Disp: , Rfl:     cholecalciferol, vitamin D3, (VITAMIN D3 ORAL), Take by mouth daily.  , Disp: , Rfl:     docusate sodium (COLACE) 100 mg capsule, Take 100 mg by mouth nightly., Disp: , Rfl:     DULoxetine (CYMBALTA) 30 mg capsule, Take 30 mg by mouth daily., Disp: , Rfl:     DULoxetine (CYMBALTA) 60 mg capsule, Take 60 mg by mouth at bedtime. , Disp: , Rfl: 0    esomeprazole (NexIUM) 20 mg capsule, Take 20 mg by mouth daily. Take 30 minutes prior to meal., Disp: , Rfl:     fexofenadine (ALLEGRA) 180 mg tablet, Take 180 mg by mouth daily., Disp: , Rfl:     FLOVENT HFA  110 mcg/actuation inhaler, Inhale 2 puffs 2 (two) times a day., Disp: , Rfl:     fluticasone propionate (FLONASE) 50 mcg/actuation nasal spray, Administer 1 spray into each nostril daily., Disp: , Rfl:     FOLIC ACID/MULTIVIT,IRON, (CENTRUM ORAL), Take 1 tablet by mouth daily., Disp: , Rfl:     LORazepam (ATIVAN) 1 mg tablet, TAKE 1 TABLET BY MOUTH NIGHTLY AS NEEDED FOR ANXIETY OR SLEEP., Disp: 30 tablet, Rfl: 5    ofloxacin (OCUFLOX) 0.3 % ophthalmic solution, INSTILL 1 DROP LEFT EYE EVERY TWO HOURS WHILE AWAKE, Disp: , Rfl:     pregabalin (LYRICA) 25 mg capsule, Take 25 mg by mouth nightly., Disp: , Rfl:     SPIRIVA RESPIMAT 2.5 mcg/actuation mist inhaler, Inhale 2 puffs daily., Disp: , Rfl:     SYMBICORT 80-4.5 mcg/actuation inhaler, Inhale 2 puffs 2 (two) times a day., Disp: , Rfl:     traMADoL (ULTRAM) 50 mg tablet, Take 50 mg by mouth daily as needed., Disp: , Rfl:     zoledronic acid (RECLAST) IVPB, Infuse 5 mg into a venous catheter See admin instr. 5 mg once yearly, Disp: , Rfl:     Allergies: is allergic to mepolizumab, morphine, and oxycodone.     Review of Systems   Constitutional: Negative for chills, fatigue and fever.   HENT: Negative.    Eyes: Negative.    Respiratory: Negative for cough and shortness of breath.    Cardiovascular: Positive for leg swelling. Negative for chest pain.   Gastrointestinal: Negative.    Endocrine: Negative.    Musculoskeletal: Positive for arthralgias.   Skin: Negative for rash and wound.   Allergic/Immunologic:        See allergy listing   Neurological: Negative.    Hematological:        Maintained on aspirin regimen   Psychiatric/Behavioral: Negative.      Objective   Visit Vitals  Temp 36.9 °C (98.4 °F)   Resp 18       Physical Exam  Constitutional:       General: She is not in acute distress.     Appearance: Normal appearance. She is not ill-appearing.   HENT:      Head: Normocephalic and atraumatic.   Eyes:      General: No scleral icterus.      Extraocular Movements: Extraocular movements intact.      Conjunctiva/sclera: Conjunctivae normal.   Pulmonary:      Effort: Pulmonary effort is normal.   Musculoskeletal:      Left lower leg: Edema present.   Skin:     Capillary Refill: Capillary refill takes less than 2 seconds.      Findings: No erythema or rash.      Comments: There is no recurrent breakdown of skin over the previous prepatellar hematoma area with the hematoma softening and now measuring 1.5 cm x 1.5 cm.  There is no tenderness of the hematoma.   Neurological:      General: No focal deficit present.      Mental Status: She is alert and oriented to person, place, and time.   Psychiatric:         Mood and Affect: Mood normal.         Behavior: Behavior normal.           Resolving subcutaneous left knee hematoma    Assessment/Plan    Diagnosis Plan   1. Traumatic hematoma of left knee, subsequent encounter          Problem List Items Addressed This Visit        Musculoskeletal    Traumatic hematoma of left knee - Primary     Subcutaneous hematoma has near completely resolved and there is no wound overlying the area.  No specific wound care is required from our standpoint and we have recommended that she continue her follow-up evaluation by Dr. Krause for her knee pain and she is otherwise discharged from further follow-up from the wound center with recommendation of use of Eucerin cream type moisturizer over the skin area as needed.            Return if symptoms worsen or fail to improve.     Anil Jones Jr, MD

## 2022-10-25 NOTE — ASSESSMENT & PLAN NOTE
Subcutaneous hematoma has near completely resolved and there is no wound overlying the area.  No specific wound care is required from our standpoint and we have recommended that she continue her follow-up evaluation by Dr. Krause for her knee pain and she is otherwise discharged from further follow-up from the wound center with recommendation of use of Eucerin cream type moisturizer over the skin area as needed.

## 2022-10-25 NOTE — PATIENT INSTRUCTIONS
WOUND CARE   Patient Disposition: Patient is discharged from care    You are being discharged from care in [facility] today.    Please follow the discharge instructions below:    Continue to inspect and protect the healed area over the next 2-4 weeks. If your wound reopens, call the Wound Healing Center at [phone].  If you develop new wounds, or if your legs become swollen and you are unable to wear your compression stockings, call the Wound Healing Center at [phone].  Keep affected areas clean and dry. Wash skin daily with mild soap and water. Moisturize daily to prevent scaling / cracking of the skin.  Wear compression stockings daily. Apply in morning, remove at bedtime. Elevate legs throughout the day to reduce swelling.

## 2022-10-26 ENCOUNTER — HOSPITAL ENCOUNTER (OUTPATIENT)
Dept: RADIOLOGY | Facility: CLINIC | Age: 87
Discharge: HOME | End: 2022-10-26
Attending: FAMILY MEDICINE
Payer: MEDICARE

## 2022-10-26 DIAGNOSIS — M25.462 EFFUSION, LEFT KNEE: ICD-10-CM

## 2022-10-26 DIAGNOSIS — S89.92XA UNSPECIFIED INJURY OF LEFT LOWER LEG, INITIAL ENCOUNTER: ICD-10-CM

## 2022-10-26 DIAGNOSIS — M25.562 PAIN IN LEFT KNEE: ICD-10-CM

## 2022-11-17 ENCOUNTER — APPOINTMENT (OUTPATIENT)
Dept: LAB | Facility: HOSPITAL | Age: 87
End: 2022-11-17
Attending: INTERNAL MEDICINE
Payer: MEDICARE

## 2022-11-17 ENCOUNTER — TRANSCRIBE ORDERS (OUTPATIENT)
Dept: SCHEDULING | Age: 87
End: 2022-11-17

## 2022-11-17 DIAGNOSIS — M81.0 AGE-RELATED OSTEOPOROSIS WITHOUT CURRENT PATHOLOGICAL FRACTURE: ICD-10-CM

## 2022-11-17 DIAGNOSIS — M81.0 AGE-RELATED OSTEOPOROSIS WITHOUT CURRENT PATHOLOGICAL FRACTURE: Primary | ICD-10-CM

## 2022-11-17 LAB
ALBUMIN SERPL-MCNC: 4.2 G/DL (ref 3.4–5)
ALP SERPL-CCNC: 55 IU/L (ref 35–126)
ALT SERPL-CCNC: 16 IU/L (ref 11–54)
ANION GAP SERPL CALC-SCNC: 6 MEQ/L (ref 3–15)
AST SERPL-CCNC: 16 IU/L (ref 15–41)
BASOPHILS # BLD: 0 K/UL (ref 0.01–0.1)
BASOPHILS NFR BLD: 0 %
BILIRUB SERPL-MCNC: 0.6 MG/DL (ref 0.3–1.2)
BUN SERPL-MCNC: 18 MG/DL (ref 8–20)
CALCIUM SERPL-MCNC: 8.9 MG/DL (ref 8.9–10.3)
CHLORIDE SERPL-SCNC: 105 MEQ/L (ref 98–109)
CO2 SERPL-SCNC: 27 MEQ/L (ref 22–32)
CREAT SERPL-MCNC: 0.8 MG/DL (ref 0.6–1.1)
DIFFERENTIAL METHOD BLD: ABNORMAL
EOSINOPHIL # BLD: 0 K/UL (ref 0.04–0.36)
EOSINOPHIL NFR BLD: 0 %
ERYTHROCYTE [DISTWIDTH] IN BLOOD BY AUTOMATED COUNT: 15.1 % (ref 11.7–14.4)
GFR SERPL CREATININE-BSD FRML MDRD: >60 ML/MIN/1.73M*2
GLUCOSE SERPL-MCNC: 92 MG/DL (ref 70–99)
HCT VFR BLDCO AUTO: 38 % (ref 35–45)
HGB BLD-MCNC: 12.2 G/DL (ref 11.8–15.7)
IMM GRANULOCYTES # BLD AUTO: 0.02 K/UL (ref 0–0.08)
IMM GRANULOCYTES NFR BLD AUTO: 0.4 %
LYMPHOCYTES # BLD: 1.48 K/UL (ref 1.2–3.5)
LYMPHOCYTES NFR BLD: 28.8 %
MCH RBC QN AUTO: 30.5 PG (ref 28–33.2)
MCHC RBC AUTO-ENTMCNC: 32.1 G/DL (ref 32.2–35.5)
MCV RBC AUTO: 95 FL (ref 83–98)
MONOCYTES # BLD: 0.6 K/UL (ref 0.28–0.8)
MONOCYTES NFR BLD: 11.7 %
NEUTROPHILS # BLD: 3.04 K/UL (ref 1.7–7)
NEUTROPHILS # BLD: 3.04 K/UL (ref 1.7–7)
NEUTS SEG NFR BLD: 59.1 %
NRBC BLD-RTO: 0 %
PDW BLD AUTO: 9.6 FL (ref 9.4–12.3)
PLATELET # BLD AUTO: 222 K/UL (ref 150–369)
POTASSIUM SERPL-SCNC: 4.4 MEQ/L (ref 3.6–5.1)
PROT SERPL-MCNC: 6.3 G/DL (ref 6–8.2)
RBC # BLD AUTO: 4 M/UL (ref 3.93–5.22)
SODIUM SERPL-SCNC: 138 MEQ/L (ref 136–144)
WBC # BLD AUTO: 5.14 K/UL (ref 3.8–10.5)

## 2022-11-17 PROCEDURE — 84165 PROTEIN E-PHORESIS SERUM: CPT

## 2022-11-17 PROCEDURE — 86334 IMMUNOFIX E-PHORESIS SERUM: CPT

## 2022-11-17 PROCEDURE — 82040 ASSAY OF SERUM ALBUMIN: CPT

## 2022-11-17 PROCEDURE — 83521 IG LIGHT CHAINS FREE EACH: CPT

## 2022-11-17 PROCEDURE — 85025 COMPLETE CBC W/AUTO DIFF WBC: CPT

## 2022-11-17 PROCEDURE — 36415 COLL VENOUS BLD VENIPUNCTURE: CPT

## 2022-11-17 PROCEDURE — 82784 ASSAY IGA/IGD/IGG/IGM EACH: CPT

## 2022-11-18 LAB
ALBUMIN MFR UR ELPH: 66.9 %
ALBUMIN SERPL ELPH-MCNC: 4.08 G/DL (ref 3.2–4.9)
ALBUMIN/GLOB SERPL: 2 {RATIO} (ref 1.1–2.1)
ALPHA1 GLOB MFR SERPL ELPH: 2.4 %
ALPHA1 GLOB SERPL ELPH-MCNC: 0.15 G/DL (ref 0.08–0.23)
ALPHA2 GLOB MFR UR ELPH: 12.4 %
ALPHA2 GLOB SERPL ELPH-MCNC: 0.76 G/DL (ref 0.45–0.92)
B-GLOBULIN SERPL ELPH-MCNC: 0.57 G/DL (ref 0.5–1.03)
BETA1 GLOB MFR UR ELPH: 9.3 %
GAMMA GLOB MFR UR ELPH: 9 %
GAMMA GLOB SERPL ELPH-MCNC: 0.55 G/DL (ref 0.6–1.6)
IGA SERPL-MCNC: 116 MG/DL (ref 84.5–499)
IGG SERPL-MCNC: 559 MG/DL (ref 537–1535)
IGM SERPL-MCNC: 66 MG/DL (ref 35–242)
INTERPRETATION SERPL IFE-IMP: NORMAL
KAPPA LC SERPL-MCNC: 13.92 MG/L (ref 8.96–34.28)
KAPPA LC/LAMBDA SER: 1.55 {RATIO} (ref 0.64–1.83)
LAMBDA LC SERPL-MCNC: 9 MG/L (ref 5.7–26.3)
PROT PATTERN SERPL ELPH-IMP: NORMAL
PROT SERPL-MCNC: 6.1 G/DL (ref 6–8.2)

## 2022-12-07 ENCOUNTER — TELEPHONE (OUTPATIENT)
Dept: PRIMARY CARE | Facility: CLINIC | Age: 87
End: 2022-12-07
Payer: MEDICARE

## 2022-12-07 NOTE — TELEPHONE ENCOUNTER
· Spoke with patient's daughter kenia she said that she would like Dr's guidance of what type of walker would be helpful for the patient with or without wheels , collapsable etc she spoke with [facility] already .

## 2022-12-07 NOTE — TELEPHONE ENCOUNTER
Pt in need of a walker, Pt daughter(Shelley) not sure which type her mother should have.  Pt daughter(Shelley) would like guidance from Dr Linares on which type of wheelchair her mother should purchase

## 2023-01-12 DIAGNOSIS — F41.9 ANXIETY: ICD-10-CM

## 2023-01-12 RX ORDER — LORAZEPAM 1 MG/1
TABLET ORAL
Qty: 30 TABLET | Refills: 5 | Status: SHIPPED | OUTPATIENT
Start: 2023-01-12 | End: 2023-01-18 | Stop reason: SDUPTHER

## 2023-01-12 RX ORDER — LORAZEPAM 1 MG/1
TABLET ORAL
Qty: 30 TABLET | Refills: 5 | Status: SHIPPED | OUTPATIENT
Start: 2023-01-12 | End: 2023-01-12 | Stop reason: SDUPTHER

## 2023-01-12 NOTE — TELEPHONE ENCOUNTER
Medicine Refill Request  [facility] 12-7-22 Written 8-29-22 # 30 for 30 days   Last Office: 10/19/2022   Last Consult Visit: 11/12/2021  Last Telemedicine Visit: 8/3/2020 Henry Linares MD    Next Appointment: Visit date not found      Current Outpatient Medications:   •  albuterol 2.5 mg /3 mL (0.083 %) nebulizer solution, Take 3 mL (2.5 mg total) by nebulization every 6 (six) hours as needed for wheezing or shortness of breath., Disp: 125 vial, Rfl: 0  •  albuterol HFA (VENTOLIN HFA) 90 mcg/actuation inhaler, Inhale 2 puffs every 4 (four) hours as needed for wheezing or shortness of breath., Disp: 8.5 g, Rfl: 0  •  aspirin 81 mg enteric coated tablet, Take 81 mg by mouth daily., Disp: , Rfl:   •  benralizumab (FASENRA) 30 mg/mL syringe subcutaneous syringe, Inject 30 mg under the skin every 2 (two) months., Disp: , Rfl:   •  benzonatate (TESSALON) 200 mg capsule, TAKE 1 CAPSULE BY MOUTH 3 TIMES DAILY AS NEEDED FOR COUGH., Disp: , Rfl:   •  calcium carbonate 600 mg calcium (1,500 mg) tablet, Take 1 tablet by mouth 2 (two) times a day., Disp: , Rfl:   •  cholecalciferol, vitamin D3, (VITAMIN D3 ORAL), Take by mouth daily.  , Disp: , Rfl:   •  docusate sodium (COLACE) 100 mg capsule, Take 100 mg by mouth nightly., Disp: , Rfl:   •  DULoxetine (CYMBALTA) 30 mg capsule, Take 30 mg by mouth daily., Disp: , Rfl:   •  DULoxetine (CYMBALTA) 60 mg capsule, Take 60 mg by mouth at bedtime. , Disp: , Rfl: 0  •  esomeprazole (NexIUM) 20 mg capsule, Take 20 mg by mouth daily. Take 30 minutes prior to meal., Disp: , Rfl:   •  fexofenadine (ALLEGRA) 180 mg tablet, Take 180 mg by mouth daily., Disp: , Rfl:   •  FLOVENT  mcg/actuation inhaler, Inhale 2 puffs 2 (two) times a day., Disp: , Rfl:   •  fluticasone propionate (FLONASE) 50 mcg/actuation nasal spray, Administer 1 spray into each nostril daily., Disp: , Rfl:   •  FOLIC ACID/MULTIVIT,IRON, (CENTRUM ORAL), Take 1 tablet by mouth daily., Disp: , Rfl:   •  LORazepam  (ATIVAN) 1 mg tablet, TAKE 1 TABLET BY MOUTH NIGHTLY AS NEEDED FOR ANXIETY OR SLEEP., Disp: 30 tablet, Rfl: 5  •  ofloxacin (OCUFLOX) 0.3 % ophthalmic solution, INSTILL 1 DROP LEFT EYE EVERY TWO HOURS WHILE AWAKE, Disp: , Rfl:   •  pregabalin (LYRICA) 25 mg capsule, Take 25 mg by mouth nightly., Disp: , Rfl:   •  SPIRIVA RESPIMAT 2.5 mcg/actuation mist inhaler, Inhale 2 puffs daily., Disp: , Rfl:   •  SYMBICORT 80-4.5 mcg/actuation inhaler, Inhale 2 puffs 2 (two) times a day., Disp: , Rfl:   •  traMADoL (ULTRAM) 50 mg tablet, Take 50 mg by mouth daily as needed., Disp: , Rfl:   •  zoledronic acid (RECLAST) IVPB, Infuse 5 mg into a venous catheter See admin instr. 5 mg once yearly, Disp: , Rfl:       BP Readings from Last 3 Encounters:   10/19/22 114/70   08/08/22 (!) 153/103   08/01/22 140/80       Recent Lab results:  Lab Results   Component Value Date    CHOL 248 (H) 11/18/2015   ,   Lab Results   Component Value Date    HDL 92 11/18/2015   ,   Lab Results   Component Value Date    LDLCALC 133 (H) 11/18/2015   ,   Lab Results   Component Value Date    TRIG 117 11/18/2015        Lab Results   Component Value Date    GLUCOSE 92 11/17/2022   ,   Lab Results   Component Value Date    HGBA1C 5.6 11/18/2015         Lab Results   Component Value Date    CREATININE 0.8 11/17/2022       Lab Results   Component Value Date    TSH 1.91 10/14/2020

## 2023-01-18 DIAGNOSIS — F41.9 ANXIETY: ICD-10-CM

## 2023-01-18 RX ORDER — LORAZEPAM 1 MG/1
TABLET ORAL
Qty: 30 TABLET | Refills: 5 | Status: SHIPPED | OUTPATIENT
Start: 2023-01-18 | End: 2023-06-01 | Stop reason: SDUPTHER

## 2023-01-18 NOTE — TELEPHONE ENCOUNTER
Medicine Refill Request  [facility] & Written 1-12-23 # 30 for 30 days   Last Office: 10/19/2022  Last Consult Visit: 11/12/2021  Last Telemedicine Visit: 8/3/2020 Henry Linares MD  Next Appointment: Visit date not found    Current Outpatient Medications:   •  albuterol 2.5 mg /3 mL (0.083 %) nebulizer solution, Take 3 mL (2.5 mg total) by nebulization every 6 (six) hours as needed for wheezing or shortness of breath., Disp: 125 vial, Rfl: 0  •  albuterol HFA (VENTOLIN HFA) 90 mcg/actuation inhaler, Inhale 2 puffs every 4 (four) hours as needed for wheezing or shortness of breath., Disp: 8.5 g, Rfl: 0  •  aspirin 81 mg enteric coated tablet, Take 81 mg by mouth daily., Disp: , Rfl:   •  benralizumab (FASENRA) 30 mg/mL syringe subcutaneous syringe, Inject 30 mg under the skin every 2 (two) months., Disp: , Rfl:   •  benzonatate (TESSALON) 200 mg capsule, TAKE 1 CAPSULE BY MOUTH 3 TIMES DAILY AS NEEDED FOR COUGH., Disp: , Rfl:   •  calcium carbonate 600 mg calcium (1,500 mg) tablet, Take 1 tablet by mouth 2 (two) times a day., Disp: , Rfl:   •  cholecalciferol, vitamin D3, (VITAMIN D3 ORAL), Take by mouth daily.  , Disp: , Rfl:   •  docusate sodium (COLACE) 100 mg capsule, Take 100 mg by mouth nightly., Disp: , Rfl:   •  DULoxetine (CYMBALTA) 30 mg capsule, Take 30 mg by mouth daily., Disp: , Rfl:   •  DULoxetine (CYMBALTA) 60 mg capsule, Take 60 mg by mouth at bedtime. , Disp: , Rfl: 0  •  esomeprazole (NexIUM) 20 mg capsule, Take 20 mg by mouth daily. Take 30 minutes prior to meal., Disp: , Rfl:   •  fexofenadine (ALLEGRA) 180 mg tablet, Take 180 mg by mouth daily., Disp: , Rfl:   •  FLOVENT  mcg/actuation inhaler, Inhale 2 puffs 2 (two) times a day., Disp: , Rfl:   •  fluticasone propionate (FLONASE) 50 mcg/actuation nasal spray, Administer 1 spray into each nostril daily., Disp: , Rfl:   •  FOLIC ACID/MULTIVIT,IRON, (CENTRUM ORAL), Take 1 tablet by mouth daily., Disp: , Rfl:   •  LORazepam (ATIVAN) 1 mg  tablet, TAKE 1 TABLET BY MOUTH NIGHTLY AS NEEDED FOR ANXIETY OR SLEEP. Strength: 1 mg, Disp: 30 tablet, Rfl: 5  •  ofloxacin (OCUFLOX) 0.3 % ophthalmic solution, INSTILL 1 DROP LEFT EYE EVERY TWO HOURS WHILE AWAKE, Disp: , Rfl:   •  pregabalin (LYRICA) 25 mg capsule, Take 25 mg by mouth nightly., Disp: , Rfl:   •  SPIRIVA RESPIMAT 2.5 mcg/actuation mist inhaler, Inhale 2 puffs daily., Disp: , Rfl:   •  SYMBICORT 80-4.5 mcg/actuation inhaler, Inhale 2 puffs 2 (two) times a day., Disp: , Rfl:   •  traMADoL (ULTRAM) 50 mg tablet, Take 50 mg by mouth daily as needed., Disp: , Rfl:   •  zoledronic acid (RECLAST) IVPB, Infuse 5 mg into a venous catheter See admin instr. 5 mg once yearly, Disp: , Rfl:       BP Readings from Last 3 Encounters:   10/19/22 114/70   08/08/22 (!) 153/103   08/01/22 140/80       Recent Lab results:  Lab Results   Component Value Date    CHOL 248 (H) 11/18/2015   ,   Lab Results   Component Value Date    HDL 92 11/18/2015   ,   Lab Results   Component Value Date    LDLCALC 133 (H) 11/18/2015   ,   Lab Results   Component Value Date    TRIG 117 11/18/2015        Lab Results   Component Value Date    GLUCOSE 92 11/17/2022   ,   Lab Results   Component Value Date    HGBA1C 5.6 11/18/2015         Lab Results   Component Value Date    CREATININE 0.8 11/17/2022       Lab Results   Component Value Date    TSH 1.91 10/14/2020

## 2023-01-26 ENCOUNTER — APPOINTMENT (EMERGENCY)
Dept: RADIOLOGY | Facility: HOSPITAL | Age: 88
DRG: 078 | End: 2023-01-26
Payer: MEDICARE

## 2023-01-26 ENCOUNTER — HOSPITAL ENCOUNTER (INPATIENT)
Facility: HOSPITAL | Age: 88
LOS: 1 days | Discharge: HOME HEALTH | DRG: 078 | End: 2023-01-29
Attending: STUDENT IN AN ORGANIZED HEALTH CARE EDUCATION/TRAINING PROGRAM | Admitting: HOSPITALIST
Payer: MEDICARE

## 2023-01-26 DIAGNOSIS — R47.89 WORD FINDING DIFFICULTY: Primary | ICD-10-CM

## 2023-01-26 DIAGNOSIS — I67.1 CEREBRAL ANEURYSM: ICD-10-CM

## 2023-01-26 DIAGNOSIS — R47.01 EXPRESSIVE APHASIA: ICD-10-CM

## 2023-01-26 PROBLEM — J43.9 PULMONARY EMPHYSEMA (CMS/HCC): Chronic | Status: ACTIVE | Noted: 2018-04-02

## 2023-01-26 LAB
ANION GAP SERPL CALC-SCNC: 9 MEQ/L (ref 3–15)
BACTERIA URNS QL MICRO: ABNORMAL /HPF
BASOPHILS # BLD: 0 K/UL (ref 0.01–0.1)
BASOPHILS NFR BLD: 0 %
BILIRUB UR QL STRIP.AUTO: NEGATIVE MG/DL
BUN SERPL-MCNC: 20 MG/DL (ref 8–20)
CALCIUM SERPL-MCNC: 9.3 MG/DL (ref 8.9–10.3)
CHLORIDE SERPL-SCNC: 107 MEQ/L (ref 98–109)
CLARITY UR REFRACT.AUTO: CLEAR
CO2 SERPL-SCNC: 20 MEQ/L (ref 22–32)
COLOR UR AUTO: YELLOW
CREAT SERPL-MCNC: 0.9 MG/DL (ref 0.6–1.1)
DIFFERENTIAL METHOD BLD: ABNORMAL
EOSINOPHIL # BLD: 0 K/UL (ref 0.04–0.36)
EOSINOPHIL NFR BLD: 0 %
ERYTHROCYTE [DISTWIDTH] IN BLOOD BY AUTOMATED COUNT: 14.6 % (ref 11.7–14.4)
FLUAV RNA SPEC QL NAA+PROBE: NEGATIVE
FLUBV RNA SPEC QL NAA+PROBE: NEGATIVE
GFR SERPL CREATININE-BSD FRML MDRD: 58.7 ML/MIN/1.73M*2
GLUCOSE SERPL-MCNC: 120 MG/DL (ref 70–99)
GLUCOSE UR STRIP.AUTO-MCNC: NEGATIVE MG/DL
HCT VFR BLDCO AUTO: 41.7 % (ref 35–45)
HGB BLD-MCNC: 12.9 G/DL (ref 11.8–15.7)
HGB UR QL STRIP.AUTO: NEGATIVE
HYALINE CASTS #/AREA URNS LPF: ABNORMAL /LPF
IMM GRANULOCYTES # BLD AUTO: 0.02 K/UL (ref 0–0.08)
IMM GRANULOCYTES NFR BLD AUTO: 0.4 %
KETONES UR STRIP.AUTO-MCNC: NEGATIVE MG/DL
LEUKOCYTE ESTERASE UR QL STRIP.AUTO: ABNORMAL
LYMPHOCYTES # BLD: 1.4 K/UL (ref 1.2–3.5)
LYMPHOCYTES NFR BLD: 27.4 %
MAGNESIUM SERPL-MCNC: 2.1 MG/DL (ref 1.8–2.5)
MCH RBC QN AUTO: 31 PG (ref 28–33.2)
MCHC RBC AUTO-ENTMCNC: 30.9 G/DL (ref 32.2–35.5)
MCV RBC AUTO: 100.2 FL (ref 83–98)
MONOCYTES # BLD: 0.45 K/UL (ref 0.28–0.8)
MONOCYTES NFR BLD: 8.8 %
MUCOUS THREADS URNS QL MICRO: ABNORMAL /LPF
NEUTROPHILS # BLD: 3.24 K/UL (ref 1.7–7)
NEUTS SEG NFR BLD: 63.4 %
NITRITE UR QL STRIP.AUTO: NEGATIVE
NRBC BLD-RTO: 0 %
PDW BLD AUTO: 10.6 FL (ref 9.4–12.3)
PH UR STRIP.AUTO: 5.5 [PH]
PLATELET # BLD AUTO: 242 K/UL (ref 150–369)
POTASSIUM SERPL-SCNC: 4.1 MEQ/L (ref 3.6–5.1)
PROT UR QL STRIP.AUTO: ABNORMAL
RBC # BLD AUTO: 4.16 M/UL (ref 3.93–5.22)
RBC #/AREA URNS HPF: ABNORMAL /HPF
RSV RNA SPEC QL NAA+PROBE: NEGATIVE
SARS-COV-2 RNA RESP QL NAA+PROBE: NEGATIVE
SODIUM SERPL-SCNC: 136 MEQ/L (ref 136–144)
SP GR UR REFRACT.AUTO: 1.02
SQUAMOUS URNS QL MICRO: ABNORMAL /HPF
TROPONIN I SERPL HS-MCNC: 5.3 PG/ML
TROPONIN I SERPL HS-MCNC: 5.3 PG/ML
TSH SERPL DL<=0.05 MIU/L-ACNC: 2.87 MIU/L (ref 0.34–5.6)
UROBILINOGEN UR STRIP-ACNC: 0.2 EU/DL
WBC # BLD AUTO: 5.11 K/UL (ref 3.8–10.5)
WBC #/AREA URNS HPF: ABNORMAL /HPF

## 2023-01-26 PROCEDURE — 63700000 HC SELF-ADMINISTRABLE DRUG

## 2023-01-26 PROCEDURE — 83735 ASSAY OF MAGNESIUM: CPT | Performed by: STUDENT IN AN ORGANIZED HEALTH CARE EDUCATION/TRAINING PROGRAM

## 2023-01-26 PROCEDURE — 93005 ELECTROCARDIOGRAM TRACING: CPT

## 2023-01-26 PROCEDURE — G0378 HOSPITAL OBSERVATION PER HR: HCPCS

## 2023-01-26 PROCEDURE — 99285 EMERGENCY DEPT VISIT HI MDM: CPT | Mod: 25

## 2023-01-26 PROCEDURE — 71046 X-RAY EXAM CHEST 2 VIEWS: CPT

## 2023-01-26 PROCEDURE — 81003 URINALYSIS AUTO W/O SCOPE: CPT | Performed by: STUDENT IN AN ORGANIZED HEALTH CARE EDUCATION/TRAINING PROGRAM

## 2023-01-26 PROCEDURE — 36415 COLL VENOUS BLD VENIPUNCTURE: CPT

## 2023-01-26 PROCEDURE — 84443 ASSAY THYROID STIM HORMONE: CPT | Performed by: STUDENT IN AN ORGANIZED HEALTH CARE EDUCATION/TRAINING PROGRAM

## 2023-01-26 PROCEDURE — 63700000 HC SELF-ADMINISTRABLE DRUG: Performed by: INTERNAL MEDICINE

## 2023-01-26 PROCEDURE — 81001 URINALYSIS AUTO W/SCOPE: CPT | Performed by: STUDENT IN AN ORGANIZED HEALTH CARE EDUCATION/TRAINING PROGRAM

## 2023-01-26 PROCEDURE — 84484 ASSAY OF TROPONIN QUANT: CPT | Performed by: STUDENT IN AN ORGANIZED HEALTH CARE EDUCATION/TRAINING PROGRAM

## 2023-01-26 PROCEDURE — 1123F ACP DISCUSS/DSCN MKR DOCD: CPT | Performed by: INTERNAL MEDICINE

## 2023-01-26 PROCEDURE — 70450 CT HEAD/BRAIN W/O DYE: CPT | Mod: MG

## 2023-01-26 PROCEDURE — 80048 BASIC METABOLIC PNL TOTAL CA: CPT | Mod: 59 | Performed by: STUDENT IN AN ORGANIZED HEALTH CARE EDUCATION/TRAINING PROGRAM

## 2023-01-26 PROCEDURE — 99223 1ST HOSP IP/OBS HIGH 75: CPT | Performed by: INTERNAL MEDICINE

## 2023-01-26 PROCEDURE — 84484 ASSAY OF TROPONIN QUANT: CPT | Mod: 91 | Performed by: STUDENT IN AN ORGANIZED HEALTH CARE EDUCATION/TRAINING PROGRAM

## 2023-01-26 PROCEDURE — 87637 SARSCOV2&INF A&B&RSV AMP PRB: CPT | Performed by: STUDENT IN AN ORGANIZED HEALTH CARE EDUCATION/TRAINING PROGRAM

## 2023-01-26 PROCEDURE — 93005 ELECTROCARDIOGRAM TRACING: CPT | Performed by: STUDENT IN AN ORGANIZED HEALTH CARE EDUCATION/TRAINING PROGRAM

## 2023-01-26 PROCEDURE — 87086 URINE CULTURE/COLONY COUNT: CPT | Performed by: STUDENT IN AN ORGANIZED HEALTH CARE EDUCATION/TRAINING PROGRAM

## 2023-01-26 PROCEDURE — 85025 COMPLETE CBC W/AUTO DIFF WBC: CPT | Performed by: STUDENT IN AN ORGANIZED HEALTH CARE EDUCATION/TRAINING PROGRAM

## 2023-01-26 PROCEDURE — 200200 PR NO CHARGE: Performed by: INTERNAL MEDICINE

## 2023-01-26 RX ORDER — BENZONATATE 100 MG/1
100 CAPSULE ORAL EVERY 4 HOURS PRN
Status: DISCONTINUED | OUTPATIENT
Start: 2023-01-26 | End: 2023-01-29 | Stop reason: HOSPADM

## 2023-01-26 RX ORDER — DEXTROSE 40 %
15-30 GEL (GRAM) ORAL AS NEEDED
Status: DISCONTINUED | OUTPATIENT
Start: 2023-01-26 | End: 2023-01-29 | Stop reason: HOSPADM

## 2023-01-26 RX ORDER — PREGABALIN 25 MG/1
25 CAPSULE ORAL DAILY PRN
Status: DISCONTINUED | OUTPATIENT
Start: 2023-01-26 | End: 2023-01-29 | Stop reason: HOSPADM

## 2023-01-26 RX ORDER — ALBUTEROL SULFATE 0.83 MG/ML
2.5 SOLUTION RESPIRATORY (INHALATION) EVERY 6 HOURS PRN
Status: DISCONTINUED | OUTPATIENT
Start: 2023-01-26 | End: 2023-01-29 | Stop reason: HOSPADM

## 2023-01-26 RX ORDER — CETIRIZINE HYDROCHLORIDE 10 MG/1
10 TABLET ORAL NIGHTLY
Status: DISCONTINUED | OUTPATIENT
Start: 2023-01-26 | End: 2023-01-29 | Stop reason: HOSPADM

## 2023-01-26 RX ORDER — DOCUSATE SODIUM 100 MG/1
100 CAPSULE, LIQUID FILLED ORAL NIGHTLY
Status: DISCONTINUED | OUTPATIENT
Start: 2023-01-26 | End: 2023-01-29 | Stop reason: HOSPADM

## 2023-01-26 RX ORDER — DEXTROSE 50 % IN WATER (D50W) INTRAVENOUS SYRINGE
25 AS NEEDED
Status: DISCONTINUED | OUTPATIENT
Start: 2023-01-26 | End: 2023-01-29 | Stop reason: HOSPADM

## 2023-01-26 RX ORDER — ONDANSETRON HYDROCHLORIDE 2 MG/ML
4 INJECTION, SOLUTION INTRAVENOUS EVERY 8 HOURS PRN
Status: DISCONTINUED | OUTPATIENT
Start: 2023-01-26 | End: 2023-01-29 | Stop reason: HOSPADM

## 2023-01-26 RX ORDER — PREGABALIN 25 MG/1
25 CAPSULE ORAL DAILY PRN
COMMUNITY
End: 2023-02-10 | Stop reason: ALTCHOICE

## 2023-01-26 RX ORDER — DULOXETIN HYDROCHLORIDE 60 MG/1
60 CAPSULE, DELAYED RELEASE ORAL NIGHTLY
Status: DISCONTINUED | OUTPATIENT
Start: 2023-01-26 | End: 2023-01-29 | Stop reason: HOSPADM

## 2023-01-26 RX ORDER — NAPROXEN SODIUM 220 MG/1
324 TABLET, FILM COATED ORAL ONCE
Status: COMPLETED | OUTPATIENT
Start: 2023-01-26 | End: 2023-01-26

## 2023-01-26 RX ORDER — TRAMADOL HYDROCHLORIDE 50 MG/1
50 TABLET ORAL DAILY PRN
Status: DISCONTINUED | OUTPATIENT
Start: 2023-01-26 | End: 2023-01-29 | Stop reason: HOSPADM

## 2023-01-26 RX ORDER — BISACODYL 10 MG/1
10 SUPPOSITORY RECTAL DAILY PRN
Status: DISCONTINUED | OUTPATIENT
Start: 2023-01-26 | End: 2023-01-29 | Stop reason: HOSPADM

## 2023-01-26 RX ORDER — ALUMINUM HYDROXIDE, MAGNESIUM HYDROXIDE, AND SIMETHICONE 1200; 120; 1200 MG/30ML; MG/30ML; MG/30ML
30 SUSPENSION ORAL EVERY 4 HOURS PRN
Status: DISCONTINUED | OUTPATIENT
Start: 2023-01-26 | End: 2023-01-29 | Stop reason: HOSPADM

## 2023-01-26 RX ORDER — PREGABALIN 50 MG/1
50 CAPSULE ORAL 2 TIMES DAILY
Status: DISCONTINUED | OUTPATIENT
Start: 2023-01-26 | End: 2023-01-29 | Stop reason: HOSPADM

## 2023-01-26 RX ORDER — IBUPROFEN 200 MG
16-32 TABLET ORAL AS NEEDED
Status: DISCONTINUED | OUTPATIENT
Start: 2023-01-26 | End: 2023-01-29 | Stop reason: HOSPADM

## 2023-01-26 RX ORDER — LORAZEPAM 1 MG/1
1 TABLET ORAL NIGHTLY PRN
Status: DISCONTINUED | OUTPATIENT
Start: 2023-01-26 | End: 2023-01-29 | Stop reason: HOSPADM

## 2023-01-26 RX ORDER — PANTOPRAZOLE SODIUM 20 MG/1
20 TABLET, DELAYED RELEASE ORAL DAILY PRN
Status: DISCONTINUED | OUTPATIENT
Start: 2023-01-26 | End: 2023-01-29 | Stop reason: HOSPADM

## 2023-01-26 RX ORDER — SENNOSIDES 8.6 MG/1
1 TABLET ORAL 2 TIMES DAILY PRN
Status: DISCONTINUED | OUTPATIENT
Start: 2023-01-26 | End: 2023-01-29 | Stop reason: HOSPADM

## 2023-01-26 RX ORDER — ENOXAPARIN SODIUM 100 MG/ML
30 INJECTION SUBCUTANEOUS
Status: DISCONTINUED | OUTPATIENT
Start: 2023-01-27 | End: 2023-01-29 | Stop reason: HOSPADM

## 2023-01-26 RX ORDER — FLUTICASONE PROPIONATE 50 MCG
1 SPRAY, SUSPENSION (ML) NASAL DAILY PRN
Status: DISCONTINUED | OUTPATIENT
Start: 2023-01-26 | End: 2023-01-29 | Stop reason: HOSPADM

## 2023-01-26 RX ADMIN — DOCUSATE SODIUM 100 MG: 100 CAPSULE, LIQUID FILLED ORAL at 23:06

## 2023-01-26 RX ADMIN — ASPIRIN 324 MG: 81 TABLET, CHEWABLE ORAL at 18:46

## 2023-01-26 RX ADMIN — CETIRIZINE HYDROCHLORIDE 10 MG: 10 TABLET, FILM COATED ORAL at 23:06

## 2023-01-26 RX ADMIN — DULOXETINE HYDROCHLORIDE 60 MG: 60 CAPSULE, DELAYED RELEASE ORAL at 23:06

## 2023-01-26 RX ADMIN — PREGABALIN 50 MG: 50 CAPSULE ORAL at 23:06

## 2023-01-26 RX ADMIN — LORAZEPAM 1 MG: 1 TABLET ORAL at 23:34

## 2023-01-26 ASSESSMENT — ENCOUNTER SYMPTOMS
DIARRHEA: 0
NAUSEA: 0
HEMATURIA: 0
NECK PAIN: 0
WEAKNESS: 1
FEVER: 0
ABDOMINAL PAIN: 0
DYSURIA: 0
BACK PAIN: 1
CONFUSION: 1
SINUS PRESSURE: 0
SINUS PAIN: 0
NUMBNESS: 0
FLANK PAIN: 0
DIAPHORESIS: 0
CHEST TIGHTNESS: 0
CHILLS: 0
DIZZINESS: 0
SHORTNESS OF BREATH: 0
PALPITATIONS: 0
VOMITING: 0
LIGHT-HEADEDNESS: 1
SORE THROAT: 0
HEADACHES: 0
COUGH: 0

## 2023-01-26 NOTE — ED ATTESTATION NOTE
"I personally saw and evaluated the patient, participated in the management, and agree with the findings in the note above except as where stated. The physician assistant and I discussed the case, workup, and disposition.     89-year-old female with past medical history significant for hypertension, asthma, PE currently not on anticoagulation presenting for evaluation.  Patient reports on Monday she went to see her primary care doctor and was well.  That evening she started feeling unwell.  She reports that she was shaking throughout the night and felt weak and nauseous.  She called her daughter approximately 5 times through the night because she could not sleep.  On Tuesday during the daytime she continued to have symptoms.  She reports that her symptoms are waxing and waning and she had episodes where she did feel improved; however, she had nausea, decreased appetite.  Denies any measured fevers.  Denies chest pain cough congestion shortness of breath abdominal pain dysuria diarrhea.  Denies weakness numbness or tingling.  Denies headaches or dizziness.  Patient was concerned secondary to persistent weakness prompting ED visit.  She does report that she saw her pulmonologist on Monday and everything checked out normally.    Daughter does report that patient \"had trouble finding words.\"  When asked to elaborate on this she states that patient had written down overnight the symptoms that she was having so she would remember.  In the morning she could not find that she did paper and was trying to express to her daughter symptoms she was having and was having trouble remembering to tell her; however, had clear coherent speech and was not having difficulty talking.    AAOx3 resting comfortably in no acute distress  Heart is regular rate and rhythm normal S1-S2   Lungs are clear to auscultation bilaterally  Abdomen nondistended, soft, nontender in all quadrants  Pupils are equal round reactive to light, extraocular " muscles are intact, no facial asymmetry, no tongue or uvular deviation  5 out of 5 strength to the upper and lower extremities, sensation equal intact bilaterally  Finger-to-nose within normal limits  Clear speech  NIH 0    89-year-old female presented secondary to weakness and nausea.  Labs reassuring.  CT and chest x-ray unremarkable.     Angelito Cohen MD  01/26/23 [id number]

## 2023-01-26 NOTE — ED PROVIDER NOTES
Emergency Medicine Note  HPI   HISTORY OF PRESENT ILLNESS     Patient is a 89-year-old female with a past medical history of asthma, hypertension, GERD, lumbar stenosis, macular degeneration, STACIA osteoporosis, PE (no current thinners) presenting for generalized weakness.  Patient states that 3 days ago, she experienced an episode of insomnia and woke up the next morning feeling generally fatigued and weak.  Throughout the day, she states she began to feel more confused and had intermittent episodes of difficulty finding the words to say during conversations.  Since then, she is continue to endorse weakness with difficulty finding her words in addition to slight lightheadedness at times, no correlation to exertion.  She also endorses lower back pain but states this is a chronic condition secondary to spinal stenosis and is not worsening.  She denies any fever, chills, diaphoresis, congestion, sore throat, sinus pain/pressure, cough, shortness of breath, chest pain, leg swelling, palpitations, abdominal pain, nausea, vomiting, diarrhea, dysuria, hematuria, urgency, frequency, flank pain, back pain, dizziness, numbness, syncope.  Patient does state that she had a PE in 2019 that was treated with Eliquis for approximately 6 months.  She has had no recurrence of symptoms and denies any current symptoms of her prior PE today.            Patient History   PAST HISTORY     Reviewed from Nursing Triage:  Tobacco  Allergies  Meds  Problems  Med Hx  Surg Hx  Fam Hx  Soc   Hx      Past Medical History:   Diagnosis Date   • Allergic bronchopulmonary aspergillosis (CMS/[facility]) 04/02/2018    Allergist: Dr. Arriaza.  Stable FEV1 in 8/2015.  IgE levels and eosinophils stable in 8/2015.  Managed with Fasenra, Azithromycin and Flovent.   • Allergic rhinitis 04/02/2018    Pulmonologist: Dr. Walker. Managed with Flonase.   • Calcification of aorta (CMS/[facility]) 06/29/2020    Seen incidentally on CT scan.   • Chronic obstructive pulmonary  disease (CMS/[facility]) 04/02/2018    Pulmonologist: Dr. Walker.  Seen on PFTs in hospital in 2014.  Controlled with Spiriva and FLovent.   • Essential hypertension 04/02/2018    Managed on no medications at this time. Advised to follow a low sodium diet and keep BMI < 25.  Advised to exercise for 2.5 hours per week.  Instructed to take home blood pressure readings and call if consistently above 140/90.     • GERD (gastroesophageal reflux disease) 06/08/2019    Managed with Esomeprazole.  Should take medication 30 minutes prior to meal.  Advised to avoid caffeine, carbonated beverages, and should not eat within 3 hours of going to sleep.  Advised to reduce BMI < 25.    • Insomnia 04/02/2018    Managed with Lorazepam as needed.   • Lumbar spinal stenosis 04/02/2018    Neurosurgeon: Dr. Miguel. MRI with Central and lateral recess spinal stenosis. Has Spondylolisthesis at L4/L5. S/P epidural injection by Dr. Martin with some relief in past. Had Lumbar fusion and Lumbar laminectomy by Dr. Miguel in 4/2015.   • Macular degeneration disease    • Obstructive sleep apnea syndrome 04/02/2018    Mild STACIA noted in sleep study 7/2015. Treated with nasal CPAP automatic with pressure range 5-10 CM H2Ox couple months. Off now   • Osteoporosis 04/02/2018    Rheumatologist: Dr. Bradley.  Dexa scan 7/2016 with osteoporosis of lumbar spine LS-2.5, LH-2.1, RH -2.2.  Has been on Fosamax in the past for 8 years.  Worsened by Prednisone in past.  Continue vitamin D, calcium and weight bearing exercise. DEXA in 7/2017 with LS -1.7, LH -2.2, and RH -2.0.  Next due in 7/2019.  Seen by Dr. Bradley in 8/2015 who agreed with initiating Prolia. Took for 2 years. N   • Pulmonary embolism (CMS/[facility]) 06/09/2019    Hematologist: Dr. Bhakta Diagnosed in 06/2019 in right upper lobe. Vascular ultrasound negative of bilateral lower extremities. Managed on Eliquis. Hypercoagulable workup was negative for beta-2 glycoprotein's, RIGO, Antithrombin III, lupus  anticoagulant, protein C activity protein S activity, and prothrombin gene mutation, and factor V Leiden. Now off Apixaban as CT scan in 12/2019 was without pu   • Vertebral compression fracture (CMS/HCC)     At T12 level       Past Surgical History:   Procedure Laterality Date   • APPENDECTOMY     • CARPAL TUNNEL RELEASE Bilateral    • CATARACT EXTRACTION W/  INTRAOCULAR LENS IMPLANT Bilateral    • HYSTERECTOMY  1972   • LUMBAR LAMINECTOMY  04/21/2015    S/P lumbar fusion   • TONSILLECTOMY         Family History   Problem Relation Age of Onset   • Glaucoma Biological Mother    • Macular degeneration Biological Mother    • Hypertension Biological Mother    • Lung cancer Biological Father    • Heart disease Biological Brother    • Breast cancer Mother's Sister    • No Known Problems Biological Son    • No Known Problems Biological Son    • No Known Problems Biological Daughter    • No Known Problems Biological Daughter    • No Known Problems Biological Daughter        Social History     Tobacco Use   • Smoking status: Never   • Smokeless tobacco: Never   Vaping Use   • Vaping Use: Never used   Substance Use Topics   • Alcohol use: Yes     Comment: Rarely   • Drug use: No         Review of Systems   REVIEW OF SYSTEMS     Review of Systems   Constitutional: Negative for chills, diaphoresis and fever.   HENT: Negative for congestion, sinus pressure, sinus pain and sore throat.    Respiratory: Negative for cough, chest tightness and shortness of breath.    Cardiovascular: Negative for chest pain, palpitations and leg swelling.   Gastrointestinal: Negative for abdominal pain, diarrhea, nausea and vomiting.   Genitourinary: Negative for dysuria, flank pain, hematuria and urgency.   Musculoskeletal: Positive for back pain (chronic, non-worsening). Negative for neck pain.   Neurological: Positive for weakness and light-headedness. Negative for dizziness, syncope, numbness and headaches.   Psychiatric/Behavioral: Positive for  confusion.         VITALS     ED Vitals    Date/Time Temp Pulse Resp BP SpO2 [facility] [place]   01/26/23 2059 -- 88 20 204/115 97 % SEC   01/26/23 1934 -- 89 18 214/113 97 % SEC   01/26/23 1841 -- 87 16 197/108 98 % EKG   01/26/23 1714 -- 89 16 180/97 98 % EKG   01/26/23 1540 -- 98 16 179/95 99 % EKG   01/26/23 1441 36.3 °C (97.3 °F) 105 18 158/72 98 % PMA        Pulse Ox %: 98 % (01/26/23 1441)  Pulse Ox Interpretation: Normal (01/26/23 1441)           Physical Exam   PHYSICAL EXAM     Physical Exam  Vitals reviewed.   Constitutional:       General: She is not in acute distress.     Appearance: Normal appearance. She is not ill-appearing, toxic-appearing or diaphoretic.   HENT:      Head: Normocephalic and atraumatic.      Mouth/Throat:      Mouth: Mucous membranes are moist.      Pharynx: Oropharynx is clear.   Eyes:      General: No visual field deficit.     Extraocular Movements: Extraocular movements intact.      Conjunctiva/sclera: Conjunctivae normal.      Pupils: Pupils are equal, round, and reactive to light.   Cardiovascular:      Rate and Rhythm: Normal rate and regular rhythm.      Pulses:           Dorsalis pedis pulses are 1+ on the right side and 1+ on the left side.        Posterior tibial pulses are 1+ on the right side and 1+ on the left side.   Pulmonary:      Effort: Pulmonary effort is normal. No respiratory distress.      Breath sounds: Normal breath sounds. No wheezing, rhonchi or rales.   Abdominal:      General: Abdomen is flat. Bowel sounds are normal.      Palpations: Abdomen is soft.      Tenderness: There is no abdominal tenderness. There is no right CVA tenderness, left CVA tenderness, guarding or rebound.   Musculoskeletal:      Cervical back: Normal range of motion and neck supple. No tenderness.      Right lower leg: No tenderness. No edema.      Left lower leg: No tenderness. No edema.   Skin:     General: Skin is warm and dry.      Capillary Refill: Capillary refill takes less than 2 seconds.    Neurological:      General: No focal deficit present.      Mental Status: She is alert and oriented to person, place, and time.      GCS: GCS eye subscore is 4. GCS verbal subscore is 5. GCS motor subscore is 6.      Cranial Nerves: No dysarthria or facial asymmetry.      Sensory: Sensation is intact. No sensory deficit.      Motor: No weakness, tremor or pronator drift.      Coordination: Finger-Nose-Finger Test and Heel to Shin Test normal.           PROCEDURES     Procedures     DATA     Results     Procedure Component Value Units Date/Time    TSH w reflex FT4 [[id number]]  (Normal) Collected: 01/26/23 1451    Specimen: Blood, Venous Updated: 01/26/23 1748     TSH 2.87 mIU/L     Magnesium [[id number]]  (Normal) Collected: 01/26/23 1451    Specimen: Blood, Venous Updated: 01/26/23 1729     Magnesium 2.1 mg/dL     SARS-CoV-2 (COVID-19), PCR Nasopharynx [[id number]]  (Normal) Collected: 01/26/23 1611    Specimen: Nasopharyngeal Swab from Nasopharynx Updated: 01/26/23 1659    Narrative:      The following orders were created for panel order SARS-CoV-2 (COVID-19), PCR Nasopharynx.  Procedure                               Abnormality         Status                     ---------                               -----------         ------                     SARS-COV-2 (COVID-19)/ F...[[id number]]  Normal              Final result                 Please view results for these tests on the individual orders.    SARS-COV-2 (COVID-19)/ FLU A/B, AND RSV, PCR Nasopharynx [[id number]]  (Normal) Collected: 01/26/23 1611    Specimen: Nasopharyngeal Swab from Nasopharynx Updated: 01/26/23 1659     SARS-CoV-2 (COVID-19) Negative     Influenza A Negative     Influenza B Negative     Respiratory Syncytial Virus Negative    Narrative:      Testing performed using real-time PCR for detection of COVID-19. EUA approved validation studies performed on site.     UA with Reflex Culture [[id number]]  (Abnormal) Collected: 01/26/23 4465     Specimen: Urine, Clean Catch Updated: 01/26/23 1642    Narrative:      The following orders were created for panel order UA with Reflex Culture.  Procedure                               Abnormality         Status                     ---------                               -----------         ------                     UA Reflex to Culture (Ma...[[id number]]  Abnormal            Final result               UA Microscopic[[id number]]               Abnormal            Final result                 Please view results for these tests on the individual orders.    UA Microscopic [[id number]]  (Abnormal) Collected: 01/26/23 1622    Specimen: Urine, Clean Catch Updated: 01/26/23 1642     RBC, Urine 0 TO 4 /HPF      WBC, Urine 4 TO 10 /HPF      Squamous Epithelial Rare /hpf      Hyaline Cast 3 TO 9 /lpf      Bacteria, Urine None Seen /HPF      MUCUSUA Rare /LPF     UA Reflex to Culture (Macroscopic) [[id number]]  (Abnormal) Collected: 01/26/23 1622    Specimen: Urine, Clean Catch Updated: 01/26/23 1634     Color, Urine Yellow     Clarity, Urine Clear     Specific Gravity, Urine 1.020     pH, Urine 5.5     Leukocyte Esterase Trace     Nitrite, Urine Negative     Protein, Urine Trace     Comment: Trace False Positive Protein can be seen with alkaline or highly buffered urines or urine with high specific gravity. Suggest clinical correlation.        Glucose, Urine Negative mg/dL      Ketones, Urine Negative mg/dL      Urobilinogen, Urine 0.2 EU/dL      Bilirubin, Urine Negative mg/dL      Blood, Urine Negative     Comment: The sensitivity of the occult blood test is equivalent to approximately 4 intact RBC/HPF.       Basic metabolic panel [[id number]]  (Abnormal) Collected: 01/26/23 1451    Specimen: Blood, Venous Updated: 01/26/23 1625     Sodium 136 mEQ/L      Potassium 4.1 mEQ/L      Comment: Results obtained on plasma. Plasma Potassium values may be up to 0.4 mEQ/L less than serum values. The differences may be greater for  patients with high platelet or white cell counts.        Chloride 107 mEQ/L      CO2 20 mEQ/L      BUN 20 mg/dL      Creatinine 0.9 mg/dL      Glucose 120 mg/dL      Calcium 9.3 mg/dL      eGFR 58.7 mL/min/1.73m*2      Anion Gap 9 mEQ/L     HS Troponin I (with 2 hour reflex) [[id number]]  (Normal) Collected: 01/26/23 1451    Specimen: Blood, Venous Updated: 01/26/23 1620     High Sens Troponin I 5.3 pg/mL     CBC and differential [[id number]]  (Abnormal) Collected: 01/26/23 1451    Specimen: Blood, Venous Updated: 01/26/23 1536     WBC 5.11 K/uL      RBC 4.16 M/uL      Hemoglobin 12.9 g/dL      Hematocrit 41.7 %      .2 fL      MCH 31.0 pg      MCHC 30.9 g/dL      RDW 14.6 %      Platelets 242 K/uL      MPV 10.6 fL      Differential Type Auto     nRBC 0.0 %      Immature Granulocytes 0.4 %      Neutrophils 63.4 %      Lymphocytes 27.4 %      Monocytes 8.8 %      Eosinophils 0.0 %      Basophils 0.0 %      Immature Granulocytes, Absolute 0.02 K/uL      Neutrophils, Absolute 3.24 K/uL      Lymphocytes, Absolute 1.40 K/uL      Monocytes, Absolute 0.45 K/uL      Eosinophils, Absolute 0.00 K/uL      Basophils, Absolute 0.00 K/uL           Imaging Results          X-RAY CHEST 2 VIEWS (Final result)  Result time 01/26/23 17:13:33    Final result                 Impression:    IMPRESSION: No acute cardiopulmonary process.             Narrative:    CLINICAL HISTORY: Lightheaded    COMMENT:  PA and lateral views of the chest compared the x-ray from 06/08/2019.    The heart appears mildly enlarged. There is uncoiling of the thoracic aorta. The  costophrenic angles are clear. Fibrolinear change at the right lung base may  represent subsegmental atelectasis or scar. No dense lung consolidation. Healed  lower right rib fractures. There is no pneumothorax.                               CT HEAD WITHOUT IV CONTRAST (Final result)  Result time 01/26/23 17:00:57    Final result                 Impression:    IMPRESSION:  1. No  acute hemorrhage. No acute infarction in a major vascular territory. No  mass or mass effect.  2. Chronic changes as described in the discussion.                 Narrative:    CLINICAL HISTORY: Lightheadedness.  Word finding difficulty.    COMMENT: CT of the brain was performed using contiguous 2.5 mm transaxial  sections without intravenous contrast. Images were reconstructed in the coronal  and sagittal planes by the technologist.    CT DOSE:  One or more dose reduction techniques (e.g. automated exposure  control, adjustment of the mA and/or kV according to patient size, use of  iterative reconstruction technique) utilized for this examination.    Comparison studies: CT brain 8/8/2022.    The ventricles and cortical sulci are prominent, consistent with moderate  involution of the brain parenchyma.  Low density of the periventricular and  subcortical white matter represents mild to moderate small vessel disease.  No  acute hemorrhage. No acute infarction in a major vascular territory. No mass or  mass effect.    There are atherosclerotic vascular calcifications at the skull base. There are  bilateral ocular lens replacements. There is ethmoid mucosal thickening.  There  is subtotal opacification of the left maxillary and sphenoid sinuses with  thickening and sclerosis of the walls compatible with chronicity.  Central high  attenuation within these regions of subtotal opacification either represents  densely inspissated secretions or fungal overgrowth.                                ECG 12 lead    (Results Pending)       Scoring tools                                  ED Course & MDM   MDM / ED COURSE / CLINICAL IMPRESSION / DISPO     Medical Decision Making  Amount and/or Complexity of Data Reviewed  External Data Reviewed: labs and notes.  Labs: ordered. Decision-making details documented in ED Course.  Radiology: ordered. Decision-making details documented in ED Course.  ECG/medicine tests:  ordered.      Risk  OTC drugs.  Decision regarding hospitalization.          ED Course as of 01/27/23 0217   Thu Jan 26, 2023   1710 CT HEAD WITHOUT IV CONTRAST  IMPRESSION:  1. No acute hemorrhage. No acute infarction in a major vascular territory. No  mass or mass effect.  2. Chronic changes as described in the discussion. [AB]   1723 X-RAY CHEST 2 VIEWS  IMPRESSION: No acute cardiopulmonary process. [AB]   1815 High Sens Troponin I: 5.3  Delta less than 5 [AB]   1815 TSH: 2.87 [AB]   1936 Discussed with [facility], will admit [AB]      ED Course User Index  [AB] Shakira Henning PA C     Clinical Impression      Word finding difficulty     _________________     ED Disposition   Admit / Observation                   Shakira Henning PA C  01/27/23 0217

## 2023-01-26 NOTE — DISCHARGE INSTRUCTIONS
Repeat MRA in 1 year to evaluate for any radiographic progression an d follow up with Jessika Zelaya MD. Call Jessika Zelaya MD office 1/27/2024  for MRA srcipt prior to visit with her.   Follow up with pcp for blood pressure control and cholesterol-lowering medication your bad cholesterol/LDL is 144.  Our goal is to keep it less than 70

## 2023-01-27 ENCOUNTER — APPOINTMENT (OUTPATIENT)
Dept: RADIOLOGY | Facility: HOSPITAL | Age: 88
Setting detail: OBSERVATION
DRG: 078 | End: 2023-01-27
Payer: MEDICARE

## 2023-01-27 ENCOUNTER — APPOINTMENT (OUTPATIENT)
Dept: RADIOLOGY | Facility: HOSPITAL | Age: 88
Setting detail: OBSERVATION
DRG: 078 | End: 2023-01-27
Attending: INTERNAL MEDICINE
Payer: MEDICARE

## 2023-01-27 LAB
ATRIAL RATE: 109
BACTERIA UR CULT: NORMAL
CHOLEST SERPL-MCNC: 227 MG/DL
HDLC SERPL-MCNC: 62 MG/DL
HDLC SERPL: 3.7 {RATIO}
LDLC SERPL CALC-MCNC: 141 MG/DL
NONHDLC SERPL-MCNC: 165 MG/DL
P AXIS: 48
PR INTERVAL: 138
QRS DURATION: 62
QT INTERVAL: 328
QTC CALCULATION(BAZETT): 441
R AXIS: 10
T WAVE AXIS: -18
TRIGL SERPL-MCNC: 120 MG/DL (ref 30–149)
VENTRICULAR RATE: 109

## 2023-01-27 PROCEDURE — G1004 CDSM NDSC: HCPCS

## 2023-01-27 PROCEDURE — 63700000 HC SELF-ADMINISTRABLE DRUG: Performed by: HOSPITALIST

## 2023-01-27 PROCEDURE — 36415 COLL VENOUS BLD VENIPUNCTURE: CPT | Performed by: INTERNAL MEDICINE

## 2023-01-27 PROCEDURE — 80061 LIPID PANEL: CPT | Performed by: INTERNAL MEDICINE

## 2023-01-27 PROCEDURE — 99222 1ST HOSP IP/OBS MODERATE 55: CPT | Performed by: PSYCHIATRY & NEUROLOGY

## 2023-01-27 PROCEDURE — 70547 MR ANGIOGRAPHY NECK W/O DYE: CPT | Mod: ME

## 2023-01-27 PROCEDURE — 97166 OT EVAL MOD COMPLEX 45 MIN: CPT | Mod: GO

## 2023-01-27 PROCEDURE — 97535 SELF CARE MNGMENT TRAINING: CPT | Mod: GO

## 2023-01-27 PROCEDURE — 63700000 HC SELF-ADMINISTRABLE DRUG

## 2023-01-27 PROCEDURE — 96372 THER/PROPH/DIAG INJ SC/IM: CPT | Performed by: INTERNAL MEDICINE

## 2023-01-27 PROCEDURE — 63700000 HC SELF-ADMINISTRABLE DRUG: Performed by: INTERNAL MEDICINE

## 2023-01-27 PROCEDURE — 97162 PT EVAL MOD COMPLEX 30 MIN: CPT | Mod: GP

## 2023-01-27 PROCEDURE — G0378 HOSPITAL OBSERVATION PER HR: HCPCS

## 2023-01-27 PROCEDURE — 99233 SBSQ HOSP IP/OBS HIGH 50: CPT | Performed by: HOSPITALIST

## 2023-01-27 PROCEDURE — 63600000 HC DRUGS/DETAIL CODE: Performed by: INTERNAL MEDICINE

## 2023-01-27 PROCEDURE — 70544 MR ANGIOGRAPHY HEAD W/O DYE: CPT | Mod: MG

## 2023-01-27 PROCEDURE — 25000000 HC PHARMACY GENERAL: Performed by: INTERNAL MEDICINE

## 2023-01-27 RX ORDER — ASPIRIN 81 MG/1
81 TABLET ORAL DAILY
Status: DISCONTINUED | OUTPATIENT
Start: 2023-01-27 | End: 2023-01-27

## 2023-01-27 RX ORDER — HYDRALAZINE HYDROCHLORIDE 20 MG/ML
10 INJECTION INTRAMUSCULAR; INTRAVENOUS EVERY 4 HOURS PRN
Status: DISCONTINUED | OUTPATIENT
Start: 2023-01-27 | End: 2023-01-28

## 2023-01-27 RX ORDER — ASPIRIN 81 MG/1
81 TABLET ORAL
Status: DISCONTINUED | OUTPATIENT
Start: 2023-01-28 | End: 2023-01-29 | Stop reason: HOSPADM

## 2023-01-27 RX ORDER — HYDRALAZINE HYDROCHLORIDE 10 MG/1
10 TABLET, FILM COATED ORAL EVERY 8 HOURS
Status: DISCONTINUED | OUTPATIENT
Start: 2023-01-27 | End: 2023-01-28

## 2023-01-27 RX ORDER — ACETAMINOPHEN 325 MG/1
650 TABLET ORAL EVERY 6 HOURS PRN
Status: DISCONTINUED | OUTPATIENT
Start: 2023-01-27 | End: 2023-01-29 | Stop reason: HOSPADM

## 2023-01-27 RX ADMIN — HYDRALAZINE HYDROCHLORIDE 10 MG: 10 TABLET ORAL at 21:15

## 2023-01-27 RX ADMIN — DOCUSATE SODIUM 100 MG: 100 CAPSULE, LIQUID FILLED ORAL at 21:15

## 2023-01-27 RX ADMIN — PREGABALIN 50 MG: 50 CAPSULE ORAL at 21:15

## 2023-01-27 RX ADMIN — TRAMADOL HYDROCHLORIDE 50 MG: 50 TABLET, COATED ORAL at 14:22

## 2023-01-27 RX ADMIN — ENOXAPARIN SODIUM 30 MG: 30 INJECTION SUBCUTANEOUS at 18:07

## 2023-01-27 RX ADMIN — HYDRALAZINE HYDROCHLORIDE 10 MG: 10 TABLET ORAL at 15:35

## 2023-01-27 RX ADMIN — LORAZEPAM 1 MG: 1 TABLET ORAL at 22:12

## 2023-01-27 RX ADMIN — CETIRIZINE HYDROCHLORIDE 10 MG: 10 TABLET, FILM COATED ORAL at 21:15

## 2023-01-27 RX ADMIN — MOMETASONE FUROATE AND FORMOTEROL FUMARATE DIHYDRATE 2 PUFF: 100; 5 AEROSOL RESPIRATORY (INHALATION) at 09:00

## 2023-01-27 RX ADMIN — ACETAMINOPHEN 650 MG: 325 TABLET, FILM COATED ORAL at 14:33

## 2023-01-27 RX ADMIN — DULOXETINE HYDROCHLORIDE 60 MG: 60 CAPSULE, DELAYED RELEASE ORAL at 21:15

## 2023-01-27 RX ADMIN — PREGABALIN 50 MG: 50 CAPSULE ORAL at 08:48

## 2023-01-27 RX ADMIN — TIOTROPIUM BROMIDE INHALATION SPRAY 2 PUFF: 3.12 SPRAY, METERED RESPIRATORY (INHALATION) at 09:00

## 2023-01-27 ASSESSMENT — COGNITIVE AND FUNCTIONAL STATUS - GENERAL
AFFECT: WFL
HELP NEEDED FOR PERSONAL GROOMING: 4 - NONE
STANDING UP FROM CHAIR USING ARMS: 4 - NONE
CLIMB 3 TO 5 STEPS WITH RAILING: 3 - A LITTLE
DRESSING REGULAR UPPER BODY CLOTHING: 4 - NONE
HELP NEEDED FOR BATHING: 3 - A LITTLE
EATING MEALS: 4 - NONE
DRESSING REGULAR LOWER BODY CLOTHING: 3 - A LITTLE
MOVING TO AND FROM BED TO CHAIR: 4 - NONE
TOILETING: 3 - A LITTLE
AFFECT: WFL
WALKING IN HOSPITAL ROOM: 3 - A LITTLE

## 2023-01-27 NOTE — PLAN OF CARE
Problem: Adult Inpatient Plan of Care  Goal: Readiness for Transition of Care  Outcome: Progressing  Intervention: Mutually Develop Transition Plan  Flowsheets (Taken 1/27/2023 1053)  Anticipated Discharge Disposition: home with assistance  Equipment Needed After Discharge: none  Assistive Device/Animal Currently Used at Home: walker, front-wheeled  Anticipated Changes Related to Illness: none  Transportation Concerns: car, none  Current Discharge Risk: lives alone  Readmission Within the Last 30 Days: no previous admission in last 30 days  Patient/Family Anticipated Services at Transition: none  Patient/Family Anticipates Transition to: home  Transportation Anticipated: family or friend will provide  Concerns to be Addressed:  • discharge planning  • care coordination/care conferences    Pt presented with generalized weakness. Sw met with pt at bedside this morning. Pt lives alone in 2SH w/3STE. Pt's bed is on 2nd fl and has full bath with grab bars. Pt has a walker at home that she does not use. Pt was independent with ADLs PTA. Pt's daughter comes over to pt's house everyday to assist. Pt has no AD/LW and her POA is her daughter. Pt has hx of [facility] and no SNF stays. PCP- Dr. Linares and pharm-[facility] delivery and [facility]. Pt's crow will transport pt.     Addendum 1206: Sw spoke with pt to explain recc for HH. Pt in agreement to [facility] for P/OT and RN services. Sw called Christiane to refer pt. Christiane stated generalized weakness does not qualify pt for HH, but she will look to see if pt has any other qualifying factors.     Addendum 1211: Christiane w/[facility]- called back. Christiane confirmed pt qualifies due to change in BP and BP meds. Pt set up for PT and VRN.    Anticipated DC Plans  Home w/[facility]  Family will transport

## 2023-01-27 NOTE — PROGRESS NOTES
Daughter was also very frustrated about being admitted under observation and insurance coverage  Discussed and attempted to say that it is not within the limits of hospitalist team regarding these decisions and certain diagnosis are generally admitted under observation.  Also mentioned that the utilization management team would be reviewing most situations with observation admissions and make necessary changes accordingly.  Suggested to discuss with patient's insurance company as well.

## 2023-01-27 NOTE — PLAN OF CARE
Problem: Adult Inpatient Plan of Care  Goal: Plan of Care Review  Flowsheets (Taken 1/27/2023 0956)  Plan of Care Reviewed With: patient  Outcome Summary: Patient evaluated 2nd [facility] trigger for reported weight loss.  Patient starting Breakfast this AM when visited, had MRI earlier today.  Appetite/intakes improved from recent.  Tolerating PO at this time.  Per Chart review, 3 kg weight loss in past 3 months, not significant.  Weight mostly around 55 kg in past year.  Cardiac diet acceptable at this time.    Goal: Consume at least 75% of estimated energy needs.  Recommendations:  Continue Cardiac diet as ordered.  Encourage adequate intakes.  RD monitoring PO, Labs, weight trends, skin.

## 2023-01-27 NOTE — ASSESSMENT & PLAN NOTE
Has not been on any medications for the last 4 years  As above was being managed in overview was being managed without medications  Blood pressure was stable on Monday during her pulmonary visit  Currently with significantly elevated levels  ?  Related to permissive hypertension versus blood pressure causing her symptoms  Will rule out CVA with MRI  Allow permissive hypertension for now  MRI negative possibly less likely that her presentation could be related to higher pressures and may need to be reinitiated on medications

## 2023-01-27 NOTE — [facility] PHYSICIAN REVIEW NOTE
Patient admitted yesterday with word finding difficulty, is on PO hydralazine and IV for BP control, pending NSG eval.  Anticipate 2nd MN for care.  Inpatient is appropriate.    Ambika Keyes, DO

## 2023-01-27 NOTE — PROGRESS NOTES
Referral per CM for SN, PT at home.  CM provided with [facility] information, patient ready to leave now, asks we call daughter to confirm. [facility] will contact at home within 24 h of discharge.

## 2023-01-27 NOTE — H&P
Hospital Medicine Service -  History & Physical        CHIEF COMPLAINT     Generalized weakness, not feeling well, difficulty finding words     HISTORY OF PRESENT ILLNESS        Madelyn Ruiz is a 89 y.o. female with a history of ABPA, allergic rhinitis, chronic obstructive pulmonary disease, hypertension, hyperlipidemia, GERD along with multiple other conditions as below now presents with concerns for the above symptoms.    Patient states that she was doing well until Monday evening.  She had an appointment Monday morning with her lung doctor and she was well and her blood pressures were stable at that time.  Monday night she started feeling unwell.  She started with altered taste and then had a bad sensation and feeling in her body along with shaking of the whole body.  She had that through the night emergently and she did not sleep well as well.  Tuesday morning when she woke up she continued to feel unwell and she also noticed that she has had difficulty trying to remember to find the words that were necessary to communicate at times.    She slept during Tuesday morning as well and did not feel well otherwise.  Wednesday she felt a little energetic but today she continued to feel worse and hence asked her daughter to bring her here to the hospital for further evaluation.    Daughter who is at bedside adds on stating that patient canceled her appointment with the physician and her [facility] visit which they generally do on Wednesday which is unusual for her. She checked in on Tuesday/Wednesday with mom and she did not want to be brought to the emergency room at that time but today she called her and asked her to take to the hospital.    Patient did not check for her temperature but she did not subjectively feel she had was having a fever.  She denied any chest pain, difficulty breathing, lightheadedness or palpitations.  She also denied any abdominal pain, nausea, vomiting, diarrhea or constipation.   She takes 1 medication for her bowels every day.  Her appetite was not great over the last few days that she ate well last night.  She states that she always feels dry in her oral cavity due to the medications that she takes.    Per daughter patient was taken off her blood pressure medications around 4 years ago and since then her blood pressures have been reasonably stable.    PAST MEDICAL AND SURGICAL HISTORY        Past Medical History:   Diagnosis Date   • Allergic bronchopulmonary aspergillosis (CMS/formerly [facility]) 04/02/2018    Allergist: Dr. Arriaza.  Stable FEV1 in 8/2015.  IgE levels and eosinophils stable in 8/2015.  Managed with Fasenra, Azithromycin and Flovent.   • Allergic rhinitis 04/02/2018    Pulmonologist: Dr. Walker. Managed with Flonase.   • Calcification of aorta (CMS/formerly [facility]) 06/29/2020    Seen incidentally on CT scan.   • Chronic obstructive pulmonary disease (CMS/formerly [facility]) 04/02/2018    Pulmonologist: Dr. Walker.  Seen on PFTs in hospital in 2014.  Controlled with Spiriva and FLovent.   • Essential hypertension 04/02/2018    Managed on no medications at this time. Advised to follow a low sodium diet and keep BMI < 25.  Advised to exercise for 2.5 hours per week.  Instructed to take home blood pressure readings and call if consistently above 140/90.     • GERD (gastroesophageal reflux disease) 06/08/2019    Managed with Esomeprazole.  Should take medication 30 minutes prior to meal.  Advised to avoid caffeine, carbonated beverages, and should not eat within 3 hours of going to sleep.  Advised to reduce BMI < 25.    • Insomnia 04/02/2018    Managed with Lorazepam as needed.   • Lumbar spinal stenosis 04/02/2018    Neurosurgeon: Dr. Miguel. MRI with Central and lateral recess spinal stenosis. Has Spondylolisthesis at L4/L5. S/P epidural injection by Dr. Martin with some relief in past. Had Lumbar fusion and Lumbar laminectomy by Dr. Miguel in 4/2015.   • Macular degeneration disease    • Obstructive sleep apnea  syndrome 04/02/2018    Mild STACIA noted in sleep study 7/2015. Treated with nasal CPAP automatic with pressure range 5-10 CM H2Ox couple months. Off now   • Osteoporosis 04/02/2018    Rheumatologist: Dr. Bradley.  Dexa scan 7/2016 with osteoporosis of lumbar spine LS-2.5, LH-2.1, RH -2.2.  Has been on Fosamax in the past for 8 years.  Worsened by Prednisone in past.  Continue vitamin D, calcium and weight bearing exercise. DEXA in 7/2017 with LS -1.7, LH -2.2, and RH -2.0.  Next due in 7/2019.  Seen by Dr. Bradley in 8/2015 who agreed with initiating Prolia. Took for 2 years. N   • Pulmonary embolism (CMS/HCC) 06/09/2019    Hematologist: Dr. Bhakta Diagnosed in 06/2019 in right upper lobe. Vascular ultrasound negative of bilateral lower extremities. Managed on Eliquis. Hypercoagulable workup was negative for beta-2 glycoprotein's, RIGO, Antithrombin III, lupus anticoagulant, protein C activity protein S activity, and prothrombin gene mutation, and factor V Leiden. Now off Apixaban as CT scan in 12/2019 was without pu   • Vertebral compression fracture (CMS/HCC)     At T12 level       Past Surgical History:   Procedure Laterality Date   • APPENDECTOMY     • CARPAL TUNNEL RELEASE Bilateral    • CATARACT EXTRACTION W/  INTRAOCULAR LENS IMPLANT Bilateral    • HYSTERECTOMY  1972   • LUMBAR LAMINECTOMY  04/21/2015    S/P lumbar fusion   • TONSILLECTOMY         MEDICATIONS        Prior to Admission medications    Medication Sig Start Date End Date Taking? Authorizing Provider   albuterol 2.5 mg /3 mL (0.083 %) nebulizer solution Take 3 mL (2.5 mg total) by nebulization every 6 (six) hours as needed for wheezing or shortness of breath. 6/12/19 6/29/29  Darline Vasquez CRNP   albuterol HFA (VENTOLIN HFA) 90 mcg/actuation inhaler Inhale 2 puffs every 4 (four) hours as needed for wheezing or shortness of breath. 6/12/19 6/29/29  Darline Vasquez CRNP   aspirin 81 mg enteric coated tablet Take 81 mg by mouth  daily.    Yessi Oquendo MD   benralizumab (FASENRA) 30 mg/mL syringe subcutaneous syringe Inject 30 mg under the skin every 2 (two) months. 5/13/19 6/29/29  Henry Linares MD   benzonatate (TESSALON) 200 mg capsule TAKE 1 CAPSULE BY MOUTH 3 TIMES DAILY AS NEEDED FOR COUGH. 6/8/19   Yessi Oquendo MD   calcium carbonate 600 mg calcium (1,500 mg) tablet Take 1 tablet by mouth 2 (two) times a day. 6/30/11   Henry Linares MD   cholecalciferol, vitamin D3, (VITAMIN D3 ORAL) Take by mouth daily.   6/30/11   Henry Linares MD   docusate sodium (COLACE) 100 mg capsule Take 100 mg by mouth nightly.    Henry Linares MD   DULoxetine (CYMBALTA) 30 mg capsule Take 30 mg by mouth daily. 5/5/20   Henry Linares MD   DULoxetine (CYMBALTA) 60 mg capsule Take 60 mg by mouth at bedtime.  3/22/19   Henry Linares MD   esomeprazole (NexIUM) 20 mg capsule Take 20 mg by mouth daily. Take 30 minutes prior to meal.    Henry Linares MD   fexofenadine (ALLEGRA) 180 mg tablet Take 180 mg by mouth daily.    Yessi Oquendo MD   FLOVENT  mcg/actuation inhaler Inhale 2 puffs 2 (two) times a day. 4/23/20   Henry Linares MD   fluticasone propionate (FLONASE) 50 mcg/actuation nasal spray Administer 1 spray into each nostril daily.    Yessi Oquendo MD   FOLIC ACID/MULTIVIT,IRON, (CENTRUM ORAL) Take 1 tablet by mouth daily. 6/27/11   Henry Linares MD   LORazepam (ATIVAN) 1 mg tablet TAKE 1 TABLET BY MOUTH NIGHTLY AS NEEDED FOR ANXIETY OR SLEEP. Strength: 1 mg 1/18/23   Henry Linares MD   ofloxacin (OCUFLOX) 0.3 % ophthalmic solution INSTILL 1 DROP LEFT EYE EVERY TWO HOURS WHILE AWAKE 3/7/22   Yessi Oquendo MD   pregabalin (LYRICA) 25 mg capsule Take 25 mg by mouth nightly.    Henry Linares MD   SPIRIVA RESPIMAT 2.5 mcg/actuation mist inhaler Inhale 2 puffs daily. 3/31/20   Henry Linares MD   SYMBICORT 80-4.5 mcg/actuation inhaler Inhale 2 puffs 2 (two) times a day. 7/15/20   Henry Linares MD   traMADoL  (ULTRAM) 50 mg tablet Take 50 mg by mouth daily as needed. 1/16/21   Henry Linares MD   zoledronic acid (RECLAST) IVPB Infuse 5 mg into a venous catheter See admin instr. 5 mg once yearly    Henry Linares MD       ALLERGIES        Mepolizumab, Morphine, and Oxycodone    FAMILY HISTORY        Family History   Problem Relation Age of Onset   • Glaucoma Biological Mother    • Macular degeneration Biological Mother    • Hypertension Biological Mother    • Lung cancer Biological Father    • Heart disease Biological Brother    • Breast cancer Mother's Sister    • No Known Problems Biological Son    • No Known Problems Biological Son    • No Known Problems Biological Daughter    • No Known Problems Biological Daughter    • No Known Problems Biological Daughter        SOCIAL HISTORY        Social History     Socioeconomic History   • Marital status:      Spouse name: None   • Number of children: 5   • Years of education: None   • Highest education level: None   Occupational History   • Occupation: Retired     Comment: Former Teacher   Tobacco Use   • Smoking status: Never   • Smokeless tobacco: Never   Vaping Use   • Vaping Use: Never used   Substance and Sexual Activity   • Alcohol use: Yes     Comment: Rarely   • Drug use: No   Social History Narrative    Exercise: No formal exercise. Due to back pain from spinal stenosis.     Social Determinants of Health     Food Insecurity: No Food Insecurity   • Worried About Running Out of Food in the Last Year: Never true   • Ran Out of Food in the Last Year: Never true       REVIEW OF SYSTEMS      All other systems reviewed and negative except as noted in HPI    PHYSICAL EXAMINATION        Visit Vitals  BP (!) 204/115 (BP Location: Right upper arm, Patient Position: Lying)   Pulse 88   Temp 36.3 °C (97.3 °F) (Tympanic)   Resp 20   SpO2 97%     There is no height or weight on file to calculate BMI.  Physical Exam  Constitutional:       Appearance: She is normal weight.    HENT:      Head: Normocephalic and atraumatic.      Nose: No congestion.      Mouth/Throat:      Mouth: Mucous membranes are dry.   Eyes:      Extraocular Movements: Extraocular movements intact.      Pupils: Pupils are equal, round, and reactive to light.   Cardiovascular:      Rate and Rhythm: Normal rate and regular rhythm.      Pulses: Normal pulses.      Heart sounds: Normal heart sounds.   Pulmonary:      Effort: Pulmonary effort is normal.      Breath sounds: Normal breath sounds.   Abdominal:      Palpations: Abdomen is soft.      Tenderness: There is no abdominal tenderness.   Musculoskeletal:      Cervical back: Neck supple.      Right lower leg: No edema.      Left lower leg: No edema.   Skin:     General: Skin is warm and dry.      Findings: Bruising present.   Neurological:      Mental Status: She is alert and oriented to person, place, and time.      Cranial Nerves: No cranial nerve deficit.      Sensory: No sensory deficit.      Motor: No weakness.      Coordination: Coordination normal.         LABS / IMAGING / EKG        Labs  Results from last 7 days   Lab Units 01/26/23  1451   WBC K/uL 5.11   HEMOGLOBIN g/dL 12.9   HEMATOCRIT % 41.7   PLATELETS K/uL 242     Results from last 7 days   Lab Units 01/26/23  1451   SODIUM mEQ/L 136   POTASSIUM mEQ/L 4.1   CHLORIDE mEQ/L 107   CO2 mEQ/L 20*   BUN mg/dL 20   CREATININE mg/dL 0.9   GLUCOSE mg/dL 120*   CALCIUM mg/dL 9.3       Imaging  CT head without IV contrast  Comparison studies: CT brain 8/8/2022.     The ventricles and cortical sulci are prominent, consistent with moderate  involution of the brain parenchyma.  Low density of the periventricular and  subcortical white matter represents mild to moderate small vessel disease.  No  acute hemorrhage. No acute infarction in a major vascular territory. No mass or  mass effect.     There are atherosclerotic vascular calcifications at the skull base. There are  bilateral ocular lens replacements. There is  ethmoid mucosal thickening.  There  is subtotal opacification of the left maxillary and sphenoid sinuses with  thickening and sclerosis of the walls compatible with chronicity.  Central high  attenuation within these regions of subtotal opacification either represents  densely inspissated secretions or fungal overgrowth.     --  IMPRESSION:  1. No acute hemorrhage. No acute infarction in a major vascular territory. No  mass or mass effect.  2. Chronic changes as described in the discussion.    ECG/Telemetry  I have independently reviewed the telemetry. Significant findings include Sinus rhythm with occasional PVCs.    ASSESSMENT AND PLAN           * Word finding difficulty  Assessment & Plan  Seems like she had an episode of word finding difficulty on Tuesday which was much more significant than her normal age-related changes  Has noticed that too much yesterday or today  Also with currently significantly elevated blood pressures  Her overall presentation could be related to elevated blood pressures but given the concerns of possible TIA with her word finding difficulty, she is being admitted to rule out CVA with MRI  Admit under stroke pathway  [facility]urochecks and [facility], [place]  Allow permissive hypertension for now and if MRI negative will need blood pressure medications to be initiated  FLP in the morning  Will continue aspirin 325 mg daily  MRI of the brain requested    Primary hypertension  Overview  · Managed on no medications at this time.  · Advised to follow a low sodium diet and keep BMI < 25.   · Advised to exercise for 2.5 hours per week.   · Instructed to take home blood pressure readings and call if consistently above 140/90.        Assessment & Plan  Has not been on any medications for the last 4 years  As above was being managed in overview was being managed without medications  Blood pressure was stable on Monday during her pulmonary visit  Currently with significantly elevated levels  ?  Related to permissive  hypertension versus blood pressure causing her symptoms  Will rule out CVA with MRI  Allow permissive hypertension for now  MRI negative possibly less likely that her presentation could be related to higher pressures and may need to be reinitiated on medications    GERD (gastroesophageal reflux disease)  Assessment & Plan  PPI as needed    Lumbar spinal stenosis  Assessment & Plan  On duloxetine and pregabalin  Continue    Pulmonary emphysema (CMS/HCC)  Assessment & Plan  Currently doing well without any symptoms  Continue her outpatient medications       VTE Assessment: [place] VTE Score: 4  Code Status: DNR (A.N.D.)  Estimated discharge date: 1/27/2023     Jj Stuart MD  1/26/2023  9:05 PM

## 2023-01-27 NOTE — PLAN OF CARE
Problem: Adult Inpatient Plan of Care  Goal: Plan of Care Review  Outcome: Progressing  Flowsheets (Taken 1/27/2023 1547)  Progress: improving  Plan of Care Reviewed With: patient  Outcome Summary: PT initial evaluation completed     Problem: Adult Inpatient Plan of Care  Goal: Patient-Specific Goal (Individualized)  Outcome: Progressing  Flowsheets (Taken 1/27/2023 1545)  Patient-Specific Goals (Include Timeframe): to return home

## 2023-01-27 NOTE — CONSULTS
Neurology Consult Note    I was asked to see Madelyn Ruiz who is a 89 y.o. female history of hypertension hyperlipidemia has been experiencing several days of several days of difficulty with short-term recall and word finding difficulty without any sensorimotor or other cognitive complaints no change or loss of consciousness is felt unwell with lethargy now feels better.  Noncontrast head CT completed shows no large territorial infarct mass or hemorrhage brain MRI with MRA of the head and neck shows no territorial infarct mass or hemorrhage small 3 mm cerebellar aneurysm neurosurgery already consulted.  In the ER blood pressure 214/113    Review of Systems notable for sensation of generalized tremulousness that has improved also insomnia  All other systems reviewed and negative except as noted in the HPI.    Allergies: Mepolizumab, Morphine, and Oxycodone    Current Facility-Administered Medications   Medication Dose Route Frequency Provider Last Rate Last Admin   • albuterol nebulizer solution 2.5 mg  2.5 mg nebulization q6h PRN Jj Stuart MD       • alum-mag hydroxide-simeth (MAALOX) 200-200-20 mg/5 mL suspension 30 mL  30 mL oral q4h PRN Jj Stuart MD       • benzonatate (TESSALON) capsule 100 mg  100 mg oral q4h PRN Jj Stuart MD       • bisacodyL (DULCOLAX) 10 mg suppository 10 mg  10 mg rectal Daily PRN Jj Stuart MD       • cetirizine (ZyrTEC) tablet 10 mg  10 mg oral Nightly Jj Stuart MD   10 mg at 01/26/23 2306   • glucose chewable tablet 16-32 g of dextrose  16-32 g of dextrose oral PRN Jj Stuart MD        Or   • dextrose 40 % oral gel 15-30 g of dextrose  15-30 g of dextrose oral PRN Jj Stuart MD        Or   • glucagon (GLUCAGEN) injection 1 mg  1 mg intramuscular PRN Jj Stuart MD        Or   • dextrose 50 % in water (D50) injection 12.5 g  25 mL intravenous PRN Jj Stuart MD       • docusate sodium (COLACE)  capsule 100 mg  100 mg oral Nightly Jj Stuart MD   100 mg at 01/26/23 2306   • DULoxetine (CYMBALTA) capsule,delayed release(DR/EC) 60 mg  60 mg oral Nightly Jj Stuart MD   60 mg at 01/26/23 2306   • enoxaparin (LOVENOX) syringe 30 mg  30 mg subcutaneous Daily (6p) Jj Stuart MD       • fluticasone propionate (FLONASE) 50 mcg/actuation nasal spray 1 spray  1 spray Each Nostril Daily PRN Jj Stuart MD       • hydrALAZINE (APRESOLINE) injection 10 mg  10 mg intravenous q4h PRN Sharon Olmedo MD       • LORazepam (ATIVAN) tablet 1 mg  1 mg oral Nightly PRN Jj Stuart MD   1 mg at 01/26/23 2334   • mometasone-formoterol (DULERA 100) 100-5 mcg/actuation inhaler 2 puff  2 puff inhalation BID Jj Stuart MD       • ondansetron (ZOFRAN) injection 4 mg  4 mg intravenous q8h PRN Jj Stuart MD       • pantoprazole (PROTONIX) tablet,delayed release (DR/EC) 20 mg  20 mg oral Daily PRN Jj Stuart MD       • pregabalin (LYRICA) capsule 25 mg  25 mg oral Daily PRN Jj Stuart MD       • pregabalin (LYRICA) capsule 50 mg  50 mg oral BID Jj Stuart MD   50 mg at 01/27/23 0848   • senna (SENOKOT) tablet 1 tablet  1 tablet oral 2x daily PRN Jj Stuart MD       • tiotropium bromide (SPIRIVA RESPIMAT) 2.5 mcg/actuation inhaler 2 puff  2 puff inhalation Daily Jj Stuart MD       • traMADoL (ULTRAM) tablet 50 mg  50 mg oral Daily PRN Jj Stuart MD           Medical History:   Past Medical History:   Diagnosis Date   • 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 Arriaza.  Stable FEV1 in 8/2015.  IgE levels and eosinophils stable in 8/2015.  Managed with Fasenra, Azithromycin and Flovent.   • Allergic rhinitis 04/02/2018    Pulmonologist: Dr. Walker. Managed with Flonase.   • Calcification of aorta (CMS/HCC) 06/29/2020    Seen incidentally on CT scan.   • Chronic obstructive pulmonary disease  (CMS/[facility]) 04/02/2018    Pulmonologist: Dr. Walker.  Seen on PFTs in hospital in 2014.  Controlled with Spiriva and FLovent.   • Essential hypertension 04/02/2018    Managed on no medications at this time. Advised to follow a low sodium diet and keep BMI < 25.  Advised to exercise for 2.5 hours per week.  Instructed to take home blood pressure readings and call if consistently above 140/90.     • GERD (gastroesophageal reflux disease) 06/08/2019    Managed with Esomeprazole.  Should take medication 30 minutes prior to meal.  Advised to avoid caffeine, carbonated beverages, and should not eat within 3 hours of going to sleep.  Advised to reduce BMI < 25.    • Insomnia 04/02/2018    Managed with Lorazepam as needed.   • Lumbar spinal stenosis 04/02/2018    Neurosurgeon: Dr. Miguel. MRI with Central and lateral recess spinal stenosis. Has Spondylolisthesis at L4/L5. S/P epidural injection by Dr. Martin with some relief in past. Had Lumbar fusion and Lumbar laminectomy by Dr. Miguel in 4/2015.   • Macular degeneration disease    • Obstructive sleep apnea syndrome 04/02/2018    Mild STACIA noted in sleep study 7/2015. Treated with nasal CPAP automatic with pressure range 5-10 CM H2Ox couple months. Off now   • Osteoporosis 04/02/2018    Rheumatologist: Dr. Bradley.  Dexa scan 7/2016 with osteoporosis of lumbar spine LS-2.5, LH-2.1, RH -2.2.  Has been on Fosamax in the past for 8 years.  Worsened by Prednisone in past.  Continue vitamin D, calcium and weight bearing exercise. DEXA in 7/2017 with LS -1.7, LH -2.2, and RH -2.0.  Next due in 7/2019.  Seen by Dr. Bradley in 8/2015 who agreed with initiating Prolia. Took for 2 years. N   • Pulmonary embolism (CMS/[facility]) 06/09/2019    Hematologist: Dr. Bhakta Diagnosed in 06/2019 in right upper lobe. Vascular ultrasound negative of bilateral lower extremities. Managed on Eliquis. Hypercoagulable workup was negative for beta-2 glycoprotein's, RIGO, Antithrombin III, lupus  anticoagulant, protein C activity protein S activity, and prothrombin gene mutation, and factor V Leiden. Now off Apixaban as CT scan in 2019 was without pu   • Vertebral compression fracture (CMS/HCC)     At T12 level       Surgical History:   Past Surgical History:   Procedure Laterality Date   • APPENDECTOMY     • CARPAL TUNNEL RELEASE Bilateral    • CATARACT EXTRACTION W/  INTRAOCULAR LENS IMPLANT Bilateral    • HYSTERECTOMY     • LUMBAR LAMINECTOMY  2015    S/P lumbar fusion   • TONSILLECTOMY         Social History:   Social History     Socioeconomic History   • Marital status:      Spouse name: None   • Number of children: 5   • Years of education: None   • Highest education level: None   Occupational History   • Occupation: Retired     Comment: Former Teacher   Tobacco Use   • Smoking status: Never   • Smokeless tobacco: Never   Vaping Use   • Vaping Use: Never used   Substance and Sexual Activity   • Alcohol use: Yes     Comment: Rarely   • Drug use: No   Social History Narrative    Exercise: No formal exercise. Due to back pain from spinal stenosis.     Social Determinants of Health     Food Insecurity: No Food Insecurity   • Worried About Running Out of Food in the Last Year: Never true   • Ran Out of Food in the Last Year: Never true       Family History:   Family History   Problem Relation Age of Onset   • Glaucoma Biological Mother    • Macular degeneration Biological Mother    • Hypertension Biological Mother    • Lung cancer Biological Father    • Heart disease Biological Brother    • Breast cancer Mother's Sister    • No Known Problems Biological Son    • No Known Problems Biological Son    • No Known Problems Biological Daughter    • No Known Problems Biological Daughter    • No Known Problems Biological Daughter        Objective     Temperature: Temp (24hrs), Av.4 °C (97.6 °F), Min:36.3 °C (97.3 °F), Max:36.6 °C (97.9 °F)     BP Max:  Systolic (24hrs), Av , Min:158 ,  Max:214      BP Fang:  Diastolic (24hrs), Av, Min:72, Max:115    Recent:    Patient Vitals for the past 4 hrs:   BP Temp Temp src Pulse Resp SpO2 Height Weight   23 1100 (!) 173/77 36.5 °C (97.7 °F) Oral 87 19 96 % -- --   23 0900 -- -- -- -- -- -- 1.524 m (5') 52.7 kg (116 lb 1.6 oz)         Physical/Neurologic Exam  Alert and oriented to person place and time phases of recall seem preserved good insight pleasant and jocular good eye contact quite jocular euthymic  No pressured speech no flight of ideas logical and organized historian  PPE in place and adjusted during visit spelling naming mathematics well preserved  Language intact comprehension repetition and fluency without major paraphasic errors blocking or psychomotor slowing no visual spatial neglect  Cranial nerves pupils isocoric nasolabial folds symmetric no ptosis  Power 5/5 in the upper and lower extremities no drift  Sensory exam intact primary modalities  Gait was deferred seated i in chair  Coordination no dysmetria or tremor at rest  Deep tendon reflexes 1+ throughout and plantar is mute to down  Labs  Lab Results   Component Value Date    WBC 5.11 2023    HGB 12.9 2023    HCT 41.7 2023     2023    CHOL 227 (H) 2023    TRIG 120 2023    HDL 62 2023    ALT 16 2022    AST 16 2022     2023    K 4.1 2023     2023    CREATININE 0.9 2023    BUN 20 2023    CO2 20 (L) 2023    TSH 2.87 2023    INR 0.9 2016    HGBA1C 5.6 2015     No results found for: ESR, CRP  Radiology Imaging    XR CHEST 2 VW    Narrative  CLINICAL HISTORY: Lightheaded    COMMENT:  PA and lateral views of the chest compared the x-ray from 2019.    The heart appears mildly enlarged. There is uncoiling of the thoracic aorta. The  costophrenic angles are clear. Fibrolinear change at the right lung base may  represent subsegmental atelectasis or  scar. No dense lung consolidation. Healed  lower right rib fractures. There is no pneumothorax.    --    Impression  No acute cardiopulmonary process.      X-RAY CHEST 2 VIEWS    Result Date: 1/26/2023  Narrative: CLINICAL HISTORY: Lightheaded COMMENT:  PA and lateral views of the chest compared the x-ray from 06/08/2019. The heart appears mildly enlarged. There is uncoiling of the thoracic aorta. The costophrenic angles are clear. Fibrolinear change at the right lung base may represent subsegmental atelectasis or scar. No dense lung consolidation. Healed lower right rib fractures. There is no pneumothorax.     Impression: IMPRESSION: No acute cardiopulmonary process.    CT HEAD WITHOUT IV CONTRAST    Result Date: 1/26/2023  Narrative: CLINICAL HISTORY: Lightheadedness.  Word finding difficulty. COMMENT: CT of the brain was performed using contiguous 2.5 mm transaxial sections without intravenous contrast. Images were reconstructed in the coronal and sagittal planes by the technologist. CT DOSE:  One or more dose reduction techniques (e.g. automated exposure control, adjustment of the mA and/or kV according to patient size, use of iterative reconstruction technique) utilized for this examination. Comparison studies: CT brain 8/8/2022. The ventricles and cortical sulci are prominent, consistent with moderate involution of the brain parenchyma.  Low density of the periventricular and subcortical white matter represents mild to moderate small vessel disease.  No acute hemorrhage. No acute infarction in a major vascular territory. No mass or mass effect. There are atherosclerotic vascular calcifications at the skull base. There are bilateral ocular lens replacements. There is ethmoid mucosal thickening.  There is subtotal opacification of the left maxillary and sphenoid sinuses with thickening and sclerosis of the walls compatible with chronicity.  Central high attenuation within these regions of subtotal opacification  either represents densely inspissated secretions or fungal overgrowth.     Impression: IMPRESSION: 1. No acute hemorrhage. No acute infarction in a major vascular territory. No mass or mass effect. 2. Chronic changes as described in the discussion.     MRI BRAIN WITHOUT CONTRAST, MRI ANGIOGRAM HEAD WITHOUT CONTRAST, MRI ANGIOGRAM NECK WITHOUT CONTRAST    Result Date: 1/27/2023  Narrative: CLINICAL HISTORY: Stroke, word finding abnormality, weakness STUDY: MRI examination of the brain performed without intravenous contrast. 3D time-of-flight cerebral and 2D time-of-flight cervical MRAs were included. CAROTID IMAGING CRITERIA:  Measurement of carotid stenosis is based on parameters that correlate the residual internal carotid lumen diameter with NASCET-based stenosis levels. COMPARISON: MR brain dated December 2, 2009 and head CT dated January 26, 2023     Impression: IMPRESSION: 1. No evidence of hemorrhage, mass or new infarct. 2. No hemodynamically significant stenosis in the neck. 3. 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 4. Paranasal sinus disease. Correlate for chronic sinusitis. In accordance with [place] Act 112,  the patient will receive a letter notifying them to follow up with their physician. COMMENT: Postsurgical change: None. Restricted diffusion: None Brain parenchyma: There is no intraparenchymal hemorrhage, mass effect, midline shift or extra-axial fluid collection. White matter changes: Normal for age Ventricles, cisterns, and sulci: Normal in size and configuration. Sella and cerebellar tonsils: Normal in appearance. Orbits: Normal Nasopharynx: Normal Calvarium and extra cranial soft tissues: Normal. Paranasal sinuses: Partial opacification of the ethmoid, sphenoid and left maxillary sinus. Mastoid air cell: Normal Vascular flow voids: Normal Cervical MRA: There is patency the bilateral common, internal carotid and vertebral arteries. Cerebral MRA:  Findings suggestive of a 3 mm aneurysm of the left superior cerebellar artery.     Recent Results (from the past 24 hour(s))   ECG 12 lead    Collection Time: 01/26/23  2:38 PM   Result Value Ref Range    Ventricular rate 109     Atrial rate 109     PA Interval 138     QRS duration 62     QT Interval 328     QTC Calculation(Bazett) 441     P Axis 48     R Axis 10     T Wave Axis -18    CBC and differential    Collection Time: 01/26/23  2:51 PM   Result Value Ref Range    WBC 5.11 3.80 - 10.50 K/uL    RBC 4.16 3.93 - 5.22 M/uL    Hemoglobin 12.9 11.8 - 15.7 g/dL    Hematocrit 41.7 35.0 - 45.0 %    .2 (H) 83.0 - 98.0 fL    MCH 31.0 28.0 - 33.2 pg    MCHC 30.9 (L) 32.2 - 35.5 g/dL    RDW 14.6 (H) 11.7 - 14.4 %    Platelets 242 150 - 369 K/uL    MPV 10.6 9.4 - 12.3 fL    Differential Type Auto     nRBC 0.0 <=0.0 %    Immature Granulocytes 0.4 %    Neutrophils 63.4 %    Lymphocytes 27.4 %    Monocytes 8.8 %    Eosinophils 0.0 %    Basophils 0.0 %    Immature Granulocytes, Absolute 0.02 0.00 - 0.08 K/uL    Neutrophils, Absolute 3.24 1.70 - 7.00 K/uL    Lymphocytes, Absolute 1.40 1.20 - 3.50 K/uL    Monocytes, Absolute 0.45 0.28 - 0.80 K/uL    Eosinophils, Absolute 0.00 (L) 0.04 - 0.36 K/uL    Basophils, Absolute 0.00 (L) 0.01 - 0.10 K/uL   Basic metabolic panel    Collection Time: 01/26/23  2:51 PM   Result Value Ref Range    Sodium 136 136 - 144 mEQ/L    Potassium 4.1 3.6 - 5.1 mEQ/L    Chloride 107 98 - 109 mEQ/L    CO2 20 (L) 22 - 32 mEQ/L    BUN 20 8 - 20 mg/dL    Creatinine 0.9 0.6 - 1.1 mg/dL    Glucose 120 (H) 70 - 99 mg/dL    Calcium 9.3 8.9 - 10.3 mg/dL    eGFR 58.7 (L) >=60.0 mL/min/1.73m*2    Anion Gap 9 3 - 15 mEQ/L   HS Troponin I (with 2 hour reflex)    Collection Time: 01/26/23  2:51 PM   Result Value Ref Range    High Sens Troponin I 5.3 <15.0 pg/mL   Magnesium    Collection Time: 01/26/23  2:51 PM   Result Value Ref Range    Magnesium 2.1 1.8 - 2.5 mg/dL   TSH w reflex FT4    Collection Time:  01/26/23  2:51 PM   Result Value Ref Range    TSH 2.87 0.34 - 5.60 mIU/L   SARS-COV-2 (COVID-19)/ FLU A/B, AND RSV, PCR Nasopharynx    Collection Time: 01/26/23  4:11 PM    Specimen: Nasopharynx; Nasopharyngeal Swab   Result Value Ref Range    SARS-CoV-2 (COVID-19) Negative Negative    Influenza A Negative Negative    Influenza B Negative Negative    Respiratory Syncytial Virus Negative Negative   UA Reflex to Culture (Macroscopic)    Collection Time: 01/26/23  4:22 PM    Specimen: Urine, Clean Catch   Result Value Ref Range    Color, Urine Yellow Yellow, Colorless    Clarity, Urine Clear Clear    Specific Gravity, Urine 1.020 1.005 - 1.030    pH, Urine 5.5 4.5 - 8.0    Leukocyte Esterase Trace (A) Negative    Nitrite, Urine Negative Negative    Protein, Urine Trace (A) Negative    Glucose, Urine Negative Negative mg/dL    Ketones, Urine Negative Negative mg/dL    Urobilinogen, Urine 0.2 <2.0 EU/dL EU/dL    Bilirubin, Urine Negative Negative mg/dL    Blood, Urine Negative Negative   UA Microscopic    Collection Time: 01/26/23  4:22 PM   Result Value Ref Range    RBC, Urine 0 TO 4 0 TO 4 /HPF    WBC, Urine 4 TO 10 (A) 0 TO 3 /HPF    Squamous Epithelial Rare (A) None Seen /hpf    Hyaline Cast 3 TO 9 (A) None Seen /lpf    Bacteria, Urine None Seen None Seen /HPF    MUCUSUA Rare (A) None Seen /LPF   HIGH SENSITIVE TROPONIN I (NO REFLEX)    Collection Time: 01/26/23  5:15 PM   Result Value Ref Range    High Sens Troponin I 5.3 <15.0 pg/mL   Lipid panel    Collection Time: 01/27/23  6:00 AM   Result Value Ref Range    Triglycerides 120 30 - 149 mg/dL    Cholesterol 227 (H) <=200 mg/dL    HDL 62 >=55 mg/dL    LDL Calculated 141 (H) <=100 mg/dL    Non-HDL, Calculated 165 mg/dL    RISK 3.7 <=5.0     Microbiology Results     Procedure Component Value Units Date/Time    SARS-CoV-2 (COVID-19), PCR Nasopharynx [[id number]]  (Normal) Collected: 01/26/23 1611    Specimen: Nasopharyngeal Swab from Nasopharynx Updated: 01/26/23 9750     Narrative:      The following orders were created for panel order SARS-CoV-2 (COVID-19), PCR Nasopharynx.  Procedure                               Abnormality         Status                     ---------                               -----------         ------                     SARS-COV-2 (COVID-19)/ F...[[id number]]  Normal              Final result                 Please view results for these tests on the individual orders.    SARS-COV-2 (COVID-19)/ FLU A/B, AND RSV, PCR Nasopharynx [[id number]]  (Normal) Collected: 01/26/23 1611    Specimen: Nasopharyngeal Swab from Nasopharynx Updated: 01/26/23 1659     SARS-CoV-2 (COVID-19) Negative     Influenza A Negative     Influenza B Negative     Respiratory Syncytial Virus Negative    Narrative:      Testing performed using real-time PCR for detection of COVID-19. EUA approved validation studies performed on site.         Assessment      Transient aphasia other symptoms likely due to hypertensive encephalopathy no evidence of completed infarct continue current medical regimen best medical management recommended for hypertension regarding aneurysm recommend neurosurgical evaluation PT and speech evaluation suggested    Saadia Cottrell MD  1/27/2023  11:59 AM

## 2023-01-27 NOTE — ASSESSMENT & PLAN NOTE
Has not been on any medications for the last 4 years  As above was being managed in overview was being managed without medications  Blood pressure was stable on Monday during her pulmonary visit  Currently with significantly elevated levels      Plan-   -Started hydralazine po.  Monitor blood pressure closely and titrate dose as needed.  Patient should have tight control of blood pressure given cerebellar aneurysm  - Discussed with patient; plan to monitor BP closely

## 2023-01-27 NOTE — PLAN OF CARE
Problem: Adult Inpatient Plan of Care  Goal: Plan of Care Review  Outcome: Progressing  Flowsheets (Taken 1/27/2023 0651)  Progress: improving  Plan of Care Reviewed With: patient  Outcome Summary: NIH 0 neuro checks intact. BP improving. OOB with assist x1. Denies pain at this time.   Plan of Care Review  Plan of Care Reviewed With: patient  Progress: improving  Outcome Summary: NIH 0 neuro checks intact. BP improving. OOB with assist x1. Denies pain at this time.

## 2023-01-27 NOTE — DISCHARGE SUMMARY
"   Hospital Medicine Service -  Inpatient Discharge Summary        BRIEF OVERVIEW   Admitting Provider: Jj Stuart MD  Attending Provider: Sharon Olmedo MD Attending phys phone: [phone]    PCP: Henry Linares [phone]    Admission Date: 1/26/2023  Discharge Date: 1/27/2023     DISCHARGE DIAGNOSES      Primary Discharge Diagnosis  Word finding difficulty    Secondary Discharge Diagnoses  Active Hospital Problems    Diagnosis Date Noted   • Word finding difficulty 01/26/2023     Priority: High   • GERD (gastroesophageal reflux disease) 06/08/2019   • Primary hypertension 04/02/2018   • Lumbar spinal stenosis 04/02/2018   • Pulmonary emphysema (CMS/HCC) 04/02/2018   • Asthma 06/16/2016      Resolved Hospital Problems   No resolved problems to display.       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     Lipid panel results --> ; patient not currently prescribed a statin. Discussed with patient and discussed option for starting statin given lipid results. Patient states she prefers to discuss options with her PCP Dr. Linares at discharge and verbalizes understanding of risks vs benefits of statin for stroke prevention.       Plan-    - Hydralazine IVP PRN added for better BP control; patient has history of \"intolerance\" to several BP medications in the past.  We will start patient on hydralazine and monitor blood pressure closely.  Titrate to have a tight blood pressure control.  Discussed with Dr. Cottrell recommending aspirin every other day.  Await input from neurosurgery  - Neurochecks   - PT/OT evaluation   - Neurosurgery consulted for further evaluation     GERD (gastroesophageal reflux " disease)  Assessment & Plan  PPI as needed    Lumbar spinal stenosis  Assessment & Plan  On duloxetine and pregabalin - PDMP reviewed.     Plan-   - Continue current medications     Primary hypertension  Assessment & Plan  Has not been on any medications for the last 4 years  As above was being managed in overview was being managed without medications  Blood pressure was stable on Monday during her pulmonary visit  Currently with significantly elevated levels      Plan-   - Start PRN Hydralazine for better BP control at this time  And start on hydralazine 25 mg twice daily.  Monitor blood pressure closely and titrate dose as needed.  Patient should have tight control of blood pressure given cerebellar aneurysm  - Discussed with patient; plan to monitor BP closely        Pulmonary emphysema (CMS/[facility])  Assessment & Plan  Currently doing well without any symptoms  Continue her outpatient medications    Asthma  Assessment & Plan  Denies any acute exacerbations.   Lungs clear on exam.     Plan-  - Continue PRN Albuterol   - Continue Dulera inhaler    SUMMARY OF HOSPITALIZATION      Presenting Problem/History of Present Illness  This is a 89 y.o. year-old female admitted on 1/26/2023 with Word finding difficulty [R47.89].    Patient presents with a past history of ABPA, allergic rhinitis, chronic obstructive pulmonary disease, hypertension, hyperlipidemia, GERD along with multiple other conditions as below now presents with concerns for the above symptoms.     Patient states that she was doing well until Monday evening.  She had an appointment Monday morning with her lung doctor and she was well and her blood pressures were stable at that time.  Monday night she started feeling unwell.  She started with altered taste and then had a bad sensation and feeling in her body along with shaking of the whole body.  She had that through the night emergently and she did not sleep well as well.  Tuesday morning when she woke up she  continued to feel unwell and she also noticed that she has had difficulty trying to remember to find the words that were necessary to communicate at times.     She slept during Tuesday morning as well and did not feel well otherwise.  Wednesday she felt a little energetic but today she continued to feel worse and hence asked her daughter to bring her here to the hospital for further evaluation.     Daughter who is at bedside adds on stating that patient canceled her appointment with the physician and her [employer] visit which they generally do on Wednesday which is unusual for her. She checked in on Tuesday/Wednesday with mom and she did not want to be brought to the emergency room at that time but today she called her and asked her to take to the hospital.     Patient did not check for her temperature but she did not subjectively feel she had was having a fever.  She denied any chest pain, difficulty breathing, lightheadedness or palpitations.  She also denied any abdominal pain, nausea, vomiting, diarrhea or constipation.  She takes 1 medication for her bowels every day.  Her appetite was not great over the last few days that she ate well last night.  She states that she always feels dry in her oral cavity due to the medications that she takes.     Per daughter patient was taken off her blood pressure medications around 4 years ago and since then her blood pressures have been reasonably stable.       Hospital Course    The patient was admitted to the observation telemetry unit for further evaluation.   She was seen in consultation of the neurologist and neurosurgeon during admission.     The patient was evaluated by the neurologist on 1/27 for further evaluation of word finding difficulty. After evaluating MRI results; the neurologist suspects symptoms may be related to hypertensive encephalopathy. Neurology recommended neurosurgery consultation for MRI findings of 3 mm cerebellar aneurysm.     The patient  was evaluated by the neurosurgery on 1/27 for further evaluation of 3 mm cerebellar aneurysm.     The patient was monitored on telemetry overnight without any evidence of telemetry events.     The patient was evaluated by the physical and occupational therapist during admission. Upon further evaluation, PT/OT recommends disposition to home with home care.        Patient was discussed results of lipid panel and elevated LDL levels. Patient reports she wishes to discuss initiating a statin when she follows up with her PCP at discharge. She states she is aware of risks vs benefits of statin therapy and stroke prevention.     Exam on Day of Discharge  Physical Exam    Consults During Admission  IP CONSULT TO NEUROLOGY  IP CONSULT TO NEUROSURGERY    DISCHARGE MEDICATIONS     Reason for no rehab assessment at discharge:{CM STK 10 Rehab Contraindications:[id number]}            Medication List      ASK your doctor about these medications    albuterol 2.5 mg /3 mL (0.083 %) nebulizer solution  Take 3 mL (2.5 mg total) by nebulization every 6 (six) hours as needed for wheezing or shortness of breath.  Dose: 2.5 mg  Ask about: Which instructions should I use?     aspirin 81 mg enteric coated tablet  Take 81 mg by mouth daily.  Dose: 81 mg     benralizumab 30 mg/mL syringe subcutaneous syringe  Commonly known as: FASENRA  Inject 30 mg under the skin every 2 (two) months.  Dose: 30 mg     benzonatate 200 mg capsule  Commonly known as: TESSALON  TAKE 1 CAPSULE BY MOUTH 3 TIMES DAILY AS NEEDED FOR COUGH.     calcium carbonate 600 mg calcium (1,500 mg) tablet  Take 1 tablet by mouth 2 (two) times a day.  Dose: 1 tablet     CENTRUM ORAL  Take 1 tablet by mouth daily.  Dose: 1 tablet     cholecalciferol (vitamin D3) 25 mcg (1,000 unit) capsule  Take 1 tablet by mouth daily.  Dose: 1 tablet     docusate sodium 100 mg capsule  Commonly known as: COLACE  Take 100 mg by mouth nightly.  Dose: 100 mg     DULoxetine 60 mg capsule  Commonly  known as: CYMBALTA  Take 60 mg by mouth at bedtime.  Dose: 60 mg  Ask about: Which instructions should I use?     esomeprazole 20 mg capsule  Commonly known as: NexIUM  Take 20 mg by mouth daily as needed. Take 30 minutes prior to meal.  Dose: 20 mg     fexofenadine 180 mg tablet  Commonly known as: ALLEGRA  Take 180 mg by mouth daily as needed.  Dose: 180 mg     fluticasone propionate 50 mcg/actuation nasal spray  Commonly known as: FLONASE  Administer 1 spray into each nostril daily as needed for rhinitis or allergies.  Dose: 1 spray  Ask about: Which instructions should I use?     LORazepam 1 mg tablet  Commonly known as: ATIVAN  TAKE 1 TABLET BY MOUTH NIGHTLY AS NEEDED FOR ANXIETY OR SLEEP. Strength: 1 mg     ofloxacin 0.3 % ophthalmic solution  Commonly known as: OCUFLOX  Only after injections into the retina     * pregabalin 25 mg capsule  Commonly known as: LYRICA  Take 50 mg by mouth 2 (two) times a day.  Dose: 50 mg     * pregabalin 25 mg capsule  Commonly known as: LYRICA  Take 25 mg by mouth daily as needed (pain). afternoon  Dose: 25 mg     SPIRIVA RESPIMAT 2.5 mcg/actuation mist inhaler  Inhale 2 puffs daily.  Dose: 2 puff  Generic drug: tiotropium bromide     SYMBICORT 80-4.5 mcg/actuation inhaler  Inhale 2 puffs 2 (two) times a day.  Dose: 2 puff  Generic drug: budesonide-formoteroL     traMADoL 50 mg tablet  Commonly known as: ULTRAM  Take 50 mg by mouth daily as needed.  Dose: 50 mg     zoledronic acid IVPB  Commonly known as: RECLAST  Infuse 5 mg into a venous catheter See admin instr. 5 mg once yearly  Dose: 5 mg         * This list has 2 medication(s) that are the same as other medications prescribed for you. Read the directions carefully, and ask your doctor or other care provider to review them with you.                 {Discharge non-med orders (select AFTER ordering discharge orders):70897}       PROCEDURES / LABS / IMAGING      Operative Procedures  none    Other  Procedures  none    Pertinent Labs  Lab Results   Component Value Date    HGBA1C 5.6 11/18/2015    HGBA1C 5.6 04/08/2015     Lab Results   Component Value Date    LDLCALC 141 (H) 01/27/2023    CREATININE 0.9 01/26/2023      Results from last 7 days   Lab Units 01/26/23  1451   MAGNESIUM mg/dL 2.1      Results from last 7 days   Lab Units 01/26/23  1451   SODIUM mEQ/L 136   POTASSIUM mEQ/L 4.1   CHLORIDE mEQ/L 107   CO2 mEQ/L 20*   BUN mg/dL 20   CREATININE mg/dL 0.9   CALCIUM mg/dL 9.3   GLUCOSE mg/dL 120*      Results from last 7 days   Lab Units 01/26/23  1451   WBC K/uL 5.11   HEMOGLOBIN g/dL 12.9   HEMATOCRIT % 41.7   PLATELETS K/uL 242   DIFF TYPE  Auto   NRBC % 0.0   IMM GRANULOCYTES % 0.4   NEUTROPHILS % 63.4   LYMPHOCYTES % 27.4   MONOCYTES % 8.8   EOSINOPHILS % 0.0   BASOPHILS % 0.0   IMM GRANUCOCYTES ABS K/uL 0.02   MONO ABS AUTO K/uL 0.45   EOS ABS AUTO K/uL 0.00*   BASO ABS AUTO K/uL 0.00*       SARS-CoV-2 (COVID-19) (no units)   Date/Time Value   01/26/2023 1611 Negative       Pertinent Imaging  X-RAY CHEST 2 VIEWS    Result Date: 1/26/2023  IMPRESSION: No acute cardiopulmonary process.    CT HEAD WITHOUT IV CONTRAST    Result Date: 1/26/2023  IMPRESSION: 1. No acute hemorrhage. No acute infarction in a major vascular territory. No mass or mass effect. 2. Chronic changes as described in the discussion.     MRI ANGIOGRAM HEAD WITHOUT CONTRAST    Result Date: 1/27/2023  IMPRESSION: 1. No evidence of hemorrhage, mass or new infarct. 2. No hemodynamically significant stenosis in the neck. 3. 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 4. Paranasal sinus disease. Correlate for chronic sinusitis. In accordance with [place] Act 112,  the patient will receive a letter notifying them to follow up with their physician. COMMENT: Postsurgical change: None. Restricted diffusion: None Brain parenchyma: There is no intraparenchymal hemorrhage, mass effect, midline shift or  extra-axial fluid collection. White matter changes: Normal for age Ventricles, cisterns, and sulci: Normal in size and configuration. Sella and cerebellar tonsils: Normal in appearance. Orbits: Normal Nasopharynx: Normal Calvarium and extra cranial soft tissues: Normal. Paranasal sinuses: Partial opacification of the ethmoid, sphenoid and left maxillary sinus. Mastoid air cell: Normal Vascular flow voids: Normal Cervical MRA: There is patency the bilateral common, internal carotid and vertebral arteries. Cerebral MRA: Findings suggestive of a 3 mm aneurysm of the left superior cerebellar artery.     MRI ANGIOGRAM NECK WITHOUT CONTRAST    Result Date: 1/27/2023  IMPRESSION: 1. No evidence of hemorrhage, mass or new infarct. 2. No hemodynamically significant stenosis in the neck. 3. 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 4. Paranasal sinus disease. Correlate for chronic sinusitis. In accordance with [place] Act 112,  the patient will receive a letter notifying them to follow up with their physician. COMMENT: Postsurgical change: None. Restricted diffusion: None Brain parenchyma: There is no intraparenchymal hemorrhage, mass effect, midline shift or extra-axial fluid collection. White matter changes: Normal for age Ventricles, cisterns, and sulci: Normal in size and configuration. Sella and cerebellar tonsils: Normal in appearance. Orbits: Normal Nasopharynx: Normal Calvarium and extra cranial soft tissues: Normal. Paranasal sinuses: Partial opacification of the ethmoid, sphenoid and left maxillary sinus. Mastoid air cell: Normal Vascular flow voids: Normal Cervical MRA: There is patency the bilateral common, internal carotid and vertebral arteries. Cerebral MRA: Findings suggestive of a 3 mm aneurysm of the left superior cerebellar artery.     MRI BRAIN WITHOUT CONTRAST    Result Date: 1/27/2023  IMPRESSION: 1. No evidence of hemorrhage, mass or new infarct. 2. No  hemodynamically significant stenosis in the neck. 3. 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 4. Paranasal sinus disease. Correlate for chronic sinusitis. In accordance with [place] Act 112,  the patient will receive a letter notifying them to follow up with their physician. COMMENT: Postsurgical change: None. Restricted diffusion: None Brain parenchyma: There is no intraparenchymal hemorrhage, mass effect, midline shift or extra-axial fluid collection. White matter changes: Normal for age Ventricles, cisterns, and sulci: Normal in size and configuration. Sella and cerebellar tonsils: Normal in appearance. Orbits: Normal Nasopharynx: Normal Calvarium and extra cranial soft tissues: Normal. Paranasal sinuses: Partial opacification of the ethmoid, sphenoid and left maxillary sinus. Mastoid air cell: Normal Vascular flow voids: Normal Cervical MRA: There is patency the bilateral common, internal carotid and vertebral arteries. Cerebral MRA: Findings suggestive of a 3 mm aneurysm of the left superior cerebellar artery.       OUTPATIENT  FOLLOW-UP / REFERRALS / PENDING TESTS        Outpatient Follow-Up Appointments  Encounter Information    This patient does not currently have any appointments scheduled.         Referrals  No orders of the defined types were placed in this encounter.      Test Results Pending at Discharge  Unresulted Labs (From admission, onward)     Start     Ordered    01/26/23 1643  Urine culture Urine, Clean Catch  Once        Question:  Release to patient  Answer:  Immediate    01/26/23 1642                Important Issues to Address in Follow-Up  - Follow up with PCP in 1 week for hospital follow up appointment and for BP monitoring and management.     DISCHARGE DISPOSITION AND DESTINATION      Disposition: Home   Destination: Home                            Code Status At Discharge: DNR (A.N.D.)    Physician Order for Life-Sustaining Treatment  Document Status      No documents found

## 2023-01-27 NOTE — PROGRESS NOTES
Hospital Medicine Service -  Daily Progress Note       SUBJECTIVE   Denies any trouble finding words, denies any change in vision any numbness and weakness of extremities or any other focal neurological symptoms     OBJECTIVE      Vital signs in last 24 hours:  Temp:  [36.3 °C (97.3 °F)-36.6 °C (97.9 °F)] 36.5 °C (97.7 °F)  Heart Rate:  [] 87  Resp:  [16-20] 19  BP: (158-214)/() 173/77    Intake/Output Summary (Last 24 hours) at 1/27/2023 1229  Last data filed at 1/27/2023 0500  Gross per 24 hour   Intake --   Output 400 ml   Net -400 ml       PHYSICAL EXAMINATION      Physical Exam GEN: well-developed and well-nourished; not in acute distress  HEENT: normocephalic; atraumatic  EYES: EOMI PERRLA conjunctiva clear no discharge  NECK: no JVD; no bruits no nystagmus  CARDIO: regular rate and rhythm; no murmurs or rubs  RESP: clear to auscultation bilaterally; no rales, rhonchi, or wheezes  ABD: soft, non-distended, non-tender, normal bowel sounds  EXT: no cyanosis or edema  SKIN: No Rash exposed skin  MUSCULOSKELETAL: no injury or deformity  NEURO: alert and oriented x 3; nonfocal  BEHAVIOR/EMOTIONAL: appropriate; cooperative           LINES, CATHETERS, DRAINS, AIRWAYS, AND WOUNDS   Lines, Drains, and Airways:  Wounds (agree with documentation and present on admission):  Peripheral IV (Adult) 01/26/23 Right Antecubital (Active)   Number of days: 1         Comments:      LABS / IMAGING / TELE      Labs  Results from last 7 days   Lab Units 01/26/23  1451   WBC K/uL 5.11   HEMOGLOBIN g/dL 12.9   HEMATOCRIT % 41.7   PLATELETS K/uL 242     Results from last 7 days   Lab Units 01/26/23  1451   SODIUM mEQ/L 136   POTASSIUM mEQ/L 4.1   CHLORIDE mEQ/L 107   CO2 mEQ/L 20*   BUN mg/dL 20   CREATININE mg/dL 0.9   GLUCOSE mg/dL 120*   CALCIUM mg/dL 9.3       SARS-CoV-2 (COVID-19) (no units)   Date/Time Value   01/26/2023 1611 Negative       Imaging  MRI report reviewed 3 mm aneurysm in the left superior cerebellar  artery   ECG/Telemetry  SR    ASSESSMENT AND PLAN      GERD (gastroesophageal reflux disease)  Assessment & Plan  PPI as needed    Lumbar spinal stenosis  Assessment & Plan  On duloxetine and pregabalin - PDMP reviewed.     Plan-   - Continue current medications     Primary hypertension  Assessment & Plan  Has not been on any medications for the last 4 years  As above was being managed in overview was being managed without medications  Blood pressure was stable on Monday during her pulmonary visit  Currently with significantly elevated levels      Plan-   - Start PRN Hydralazine for better BP control at this time  And start on hydralazine 25 mg twice daily.  Monitor blood pressure closely and titrate dose as needed.  Patient should have tight control of blood pressure given cerebellar aneurysm  - Discussed with patient; plan to monitor BP closely        Pulmonary emphysema (CMS/HCC)  Assessment & Plan  Currently doing well without any symptoms  Continue her outpatient medications    Asthma  Assessment & Plan  Denies any acute exacerbations.   Lungs clear on exam.     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     Lipid panel results --> ; patient not currently prescribed a statin. Discussed with patient and discussed option for starting statin given lipid results. Patient states she prefers to discuss options with her PCP Dr. Linares at discharge and verbalizes  "understanding of risks vs benefits of statin for stroke prevention.       Plan-    - Hydralazine IVP PRN added for better BP control; patient has history of \"intolerance\" to several BP medications in the past.  We will start patient on hydralazine and monitor blood pressure closely.  Titrate to have a tight blood pressure control.  Discussed with Dr. Cottrell recommending aspirin every other day.  Await input from neurosurgery  - Neurochecks   - PT/OT evaluation   - Neurosurgery consulted for further evaluation        VTE Assessment: Padua VTE Score: 4  VTE Prophylaxis:  Current anticoagulants:  enoxaparin (LOVENOX) syringe 30 mg, subcutaneous, Daily (6p)      Code Status: DNR (A.N.D.)      Estimated Discharge Date: 1/28/2023     Disposition Planning: Possible discharge in a..     Sharon Olmedo MD  1/27/2023             "

## 2023-01-27 NOTE — ASSESSMENT & PLAN NOTE
Denies any acute exacerbations.   Lungs clear on exam.     Plan-  - Continue PRN Albuterol   - Continue Dulera inhaler

## 2023-01-27 NOTE — PLAN OF CARE
Problem: Adult Inpatient Plan of Care  Goal: Plan of Care Review  Outcome: Progressing  Flowsheets (Taken 1/27/2023 1230)  Plan of Care Reviewed With: patient  Outcome Summary: OT eval complete. Pt reports I with ADLs and ambulation PTA. Pt lives in 3 story condo alone, FFSU, 3STE, WIS+GBs+SC. Today, pt mod I for bed mobility and supervision for functional transfer. Pt supervision for ADLs observed. Pt performed functional ambulation short household distances with SPC and supervision. Rec d/c home with assistance prn. Continue OT during admission.     Problem: Self-Care Deficit  Goal: Improved Ability to Complete Activities of Daily Living  Outcome: Progressing

## 2023-01-27 NOTE — ASSESSMENT & PLAN NOTE
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     Lipid panel results --> ; patient not currently prescribed a statin. Discussed with patient and discussed option for starting statin given lipid results. Patient states she prefers to discuss options with her PCP Dr. Linares at discharge and verbalizes understanding of risks vs benefits of statin for stroke prevention.       Plan-    - Hydralazine IVP PRN added for better BP control; patient has history of \"intolerance\" to several BP medications in the past.  We will start patient on hydralazine and monitor blood pressure closely.  Titrate to have a tight blood pressure control.  Discussed with Dr. Cottrell recommending aspirin every other day.  Await input from neurosurgery  - Neurochecks   - PT/OT evaluation   - Neurosurgery consulted for further evaluation   "

## 2023-01-27 NOTE — CONSULTS
Neurosurgery Consult Note    CC:  Cerebral Aneurysm    HPI   Madelyn Ruiz is a 89 y.o. who presented to the ED on 1/26/23 with word finding. The days prior to coming to the ED she was having generalized weakness and fatigue. She then developed difficulty with finding her words. She was admitted for work up and underwent a head/neck MRI/A which showed no acute infract and an incidental 3 mm left SCA aneurysm. On interview today, she denies any current headache, or worst headache of her life. She is a non-smoker and has no family history of brain aneurysms or subarachnoid hemorrhage.       Medical History:   Past Medical History:   Diagnosis Date   • Allergic bronchopulmonary aspergillosis (CMS/[facility]) 04/02/2018    Allergist: Dr. Arriaza.  Stable FEV1 in 8/2015.  IgE levels and eosinophils stable in 8/2015.  Managed with Fasenra, Azithromycin and Flovent.   • Allergic rhinitis 04/02/2018    Pulmonologist: Dr. Walker. Managed with Flonase.   • Calcification of aorta (CMS/[facility]) 06/29/2020    Seen incidentally on CT scan.   • Chronic obstructive pulmonary disease (CMS/[facility]) 04/02/2018    Pulmonologist: Dr. Walker.  Seen on PFTs in hospital in 2014.  Controlled with Spiriva and FLovent.   • Essential hypertension 04/02/2018    Managed on no medications at this time. Advised to follow a low sodium diet and keep BMI < 25.  Advised to exercise for 2.5 hours per week.  Instructed to take home blood pressure readings and call if consistently above 140/90.     • GERD (gastroesophageal reflux disease) 06/08/2019    Managed with Esomeprazole.  Should take medication 30 minutes prior to meal.  Advised to avoid caffeine, carbonated beverages, and should not eat within 3 hours of going to sleep.  Advised to reduce BMI < 25.    • Insomnia 04/02/2018    Managed with Lorazepam as needed.   • Lumbar spinal stenosis 04/02/2018    Neurosurgeon: Dr. Migeul. MRI with Central and lateral recess spinal stenosis. Has Spondylolisthesis at  L4/L5. S/P epidural injection by Dr. Martin with some relief in past. Had Lumbar fusion and Lumbar laminectomy by Dr. Miguel in 4/2015.   • Macular degeneration disease    • Obstructive sleep apnea syndrome 04/02/2018    Mild STACIA noted in sleep study 7/2015. Treated with nasal CPAP automatic with pressure range 5-10 CM H2Ox couple months. Off now   • Osteoporosis 04/02/2018    Rheumatologist: Dr. Bradley.  Dexa scan 7/2016 with osteoporosis of lumbar spine LS-2.5, LH-2.1, RH -2.2.  Has been on Fosamax in the past for 8 years.  Worsened by Prednisone in past.  Continue vitamin D, calcium and weight bearing exercise. DEXA in 7/2017 with LS -1.7, LH -2.2, and RH -2.0.  Next due in 7/2019.  Seen by Dr. Bradley in 8/2015 who agreed with initiating Prolia. Took for 2 years. N   • Pulmonary embolism (CMS/HCC) 06/09/2019    Hematologist: Dr. Bhakta Diagnosed in 06/2019 in right upper lobe. Vascular ultrasound negative of bilateral lower extremities. Managed on Eliquis. Hypercoagulable workup was negative for beta-2 glycoprotein's, RIGO, Antithrombin III, lupus anticoagulant, protein C activity protein S activity, and prothrombin gene mutation, and factor V Leiden. Now off Apixaban as CT scan in 12/2019 was without pu   • Vertebral compression fracture (CMS/HCC)     At T12 level       Surgical History:   Past Surgical History:   Procedure Laterality Date   • APPENDECTOMY     • CARPAL TUNNEL RELEASE Bilateral    • CATARACT EXTRACTION W/  INTRAOCULAR LENS IMPLANT Bilateral    • HYSTERECTOMY  1972   • LUMBAR LAMINECTOMY  04/21/2015    S/P lumbar fusion   • TONSILLECTOMY         Family History:  Reviewed and deemed non-pertinent to current admission.    Social History:   Social History     Socioeconomic History   • Marital status:      Spouse name: None   • Number of children: 5   • Years of education: None   • Highest education level: None   Occupational History   • Occupation: Retired     Comment: Former Teacher    Tobacco Use   • Smoking status: Never   • Smokeless tobacco: Never   Vaping Use   • Vaping Use: Never used   Substance and Sexual Activity   • Alcohol use: Yes     Comment: Rarely   • Drug use: No   Social History Narrative    Exercise: No formal exercise. Due to back pain from spinal stenosis.     Social Determinants of Health     Food Insecurity: No Food Insecurity   • Worried About Running Out of Food in the Last Year: Never true   • Ran Out of Food in the Last Year: Never true        Allergies: Mepolizumab, Morphine, and Oxycodone      Current Facility-Administered Medications:   •  albuterol nebulizer solution 2.5 mg, 2.5 mg, nebulization, q6h PRN, Jj Stuart MD  •  alum-mag hydroxide-simeth (MAALOX) 200-200-20 mg/5 mL suspension 30 mL, 30 mL, oral, q4h PRN, Jj Stuart MD  •  [START ON 1/28/2023] aspirin enteric coated tablet 81 mg, 81 mg, oral, q48h INT, Sharon Olmedo MD  •  benzonatate (TESSALON) capsule 100 mg, 100 mg, oral, q4h PRN, Jj Stuart MD  •  bisacodyL (DULCOLAX) 10 mg suppository 10 mg, 10 mg, rectal, Daily PRN, Jj Stuart MD  •  cetirizine (ZyrTEC) tablet 10 mg, 10 mg, oral, Nightly, Jj Stuart MD, 10 mg at 01/26/23 2306  •  glucose chewable tablet 16-32 g of dextrose, 16-32 g of dextrose, oral, PRN **OR** dextrose 40 % oral gel 15-30 g of dextrose, 15-30 g of dextrose, oral, PRN **OR** glucagon (GLUCAGEN) injection 1 mg, 1 mg, intramuscular, PRN **OR** dextrose 50 % in water (D50) injection 12.5 g, 25 mL, intravenous, PRN, Jj Stuart MD  •  docusate sodium (COLACE) capsule 100 mg, 100 mg, oral, Nightly, Jj Stuart MD, 100 mg at 01/26/23 2306  •  DULoxetine (CYMBALTA) capsule,delayed release(DR/EC) 60 mg, 60 mg, oral, Nightly, Jj Stuart MD, 60 mg at 01/26/23 2301  •  enoxaparin (LOVENOX) syringe 30 mg, 30 mg, subcutaneous, Daily (6p), Jj Stuart MD  •  fluticasone propionate (FLONASE) 50 mcg/actuation nasal spray  1 spray, 1 spray, Each Nostril, Daily PRN, Jj Stuart MD  •  hydrALAZINE (APRESOLINE) injection 10 mg, 10 mg, intravenous, q4h PRN, Sharon Olmedo MD  •  LORazepam (ATIVAN) tablet 1 mg, 1 mg, oral, Nightly PRN, Jj Stuart MD, 1 mg at 01/26/23 2334  •  mometasone-formoterol (DULERA 100) 100-5 mcg/actuation inhaler 2 puff, 2 puff, inhalation, BID, Jj Stuart MD, 2 puff at 01/27/23 0900  •  ondansetron (ZOFRAN) injection 4 mg, 4 mg, intravenous, q8h PRN, Jj Stuart MD  •  pantoprazole (PROTONIX) tablet,delayed release (DR/EC) 20 mg, 20 mg, oral, Daily PRN, Jj Stuart MD  •  pregabalin (LYRICA) capsule 25 mg, 25 mg, oral, Daily PRN, Jj Stuart MD  •  pregabalin (LYRICA) capsule 50 mg, 50 mg, oral, BID, Jj Stuart MD, 50 mg at 01/27/23 0848  •  senna (SENOKOT) tablet 1 tablet, 1 tablet, oral, 2x daily PRN, Jj Stuart MD  •  tiotropium bromide (SPIRIVA RESPIMAT) 2.5 mcg/actuation inhaler 2 puff, 2 puff, inhalation, Daily, Jj Stuart MD, 2 puff at 01/27/23 0900  •  traMADoL (ULTRAM) tablet 50 mg, 50 mg, oral, Daily PRN, Jj Stuart MD      Review of Systems  A complete review of systems was performed and aside from as mentioned above, was otherwise negative.    Vital Signs for the last 24 hours:  Temp:  [36.3 °C (97.3 °F)-36.6 °C (97.9 °F)] 36.5 °C (97.7 °F)  Heart Rate:  [] 89  Resp:  [16-20] 19  BP: (158-214)/() 171/96     Oxygen:   Oxygen Therapy: None (Room air)                              Physical Exam  Visit Vitals  BP (!) 171/96 (BP Location: Left upper arm, Patient Position: Sitting)   Pulse 89   Temp 36.5 °C (97.7 °F) (Oral)   Resp 19   Ht 1.524 m (5')   Wt 52.7 kg (116 lb 1.6 oz)   SpO2 97%   BMI 22.67 kg/m²       General Appearance:  Alert, no distress   Eyes:  PERRL   Vascular: DP pulses 2+ bilaterally, no ankle edema   Neurologic: AAO x3  Memory and fund of knowledge consistent with education  Following  commands  Language is fluent, comprehension is intact  Visual fields full  EOMI  Facial sensation intact, V1-V3 bilaterally  Face symmetric  Hearing intact bilaterally  Symmetric palatal elevation  Symmetric shoulder shrug  Tongue midline  5 out of 5 strength through all major muscles in bilateral upper and lower extremities  Normal bulk and tone upper and lower extremities  Sensation intact to light touch throughout  No evidence of Metz's sign or pathological clonus  Rapid alternating movement intact, no dysmetria bilaterally  Gait deferred secondary to fall risk          Data Review  Pertinent radiology results reviewed. Pertinent lab results reviewed.    Labs  WBC   Date Value Ref Range Status   01/26/2023 5.11 3.80 - 10.50 K/uL Final     RBC   Date Value Ref Range Status   01/26/2023 4.16 3.93 - 5.22 M/uL Final     Hemoglobin   Date Value Ref Range Status   01/26/2023 12.9 11.8 - 15.7 g/dL Final     Hematocrit   Date Value Ref Range Status   01/26/2023 41.7 35.0 - 45.0 % Final     MCV   Date Value Ref Range Status   01/26/2023 100.2 (H) 83.0 - 98.0 fL Final     MCH   Date Value Ref Range Status   01/26/2023 31.0 28.0 - 33.2 pg Final     RDW   Date Value Ref Range Status   01/26/2023 14.6 (H) 11.7 - 14.4 % Final     Platelets   Date Value Ref Range Status   01/26/2023 242 150 - 369 K/uL Final     MPV   Date Value Ref Range Status   01/26/2023 10.6 9.4 - 12.3 fL Final     Sodium   Date Value Ref Range Status   01/26/2023 136 136 - 144 mEQ/L Final     Potassium   Date Value Ref Range Status   01/26/2023 4.1 3.6 - 5.1 mEQ/L Final     Comment:     Results obtained on plasma. Plasma Potassium values may be up to 0.4 mEQ/L less than serum values. The differences may be greater for patients with high platelet or white cell counts.     Chloride   Date Value Ref Range Status   01/26/2023 107 98 - 109 mEQ/L Final     CO2   Date Value Ref Range Status   01/26/2023 20 (L) 22 - 32 mEQ/L Final     BUN   Date Value Ref Range  Status   01/26/2023 20 8 - 20 mg/dL Final     Creatinine   Date Value Ref Range Status   01/26/2023 0.9 0.6 - 1.1 mg/dL Final     Glucose   Date Value Ref Range Status   01/26/2023 120 (H) 70 - 99 mg/dL Final     Calcium   Date Value Ref Range Status   01/26/2023 9.3 8.9 - 10.3 mg/dL Final     Anion Gap   Date Value Ref Range Status   01/26/2023 9 3 - 15 mEQ/L Final     eGFR   Date Value Ref Range Status   01/26/2023 58.7 (L) >=60.0 mL/min/1.73m*2 Final     PT   Date Value Ref Range Status   06/10/2019 16.4 (H) 12.2 - 14.5 sec Final     PTT   Date Value Ref Range Status   06/10/2019 32 23 - 35 sec Final     I have reviewed the patient's labs to the time of note. No new clinical concern.    Imaging  MRI BRAIN WITHOUT CONTRAST, MRI ANGIOGRAM HEAD WITHOUT CONTRAST, MRI ANGIOGRAM NECK WITHOUT 1/27/23 CONTRAST  Narrative: CLINICAL HISTORY: Stroke, word finding abnormality, weakness  STUDY: MRI examination of the brain performed without intravenous contrast. 3D  time-of-flight cerebral and 2D time-of-flight cervical MRAs were included.  CAROTID IMAGING CRITERIA:  Measurement of carotid stenosis is based on  parameters that correlate the residual internal carotid lumen diameter with  NASCET-based stenosis levels.  COMPARISON: MR brain dated December 2, 2009 and head CT dated January 26, 2023  Impression: IMPRESSION:  1. No evidence of hemorrhage, mass or new infarct.  2. No hemodynamically significant stenosis in the neck.  3. 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  4. Paranasal sinus disease. Correlate for chronic sinusitis.    In accordance with [place] Act 112,  the patient will receive a letter notifying them  to follow up with their physician.  COMENT:  Postsurgical change: None.  Restricted diffusion: None  Brain parenchyma: There is no intraparenchymal hemorrhage, mass effect, midline  shift or extra-axial fluid collection.  White matter changes: Normal for  age  Ventricles, cisterns, and sulci: Normal in size and configuration.  Sella and cerebellar tonsils: Normal in appearance.  Orbits: Normal  Nasopharynx: Normal  Calvarium and extra cranial soft tissues: Normal.  Paranasal sinuses: Partial opacification of the ethmoid, sphenoid and left  maxillary sinus.  Mastoid air cell: Normal  Vascular flow voids: Normal  Cervical MRA: There is patency the bilateral common, internal carotid and  vertebral arteries.  Cerebral MRA: Findings suggestive of a 3 mm aneurysm of the left superior  cerebellar artery.      I have independently reviewed the patient's pertinent imaging for the last 24 hours. Pertinent neurologic imaging findings are within normal limits.    Assessment/Plan:  Code Status: DNR (A.N.D.)    Madelyn Ruiz is a 89 y.o. who presented to the ED on 1/26/23 with word finding and was incidentally found to have a 3mm left SCA aneurysm. There is no acute neurovascular intervention for the aneurysm and we would recommend repeating a brain MRA in 1 year. She can follow with Dr. Zelaya as an outpatient if needed.

## 2023-01-27 NOTE — ASSESSMENT & PLAN NOTE
Seems like she had an episode of word finding difficulty on Tuesday which was much more significant than her normal age-related changes  Has noticed that too much yesterday or today  Also with currently significantly elevated blood pressures  Her overall presentation could be related to elevated blood pressures but given the concerns of possible TIA with her word finding difficulty, she is being admitted to rule out CVA with MRI  Admit under stroke pathway  Neurochecks and [facility]  Allow permissive hypertension for now and if MRI negative will need blood pressure medications to be initiated  FLP in the morning  Will continue aspirin 325 mg daily  MRI of the brain requested

## 2023-01-27 NOTE — PROGRESS NOTES
Patient: Madelyn Ruiz  Location:  [facility] Clinical Decision Unit G103  MRN:  [id number]  Today's date:  1/27/2023    Madelyn is a 89 y.o. female admitted on 1/26/2023 with Word finding difficulty [R47.89]. Principal problem is Word finding difficulty.    Past Medical History  Madelyn has a past medical history of Allergic bronchopulmonary aspergillosis (CMS/[facility]) (04/02/2018), Allergic rhinitis (04/02/2018), Calcification of aorta (CMS/[facility]) (06/29/2020), Chronic obstructive pulmonary disease (CMS/[facility]) (04/02/2018), Essential hypertension (04/02/2018), GERD (gastroesophageal reflux disease) (06/08/2019), Insomnia (04/02/2018), Lumbar spinal stenosis (04/02/2018), Macular degeneration disease, Obstructive sleep apnea syndrome (04/02/2018), Osteoporosis (04/02/2018), Pulmonary embolism (CMS/[facility]) (06/09/2019), and Vertebral compression fracture (CMS/[facility]).    History of Present Illness   Per H&P, patient states that she was doing well until Monday evening.  She had an appointment Monday morning with her lung doctor and she was well and her blood pressures were stable at that time.  Monday night she started feeling unwell.  She started with altered taste and then had a bad sensation and feeling in her body along with shaking of the whole body.  Tuesday morning when she woke up she continued to feel unwell and she also noticed that she has had difficulty trying to remember to find the words that were necessary to communicate at times.     She slept during Tuesday morning as well and did not feel well otherwise.  Wednesday she felt a little energetic but today she continued to feel worse and hence asked her daughter to bring her here to the hospital for further evaluation.    CT and MRI negative.       PT Vitals    Date/Time Pulse HR Source SpO2 Pt Activity O2 Therapy BP BP Location BP Method Pt Position [facility]   01/27/23 1422 101 Monitor -- -- -- 149/78 Left upper arm Automatic Sitting RC   01/27/23 1436 114  Monitor 98 % At rest None (Room air) 174/79 Left upper arm Automatic Sitting RC      PT Pain    Date/Time Pain Type Location Rating: Activity Interventions Who   01/27/23 1422 Pain Assessment back 8 -- SS   01/27/23 1436 Pain Reassessment back 9 premedicated for activity RC          Prior Living Environment    Flowsheet Row Most Recent Value   Living Environment Comment Lives in 3 story condo alone, FFSU,  3STE, WIS+GBs+SC. Dtr or neighbors checks in on pt daily        Prior Level of Function    Flowsheet Row Most Recent Value   Ambulation independent   Transferring independent   Toileting independent   Bathing independent   Dressing independent   Eating independent   Prior Level of Function Comment Pt reports I with ADLs and ambulation PTA   Assistive Device Currently Used at Home walker, front-wheeled           PT Evaluation and Treatment - 01/27/23 1422        PT Time Calculation    Start Time 1422     Stop Time 1436     Time Calculation (min) 14 min        General Information    Document Type initial evaluation     Mode of Treatment physical therapy     Position at Start of Session seated in chair     Status at Start of Session agreeable to therapy;no issues or concerns identified by nurse prior to session     General Observations of Patient daughter present in room during IE        Precautions/Limitations/Impairments    Existing Precautions/Restrictions fall;aspiration        Cognition/Psychosocial    Affect/Mental Status (Cognition) WFL     Orientation Status (Cognition) oriented x 4        Sensory Assessment (Somatosensory)    Left LE Sensory Assessment general sensation;light touch awareness;proprioception;intact     Right LE Sensory Assessment general sensation;light touch awareness;proprioception;intact        Range of Motion (ROM)    Left Lower Extremity (ROM) left LE ROM is WFL     Right Lower Extremity (ROM) right LE ROM is WFL        Strength (Manual Muscle Testing)    Hip, Left (Strength) 4/5 in all  planes     Knee, Left (Strength) 4/5     Ankle, Left (Strength) 4/5 in all planes     Hip, Right (Strength) 4/5 in all planes     Knee, Right (Strength) 4/5     Ankle, Right (Strength) 4/5 in all planes        Bed Mobility    Comment (Bed Mobility) not assessed patient recieved OOB        Sit to Stand Transfer    Kimberly, Sit to Stand Transfer supervision;verbal cues     Verbal Cues safety;technique;hand placement     Assistive Device cane, straight     Comment from chair; supervision for safety d/t balance        Stand to Sit Transfer    Kimberly, Stand to Sit Transfer supervision;verbal cues     Verbal Cues safety;technique;hand placement     Assistive Device cane, straight     Comment to chair; fair eccentric control with verbal cueing for hand placement        Gait Training    Kimberly, Gait minimum assist (75% or more patient effort)     Assistive Device cane, straight     Distance in Feet 100 feet     Pattern (Gait) step-through;step-to     Deviations/Abnormal Patterns (Gait) step length decreased;gait speed decreased;base of support, narrow     Comment (Gait/Stairs) shorted step length, with minimal improvement with verbal cueing. Hardik for weight shift and balance        Stairs Training    Kimberly, Stairs not tested     Comment not safe to assess at this time        Balance    Static Sitting Balance WFL     Dynamic Sitting Balance WFL     Sit to Stand Dynamic Balance WFL     Static Standing Balance WFL     Dynamic Standing Balance mild impairment        Motor Skills    Coordination WFL;bilateral;lower extremity     Functional Endurance fair     Muscle Tone WNL;bilateral;lower extremity(s)        AM-PAC (TM) - Mobility (Current Function)    Turning from your back to your side while in a flat bed without using bedrails? 4 - None     Moving from lying on your back to sitting on the side of a flat bed without using bedrails? 4 - None     Moving to and from a bed to a chair? 4 - None     Standing  up from a chair using your arms? 4 - None     To walk in a hospital room? 3 - A Little     Climbing 3-5 steps with a railing? 3 - A Little     AM-PAC (TM) Mobility Score 22        Session Outcome    Daily Outcome Statement Patient with supervision for STS and Coreen for ambulation secondary to poor balance. Patient with minimal improvement in gait pattern with verbal cueing. Recommending home with home care and assistance as need per progress.     Position at End of Session seated in chair     Safety Factors call light in reach;chair alarm     Status at End of Session personal items in reach;all needs met     Nursing Notified patient's performance        Plan    Rehab Potential good, to achieve stated therapy goals     Therapy Frequency 2-3 times/wk     Planned Therapy Interventions balance training;bed mobility training;gait training;home exercise program;manual therapy techniques;ROM (range of motion);stair training;strengthening;swiss ball techniques;stretching               PT Discharge Recommendations    Flowsheet Row Most Recent Value   PT Recommended Discharge Disposition home with assistance, home with home health at 01/27/2023 1422   Anticipated Equipment Needs at Discharge (PT) none at 01/27/2023 1422   Patient/Family Therapy Goals Statement to return home at 01/27/2023 1422                  Education Documentation  Assistive/Adaptive Devices, taught by Arabella Anne, PT at 1/27/2023  2:30 PM.  Learner: Patient  Readiness: Acceptance  Method: Explanation  Response: Verbalizes Understanding, Needs Reinforcement  Comment: discussed role of PT, importance of OOB activity, safety considerations with use of assistive device and increased pain          PT Goals    Flowsheet Row Most Recent Value   Bed Mobility Goal 1    Activity/Assistive Device bed mobility activities, all at 01/27/2023 1422   Ayr modified independence at 01/27/2023 1422   Time Frame by discharge at 01/27/2023 1422   Transfer Goal  1    Activity/Assistive Device all transfers, sit-to-stand/stand-to-sit, stand pivot at 01/27/2023 1422   Cole modified independence at 01/27/2023 1422   Time Frame by discharge at 01/27/2023 1422   Gait Training Goal 1    Activity/Assistive Device gait (walking locomotion), assistive device use at 01/27/2023 1422   Cole supervision required at 01/27/2023 1422   Distance 100 at 01/27/2023 1422   Time Frame by discharge at 01/27/2023 1422

## 2023-01-27 NOTE — PATIENT CARE CONFERENCE
Care Progression Rounds Note  Date: 1/27/2023  Time: 2:15 PM     Patient Name: Madelyn Ruiz     Medical Record Number: [id number]   YOB: 1930  Sex: Female      Room/Bed: G103    Admitting Diagnosis: Word finding difficulty [R47.89]   Admit Date/Time: 1/26/2023  3:30 PM    Primary Diagnosis: Word finding difficulty  Principal Problem: Word finding difficulty    GMLOS: pending  Anticipated Discharge Date: 1/28/2023    AM-PAC:  Mobility Score:      Discharge Planning:  Concerns to be Addressed: discharge planning, care coordination/care conferences  Anticipated Discharge Disposition: home with assistance, home with home health  Can patient participate in a follow-up phone call/video visit?: Yes, independently    Barriers to Discharge:  Medical issues not resolved    Comments:  Adonay LOVE today. Monitor BP.    Participants:  nursing, social work/services

## 2023-01-28 PROBLEM — I47.10 SVT (SUPRAVENTRICULAR TACHYCARDIA) (CMS/HCC): Status: ACTIVE | Noted: 2023-01-28

## 2023-01-28 LAB
ANION GAP SERPL CALC-SCNC: 8 MEQ/L (ref 3–15)
BUN SERPL-MCNC: 19 MG/DL (ref 8–20)
CALCIUM SERPL-MCNC: 9 MG/DL (ref 8.9–10.3)
CHLORIDE SERPL-SCNC: 107 MEQ/L (ref 98–109)
CO2 SERPL-SCNC: 25 MEQ/L (ref 22–32)
CREAT SERPL-MCNC: 0.9 MG/DL (ref 0.6–1.1)
GFR SERPL CREATININE-BSD FRML MDRD: 58.7 ML/MIN/1.73M*2
GLUCOSE SERPL-MCNC: 151 MG/DL (ref 70–99)
MAGNESIUM SERPL-MCNC: 2 MG/DL (ref 1.8–2.5)
POTASSIUM SERPL-SCNC: 3.5 MEQ/L (ref 3.6–5.1)
SODIUM SERPL-SCNC: 140 MEQ/L (ref 136–144)

## 2023-01-28 PROCEDURE — 63700000 HC SELF-ADMINISTRABLE DRUG: Performed by: INTERNAL MEDICINE

## 2023-01-28 PROCEDURE — 80048 BASIC METABOLIC PNL TOTAL CA: CPT | Performed by: HOSPITALIST

## 2023-01-28 PROCEDURE — 63700000 HC SELF-ADMINISTRABLE DRUG: Performed by: HOSPITALIST

## 2023-01-28 PROCEDURE — 20600000 HC ROOM AND CARE INTERMEDIATE/TELEMETRY

## 2023-01-28 PROCEDURE — 63700000 HC SELF-ADMINISTRABLE DRUG

## 2023-01-28 PROCEDURE — G0378 HOSPITAL OBSERVATION PER HR: HCPCS

## 2023-01-28 PROCEDURE — 99232 SBSQ HOSP IP/OBS MODERATE 35: CPT | Performed by: HOSPITALIST

## 2023-01-28 PROCEDURE — 83735 ASSAY OF MAGNESIUM: CPT | Performed by: HOSPITALIST

## 2023-01-28 PROCEDURE — 63600000 HC DRUGS/DETAIL CODE: Performed by: INTERNAL MEDICINE

## 2023-01-28 PROCEDURE — 25000000 HC PHARMACY GENERAL: Performed by: INTERNAL MEDICINE

## 2023-01-28 PROCEDURE — 36415 COLL VENOUS BLD VENIPUNCTURE: CPT | Performed by: HOSPITALIST

## 2023-01-28 RX ORDER — HYDRALAZINE HYDROCHLORIDE 25 MG/1
25 TABLET, FILM COATED ORAL EVERY 8 HOURS
Qty: 90 TABLET | Refills: 0 | Status: CANCELLED | OUTPATIENT
Start: 2023-01-28 | End: 2023-02-27

## 2023-01-28 RX ORDER — HYDRALAZINE HYDROCHLORIDE 25 MG/1
25 TABLET, FILM COATED ORAL EVERY 8 HOURS
Status: DISCONTINUED | OUTPATIENT
Start: 2023-01-28 | End: 2023-01-29 | Stop reason: HOSPADM

## 2023-01-28 RX ORDER — POTASSIUM CHLORIDE 750 MG/1
40 TABLET, EXTENDED RELEASE ORAL ONCE
Status: COMPLETED | OUTPATIENT
Start: 2023-01-28 | End: 2023-01-28

## 2023-01-28 RX ADMIN — HYDRALAZINE HYDROCHLORIDE 10 MG: 10 TABLET ORAL at 05:46

## 2023-01-28 RX ADMIN — ENOXAPARIN SODIUM 30 MG: 30 INJECTION SUBCUTANEOUS at 17:25

## 2023-01-28 RX ADMIN — ACETAMINOPHEN 650 MG: 325 TABLET, FILM COATED ORAL at 09:53

## 2023-01-28 RX ADMIN — MOMETASONE FUROATE AND FORMOTEROL FUMARATE DIHYDRATE 2 PUFF: 100; 5 AEROSOL RESPIRATORY (INHALATION) at 20:52

## 2023-01-28 RX ADMIN — POTASSIUM CHLORIDE 40 MEQ: 750 TABLET, EXTENDED RELEASE ORAL at 13:47

## 2023-01-28 RX ADMIN — CETIRIZINE HYDROCHLORIDE 10 MG: 10 TABLET, FILM COATED ORAL at 21:02

## 2023-01-28 RX ADMIN — DULOXETINE HYDROCHLORIDE 60 MG: 60 CAPSULE, DELAYED RELEASE ORAL at 21:02

## 2023-01-28 RX ADMIN — DOCUSATE SODIUM 100 MG: 100 CAPSULE, LIQUID FILLED ORAL at 21:01

## 2023-01-28 RX ADMIN — TRAMADOL HYDROCHLORIDE 50 MG: 50 TABLET, COATED ORAL at 05:48

## 2023-01-28 RX ADMIN — PREGABALIN 25 MG: 25 CAPSULE ORAL at 17:25

## 2023-01-28 RX ADMIN — ASPIRIN 81 MG: 81 TABLET, COATED ORAL at 09:52

## 2023-01-28 RX ADMIN — MOMETASONE FUROATE AND FORMOTEROL FUMARATE DIHYDRATE 2 PUFF: 100; 5 AEROSOL RESPIRATORY (INHALATION) at 09:58

## 2023-01-28 RX ADMIN — TIOTROPIUM BROMIDE INHALATION SPRAY 2 PUFF: 3.12 SPRAY, METERED RESPIRATORY (INHALATION) at 09:57

## 2023-01-28 RX ADMIN — HYDRALAZINE HYDROCHLORIDE 25 MG: 25 TABLET, FILM COATED ORAL at 21:01

## 2023-01-28 RX ADMIN — PREGABALIN 50 MG: 50 CAPSULE ORAL at 09:52

## 2023-01-28 RX ADMIN — HYDRALAZINE HYDROCHLORIDE 25 MG: 25 TABLET, FILM COATED ORAL at 13:47

## 2023-01-28 RX ADMIN — ACETAMINOPHEN 650 MG: 325 TABLET, FILM COATED ORAL at 17:25

## 2023-01-28 RX ADMIN — PREGABALIN 50 MG: 50 CAPSULE ORAL at 22:56

## 2023-01-28 RX ADMIN — LORAZEPAM 1 MG: 1 TABLET ORAL at 21:10

## 2023-01-28 NOTE — PROGRESS NOTES
Hospital Medicine Service -  Daily Progress Note       SUBJECTIVE   Interval History: Pt seen and examined. No acute complaints. Speech clear. No CP or SOB, no palpitations, no dizziness or lightheadedness.     OBJECTIVE      Vital signs in last 24 hours:  Temp:  [36.4 °C (97.5 °F)-36.7 °C (98 °F)] 36.6 °C (97.9 °F)  Heart Rate:  [] 94  Resp:  [16-18] 16  BP: (131-182)/(65-89) 143/65  No intake or output data in the 24 hours ending 01/28/23 1555    PHYSICAL EXAMINATION      Physical Exam  Constitutional:       General: She is not in acute distress.     Appearance: She is not toxic-appearing.   HENT:      Head: Normocephalic and atraumatic.   Eyes:      Conjunctiva/sclera: Conjunctivae normal.   Cardiovascular:      Rate and Rhythm: Normal rate and regular rhythm.      Heart sounds: Normal heart sounds.   Pulmonary:      Effort: Pulmonary effort is normal.      Breath sounds: Normal breath sounds.   Abdominal:      General: Bowel sounds are normal. There is no distension.      Palpations: Abdomen is soft.      Tenderness: There is no abdominal tenderness.   Musculoskeletal:      Cervical back: Neck supple.   Skin:     General: Skin is warm and dry.   Neurological:      General: No focal deficit present.      Mental Status: She is alert and oriented to person, place, and time.   Psychiatric:         Mood and Affect: Mood normal.         Behavior: Behavior normal.      LINES, CATHETERS, DRAINS, AIRWAYS, AND WOUNDS   Lines, Drains, and Airways:  Wounds (agree with documentation and present on admission):  Peripheral IV (Adult) 01/26/23 Right Antecubital (Active)   Number of days: 2         Comments:      LABS / IMAGING / TELE      Labs  Reviewed  Results from last 7 days   Lab Units 01/26/23  1451   WBC K/uL 5.11   HEMOGLOBIN g/dL 12.9   HEMATOCRIT % 41.7   PLATELETS K/uL 242     Results from last 7 days   Lab Units 01/28/23  1015 01/26/23  1451   SODIUM mEQ/L 140 136   POTASSIUM mEQ/L 3.5* 4.1   CHLORIDE  mEQ/L 107 107   CO2 mEQ/L 25 20*   BUN mg/dL 19 20   CREATININE mg/dL 0.9 0.9   GLUCOSE mg/dL 151* 120*   CALCIUM mg/dL 9.0 9.3       SARS-CoV-2 (COVID-19) (no units)   Date/Time Value   01/26/2023 1611 Negative       Imaging  No new imaging    ECG/Telemetry  SR, runs of SVT    ASSESSMENT AND PLAN      SVT (supraventricular tachycardia) (CMS/[facility])  Assessment & Plan  Replete electrolytes  Cardiology consult  Monitor on tele    GERD (gastroesophageal reflux disease)  Assessment & Plan  PPI as needed    Lumbar spinal stenosis  Assessment & Plan  On duloxetine and pregabalin - PDMP reviewed.     Plan-   - Continue current medications     Primary hypertension  Assessment & Plan  Has not been on any medications for the last 4 years  As above was being managed in overview was being managed without medications  Blood pressure was stable on Monday during her pulmonary visit  Currently with significantly elevated levels      Plan-   -Started hydralazine po.  Monitor blood pressure closely and titrate dose as needed.  Patient should have tight control of blood pressure given cerebellar aneurysm  - Discussed with patient; plan to monitor BP closely        Pulmonary emphysema (CMS/[facility])  Assessment & Plan  Currently doing well without any symptoms  Continue her outpatient medications    Asthma  Assessment & Plan  Denies any acute exacerbations.   Lungs clear on exam.     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     Lipid panel results --> ; patient not currently prescribed a statin. Discussed with patient and discussed option for starting statin given lipid results. Patient states she prefers  "to discuss options with her PCP Dr. Linares at discharge and verbalizes understanding of risks vs benefits of statin for stroke prevention.       Plan-    - Hydralazine IVP PRN added for better BP control; patient has history of \"intolerance\" to several BP medications in the past.  We will start patient on hydralazine and monitor blood pressure closely.  Titrate to have a tight blood pressure control.  Discussed with Dr. Cottrell recommending aspirin every other day.  Await input from neurosurgery  - Neurochecks   - PT/OT evaluation   - Neurosurgery consulted for further evaluation          VTE Assessment: Padua VTE Score: 4  VTE Prophylaxis:  Current anticoagulants:  enoxaparin (LOVENOX) syringe 30 mg, subcutaneous, Daily (6p)      Code Status: DNR (A.N.D.)      Estimated Discharge Date: 1/28/2023       Disposition Planning: home when medically stable     Apollo Iverson MD  1/28/2023               "

## 2023-01-28 NOTE — PLAN OF CARE
Problem: Adult Inpatient Plan of Care  Goal: Plan of Care Review  Outcome: Progressing  Flowsheets (Taken 1/28/2023 0420)  Progress: improving  Plan of Care Reviewed With: patient  Outcome Summary: VSS. BP improving with Hydralazine. OOB with assist x1. Denies pain at this time.   Plan of Care Review  Plan of Care Reviewed With: patient  Progress: improving  Outcome Summary: VSS. BP improving with Hydralazine. OOB with assist x1. Denies pain at this time.

## 2023-01-28 NOTE — DISCHARGE SUMMARY
Hospital Medicine Service -  Inpatient Discharge Summary        BRIEF OVERVIEW   Admitting Provider: Apollo Iverson MD  Attending Provider: Apollo Iverson MD Attending phys phone: [phone]    PCP: Henry Linares [phone]    Admission Date: 1/26/2023  Discharge Date: 1/29/2023     DISCHARGE DIAGNOSES      Primary Discharge Diagnosis  Word finding difficulty    Secondary Discharge Diagnoses  Active Hospital Problems    Diagnosis Date Noted   • SVT (supraventricular tachycardia) (CMS/[facility]) 01/28/2023   • Word finding difficulty 01/26/2023   • GERD (gastroesophageal reflux disease) 06/08/2019   • Primary hypertension 04/02/2018   • Lumbar spinal stenosis 04/02/2018   • Pulmonary emphysema (CMS/[facility]) 04/02/2018   • Asthma 06/16/2016      Resolved Hospital Problems   No resolved problems to display.       Problem List on Day of Discharge  SVT (supraventricular tachycardia) (CMS/[facility])  Assessment & Plan  Replete electrolytes  Cardiology consult  Monitor on tele    GERD (gastroesophageal reflux disease)  Assessment & Plan  PPI as needed    Lumbar spinal stenosis  Assessment & Plan  On duloxetine and pregabalin - PDMP reviewed.     Plan-   - Continue current medications     Primary hypertension  Assessment & Plan  Has not been on any medications for the last 4 years  As above was being managed in overview was being managed without medications  Blood pressure was stable on Monday during her pulmonary visit  Currently with significantly elevated levels      Plan-   -Started hydralazine po.  Monitor blood pressure closely and titrate dose as needed.  Patient should have tight control of blood pressure given cerebellar aneurysm  - Discussed with patient; plan to monitor BP closely        Pulmonary emphysema (CMS/[facility])  Assessment & Plan  Currently doing well without any symptoms  Continue her outpatient medications    Asthma  Assessment & Plan  Denies any acute exacerbations.   Lungs clear on exam.     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     Lipid panel results --> ; patient not currently prescribed a statin. Discussed with patient and discussed option for starting statin given lipid results. Patient states she prefers to discuss options with her PCP Dr. Linares at discharge and verbalizes understanding of risks vs benefits of statin for stroke prevention.       Plan-    - Hydralazine IVP PRN added for better BP control; patient has history of \"intolerance\" to several BP medications in the past.  We will start patient on hydralazine and monitor blood pressure closely.  Titrate to have a tight blood pressure control.  Discussed with Dr. Cottrell recommending aspirin every other day.  Await input from neurosurgery  - Neurochecks   - PT/OT evaluation   - Neurosurgery consulted for further evaluation     SUMMARY OF HOSPITALIZATION      Presenting Problem/History of Present Illness  This is a 89 y.o. year-old female admitted on 1/26/2023 with Word finding difficulty [R47.89].      Hospital Course  89 y.o. female with a history of ABPA, allergic rhinitis, chronic obstructive pulmonary disease, hypertension, hyperlipidemia, GERD presented with complaints of generalized weakness, word finding difficulty.  She was admitted to rule out CVA.  MRI brain was checked which showed no evidence of CVA.  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  Blood pressure was noted to be very " elevated and patient reported intolerance to various antihypertensives so she was started on hydralazine and dose was titrated with which her blood pressure improved to adequate range by the time of discharge.  Close monitoring of blood pressure recommended outpatient with follow-up with her PCP.  Her neuro issues were felt to be blood pressure related.  Per neurology recommendation she is to be continued on baby aspirin and statin was recommended for elevated LDL levels but patient refused at this time and stated she will discuss with her PCP outpatient about starting.  Also had brief runs of SVT/PAT while in hospital which was evaluated by cardiology who recommended no further meds and outpatient follow-up.  Currently stable for discharge home with home health per PT/OT recs.  Spent 40 minutes on patient care.    Exam on Day of Discharge  Physical Exam  Constitutional:       General: She is not in acute distress.     Appearance: She is not toxic-appearing.   HENT:      Head: Normocephalic and atraumatic.   Eyes:      Conjunctiva/sclera: Conjunctivae normal.   Cardiovascular:      Rate and Rhythm: Normal rate and regular rhythm.      Heart sounds: Normal heart sounds.   Pulmonary:      Effort: Pulmonary effort is normal.      Breath sounds: Normal breath sounds.   Abdominal:      General: Bowel sounds are normal. There is no distension.      Palpations: Abdomen is soft.      Tenderness: There is no abdominal tenderness.   Musculoskeletal:      Cervical back: Neck supple.   Skin:     General: Skin is warm and dry.   Neurological:      General: No focal deficit present.      Mental Status: She is alert and oriented to person, place, and time.   Psychiatric:         Mood and Affect: Mood normal.         Behavior: Behavior normal.          Consults During Admission  IP CONSULT TO NEUROLOGY  IP CONSULT TO NEUROSURGERY  IP CONSULT TO CARDIOLOGY    DISCHARGE MEDICATIONS               Medication List      START taking  these medications    hydrALAZINE 25 mg tablet  Commonly known as: APRESOLINE  Take 1 tablet (25 mg total) by mouth every 8 (eight) hours.  Dose: 25 mg        CONTINUE taking these medications    albuterol 2.5 mg /3 mL (0.083 %) nebulizer solution  Take 3 mL (2.5 mg total) by nebulization every 6 (six) hours as needed for wheezing or shortness of breath.  Dose: 2.5 mg     aspirin 81 mg enteric coated tablet  Take 81 mg by mouth daily.  Dose: 81 mg     benralizumab 30 mg/mL syringe subcutaneous syringe  Commonly known as: FASENRA  Inject 30 mg under the skin every 2 (two) months.  Dose: 30 mg     benzonatate 200 mg capsule  Commonly known as: TESSALON  TAKE 1 CAPSULE BY MOUTH 3 TIMES DAILY AS NEEDED FOR COUGH.     calcium carbonate 600 mg calcium (1,500 mg) tablet  Take 1 tablet by mouth 2 (two) times a day.  Dose: 1 tablet     CENTRUM ORAL  Take 1 tablet by mouth daily.  Dose: 1 tablet     cholecalciferol (vitamin D3) 25 mcg (1,000 unit) capsule  Take 1 tablet by mouth daily.  Dose: 1 tablet     docusate sodium 100 mg capsule  Commonly known as: COLACE  Take 100 mg by mouth nightly.  Dose: 100 mg     DULoxetine 60 mg capsule  Commonly known as: CYMBALTA  Take 60 mg by mouth at bedtime.  Dose: 60 mg     esomeprazole 20 mg capsule  Commonly known as: NexIUM  Take 20 mg by mouth daily as needed. Take 30 minutes prior to meal.  Dose: 20 mg     fexofenadine 180 mg tablet  Commonly known as: ALLEGRA  Take 180 mg by mouth daily as needed.  Dose: 180 mg     fluticasone propionate 50 mcg/actuation nasal spray  Commonly known as: FLONASE  Administer 1 spray into each nostril daily as needed for rhinitis or allergies.  Dose: 1 spray     LORazepam 1 mg tablet  Commonly known as: ATIVAN  TAKE 1 TABLET BY MOUTH NIGHTLY AS NEEDED FOR ANXIETY OR SLEEP. Strength: 1 mg     ofloxacin 0.3 % ophthalmic solution  Commonly known as: OCUFLOX  Only after injections into the retina     * pregabalin 25 mg capsule  Commonly known as:  LYRICA  Take 50 mg by mouth 2 (two) times a day.  Dose: 50 mg     * pregabalin 25 mg capsule  Commonly known as: LYRICA  Take 25 mg by mouth daily as needed (pain). afternoon  Dose: 25 mg     SPIRIVA RESPIMAT 2.5 mcg/actuation mist inhaler  Inhale 2 puffs daily.  Dose: 2 puff  Generic drug: tiotropium bromide     SYMBICORT 80-4.5 mcg/actuation inhaler  Inhale 2 puffs 2 (two) times a day.  Dose: 2 puff  Generic drug: budesonide-formoteroL     traMADoL 50 mg tablet  Commonly known as: ULTRAM  Take 50 mg by mouth daily as needed.  Dose: 50 mg     zoledronic acid IVPB  Commonly known as: RECLAST  Infuse 5 mg into a venous catheter See admin instr. 5 mg once yearly  Dose: 5 mg         * This list has 2 medication(s) that are the same as other medications prescribed for you. Read the directions carefully, and ask your doctor or other care provider to review them with you.                 Instructions for after discharge     Call provider for:  difficulty breathing, headache or visual disturbances      Call provider for:  extreme fatigue      Call provider for:  persistent dizziness or light-headedness      Call provider for:  persistent nausea or vomiting      Call provider for:  rash      Call provider for:  redness, tenderness, or signs of infection (pain, swelling, redness, odor or green/yellow discharge around incision site)      Call provider for:  severe uncontrolled pain      Call provider for:  temperature >100.4      Discharge diet      Diet Type / Texture: Cardiac (Low Sodium, Low Fat)    Driving restriction: No driving.      Follow-up with Provider:      In 1 to 2 weeks    Henrietta Crespo MD   [phone]    [address] [facility] [address]  [facility] [address]       Follow-up with primary physician (PCP)      Within a week for Blood pressure check    Follow-up with provider      In one year for your aneurysm. You will need a MRA of your brain prior to your visit for follow ip    Jessika Zelaya MD   [phone]     [address]  [facility] [place] [address]       Post-Discharge Activity: Normal activity as tolerated.      Normal activity as tolerated.    MRI ANGIOGRAM HEAD WITHOUT CONTRAST      Release to patient: Immediate             PROCEDURES / LABS / IMAGING      Operative Procedures  n/a    SARS-CoV-2 (COVID-19) (no units)   Date/Time Value   01/26/2023 1611 Negative       Pertinent Imaging  X-RAY CHEST 2 VIEWS    Result Date: 1/26/2023  IMPRESSION: No acute cardiopulmonary process.    CT HEAD WITHOUT IV CONTRAST    Result Date: 1/26/2023  IMPRESSION: 1. No acute hemorrhage. No acute infarction in a major vascular territory. No mass or mass effect. 2. Chronic changes as described in the discussion.     MRI ANGIOGRAM HEAD WITHOUT CONTRAST    Result Date: 1/27/2023  IMPRESSION: 1. No evidence of hemorrhage, mass or new infarct. 2. No hemodynamically significant stenosis in the neck. 3. 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 4. Paranasal sinus disease. Correlate for chronic sinusitis. In accordance with [place] Act 112,  the patient will receive a letter notifying them to follow up with their physician. COMMENT: Postsurgical change: None. Restricted diffusion: None Brain parenchyma: There is no intraparenchymal hemorrhage, mass effect, midline shift or extra-axial fluid collection. White matter changes: Normal for age Ventricles, cisterns, and sulci: Normal in size and configuration. Sella and cerebellar tonsils: Normal in appearance. Orbits: Normal Nasopharynx: Normal Calvarium and extra cranial soft tissues: Normal. Paranasal sinuses: Partial opacification of the ethmoid, sphenoid and left maxillary sinus. Mastoid air cell: Normal Vascular flow voids: Normal Cervical MRA: There is patency the bilateral common, internal carotid and vertebral arteries. Cerebral MRA: Findings suggestive of a 3 mm aneurysm of the left superior cerebellar artery.     MRI  ANGIOGRAM NECK WITHOUT CONTRAST    Result Date: 1/27/2023  IMPRESSION: 1. No evidence of hemorrhage, mass or new infarct. 2. No hemodynamically significant stenosis in the neck. 3. 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 4. Paranasal sinus disease. Correlate for chronic sinusitis. In [place] with [place] Act 112,  the patient will receive a letter notifying them to follow up with their physician. COMMENT: Postsurgical change: None. Restricted diffusion: None Brain parenchyma: There is no intraparenchymal hemorrhage, mass effect, midline shift or extra-axial fluid collection. White matter changes: Normal for age Ventricles, cisterns, and sulci: Normal in size and configuration. Sella and cerebellar tonsils: Normal in appearance. Orbits: Normal Nasopharynx: Normal Calvarium and extra cranial soft tissues: Normal. Paranasal sinuses: Partial opacification of the ethmoid, sphenoid and left maxillary sinus. Mastoid air cell: Normal Vascular flow voids: Normal Cervical MRA: There is patency the bilateral common, internal carotid and vertebral arteries. Cerebral MRA: Findings suggestive of a 3 mm aneurysm of the left superior cerebellar artery.     MRI BRAIN WITHOUT CONTRAST    Result Date: 1/27/2023  IMPRESSION: 1. No evidence of hemorrhage, mass or new infarct. 2. No hemodynamically significant stenosis in the neck. 3. 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 4. Paranasal sinus disease. Correlate for chronic sinusitis. In accordance with [place] Act 112,  the patient will receive a letter notifying them to follow up with their physician. COMMENT: Postsurgical change: None. Restricted diffusion: None Brain parenchyma: There is no intraparenchymal hemorrhage, mass effect, midline shift or extra-axial fluid collection. White matter changes: Normal for age Ventricles, cisterns, and sulci: Normal in size and configuration. Sella  and cerebellar tonsils: Normal in appearance. Orbits: Normal Nasopharynx: Normal Calvarium and extra cranial soft tissues: Normal. Paranasal sinuses: Partial opacification of the ethmoid, sphenoid and left maxillary sinus. Mastoid air cell: Normal Vascular flow voids: Normal Cervical MRA: There is patency the bilateral common, internal carotid and vertebral arteries. Cerebral MRA: Findings suggestive of a 3 mm aneurysm of the left superior cerebellar artery.       OUTPATIENT  FOLLOW-UP / REFERRALS / PENDING TESTS        Outpatient Follow-Up Appointments  Encounter Information    This patient does not currently have any appointments scheduled.         Referrals  No orders of the defined types were placed in this encounter.      Test Results Pending at Discharge  Unresulted Labs (From admission, onward)    None          Important Issues to Address in Follow-Up  Follow up with with PCP within a week for BP check  Follow up with neurosurgery team in one yr for aneurysm     DISCHARGE DISPOSITION AND DESTINATION      Disposition: Home Health Care - [facility]  Destination: Home                            Code Status At Discharge: DNR (A.N.D.)    Physician Order for Life-Sustaining Treatment Document Status      No documents found

## 2023-01-28 NOTE — NURSING NOTE
Report called to Shir[facility]OR[facility] 3B , pt to be transferred to room 334 at pt and family request for pt to have a room with a bathroom as she is IP.Pt will say over another night to have a Cardiology Consult due to some short bursts of SVT vs PAT/afib on tele monitor per Dr Iverson.

## 2023-01-28 NOTE — PLAN OF CARE
Plan of Care Review  Plan of Care Reviewed With: patient, daughter  Progress: improving  Outcome Summary: MRI negative, 3mm aneuysm noted, Neuro/Neurosurgery consulted, PT/OT consulted, blood pressure hypertensive Hydralazine given, c/o moderate pain, Tramadol/Tylenol given, daughter updated on POC

## 2023-01-29 VITALS
HEIGHT: 60 IN | OXYGEN SATURATION: 96 % | WEIGHT: 116.1 LBS | DIASTOLIC BLOOD PRESSURE: 77 MMHG | RESPIRATION RATE: 14 BRPM | BODY MASS INDEX: 22.79 KG/M2 | SYSTOLIC BLOOD PRESSURE: 137 MMHG | HEART RATE: 83 BPM | TEMPERATURE: 97.6 F

## 2023-01-29 PROCEDURE — 63700000 HC SELF-ADMINISTRABLE DRUG: Performed by: HOSPITALIST

## 2023-01-29 PROCEDURE — 99239 HOSP IP/OBS DSCHRG MGMT >30: CPT | Performed by: HOSPITALIST

## 2023-01-29 PROCEDURE — 99223 1ST HOSP IP/OBS HIGH 75: CPT | Performed by: INTERNAL MEDICINE

## 2023-01-29 PROCEDURE — 63700000 HC SELF-ADMINISTRABLE DRUG: Performed by: INTERNAL MEDICINE

## 2023-01-29 PROCEDURE — 63700000 HC SELF-ADMINISTRABLE DRUG

## 2023-01-29 RX ORDER — HYDRALAZINE HYDROCHLORIDE 25 MG/1
25 TABLET, FILM COATED ORAL EVERY 8 HOURS
Qty: 90 TABLET | Refills: 0 | Status: SHIPPED | OUTPATIENT
Start: 2023-01-29 | End: 2023-02-22 | Stop reason: HOSPADM

## 2023-01-29 RX ADMIN — TIOTROPIUM BROMIDE INHALATION SPRAY 2 PUFF: 3.12 SPRAY, METERED RESPIRATORY (INHALATION) at 09:43

## 2023-01-29 RX ADMIN — PREGABALIN 50 MG: 50 CAPSULE ORAL at 09:45

## 2023-01-29 RX ADMIN — ACETAMINOPHEN 650 MG: 325 TABLET, FILM COATED ORAL at 10:23

## 2023-01-29 RX ADMIN — HYDRALAZINE HYDROCHLORIDE 25 MG: 25 TABLET, FILM COATED ORAL at 14:34

## 2023-01-29 RX ADMIN — HYDRALAZINE HYDROCHLORIDE 25 MG: 25 TABLET, FILM COATED ORAL at 06:32

## 2023-01-29 RX ADMIN — TRAMADOL HYDROCHLORIDE 50 MG: 50 TABLET, COATED ORAL at 14:45

## 2023-01-29 RX ADMIN — MOMETASONE FUROATE AND FORMOTEROL FUMARATE DIHYDRATE 2 PUFF: 100; 5 AEROSOL RESPIRATORY (INHALATION) at 09:42

## 2023-01-29 NOTE — PLAN OF CARE
Problem: Adult Inpatient Plan of Care  Goal: Plan of Care Review  1/29/2023 0404 by Lilia Ruth RN  Outcome: Progressing  Flowsheets (Taken 1/29/2023 0404)  Progress: improving  Plan of Care Reviewed With: patient  Outcome Summary: No complaints of pain or discomfort, VSS, SR with PACS on the monitor. Will Continue to monitor.

## 2023-01-29 NOTE — NURSING NOTE
Pt adequate for discharge. VSS. Discharge instructions reviewed with pt and daughter at bedside, safely transported to ER [facility].

## 2023-01-29 NOTE — CONSULTS
[facility] Cardiology  [facility] Consult Note       Reason for consult: SVT     HPI     Madelyn Ruiz is a 89 y.o. female, not previously known to our office, with a past medical history significant for allergic bronchopulmonary aspergillosis, COPD, hypertension, hyperlipidemia, GERD and a pulmonary embolism in 2019 and mild sleep apnea.  She presented to [facility] on January 26, 2023 with generalized weakness and word finding difficulty.    On admission the patient reported that on Monday (January 23) she began to feel generally unwell.  She was eventually brought to the hospital for further evaluation given this and issues with intermittent word finding, fatigue and generalized weakness. She also reported feeling very shaky.     CT head on admission was unremarkable and she subsequently underwent an MRI brain as well as MRA head/neck.  This showed no hemorrhage, mass or infarct and there was no hemodynamically significant stenosis in the neck.  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 Neurosurgery was consulted with regards to the cerebral aneurysm and recommended a repeat MRI in 1 year to evaluate for any progression as well as optimizing modifiable risk factors.    On admission her blood pressure was significantly elevated with a peak ~ 214/113.  This was thought to possibly contribute to her symptoms (hypertensive encephalopathy) versus age related changes.  She reports a history of intolerance to several antihypertensives and the patient was ultimately started on hydralazine for blood pressure management.      Cardiology was consulted on admission day #3 for evaluation of SVT on telemetry. Telemetry reviewed however the patient was transferred from observation to the 3rd floor. Strips available in chart and current telemetry show sinus rhythm with brief episodes of SVT.    On interview this morning the patient feels well  and is eager for discharge home. She denies cardiovascular complaints including chest pain, shortness of breath, lightheadedness, dizziness, palpitations, syncope, presyncope or lower extremity edema.  She states that she saw a cardiologist many years ago for a general checkup but does not follow with one routinely.   Past History      Past Medical History:   Diagnosis Date   • Allergic bronchopulmonary aspergillosis (CMS/[facility]) 04/02/2018    Allergist: Dr. Arriaza.  Stable FEV1 in 8/2015.  IgE levels and eosinophils stable in 8/2015.  Managed with Fasenra, Azithromycin and Flovent.   • Allergic rhinitis 04/02/2018    Pulmonologist: Dr. Walker. Managed with Flonase.   • Calcification of aorta (CMS/[facility]) 06/29/2020    Seen incidentally on CT scan.   • Chronic obstructive pulmonary disease (CMS/[facility]) 04/02/2018    Pulmonologist: Dr. Walker.  Seen on PFTs in hospital in 2014.  Controlled with Spiriva and FLovent.   • Essential hypertension 04/02/2018    Managed on no medications at this time. Advised to follow a low sodium diet and keep BMI < 25.  Advised to exercise for 2.5 hours per week.  Instructed to take home blood pressure readings and call if consistently above 140/90.     • GERD (gastroesophageal reflux disease) 06/08/2019    Managed with Esomeprazole.  Should take medication 30 minutes prior to meal.  Advised to avoid caffeine, carbonated beverages, and should not eat within 3 hours of going to sleep.  Advised to reduce BMI < 25.    • Insomnia 04/02/2018    Managed with Lorazepam as needed.   • Lumbar spinal stenosis 04/02/2018    Neurosurgeon: Dr. Miguel. MRI with Central and lateral recess spinal stenosis. Has Spondylolisthesis at L4/L5. S/P epidural injection by Dr. Martin with some relief in past. Had Lumbar fusion and Lumbar laminectomy by Dr. Miguel in 4/2015.   • Macular degeneration disease    • Obstructive sleep apnea syndrome 04/02/2018    Mild STACIA noted in sleep study 7/2015. Treated with nasal CPAP  automatic with pressure range 5-10 CM H2Ox couple months. Off now   • Osteoporosis 04/02/2018    Rheumatologist: Dr. Bradley.  Dexa scan 7/2016 with osteoporosis of lumbar spine LS-2.5, LH-2.1, RH -2.2.  Has been on Fosamax in the past for 8 years.  Worsened by Prednisone in past.  Continue vitamin D, calcium and weight bearing exercise. DEXA in 7/2017 with LS -1.7, LH -2.2, and RH -2.0.  Next due in 7/2019.  Seen by Dr. Bradley in 8/2015 who agreed with initiating Prolia. Took for 2 years. N   • Pulmonary embolism (CMS/HCC) 06/09/2019    Hematologist: Dr. Bhakta Diagnosed in 06/2019 in right upper lobe. Vascular ultrasound negative of bilateral lower extremities. Managed on Eliquis. Hypercoagulable workup was negative for beta-2 glycoprotein's, RIGO, Antithrombin III, lupus anticoagulant, protein C activity protein S activity, and prothrombin gene mutation, and factor V Leiden. Now off Apixaban as CT scan in 12/2019 was without pu   • Vertebral compression fracture (CMS/HCC)     At T12 level       Past Surgical History:   Procedure Laterality Date   • APPENDECTOMY     • CARPAL TUNNEL RELEASE Bilateral    • CATARACT EXTRACTION W/  INTRAOCULAR LENS IMPLANT Bilateral    • HYSTERECTOMY  1972   • LUMBAR LAMINECTOMY  04/21/2015    S/P lumbar fusion   • TONSILLECTOMY         Social History     Social History Narrative    Exercise: No formal exercise. Due to back pain from spinal stenosis.       Family History   Problem Relation Age of Onset   • Glaucoma Biological Mother    • Macular degeneration Biological Mother    • Hypertension Biological Mother    • Lung cancer Biological Father    • Heart disease Biological Brother    • Breast cancer Mother's Sister    • No Known Problems Biological Son    • No Known Problems Biological Son    • No Known Problems Biological Daughter    • No Known Problems Biological Daughter    • No Known Problems Biological Daughter         Medications     Medications prior to  admission:  Medications Prior to Admission   Medication Sig Dispense Refill Last Dose   • pregabalin (LYRICA) 25 mg capsule Take 25 mg by mouth daily as needed (pain). afternoon      • albuterol 2.5 mg /3 mL (0.083 %) nebulizer solution Take 3 mL (2.5 mg total) by nebulization every 6 (six) hours as needed for wheezing or shortness of breath. 125 vial 0    • aspirin 81 mg enteric coated tablet Take 81 mg by mouth daily.      • benralizumab (FASENRA) 30 mg/mL syringe subcutaneous syringe Inject 30 mg under the skin every 2 (two) months.      • benzonatate (TESSALON) 200 mg capsule TAKE 1 CAPSULE BY MOUTH 3 TIMES DAILY AS NEEDED FOR COUGH.      • calcium carbonate 600 mg calcium (1,500 mg) tablet Take 1 tablet by mouth 2 (two) times a day.      • cholecalciferol, vitamin D3, 25 mcg (1,000 unit) capsule Take 1 tablet by mouth daily.      • docusate sodium (COLACE) 100 mg capsule Take 100 mg by mouth nightly.      • DULoxetine (CYMBALTA) 60 mg capsule Take 60 mg by mouth at bedtime.   0    • esomeprazole (NexIUM) 20 mg capsule Take 20 mg by mouth daily as needed. Take 30 minutes prior to meal.      • fexofenadine (ALLEGRA) 180 mg tablet Take 180 mg by mouth daily as needed.      • fluticasone propionate (FLONASE) 50 mcg/actuation nasal spray Administer 1 spray into each nostril daily as needed for rhinitis or allergies.      • FOLIC ACID/MULTIVIT,IRON, (CENTRUM ORAL) Take 1 tablet by mouth daily.      • LORazepam (ATIVAN) 1 mg tablet TAKE 1 TABLET BY MOUTH NIGHTLY AS NEEDED FOR ANXIETY OR SLEEP. Strength: 1 mg 30 tablet 5    • ofloxacin (OCUFLOX) 0.3 % ophthalmic solution Only after injections into the retina      • pregabalin (LYRICA) 25 mg capsule Take 50 mg by mouth 2 (two) times a day.      • SPIRIVA RESPIMAT 2.5 mcg/actuation mist inhaler Inhale 2 puffs daily.      • SYMBICORT 80-4.5 mcg/actuation inhaler Inhale 2 puffs 2 (two) times a day.      • traMADoL (ULTRAM) 50 mg tablet Take 50 mg by mouth daily as  needed.      • zoledronic acid (RECLAST) IVPB Infuse 5 mg into a venous catheter See admin instr. 5 mg once yearly          Scheduled Inpatient Medications:  • aspirin  81 mg oral q48h INT   • cetirizine  10 mg oral Nightly   • docusate sodium  100 mg oral Nightly   • DULoxetine  60 mg oral Nightly   • enoxaparin  30 mg subcutaneous Daily (6p)   • hydrALAZINE  25 mg oral q8h NOE   • mometasone-formoterol  2 puff inhalation BID   • pregabalin  50 mg oral BID   • tiotropium bromide  2 puff inhalation Daily       Scheduled Inpatient Infusions:       Allergies     Mepolizumab, Morphine, and Oxycodone     Vital Signs/Exam     Vital signs in last 24 hours:  Temp:  [36.1 °C (96.9 °F)-36.6 °C (97.9 °F)] 36.1 °C (96.9 °F)  Heart Rate:  [] 84  Resp:  [16-18] 18  BP: (130-163)/(65-89) 163/77    I/O  No intake or output data in the 24 hours ending 01/29/23 0730    Weight  Weight change:     Physical Exam:  Visit Vitals  BP (!) 163/77 (BP Location: Left upper arm, Patient Position: Lying)   Pulse 84   Temp (!) 36.1 °C (96.9 °F) (Oral)   Resp 18   Ht 1.524 m (5')   Wt 52.7 kg (116 lb 1.6 oz)   SpO2 97%   BMI 22.67 kg/m²       Gen: no distress, elderly   HEENT: no jvd  Lungs: clear bilaterally; no crackles, rhonchi, wheezing  Chest wall: no tenderness  Heart: regular rate and rhythm; no murmur  Abdomen: nondistended, nontender  Extremities: no LE edema bilaterally   Neuro: nonfocal, alert and oriented  Psych: normal mood and affect     Diagnostic Data   Diagnostic data personally reviewed.    Labs:  Results from last 7 days   Lab Units 01/26/23  1451   WBC K/uL 5.11   HEMOGLOBIN g/dL 12.9   HEMATOCRIT % 41.7   PLATELETS K/uL 242     Results from last 7 days   Lab Units 01/28/23  1015 01/26/23  1451   SODIUM mEQ/L 140 136   POTASSIUM mEQ/L 3.5* 4.1   CHLORIDE mEQ/L 107 107   CO2 mEQ/L 25 20*   BUN mg/dL 19 20   CREATININE mg/dL 0.9 0.9   GLUCOSE mg/dL 151* 120*   CALCIUM mg/dL 9.0 9.3         High Sens Troponin I   Date Value  Ref Range Status   01/26/2023 5.3 <15.0 pg/mL Final   01/26/2023 5.3 <15.0 pg/mL Final                     Results from last 7 days   Lab Units 01/27/23  0600   CHOLESTEROL mg/dL 227*   TRIGLYCERIDES mg/dL 120   HDL mg/dL 62   LDL CALC mg/dL 141*           Imaging last 24 hrs:   No results found.    Vascular Studies:  No results found for this or any previous visit from the past 365 days.          Peripheral:  No results found for this or any previous visit from the past 365 days.      Stress Tests:             Cardiac cath:      Echocardiogram: 6/11/2019  • Estimated EF = 55- 60%. Grade I diastolic dysfunction. Dysfunction consistent with impaired relaxation.  • Normal-sized right ventricular cavity with preserved systolic function.        ECG: Independently reviewed: Sinus tachycardia 109 bpm. Low voltage in the precordial leads. Possible anterior infract, age undetermined       Telemetry: Independently reviewed: Sinus rhythm with brief runs of PAT and PVCs       Assessment and Plan    CODE STATUS: DNR (A.N.D.)    Active Hospital Problems    Diagnosis Date Noted   • SVT (supraventricular tachycardia) (CMS/[facility]) 01/28/2023   • Word finding difficulty 01/26/2023   • GERD (gastroesophageal reflux disease) 06/08/2019   • Primary hypertension 04/02/2018   • Lumbar spinal stenosis 04/02/2018   • Pulmonary emphysema (CMS/[facility]) 04/02/2018   • Asthma 06/16/2016      Resolved Hospital Problems   No resolved problems to display.       Madelyn Ruiz is a 89 y.o. female, not previously known to our office, with a past medical history significant for allergic bronchopulmonary aspergillosis, COPD, hypertension, hyperlipidemia, GERD and a pulmonary embolism in 2019 (s/p 6 months of Eliquis) and mild sleep apnea.  She presented to [facility] on January 26, 2023 with generalized weakness and word finding difficulty.    Accelerated Hypertension  -HS troponin negative x 2 and EKG nonischemic  -Examines fairly euvolemic on  room air   -Blood pressure significantly elevated on admission >> improving with Hydralazine 25 mg TID  -Continue to monitor and augment regimen as needed    SVT  -Brief episodes of PAT on telemetry for which the patient is asymptomatic    -Would not initiate AV genesis blockers at this time - reconsider if more frequent/longer duration   -2019 TTE normal EF with grade I diastolic dysfunction   -Maintain K>/= 4.0 and Mg >/=2.0  -Follow telemetry     Word Finding Difficulty  -MRI brain w/o evidence of infract. No hemodynamically significant stenosis in the neck.  An incidental finding of a 3 mm aneurysm in the left SCA was noted - neurosurgery consulted with plans for repeat imaging in 1 year to monitor for progression   -Differential included age related changes versus hypertensive encephalopathy   -Per patient this has resolved   -Management per [facility]     Hyperlipidemia  -1/27 lipid profile: total 227, , HDL 62, triglycerides 120  -Statin therapy offered to patient however she would like to discuss initiation with PCP (Dr. Linares)       MICA Ruggiero with Dr. Henrietta Crespo     [facility], Main Office: [phone]  Pager [phone]  Primary [facility]: [facility]     1/29/2023

## 2023-01-30 ENCOUNTER — PATIENT OUTREACH (OUTPATIENT)
Dept: CASE MANAGEMENT | Facility: CLINIC | Age: 88
End: 2023-01-30
Payer: MEDICARE

## 2023-01-30 ASSESSMENT — ENCOUNTER SYMPTOMS
VOMITING: 0
FATIGUE: 0
WEAKNESS: 0
NAUSEA: 0
DIARRHEA: 0
DIZZINESS: 0
ARTHRALGIAS: 1
SPEECH DIFFICULTY: 0
HEADACHES: 0
LIGHT-HEADEDNESS: 0
CONSTIPATION: 0

## 2023-01-30 NOTE — PROGRESS NOTES
NAME: Madelyn Ruiz    MRN: [id number]    YOB: 1930    Event Review:    Initial TCM Patient Outreach Date: 01/30/23    Assessment completed with: Patient  Patient stated reason for hospitalization: Word Finding Difficulty  Discharge Diagnosis: Word Finding Difficulty    Patient readmitted in the last 30 days: No  Discharging Facility: [facility]  Date of Last Admission: 01/26/23  Date of Last Discharge: 01/29/23    Discharging Facilty SNF/Rehab: None          Patient's perception of their health status since discharge: Improving    HPI: Spoke with pt. Pt admitted with generalized weakness. Pt says the night before admission she had felt shaking and couldn't sleep. She felt ok in the morning. She saw her pulmonologist in the morning was fine. During the rest of the day she started to feel confused and difficulty finding her words. CT head negative. 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 Pt with elevated blood pressures 158/214/ in ED. Pt started on hydralazine. RSV/Flu/COVID negative. Pt seen by neurosurgeon-no intervention for aneurysm at this time. Pt to f/u 1 year with MRA prior to appt.     Pt says she is doing better. Denies confusion, word finding difficulty, lightheadedness or dizziness. Reviewed s/s of stroke and reporting. Pt is checking her blood pressures at home. This morning was 110/81 and at 2 pm 132/73. HR 90. Pt was told her symptoms may have been due to high blood pressures.     Review of Systems   Constitutional: Negative for fatigue.   Gastrointestinal: Negative for constipation, diarrhea, nausea and vomiting.   Musculoskeletal: Positive for arthralgias.   Neurological: Negative for dizziness, speech difficulty, weakness, light-headedness and headaches.       Medication Review:    Medication Review: Yes     Reported by: Patient  Any new medications prescribed at discharge?: Yes  Is the patient  having any side effects they believe may be caused by any medication additions or changes?: No  New prescriptions filled?: Yes     Do you have enough of your regularly prescribed medications?: Yes       Medication adherence problem?: No  Was a medication discrepancy indentified?: No       Nursing Interventions: Nurse provided patient education  Reconciled the current and discharge medications: Yes  Reviewed AVS (Discharge Instructions)?: Yes    Acute Pain:    Acute pain: No    Chronic Pain:    Chronic pain: No    Diet/Nutrition:    Type of Diet: Regular  Diet Adherence: Adherent with diet    Home Care Services:    Home Care Agency: [facility]  Type of Home Care Services: Home PT, Home Nursing        Post-Discharge Durable Medical Equipment::    Durable Medical Equipment: Front wheeled walker, Grab bars  Oxygen Use: No     DME Interventions: No intervention required    Home Management:    Living Arrangement: Alone  Support System:: Child/Children (Daughter lives nearby.)  Type of Residence: [facility] (First floor setup. Laundry in basement. Daughter assists with laundry.)  Home Monitoring: Blood Pressure  Any patient reported falls in the last 3 months?: No  Episcopalian or spiritual beliefs that impact treatment?: No    Appointment Scheduling:    PCP appointment scheduled: Yes     Appointment Date: 02/10/23     Appointment Provider: Dr. Linares        Follow-Ups:    Dr. Bedolla ([facility]): to see in 1 year  Dr. Crespo ([facility] Card[facility]): to see in 1 year  MRI/A head-1 year per [facility]    Interventions/ Care Coordination:    Interventions/ Care Coordination: Encouraged patient to schedule with the PCP/Specialist  General Education: Respiratory precautions (flu, colds, pneumonia, COVID-19), Cold weather precautions, Falls/Home safety       Reviewed signs/symptoms of worsening condition or complication that necessitate a call to the Physician's office.  Educated patient on access to care.  RN phone number given for future care  management.    Karolina Hauser, RN  [phone]

## 2023-02-10 ENCOUNTER — OFFICE VISIT (OUTPATIENT)
Dept: PRIMARY CARE | Facility: CLINIC | Age: 88
End: 2023-02-10
Payer: MEDICARE

## 2023-02-10 VITALS
BODY MASS INDEX: 22.62 KG/M2 | RESPIRATION RATE: 16 BRPM | SYSTOLIC BLOOD PRESSURE: 140 MMHG | OXYGEN SATURATION: 99 % | DIASTOLIC BLOOD PRESSURE: 78 MMHG | WEIGHT: 119.8 LBS | HEIGHT: 61 IN | HEART RATE: 94 BPM | TEMPERATURE: 97.7 F

## 2023-02-10 DIAGNOSIS — J43.9 PULMONARY EMPHYSEMA, UNSPECIFIED EMPHYSEMA TYPE (CMS/HCC): Chronic | ICD-10-CM

## 2023-02-10 DIAGNOSIS — B44.81 ALLERGIC BRONCHOPULMONARY ASPERGILLOSIS (CMS/HCC): Primary | Chronic | ICD-10-CM

## 2023-02-10 DIAGNOSIS — I10 PRIMARY HYPERTENSION: Chronic | ICD-10-CM

## 2023-02-10 DIAGNOSIS — I70.0 CALCIFICATION OF AORTA (CMS/HCC): Chronic | ICD-10-CM

## 2023-02-10 DIAGNOSIS — I47.10 SVT (SUPRAVENTRICULAR TACHYCARDIA) (CMS/HCC): ICD-10-CM

## 2023-02-10 PROBLEM — Z01.818 PREOP EXAMINATION: Status: RESOLVED | Noted: 2021-11-12 | Resolved: 2023-02-10

## 2023-02-10 PROCEDURE — 99495 TRANSJ CARE MGMT MOD F2F 14D: CPT | Performed by: FAMILY MEDICINE

## 2023-02-10 RX ORDER — AZITHROMYCIN 250 MG/1
250 TABLET, FILM COATED ORAL DAILY
COMMUNITY
Start: 2023-01-24 | End: 2023-02-10

## 2023-02-10 RX ORDER — ROSUVASTATIN CALCIUM 5 MG/1
5 TABLET, COATED ORAL DAILY
Qty: 90 TABLET | Refills: 3 | Status: SHIPPED | OUTPATIENT
Start: 2023-02-10 | End: 2023-03-28

## 2023-02-10 RX ORDER — PREGABALIN 50 MG/1
50 CAPSULE ORAL 2 TIMES DAILY
COMMUNITY
Start: 2023-01-13

## 2023-02-10 RX ORDER — AZITHROMYCIN 250 MG/1
250 TABLET, FILM COATED ORAL 3 TIMES WEEKLY
Start: 2023-02-10 | End: 2023-03-21 | Stop reason: ALTCHOICE

## 2023-02-10 ASSESSMENT — ENCOUNTER SYMPTOMS
UNEXPECTED WEIGHT CHANGE: 0
HEADACHES: 0
CONFUSION: 0
SHORTNESS OF BREATH: 0
MYALGIAS: 0
ARTHRALGIAS: 0
DIZZINESS: 0
LIGHT-HEADEDNESS: 0
CHEST TIGHTNESS: 0
SPEECH DIFFICULTY: 0
COUGH: 0
PALPITATIONS: 0
WHEEZING: 0
WEAKNESS: 0

## 2023-02-10 ASSESSMENT — PATIENT HEALTH QUESTIONNAIRE - PHQ9: SUM OF ALL RESPONSES TO PHQ9 QUESTIONS 1 & 2: 0

## 2023-02-10 NOTE — PATIENT INSTRUCTIONS
Problem List Items Addressed This Visit       Allergic bronchopulmonary aspergillosis (CMS/HCC) - Primary (Chronic)     Follow up with Dr. Arriaza         Calcification of aorta (CMS/HCC) (Chronic)     Treat with rosuvastatin.           Primary hypertension (Chronic)     Blood pressure stable on Hydralazine. Continue with home blood pressure checks and call if consistently above 150/90         Pulmonary emphysema (CMS/HCC) (Chronic)    SVT (supraventricular tachycardia) (CMS/HCC)     Resolved          Relevant Medications    rosuvastatin (CRESTOR) 5 mg tablet

## 2023-02-10 NOTE — ASSESSMENT & PLAN NOTE
Blood pressure stable on Hydralazine. Continue with home blood pressure checks and call if consistently above 150/90

## 2023-02-10 NOTE — PROGRESS NOTES
Henry Linares MD    [facility]  [address], [address]  [place] [address]  Phone: [phone]  Fax: [phone]     History of Present Illness     Subjective     Patient ID: Madelyn Ruiz is a 89 y.o. female.    SUMMARY OF HOSPITALIZATION    Presenting Problem/History of Present Illness  This is a 89 y.o. year-old female admitted on 1/26/2023 with Word finding difficulty (R47.89).        Hospital Course  89 y.o. female with a history of ABPA, allergic rhinitis, chronic obstructive pulmonary disease, hypertension, hyperlipidemia, GERD presented with complaints of generalized weakness, word finding difficulty.  She was admitted to rule out CVA.  MRI brain was checked which showed no evidence of CVA.  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  Blood pressure was noted to be very elevated and patient reported intolerance to various antihypertensives so she was started on hydralazine and dose was titrated with which her blood pressure improved to adequate range by the time of discharge.  Close monitoring of blood pressure recommended outpatient with follow-up with her PCP.  Her neuro issues were felt to be blood pressure related.  Per neurology recommendation she is to be continued on baby aspirin and statin was recommended for elevated LDL levels but patient refused at this time and stated she will discuss with her PCP outpatient about starting.  Also had brief runs of SVT/PAT while in hospital which was evaluated by cardiology who recommended no further meds and outpatient follow-up.  Currently stable for discharge home with home health per PT/OT recs.     Since discharge, No speech difficulty. No focal weakness. Has headache daily except for today.  No vertigo. No vision changes. No ataxia. Using cane. Doing physical therapy at home. Working on balance. On Hydralazine for blood pressure 2 times per day.  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 Arriaza.  Stable FEV1 in 8/2015.  IgE levels and eosinophils stable in 8/2015.  Managed with Fasenra, Azithromycin and Flovent.   • Allergic rhinitis 04/02/2018    Pulmonologist: Dr. Walker. Managed with Flonase.   • Calcification of aorta (CMS/HCC) 06/29/2020    Seen incidentally on CT scan.   • Chronic obstructive pulmonary disease (CMS/[facility]) 04/02/2018    Pulmonologist: Dr. Walker.  Seen on PFTs in hospital in 2014.  Controlled with Spiriva and FLovent.   • Essential hypertension 04/02/2018    Managed on no medications at this time. Advised to follow a low sodium diet and keep BMI < 25.  Advised to exercise for 2.5 hours per week.  Instructed to take home blood pressure readings and call if consistently above 140/90.     • GERD (gastroesophageal reflux disease) 06/08/2019    Managed with Esomeprazole.  Should take medication 30 minutes prior to meal.  Advised to avoid caffeine, carbonated beverages, and should not eat within 3 hours of going to sleep.  Advised to reduce BMI < 25.    • Insomnia 04/02/2018    Managed with Lorazepam as needed.   • Lumbar spinal stenosis 04/02/2018    Neurosurgeon: Dr. Miguel. MRI with Central and lateral recess spinal stenosis. Has Spondylolisthesis at L4/L5. S/P epidural injection by Dr. Martin with some relief in past. Had Lumbar fusion and Lumbar laminectomy by Dr. Miguel in 4/2015.   • Macular degeneration disease    • Obstructive sleep apnea syndrome 04/02/2018    Mild STACIA noted in sleep study  7/2015. Treated with nasal CPAP automatic with pressure range 5-10 CM H2Ox couple months. Off now   • Osteoporosis 04/02/2018    Rheumatologist: Dr. Bradley.  Dexa scan 7/2016 with osteoporosis of lumbar spine LS-2.5, LH-2.1, RH -2.2.  Has been on Fosamax in the past for 8 years.  Worsened by Prednisone in past.  Continue vitamin D, calcium and weight bearing exercise. DEXA in 7/2017 with LS -1.7, LH -2.2, and RH -2.0.  Next due in 7/2019.  Seen by Dr. Bradley in 8/2015 who agreed with initiating Prolia. Took for 2 years. N   • Primary hypertension 4/2/2018    Managed on Hydralazine. Advised to follow a low sodium diet and keep BMI < 25.  Advised to exercise for 2.5 hours per week.  Instructed to take home blood pressure readings and call if consistently above 140/90.     • Pulmonary embolism (CMS/HCC) 06/09/2019    Hematologist: Dr. Bhakta Diagnosed in 06/2019 in right upper lobe. Vascular ultrasound negative of bilateral lower extremities. Managed on Eliquis. Hypercoagulable workup was negative for beta-2 glycoprotein's, RIGO, Antithrombin III, lupus anticoagulant, protein C activity protein S activity, and prothrombin gene mutation, and factor V Leiden. Now off Apixaban as CT scan in 12/2019 was without pu   • Vertebral compression fracture (CMS/HCC)     At T12 level       Past Surgical History:   Procedure Laterality Date   • APPENDECTOMY     • CARPAL TUNNEL RELEASE Bilateral    • CATARACT EXTRACTION W/  INTRAOCULAR LENS IMPLANT Bilateral    • HYSTERECTOMY  1972   • LUMBAR LAMINECTOMY  04/21/2015    S/P lumbar fusion   • TONSILLECTOMY         Family History   Problem Relation Age of Onset   • Glaucoma Biological Mother    • Macular degeneration Biological Mother    • Hypertension Biological Mother    • Lung cancer Biological Father    • Heart disease Biological Brother    • Breast cancer Mother's Sister    • No Known Problems Biological Son    • No Known Problems Biological Son    • No Known Problems Biological  Daughter    • No Known Problems Biological Daughter    • No Known Problems Biological Daughter        Social History     Tobacco Use   • Smoking status: Never   • Smokeless tobacco: Never   Vaping Use   • Vaping Use: Never used   Substance Use Topics   • Alcohol use: Yes     Comment: Rarely   • Drug use: No       Patient Care Team:  Henry Linares MD as PCP - General  Ray Arriaza MD as Referring Physician  Aye Bhakta MD as Consulting Physician (Hematology and Oncology)  Wesley Walker MD as Consulting Physician (Pulmonary)  Fausto Bradley MD as Consulting Physician (Rheumatology)  Kleiner, Robert C, MD as Referring Physician  Alfie Torres MD as Consulting Physician (Dermatology)  Fanny Pelaez MD as Consulting Physician (Orthopedic Surgery)  Keenan Krause DO as Consulting Physician (Family Medicine)  Keenan Krause DO as Consulting Physician (Family Medicine)  Wesley You DO as Consulting Physician (Physical Medicine and Rehabilitation)  Jessika Zelaya MD as Surgeon (Neurosurgery)  Henrietta Crespo MD as Cardiologist (Interventional Cardiology)  Karolina Hauser RN as Care Manager (Care Management)  Dinesh Goel MD as Surgeon (Otolaryngology)     Allergies and Medications       Allergies   Allergen Reactions   • Mepolizumab Rash   • Morphine      Other reaction(s): Vomiting   • Oxycodone      Other reaction(s): Vomiting       Current Outpatient Medications   Medication Sig Dispense Refill   • albuterol 2.5 mg /3 mL (0.083 %) nebulizer solution Take 3 mL (2.5 mg total) by nebulization every 6 (six) hours as needed for wheezing or shortness of breath. 125 vial 0   • aspirin 81 mg enteric coated tablet Take 81 mg by mouth daily.     • azithromycin (ZITHROMAX) 250 mg tablet Take 1 tablet (250 mg total) by mouth 3 (three) times a week (Mon, Wed, Fri).     • benralizumab (FASENRA) 30 mg/mL syringe subcutaneous syringe Inject 30 mg under the skin every 2 (two) months.      • benzonatate (TESSALON) 200 mg capsule TAKE 1 CAPSULE BY MOUTH 3 TIMES DAILY AS NEEDED FOR COUGH.     • calcium carbonate 600 mg calcium (1,500 mg) tablet Take 1 tablet by mouth 2 (two) times a day.     • cholecalciferol, vitamin D3, 25 mcg (1,000 unit) capsule Take 1 tablet by mouth daily.     • docusate sodium (COLACE) 100 mg capsule Take 100 mg by mouth nightly.     • DULoxetine (CYMBALTA) 60 mg capsule Take 60 mg by mouth at bedtime.   0   • esomeprazole (NexIUM) 20 mg capsule Take 20 mg by mouth daily as needed. Take 30 minutes prior to meal.     • fexofenadine (ALLEGRA) 180 mg tablet Take 180 mg by mouth daily as needed.     • fluticasone propionate (FLONASE) 50 mcg/actuation nasal spray Administer 1 spray into each nostril daily as needed for rhinitis or allergies.     • FOLIC ACID/MULTIVIT,IRON, (CENTRUM ORAL) Take 1 tablet by mouth daily.     • hydrALAZINE (APRESOLINE) 25 mg tablet Take 1 tablet (25 mg total) by mouth every 8 (eight) hours. 90 tablet 0   • LORazepam (ATIVAN) 1 mg tablet TAKE 1 TABLET BY MOUTH NIGHTLY AS NEEDED FOR ANXIETY OR SLEEP. Strength: 1 mg 30 tablet 5   • ofloxacin (OCUFLOX) 0.3 % ophthalmic solution Only after injections into the retina     • pregabalin (LYRICA) 50 mg capsule Take 50 mg by mouth 2 (two) times a day.     • rosuvastatin (CRESTOR) 5 mg tablet Take 1 tablet (5 mg total) by mouth daily. 90 tablet 3   • SPIRIVA RESPIMAT 2.5 mcg/actuation mist inhaler Inhale 2 puffs daily.     • SYMBICORT 80-4.5 mcg/actuation inhaler Inhale 2 puffs 2 (two) times a day.     • traMADoL (ULTRAM) 50 mg tablet Take 50 mg by mouth daily as needed.     • zoledronic acid (RECLAST) IVPB Infuse 5 mg into a venous catheter See admin instr. 5 mg once yearly       No current facility-administered medications for this visit.          Review of Systems       Review of Systems   Constitutional: Negative for unexpected weight change.   Eyes: Negative for visual disturbance.   Respiratory: Negative  "for cough, chest tightness, shortness of breath and wheezing.    Cardiovascular: Negative for chest pain, palpitations and leg swelling.   Musculoskeletal: Negative for arthralgias and myalgias.   Neurological: Negative for dizziness, syncope, speech difficulty, weakness, light-headedness and headaches.   Psychiatric/Behavioral: Negative for confusion.        Physical Examination       Objective     Vitals:    02/10/23 0958   BP: 140/78   Pulse: 94   Resp: 16   Temp: 36.5 °C (97.7 °F)   SpO2: 99%       Pulse Readings from Last 5 Encounters:   02/10/23 94   01/29/23 83   10/19/22 (!) 101   08/08/22 89   08/01/22 94       Wt Readings from Last 5 Encounters:   02/10/23 54.3 kg (119 lb 12.8 oz)   01/27/23 52.7 kg (116 lb 1.6 oz)   01/26/23 53.4 kg (117 lb 11.6 oz)   10/19/22 55.8 kg (123 lb)   08/08/22 54.9 kg (121 lb)       Ht Readings from Last 5 Encounters:   02/10/23 1.549 m (5' 1\")   01/27/23 1.524 m (5')   01/26/23 1.524 m (5')   08/08/22 1.524 m (5')   04/25/22 1.537 m (5' 0.5\")       BP Readings from Last 5 Encounters:   02/10/23 140/78   01/29/23 137/77   10/19/22 114/70   08/08/22 (!) 153/103   08/01/22 140/80       BMI Readings from Last 4 Encounters:   02/10/23 22.64 kg/m²   01/27/23 22.67 kg/m²   01/26/23 22.99 kg/m²   10/19/22 24.02 kg/m²       Physical Exam  Constitutional:       General: She is not in acute distress.     Appearance: Normal appearance. She is well-developed. She is not ill-appearing, toxic-appearing or diaphoretic.   Neck:      Vascular: No carotid bruit or JVD.   Cardiovascular:      Rate and Rhythm: Normal rate and regular rhythm.      Pulses:           Dorsalis pedis pulses are 2+ on the right side and 2+ on the left side.        Posterior tibial pulses are 2+ on the right side and 2+ on the left side.      Heart sounds: Normal heart sounds, S1 normal and S2 normal. No murmur heard.    No gallop. No S3 or S4 sounds.      Comments: No edema bilaterally  Pulmonary:      Effort: Pulmonary " effort is normal. No accessory muscle usage or respiratory distress.      Breath sounds: Normal breath sounds. No wheezing, rhonchi or rales.   Abdominal:      General: Bowel sounds are normal.      Palpations: Abdomen is soft. There is no hepatomegaly or splenomegaly.      Tenderness: There is no abdominal tenderness. There is no guarding or rebound.   Musculoskeletal:      Right lower leg: No edema.      Left lower leg: No edema.   Neurological:      Mental Status: She is alert.   Psychiatric:         Mood and Affect: Mood normal.         Behavior: Behavior is cooperative.          Laboratory Results     Lab Results   Component Value Date    WBC 5.11 01/26/2023    WBC 5.14 11/17/2022    WBC 7.42 04/28/2022    HGB 12.9 01/26/2023    HGB 12.2 11/17/2022    HGB 12.3 04/28/2022    HCT 41.7 01/26/2023    HCT 38.0 11/17/2022    HCT 38.7 04/28/2022    .2 (H) 01/26/2023    MCV 95.0 11/17/2022    MCV 98.2 (H) 04/28/2022     01/26/2023     11/17/2022     04/28/2022        Lab Results   Component Value Date    GLUCOSE 151 (H) 01/28/2023    GLUCOSE 120 (H) 01/26/2023    GLUCOSE 92 11/17/2022    CALCIUM 9.0 01/28/2023    CALCIUM 9.3 01/26/2023    CALCIUM 8.9 11/17/2022     01/28/2023     01/26/2023     11/17/2022    K 3.5 (L) 01/28/2023    K 4.1 01/26/2023    K 4.4 11/17/2022    CO2 25 01/28/2023    CO2 20 (L) 01/26/2023    CO2 27 11/17/2022     01/28/2023     01/26/2023     11/17/2022    BUN 19 01/28/2023    BUN 20 01/26/2023    BUN 18 11/17/2022    CREATININE 0.9 01/28/2023    CREATININE 0.9 01/26/2023    CREATININE 0.8 11/17/2022    ALKPHOS 55 11/17/2022    ALKPHOS 75 04/28/2022    ALKPHOS 41 10/21/2021    AST 16 11/17/2022    AST 17 04/28/2022    AST 19 10/21/2021    ALT 16 11/17/2022    ALT 20 04/28/2022    ALT 19 10/21/2021    BILITOT 0.6 11/17/2022    BILITOT 0.6 04/28/2022    BILITOT 1.1 10/21/2021    ALBUMIN 4.2 11/17/2022    ALBUMIN 3.9 04/28/2022     ALBUMIN 4.1 10/21/2021       Lab Results   Component Value Date    CHOL 227 (H) 01/27/2023    CHOL 248 (H) 11/18/2015     Lab Results   Component Value Date    HDL 62 01/27/2023    HDL 92 11/18/2015     Lab Results   Component Value Date    LDLCALC 141 (H) 01/27/2023    LDLCALC 133 (H) 11/18/2015     Lab Results   Component Value Date    TRIG 120 01/27/2023    TRIG 117 11/18/2015       Lab Results   Component Value Date    TSH 2.87 01/26/2023    TSH 1.91 10/14/2020    TSH 0.32 (L) 06/10/2019     Lab Results   Component Value Date    FREET4 0.79 11/18/2015       Lab Results   Component Value Date    HGBA1C 5.6 11/18/2015    HGBA1C 5.6 04/08/2015       Lab Results   Component Value Date    HEPCAB Nonreactive 07/12/2018       Lab Results   Component Value Date    MG 2.0 01/28/2023    MG 2.1 01/26/2023        Immunization History   Administered Date(s) Administered   • INFLUENZA VACCINE QUAD ADJUVANTED 65 and OLDER 10/06/2021, 10/19/2022   • Influenza Split High Dose Preservative Free IM 11/03/2011, 11/13/2012, 10/09/2013, 10/30/2014, 11/17/2015, 09/30/2016, 09/25/2017   • Influenza Vaccine 65 And Older Preservative Free 10/25/2019, 10/02/2020   • Influenza Vaccine Quadrivalent 3 Yr And Older 09/18/2017, 10/22/2018   • Influenza, Unspecified 10/30/2014, 11/17/2015, 09/30/2016, 09/25/2017   • MMRV 06/05/2014   • Pneumococcal Conjugate 08/09/1996   • Pneumococcal Conjugate 13-Valent 11/17/2015   • Pneumococcal Polysaccharide 10/30/2014, 09/11/2017   • SARS-COV-2 (COVID-19) VACCINE PFIZER BIVALENT BOOSTER 12 years and older (Blakely Cap) 09/15/2022   • SARS-COV-2 (COVID-19) VACCINE, MODERNA 02/05/2021, 03/05/2021   • SARS-COV-2 (COVID-19) VACCINE, MODERNA BOOSTER 11/17/2021   • SARS-COV-2 (COVID19) VACCINE, PFIZER 09/15/2022   • Tdap 03/22/2012, 08/08/2022   • Varicella 06/05/2014   • Zoster 01/02/2006, 01/02/2016   • Zoster Vaccine Recombinant Adjuvanted (Shingrix) 05/24/2019, 09/20/2019         Health Maintenance Topics  with due status: Overdue       Topic Date Due    Medicare Annual Wellness Visit 10/02/2021    COVID-19 Vaccine 11/10/2022     Health Maintenance Topics with due status: Not Due       Topic Last Completion Date    DTaP, Tdap, and Td Vaccines 08/08/2022    DEXA Scan 09/20/2022    Depression Screening 02/10/2023    Falls Risk Screening 02/10/2023     Health Maintenance Topics with due status: Completed       Topic Last Completion Date    Pneumococcal (65 years and older) 09/11/2017    Zoster Vaccine 09/20/2019    Influenza Vaccine 10/19/2022     Health Maintenance Topics with due status: Aged Out       Topic Date Due    Meningococcal ACWY Aged Out    HIB Vaccines Aged Out    Hepatitis B Vaccines Aged Out    IPV Vaccines Aged Out    HPV Vaccines Aged Out     Health Maintenance Topics with due status: Discontinued       Topic Date Due    Breast Cancer Screening Discontinued        Assessment and Plan       Assessment/Plan     Problem List Items Addressed This Visit     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 Arriaza.   · Stable FEV1 in 8/2015.   · IgE levels and eosinophils stable in 8/2015.   · Managed with Fasenra, Azithromycin and Flovent.         Current Assessment & Plan     Follow up with Dr. Arriaza         Calcification of aorta (CMS/HCC) (Chronic)    Overview     · Seen incidentally on CT scan.         Current Assessment & Plan     Treat with rosuvastatin.           Primary hypertension (Chronic)    Overview     · Managed on Hydralazine.  · Advised to follow a low sodium diet and keep BMI < 25.   · Advised to exercise for 2.5 hours per week.   · Instructed to take home blood pressure readings and call if consistently above 140/90.             Current Assessment & Plan     Blood pressure stable on Hydralazine. Continue with home blood pressure checks and call if consistently above 150/90         Pulmonary emphysema (CMS/HCC) (Chronic)    Overview      · Pulmonologist: Dr. Walker.   · Seen on PFTs in hospital in 2014.   · Controlled with Spiriva and FLovent.         SVT (supraventricular tachycardia) (CMS/HCC)    Current Assessment & Plan     Resolved          Relevant Medications    rosuvastatin (CRESTOR) 5 mg tablet       Return in about 3 months (around 5/10/2023) for Blood pressure check.    No orders of the defined types were placed in this encounter.             Henry Linares MD    2/10/2023

## 2023-02-16 ENCOUNTER — PATIENT OUTREACH (OUTPATIENT)
Dept: CASE MANAGEMENT | Facility: CLINIC | Age: 88
End: 2023-02-16
Payer: MEDICARE

## 2023-02-16 NOTE — PROGRESS NOTES
"TCM f/u word finding difficulty, elevated blood pressures. Spoke with pt. Pt says she is doing \"pretty well\".  Denies headaches, dizziness, lightheadedness or word finding issues. Her blood pressures have been good. Low 100's to 150/80's. Reinforced s/s of stroke and reporting. Pt able to teach back most s/s to report.     Pt declined need for f/u calls. Has RN number for future reference. Will close to CM.     AURORA WalkerN, RN  [phone]      "

## 2023-02-20 ENCOUNTER — HOSPITAL ENCOUNTER (INPATIENT)
Facility: HOSPITAL | Age: 88
LOS: 1 days | Discharge: HOME HEALTH | DRG: 309 | End: 2023-02-22
Attending: STUDENT IN AN ORGANIZED HEALTH CARE EDUCATION/TRAINING PROGRAM | Admitting: HOSPITALIST
Payer: MEDICARE

## 2023-02-20 ENCOUNTER — APPOINTMENT (EMERGENCY)
Dept: RADIOLOGY | Facility: HOSPITAL | Age: 88
DRG: 309 | End: 2023-02-20
Payer: MEDICARE

## 2023-02-20 DIAGNOSIS — R93.89 ABNORMAL FINDING ON IMAGING: ICD-10-CM

## 2023-02-20 DIAGNOSIS — S22.31XA CLOSED FRACTURE OF ONE RIB OF RIGHT SIDE, INITIAL ENCOUNTER: ICD-10-CM

## 2023-02-20 DIAGNOSIS — I48.92 ATRIAL FLUTTER WITH RAPID VENTRICULAR RESPONSE (CMS/HCC): Primary | ICD-10-CM

## 2023-02-20 LAB
ALBUMIN SERPL-MCNC: 4.3 G/DL (ref 3.4–5)
ALP SERPL-CCNC: 59 IU/L (ref 35–126)
ALT SERPL-CCNC: 22 IU/L (ref 11–54)
ANION GAP SERPL CALC-SCNC: 13 MEQ/L (ref 3–15)
AST SERPL-CCNC: 23 IU/L (ref 15–41)
BASOPHILS # BLD: 0 K/UL (ref 0.01–0.1)
BASOPHILS NFR BLD: 0 %
BILIRUB SERPL-MCNC: 0.4 MG/DL (ref 0.3–1.2)
BUN SERPL-MCNC: 20 MG/DL (ref 8–20)
CALCIUM SERPL-MCNC: 9.3 MG/DL (ref 8.9–10.3)
CHLORIDE SERPL-SCNC: 102 MEQ/L (ref 98–109)
CO2 SERPL-SCNC: 21 MEQ/L (ref 22–32)
CREAT SERPL-MCNC: 0.9 MG/DL (ref 0.6–1.1)
DIFFERENTIAL METHOD BLD: ABNORMAL
EOSINOPHIL # BLD: 0 K/UL (ref 0.04–0.36)
EOSINOPHIL NFR BLD: 0 %
ERYTHROCYTE [DISTWIDTH] IN BLOOD BY AUTOMATED COUNT: 14.3 % (ref 11.7–14.4)
GFR SERPL CREATININE-BSD FRML MDRD: 58.7 ML/MIN/1.73M*2
GLUCOSE SERPL-MCNC: 113 MG/DL (ref 70–99)
HCT VFR BLDCO AUTO: 43 % (ref 35–45)
HGB BLD-MCNC: 14.1 G/DL (ref 11.8–15.7)
IMM GRANULOCYTES # BLD AUTO: 0.05 K/UL (ref 0–0.08)
IMM GRANULOCYTES NFR BLD AUTO: 0.7 %
LYMPHOCYTES # BLD: 1.46 K/UL (ref 1.2–3.5)
LYMPHOCYTES NFR BLD: 20.2 %
MCH RBC QN AUTO: 31.2 PG (ref 28–33.2)
MCHC RBC AUTO-ENTMCNC: 32.8 G/DL (ref 32.2–35.5)
MCV RBC AUTO: 95.1 FL (ref 83–98)
MONOCYTES # BLD: 0.79 K/UL (ref 0.28–0.8)
MONOCYTES NFR BLD: 11 %
NEUTROPHILS # BLD: 4.91 K/UL (ref 1.7–7)
NEUTS SEG NFR BLD: 68.1 %
NRBC BLD-RTO: 0 %
PDW BLD AUTO: 9.8 FL (ref 9.4–12.3)
PLATELET # BLD AUTO: 294 K/UL (ref 150–369)
POTASSIUM SERPL-SCNC: 4.2 MEQ/L (ref 3.6–5.1)
PROT SERPL-MCNC: 7.3 G/DL (ref 6–8.2)
RBC # BLD AUTO: 4.52 M/UL (ref 3.93–5.22)
SODIUM SERPL-SCNC: 136 MEQ/L (ref 136–144)
TROPONIN I SERPL HS-MCNC: 7.7 PG/ML
WBC # BLD AUTO: 7.21 K/UL (ref 3.8–10.5)

## 2023-02-20 PROCEDURE — 96361 HYDRATE IV INFUSION ADD-ON: CPT

## 2023-02-20 PROCEDURE — 96374 THER/PROPH/DIAG INJ IV PUSH: CPT

## 2023-02-20 PROCEDURE — 71045 X-RAY EXAM CHEST 1 VIEW: CPT

## 2023-02-20 PROCEDURE — 85025 COMPLETE CBC W/AUTO DIFF WBC: CPT

## 2023-02-20 PROCEDURE — 93005 ELECTROCARDIOGRAM TRACING: CPT

## 2023-02-20 PROCEDURE — 25800000 HC PHARMACY IV SOLUTIONS: Performed by: STUDENT IN AN ORGANIZED HEALTH CARE EDUCATION/TRAINING PROGRAM

## 2023-02-20 PROCEDURE — 25800000 HC PHARMACY IV SOLUTIONS

## 2023-02-20 PROCEDURE — 84484 ASSAY OF TROPONIN QUANT: CPT

## 2023-02-20 PROCEDURE — 36415 COLL VENOUS BLD VENIPUNCTURE: CPT

## 2023-02-20 PROCEDURE — 80053 COMPREHEN METABOLIC PANEL: CPT

## 2023-02-20 PROCEDURE — 99285 EMERGENCY DEPT VISIT HI MDM: CPT | Mod: 25

## 2023-02-20 PROCEDURE — 25000000 HC PHARMACY GENERAL: Performed by: STUDENT IN AN ORGANIZED HEALTH CARE EDUCATION/TRAINING PROGRAM

## 2023-02-20 PROCEDURE — G1004 CDSM NDSC: HCPCS

## 2023-02-20 RX ORDER — METOPROLOL TARTRATE 1 MG/ML
5 INJECTION, SOLUTION INTRAVENOUS ONCE
Status: DISCONTINUED | OUTPATIENT
Start: 2023-02-20 | End: 2023-02-20

## 2023-02-20 RX ORDER — DILTIAZEM HCL IN NACL,ISO-OSM 125 MG/125
5 PLASTIC BAG, INJECTION (ML) INTRAVENOUS
Status: DISCONTINUED | OUTPATIENT
Start: 2023-02-20 | End: 2023-02-21

## 2023-02-20 RX ORDER — DILTIAZEM HYDROCHLORIDE 5 MG/ML
10 INJECTION INTRAVENOUS ONCE
Status: COMPLETED | OUTPATIENT
Start: 2023-02-20 | End: 2023-02-20

## 2023-02-20 RX ADMIN — DILTIAZEM HYDROCHLORIDE 5 MG/HR: 5 INJECTION INTRAVENOUS at 23:20

## 2023-02-20 RX ADMIN — DILTIAZEM HYDROCHLORIDE 10 MG: 5 INJECTION INTRAVENOUS at 23:18

## 2023-02-20 RX ADMIN — SODIUM CHLORIDE 500 ML: 9 INJECTION, SOLUTION INTRAVENOUS at 23:24

## 2023-02-21 PROBLEM — I48.92 ATRIAL FLUTTER WITH RAPID VENTRICULAR RESPONSE (CMS/HCC): Status: ACTIVE | Noted: 2023-02-21

## 2023-02-21 LAB
ATRIAL RATE: 284
GLUCOSE BLD-MCNC: 110 MG/DL (ref 70–99)
P AXIS: 133
POCT TEST: ABNORMAL
QRS DURATION: 66
QT INTERVAL: 298
QTC CALCULATION(BAZETT): 458
R AXIS: 11
SARS-COV-2 RNA RESP QL NAA+PROBE: NEGATIVE
T WAVE AXIS: 0
TROPONIN I SERPL HS-MCNC: 10.6 PG/ML
VENTRICULAR RATE: 142

## 2023-02-21 PROCEDURE — 63700000 HC SELF-ADMINISTRABLE DRUG: Performed by: HOSPITALIST

## 2023-02-21 PROCEDURE — U0003 INFECTIOUS AGENT DETECTION BY NUCLEIC ACID (DNA OR RNA); SEVERE ACUTE RESPIRATORY SYNDROME CORONAVIRUS 2 (SARS-COV-2) (CORONAVIRUS DISEASE [COVID-19]), AMPLIFIED PROBE TECHNIQUE, MAKING USE OF HIGH THROUGHPUT TECHNOLOGIES AS DESCRIBED BY CMS-2020-01-R: HCPCS

## 2023-02-21 PROCEDURE — 36415 COLL VENOUS BLD VENIPUNCTURE: CPT

## 2023-02-21 PROCEDURE — 63600000 HC DRUGS/DETAIL CODE: Performed by: STUDENT IN AN ORGANIZED HEALTH CARE EDUCATION/TRAINING PROGRAM

## 2023-02-21 PROCEDURE — 99223 1ST HOSP IP/OBS HIGH 75: CPT | Performed by: INTERNAL MEDICINE

## 2023-02-21 PROCEDURE — 21400000 HC ROOM AND CARE CCU/INTERMEDIATE

## 2023-02-21 PROCEDURE — 63700000 HC SELF-ADMINISTRABLE DRUG: Performed by: PHYSICIAN ASSISTANT

## 2023-02-21 PROCEDURE — 84484 ASSAY OF TROPONIN QUANT: CPT

## 2023-02-21 PROCEDURE — 25000000 HC PHARMACY GENERAL: Performed by: HOSPITALIST

## 2023-02-21 PROCEDURE — 63600000 HC DRUGS/DETAIL CODE: Performed by: HOSPITALIST

## 2023-02-21 PROCEDURE — 99223 1ST HOSP IP/OBS HIGH 75: CPT | Performed by: HOSPITALIST

## 2023-02-21 PROCEDURE — 63700000 HC SELF-ADMINISTRABLE DRUG: Performed by: INTERNAL MEDICINE

## 2023-02-21 PROCEDURE — 93010 ELECTROCARDIOGRAM REPORT: CPT | Performed by: INTERNAL MEDICINE

## 2023-02-21 RX ORDER — LORAZEPAM 0.5 MG/1
1 TABLET ORAL DAILY PRN
Status: DISCONTINUED | OUTPATIENT
Start: 2023-02-21 | End: 2023-02-22 | Stop reason: HOSPADM

## 2023-02-21 RX ORDER — POTASSIUM CHLORIDE 750 MG/1
20 TABLET, EXTENDED RELEASE ORAL AS NEEDED
Status: DISCONTINUED | OUTPATIENT
Start: 2023-02-21 | End: 2023-02-22 | Stop reason: HOSPADM

## 2023-02-21 RX ORDER — ATROPINE SULFATE 0.1 MG/ML
0.5 INJECTION INTRAVENOUS EVERY 5 MIN PRN
Status: DISCONTINUED | OUTPATIENT
Start: 2023-02-21 | End: 2023-02-22 | Stop reason: HOSPADM

## 2023-02-21 RX ORDER — ROSUVASTATIN CALCIUM 10 MG/1
5 TABLET, COATED ORAL DAILY
Status: DISCONTINUED | OUTPATIENT
Start: 2023-02-21 | End: 2023-02-22 | Stop reason: HOSPADM

## 2023-02-21 RX ORDER — DULOXETIN HYDROCHLORIDE 30 MG/1
60 CAPSULE, DELAYED RELEASE ORAL DAILY
Status: DISCONTINUED | OUTPATIENT
Start: 2023-02-21 | End: 2023-02-22 | Stop reason: HOSPADM

## 2023-02-21 RX ORDER — PANTOPRAZOLE SODIUM 40 MG/1
40 TABLET, DELAYED RELEASE ORAL DAILY
Status: DISCONTINUED | OUTPATIENT
Start: 2023-02-21 | End: 2023-02-22 | Stop reason: HOSPADM

## 2023-02-21 RX ORDER — HYDRALAZINE HYDROCHLORIDE 25 MG/1
25 TABLET, FILM COATED ORAL EVERY 8 HOURS
Status: DISCONTINUED | OUTPATIENT
Start: 2023-02-21 | End: 2023-02-21

## 2023-02-21 RX ORDER — DEXTROSE 40 %
15-30 GEL (GRAM) ORAL AS NEEDED
Status: DISCONTINUED | OUTPATIENT
Start: 2023-02-21 | End: 2023-02-22 | Stop reason: HOSPADM

## 2023-02-21 RX ORDER — ASPIRIN 81 MG/1
81 TABLET ORAL DAILY
Status: DISCONTINUED | OUTPATIENT
Start: 2023-02-21 | End: 2023-02-21

## 2023-02-21 RX ORDER — ENOXAPARIN SODIUM 100 MG/ML
1 INJECTION SUBCUTANEOUS
Status: DISCONTINUED | OUTPATIENT
Start: 2023-02-21 | End: 2023-02-21

## 2023-02-21 RX ORDER — BENZONATATE 100 MG/1
100 CAPSULE ORAL EVERY 4 HOURS PRN
Status: DISCONTINUED | OUTPATIENT
Start: 2023-02-21 | End: 2023-02-22 | Stop reason: HOSPADM

## 2023-02-21 RX ORDER — NITROGLYCERIN 0.4 MG/1
0.4 TABLET SUBLINGUAL EVERY 5 MIN PRN
Status: DISCONTINUED | OUTPATIENT
Start: 2023-02-21 | End: 2023-02-22 | Stop reason: HOSPADM

## 2023-02-21 RX ORDER — DEXTROSE 50 % IN WATER (D50W) INTRAVENOUS SYRINGE
25 AS NEEDED
Status: DISCONTINUED | OUTPATIENT
Start: 2023-02-21 | End: 2023-02-22 | Stop reason: HOSPADM

## 2023-02-21 RX ORDER — PREGABALIN 50 MG/1
50 CAPSULE ORAL 2 TIMES DAILY
Status: DISCONTINUED | OUTPATIENT
Start: 2023-02-21 | End: 2023-02-22 | Stop reason: HOSPADM

## 2023-02-21 RX ORDER — ACETAMINOPHEN 325 MG/1
650 TABLET ORAL EVERY 4 HOURS PRN
Status: DISCONTINUED | OUTPATIENT
Start: 2023-02-21 | End: 2023-02-22 | Stop reason: HOSPADM

## 2023-02-21 RX ORDER — DILTIAZEM HYDROCHLORIDE 240 MG/1
240 CAPSULE, COATED, EXTENDED RELEASE ORAL DAILY
Status: DISCONTINUED | OUTPATIENT
Start: 2023-02-21 | End: 2023-02-22

## 2023-02-21 RX ORDER — CHOLECALCIFEROL (VITAMIN D3) 25 MCG
1000 TABLET ORAL DAILY
Status: DISCONTINUED | OUTPATIENT
Start: 2023-02-21 | End: 2023-02-22 | Stop reason: HOSPADM

## 2023-02-21 RX ORDER — IBUPROFEN 200 MG
16-32 TABLET ORAL AS NEEDED
Status: DISCONTINUED | OUTPATIENT
Start: 2023-02-21 | End: 2023-02-22 | Stop reason: HOSPADM

## 2023-02-21 RX ORDER — TRAMADOL HYDROCHLORIDE 50 MG/1
50 TABLET ORAL DAILY PRN
Status: DISCONTINUED | OUTPATIENT
Start: 2023-02-21 | End: 2023-02-22 | Stop reason: HOSPADM

## 2023-02-21 RX ORDER — ONDANSETRON HYDROCHLORIDE 2 MG/ML
4 INJECTION, SOLUTION INTRAVENOUS EVERY 6 HOURS PRN
Status: DISCONTINUED | OUTPATIENT
Start: 2023-02-21 | End: 2023-02-22 | Stop reason: HOSPADM

## 2023-02-21 RX ORDER — AZITHROMYCIN 250 MG/1
250 TABLET, FILM COATED ORAL 3 TIMES WEEKLY
Status: DISCONTINUED | OUTPATIENT
Start: 2023-02-21 | End: 2023-02-22 | Stop reason: HOSPADM

## 2023-02-21 RX ORDER — LOSARTAN POTASSIUM 100 MG/1
50 TABLET ORAL DAILY
Status: DISCONTINUED | OUTPATIENT
Start: 2023-02-21 | End: 2023-02-22 | Stop reason: HOSPADM

## 2023-02-21 RX ORDER — POTASSIUM CHLORIDE 750 MG/1
40 TABLET, EXTENDED RELEASE ORAL AS NEEDED
Status: DISCONTINUED | OUTPATIENT
Start: 2023-02-21 | End: 2023-02-22 | Stop reason: HOSPADM

## 2023-02-21 RX ADMIN — ENOXAPARIN SODIUM 60 MG: 60 INJECTION SUBCUTANEOUS at 10:23

## 2023-02-21 RX ADMIN — LORAZEPAM 1 MG: 0.5 TABLET ORAL at 23:31

## 2023-02-21 RX ADMIN — MOMETASONE FUROATE AND FORMOTEROL FUMARATE DIHYDRATE 2 PUFF: 100; 5 AEROSOL RESPIRATORY (INHALATION) at 10:33

## 2023-02-21 RX ADMIN — TIOTROPIUM BROMIDE INHALATION SPRAY 2 PUFF: 3.12 SPRAY, METERED RESPIRATORY (INHALATION) at 10:24

## 2023-02-21 RX ADMIN — LOSARTAN POTASSIUM 50 MG: 100 TABLET, FILM COATED ORAL at 13:25

## 2023-02-21 RX ADMIN — Medication 1000 UNITS: at 09:34

## 2023-02-21 RX ADMIN — DULOXETINE HYDROCHLORIDE 60 MG: 60 CAPSULE, DELAYED RELEASE ORAL at 10:21

## 2023-02-21 RX ADMIN — ONDANSETRON HYDROCHLORIDE 4 MG: 2 INJECTION, SOLUTION INTRAMUSCULAR; INTRAVENOUS at 05:00

## 2023-02-21 RX ADMIN — DILTIAZEM HYDROCHLORIDE 240 MG: 240 CAPSULE, COATED, EXTENDED RELEASE ORAL at 13:24

## 2023-02-21 RX ADMIN — ONDANSETRON HYDROCHLORIDE 4 MG: 2 INJECTION, SOLUTION INTRAMUSCULAR; INTRAVENOUS at 13:44

## 2023-02-21 RX ADMIN — HYDRALAZINE HYDROCHLORIDE 25 MG: 25 TABLET, FILM COATED ORAL at 09:34

## 2023-02-21 RX ADMIN — PREGABALIN 50 MG: 50 CAPSULE ORAL at 21:04

## 2023-02-21 RX ADMIN — PANTOPRAZOLE SODIUM 40 MG: 40 TABLET, DELAYED RELEASE ORAL at 09:35

## 2023-02-21 RX ADMIN — ROSUVASTATIN CALCIUM 5 MG: 10 TABLET, FILM COATED ORAL at 09:34

## 2023-02-21 RX ADMIN — TRAMADOL HYDROCHLORIDE 50 MG: 50 TABLET, COATED ORAL at 09:34

## 2023-02-21 RX ADMIN — MOMETASONE FUROATE AND FORMOTEROL FUMARATE DIHYDRATE 2 PUFF: 100; 5 AEROSOL RESPIRATORY (INHALATION) at 21:04

## 2023-02-22 ENCOUNTER — APPOINTMENT (INPATIENT)
Dept: CARDIOLOGY | Facility: HOSPITAL | Age: 88
DRG: 309 | End: 2023-02-22
Attending: HOSPITALIST
Payer: MEDICARE

## 2023-02-22 VITALS
RESPIRATION RATE: 14 BRPM | TEMPERATURE: 97.8 F | SYSTOLIC BLOOD PRESSURE: 142 MMHG | OXYGEN SATURATION: 97 % | WEIGHT: 117 LBS | BODY MASS INDEX: 22.97 KG/M2 | HEART RATE: 97 BPM | DIASTOLIC BLOOD PRESSURE: 71 MMHG | HEIGHT: 60 IN

## 2023-02-22 LAB
AORTIC ROOT ANNULUS: 2.9 CM
AORTIC VALVE MEAN VELOCITY: 0.89 M/S
AORTIC VALVE VELOCITY TIME INTEGRAL: 22.8 CM
ASCENDING AORTA: 3.4 CM
ATRIAL RATE: 75
AV MEAN GRADIENT: 4 MMHG
AV PEAK GRADIENT: 7 MMHG
AV PEAK VELOCITY-S: 1.31 M/S
AV VALVE AREA INDEX: 1.97
AV VALVE AREA: 1.92 CM2
AV VELOCITY RATIO: 1.04
AVA (VTI): 2.96 CM2
BSA FOR ECHO PROCEDURE: 1.5 M2
DOP CALC LVOT STROKE VOLUME: 67.45 CM3
E WAVE DECELERATION TIME: 223 MS
E/A RATIO: 0.9
E/E' RATIO: 15
E/LAT E' RATIO: 11.9
EDV (BP): 45.6 CM3
EF (A4C): 62.1 %
EF A2C: 58.3 %
EJECTION FRACTION: 62.1 %
ERYTHROCYTE [DISTWIDTH] IN BLOOD BY AUTOMATED COUNT: 14.4 % (ref 11.7–14.4)
EST RIGHT VENT SYSTOLIC PRESSURE BY TRICUSPID REGURGITATION JET: 32 MMHG
ESV (BP): 17.3 CM3
FRACTIONAL SHORTENING: 46.83 %
HCT VFR BLDCO AUTO: 39.6 % (ref 35–45)
HGB BLD-MCNC: 12.9 G/DL (ref 11.8–15.7)
INTERVENTRICULAR SEPTUM: 1.11 CM
LA ESV (BP): 82.9 CM3
LA ESV INDEX (A2C): 56.67 CM3/M2
LA ESV INDEX (BP): 55.27 CM3/M2
LA/AORTA RATIO: 1.59
LAAS-AP2: 25.7 CM2
LAAS-AP4: 26 CM2
LAD 2D: 4.6 CM
LALD A4C: 6.44 CM
LALD A4C: 6.45 CM
LAV-S: 85 CM3
LEFT ATRIUM VOLUME INDEX: 54.67 CM3/M2
LEFT ATRIUM VOLUME: 82 CM3
LEFT INTERNAL DIMENSION IN SYSTOLE: 2.43 CM (ref 2.23–3.38)
LEFT VENTRICLE DIASTOLIC VOLUME INDEX: 32.87 CM3/M2
LEFT VENTRICLE DIASTOLIC VOLUME: 49.3 CM3
LEFT VENTRICLE SYSTOLIC VOLUME INDEX: 12.47 CM3/M2
LEFT VENTRICLE SYSTOLIC VOLUME: 18.7 CM3
LEFT VENTRICULAR INTERNAL DIMENSION IN DIASTOLE: 4.57 CM (ref 3.75–5.21)
LEFT VENTRICULAR POSTERIOR WALL IN END DIASTOLE: 1.05 CM (ref 0.48–0.89)
LV DIASTOLIC VOLUME: 40.5 CM3
LV ESV (APICAL 2 CHAMBER): 16.9 CM3
LVAD-AP2: 16.7 CM2
LVAD-AP4: 19.3 CM2
LVAS-AP2: 9.35 CM2
LVAS-AP4: 10.8 CM2
LVEDVI(A2C): 27 CM3/M2
LVEDVI(BP): 30.4 CM3/M2
LVESVI(A2C): 11.27 CM3/M2
LVESVI(BP): 11.53 CM3/M2
LVLD-AP2: 5.76 CM
LVLD-AP4: 6.09 CM
LVLS-AP2: 5.28 CM
LVLS-AP4: 5.21 CM
LVOT 2D: 1.9 CM
LVOT A: 2.84 CM2
LVOT MG: 3 MMHG
LVOT MV: 0.78 M/S
LVOT PEAK VELOCITY: 1.13 M/S
LVOT PG: 5 MMHG
LVOT STROKE VOLUME INDEX: 44.96 ML/M2
LVOT VTI: 23.8 CM
MCH RBC QN AUTO: 31.4 PG (ref 28–33.2)
MCHC RBC AUTO-ENTMCNC: 32.6 G/DL (ref 32.2–35.5)
MCV RBC AUTO: 96.4 FL (ref 83–98)
MITRAL VALVE MEAN INFLOW VELOCITY: 4.56 M/S
MLH CV ECHO AVA INDEX VELOCITY RATIO: 1.3
MR VELOCITY: 5.73 M/S
MR VTI: 146 CM
MV E'TISSUE VEL-LAT: 0.07 M/S
MV E'TISSUE VEL-MED: 0.05 M/S
MV MEAN GRADIENT: 2 MMHG
MV PEAK A VEL: 0.87 M/S
MV PEAK E VEL: 0.81 M/S
MV PEAK GRADIENT: 4 MMHG
MV VALVE AREA BY CONTINUITY EQUATION: 3.36 CM2
MV VTI: 20.1 CM
PDW BLD AUTO: 9.9 FL (ref 9.4–12.3)
PLATELET # BLD AUTO: 270 K/UL (ref 150–369)
POSTERIOR WALL: 1.05 CM
PV PEAK GRADIENT: 2 MMHG
PV PV: 0.78 M/S
QRS DURATION: 78
QT INTERVAL: 372
QTC CALCULATION(BAZETT): 494
R AXIS: 16
RAP: 3 MMHG
RBC # BLD AUTO: 4.11 M/UL (ref 3.93–5.22)
SEPTAL TISSUE DOPPLER FREE WALL LATE DIA VELOCITY (APICAL 4 CHAMBER VIEW): 0.15 M/S
T WAVE AXIS: 39
TAPSE: 2.32 CM
TR MAX PG: 28.52 MMHG
TRICUSPID VALVE PEAK REGURGITATION VELOCITY: 2.67 M/S
VENTRICULAR RATE: 106
WBC # BLD AUTO: 4.83 K/UL (ref 3.8–10.5)
Z-SCORE OF LEFT VENTRICULAR DIMENSION IN END DIASTOLE: 0.33
Z-SCORE OF LEFT VENTRICULAR DIMENSION IN END SYSTOLE: -0.96
Z-SCORE OF LEFT VENTRICULAR POSTERIOR WALL IN END DIASTOLE: 2.51

## 2023-02-22 PROCEDURE — 36415 COLL VENOUS BLD VENIPUNCTURE: CPT | Performed by: STUDENT IN AN ORGANIZED HEALTH CARE EDUCATION/TRAINING PROGRAM

## 2023-02-22 PROCEDURE — 93306 TTE W/DOPPLER COMPLETE: CPT | Mod: 26 | Performed by: INTERNAL MEDICINE

## 2023-02-22 PROCEDURE — 99233 SBSQ HOSP IP/OBS HIGH 50: CPT | Performed by: INTERNAL MEDICINE

## 2023-02-22 PROCEDURE — 97162 PT EVAL MOD COMPLEX 30 MIN: CPT | Mod: GP

## 2023-02-22 PROCEDURE — 25000000 HC PHARMACY GENERAL: Performed by: HOSPITALIST

## 2023-02-22 PROCEDURE — 63700000 HC SELF-ADMINISTRABLE DRUG: Performed by: INTERNAL MEDICINE

## 2023-02-22 PROCEDURE — 93306 TTE W/DOPPLER COMPLETE: CPT

## 2023-02-22 PROCEDURE — 93010 ELECTROCARDIOGRAM REPORT: CPT | Performed by: INTERNAL MEDICINE

## 2023-02-22 PROCEDURE — 99239 HOSP IP/OBS DSCHRG MGMT >30: CPT | Performed by: STUDENT IN AN ORGANIZED HEALTH CARE EDUCATION/TRAINING PROGRAM

## 2023-02-22 PROCEDURE — 85027 COMPLETE CBC AUTOMATED: CPT | Performed by: STUDENT IN AN ORGANIZED HEALTH CARE EDUCATION/TRAINING PROGRAM

## 2023-02-22 PROCEDURE — 63700000 HC SELF-ADMINISTRABLE DRUG: Performed by: PHYSICIAN ASSISTANT

## 2023-02-22 PROCEDURE — 63700000 HC SELF-ADMINISTRABLE DRUG: Performed by: HOSPITALIST

## 2023-02-22 RX ORDER — DILTIAZEM HYDROCHLORIDE 300 MG/1
300 CAPSULE, COATED, EXTENDED RELEASE ORAL DAILY
Status: DISCONTINUED | OUTPATIENT
Start: 2023-02-23 | End: 2023-02-22 | Stop reason: HOSPADM

## 2023-02-22 RX ORDER — DILTIAZEM HYDROCHLORIDE 300 MG/1
300 CAPSULE, EXTENDED RELEASE ORAL DAILY
Qty: 30 CAPSULE | Refills: 0 | Status: SHIPPED | OUTPATIENT
Start: 2023-02-23 | End: 2023-03-21 | Stop reason: SDUPTHER

## 2023-02-22 RX ORDER — LOSARTAN POTASSIUM 50 MG/1
50 TABLET ORAL DAILY
Qty: 30 TABLET | Refills: 0 | Status: SHIPPED | OUTPATIENT
Start: 2023-02-23 | End: 2023-03-21 | Stop reason: SDUPTHER

## 2023-02-22 RX ADMIN — AZITHROMYCIN MONOHYDRATE 250 MG: 250 TABLET ORAL at 09:46

## 2023-02-22 RX ADMIN — Medication 1000 UNITS: at 09:46

## 2023-02-22 RX ADMIN — LOSARTAN POTASSIUM 50 MG: 100 TABLET, FILM COATED ORAL at 09:47

## 2023-02-22 RX ADMIN — APIXABAN 2.5 MG: 5 TABLET, FILM COATED ORAL at 09:45

## 2023-02-22 RX ADMIN — MOMETASONE FUROATE AND FORMOTEROL FUMARATE DIHYDRATE 2 PUFF: 100; 5 AEROSOL RESPIRATORY (INHALATION) at 09:52

## 2023-02-22 RX ADMIN — PANTOPRAZOLE SODIUM 40 MG: 40 TABLET, DELAYED RELEASE ORAL at 09:47

## 2023-02-22 RX ADMIN — DILTIAZEM HYDROCHLORIDE 240 MG: 240 CAPSULE, COATED, EXTENDED RELEASE ORAL at 09:46

## 2023-02-22 RX ADMIN — TIOTROPIUM BROMIDE INHALATION SPRAY 2 PUFF: 3.12 SPRAY, METERED RESPIRATORY (INHALATION) at 09:53

## 2023-02-22 RX ADMIN — ROSUVASTATIN CALCIUM 5 MG: 10 TABLET, FILM COATED ORAL at 09:49

## 2023-02-22 RX ADMIN — PREGABALIN 50 MG: 50 CAPSULE ORAL at 09:47

## 2023-02-23 ENCOUNTER — PATIENT OUTREACH (OUTPATIENT)
Dept: CASE MANAGEMENT | Facility: CLINIC | Age: 88
End: 2023-02-23
Payer: MEDICARE

## 2023-02-23 PROBLEM — I48.92 ATRIAL FLUTTER WITH RAPID VENTRICULAR RESPONSE (CMS/HCC): Chronic | Status: ACTIVE | Noted: 2023-02-21

## 2023-03-17 RX ORDER — LOSARTAN POTASSIUM 50 MG/1
50 TABLET ORAL DAILY
Qty: 30 TABLET | Refills: 0 | Status: CANCELLED | OUTPATIENT
Start: 2023-03-17 | End: 2023-04-16

## 2023-03-17 RX ORDER — DILTIAZEM HYDROCHLORIDE 300 MG/1
300 CAPSULE, EXTENDED RELEASE ORAL DAILY
Qty: 30 CAPSULE | Refills: 0 | Status: CANCELLED | OUTPATIENT
Start: 2023-03-17 | End: 2023-04-16

## 2023-03-17 NOTE — TELEPHONE ENCOUNTER
Pt daughter stated pt was in the ER and was given these medication and is due for refill and was told to call PCP to get it refill  Eliquis 2.5mg, Tiadylt  mg, and losartan 50 mg  [facility]

## 2023-03-21 ENCOUNTER — TELEPHONE (OUTPATIENT)
Dept: PRIMARY CARE | Facility: CLINIC | Age: 88
End: 2023-03-21
Payer: MEDICARE

## 2023-03-21 ENCOUNTER — TELEPHONE (OUTPATIENT)
Dept: SCHEDULING | Facility: CLINIC | Age: 88
End: 2023-03-21
Payer: MEDICARE

## 2023-03-21 RX ORDER — DILTIAZEM HYDROCHLORIDE 300 MG/1
300 CAPSULE, EXTENDED RELEASE ORAL DAILY
Qty: 30 CAPSULE | Refills: 0 | Status: SHIPPED | OUTPATIENT
Start: 2023-03-21 | End: 2023-03-22 | Stop reason: SDUPTHER

## 2023-03-21 RX ORDER — LOSARTAN POTASSIUM 50 MG/1
50 TABLET ORAL DAILY
Qty: 30 TABLET | Refills: 0 | Status: SHIPPED | OUTPATIENT
Start: 2023-03-21 | End: 2023-03-22 | Stop reason: SDUPTHER

## 2023-03-21 NOTE — TELEPHONE ENCOUNTER
Edita, RN with [facility] home care states that patient has upcoming NP apt with Dr. Valentino, but needs RX refills, and unable to get a hold of PCP office    Meds  Tiadylt 300 mg BID  Losartan 50 mg tablet    Edita wants to know if ok to fill  Edita states any questions to contact pt    [phone]

## 2023-03-21 NOTE — TELEPHONE ENCOUNTER
· Spoke with [facility] patient has an appointment with Dr Valentino next week she will be out of her medications before then asking for them to be filled I will call the patient after sent in .

## 2023-03-22 RX ORDER — LOSARTAN POTASSIUM 50 MG/1
50 TABLET ORAL DAILY
Qty: 90 TABLET | Refills: 1 | Status: SHIPPED | OUTPATIENT
Start: 2023-03-22 | End: 2023-05-19

## 2023-03-22 RX ORDER — DILTIAZEM HYDROCHLORIDE 300 MG/1
300 CAPSULE, EXTENDED RELEASE ORAL DAILY
Qty: 90 CAPSULE | Refills: 1 | Status: SHIPPED | OUTPATIENT
Start: 2023-03-22 | End: 2023-10-05

## 2023-03-22 NOTE — TELEPHONE ENCOUNTER
Chart reviewed. Recent admission to [facility] for accelerated blood pressure and new onset atrial flutter.    D/C medications include Diltiazem 300 mg once daily and Losartan 50 mg once daily.    New patient visit with Dr. Valentino on 3/28/23. Will plan to refill these two scripts.

## 2023-03-27 RX ORDER — APIXABAN 2.5 MG/1
TABLET, FILM COATED ORAL
Qty: 60 TABLET | Refills: 0 | Status: SHIPPED | OUTPATIENT
Start: 2023-03-27 | End: 2023-04-20

## 2023-03-28 ENCOUNTER — OFFICE VISIT (OUTPATIENT)
Dept: CARDIOLOGY | Facility: CLINIC | Age: 88
End: 2023-03-28
Payer: MEDICARE

## 2023-03-28 VITALS
DIASTOLIC BLOOD PRESSURE: 70 MMHG | SYSTOLIC BLOOD PRESSURE: 118 MMHG | RESPIRATION RATE: 18 BRPM | HEART RATE: 80 BPM | OXYGEN SATURATION: 98 % | HEIGHT: 61 IN | BODY MASS INDEX: 22.47 KG/M2 | WEIGHT: 119 LBS

## 2023-03-28 DIAGNOSIS — I67.1 BRAIN ANEURYSM: ICD-10-CM

## 2023-03-28 DIAGNOSIS — I10 PRIMARY HYPERTENSION: ICD-10-CM

## 2023-03-28 DIAGNOSIS — I48.92 ATRIAL FLUTTER, UNSPECIFIED TYPE (CMS/HCC): Primary | ICD-10-CM

## 2023-03-28 DIAGNOSIS — E78.2 MIXED HYPERLIPIDEMIA: ICD-10-CM

## 2023-03-28 PROCEDURE — 99214 OFFICE O/P EST MOD 30 MIN: CPT | Performed by: INTERNAL MEDICINE

## 2023-03-28 PROCEDURE — 93000 ELECTROCARDIOGRAM COMPLETE: CPT | Performed by: INTERNAL MEDICINE

## 2023-03-28 RX ORDER — ROSUVASTATIN CALCIUM 5 MG/1
5 TABLET, COATED ORAL DAILY
Start: 2023-03-28 | End: 2023-08-21

## 2023-03-28 NOTE — PROGRESS NOTES
[facility] Cardiology  [place] Office Visit       Patient ID: Madelyn Ruiz 89 y.o. female 8/9/1930  PCP: Henry Linares MD      HPI     Madelyn Ruiz is a 89 y.o. female presenting for an office visit.     She has a past medical history significant for allergic bronchopulmonary aspergillosis, COPD, hypertension, hyperlipidemia, atrial flutter, left SCA aneurysm, GERD, pulmonary embolism in 2019 and mild sleep apnea.    She has had 2 recent hospitalizations at [facility]. Her first hospitalization was from 1/26 - 1/29/2023 where she presented with word finding difficulty.  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  Neurosurgery was consulted and recommended blood pressure control and outpatient follow-up.  Her blood pressure was noted to be significantly elevated and she reported multiple intolerances to antihypertensive medications.  She was started on hydralazine and her dose was titrated to ensure adequate blood pressure control.  She was recommended to continue on a baby aspirin and statin therapy for an LDL of 141, however she refused initiating statin and plans to discuss this with her PCP.  She was noted to have very brief runs of SVT/PAT, for which our service was consulted for. She was discharged on hydralazine 25 mg Q8 hours.     She was readmitted to [facility] from 2/20 - 2/22/2023 when she presented for elevated blood pressure, lightheadedness, and nausea.  In the ED, she was noted to be in rapid atrial flutter and was started on a Cardizem drip with improvement in her heart rate. Upon interview, she reported only taking hydralazine twice daily after her discharge, instead of three times daily.  She converted to normal sinus rhythm and was transitioned to oral Cardizem.  Her hydralazine was changed to losartan for better compliance.  She had an  echocardiogram which revealed a preserved EF. She was started on Eliquis 2.5mg BID and discharged with outpatient follow up.     She presents today for followup.  She is accompanied by her daughter to today's visit.  She has been doing quite well since hospital discharge.  Her blood pressure has been under excellent control.  She denies any symptoms of palpitations.  She also denies any chest pain, shortness of breath, lightheaded/dizziness, lower extremity swelling, or syncope.     Medications     Current Outpatient Medications   Medication Instructions   • albuterol 2.5 mg, nebulization, Every 6 hours PRN   • benralizumab (FASENRA) 30 mg, subcutaneous, Every 2 months   • benzonatate (TESSALON) 200 mg capsule TAKE 1 CAPSULE BY MOUTH 3 TIMES DAILY AS NEEDED FOR COUGH.   • calcium carbonate 600 mg calcium (1,500 mg) tablet 1 tablet, oral, 2 times daily   • cholecalciferol, vitamin D3, 25 mcg (1,000 unit) capsule 1 tablet, oral, Daily   • diltiazem (TIAZAC) 300 mg, oral, Daily   • docusate sodium (COLACE) 100 mg, oral, Nightly   • DULoxetine (CYMBALTA) 60 mg, oral, Nightly   • ELIQUIS 2.5 mg tablet TAKE 1 TABLET BY MOUTH TWICE A DAY TO PREVENT BLOOD CLOTS IN CHRONIC ATRIAL FIBRILLATION   • esomeprazole (NEXIUM) 20 mg, oral, Daily PRN, Take 30 minutes prior to meal.   • fexofenadine (ALLEGRA) 180 mg, oral, Daily PRN   • fluticasone propionate (FLONASE) 50 mcg/actuation nasal spray 1 spray, Each Nostril, Daily PRN   • FOLIC ACID/MULTIVIT,IRON, (CENTRUM ORAL) 1 tablet, oral, Daily   • LORazepam (ATIVAN) 1 mg tablet TAKE 1 TABLET BY MOUTH NIGHTLY AS NEEDED FOR ANXIETY OR SLEEP. Strength: 1 mg   • losartan (COZAAR) 50 mg, oral, Daily   • ofloxacin (OCUFLOX) 0.3 % ophthalmic solution Only after injections into the retina   • pregabalin (LYRICA) 50 mg, oral, 2 times daily, 25 mgs in afternoon as needed   • rosuvastatin (CRESTOR) 5 mg, oral, Daily, Every other day   • SPIRIVA RESPIMAT 2.5 mcg/actuation mist inhaler 2  "puffs, inhalation, Daily   • SYMBICORT 80-4.5 mcg/actuation inhaler 2 puffs, inhalation, 2 times daily   • traMADoL (ULTRAM) 50 mg, oral, Daily PRN   • zoledronic acid (RECLAST) IVPB 5 mg, intravenous, See admin instructions, 5 mg once yearly         Allergies     Mepolizumab, Morphine, and Oxycodone     Vital Signs/Exam     Vitals:    03/28/23 1201   BP: 118/70   Pulse: 80   Resp: 18   SpO2: 98%   Weight: 54 kg (119 lb)   Height: 1.549 m (5' 1\")     BP Readings from Last 3 Encounters:   03/28/23 118/70   02/22/23 (!) 142/71   02/10/23 140/78     Wt Readings from Last 3 Encounters:   03/28/23 54 kg (119 lb)   02/22/23 53.1 kg (117 lb)   02/10/23 54.3 kg (119 lb 12.8 oz)      Body mass index is 22.48 kg/m².    Physical Exam  Constitutional:       Appearance: She is well-developed.   Eyes:      Conjunctiva/sclera: Conjunctivae normal.   Cardiovascular:      Rate and Rhythm: Normal rate and regular rhythm.      Heart sounds: Normal heart sounds.   Pulmonary:      Effort: Pulmonary effort is normal.      Breath sounds: Normal breath sounds.   Abdominal:      General: There is no distension.      Palpations: Abdomen is soft.      Tenderness: There is no abdominal tenderness.   Musculoskeletal:      Right lower leg: No edema.      Left lower leg: No edema.   Skin:     General: Skin is warm and dry.   Neurological:      General: No focal deficit present.      Mental Status: She is alert.   Psychiatric:         Mood and Affect: Mood normal.         Behavior: Behavior normal.          Diagnostic Data     Labs:  Lab Results   Component Value Date    CHOL 227 (H) 01/27/2023    CHOL 248 (H) 11/18/2015     Lab Results   Component Value Date    HDL 62 01/27/2023    HDL 92 11/18/2015     Lab Results   Component Value Date    LDLCALC 141 (H) 01/27/2023    LDLCALC 133 (H) 11/18/2015     Lab Results   Component Value Date    TRIG 120 01/27/2023    TRIG 117 11/18/2015     Lab Results   Component Value Date    WBC 4.83 02/22/2023    " HGB 12.9 02/22/2023     02/22/2023     Lab Results   Component Value Date    GLUCOSE 113 (H) 02/20/2023    CALCIUM 9.3 02/20/2023     02/20/2023    K 4.2 02/20/2023    CO2 21 (L) 02/20/2023     02/20/2023    BUN 20 02/20/2023    CREATININE 0.9 02/20/2023     Lab Results   Component Value Date    EGFR 58.7 (L) 02/20/2023     Lab Results   Component Value Date    ALBUMIN 4.3 02/20/2023    BILITOT 0.4 02/20/2023    ALKPHOS 59 02/20/2023    ALT 22 02/20/2023    AST 23 02/20/2023     Lab Results   Component Value Date    HGBA1C 5.6 11/18/2015     Lab Results   Component Value Date    TSH 2.87 01/26/2023     CrCl cannot be calculated (Patient's most recent lab result is older than the maximum 28 days allowed.).    Echocardiogram:  Cardiac Imaging    TRANSTHORACIC ECHO (TTE) COMPLETE 02/22/2023    Interpretation Summary  •  Left Ventricle: Normal ventricle size. Mild concentric left ventricular hypertrophy. Preserved systolic function. Estimated EF 65-70%. No regional wall motion abnormalities. Grade I diastolic dysfunction.  •  Right Ventricle: Normal ventricle size. Normal systolic function.  •  Left Atrium: Moderately dilated atrium.  •  Right Atrium: Mildly dilated atrium.  •  Aortic Valve: Tricuspid valve.  Sclerotic leaflets. No regurgitation. No stenosis. Calculated dimensionless index = 1.04.  •  Mitral Valve: Anterior leaflet thickening. Sclerotic mitral valve. Normal leaflet motion. Mild mitral annular calcification. Mild regurgitation. No stenosis. Mean gradient = 2.00.  •  Tricuspid Valve: Normal structure. Mild regurgitation. Estimated RVSP = 32 mmHg. No significant stenosis.  •  Prior Study: Prior study available for comparison. Prior study date: 6/11/2019.  Compared to the prior study, biatrial enlargement and mild concentric LVH are now present.  Otherwise no significant change.      Stress Tests:       Vascular Studies:  No results found for this or any previous visit from the past 730  days.      Cardiac cath:      Peripheral:  No results found for this or any previous visit from the past 730 days.      ECG: Independently reviewed:          Assessment and Plan      Madelyn Ruiz is a 89 y.o. female seen today for the following conditions:    1. Atrial flutter, unspecified type (CMS/HCC)  ECG 12 LEAD-OFFICE PERFORMED      2. Primary hypertension        3. Mixed hyperlipidemia        4. Brain aneurysm            Plan:    She is doing very well from a cardiac standpoint.  She is in sinus rhythm at today's visit and her blood pressure is under excellent control.  She has no concerning cardiac symptoms and her echocardiogram performed during her recent hospital admission showed normal LV function.    I explained to her and her daughter that even though she has maintained sinus rhythm that she is at risk for recurrent A-fib and I would continue her on anticoagulation.  She has had no bleeding complications from the Eliquis.    I will plan to see her in follow-up in approximately 4 months.    Available medical records were reviewed and updated where appropriate including laboratory data, imaging studies, and previous outpatient and inpatient records.  Counseling/education performed.      Thank you for allowing me to participate in the care of this patient.      Dr. Michael A. Valentino, MD, PhD, [facility]  3/28/2023  1:07 PM    [facility] Office: [phone]  Primary [facility] [place]: [facility]     This document was generated utilizing voice recognition technology, typographical errors may be present.

## 2023-03-28 NOTE — LETTER
March 28, 2023     Henry Linares MD  [address]  [address]  [facility] [address]    Patient: Madelyn Ruiz  YOB: 1930  Date of Visit: 3/28/2023      Dear Dr. Linares:    Thank you for referring Madelyn Ruiz to me for evaluation. Below are my notes for this consultation.    If you have questions, please do not hesitate to call me. I look forward to following your patient along with you.         Sincerely,        Michael A. Valentino, MD PhD        CC: No Recipients    Valentino, Michael A, MD PhD  3/28/2023  1:09 PM  Signed     [facility] Cardiology  [place] Office Visit       Patient ID: Madelyn Ruiz 89 y.o. female 8/9/1930  PCP: Henry Linares MD      HPI     Madelyn Ruiz is a 89 y.o. female presenting for an office visit.     She has a past medical history significant for allergic bronchopulmonary aspergillosis, COPD, hypertension, hyperlipidemia, atrial flutter, left SCA aneurysm, GERD, pulmonary embolism in 2019 and mild sleep apnea.    She has had 2 recent hospitalizations at [facility]. Her first hospitalization was from 1/26 - 1/29/2023 where she presented with word finding difficulty.  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  Neurosurgery was consulted and recommended blood pressure control and outpatient follow-up.  Her blood pressure was noted to be significantly elevated and she reported multiple intolerances to antihypertensive medications.  She was started on hydralazine and her dose was titrated to ensure adequate blood pressure control.  She was recommended to continue on a baby aspirin and statin therapy for an LDL of 141, however she refused initiating statin and plans to discuss this with her PCP.  She was noted to have very brief runs of SVT/PAT, for which our service was consulted for. She was discharged on  hydralazine 25 mg Q8 hours.     She was readmitted to [facility] from 2/20 - 2/22/2023 when she presented for elevated blood pressure, lightheadedness, and nausea.  In the ED, she was noted to be in rapid atrial flutter and was started on a Cardizem drip with improvement in her heart rate. Upon interview, she reported only taking hydralazine twice daily after her discharge, instead of three times daily.  She converted to normal sinus rhythm and was transitioned to oral Cardizem.  Her hydralazine was changed to losartan for better compliance.  She had an echocardiogram which revealed a preserved EF. She was started on Eliquis 2.5mg BID and discharged with outpatient follow up.     She presents today for followup.  She is accompanied by her daughter to today's visit.  She has been doing quite well since hospital discharge.  Her blood pressure has been under excellent control.  She denies any symptoms of palpitations.  She also denies any chest pain, shortness of breath, lightheaded/dizziness, lower extremity swelling, or syncope.     Medications     Current Outpatient Medications   Medication Instructions   • albuterol 2.5 mg, nebulization, Every 6 hours PRN   • benralizumab (FASENRA) 30 mg, subcutaneous, Every 2 months   • benzonatate (TESSALON) 200 mg capsule TAKE 1 CAPSULE BY MOUTH 3 TIMES DAILY AS NEEDED FOR COUGH.   • calcium carbonate 600 mg calcium (1,500 mg) tablet 1 tablet, oral, 2 times daily   • cholecalciferol, vitamin D3, 25 mcg (1,000 unit) capsule 1 tablet, oral, Daily   • diltiazem (TIAZAC) 300 mg, oral, Daily   • docusate sodium (COLACE) 100 mg, oral, Nightly   • DULoxetine (CYMBALTA) 60 mg, oral, Nightly   • ELIQUIS 2.5 mg tablet TAKE 1 TABLET BY MOUTH TWICE A DAY TO PREVENT BLOOD CLOTS IN CHRONIC ATRIAL FIBRILLATION   • esomeprazole (NEXIUM) 20 mg, oral, Daily PRN, Take 30 minutes prior to meal.   • fexofenadine (ALLEGRA) 180 mg, oral, Daily PRN   • fluticasone propionate (FLONASE) 50  "mcg/actuation nasal spray 1 spray, Each Nostril, Daily PRN   • FOLIC ACID/MULTIVIT,IRON, (CENTRUM ORAL) 1 tablet, oral, Daily   • LORazepam (ATIVAN) 1 mg tablet TAKE 1 TABLET BY MOUTH NIGHTLY AS NEEDED FOR ANXIETY OR SLEEP. Strength: 1 mg   • losartan (COZAAR) 50 mg, oral, Daily   • ofloxacin (OCUFLOX) 0.3 % ophthalmic solution Only after injections into the retina   • pregabalin (LYRICA) 50 mg, oral, 2 times daily, 25 mgs in afternoon as needed   • rosuvastatin (CRESTOR) 5 mg, oral, Daily, Every other day   • SPIRIVA RESPIMAT 2.5 mcg/actuation mist inhaler 2 puffs, inhalation, Daily   • SYMBICORT 80-4.5 mcg/actuation inhaler 2 puffs, inhalation, 2 times daily   • traMADoL (ULTRAM) 50 mg, oral, Daily PRN   • zoledronic acid (RECLAST) IVPB 5 mg, intravenous, See admin instructions, 5 mg once yearly         Allergies     Mepolizumab, Morphine, and Oxycodone     Vital Signs/Exam     Vitals:    03/28/23 1201   BP: 118/70   Pulse: 80   Resp: 18   SpO2: 98%   Weight: 54 kg (119 lb)   Height: 1.549 m (5' 1\")     BP Readings from Last 3 Encounters:   03/28/23 118/70   02/22/23 (!) 142/71   02/10/23 140/78     Wt Readings from Last 3 Encounters:   03/28/23 54 kg (119 lb)   02/22/23 53.1 kg (117 lb)   02/10/23 54.3 kg (119 lb 12.8 oz)      Body mass index is 22.48 kg/m².    Physical Exam  Constitutional:       Appearance: She is well-developed.   Eyes:      Conjunctiva/sclera: Conjunctivae normal.   Cardiovascular:      Rate and Rhythm: Normal rate and regular rhythm.      Heart sounds: Normal heart sounds.   Pulmonary:      Effort: Pulmonary effort is normal.      Breath sounds: Normal breath sounds.   Abdominal:      General: There is no distension.      Palpations: Abdomen is soft.      Tenderness: There is no abdominal tenderness.   Musculoskeletal:      Right lower leg: No edema.      Left lower leg: No edema.   Skin:     General: Skin is warm and dry.   Neurological:      General: No focal deficit present.      " Mental Status: She is alert.   Psychiatric:         Mood and Affect: Mood normal.         Behavior: Behavior normal.          Diagnostic Data     Labs:  Lab Results   Component Value Date    CHOL 227 (H) 01/27/2023    CHOL 248 (H) 11/18/2015     Lab Results   Component Value Date    HDL 62 01/27/2023    HDL 92 11/18/2015     Lab Results   Component Value Date    LDLCALC 141 (H) 01/27/2023    LDLCALC 133 (H) 11/18/2015     Lab Results   Component Value Date    TRIG 120 01/27/2023    TRIG 117 11/18/2015     Lab Results   Component Value Date    WBC 4.83 02/22/2023    HGB 12.9 02/22/2023     02/22/2023     Lab Results   Component Value Date    GLUCOSE 113 (H) 02/20/2023    CALCIUM 9.3 02/20/2023     02/20/2023    K 4.2 02/20/2023    CO2 21 (L) 02/20/2023     02/20/2023    BUN 20 02/20/2023    CREATININE 0.9 02/20/2023     Lab Results   Component Value Date    EGFR 58.7 (L) 02/20/2023     Lab Results   Component Value Date    ALBUMIN 4.3 02/20/2023    BILITOT 0.4 02/20/2023    ALKPHOS 59 02/20/2023    ALT 22 02/20/2023    AST 23 02/20/2023     Lab Results   Component Value Date    HGBA1C 5.6 11/18/2015     Lab Results   Component Value Date    TSH 2.87 01/26/2023     CrCl cannot be calculated (Patient's most recent lab result is older than the maximum 28 days allowed.).    Echocardiogram:  Cardiac Imaging    TRANSTHORACIC ECHO (TTE) COMPLETE 02/22/2023    Interpretation Summary  •  Left Ventricle: Normal ventricle size. Mild concentric left ventricular hypertrophy. Preserved systolic function. Estimated EF 65-70%. No regional wall motion abnormalities. Grade I diastolic dysfunction.  •  Right Ventricle: Normal ventricle size. Normal systolic function.  •  Left Atrium: Moderately dilated atrium.  •  Right Atrium: Mildly dilated atrium.  •  Aortic Valve: Tricuspid valve.  Sclerotic leaflets. No regurgitation. No stenosis. Calculated dimensionless index = 1.04.  •  Mitral Valve: Anterior leaflet  thickening. Sclerotic mitral valve. Normal leaflet motion. Mild mitral annular calcification. Mild regurgitation. No stenosis. Mean gradient = 2.00.  •  Tricuspid Valve: Normal structure. Mild regurgitation. Estimated RVSP = 32 mmHg. No significant stenosis.  •  Prior Study: Prior study available for comparison. Prior study date: 6/11/2019.  Compared to the prior study, biatrial enlargement and mild concentric LVH are now present.  Otherwise no significant change.      Stress Tests:       Vascular Studies:  No results found for this or any previous visit from the past 730 days.      Cardiac cath:      Peripheral:  No results found for this or any previous visit from the past 730 days.      ECG: Independently reviewed:          Assessment and Plan      Madelyn Ruiz is a 89 y.o. female seen today for the following conditions:    1. Atrial flutter, unspecified type (CMS/HCC)  ECG 12 LEAD-OFFICE PERFORMED      2. Primary hypertension        3. Mixed hyperlipidemia        4. Brain aneurysm            Plan:    She is doing very well from a cardiac standpoint.  She is in sinus rhythm at today's visit and her blood pressure is under excellent control.  She has no concerning cardiac symptoms and her echocardiogram performed during her recent hospital admission showed normal LV function.    I explained to her and her daughter that even though she has maintained sinus rhythm that she is at risk for recurrent A-fib and I would continue her on anticoagulation.  She has had no bleeding complications from the Eliquis.    I will plan to see her in follow-up in approximately 4 months.    Available medical records were reviewed and updated where appropriate including laboratory data, imaging studies, and previous outpatient and inpatient records.  Counseling/education performed.      Thank you for allowing me to participate in the care of this patient.      Dr. Michael A. Valentino, MD, PhD, [facility]  3/28/2023  1:07 PM    [facility]  [place] Office: [phone]  [facility]: [facility]     This document was generated utilizing voice recognition technology, typographical errors may be present.

## 2023-05-10 ENCOUNTER — OFFICE VISIT (OUTPATIENT)
Dept: PRIMARY CARE | Facility: CLINIC | Age: 88
End: 2023-05-10
Payer: MEDICARE

## 2023-05-10 ENCOUNTER — DOCUMENTATION (OUTPATIENT)
Dept: PRIMARY CARE | Facility: CLINIC | Age: 88
End: 2023-05-10

## 2023-05-10 ENCOUNTER — APPOINTMENT (OUTPATIENT)
Dept: LAB | Facility: CLINIC | Age: 88
End: 2023-05-10
Attending: FAMILY MEDICINE
Payer: MEDICARE

## 2023-05-10 VITALS
TEMPERATURE: 98.1 F | SYSTOLIC BLOOD PRESSURE: 146 MMHG | BODY MASS INDEX: 23.05 KG/M2 | DIASTOLIC BLOOD PRESSURE: 84 MMHG | RESPIRATION RATE: 16 BRPM | OXYGEN SATURATION: 98 % | HEART RATE: 99 BPM | WEIGHT: 122 LBS

## 2023-05-10 DIAGNOSIS — I10 PRIMARY HYPERTENSION: Chronic | ICD-10-CM

## 2023-05-10 DIAGNOSIS — I10 PRIMARY HYPERTENSION: Primary | Chronic | ICD-10-CM

## 2023-05-10 LAB
ANION GAP SERPL CALC-SCNC: 8 MEQ/L (ref 3–15)
BUN SERPL-MCNC: 21 MG/DL (ref 8–20)
CALCIUM SERPL-MCNC: 9.2 MG/DL (ref 8.9–10.3)
CHLORIDE SERPL-SCNC: 108 MEQ/L (ref 98–109)
CO2 SERPL-SCNC: 24 MEQ/L (ref 22–32)
CREAT SERPL-MCNC: 0.8 MG/DL (ref 0.6–1.1)
GFR SERPL CREATININE-BSD FRML MDRD: >60 ML/MIN/1.73M*2
GLUCOSE SERPL-MCNC: 90 MG/DL (ref 70–99)
POTASSIUM SERPL-SCNC: 4.6 MEQ/L (ref 3.6–5.1)
SODIUM SERPL-SCNC: 140 MEQ/L (ref 136–144)

## 2023-05-10 PROCEDURE — 36415 COLL VENOUS BLD VENIPUNCTURE: CPT

## 2023-05-10 PROCEDURE — 80048 BASIC METABOLIC PNL TOTAL CA: CPT

## 2023-05-10 PROCEDURE — 99213 OFFICE O/P EST LOW 20 MIN: CPT | Performed by: FAMILY MEDICINE

## 2023-05-10 ASSESSMENT — ENCOUNTER SYMPTOMS
CHEST TIGHTNESS: 0
BLURRED VISION: 0
LIGHT-HEADEDNESS: 0
DIZZINESS: 0
ORTHOPNEA: 0
PND: 0
CHILLS: 0
NAUSEA: 0
SHORTNESS OF BREATH: 0
VOMITING: 0
COUGH: 0
PALPITATIONS: 0
HEADACHES: 0
NUMBNESS: 0
WEAKNESS: 0
HYPERTENSION: 1
FEVER: 0
CONFUSION: 0
SPEECH DIFFICULTY: 0

## 2023-05-10 NOTE — RESULT ENCOUNTER NOTE
Patient notified of blood test results.  Losartan is not affecting creatinine or potassium levels.  No changes in medication at this time.

## 2023-05-10 NOTE — ASSESSMENT & PLAN NOTE
Blood pressure is stable on current doses of the Losartan and Diltiazem. Continue with home blood pressure readings. Follow low sodium diet.

## 2023-05-10 NOTE — PATIENT INSTRUCTIONS
Problem List Items Addressed This Visit       Primary hypertension - Primary (Chronic)     Blood pressure is stable on current doses of the Losartan and Diltiazem. Continue with home blood pressure readings. Follow low sodium diet.         Relevant Orders    Basic metabolic panel

## 2023-05-10 NOTE — PROGRESS NOTES
Henry Linares MD    [facility]  [address]  [place] [address]  Phone: [phone]  Fax: [phone]     History of Present Illness     Subjective     Patient ID: Madelyn Ruiz is a 89 y.o. female.    Hypertension  This is a chronic problem. Associated symptoms include peripheral edema. Pertinent negatives include no blurred vision, chest pain, headaches, orthopnea, palpitations, PND or shortness of breath. Past treatments include calcium channel blockers and angiotensin blockers. There is no history of CAD/MI, CVA, heart failure, left ventricular hypertrophy or PVD.     Home blood pressure 130s / 70s    Past Medical/Surgical/Family/Social History       The following have been reviewed and updated as appropriate in this visit:   Allergies  Meds  Problems         Past Medical History:   Diagnosis Date   • Allergic bronchopulmonary aspergillosis (CMS/[facility]) 04/02/2018    Allergist: Dr. Arriaza.  Stable FEV1 in 8/2015.  IgE levels and eosinophils stable in 8/2015.  Managed with Fasenra, Azithromycin and Flovent.   • Allergic rhinitis 04/02/2018    Pulmonologist: Dr. Walker. Managed with Flonase.   • Asthma    • Atrial flutter with rapid ventricular response (CMS/[facility]) 02/21/2023    Diagnosed in 02/2023 and hospitalized Managed with Apixaban and Diltiazem Echocardiogram 02/2023 •  Left Ventricle: Normal ventricle size. Mild concentric left ventricular hypertrophy. Preserved systolic function. Estimated EF 65-70%. No regional wall motion abnormalities. Grade I diastolic dysfunction. •  Right Ventricle: Normal ventricle size. Normal systolic function. •  Left Atrium: Moderately   • Calcification of aorta (CMS/[facility]) 06/29/2020    Seen incidentally on CT scan.   • Chronic obstructive pulmonary disease (CMS/[facility]) 04/02/2018    Pulmonologist: Dr. Walker.  Seen on PFTs in hospital in 2014.  Controlled with Spiriva and FLovent.   • GERD (gastroesophageal reflux disease)  06/08/2019    Managed with Esomeprazole.  Should take medication 30 minutes prior to meal.  Advised to avoid caffeine, carbonated beverages, and should not eat within 3 hours of going to sleep.  Advised to reduce BMI < 25.    • Insomnia 04/02/2018    Managed with Lorazepam as needed.   • Lumbar spinal stenosis 04/02/2018    Neurosurgeon: Dr. Miguel. MRI with Central and lateral recess spinal stenosis. Has Spondylolisthesis at L4/L5. S/P epidural injection by Dr. Martin with some relief in past. Had Lumbar fusion and Lumbar laminectomy by Dr. Miguel in 4/2015.   • Macular degeneration disease    • Obstructive sleep apnea syndrome 04/02/2018    Mild STACIA noted in sleep study 7/2015. Treated with nasal CPAP automatic with pressure range 5-10 CM H2Ox couple months. Off now   • Osteoporosis 04/02/2018    Rheumatologist: Dr. Bradley.  Dexa scan 7/2016 with osteoporosis of lumbar spine LS-2.5, LH-2.1, RH -2.2.  Has been on Fosamax in the past for 8 years.  Worsened by Prednisone in past.  Continue vitamin D, calcium and weight bearing exercise. DEXA in 7/2017 with LS -1.7, LH -2.2, and RH -2.0.  Next due in 7/2019.  Seen by Dr. Bradley in 8/2015 who agreed with initiating Prolia. Took for 2 years. N   • Primary hypertension 04/02/2018    Managed on Diltiazem and Losartan. Advised to follow a low sodium diet and keep BMI < 25.  Advised to exercise for 2.5 hours per week.  Instructed to take home blood pressure readings and call if consistently above 140/90.     • Pulmonary embolism (CMS/HCC) 06/09/2019    Hematologist: Dr. Bhakta Diagnosed in 06/2019 in right upper lobe. Vascular ultrasound negative of bilateral lower extremities. Managed on Eliquis. Hypercoagulable workup was negative for beta-2 glycoprotein's, RIGO, Antithrombin III, lupus anticoagulant, protein C activity protein S activity, and prothrombin gene mutation, and factor V Leiden. Now off Apixaban as CT scan in 12/2019 was without pu   • SVT (supraventricular  tachycardia) (CMS/HCC)    • Vertebral compression fracture (CMS/HCC)     At T12 level       Past Surgical History:   Procedure Laterality Date   • APPENDECTOMY     • CARPAL TUNNEL RELEASE Bilateral    • CATARACT EXTRACTION W/  INTRAOCULAR LENS IMPLANT Bilateral    • HYSTERECTOMY  1972   • LUMBAR LAMINECTOMY  04/21/2015    S/P lumbar fusion   • TONSILLECTOMY         Family History   Problem Relation Age of Onset   • Glaucoma Biological Mother    • Macular degeneration Biological Mother    • Hypertension Biological Mother    • Lung cancer Biological Father    • Heart disease Biological Brother    • Breast cancer Mother's Sister    • No Known Problems Biological Son    • No Known Problems Biological Son    • No Known Problems Biological Daughter    • No Known Problems Biological Daughter    • No Known Problems Biological Daughter        Social History     Tobacco Use   • Smoking status: Never   • Smokeless tobacco: Never   Vaping Use   • Vaping status: Never Used   Substance Use Topics   • Alcohol use: Yes     Comment: Rarely   • Drug use: No       Patient Care Team:  Henry Linares MD as PCP - General  Ray Arriaza MD as Referring Physician  Aye Bhakta MD as Consulting Physician (Hematology and Oncology)  Wesley Walker MD as Consulting Physician (Pulmonary)  Fausto Bradley MD as Consulting Physician (Rheumatology)  Kleiner, Robert C, MD as Referring Physician  Alfie Torres MD as Consulting Physician (Dermatology)  Fanny Pelaez MD as Consulting Physician (Orthopedic Surgery)  Keenan Krause DO as Consulting Physician (Family Medicine)  Keenan Krause DO as Consulting Physician (Family Medicine)  Wesley You DO as Consulting Physician (Physical Medicine and Rehabilitation)  Jessika Zelaya MD as Surgeon (Neurosurgery)  Henrietta Crespo MD as Cardiologist (Interventional Cardiology)  Dinesh Goel MD as Surgeon (Otolaryngology)  Valentino, Michael A, MD PhD as  Cardiologist (Cardiology)     Allergies and Medications       Allergies   Allergen Reactions   • Mepolizumab Rash   • Morphine      Other reaction(s): Vomiting   • Oxycodone      Other reaction(s): Vomiting       Current Outpatient Medications   Medication Sig Dispense Refill   • albuterol 2.5 mg /3 mL (0.083 %) nebulizer solution Take 3 mL (2.5 mg total) by nebulization every 6 (six) hours as needed for wheezing or shortness of breath. 125 vial 0   • benralizumab (FASENRA) 30 mg/mL syringe subcutaneous syringe Inject 30 mg under the skin every 2 (two) months.     • benzonatate (TESSALON) 200 mg capsule TAKE 1 CAPSULE BY MOUTH 3 TIMES DAILY AS NEEDED FOR COUGH.     • calcium carbonate 600 mg calcium (1,500 mg) tablet Take 1 tablet by mouth 2 (two) times a day.     • cholecalciferol, vitamin D3, 25 mcg (1,000 unit) capsule Take 1 tablet by mouth daily.     • diltiazem (TIAZAC) 300 mg 24 hr capsule Take 1 capsule (300 mg total) by mouth daily. 90 capsule 1   • docusate sodium (COLACE) 100 mg capsule Take 100 mg by mouth nightly.     • DULoxetine (CYMBALTA) 60 mg capsule Take 60 mg by mouth at bedtime.   0   • ELIQUIS 2.5 mg tablet TAKE 1 TABLET BY MOUTH TWICE A DAY TO PREVENT BLOOD CLOTS IN CHRONIC ATRIAL FIBRILLATION 60 tablet 11   • esomeprazole (NexIUM) 20 mg capsule Take 20 mg by mouth daily as needed. Take 30 minutes prior to meal.     • fexofenadine (ALLEGRA) 180 mg tablet Take 180 mg by mouth daily as needed.     • fluticasone propionate (FLONASE) 50 mcg/actuation nasal spray Administer 1 spray into each nostril daily as needed for rhinitis or allergies.     • FOLIC ACID/MULTIVIT,IRON, (CENTRUM ORAL) Take 1 tablet by mouth daily.     • LORazepam (ATIVAN) 1 mg tablet TAKE 1 TABLET BY MOUTH NIGHTLY AS NEEDED FOR ANXIETY OR SLEEP. Strength: 1 mg 30 tablet 5   • losartan (COZAAR) 50 mg tablet Take 1 tablet (50 mg total) by mouth daily. 90 tablet 1   • ofloxacin (OCUFLOX) 0.3 % ophthalmic solution Only after  "injections into the retina     • pregabalin (LYRICA) 50 mg capsule Take 50 mg by mouth 2 (two) times a day. 25 mgs in afternoon as needed     • rosuvastatin (CRESTOR) 5 mg tablet Take 1 tablet (5 mg total) by mouth daily. Every other day     • SPIRIVA RESPIMAT 2.5 mcg/actuation mist inhaler Inhale 2 puffs daily.     • SYMBICORT 80-4.5 mcg/actuation inhaler Inhale 2 puffs 2 (two) times a day.     • traMADoL (ULTRAM) 50 mg tablet Take 50 mg by mouth daily as needed.     • zoledronic acid (RECLAST) IVPB Infuse 5 mg into a venous catheter See admin instr. 5 mg once yearly       No current facility-administered medications for this visit.          Review of Systems       Review of Systems   Constitutional: Negative for chills and fever.   Eyes: Negative for blurred vision and visual disturbance.   Respiratory: Negative for cough, chest tightness and shortness of breath.    Cardiovascular: Negative for chest pain, palpitations, orthopnea, leg swelling and PND.   Gastrointestinal: Negative for nausea and vomiting.   Neurological: Negative for dizziness, syncope, speech difficulty, weakness, light-headedness, numbness and headaches.   Psychiatric/Behavioral: Negative for confusion.        Physical Examination       Objective     Vitals:    05/10/23 1039   BP: (!) 146/84   Pulse: 99   Resp: 16   Temp: 36.7 °C (98.1 °F)   SpO2: 98%       Pulse Readings from Last 5 Encounters:   05/10/23 99   03/28/23 80   02/22/23 97   02/10/23 94   01/29/23 83       Wt Readings from Last 5 Encounters:   05/10/23 55.3 kg (122 lb)   03/28/23 54 kg (119 lb)   02/22/23 53.1 kg (117 lb)   02/10/23 54.3 kg (119 lb 12.8 oz)   01/27/23 52.7 kg (116 lb 1.6 oz)       Ht Readings from Last 5 Encounters:   03/28/23 1.549 m (5' 1\")   02/22/23 1.524 m (5')   02/10/23 1.549 m (5' 1\")   01/27/23 1.524 m (5')   01/26/23 1.524 m (5')       BP Readings from Last 5 Encounters:   05/10/23 (!) 146/84   03/28/23 118/70   02/22/23 (!) 142/71   02/10/23 140/78 "   01/29/23 137/77       BMI Readings from Last 4 Encounters:   05/10/23 23.05 kg/m²   03/28/23 22.48 kg/m²   02/22/23 22.85 kg/m²   02/10/23 22.64 kg/m²       Physical Exam  Constitutional:       General: She is not in acute distress.     Appearance: Normal appearance. She is not ill-appearing.   Cardiovascular:      Rate and Rhythm: Normal rate and regular rhythm.      Heart sounds: Normal heart sounds. No murmur heard.     No gallop.   Pulmonary:      Effort: No respiratory distress.      Breath sounds: Normal breath sounds. No wheezing or rhonchi.   Neurological:      Mental Status: She is alert.   Psychiatric:         Mood and Affect: Mood normal.         Behavior: Behavior normal.          Laboratory Results     Lab Results   Component Value Date    WBC 4.83 02/22/2023    WBC 7.21 02/20/2023    WBC 5.11 01/26/2023    HGB 12.9 02/22/2023    HGB 14.1 02/20/2023    HGB 12.9 01/26/2023    HCT 39.6 02/22/2023    HCT 43.0 02/20/2023    HCT 41.7 01/26/2023    MCV 96.4 02/22/2023    MCV 95.1 02/20/2023    .2 (H) 01/26/2023     02/22/2023     02/20/2023     01/26/2023        Lab Results   Component Value Date    GLUCOSE 113 (H) 02/20/2023    GLUCOSE 151 (H) 01/28/2023    GLUCOSE 120 (H) 01/26/2023    CALCIUM 9.3 02/20/2023    CALCIUM 9.0 01/28/2023    CALCIUM 9.3 01/26/2023     02/20/2023     01/28/2023     01/26/2023    K 4.2 02/20/2023    K 3.5 (L) 01/28/2023    K 4.1 01/26/2023    CO2 21 (L) 02/20/2023    CO2 25 01/28/2023    CO2 20 (L) 01/26/2023     02/20/2023     01/28/2023     01/26/2023    BUN 20 02/20/2023    BUN 19 01/28/2023    BUN 20 01/26/2023    CREATININE 0.9 02/20/2023    CREATININE 0.9 01/28/2023    CREATININE 0.9 01/26/2023    ALKPHOS 59 02/20/2023    ALKPHOS 55 11/17/2022    ALKPHOS 75 04/28/2022    AST 23 02/20/2023    AST 16 11/17/2022    AST 17 04/28/2022    ALT 22 02/20/2023    ALT 16 11/17/2022    ALT 20 04/28/2022    BILITOT 0.4  02/20/2023    BILITOT 0.6 11/17/2022    BILITOT 0.6 04/28/2022    ALBUMIN 4.3 02/20/2023    ALBUMIN 4.2 11/17/2022    ALBUMIN 3.9 04/28/2022       Lab Results   Component Value Date    CHOL 227 (H) 01/27/2023    CHOL 248 (H) 11/18/2015     Lab Results   Component Value Date    HDL 62 01/27/2023    HDL 92 11/18/2015     Lab Results   Component Value Date    LDLCALC 141 (H) 01/27/2023    LDLCALC 133 (H) 11/18/2015     Lab Results   Component Value Date    TRIG 120 01/27/2023    TRIG 117 11/18/2015     Lab Results   Component Value Date    TSH 2.87 01/26/2023    TSH 1.91 10/14/2020    TSH 0.32 (L) 06/10/2019     Lab Results   Component Value Date    FREET4 0.79 11/18/2015       Lab Results   Component Value Date    HGBA1C 5.6 11/18/2015    HGBA1C 5.6 04/08/2015     Lab Results   Component Value Date    HEPCAB Nonreactive 07/12/2018     Lab Results   Component Value Date    MG 2.0 01/28/2023    MG 2.1 01/26/2023      Immunization History   Administered Date(s) Administered   • INFLUENZA VACCINE QUAD ADJUVANTED 65 and OLDER 10/06/2021, 10/19/2022   • Influenza Split High Dose Preservative Free IM 11/03/2011, 11/13/2012, 10/09/2013, 10/30/2014, 11/17/2015, 09/30/2016, 09/25/2017   • Influenza Vaccine 65 And Older Preservative Free 10/25/2019, 10/02/2020   • Influenza Vaccine Quadrivalent 3 Yr And Older 09/18/2017, 10/22/2018   • Influenza, Unspecified 10/30/2014, 11/17/2015, 09/30/2016, 09/25/2017   • MMRV 06/05/2014   • Pneumococcal Conjugate 08/09/1996   • Pneumococcal Conjugate 13-Valent 11/17/2015   • Pneumococcal Polysaccharide 10/30/2014, 09/11/2017   • SARS-COV-2 (COVID-19) VACCINE PFIZER BIVALENT BOOSTER 12 years and older ([place]ely Cap) 09/15/2022   • SARS-COV-2 (COVID-19) VACCINE, MODERNA 02/05/2021, 03/05/2021   • SARS-COV-2 (COVID-19) VACCINE, MODERNA BOOSTER 11/17/2021   • SARS-COV-2 (COVID19) VACCINE, PFIZER 09/15/2022   • Tdap 03/22/2012, 08/08/2022   • Varicella 06/05/2014   • Zoster 01/02/2006, 01/02/2016   •  Zoster Vaccine Recombinant Adjuvanted (Shingrix) 05/24/2019, 09/20/2019     Health Maintenance Topics with due status: Overdue       Topic Date Due    Medicare Annual Wellness Visit 10/02/2021    COVID-19 Vaccine 11/10/2022     Health Maintenance Topics with due status: Not Due       Topic Last Completion Date    DTaP, Tdap, and Td Vaccines 08/08/2022    DEXA Scan 09/20/2022    Falls Risk Screening 02/10/2023    Depression Screening 02/21/2023     Health Maintenance Topics with due status: Completed       Topic Last Completion Date    Pneumococcal (65 years and older) 09/11/2017    Zoster Vaccine 09/20/2019    Influenza Vaccine 10/19/2022     Health Maintenance Topics with due status: Aged Out       Topic Date Due    Meningococcal ACWY Aged Out    HIB Vaccines Aged Out    Hepatitis B Vaccines Aged Out    IPV Vaccines Aged Out    HPV Vaccines Aged Out     Health Maintenance Topics with due status: Discontinued       Topic Date Due    Breast Cancer Screening Discontinued        Assessment and Plan       Assessment/Plan     Problem List Items Addressed This Visit     Primary hypertension - Primary (Chronic)    Overview     · Managed on Diltiazem and Losartan.  · Advised to follow a low sodium diet and keep BMI < 25.   · Advised to exercise for 2.5 hours per week.   · Instructed to take home blood pressure readings and call if consistently above 140/90.             Current Assessment & Plan     Blood pressure is stable on current doses of the Losartan and Diltiazem. Continue with home blood pressure readings. Follow low sodium diet.         Relevant Orders    Basic metabolic panel       Return in about 5 months (around 10/10/2023) for Medicare annual wellness exam.    Orders Placed This Encounter   Procedures   • Basic metabolic panel     Standing Status:   Future     Number of Occurrences:   1     Standing Expiration Date:   5/10/2024     Order Specific Question:   Release to patient     Answer:   Immediate               Henry Linares MD    5/10/2023        36.5

## 2023-05-19 ENCOUNTER — TELEPHONE (OUTPATIENT)
Dept: CARDIOLOGY | Facility: CLINIC | Age: 88
End: 2023-05-19
Payer: MEDICARE

## 2023-05-19 RX ORDER — LOSARTAN POTASSIUM 50 MG/1
50 TABLET ORAL 2 TIMES DAILY
Start: 2023-05-19 | End: 2023-06-20 | Stop reason: SDUPTHER

## 2023-05-19 NOTE — TELEPHONE ENCOUNTER
Pt called stated her BP was 138/78 after taking medication     P: [phone]     Detail Level: Detailed Add 52 Modifier (Optional): no Medical Necessity Information: It is in your best interest to select a reason for this procedure from the list below. All of these items fulfill various CMS LCD requirements except the new and changing color options. Consent: The patient's consent was obtained including but not limited to risks of crusting, scabbing, blistering, scarring, darker or lighter pigmentary change, recurrence, incomplete removal and infection. Medical Necessity Clause: This procedure was medically necessary because the lesion that was treated was Post-Care Instructions: I reviewed with the patient in detail post-care instructions. Patient is to wear sunprotection, and avoid picking at any of the treated lesions. Pt may apply Vaseline to crusted or scabbing areas. Treatment Number (Will Not Render If 0): 0 Anesthesia Volume In Cc: 3

## 2023-05-19 NOTE — TELEPHONE ENCOUNTER
Chart reviewed.     The patient was last seen by Dr. Valentino in March 2023. She was doing well at that time and her blood pressure was 118/70 - in sinus rhythm. I called and spoke with patient. She reiterates the symptoms in the nursing encounter.    She normally takes her medications in the morning and reports compliance to losartan as well as diltiazem.  Last night she checked her blood pressure around 8 PM which was 162/92 and initially she felt well but then developed lightheadedness, dizziness and weakness.  She has had similar symptoms in the past in the setting of elevated blood pressure.  She decided to take additional doses of her losartan and diltiazem and went to bed.    This morning her blood pressure remained elevated at 162/93 but improved to 138/78 after her morning medications (which is her 3rd dose in ~ 24 hours).  She currently feels better and back to her baseline. Her baseline systolics are around 130-140s.     She denies any recent illnesses or infections. No OTC medications or changes in diet  - specifically salt consumption. She does have some chronic back pain but this hasn't been an issue with around the time her blood pressure spiked.     Heart rate has been in the 70s (she thinks this is the pulse on her home monitor) and previously in February 2023 when she had lightheadedness and elevated blood pressure she was in rapid atrial flutter.  She does not have any symptoms of any palpitations or chest fluttering.    Plan:  -Continue Diltiazem 300 mg daily  -Increase Losartan to 50 mg BID  -Continue close blood pressure monitoring. We discussed she should call back with any persistently high/low readings or symptoms

## 2023-05-19 NOTE — TELEPHONE ENCOUNTER
Dr. Valentino     Last OV 3/28/23    BP at OV is: 142/71       PMH  allergic bronchopulmonary aspergillosis, COPD, HTN, hyperlipidemia, Aflutter, Left SCA aneurysm, GERD, PE 2019 and mild sleep apnea.     Pt is calling to report that last night her BP was 162/92 72 BPM     She was feeling weak and dizzy.     She decided to take her morning meds again last night. This included Diltiazem 300 mg and Losartan 50 mg. She normally takes these only once in the am.    This morning, her BP is 162/93 67 BPM but before her medications.    She says she does not have any dizziness or weak feeling this morning.   She says on average her BP runs in the low 140's.     Pt may be reached at [phone]

## 2023-06-01 ENCOUNTER — TRANSCRIBE ORDERS (OUTPATIENT)
Dept: LAB | Facility: HOSPITAL | Age: 88
End: 2023-06-01

## 2023-06-01 ENCOUNTER — APPOINTMENT (OUTPATIENT)
Dept: LAB | Facility: HOSPITAL | Age: 88
End: 2023-06-01
Attending: INTERNAL MEDICINE
Payer: MEDICARE

## 2023-06-01 ENCOUNTER — TELEPHONE (OUTPATIENT)
Dept: PRIMARY CARE | Facility: CLINIC | Age: 88
End: 2023-06-01
Payer: MEDICARE

## 2023-06-01 DIAGNOSIS — R53.1 WEAKNESS: ICD-10-CM

## 2023-06-01 DIAGNOSIS — R00.0 TACHYCARDIA, UNSPECIFIED: ICD-10-CM

## 2023-06-01 DIAGNOSIS — F41.9 ANXIETY: ICD-10-CM

## 2023-06-01 DIAGNOSIS — R47.89 OTHER SPEECH DISTURBANCES: Primary | ICD-10-CM

## 2023-06-01 DIAGNOSIS — R47.89 OTHER SPEECH DISTURBANCES: ICD-10-CM

## 2023-06-01 LAB
ALBUMIN SERPL-MCNC: 3.9 G/DL (ref 3.4–5)
ALP SERPL-CCNC: 41 IU/L (ref 35–126)
ALT SERPL-CCNC: 21 IU/L (ref 11–54)
ANION GAP SERPL CALC-SCNC: 6 MEQ/L (ref 3–15)
AST SERPL-CCNC: 14 IU/L (ref 15–41)
BASOPHILS # BLD: 0 K/UL (ref 0.01–0.1)
BASOPHILS NFR BLD: 0 %
BILIRUB SERPL-MCNC: 0.8 MG/DL (ref 0.3–1.2)
BUN SERPL-MCNC: 26 MG/DL (ref 8–20)
CALCIUM SERPL-MCNC: 9 MG/DL (ref 8.9–10.3)
CHLORIDE SERPL-SCNC: 107 MEQ/L (ref 98–109)
CO2 SERPL-SCNC: 27 MEQ/L (ref 22–32)
CREAT SERPL-MCNC: 0.8 MG/DL (ref 0.6–1.1)
DIFFERENTIAL METHOD BLD: ABNORMAL
EOSINOPHIL # BLD: 0 K/UL (ref 0.04–0.36)
EOSINOPHIL NFR BLD: 0 %
ERYTHROCYTE [DISTWIDTH] IN BLOOD BY AUTOMATED COUNT: 14.4 % (ref 11.7–14.4)
GFR SERPL CREATININE-BSD FRML MDRD: >60 ML/MIN/1.73M*2
GLUCOSE SERPL-MCNC: 97 MG/DL (ref 70–99)
HCT VFR BLDCO AUTO: 39.3 % (ref 35–45)
HGB BLD-MCNC: 12.4 G/DL (ref 11.8–15.7)
IMM GRANULOCYTES # BLD AUTO: 0.06 K/UL (ref 0–0.08)
IMM GRANULOCYTES NFR BLD AUTO: 1 %
LYMPHOCYTES # BLD: 1.24 K/UL (ref 1.2–3.5)
LYMPHOCYTES NFR BLD: 20.3 %
MCH RBC QN AUTO: 32 PG (ref 28–33.2)
MCHC RBC AUTO-ENTMCNC: 31.6 G/DL (ref 32.2–35.5)
MCV RBC AUTO: 101.6 FL (ref 83–98)
MONOCYTES # BLD: 0.6 K/UL (ref 0.28–0.8)
MONOCYTES NFR BLD: 9.8 %
NEUTROPHILS # BLD: 4.22 K/UL (ref 1.7–7)
NEUTS SEG NFR BLD: 68.9 %
NRBC BLD-RTO: 0 %
PDW BLD AUTO: 10.4 FL (ref 9.4–12.3)
PLATELET # BLD AUTO: 213 K/UL (ref 150–369)
POTASSIUM SERPL-SCNC: 4.3 MEQ/L (ref 3.6–5.1)
PROT SERPL-MCNC: 6.2 G/DL (ref 6–8.2)
RBC # BLD AUTO: 3.87 M/UL (ref 3.93–5.22)
SODIUM SERPL-SCNC: 140 MEQ/L (ref 136–144)
WBC # BLD AUTO: 6.12 K/UL (ref 3.8–10.5)

## 2023-06-01 PROCEDURE — 82784 ASSAY IGA/IGD/IGG/IGM EACH: CPT

## 2023-06-01 PROCEDURE — 85025 COMPLETE CBC W/AUTO DIFF WBC: CPT

## 2023-06-01 PROCEDURE — 80053 COMPREHEN METABOLIC PANEL: CPT

## 2023-06-01 PROCEDURE — 36415 COLL VENOUS BLD VENIPUNCTURE: CPT

## 2023-06-01 RX ORDER — LORAZEPAM 1 MG/1
1 TABLET ORAL NIGHTLY PRN
Qty: 30 TABLET | Refills: 5 | Status: SHIPPED | OUTPATIENT
Start: 2023-06-01 | End: 2023-08-15

## 2023-06-01 RX ORDER — LORAZEPAM 1 MG/1
1 TABLET ORAL NIGHTLY PRN
Qty: 30 TABLET | Refills: 5 | OUTPATIENT
Start: 2023-06-01

## 2023-06-01 NOTE — TELEPHONE ENCOUNTER
Medicine Refill Request  [facility] 4-18-23 Written 1-18-23 # 30 for 30 days   Last Office: 5/10/2023   Last Consult Visit: 11/12/2021  Last Telemedicine Visit: 8/3/2020 Henry Linares MD    Next Appointment: 10/11/2023      Current Outpatient Medications:   •  albuterol 2.5 mg /3 mL (0.083 %) nebulizer solution, Take 3 mL (2.5 mg total) by nebulization every 6 (six) hours as needed for wheezing or shortness of breath., Disp: 125 vial, Rfl: 0  •  benralizumab (FASENRA) 30 mg/mL syringe subcutaneous syringe, Inject 30 mg under the skin every 2 (two) months., Disp: , Rfl:   •  benzonatate (TESSALON) 200 mg capsule, TAKE 1 CAPSULE BY MOUTH 3 TIMES DAILY AS NEEDED FOR COUGH., Disp: , Rfl:   •  calcium carbonate 600 mg calcium (1,500 mg) tablet, Take 1 tablet by mouth 2 (two) times a day., Disp: , Rfl:   •  cholecalciferol, vitamin D3, 25 mcg (1,000 unit) capsule, Take 1 tablet by mouth daily., Disp: , Rfl:   •  diltiazem (TIAZAC) 300 mg 24 hr capsule, Take 1 capsule (300 mg total) by mouth daily., Disp: 90 capsule, Rfl: 1  •  docusate sodium (COLACE) 100 mg capsule, Take 100 mg by mouth nightly., Disp: , Rfl:   •  DULoxetine (CYMBALTA) 60 mg capsule, Take 60 mg by mouth at bedtime. , Disp: , Rfl: 0  •  ELIQUIS 2.5 mg tablet, TAKE 1 TABLET BY MOUTH TWICE A DAY TO PREVENT BLOOD CLOTS IN CHRONIC ATRIAL FIBRILLATION, Disp: 60 tablet, Rfl: 11  •  esomeprazole (NexIUM) 20 mg capsule, Take 20 mg by mouth daily as needed. Take 30 minutes prior to meal., Disp: , Rfl:   •  fexofenadine (ALLEGRA) 180 mg tablet, Take 180 mg by mouth daily as needed., Disp: , Rfl:   •  fluticasone propionate (FLONASE) 50 mcg/actuation nasal spray, Administer 1 spray into each nostril daily as needed for rhinitis or allergies., Disp: , Rfl:   •  FOLIC ACID/MULTIVIT,IRON, (CENTRUM ORAL), Take 1 tablet by mouth daily., Disp: , Rfl:   •  LORazepam (ATIVAN) 1 mg tablet, TAKE 1 TABLET BY MOUTH NIGHTLY AS NEEDED FOR ANXIETY OR SLEEP. Strength: 1 mg, Disp:  30 tablet, Rfl: 5  •  losartan (COZAAR) 50 mg tablet, Take 1 tablet (50 mg total) by mouth 2 (two) times a day., Disp: , Rfl:   •  ofloxacin (OCUFLOX) 0.3 % ophthalmic solution, Only after injections into the retina, Disp: , Rfl:   •  pregabalin (LYRICA) 50 mg capsule, Take 50 mg by mouth 2 (two) times a day. 25 mgs in afternoon as needed, Disp: , Rfl:   •  rosuvastatin (CRESTOR) 5 mg tablet, Take 1 tablet (5 mg total) by mouth daily. Every other day, Disp: , Rfl:   •  SPIRIVA RESPIMAT 2.5 mcg/actuation mist inhaler, Inhale 2 puffs daily., Disp: , Rfl:   •  SYMBICORT 80-4.5 mcg/actuation inhaler, Inhale 2 puffs 2 (two) times a day., Disp: , Rfl:   •  traMADoL (ULTRAM) 50 mg tablet, Take 50 mg by mouth daily as needed., Disp: , Rfl:   •  zoledronic acid (RECLAST) IVPB, Infuse 5 mg into a venous catheter See admin instr. 5 mg once yearly, Disp: , Rfl:       BP Readings from Last 3 Encounters:   05/10/23 (!) 146/84   03/28/23 118/70   02/22/23 (!) 142/71       Recent Lab results:  Lab Results   Component Value Date    CHOL 227 (H) 01/27/2023   ,   Lab Results   Component Value Date    HDL 62 01/27/2023   ,   Lab Results   Component Value Date    LDLCALC 141 (H) 01/27/2023   ,   Lab Results   Component Value Date    TRIG 120 01/27/2023        Lab Results   Component Value Date    GLUCOSE 97 06/01/2023   ,   Lab Results   Component Value Date    HGBA1C 5.6 11/18/2015         Lab Results   Component Value Date    CREATININE 0.8 06/01/2023       Lab Results   Component Value Date    TSH 2.87 01/26/2023

## 2023-06-01 NOTE — TELEPHONE ENCOUNTER
PDMP:  LF 4/12/23  Qty 30  4 RF     It is too soon to order pt's Lorazepam (Ativan); it was last ordered for refill on 1/18/23, for 30 and 5 refills (a 6 month's supply), and we did receive a confirmation receipt from the requested pharmacy that they got it; the PDMP shows that the pt picked up this medication on 2/6/23 & 4/12/23 but has not yet picked up the remaining refills, so she still has refills there, that are available to her.     Pt instructed, via My Chart message, to call the pharmacy for the refill there, that is available to her.    ---------------------------------------------------------------------    Medicine Refill Request    Last Office: 5/10/2023   Last Consult Visit: 11/12/2021  Last Telemedicine Visit: 8/3/2020 Henry Linares MD    Next Appointment: 10/11/2023      Current Outpatient Medications:   •  albuterol 2.5 mg /3 mL (0.083 %) nebulizer solution, Take 3 mL (2.5 mg total) by nebulization every 6 (six) hours as needed for wheezing or shortness of breath., Disp: 125 vial, Rfl: 0  •  benralizumab (FASENRA) 30 mg/mL syringe subcutaneous syringe, Inject 30 mg under the skin every 2 (two) months., Disp: , Rfl:   •  benzonatate (TESSALON) 200 mg capsule, TAKE 1 CAPSULE BY MOUTH 3 TIMES DAILY AS NEEDED FOR COUGH., Disp: , Rfl:   •  calcium carbonate 600 mg calcium (1,500 mg) tablet, Take 1 tablet by mouth 2 (two) times a day., Disp: , Rfl:   •  cholecalciferol, vitamin D3, 25 mcg (1,000 unit) capsule, Take 1 tablet by mouth daily., Disp: , Rfl:   •  diltiazem (TIAZAC) 300 mg 24 hr capsule, Take 1 capsule (300 mg total) by mouth daily., Disp: 90 capsule, Rfl: 1  •  docusate sodium (COLACE) 100 mg capsule, Take 100 mg by mouth nightly., Disp: , Rfl:   •  DULoxetine (CYMBALTA) 60 mg capsule, Take 60 mg by mouth at bedtime. , Disp: , Rfl: 0  •  ELIQUIS 2.5 mg tablet, TAKE 1 TABLET BY MOUTH TWICE A DAY TO PREVENT BLOOD CLOTS IN CHRONIC ATRIAL FIBRILLATION, Disp: 60 tablet, Rfl: 11  •  esomeprazole  (NexIUM) 20 mg capsule, Take 20 mg by mouth daily as needed. Take 30 minutes prior to meal., Disp: , Rfl:   •  fexofenadine (ALLEGRA) 180 mg tablet, Take 180 mg by mouth daily as needed., Disp: , Rfl:   •  fluticasone propionate (FLONASE) 50 mcg/actuation nasal spray, Administer 1 spray into each nostril daily as needed for rhinitis or allergies., Disp: , Rfl:   •  FOLIC ACID/MULTIVIT,IRON, (CENTRUM ORAL), Take 1 tablet by mouth daily., Disp: , Rfl:   •  LORazepam (ATIVAN) 1 mg tablet, TAKE 1 TABLET BY MOUTH NIGHTLY AS NEEDED FOR ANXIETY OR SLEEP. Strength: 1 mg, Disp: 30 tablet, Rfl: 5  •  losartan (COZAAR) 50 mg tablet, Take 1 tablet (50 mg total) by mouth 2 (two) times a day., Disp: , Rfl:   •  ofloxacin (OCUFLOX) 0.3 % ophthalmic solution, Only after injections into the retina, Disp: , Rfl:   •  pregabalin (LYRICA) 50 mg capsule, Take 50 mg by mouth 2 (two) times a day. 25 mgs in afternoon as needed, Disp: , Rfl:   •  rosuvastatin (CRESTOR) 5 mg tablet, Take 1 tablet (5 mg total) by mouth daily. Every other day, Disp: , Rfl:   •  SPIRIVA RESPIMAT 2.5 mcg/actuation mist inhaler, Inhale 2 puffs daily., Disp: , Rfl:   •  SYMBICORT 80-4.5 mcg/actuation inhaler, Inhale 2 puffs 2 (two) times a day., Disp: , Rfl:   •  traMADoL (ULTRAM) 50 mg tablet, Take 50 mg by mouth daily as needed., Disp: , Rfl:   •  zoledronic acid (RECLAST) IVPB, Infuse 5 mg into a venous catheter See admin instr. 5 mg once yearly, Disp: , Rfl:       BP Readings from Last 3 Encounters:   05/10/23 (!) 146/84   03/28/23 118/70   02/22/23 (!) 142/71       Recent Lab results:  Lab Results   Component Value Date    CHOL 227 (H) 01/27/2023   ,   Lab Results   Component Value Date    HDL 62 01/27/2023   ,   Lab Results   Component Value Date    LDLCALC 141 (H) 01/27/2023   ,   Lab Results   Component Value Date    TRIG 120 01/27/2023        Lab Results   Component Value Date    GLUCOSE 90 05/10/2023   ,   Lab Results   Component Value Date     HGBA1C 5.6 11/18/2015         Lab Results   Component Value Date    CREATININE 0.8 05/10/2023       Lab Results   Component Value Date    TSH 2.87 01/26/2023

## 2023-06-02 LAB
IGA SERPL-MCNC: 107 MG/DL (ref 84.5–499)
IGG SERPL-MCNC: 421 MG/DL (ref 537–1535)
IGM SERPL-MCNC: 57 MG/DL (ref 35–242)

## 2023-06-05 NOTE — TELEPHONE ENCOUNTER
Current medication is working fine. Will continue with same doses. Home blood pressure is ok. Not having much swelling with adjustment of dose. Taking amlodipine 5 mg alternating with 10 mg.   See plan for NSTEMI

## 2023-06-20 ENCOUNTER — TELEPHONE (OUTPATIENT)
Dept: PRIMARY CARE | Facility: CLINIC | Age: 88
End: 2023-06-20
Payer: MEDICARE

## 2023-06-20 NOTE — TELEPHONE ENCOUNTER
Patient is requesting refill of med below, however, appears to have been discontinued by a provider not at this practice as indicated below and med is still on patient's current med list.  If appropriate, please order.      losartan (COZAAR) 50 mg tablet [[id number]]  DISCONTINUED    Order Details  Dose: 50 mg Route: oral Frequency: Daily   Dispense Quantity: 90 tablet Refills: 1          Sig: Take 1 tablet (50 mg total) by mouth daily.         Start Date: 03/22/23 End Date: 05/19/23   Discontinued by: Janna Beltran CRNP on 5/19/2023 16:20     .     Medicine Refill Request    Last Office: 5/10/2023   Last Consult Visit: 11/12/2021  Last Telemedicine Visit: 8/3/2020 Henry Linares MD    Next Appointment: 10/11/2023      Current Outpatient Medications:   •  losartan (COZAAR) 50 mg tablet, Take 1 tablet (50 mg total) by mouth 2 (two) times a day., Disp: , Rfl:   •  albuterol 2.5 mg /3 mL (0.083 %) nebulizer solution, Take 3 mL (2.5 mg total) by nebulization every 6 (six) hours as needed for wheezing or shortness of breath., Disp: 125 vial, Rfl: 0  •  benralizumab (FASENRA) 30 mg/mL syringe subcutaneous syringe, Inject 30 mg under the skin every 2 (two) months., Disp: , Rfl:   •  benzonatate (TESSALON) 200 mg capsule, TAKE 1 CAPSULE BY MOUTH 3 TIMES DAILY AS NEEDED FOR COUGH., Disp: , Rfl:   •  calcium carbonate 600 mg calcium (1,500 mg) tablet, Take 1 tablet by mouth 2 (two) times a day., Disp: , Rfl:   •  cholecalciferol, vitamin D3, 25 mcg (1,000 unit) capsule, Take 1 tablet by mouth daily., Disp: , Rfl:   •  diltiazem (TIAZAC) 300 mg 24 hr capsule, Take 1 capsule (300 mg total) by mouth daily., Disp: 90 capsule, Rfl: 1  •  docusate sodium (COLACE) 100 mg capsule, Take 100 mg by mouth nightly., Disp: , Rfl:   •  DULoxetine (CYMBALTA) 60 mg capsule, Take 60 mg by mouth at bedtime. , Disp: , Rfl: 0  •  ELIQUIS 2.5 mg tablet, TAKE 1 TABLET BY MOUTH TWICE A DAY TO PREVENT BLOOD CLOTS IN CHRONIC ATRIAL  FIBRILLATION, Disp: 60 tablet, Rfl: 11  •  esomeprazole (NexIUM) 20 mg capsule, Take 20 mg by mouth daily as needed. Take 30 minutes prior to meal., Disp: , Rfl:   •  fexofenadine (ALLEGRA) 180 mg tablet, Take 180 mg by mouth daily as needed., Disp: , Rfl:   •  fluticasone propionate (FLONASE) 50 mcg/actuation nasal spray, Administer 1 spray into each nostril daily as needed for rhinitis or allergies., Disp: , Rfl:   •  FOLIC ACID/MULTIVIT,IRON, (CENTRUM ORAL), Take 1 tablet by mouth daily., Disp: , Rfl:   •  LORazepam (ATIVAN) 1 mg tablet, Take 1 tablet (1 mg total) by mouth nightly as needed for anxiety. TAKE 1 TABLET BY MOUTH NIGHTLY AS NEEDED FOR ANXIETY OR SLEEP. Strength: 1 mg, Disp: 30 tablet, Rfl: 5  •  ofloxacin (OCUFLOX) 0.3 % ophthalmic solution, Only after injections into the retina, Disp: , Rfl:   •  pregabalin (LYRICA) 50 mg capsule, Take 50 mg by mouth 2 (two) times a day. 25 mgs in afternoon as needed, Disp: , Rfl:   •  rosuvastatin (CRESTOR) 5 mg tablet, Take 1 tablet (5 mg total) by mouth daily. Every other day, Disp: , Rfl:   •  SPIRIVA RESPIMAT 2.5 mcg/actuation mist inhaler, Inhale 2 puffs daily., Disp: , Rfl:   •  SYMBICORT 80-4.5 mcg/actuation inhaler, Inhale 2 puffs 2 (two) times a day., Disp: , Rfl:   •  traMADoL (ULTRAM) 50 mg tablet, Take 50 mg by mouth daily as needed., Disp: , Rfl:   •  zoledronic acid (RECLAST) IVPB, Infuse 5 mg into a venous catheter See admin instr. 5 mg once yearly, Disp: , Rfl:       BP Readings from Last 3 Encounters:   05/10/23 (!) 146/84   03/28/23 118/70   02/22/23 (!) 142/71       Recent Lab results:  Lab Results   Component Value Date    CHOL 227 (H) 01/27/2023   ,   Lab Results   Component Value Date    HDL 62 01/27/2023   ,   Lab Results   Component Value Date    LDLCALC 141 (H) 01/27/2023   ,   Lab Results   Component Value Date    TRIG 120 01/27/2023        Lab Results   Component Value Date    GLUCOSE 97 06/01/2023   ,   Lab Results   Component Value  Date    HGBA1C 5.6 11/18/2015         Lab Results   Component Value Date    CREATININE 0.8 06/01/2023       Lab Results   Component Value Date    TSH 2.87 01/26/2023

## 2023-06-21 RX ORDER — LOSARTAN POTASSIUM 50 MG/1
50 TABLET ORAL 2 TIMES DAILY
Qty: 90 TABLET | Refills: 3 | Status: SHIPPED | OUTPATIENT
Start: 2023-06-21 | End: 2023-08-16 | Stop reason: SDUPTHER

## 2023-06-28 ENCOUNTER — TELEPHONE (OUTPATIENT)
Dept: PRIMARY CARE | Facility: CLINIC | Age: 88
End: 2023-06-28
Payer: MEDICARE

## 2023-06-28 NOTE — TELEPHONE ENCOUNTER
· Received notification from [facility] I left a message for the patient spoke with her daughter kenia she has discontinued hydralazine 25 mg was replaced with losartan called [facility] closed for lunch spoke with carlo at [facility] informed her that it was discontinued .

## 2023-07-12 ENCOUNTER — TELEPHONE (OUTPATIENT)
Dept: PRIMARY CARE | Facility: CLINIC | Age: 88
End: 2023-07-12
Payer: MEDICARE

## 2023-07-12 NOTE — TELEPHONE ENCOUNTER
Triage call  Julita notified me that th pt had 2 falls and injured her left elbow: cut and bruising, then stated that she takes Eliquis, so Julita wants the pt's symptoms triaged to make sure they are not of a more urgent nature.    Spoke to the pt, and her daughter, Shelley Raya (on Office PHI) on speaker phone, who stated that she had 2 falls: first fall on Monday when she took out trash and coming back, the tip of her slipped caught on garage and pt fell onto her left side and she said that she scraped her hurt her left elbow, stating that it's scraped (not cut), bruised and swollen and her left thigh is bruised, but did not hit her head, which id still sore today; ten on Tuesday pt did npt have her night light on and when coming back from the bathroom to get back to her bed, she fell onto her right side and has a bruise on her upper, outer right thigh which is sore; did not hit her head and able to ambulate, but has been using a walker, as she feels unsteady since the falls; pt's concerns: she also has pain in both groins since the falls, feels occasional weakness in all extremities since the falls and has an unsteady gait and wants to make sure all is ok especially since she takes Eliquis; denied any: head injury, confusion, headache, dizziness, lightheadedness, palpitations, chest pain, numbness/ tingling anywhere, shortness of breath, nausea, vomiting, pain anywhere else, fatigue, or any other symptoms.      Let pt know that Dr Linares is not available for the remainder of the week. Let pt know that since she did not hit her head, has no active bleeding or any seemingly emergent symptoms, then we could schedule an appt for her. Pt stated that she is fine with an appt but can't come today and would like to schedule an appt tomorrow. Pt stated that she doesn't know any of the other providers, bur is willing ot see one.       Pt scheduled for an appt tomorrow at 2:40 PM with Dr Jackson.    Instructed the pt, that if she  develops worsening symptoms or condition, then she needs to go immediately to the ED for evaluation. Other wise pt could use ice/ heat for the swelling and pain, Tylenol and rest.    Routed to Dr Jackson, as an FYI and is it ok for pt to keep appt tomorrow, or UC/ ED or other advice/ instructions?

## 2023-07-13 ENCOUNTER — HOSPITAL ENCOUNTER (OUTPATIENT)
Dept: RADIOLOGY | Facility: CLINIC | Age: 88
Discharge: HOME | End: 2023-07-13
Attending: FAMILY MEDICINE
Payer: MEDICARE

## 2023-07-13 ENCOUNTER — OFFICE VISIT (OUTPATIENT)
Dept: PRIMARY CARE | Facility: CLINIC | Age: 88
End: 2023-07-13
Payer: MEDICARE

## 2023-07-13 ENCOUNTER — TRANSCRIBE ORDERS (OUTPATIENT)
Dept: SCHEDULING | Age: 88
End: 2023-07-13

## 2023-07-13 VITALS
HEIGHT: 61 IN | WEIGHT: 125.6 LBS | BODY MASS INDEX: 23.71 KG/M2 | DIASTOLIC BLOOD PRESSURE: 62 MMHG | SYSTOLIC BLOOD PRESSURE: 124 MMHG | RESPIRATION RATE: 16 BRPM

## 2023-07-13 DIAGNOSIS — M81.0 AGE RELATED OSTEOPOROSIS, UNSPECIFIED PATHOLOGICAL FRACTURE PRESENCE: ICD-10-CM

## 2023-07-13 DIAGNOSIS — S70.12XA HEMATOMA OF LEFT THIGH, INITIAL ENCOUNTER: ICD-10-CM
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
## 2023-07-13 DIAGNOSIS — M25.551 BILATERAL HIP PAIN: Primary | ICD-10-CM

## 2023-07-13 DIAGNOSIS — M25.562 PAIN IN LEFT KNEE: Primary | ICD-10-CM

## 2023-07-13 DIAGNOSIS — M25.552 BILATERAL HIP PAIN: Primary | ICD-10-CM

## 2023-07-13 DIAGNOSIS — M25.552 BILATERAL HIP PAIN: ICD-10-CM

## 2023-07-13 DIAGNOSIS — Z92.29 HX OF LONG TERM USE OF BLOOD THINNERS: ICD-10-CM

## 2023-07-13 DIAGNOSIS — M25.551 BILATERAL HIP PAIN: ICD-10-CM

## 2023-07-13 PROBLEM — Z79.01 HX OF LONG TERM USE OF BLOOD THINNERS: Status: ACTIVE | Noted: 2023-07-13

## 2023-07-13 PROCEDURE — 99213 OFFICE O/P EST LOW 20 MIN: CPT | Performed by: FAMILY MEDICINE

## 2023-07-13 PROCEDURE — 73522 X-RAY EXAM HIPS BI 3-4 VIEWS: CPT

## 2023-07-13 RX ORDER — HYDRALAZINE HYDROCHLORIDE 25 MG/1
1 TABLET, FILM COATED ORAL EVERY 8 HOURS
COMMUNITY
End: 2023-09-20 | Stop reason: SINTOL

## 2023-07-13 RX ORDER — AZITHROMYCIN 250 MG/1
250 TABLET, FILM COATED ORAL 3 TIMES WEEKLY
COMMUNITY
Start: 2023-07-01 | End: 2023-10-11 | Stop reason: ALTCHOICE

## 2023-07-13 RX ORDER — VALSARTAN 40 MG/1
1 TABLET ORAL DAILY
COMMUNITY
End: 2023-07-18 | Stop reason: ALTCHOICE

## 2023-07-13 RX ORDER — LIFITEGRAST 50 MG/ML
SOLUTION/ DROPS OPHTHALMIC
Status: ON HOLD | COMMUNITY
End: 2023-12-20

## 2023-07-13 ASSESSMENT — ENCOUNTER SYMPTOMS: BRUISES/BLEEDS EASILY: 1

## 2023-07-13 NOTE — PROGRESS NOTES
Jennifer Jackson DO    [facility]  [address]  [place] [address]  Phone: [phone]  Fax: [phone]     History of Present Illness   Subjective     Patient ID: Madelyn Ruiz is a 89 y.o. female.    Madelyn is here today w her Dtr after had 2 falls this week - one on Monday taking in garbage can and tripped over lip of garage and fell onto L upper arm and hip. THen Tuesday got up to go to the bathroom , too dark to see and was feeling along wall but ran out of wall and fell onto R upper arm and R hip. Arms are ok. On Eliquis w generally more bruising but a lot on both hips since falls. Hips sore and hobbling w a cane today and says usually gets around better than this.   Osteoporosis on Reclast so is a fx risk  Dtr says she has some chronic pain.  Vitals stable, med list reviewed  SHe is a pt of Dr Linares , not known to me     Past Medical/Surgical/Family/Social History     The following have been reviewed and updated as appropriate in this visit:   Allergies  Meds  Problems         Past Medical History:   Diagnosis Date   • Allergic bronchopulmonary aspergillosis (CMS/[facility]) 04/02/2018    Allergist: Dr. Arriaza.  Stable FEV1 in 8/2015.  IgE levels and eosinophils stable in 8/2015.  Managed with Fasenra, Azithromycin and Flovent.   • Allergic rhinitis 04/02/2018    Pulmonologist: Dr. Walker. Managed with Flonase.   • Asthma    • Atrial flutter with rapid ventricular response (CMS/[facility]) 02/21/2023    Diagnosed in 02/2023 and hospitalized Managed with Apixaban and Diltiazem Echocardiogram 02/2023 •  Left Ventricle: Normal ventricle size. Mild concentric left ventricular hypertrophy. Preserved systolic function. Estimated EF 65-70%. No regional wall motion abnormalities. Grade I diastolic dysfunction. •  Right Ventricle: Normal ventricle size. Normal systolic function. •  Left Atrium: Moderately   • Calcification of aorta (CMS/[facility]) 06/29/2020    Seen  incidentally on CT scan.   • Chronic obstructive pulmonary disease (CMS/HCC) 04/02/2018    Pulmonologist: Dr. Walker.  Seen on PFTs in hospital in 2014.  Controlled with Spiriva and FLovent.   • GERD (gastroesophageal reflux disease) 06/08/2019    Managed with Esomeprazole.  Should take medication 30 minutes prior to meal.  Advised to avoid caffeine, carbonated beverages, and should not eat within 3 hours of going to sleep.  Advised to reduce BMI < 25.    • Insomnia 04/02/2018    Managed with Lorazepam as needed.   • Lumbar spinal stenosis 04/02/2018    Neurosurgeon: Dr. Miguel. MRI with Central and lateral recess spinal stenosis. Has Spondylolisthesis at L4/L5. S/P epidural injection by Dr. Martin with some relief in past. Had Lumbar fusion and Lumbar laminectomy by Dr. Miguel in 4/2015.   • Macular degeneration disease    • Obstructive sleep apnea syndrome 04/02/2018    Mild STACIA noted in sleep study 7/2015. Treated with nasal CPAP automatic with pressure range 5-10 CM H2Ox couple months. Off now   • Osteoporosis 04/02/2018    Rheumatologist: Dr. Bradley.  Dexa scan 7/2016 with osteoporosis of lumbar spine LS-2.5, LH-2.1, RH -2.2.  Has been on Fosamax in the past for 8 years.  Worsened by Prednisone in past.  Continue vitamin D, calcium and weight bearing exercise. DEXA in 7/2017 with LS -1.7, LH -2.2, and RH -2.0.  Next due in 7/2019.  Seen by Dr. Bradley in 8/2015 who agreed with initiating Prolia. Took for 2 years. N   • Primary hypertension 04/02/2018    Managed on Diltiazem and Losartan. Advised to follow a low sodium diet and keep BMI < 25.  Advised to exercise for 2.5 hours per week.  Instructed to take home blood pressure readings and call if consistently above 140/90.     • Pulmonary embolism (CMS/HCC) 06/09/2019    Hematologist: Dr. Bhakta Diagnosed in 06/2019 in right upper lobe. Vascular ultrasound negative of bilateral lower extremities. Managed on Eliquis. Hypercoagulable workup was negative for  beta-2 glycoprotein's, RIGO, Antithrombin III, lupus anticoagulant, protein C activity protein S activity, and prothrombin gene mutation, and factor V Leiden. Now off Apixaban as CT scan in 12/2019 was without pu   • SVT (supraventricular tachycardia) (CMS/HCC)    • Vertebral compression fracture (CMS/HCC)     At T12 level       Past Surgical History:   Procedure Laterality Date   • APPENDECTOMY     • CARPAL TUNNEL RELEASE Bilateral    • CATARACT EXTRACTION W/  INTRAOCULAR LENS IMPLANT Bilateral    • HYSTERECTOMY  1972   • LUMBAR LAMINECTOMY  04/21/2015    S/P lumbar fusion   • TONSILLECTOMY         Family History   Problem Relation Age of Onset   • Glaucoma Biological Mother    • Macular degeneration Biological Mother    • Hypertension Biological Mother    • Lung cancer Biological Father    • Heart disease Biological Brother    • Breast cancer Mother's Sister    • No Known Problems Biological Son    • No Known Problems Biological Son    • No Known Problems Biological Daughter    • No Known Problems Biological Daughter    • No Known Problems Biological Daughter        Social History     Tobacco Use   • Smoking status: Never   • Smokeless tobacco: Never   Vaping Use   • Vaping Use: Never used   Substance Use Topics   • Alcohol use: Yes     Comment: Rarely   • Drug use: No      Allergies and Medications     Allergies   Allergen Reactions   • Mepolizumab Rash   • Morphine      Other reaction(s): Vomiting   • Oxycodone      Other reaction(s): Vomiting         Outpatient Encounter Medications as of 7/13/2023:   •  albuterol 2.5 mg /3 mL (0.083 %) nebulizer solution, Take 3 mL (2.5 mg total) by nebulization every 6 (six) hours as needed for wheezing or shortness of breath.  •  azithromycin (ZITHROMAX) 250 mg tablet,   •  benralizumab (FASENRA) 30 mg/mL syringe subcutaneous syringe, Inject 30 mg under the skin every 2 (two) months.  •  benzonatate (TESSALON) 200 mg capsule, TAKE 1 CAPSULE BY MOUTH 3 TIMES DAILY AS NEEDED  FOR COUGH.  •  calcium carbonate 600 mg calcium (1,500 mg) tablet, Take 1 tablet by mouth 2 (two) times a day.  •  cholecalciferol, vitamin D3, 25 mcg (1,000 unit) capsule, Take 1 tablet by mouth daily.  •  diltiazem (TIAZAC) 300 mg 24 hr capsule, Take 1 capsule (300 mg total) by mouth daily.  •  docusate sodium (COLACE) 100 mg capsule, Take 100 mg by mouth nightly.  •  DULoxetine (CYMBALTA) 60 mg capsule, Take 60 mg by mouth at bedtime.   •  ELIQUIS 2.5 mg tablet, TAKE 1 TABLET BY MOUTH TWICE A DAY TO PREVENT BLOOD CLOTS IN CHRONIC ATRIAL FIBRILLATION  •  esomeprazole (NexIUM) 20 mg capsule, Take 20 mg by mouth daily as needed. Take 30 minutes prior to meal.  •  fexofenadine (ALLEGRA) 180 mg tablet, Take 180 mg by mouth daily as needed.  •  fluticasone propionate (FLONASE) 50 mcg/actuation nasal spray, Administer 1 spray into each nostril daily as needed for rhinitis or allergies.  •  FOLIC ACID/MULTIVIT,IRON, (CENTRUM ORAL), Take 1 tablet by mouth daily.  •  hydrALAZINE (APRESOLINE) 25 mg tablet, Take 1 tablet by mouth every 8 (eight) hours.  •  lifitegrast (XIIDRA) 5 % dropperette dropperette,   •  LORazepam (ATIVAN) 1 mg tablet, Take 1 tablet (1 mg total) by mouth nightly as needed for anxiety. TAKE 1 TABLET BY MOUTH NIGHTLY AS NEEDED FOR ANXIETY OR SLEEP. Strength: 1 mg  •  losartan (COZAAR) 50 mg tablet, Take 1 tablet (50 mg total) by mouth 2 (two) times a day.  •  ofloxacin (OCUFLOX) 0.3 % ophthalmic solution, Only after injections into the retina  •  pregabalin (LYRICA) 50 mg capsule, Take 50 mg by mouth 2 (two) times a day. 25 mgs in afternoon as needed  •  rosuvastatin (CRESTOR) 5 mg tablet, Take 1 tablet (5 mg total) by mouth daily. Every other day  •  SPIRIVA RESPIMAT 2.5 mcg/actuation mist inhaler, Inhale 2 puffs daily.  •  SYMBICORT 80-4.5 mcg/actuation inhaler, Inhale 2 puffs 2 (two) times a day.  •  traMADoL (ULTRAM) 50 mg tablet, Take 50 mg by mouth daily as needed.  •  valsartan (DIOVAN) 40 mg  "tablet, Take 1 tablet by mouth daily.  •  zoledronic acid (RECLAST) IVPB, Infuse 5 mg into a venous catheter See admin instr. 5 mg once yearly   Review of Systems       Review of Systems   Musculoskeletal:        Hips sore both sides and ing areas  Neck a little sore   Skin:        Bruising on elbows and hips B/L w R elbow skin tear   Hematological: Bruises/bleeds easily (due to blood thinner so has old bruises on both lower legs).      Physical Examination       Objective     Vitals:    07/13/23 1441   BP: 124/62   Resp: 16       Wt Readings from Last 3 Encounters:   07/13/23 57 kg (125 lb 9.6 oz)   05/10/23 55.3 kg (122 lb)   03/28/23 54 kg (119 lb)       Body mass index is 23.73 kg/m².    Ht Readings from Last 3 Encounters:   07/13/23 1.549 m (5' 1\")   03/28/23 1.549 m (5' 1\")   02/22/23 1.524 m (5')       BP Readings from Last 3 Encounters:   07/13/23 124/62   05/10/23 (!) 146/84   03/28/23 118/70       Physical Exam  Constitutional:       General: She is not in acute distress.  Musculoskeletal:      Comments: FROM shoulders , elbows and wrists w no tenderness B/L  Not very tender L hip , some tenderness lat R hip and indicates initial soreness in ing area on L that has improved and still w some on R   Skin:     General: Skin is warm and dry.      Comments: Some superficial bruising on elbows , one on R dorsal hand and dried skin tear R elbow  Both lateral hips bruised deep purple w firm, fullness of hematoma on L  She has bruising on both lower legs but she and Dtr say those were from before due to her Eliquis does bruise easily   Neurological:      Mental Status: She is alert.   Psychiatric:         Mood and Affect: Mood normal.         Behavior: Behavior normal.        Laboratory Results     Lab Results   Component Value Date    WBC 6.12 06/01/2023    HGB 12.4 06/01/2023    HCT 39.3 06/01/2023    .6 (H) 06/01/2023     06/01/2023          Chemistry        Component Value Date/Time     " 06/01/2023 1515    K 4.3 06/01/2023 1515     06/01/2023 1515    CO2 27 06/01/2023 1515    BUN 26 (H) 06/01/2023 1515    CREATININE 0.8 06/01/2023 1515        Component Value Date/Time    CALCIUM 9.0 06/01/2023 1515    ALKPHOS 41 06/01/2023 1515    AST 14 (L) 06/01/2023 1515    ALT 21 06/01/2023 1515    BILITOT 0.8 06/01/2023 1515            Lab Results   Component Value Date    GLUCOSE 97 06/01/2023    CALCIUM 9.0 06/01/2023     06/01/2023    K 4.3 06/01/2023    CO2 27 06/01/2023     06/01/2023    BUN 26 (H) 06/01/2023    CREATININE 0.8 06/01/2023       Lab Results   Component Value Date    CHOL 227 (H) 01/27/2023    CHOL 248 (H) 11/18/2015     Lab Results   Component Value Date    HDL 62 01/27/2023    HDL 92 11/18/2015     Lab Results   Component Value Date    LDLCALC 141 (H) 01/27/2023    LDLCALC 133 (H) 11/18/2015     Lab Results   Component Value Date    TRIG 120 01/27/2023    TRIG 117 11/18/2015     No results found for: CHOLHDL    Lab Results   Component Value Date    TSH 2.87 01/26/2023       Lab Results   Component Value Date    HGBA1C 5.6 11/18/2015       Lab Results   Component Value Date    HEPCAB Nonreactive 07/12/2018         Immunization History   Administered Date(s) Administered   • INFLUENZA VACCINE QUAD ADJUVANTED 65 and OLDER 10/06/2021, 10/19/2022   • Influenza Split High Dose Preservative Free IM 11/03/2011, 11/13/2012, 10/09/2013, 10/30/2014, 11/17/2015, 09/30/2016, 09/25/2017   • Influenza Vaccine 65 And Older Preservative Free 10/25/2019, 10/02/2020   • Influenza Vaccine Quadrivalent 3 Yr And Older 09/18/2017, 10/22/2018   • Influenza, Unspecified 10/30/2014, 11/17/2015, 09/30/2016, 09/25/2017   • MMRV 06/05/2014   • Pneumococcal Conjugate 08/09/1996   • Pneumococcal Conjugate 13-Valent 11/17/2015   • Pneumococcal Polysaccharide 10/30/2014, 09/11/2017   • SARS-COV-2 (COVID-19) VACCINE PFIZER BIVALENT 12 years and older ([place]) 09/15/2022   • SARS-COV-2 (COVID-19) VACCINE,  MODERNA MONOVALENT 02/05/2021, 03/05/2021   • SARS-COV-2 (COVID-19) VACCINE, MODERNA MONOVALENT BOOSTER 11/17/2021   • SARS-COV-2 (COVID19) VACCINE, PFIZER MONOVALENT 09/15/2022   • Tdap 03/22/2012, 08/08/2022   • Varicella 06/05/2014   • Zoster 01/02/2006, 01/02/2016   • Zoster Vaccine Recombinant Adjuvanted (Shingrix) 05/24/2019, 09/20/2019         Health Maintenance Topics with due status: Overdue       Topic Date Due    Medicare Annual Wellness Visit 10/02/2021    COVID-19 Vaccine 11/10/2022     Health Maintenance Topics with due status: Not Due       Topic Last Completion Date    DTaP, Tdap, and Td Vaccines 08/08/2022    DEXA Scan 09/20/2022    Influenza Vaccine 10/19/2022    Falls Risk Screening 02/10/2023    Depression Screening 02/21/2023     Health Maintenance Topics with due status: Completed       Topic Last Completion Date    Pneumococcal (65 years and older) 09/11/2017    Zoster Vaccine 09/20/2019     Health Maintenance Topics with due status: Aged Out       Topic Date Due    Meningococcal ACWY Aged Out    HIB Vaccines Aged Out    Hepatitis B Vaccines Aged Out    IPV Vaccines Aged Out    HPV Vaccines Aged Out     Health Maintenance Topics with due status: Discontinued       Topic Date Due    Breast Cancer Screening Discontinued          Assessment and Plan       Assessment/Plan     Problem List Items Addressed This Visit        Musculoskeletal    Osteoporosis (Chronic)    Overview     · Rheumatologist: Dr. Bradley.   · Dexa scan 7/2016 with osteoporosis of lumbar spine LS-2.5, LH-2.1, RH -2.2.   · Has been on Fosamax in the past for 8 years.   · Worsened by Prednisone in past.   · Continue vitamin D, calcium and weight bearing exercise.  · DEXA in 7/2017 with LS -1.7, LH -2.2, and RH -2.0.   · Next due in 7/2019.   · Seen by Dr. Bradley in 8/2015 who agreed with initiating Prolia. Took for 2 years.  · Now back on Alendronate in 2019 but changed to Reclast in 01/2020 due to high CTX.         Current  Assessment & Plan     On med but is a fx risk so get hip XRs         Relevant Orders    X-RAY HIP WITH OR WITHOUT PELVIS 4+ VW LEFT    X-RAY HIP WITH OR WITHOUT PELVIS 4+ VW RIGHT    Ambulatory referral to Orthopedic Surgery    Bilateral hip pain - Primary    Current Assessment & Plan     chk XRs and given # for Dr Cardozo if has fxs and if not then rest , ice , expectations reviewed.         Relevant Orders    X-RAY HIP WITH OR WITHOUT PELVIS 4+ VW LEFT    X-RAY HIP WITH OR WITHOUT PELVIS 4+ VW RIGHT    Ambulatory referral to Orthopedic Surgery    Hematoma of left thigh    Current Assessment & Plan     Ice, rest         Relevant Orders    X-RAY HIP WITH OR WITHOUT PELVIS 4+ VW LEFT    Ambulatory referral to Orthopedic Surgery       Hematologic    Hx of long term use of blood thinners    Current Assessment & Plan     Eliquis for afib causing her to bruise more easily            Other    Fall    Current Assessment & Plan     Safety of no open backed shoes, slippers, better footwear , nightlights and letting family take in garbage can over garage lip         Relevant Orders    X-RAY HIP WITH OR WITHOUT PELVIS 4+ VW LEFT    X-RAY HIP WITH OR WITHOUT PELVIS 4+ VW RIGHT    Ambulatory referral to Orthopedic Surgery       Return for FU when due w your regular , prn.    Orders Placed This Encounter   Procedures   • X-RAY HIP WITH OR WITHOUT PELVIS 4+ VW LEFT     Standing Status:   Future     Number of Occurrences:   1     Standing Expiration Date:   7/13/2024     Order Specific Question:   Release to patient     Answer:   Immediate   • X-RAY HIP WITH OR WITHOUT PELVIS 4+ VW RIGHT     Standing Status:   Future     Number of Occurrences:   1     Standing Expiration Date:   7/13/2024     Order Specific Question:   Release to patient     Answer:   Immediate   • Ambulatory referral to Orthopedic Surgery     Standing Status:   Future     Standing Expiration Date:   1/13/2024     Referral Priority:   Routine     Referral Type:    Surgical     Referral Reason:   Evaluate and Treat     Referred to Provider:   Mir Cardozo MD     Number of Visits Requested:   1            Jennifer Jackson DO  7/13/2023

## 2023-07-13 NOTE — ASSESSMENT & PLAN NOTE
Safety of no open backed shoes, slippers, better footwear , nightlights and letting family take in garbage can over garage lip

## 2023-07-14 ENCOUNTER — TELEPHONE (OUTPATIENT)
Dept: PRIMARY CARE | Facility: CLINIC | Age: 88
End: 2023-07-14
Payer: MEDICARE

## 2023-07-14 NOTE — TELEPHONE ENCOUNTER
----- Message from Jennifer Jackson DO sent at 7/13/2023 10:08 PM EDT -----  Let pt and Dtr know that her XRs are normal since not sure they look in portal.

## 2023-07-18 ENCOUNTER — APPOINTMENT (OUTPATIENT)
Dept: LAB | Facility: CLINIC | Age: 88
End: 2023-07-18
Attending: FAMILY MEDICINE
Payer: MEDICARE

## 2023-07-18 ENCOUNTER — OFFICE VISIT (OUTPATIENT)
Dept: PRIMARY CARE | Facility: CLINIC | Age: 88
End: 2023-07-18
Payer: MEDICARE

## 2023-07-18 VITALS
HEART RATE: 76 BPM | OXYGEN SATURATION: 96 % | DIASTOLIC BLOOD PRESSURE: 80 MMHG | RESPIRATION RATE: 16 BRPM | TEMPERATURE: 98 F | SYSTOLIC BLOOD PRESSURE: 128 MMHG

## 2023-07-18 DIAGNOSIS — M25.551 BILATERAL HIP PAIN: Primary | ICD-10-CM

## 2023-07-18 DIAGNOSIS — I10 PRIMARY HYPERTENSION: ICD-10-CM

## 2023-07-18 DIAGNOSIS — M25.552 BILATERAL HIP PAIN: Primary | ICD-10-CM

## 2023-07-18 LAB
ANION GAP SERPL CALC-SCNC: 8 MEQ/L (ref 3–15)
BASOPHILS # BLD: 0.01 K/UL (ref 0.01–0.1)
BASOPHILS NFR BLD: 0.1 %
BUN SERPL-MCNC: 22 MG/DL (ref 7–25)
CALCIUM SERPL-MCNC: 8.9 MG/DL (ref 8.6–10.3)
CHLORIDE SERPL-SCNC: 107 MEQ/L (ref 98–107)
CO2 SERPL-SCNC: 26 MEQ/L (ref 21–31)
CREAT SERPL-MCNC: 0.8 MG/DL (ref 0.6–1.2)
DIFFERENTIAL METHOD BLD: ABNORMAL
EOSINOPHIL # BLD: 0 K/UL (ref 0.04–0.36)
EOSINOPHIL NFR BLD: 0 %
ERYTHROCYTE [DISTWIDTH] IN BLOOD BY AUTOMATED COUNT: 15.3 % (ref 11.7–14.4)
GFR SERPL CREATININE-BSD FRML MDRD: >60 ML/MIN/1.73M*2
GLUCOSE SERPL-MCNC: 73 MG/DL (ref 70–99)
HCT VFR BLDCO AUTO: 33.6 % (ref 35–45)
HGB BLD-MCNC: 10.7 G/DL (ref 11.8–15.7)
IMM GRANULOCYTES # BLD AUTO: 0.18 K/UL (ref 0–0.08)
IMM GRANULOCYTES NFR BLD AUTO: 2 %
LYMPHOCYTES # BLD: 1.18 K/UL (ref 1.2–3.5)
LYMPHOCYTES NFR BLD: 13.1 %
MCH RBC QN AUTO: 34 PG (ref 28–33.2)
MCHC RBC AUTO-ENTMCNC: 31.8 G/DL (ref 32.2–35.5)
MCV RBC AUTO: 106.7 FL (ref 83–98)
MONOCYTES # BLD: 0.83 K/UL (ref 0.28–0.8)
MONOCYTES NFR BLD: 9.2 %
NEUTROPHILS # BLD: 6.79 K/UL (ref 1.7–7)
NEUTS SEG NFR BLD: 75.6 %
NRBC BLD-RTO: 0 %
PDW BLD AUTO: 10.4 FL (ref 9.4–12.3)
PLATELET # BLD AUTO: 364 K/UL (ref 150–369)
POTASSIUM SERPL-SCNC: 3.8 MEQ/L (ref 3.5–5.1)
RBC # BLD AUTO: 3.15 M/UL (ref 3.93–5.22)
SODIUM SERPL-SCNC: 141 MEQ/L (ref 136–145)
WBC # BLD AUTO: 8.99 K/UL (ref 3.8–10.5)

## 2023-07-18 PROCEDURE — 80048 BASIC METABOLIC PNL TOTAL CA: CPT

## 2023-07-18 PROCEDURE — 99214 OFFICE O/P EST MOD 30 MIN: CPT | Performed by: FAMILY MEDICINE

## 2023-07-18 PROCEDURE — 36415 COLL VENOUS BLD VENIPUNCTURE: CPT

## 2023-07-18 PROCEDURE — 85025 COMPLETE CBC W/AUTO DIFF WBC: CPT

## 2023-07-18 ASSESSMENT — ENCOUNTER SYMPTOMS
HEADACHES: 0
NECK PAIN: 0
BACK PAIN: 1
DIZZINESS: 0
FEVER: 0
NECK STIFFNESS: 0
LIGHT-HEADEDNESS: 0
CHILLS: 0

## 2023-07-18 NOTE — ASSESSMENT & PLAN NOTE
Stable at this time. Still sore from fall. Xrays negative for fracture. Will consult physical therapy. Will use heat over the areas of hematoma. Check cbc to ensure no excessive blood loss. Patient is on Eliquis.

## 2023-07-18 NOTE — PROGRESS NOTES
Henry Linares MD    [facility]  [address]  [place] [address]  Phone: [phone]  Fax: [phone]     History of Present Illness     Subjective     Patient ID: Madelyn Ruiz is a 89 y.o. female.    Patient had 2 falls. The first fall was taking out the trash. Tip of slipper caught edge at garage door. No head injury. Next night. Came out of bathroom to go in to bedroom and it was pitch black and fell. Fell on hardwood floor. Fell on left side first time and right side the second time. No syncope or loss of consciousness. Pain in groin both side. Iced area. Fall were 7 and 8 days ago. Xray of hip was negative for fracture. Has chronic back pain due to spinal stenosis and not worse with fall. Walking with cane.        Past Medical/Surgical/Family/Social History       The following have been reviewed and updated as appropriate in this visit:   Allergies  Meds  Problems         Past Medical History:   Diagnosis Date   • Allergic bronchopulmonary aspergillosis (CMS/[facility]) 04/02/2018    Allergist: Dr. Arriaza.  Stable FEV1 in 8/2015.  IgE levels and eosinophils stable in 8/2015.  Managed with Fasenra, Azithromycin and Flovent.   • Allergic rhinitis 04/02/2018    Pulmonologist: Dr. Walker. Managed with Flonase.   • Asthma    • Atrial flutter with rapid ventricular response (CMS/[facility]) 02/21/2023    Diagnosed in 02/2023 and hospitalized Managed with Apixaban and Diltiazem Echocardiogram 02/2023 •  Left Ventricle: Normal ventricle size. Mild concentric left ventricular hypertrophy. Preserved systolic function. Estimated EF 65-70%. No regional wall motion abnormalities. Grade I diastolic dysfunction. •  Right Ventricle: Normal ventricle size. Normal systolic function. •  Left Atrium: Moderately   • Calcification of aorta (CMS/[facility]) 06/29/2020    Seen incidentally on CT scan.   • Chronic obstructive pulmonary disease (CMS/[facility]) 04/02/2018    Pulmonologist: Dr. Walker.   Seen on PFTs in hospital in 2014.  Controlled with Spiriva and FLovent.   • GERD (gastroesophageal reflux disease) 06/08/2019    Managed with Esomeprazole.  Should take medication 30 minutes prior to meal.  Advised to avoid caffeine, carbonated beverages, and should not eat within 3 hours of going to sleep.  Advised to reduce BMI < 25.    • Insomnia 04/02/2018    Managed with Lorazepam as needed.   • Lumbar spinal stenosis 04/02/2018    Neurosurgeon: Dr. Miguel. MRI with Central and lateral recess spinal stenosis. Has Spondylolisthesis at L4/L5. S/P epidural injection by Dr. Martin with some relief in past. Had Lumbar fusion and Lumbar laminectomy by Dr. Miguel in 4/2015.   • Macular degeneration disease    • Obstructive sleep apnea syndrome 04/02/2018    Mild STACIA noted in sleep study 7/2015. Treated with nasal CPAP automatic with pressure range 5-10 CM H2Ox couple months. Off now   • Osteoporosis 04/02/2018    Rheumatologist: Dr. Bradley.  Dexa scan 7/2016 with osteoporosis of lumbar spine LS-2.5, LH-2.1, RH -2.2.  Has been on Fosamax in the past for 8 years.  Worsened by Prednisone in past.  Continue vitamin D, calcium and weight bearing exercise. DEXA in 7/2017 with LS -1.7, LH -2.2, and RH -2.0.  Next due in 7/2019.  Seen by Dr. Bradley in 8/2015 who agreed with initiating Prolia. Took for 2 years. N   • Primary hypertension 04/02/2018    Managed on Diltiazem and Losartan. Advised to follow a low sodium diet and keep BMI < 25.  Advised to exercise for 2.5 hours per week.  Instructed to take home blood pressure readings and call if consistently above 140/90.     • Pulmonary embolism (CMS/HCC) 06/09/2019    Hematologist: Dr. Bhakta Diagnosed in 06/2019 in right upper lobe. Vascular ultrasound negative of bilateral lower extremities. Managed on Eliquis. Hypercoagulable workup was negative for beta-2 glycoprotein's, RIGO, Antithrombin III, lupus anticoagulant, protein C activity protein S activity, and prothrombin  gene mutation, and factor V Leiden. Now off Apixaban as CT scan in 12/2019 was without pu   • SVT (supraventricular tachycardia) (CMS/HCC)    • Vertebral compression fracture (CMS/HCC)     At T12 level       Past Surgical History:   Procedure Laterality Date   • APPENDECTOMY     • CARPAL TUNNEL RELEASE Bilateral    • CATARACT EXTRACTION W/  INTRAOCULAR LENS IMPLANT Bilateral    • HYSTERECTOMY  1972   • LUMBAR LAMINECTOMY  04/21/2015    S/P lumbar fusion   • TONSILLECTOMY         Family History   Problem Relation Age of Onset   • Glaucoma Biological Mother    • Macular degeneration Biological Mother    • Hypertension Biological Mother    • Lung cancer Biological Father    • Heart disease Biological Brother    • Breast cancer Mother's Sister    • No Known Problems Biological Son    • No Known Problems Biological Son    • No Known Problems Biological Daughter    • No Known Problems Biological Daughter    • No Known Problems Biological Daughter        Social History     Tobacco Use   • Smoking status: Never   • Smokeless tobacco: Never   Vaping Use   • Vaping Use: Never used   Substance Use Topics   • Alcohol use: Yes     Comment: Rarely   • Drug use: No       Patient Care Team:  Henry Linares MD as PCP - General  Ray Arriaza MD as Referring Physician  Aye Bhakta MD as Consulting Physician (Hematology and Oncology)  Wesley Walker MD as Consulting Physician (Pulmonary)  Fausto Bradley MD as Consulting Physician (Rheumatology)  Kleiner, Robert C, MD as Referring Physician  Alfie Torres MD as Consulting Physician (Dermatology)  Fanny Pelaez MD as Consulting Physician (Orthopedic Surgery)  Keenan Krause DO as Consulting Physician (Family Medicine)  Keenan Krause DO as Consulting Physician (Family Medicine)  Wesley You DO as Consulting Physician (Physical Medicine and Rehabilitation)  Jessika Zelaya MD as Surgeon (Neurosurgery)  Henrietta Crespo MD as Cardiologist  (Interventional Cardiology)  Dinesh Goel MD as Surgeon (Otolaryngology)  Valentino, Michael A, MD PhD as Cardiologist (Cardiology)     Allergies and Medications       Allergies   Allergen Reactions   • Mepolizumab Rash   • Morphine      Other reaction(s): Vomiting   • Oxycodone      Other reaction(s): Vomiting       Current Outpatient Medications   Medication Sig Dispense Refill   • albuterol 2.5 mg /3 mL (0.083 %) nebulizer solution Take 3 mL (2.5 mg total) by nebulization every 6 (six) hours as needed for wheezing or shortness of breath. 125 vial 0   • azithromycin (ZITHROMAX) 250 mg tablet      • benralizumab (FASENRA) 30 mg/mL syringe subcutaneous syringe Inject 30 mg under the skin every 2 (two) months.     • benzonatate (TESSALON) 200 mg capsule TAKE 1 CAPSULE BY MOUTH 3 TIMES DAILY AS NEEDED FOR COUGH.     • calcium carbonate 600 mg calcium (1,500 mg) tablet Take 1 tablet by mouth 2 (two) times a day.     • cholecalciferol, vitamin D3, 25 mcg (1,000 unit) capsule Take 1 tablet by mouth daily.     • diltiazem (TIAZAC) 300 mg 24 hr capsule Take 1 capsule (300 mg total) by mouth daily. 90 capsule 1   • docusate sodium (COLACE) 100 mg capsule Take 100 mg by mouth nightly.     • DULoxetine (CYMBALTA) 60 mg capsule Take 60 mg by mouth at bedtime.   0   • ELIQUIS 2.5 mg tablet TAKE 1 TABLET BY MOUTH TWICE A DAY TO PREVENT BLOOD CLOTS IN CHRONIC ATRIAL FIBRILLATION 60 tablet 11   • esomeprazole (NexIUM) 20 mg capsule Take 20 mg by mouth daily as needed. Take 30 minutes prior to meal.     • fexofenadine (ALLEGRA) 180 mg tablet Take 180 mg by mouth daily as needed.     • fluticasone propionate (FLONASE) 50 mcg/actuation nasal spray Administer 1 spray into each nostril daily as needed for rhinitis or allergies.     • FOLIC ACID/MULTIVIT,IRON, (CENTRUM ORAL) Take 1 tablet by mouth daily.     • hydrALAZINE (APRESOLINE) 25 mg tablet Take 1 tablet by mouth every 8 (eight) hours.     • lifitegrast (XIIDRA) 5 %  "dropperette dropperette      • LORazepam (ATIVAN) 1 mg tablet Take 1 tablet (1 mg total) by mouth nightly as needed for anxiety. TAKE 1 TABLET BY MOUTH NIGHTLY AS NEEDED FOR ANXIETY OR SLEEP. Strength: 1 mg 30 tablet 5   • losartan (COZAAR) 50 mg tablet Take 1 tablet (50 mg total) by mouth 2 (two) times a day. 90 tablet 3   • ofloxacin (OCUFLOX) 0.3 % ophthalmic solution Only after injections into the retina     • pregabalin (LYRICA) 50 mg capsule Take 50 mg by mouth 2 (two) times a day. 25 mgs in afternoon as needed     • rosuvastatin (CRESTOR) 5 mg tablet Take 1 tablet (5 mg total) by mouth daily. Every other day     • SPIRIVA RESPIMAT 2.5 mcg/actuation mist inhaler Inhale 2 puffs daily.     • SYMBICORT 80-4.5 mcg/actuation inhaler Inhale 2 puffs 2 (two) times a day.     • traMADoL (ULTRAM) 50 mg tablet Take 50 mg by mouth daily as needed.     • zoledronic acid (RECLAST) IVPB Infuse 5 mg into a venous catheter See admin instr. 5 mg once yearly       No current facility-administered medications for this visit.          Review of Systems       Review of Systems   Constitutional: Negative for chills and fever.   Musculoskeletal: Positive for back pain and gait problem. Negative for neck pain and neck stiffness.        Uses cane   Skin:             Neurological: Negative for dizziness, light-headedness and headaches.        Physical Examination       Objective     Vitals:    07/18/23 1031   BP: 128/80   Pulse: 76   Resp: 16   Temp: 36.7 °C (98 °F)   SpO2: 96%       Pulse Readings from Last 5 Encounters:   07/18/23 76   05/10/23 99   03/28/23 80   02/22/23 97   02/10/23 94       Wt Readings from Last 5 Encounters:   07/13/23 57 kg (125 lb 9.6 oz)   05/10/23 55.3 kg (122 lb)   03/28/23 54 kg (119 lb)   02/22/23 53.1 kg (117 lb)   02/10/23 54.3 kg (119 lb 12.8 oz)       Ht Readings from Last 5 Encounters:   07/13/23 1.549 m (5' 1\")   03/28/23 1.549 m (5' 1\")   02/22/23 1.524 m (5')   02/10/23 1.549 m (5' 1\")   01/27/23 " 1.524 m (5')       BP Readings from Last 5 Encounters:   07/18/23 128/80   07/13/23 124/62   05/10/23 (!) 146/84   03/28/23 118/70   02/22/23 (!) 142/71       BMI Readings from Last 4 Encounters:   07/13/23 23.73 kg/m²   05/10/23 23.05 kg/m²   03/28/23 22.48 kg/m²   02/22/23 22.85 kg/m²       Physical Exam  Constitutional:       General: She is not in acute distress.     Appearance: Normal appearance. She is not ill-appearing.   Musculoskeletal:      Right lower leg: Edema present.      Left lower leg: Edema present.      Comments: Able to bear weight on bilateral hips   Skin:     Comments: Bilateral lateral hip hematomas and significant ecchymosis down the lateral legs and anterior tibial region above the ankle.     Neurological:      General: No focal deficit present.      Mental Status: She is alert and oriented to person, place, and time.   Psychiatric:         Mood and Affect: Mood normal.         Behavior: Behavior normal.         Thought Content: Thought content normal.         Judgment: Judgment normal.          Laboratory Results     Lab Results   Component Value Date    WBC 6.12 06/01/2023    WBC 4.83 02/22/2023    WBC 7.21 02/20/2023    HGB 12.4 06/01/2023    HGB 12.9 02/22/2023    HGB 14.1 02/20/2023    HCT 39.3 06/01/2023    HCT 39.6 02/22/2023    HCT 43.0 02/20/2023    .6 (H) 06/01/2023    MCV 96.4 02/22/2023    MCV 95.1 02/20/2023     06/01/2023     02/22/2023     02/20/2023        Lab Results   Component Value Date    GLUCOSE 97 06/01/2023    GLUCOSE 90 05/10/2023    GLUCOSE 113 (H) 02/20/2023    CALCIUM 9.0 06/01/2023    CALCIUM 9.2 05/10/2023    CALCIUM 9.3 02/20/2023     06/01/2023     05/10/2023     02/20/2023    K 4.3 06/01/2023    K 4.6 05/10/2023    K 4.2 02/20/2023    CO2 27 06/01/2023    CO2 24 05/10/2023    CO2 21 (L) 02/20/2023     06/01/2023     05/10/2023     02/20/2023    BUN 26 (H) 06/01/2023    BUN 21 (H) 05/10/2023     BUN 20 02/20/2023    CREATININE 0.8 06/01/2023    CREATININE 0.8 05/10/2023    CREATININE 0.9 02/20/2023    ALKPHOS 41 06/01/2023    ALKPHOS 59 02/20/2023    ALKPHOS 55 11/17/2022    AST 14 (L) 06/01/2023    AST 23 02/20/2023    AST 16 11/17/2022    ALT 21 06/01/2023    ALT 22 02/20/2023    ALT 16 11/17/2022    BILITOT 0.8 06/01/2023    BILITOT 0.4 02/20/2023    BILITOT 0.6 11/17/2022    ALBUMIN 3.9 06/01/2023    ALBUMIN 4.3 02/20/2023    ALBUMIN 4.2 11/17/2022       Lab Results   Component Value Date    CHOL 227 (H) 01/27/2023    CHOL 248 (H) 11/18/2015     Lab Results   Component Value Date    HDL 62 01/27/2023    HDL 92 11/18/2015     Lab Results   Component Value Date    LDLCALC 141 (H) 01/27/2023    LDLCALC 133 (H) 11/18/2015     Lab Results   Component Value Date    TRIG 120 01/27/2023    TRIG 117 11/18/2015       Lab Results   Component Value Date    TSH 2.87 01/26/2023    TSH 1.91 10/14/2020    TSH 0.32 (L) 06/10/2019     Lab Results   Component Value Date    FREET4 0.79 11/18/2015     Lab Results   Component Value Date    HGBA1C 5.6 11/18/2015    HGBA1C 5.6 04/08/2015       Lab Results   Component Value Date    HEPCAB Nonreactive 07/12/2018       Lab Results   Component Value Date    MG 2.0 01/28/2023    MG 2.1 01/26/2023      Immunization History   Administered Date(s) Administered   • INFLUENZA VACCINE QUAD ADJUVANTED 65 and OLDER 10/06/2021, 10/19/2022   • Influenza Split High Dose Preservative Free IM 11/03/2011, 11/13/2012, 10/09/2013, 10/30/2014, 11/17/2015, 09/30/2016, 09/25/2017   • Influenza Vaccine 65 And Older Preservative Free 10/25/2019, 10/02/2020   • Influenza Vaccine Quadrivalent 3 Yr And Older 09/18/2017, 10/22/2018   • Influenza, Unspecified 10/30/2014, 11/17/2015, 09/30/2016, 09/25/2017   • MMRV 06/05/2014   • Pneumococcal Conjugate 08/09/1996   • Pneumococcal Conjugate 13-Valent 11/17/2015   • Pneumococcal Polysaccharide 10/30/2014, 09/11/2017   • SARS-COV-2 (COVID-19) VACCINE PFIZER  BIVALENT 12 years and older ([place] [employer]) 09/15/2022   • SARS-COV-2 (COVID-19) VACCINE, MODERNA MONOVALENT 02/05/2021, 03/05/2021   • SARS-COV-2 (COVID-19) VACCINE, MODERNA MONOVALENT BOOSTER 11/17/2021   • SARS-COV-2 (COVID19) VACCINE, PFIZER MONOVALENT 09/15/2022   • Tdap 03/22/2012, 08/08/2022   • Varicella 06/05/2014   • Zoster 01/02/2006, 01/02/2016   • Zoster Vaccine Recombinant Adjuvanted (Shingrix) 05/24/2019, 09/20/2019         Health Maintenance Topics with due status: Overdue       Topic Date Due    Medicare Annual Wellness Visit 10/02/2021    COVID-19 Vaccine 11/10/2022     Health Maintenance Topics with due status: Not Due       Topic Last Completion Date    DTaP, Tdap, and Td Vaccines 08/08/2022    DEXA Scan 09/20/2022    Influenza Vaccine 10/19/2022    Falls Risk Screening 02/10/2023    Depression Screening 02/21/2023     Health Maintenance Topics with due status: Completed       Topic Last Completion Date    Pneumococcal (65 years and older) 09/11/2017    Zoster Vaccine 09/20/2019     Health Maintenance Topics with due status: Aged Out       Topic Date Due    Meningococcal ACWY Aged Out    HIB Vaccines Aged Out    Hepatitis B Vaccines Aged Out    IPV Vaccines Aged Out    HPV Vaccines Aged Out     Health Maintenance Topics with due status: Discontinued       Topic Date Due    Breast Cancer Screening Discontinued        Assessment and Plan       Assessment/Plan     Problem List Items Addressed This Visit     Primary hypertension (Chronic)    Overview     · Managed on Diltiazem and Losartan.  · Advised to follow a low sodium diet and keep BMI < 25.   · Advised to exercise for 2.5 hours per week.   · Instructed to take home blood pressure readings and call if consistently above 140/90.             Current Assessment & Plan     Blood pressure controlled. Will check basic metabolic profile to check creatinine and electrolytes.         Relevant Orders    Basic metabolic panel    CBC and Differential     Bilateral hip pain - Primary    Current Assessment & Plan     Stable at this time. Still sore from fall. Xrays negative for fracture. Will consult physical therapy. Will use heat over the areas of hematoma. Check cbc to ensure no excessive blood loss. Patient is on Eliquis.             Return if symptoms worsen or fail to improve.    Orders Placed This Encounter   Procedures   • Basic metabolic panel     Standing Status:   Future     Number of Occurrences:   1     Standing Expiration Date:   7/18/2024     Order Specific Question:   Release to patient     Answer:   Immediate   • CBC and Differential     Standing Status:   Future     Number of Occurrences:   1     Standing Expiration Date:   7/18/2024     Order Specific Question:   Release to patient     Answer:   Immediate              Henry Linares MD    7/18/2023

## 2023-07-18 NOTE — PATIENT INSTRUCTIONS
Problem List Items Addressed This Visit       Primary hypertension (Chronic)     Blood pressure controlled. Will check basic metabolic profile to check creatinine and electrolytes.         Relevant Orders    Basic metabolic panel    CBC and Differential    Bilateral hip pain - Primary     Stable at this time. Still sore from fall. Xrays negative for fracture. Will consult physical therapy. Will use heat over the areas of hematoma. Check cbc to ensure no excessive blood loss. Patient is on Eliquis.

## 2023-07-18 NOTE — ASSESSMENT & PLAN NOTE
Blood pressure controlled. Will check basic metabolic profile to check creatinine and electrolytes.

## 2023-07-19 ENCOUNTER — TELEPHONE (OUTPATIENT)
Dept: PRIMARY CARE | Facility: CLINIC | Age: 88
End: 2023-07-19
Payer: MEDICARE

## 2023-07-19 ENCOUNTER — DOCUMENTATION (OUTPATIENT)
Dept: PRIMARY CARE | Facility: CLINIC | Age: 88
End: 2023-07-19
Payer: MEDICARE

## 2023-07-19 NOTE — TELEPHONE ENCOUNTER
· Form sent for PT/OT   · Spoke with alessio at [facility] refaxed the info with thr confirmation received confirmation after resent

## 2023-07-19 NOTE — TELEPHONE ENCOUNTER
· ----- Message from Henry Linares MD sent at 7/19/2023  8:40 AM EDT -----  · Please let patient know that she is mildly anemic.  This is from the bleeding from her falls.  This will correct on its own.  I would recommend iron sulfate 325 mg over-the-counter to be taken on empty stomach with 1 pill of 500 mg of vitamin C for 4 weeks to help speed up the correction in her anemia.  Her kidney function test are normal.  So the blood pressure medication is not affecting her kidneys at all.  She should continue same dose of blood pressure medication.  · Spoke with rosalia caregiver she was informed per the Dr Nolan

## 2023-07-19 NOTE — RESULT ENCOUNTER NOTE
Please let patient know that she is mildly anemic.  This is from the bleeding from her falls.  This will correct on its own.  I would recommend iron sulfate 325 mg over-the-counter to be taken on empty stomach with 1 pill of 500 mg of vitamin C for 4 weeks to help speed up the correction in her anemia.  Her kidney function test are normal.  So the blood pressure medication is not affecting her kidneys at all.  She should continue same dose of blood pressure medication.

## 2023-07-19 NOTE — TELEPHONE ENCOUNTER
Vijay Prestonab calling: ) [phone] EXT [phone]    They stated they received forms for home care, but they are unsure which therapies we are requesting for the patient, they said it wasn't specified on the form.   Can you follow up with Vijay hernandez?

## 2023-07-26 ENCOUNTER — HOSPITAL ENCOUNTER (OUTPATIENT)
Dept: RADIOLOGY | Facility: CLINIC | Age: 88
Discharge: HOME | End: 2023-07-26
Attending: FAMILY MEDICINE
Payer: MEDICARE

## 2023-07-26 DIAGNOSIS — M25.562 PAIN IN LEFT KNEE: ICD-10-CM

## 2023-08-08 ENCOUNTER — OFFICE VISIT (OUTPATIENT)
Dept: PRIMARY CARE | Facility: CLINIC | Age: 88
End: 2023-08-08
Payer: MEDICARE

## 2023-08-08 VITALS
SYSTOLIC BLOOD PRESSURE: 138 MMHG | DIASTOLIC BLOOD PRESSURE: 80 MMHG | RESPIRATION RATE: 16 BRPM | HEART RATE: 80 BPM | OXYGEN SATURATION: 96 % | TEMPERATURE: 97.7 F

## 2023-08-08 DIAGNOSIS — R60.9 EDEMA, UNSPECIFIED TYPE: Primary | ICD-10-CM

## 2023-08-08 DIAGNOSIS — S70.12XA HEMATOMA OF LEFT THIGH, INITIAL ENCOUNTER: ICD-10-CM

## 2023-08-08 PROBLEM — T14.8XXA HEMATOMA: Status: ACTIVE | Noted: 2023-08-08

## 2023-08-08 PROCEDURE — 99213 OFFICE O/P EST LOW 20 MIN: CPT | Performed by: FAMILY MEDICINE

## 2023-08-08 RX ORDER — PREGABALIN 25 MG/1
25 CAPSULE ORAL
COMMUNITY
Start: 2023-07-24

## 2023-08-08 RX ORDER — FUROSEMIDE 20 MG/1
10 TABLET ORAL SEE ADMIN INSTRUCTIONS
Qty: 5 TABLET | Refills: 0 | Status: SHIPPED | OUTPATIENT
Start: 2023-08-08 | End: 2023-09-20 | Stop reason: SDUPTHER

## 2023-08-08 ASSESSMENT — ENCOUNTER SYMPTOMS
FEVER: 0
CHILLS: 0
SHORTNESS OF BREATH: 0
WHEEZING: 0
ROS SKIN COMMENTS: SEE HISTORY OF PRESENT ILLNESS
CHEST TIGHTNESS: 0

## 2023-08-08 NOTE — PATIENT INSTRUCTIONS
Problem List Items Addressed This Visit       Edema - Primary     Secondary to recent fall. Recent kidney and liver test ok. Recent echocardiogram with normal ejection fraction. Treat with 10 mg of Furosemide every other day for up to 5 doses. Advised to call if not resolving or if worsening.          Hematoma of left thigh     Slightly improved on left and more improvement on right. S/P Aspiration by Dr. Krause. Has follow up appointment on 08/17/2023 for reevaluation. Use heating pad. If ultimately does not get better and is uncomfortable for patient would consider surgical evacuation.

## 2023-08-08 NOTE — PROGRESS NOTES
Henry Linares MD    [facility]  [address]  [place] [address]  Phone: [phone]  Fax: [phone]     History of Present Illness     Subjective     Patient ID: Madelyn Ruiz is a 89 y.o. female.    Patient here for follow up of hematoma and edema of legs       Past Medical/Surgical/Family/Social History       The following have been reviewed and updated as appropriate in this visit:   Allergies  Meds  Problems         Past Medical History:   Diagnosis Date   • Allergic bronchopulmonary aspergillosis (CMS/Formerly [facility]) 04/02/2018    Allergist: Dr. Arriaza.  Stable FEV1 in 8/2015.  IgE levels and eosinophils stable in 8/2015.  Managed with Fasenra, Azithromycin and Flovent.   • Allergic rhinitis 04/02/2018    Pulmonologist: Dr. Walker. Managed with Flonase.   • Asthma    • Atrial flutter with rapid ventricular response (CMS/Formerly [facility]) 02/21/2023    Diagnosed in 02/2023 and hospitalized Managed with Apixaban and Diltiazem Echocardiogram 02/2023 •  Left Ventricle: Normal ventricle size. Mild concentric left ventricular hypertrophy. Preserved systolic function. Estimated EF 65-70%. No regional wall motion abnormalities. Grade I diastolic dysfunction. •  Right Ventricle: Normal ventricle size. Normal systolic function. •  Left Atrium: Moderately   • Calcification of aorta (CMS/Formerly [facility]) 06/29/2020    Seen incidentally on CT scan.   • Chronic obstructive pulmonary disease (CMS/Formerly [facility]) 04/02/2018    Pulmonologist: Dr. Walker.  Seen on PFTs in hospital in 2014.  Controlled with Spiriva and FLovent.   • GERD (gastroesophageal reflux disease) 06/08/2019    Managed with Esomeprazole.  Should take medication 30 minutes prior to meal.  Advised to avoid caffeine, carbonated beverages, and should not eat within 3 hours of going to sleep.  Advised to reduce BMI < 25.    • Insomnia 04/02/2018    Managed with Lorazepam as needed.   • Lumbar spinal stenosis 04/02/2018    Neurosurgeon:   Myriam. MRI with Central and lateral recess spinal stenosis. Has Spondylolisthesis at L4/L5. S/P epidural injection by Dr. Martin with some relief in past. Had Lumbar fusion and Lumbar laminectomy by Dr. Miguel in 4/2015.   • Macular degeneration disease    • Obstructive sleep apnea syndrome 04/02/2018    Mild STACIA noted in sleep study 7/2015. Treated with nasal CPAP automatic with pressure range 5-10 CM H2Ox couple months. Off now   • Osteoporosis 04/02/2018    Rheumatologist: Dr. Bradley.  Dexa scan 7/2016 with osteoporosis of lumbar spine LS-2.5, LH-2.1, RH -2.2.  Has been on Fosamax in the past for 8 years.  Worsened by Prednisone in past.  Continue vitamin D, calcium and weight bearing exercise. DEXA in 7/2017 with LS -1.7, LH -2.2, and RH -2.0.  Next due in 7/2019.  Seen by Dr. Bradley in 8/2015 who agreed with initiating Prolia. Took for 2 years. N   • Primary hypertension 04/02/2018    Managed on Diltiazem and Losartan. Advised to follow a low sodium diet and keep BMI < 25.  Advised to exercise for 2.5 hours per week.  Instructed to take home blood pressure readings and call if consistently above 140/90.     • Pulmonary embolism (CMS/HCC) 06/09/2019    Hematologist: Dr. Bhakta Diagnosed in 06/2019 in right upper lobe. Vascular ultrasound negative of bilateral lower extremities. Managed on Eliquis. Hypercoagulable workup was negative for beta-2 glycoprotein's, RIGO, Antithrombin III, lupus anticoagulant, protein C activity protein S activity, and prothrombin gene mutation, and factor V Leiden. Now off Apixaban as CT scan in 12/2019 was without pu   • SVT (supraventricular tachycardia) (CMS/HCC)    • Vertebral compression fracture (CMS/HCC)     At T12 level       Past Surgical History:   Procedure Laterality Date   • APPENDECTOMY     • CARPAL TUNNEL RELEASE Bilateral    • CATARACT EXTRACTION W/  INTRAOCULAR LENS IMPLANT Bilateral    • HYSTERECTOMY  1972   • LUMBAR LAMINECTOMY  04/21/2015    S/P lumbar fusion    • TONSILLECTOMY         Family History   Problem Relation Age of Onset   • Glaucoma Biological Mother    • Macular degeneration Biological Mother    • Hypertension Biological Mother    • Lung cancer Biological Father    • Heart disease Biological Brother    • Breast cancer Mother's Sister    • No Known Problems Biological Son    • No Known Problems Biological Son    • No Known Problems Biological Daughter    • No Known Problems Biological Daughter    • No Known Problems Biological Daughter        Social History     Tobacco Use   • Smoking status: Never   • Smokeless tobacco: Never   Vaping Use   • Vaping Use: Never used   Substance Use Topics   • Alcohol use: Yes     Comment: Rarely   • Drug use: No       Patient Care Team:  Henry Linares MD as PCP - General  Ray Arriaza MD as Referring Physician  Aye Bhakta MD as Consulting Physician (Hematology and Oncology)  Wesley Walker MD as Consulting Physician (Pulmonary)  Fausto Bradley MD as Consulting Physician (Rheumatology)  Kleiner, Robert C, MD as Referring Physician  Alfie Torres MD as Consulting Physician (Dermatology)  Fanny Pelaez MD as Consulting Physician (Orthopedic Surgery)  Keenan Krause DO as Consulting Physician (Family Medicine)  Keenan Krause DO as Consulting Physician (Family Medicine)  Wesley You DO as Consulting Physician (Physical Medicine and Rehabilitation)  Jessika Zelaya MD as Surgeon (Neurosurgery)  Henrietta Crespo MD as Cardiologist (Interventional Cardiology)  Dinesh Goel MD as Surgeon (Otolaryngology)  Valentino, Michael A, MD PhD as Cardiologist (Cardiology)     Allergies and Medications       Allergies   Allergen Reactions   • Mepolizumab Rash   • Morphine      Other reaction(s): Vomiting   • Oxycodone      Other reaction(s): Vomiting       Current Outpatient Medications   Medication Sig Dispense Refill   • albuterol 2.5 mg /3 mL (0.083 %) nebulizer solution Take 3 mL (2.5 mg  total) by nebulization every 6 (six) hours as needed for wheezing or shortness of breath. 125 vial 0   • azithromycin (ZITHROMAX) 250 mg tablet      • benralizumab (FASENRA) 30 mg/mL syringe subcutaneous syringe Inject 30 mg under the skin every 2 (two) months.     • benzonatate (TESSALON) 200 mg capsule TAKE 1 CAPSULE BY MOUTH 3 TIMES DAILY AS NEEDED FOR COUGH.     • calcium carbonate 600 mg calcium (1,500 mg) tablet Take 1 tablet by mouth 2 (two) times a day.     • cholecalciferol, vitamin D3, 25 mcg (1,000 unit) capsule Take 1 tablet by mouth daily.     • diltiazem (TIAZAC) 300 mg 24 hr capsule Take 1 capsule (300 mg total) by mouth daily. 90 capsule 1   • docusate sodium (COLACE) 100 mg capsule Take 100 mg by mouth nightly.     • DULoxetine (CYMBALTA) 60 mg capsule Take 60 mg by mouth at bedtime.   0   • ELIQUIS 2.5 mg tablet TAKE 1 TABLET BY MOUTH TWICE A DAY TO PREVENT BLOOD CLOTS IN CHRONIC ATRIAL FIBRILLATION 60 tablet 11   • esomeprazole (NexIUM) 20 mg capsule Take 20 mg by mouth daily as needed. Take 30 minutes prior to meal.     • fexofenadine (ALLEGRA) 180 mg tablet Take 180 mg by mouth daily as needed.     • fluticasone propionate (FLONASE) 50 mcg/actuation nasal spray Administer 1 spray into each nostril daily as needed for rhinitis or allergies.     • FOLIC ACID/MULTIVIT,IRON, (CENTRUM ORAL) Take 1 tablet by mouth daily.     • furosemide (LASIX) 20 mg tablet Take 0.5 tablets (10 mg total) by mouth See admin instr. Take 1/2 tablet every other day for 10 doses. 5 tablet 0   • hydrALAZINE (APRESOLINE) 25 mg tablet Take 1 tablet by mouth every 8 (eight) hours.     • lifitegrast (XIIDRA) 5 % dropperette dropperette      • LORazepam (ATIVAN) 1 mg tablet Take 1 tablet (1 mg total) by mouth nightly as needed for anxiety. TAKE 1 TABLET BY MOUTH NIGHTLY AS NEEDED FOR ANXIETY OR SLEEP. Strength: 1 mg 30 tablet 5   • losartan (COZAAR) 50 mg tablet Take 1 tablet (50 mg total) by mouth 2 (two) times a day. 90  "tablet 3   • ofloxacin (OCUFLOX) 0.3 % ophthalmic solution Only after injections into the retina     • pregabalin (LYRICA) 25 mg capsule Take 25 mg by mouth See admin instr. Take as needed during day     • pregabalin (LYRICA) 50 mg capsule Take 50 mg by mouth 2 (two) times a day. 25 mgs in afternoon as needed     • rosuvastatin (CRESTOR) 5 mg tablet Take 1 tablet (5 mg total) by mouth daily. Every other day     • SPIRIVA RESPIMAT 2.5 mcg/actuation mist inhaler Inhale 2 puffs daily.     • SYMBICORT 80-4.5 mcg/actuation inhaler Inhale 2 puffs 2 (two) times a day.     • traMADoL (ULTRAM) 50 mg tablet Take 50 mg by mouth daily as needed.     • zoledronic acid (RECLAST) IVPB Infuse 5 mg into a venous catheter See admin instr. 5 mg once yearly       No current facility-administered medications for this visit.          Review of Systems       Review of Systems   Constitutional: Negative for chills and fever.   Respiratory: Negative for chest tightness, shortness of breath and wheezing.    Cardiovascular: Positive for leg swelling. Negative for chest pain.   Skin:        See history of present illness         Physical Examination       Objective     Vitals:    08/08/23 1146   BP: 138/80   Pulse: 80   Resp: 16   Temp: 36.5 °C (97.7 °F)   SpO2: 96%       Pulse Readings from Last 5 Encounters:   08/08/23 80   07/18/23 76   05/10/23 99   03/28/23 80   02/22/23 97       Wt Readings from Last 5 Encounters:   07/13/23 57 kg (125 lb 9.6 oz)   05/10/23 55.3 kg (122 lb)   03/28/23 54 kg (119 lb)   02/22/23 53.1 kg (117 lb)   02/10/23 54.3 kg (119 lb 12.8 oz)       Ht Readings from Last 5 Encounters:   07/13/23 1.549 m (5' 1\")   03/28/23 1.549 m (5' 1\")   02/22/23 1.524 m (5')   02/10/23 1.549 m (5' 1\")   01/27/23 1.524 m (5')       BP Readings from Last 5 Encounters:   08/08/23 138/80   07/18/23 128/80   07/13/23 124/62   05/10/23 (!) 146/84   03/28/23 118/70       BMI Readings from Last 4 Encounters:   07/13/23 23.73 kg/m² "   05/10/23 23.05 kg/m²   03/28/23 22.48 kg/m²   02/22/23 22.85 kg/m²       Physical Exam  Constitutional:       General: She is not in acute distress.     Appearance: Normal appearance. She is not ill-appearing.   Musculoskeletal:      Right lower leg: Edema present.      Left lower leg: Edema present.      Comments: 2 plus pitting edema bilaterally    Skin:     Comments: Slightly smaller but large hematoma on left outer thigh and small hematoma on right outer thigh.   Neurological:      Mental Status: She is alert.          Laboratory Results     Lab Results   Component Value Date    WBC 8.99 07/18/2023    WBC 6.12 06/01/2023    WBC 4.83 02/22/2023    HGB 10.7 (L) 07/18/2023    HGB 12.4 06/01/2023    HGB 12.9 02/22/2023    HCT 33.6 (L) 07/18/2023    HCT 39.3 06/01/2023    HCT 39.6 02/22/2023    .7 (H) 07/18/2023    .6 (H) 06/01/2023    MCV 96.4 02/22/2023     07/18/2023     06/01/2023     02/22/2023        Lab Results   Component Value Date    GLUCOSE 73 07/18/2023    GLUCOSE 97 06/01/2023    GLUCOSE 90 05/10/2023    CALCIUM 8.9 07/18/2023    CALCIUM 9.0 06/01/2023    CALCIUM 9.2 05/10/2023     07/18/2023     06/01/2023     05/10/2023    K 3.8 07/18/2023    K 4.3 06/01/2023    K 4.6 05/10/2023    CO2 26 07/18/2023    CO2 27 06/01/2023    CO2 24 05/10/2023     07/18/2023     06/01/2023     05/10/2023    BUN 22 07/18/2023    BUN 26 (H) 06/01/2023    BUN 21 (H) 05/10/2023    CREATININE 0.8 07/18/2023    CREATININE 0.8 06/01/2023    CREATININE 0.8 05/10/2023    ALKPHOS 41 06/01/2023    ALKPHOS 59 02/20/2023    ALKPHOS 55 11/17/2022    AST 14 (L) 06/01/2023    AST 23 02/20/2023    AST 16 11/17/2022    ALT 21 06/01/2023    ALT 22 02/20/2023    ALT 16 11/17/2022    BILITOT 0.8 06/01/2023    BILITOT 0.4 02/20/2023    BILITOT 0.6 11/17/2022    ALBUMIN 3.9 06/01/2023    ALBUMIN 4.3 02/20/2023    ALBUMIN 4.2 11/17/2022       Lab Results   Component Value  Date    CHOL 227 (H) 01/27/2023    CHOL 248 (H) 11/18/2015     Lab Results   Component Value Date    HDL 62 01/27/2023    HDL 92 11/18/2015     Lab Results   Component Value Date    LDLCALC 141 (H) 01/27/2023    LDLCALC 133 (H) 11/18/2015     Lab Results   Component Value Date    TRIG 120 01/27/2023    TRIG 117 11/18/2015       Lab Results   Component Value Date    TSH 2.87 01/26/2023    TSH 1.91 10/14/2020    TSH 0.32 (L) 06/10/2019     Lab Results   Component Value Date    FREET4 0.79 11/18/2015         Lab Results   Component Value Date    HGBA1C 5.6 11/18/2015    HGBA1C 5.6 04/08/2015         Lab Results   Component Value Date    HEPCAB Nonreactive 07/12/2018       Lab Results   Component Value Date    MG 2.0 01/28/2023    MG 2.1 01/26/2023            Immunization History   Administered Date(s) Administered   • INFLUENZA VACCINE QUAD ADJUVANTED 65 and OLDER 10/06/2021, 10/19/2022   • Influenza Split High Dose Preservative Free IM 11/03/2011, 11/13/2012, 10/09/2013, 10/30/2014, 11/17/2015, 09/30/2016, 09/25/2017   • Influenza Vaccine 65 And Older Preservative Free 10/25/2019, 10/02/2020   • Influenza Vaccine Quadrivalent 3 Yr And Older 09/18/2017, 10/22/2018   • Influenza, Unspecified 10/30/2014, 11/17/2015, 09/30/2016, 09/25/2017   • MMRV 06/05/2014   • Pneumococcal Conjugate 08/09/1996   • Pneumococcal Conjugate 13-Valent 11/17/2015   • Pneumococcal Polysaccharide 10/30/2014, 09/11/2017   • SARS-COV-2 (COVID-19) VACCINE PFIZER BIVALENT 12 years and older ([place] [employer]) 09/15/2022   • SARS-COV-2 (COVID-19) VACCINE, MODERNA MONOVALENT 02/05/2021, 03/05/2021   • SARS-COV-2 (COVID-19) VACCINE, MODERNA MONOVALENT BOOSTER 11/17/2021   • SARS-COV-2 (COVID19) VACCINE, PFIZER MONOVALENT 09/15/2022   • Tdap 03/22/2012, 08/08/2022   • Varicella 06/05/2014   • Zoster 01/02/2006, 01/02/2016   • Zoster Vaccine Recombinant Adjuvanted (Shingrix) 05/24/2019, 09/20/2019         Health Maintenance Topics with due status: Overdue        Topic Date Due    Medicare Annual Wellness Visit 10/02/2021    COVID-19 Vaccine 11/10/2022    Influenza Vaccine 08/01/2023     Health Maintenance Topics with due status: Not Due       Topic Last Completion Date    DTaP, Tdap, and Td Vaccines 08/08/2022    DEXA Scan 09/20/2022    Falls Risk Screening 02/10/2023    Depression Screening 02/21/2023     Health Maintenance Topics with due status: Completed       Topic Last Completion Date    Pneumococcal (65 years and older) 09/11/2017    Zoster Vaccine 09/20/2019     Health Maintenance Topics with due status: Aged Out       Topic Date Due    Meningococcal ACWY Aged Out    HIB Vaccines Aged Out    Hepatitis B Vaccines Aged Out    IPV Vaccines Aged Out    HPV Vaccines Aged Out     Health Maintenance Topics with due status: Discontinued       Topic Date Due    Breast Cancer Screening Discontinued        Assessment and Plan       Assessment/Plan     Problem List Items Addressed This Visit     Edema - Primary    Current Assessment & Plan     Secondary to recent fall. Recent kidney and liver test ok. Recent echocardiogram with normal ejection fraction. Treat with 10 mg of Furosemide every other day for up to 5 doses. Advised to call if not resolving or if worsening.          Hematoma of left thigh    Current Assessment & Plan     Slightly improved on left and more improvement on right. S/P Aspiration by Dr. Krause. Has follow up appointment on 08/17/2023 for reevaluation. Use heating pad. If ultimately does not get better and is uncomfortable for patient would consider surgical evacuation.            Return if symptoms worsen or fail to improve.    No orders of the defined types were placed in this encounter.             Henry Linares MD    8/8/2023

## 2023-08-08 NOTE — ASSESSMENT & PLAN NOTE
Slightly improved on left and more improvement on right. S/P Aspiration by Dr. Krause. Has follow up appointment on 08/17/2023 for reevaluation. Use heating pad. If ultimately does not get better and is uncomfortable for patient would consider surgical evacuation.

## 2023-08-08 NOTE — ASSESSMENT & PLAN NOTE
Secondary to recent fall. Recent kidney and liver test ok. Recent echocardiogram with normal ejection fraction. Treat with 10 mg of Furosemide every other day for up to 5 doses. Advised to call if not resolving or if worsening.

## 2023-08-10 ENCOUNTER — TELEPHONE (OUTPATIENT)
Dept: PRIMARY CARE | Facility: CLINIC | Age: 88
End: 2023-08-10
Payer: MEDICARE

## 2023-08-10 NOTE — TELEPHONE ENCOUNTER
· Spoke with rosalia patient has long term care insurance & with all her falls she wanted assistance approximately 2 days a week due to her falls I gave her agencies in [place] she also has [facility]  [facility] [phone] , [facility] [phone] , [facility] [phone] & tri [phone]

## 2023-08-15 ENCOUNTER — TELEPHONE (OUTPATIENT)
Dept: PRIMARY CARE | Facility: CLINIC | Age: 88
End: 2023-08-15
Payer: MEDICARE

## 2023-08-15 NOTE — TELEPHONE ENCOUNTER
Please have patient see one of the other providers in my absence for evaluation of her back pain and decision with regard to further testing.  The only other option would be to have them call Dr. You office to see at a sooner date.

## 2023-08-15 NOTE — TELEPHONE ENCOUNTER
· Spoke with kenia patient having trouble walking she is very weak she is using the walker kenia said that it is effecting her mobility & difficulty walking [facility] is there for PT / OT she doesn't know if that is that issue patient said that pain is different then her spinal stenosis it is her lower back kenia is requesting xrays of her back & possibly a mri patient doesn't see Dr You for an injection until August 26 patient is in pain informed kenia when you are available you will address .

## 2023-08-16 RX ORDER — LOSARTAN POTASSIUM 50 MG/1
50 TABLET ORAL 2 TIMES DAILY
Qty: 90 TABLET | Refills: 3 | Status: SHIPPED | OUTPATIENT
Start: 2023-08-16 | End: 2023-09-20 | Stop reason: SDUPTHER

## 2023-08-16 NOTE — TELEPHONE ENCOUNTER
Medicine Refill Request    Last Office Visit: 8/8/2023   Last Consult Visit: 11/12/2021  Last Telemedicine Visit: 8/3/2020 Henry Linares MD    Next Appointment: 10/11/2023      Current Outpatient Medications:   •  albuterol 2.5 mg /3 mL (0.083 %) nebulizer solution, Take 3 mL (2.5 mg total) by nebulization every 6 (six) hours as needed for wheezing or shortness of breath., Disp: 125 vial, Rfl: 0  •  azithromycin (ZITHROMAX) 250 mg tablet, , Disp: , Rfl:   •  benralizumab (FASENRA) 30 mg/mL syringe subcutaneous syringe, Inject 30 mg under the skin every 2 (two) months., Disp: , Rfl:   •  benzonatate (TESSALON) 200 mg capsule, TAKE 1 CAPSULE BY MOUTH 3 TIMES DAILY AS NEEDED FOR COUGH., Disp: , Rfl:   •  calcium carbonate 600 mg calcium (1,500 mg) tablet, Take 1 tablet by mouth 2 (two) times a day., Disp: , Rfl:   •  cholecalciferol, vitamin D3, 25 mcg (1,000 unit) capsule, Take 1 tablet by mouth daily., Disp: , Rfl:   •  diltiazem (TIAZAC) 300 mg 24 hr capsule, Take 1 capsule (300 mg total) by mouth daily., Disp: 90 capsule, Rfl: 1  •  docusate sodium (COLACE) 100 mg capsule, Take 100 mg by mouth nightly., Disp: , Rfl:   •  DULoxetine (CYMBALTA) 60 mg capsule, Take 60 mg by mouth at bedtime. , Disp: , Rfl: 0  •  ELIQUIS 2.5 mg tablet, TAKE 1 TABLET BY MOUTH TWICE A DAY TO PREVENT BLOOD CLOTS IN CHRONIC ATRIAL FIBRILLATION, Disp: 60 tablet, Rfl: 11  •  esomeprazole (NexIUM) 20 mg capsule, Take 20 mg by mouth daily as needed. Take 30 minutes prior to meal., Disp: , Rfl:   •  fexofenadine (ALLEGRA) 180 mg tablet, Take 180 mg by mouth daily as needed., Disp: , Rfl:   •  fluticasone propionate (FLONASE) 50 mcg/actuation nasal spray, Administer 1 spray into each nostril daily as needed for rhinitis or allergies., Disp: , Rfl:   •  FOLIC ACID/MULTIVIT,IRON, (CENTRUM ORAL), Take 1 tablet by mouth daily., Disp: , Rfl:   •  furosemide (LASIX) 20 mg tablet, Take 0.5 tablets (10 mg total) by mouth See admin instr. Take 1/2  tablet every other day for 10 doses., Disp: 5 tablet, Rfl: 0  •  hydrALAZINE (APRESOLINE) 25 mg tablet, Take 1 tablet by mouth every 8 (eight) hours., Disp: , Rfl:   •  lifitegrast (XIIDRA) 5 % dropperette dropperette, , Disp: , Rfl:   •  LORazepam (ATIVAN) 1 mg tablet, TAKE 1 TABLET EVERY NIGHT AS NEEDED FOR ANXIETY OR SLEEP, Disp: 30 tablet, Rfl: 3  •  losartan (COZAAR) 50 mg tablet, Take 1 tablet (50 mg total) by mouth 2 (two) times a day., Disp: 90 tablet, Rfl: 3  •  ofloxacin (OCUFLOX) 0.3 % ophthalmic solution, Only after injections into the retina, Disp: , Rfl:   •  pregabalin (LYRICA) 25 mg capsule, Take 25 mg by mouth See admin instr. Take as needed during day, Disp: , Rfl:   •  pregabalin (LYRICA) 50 mg capsule, Take 50 mg by mouth 2 (two) times a day. 25 mgs in afternoon as needed, Disp: , Rfl:   •  rosuvastatin (CRESTOR) 5 mg tablet, Take 1 tablet (5 mg total) by mouth daily. Every other day, Disp: , Rfl:   •  SPIRIVA RESPIMAT 2.5 mcg/actuation mist inhaler, Inhale 2 puffs daily., Disp: , Rfl:   •  SYMBICORT 80-4.5 mcg/actuation inhaler, Inhale 2 puffs 2 (two) times a day., Disp: , Rfl:   •  traMADoL (ULTRAM) 50 mg tablet, Take 50 mg by mouth daily as needed., Disp: , Rfl:   •  zoledronic acid (RECLAST) IVPB, Infuse 5 mg into a venous catheter See admin instr. 5 mg once yearly, Disp: , Rfl:       BP Readings from Last 3 Encounters:   08/08/23 138/80   07/18/23 128/80   07/13/23 124/62       Recent Lab results:  Lab Results   Component Value Date    CHOL 227 (H) 01/27/2023   ,   Lab Results   Component Value Date    HDL 62 01/27/2023   ,   Lab Results   Component Value Date    LDLCALC 141 (H) 01/27/2023   ,   Lab Results   Component Value Date    TRIG 120 01/27/2023        Lab Results   Component Value Date    GLUCOSE 73 07/18/2023   ,   Lab Results   Component Value Date    HGBA1C 5.6 11/18/2015         Lab Results   Component Value Date    CREATININE 0.8 07/18/2023       Lab Results   Component Value  Date    TSH 2.87 01/26/2023           Lab Results   Component Value Date    HGBA1C 5.6 11/18/2015

## 2023-08-21 ENCOUNTER — HOSPITAL ENCOUNTER (INPATIENT)
Facility: HOSPITAL | Age: 88
LOS: 3 days | Discharge: NURSING, REHAB OR LONG-TERM CARE | DRG: 552 | End: 2023-08-24
Attending: EMERGENCY MEDICINE | Admitting: SURGERY
Payer: MEDICARE

## 2023-08-21 ENCOUNTER — APPOINTMENT (EMERGENCY)
Dept: RADIOLOGY | Facility: HOSPITAL | Age: 88
DRG: 552 | End: 2023-08-21
Attending: EMERGENCY MEDICINE
Payer: MEDICARE

## 2023-08-21 ENCOUNTER — TELEPHONE (OUTPATIENT)
Dept: PRIMARY CARE | Facility: CLINIC | Age: 88
End: 2023-08-21
Payer: MEDICARE

## 2023-08-21 DIAGNOSIS — S32.10XA CLOSED FRACTURE OF SACRUM, UNSPECIFIED PORTION OF SACRUM, INITIAL ENCOUNTER (CMS/HCC): ICD-10-CM

## 2023-08-21 DIAGNOSIS — Z92.29 HX OF LONG TERM USE OF BLOOD THINNERS: ICD-10-CM
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
## 2023-08-21 LAB
ABO + RH BLD: NORMAL
ALBUMIN SERPL-MCNC: 3.7 G/DL (ref 3.5–5.7)
ALP SERPL-CCNC: 95 IU/L (ref 34–125)
ALT SERPL-CCNC: 14 IU/L (ref 7–52)
AMPHET UR QL SCN: NOT DETECTED
ANION GAP SERPL CALC-SCNC: 9 MEQ/L (ref 3–15)
APTT PPP: 24 SEC (ref 23–35)
AST SERPL-CCNC: 15 IU/L (ref 13–39)
ATRIAL RATE: 104
BARBITURATES UR QL SCN: NOT DETECTED
BASOPHILS # BLD: 0.01 K/UL (ref 0.01–0.1)
BASOPHILS NFR BLD: 0.2 %
BENZODIAZ UR QL SCN: NOT DETECTED
BILIRUB SERPL-MCNC: 0.5 MG/DL (ref 0.3–1.2)
BILIRUB UR QL STRIP.AUTO: NEGATIVE MG/DL
BLD GP AB SCN SERPL QL: NEGATIVE
BUN SERPL-MCNC: 12 MG/DL (ref 7–25)
CALCIUM SERPL-MCNC: 8.4 MG/DL (ref 8.6–10.3)
CANNABINOIDS UR QL SCN: POSITIVE
CHLORIDE SERPL-SCNC: 104 MEQ/L (ref 98–107)
CK SERPL-CCNC: 48 U/L (ref 30–223)
CLARITY UR REFRACT.AUTO: CLEAR
CO2 SERPL-SCNC: 24 MEQ/L (ref 21–31)
COCAINE UR QL SCN: NOT DETECTED
COLOR UR AUTO: COLORLESS
CREAT SERPL-MCNC: 0.5 MG/DL (ref 0.6–1.2)
D AG BLD QL: POSITIVE
DIFFERENTIAL METHOD BLD: ABNORMAL
EOSINOPHIL # BLD: 0 K/UL (ref 0.04–0.36)
EOSINOPHIL NFR BLD: 0 %
ERYTHROCYTE [DISTWIDTH] IN BLOOD BY AUTOMATED COUNT: 13.7 % (ref 11.7–14.4)
ETHANOL SERPL-MCNC: <10 MG/DL
FENTANYL URINE SCR: NOT DETECTED
GFR SERPL CREATININE-BSD FRML MDRD: >60 ML/MIN/1.73M*2
GLUCOSE BLD-MCNC: 97 MG/DL (ref 70–99)
GLUCOSE SERPL-MCNC: 93 MG/DL (ref 70–99)
GLUCOSE UR STRIP.AUTO-MCNC: NEGATIVE MG/DL
HCT VFR BLDCO AUTO: 35.2 % (ref 35–45)
HGB BLD-MCNC: 11.2 G/DL (ref 11.8–15.7)
HGB UR QL STRIP.AUTO: NEGATIVE
IMM GRANULOCYTES # BLD AUTO: 0.05 K/UL (ref 0–0.08)
IMM GRANULOCYTES NFR BLD AUTO: 0.8 %
INR PPP: 1.1
KETONES UR STRIP.AUTO-MCNC: NEGATIVE MG/DL
LABORATORY COMMENT REPORT: NORMAL
LABORATORY COMMENT REPORT: NORMAL
LACTATE SERPL-SCNC: 1.3 MMOL/L (ref 0.4–2)
LEUKOCYTE ESTERASE UR QL STRIP.AUTO: NEGATIVE
LYMPHOCYTES # BLD: 1.13 K/UL (ref 1.2–3.5)
LYMPHOCYTES NFR BLD: 17.9 %
MAGNESIUM SERPL-MCNC: 2 MG/DL (ref 1.8–2.5)
MCH RBC QN AUTO: 31.5 PG (ref 28–33.2)
MCHC RBC AUTO-ENTMCNC: 31.8 G/DL (ref 32.2–35.5)
MCV RBC AUTO: 99.2 FL (ref 83–98)
MONOCYTES # BLD: 0.6 K/UL (ref 0.28–0.8)
MONOCYTES NFR BLD: 9.5 %
NEUTROPHILS # BLD: 4.53 K/UL (ref 1.7–7)
NEUTS SEG NFR BLD: 71.6 %
NITRITE UR QL STRIP.AUTO: NEGATIVE
NRBC BLD-RTO: 0 %
OPIATES UR QL SCN: NOT DETECTED
PCP UR QL SCN: NOT DETECTED
PDW BLD AUTO: 10.3 FL (ref 9.4–12.3)
PH UR STRIP.AUTO: 6.5 [PH]
PLATELET # BLD AUTO: 368 K/UL (ref 150–369)
POCT TEST: NORMAL
POTASSIUM SERPL-SCNC: 3.7 MEQ/L (ref 3.5–5.1)
PROT SERPL-MCNC: 6.2 G/DL (ref 6–8.2)
PROT UR QL STRIP.AUTO: NEGATIVE
PROTHROMBIN TIME: 14 SEC (ref 12.2–14.5)
QRS DURATION: 122
QT INTERVAL: 396
QTC CALCULATION(BAZETT): 520
R AXIS: 47
RBC # BLD AUTO: 3.55 M/UL (ref 3.93–5.22)
SODIUM SERPL-SCNC: 137 MEQ/L (ref 136–145)
SP GR UR REFRACT.AUTO: 1.01
SPECIMEN EXP DATE BLD: NORMAL
T WAVE AXIS: 29
TROPONIN I SERPL HS-MCNC: 10.9 PG/ML
TROPONIN I SERPL HS-MCNC: 8.7 PG/ML
UROBILINOGEN UR STRIP-ACNC: 0.2 EU/DL
VENTRICULAR RATE: 104
WBC # BLD AUTO: 6.32 K/UL (ref 3.8–10.5)

## 2023-08-21 PROCEDURE — 93005 ELECTROCARDIOGRAM TRACING: CPT | Performed by: SURGERY

## 2023-08-21 PROCEDURE — 85610 PROTHROMBIN TIME: CPT | Performed by: NURSE PRACTITIONER

## 2023-08-21 PROCEDURE — 80053 COMPREHEN METABOLIC PANEL: CPT | Performed by: EMERGENCY MEDICINE

## 2023-08-21 PROCEDURE — 84484 ASSAY OF TROPONIN QUANT: CPT | Performed by: EMERGENCY MEDICINE

## 2023-08-21 PROCEDURE — G0480 DRUG TEST DEF 1-7 CLASSES: HCPCS | Performed by: EMERGENCY MEDICINE

## 2023-08-21 PROCEDURE — 99285 EMERGENCY DEPT VISIT HI MDM: CPT | Mod: 25

## 2023-08-21 PROCEDURE — 86901 BLOOD TYPING SEROLOGIC RH(D): CPT

## 2023-08-21 PROCEDURE — 74177 CT ABD & PELVIS W/CONTRAST: CPT | Mod: ME

## 2023-08-21 PROCEDURE — G1004 CDSM NDSC: HCPCS

## 2023-08-21 PROCEDURE — 82550 ASSAY OF CK (CPK): CPT | Performed by: EMERGENCY MEDICINE

## 2023-08-21 PROCEDURE — 72125 CT NECK SPINE W/O DYE: CPT | Mod: MG

## 2023-08-21 PROCEDURE — 93010 ELECTROCARDIOGRAM REPORT: CPT | Performed by: INTERNAL MEDICINE

## 2023-08-21 PROCEDURE — 36415 COLL VENOUS BLD VENIPUNCTURE: CPT | Performed by: EMERGENCY MEDICINE

## 2023-08-21 PROCEDURE — 96374 THER/PROPH/DIAG INJ IV PUSH: CPT | Mod: 59

## 2023-08-21 PROCEDURE — 63700000 HC SELF-ADMINISTRABLE DRUG: Performed by: NURSE PRACTITIONER

## 2023-08-21 PROCEDURE — 63700000 HC SELF-ADMINISTRABLE DRUG

## 2023-08-21 PROCEDURE — 73502 X-RAY EXAM HIP UNI 2-3 VIEWS: CPT | Mod: RT

## 2023-08-21 PROCEDURE — 99221 1ST HOSP IP/OBS SF/LOW 40: CPT | Performed by: ORTHOPAEDIC SURGERY

## 2023-08-21 PROCEDURE — 93005 ELECTROCARDIOGRAM TRACING: CPT | Performed by: EMERGENCY MEDICINE

## 2023-08-21 PROCEDURE — 81003 URINALYSIS AUTO W/O SCOPE: CPT | Performed by: EMERGENCY MEDICINE

## 2023-08-21 PROCEDURE — 83605 ASSAY OF LACTIC ACID: CPT | Performed by: NURSE PRACTITIONER

## 2023-08-21 PROCEDURE — 86850 RBC ANTIBODY SCREEN: CPT

## 2023-08-21 PROCEDURE — 63600105 HC IODINE BASED CONTRAST: Performed by: EMERGENCY MEDICINE

## 2023-08-21 PROCEDURE — 63600000 HC DRUGS/DETAIL CODE

## 2023-08-21 PROCEDURE — 83735 ASSAY OF MAGNESIUM: CPT | Performed by: NURSE PRACTITIONER

## 2023-08-21 PROCEDURE — 84484 ASSAY OF TROPONIN QUANT: CPT | Mod: 91 | Performed by: EMERGENCY MEDICINE

## 2023-08-21 PROCEDURE — 21400000 HC ROOM AND CARE CCU/INTERMEDIATE

## 2023-08-21 PROCEDURE — 99222 1ST HOSP IP/OBS MODERATE 55: CPT | Performed by: SURGERY

## 2023-08-21 PROCEDURE — 80307 DRUG TEST PRSMV CHEM ANLYZR: CPT | Performed by: NURSE PRACTITIONER

## 2023-08-21 PROCEDURE — 85730 THROMBOPLASTIN TIME PARTIAL: CPT | Performed by: NURSE PRACTITIONER

## 2023-08-21 PROCEDURE — 85025 COMPLETE CBC W/AUTO DIFF WBC: CPT | Performed by: EMERGENCY MEDICINE

## 2023-08-21 PROCEDURE — 86900 BLOOD TYPING SEROLOGIC ABO: CPT

## 2023-08-21 RX ORDER — CETIRIZINE HYDROCHLORIDE 10 MG/1
10 TABLET ORAL NIGHTLY
Status: DISCONTINUED | OUTPATIENT
Start: 2023-08-21 | End: 2023-08-24 | Stop reason: HOSPADM

## 2023-08-21 RX ORDER — HYDRALAZINE HYDROCHLORIDE 25 MG/1
25 TABLET, FILM COATED ORAL EVERY 8 HOURS
Status: DISCONTINUED | OUTPATIENT
Start: 2023-08-21 | End: 2023-08-24 | Stop reason: HOSPADM

## 2023-08-21 RX ORDER — LORAZEPAM 1 MG/1
1 TABLET ORAL NIGHTLY PRN
Status: ON HOLD | COMMUNITY
End: 2023-10-24 | Stop reason: SDUPTHER

## 2023-08-21 RX ORDER — DOCUSATE SODIUM 100 MG/1
100 CAPSULE, LIQUID FILLED ORAL 2 TIMES DAILY
Status: DISCONTINUED | OUTPATIENT
Start: 2023-08-21 | End: 2023-08-24 | Stop reason: HOSPADM

## 2023-08-21 RX ORDER — LIDOCAINE 560 MG/1
2 PATCH PERCUTANEOUS; TOPICAL; TRANSDERMAL DAILY
Status: DISCONTINUED | OUTPATIENT
Start: 2023-08-21 | End: 2023-08-24 | Stop reason: HOSPADM

## 2023-08-21 RX ORDER — LORAZEPAM 1 MG/1
1 TABLET ORAL NIGHTLY PRN
Status: DISCONTINUED | OUTPATIENT
Start: 2023-08-21 | End: 2023-08-24 | Stop reason: HOSPADM

## 2023-08-21 RX ORDER — ACETAMINOPHEN 325 MG/1
975 TABLET ORAL EVERY 6 HOURS
Status: DISCONTINUED | OUTPATIENT
Start: 2023-08-21 | End: 2023-08-24 | Stop reason: HOSPADM

## 2023-08-21 RX ORDER — ONDANSETRON 4 MG/1
4 TABLET, ORALLY DISINTEGRATING ORAL EVERY 6 HOURS PRN
Status: DISCONTINUED | OUTPATIENT
Start: 2023-08-21 | End: 2023-08-24 | Stop reason: HOSPADM

## 2023-08-21 RX ORDER — DULOXETIN HYDROCHLORIDE 60 MG/1
60 CAPSULE, DELAYED RELEASE ORAL DAILY
Status: DISCONTINUED | OUTPATIENT
Start: 2023-08-22 | End: 2023-08-24 | Stop reason: HOSPADM

## 2023-08-21 RX ORDER — SENNOSIDES 8.6 MG/1
2 TABLET ORAL NIGHTLY
Status: DISCONTINUED | OUTPATIENT
Start: 2023-08-21 | End: 2023-08-24 | Stop reason: HOSPADM

## 2023-08-21 RX ORDER — DILTIAZEM HYDROCHLORIDE 300 MG/1
300 CAPSULE, EXTENDED RELEASE ORAL DAILY
Status: CANCELLED | OUTPATIENT
Start: 2023-08-22

## 2023-08-21 RX ORDER — DULOXETIN HYDROCHLORIDE 30 MG/1
60 CAPSULE, DELAYED RELEASE ORAL DAILY
Status: CANCELLED | OUTPATIENT
Start: 2023-08-22

## 2023-08-21 RX ORDER — PREGABALIN 25 MG/1
50 CAPSULE ORAL 2 TIMES DAILY
Status: DISCONTINUED | OUTPATIENT
Start: 2023-08-21 | End: 2023-08-24 | Stop reason: HOSPADM

## 2023-08-21 RX ORDER — TRAMADOL HYDROCHLORIDE 50 MG/1
50 TABLET ORAL EVERY 12 HOURS PRN
Status: DISCONTINUED | OUTPATIENT
Start: 2023-08-21 | End: 2023-08-22

## 2023-08-21 RX ORDER — KETOROLAC TROMETHAMINE 15 MG/ML
15 INJECTION, SOLUTION INTRAMUSCULAR; INTRAVENOUS ONCE
Status: COMPLETED | OUTPATIENT
Start: 2023-08-21 | End: 2023-08-21

## 2023-08-21 RX ORDER — POLYETHYLENE GLYCOL 3350 17 G/17G
17 POWDER, FOR SOLUTION ORAL DAILY
Status: DISCONTINUED | OUTPATIENT
Start: 2023-08-22 | End: 2023-08-24 | Stop reason: HOSPADM

## 2023-08-21 RX ORDER — DEXTROSE 40 %
15-30 GEL (GRAM) ORAL AS NEEDED
Status: DISCONTINUED | OUTPATIENT
Start: 2023-08-21 | End: 2023-08-22

## 2023-08-21 RX ORDER — ONDANSETRON HYDROCHLORIDE 2 MG/ML
4 INJECTION, SOLUTION INTRAVENOUS EVERY 6 HOURS PRN
Status: DISCONTINUED | OUTPATIENT
Start: 2023-08-21 | End: 2023-08-24 | Stop reason: HOSPADM

## 2023-08-21 RX ORDER — ALBUTEROL SULFATE 0.83 MG/ML
2.5 SOLUTION RESPIRATORY (INHALATION) EVERY 6 HOURS PRN
Status: CANCELLED | OUTPATIENT
Start: 2023-08-21

## 2023-08-21 RX ORDER — PREGABALIN 25 MG/1
25 CAPSULE ORAL
Status: DISCONTINUED | OUTPATIENT
Start: 2023-08-22 | End: 2023-08-24 | Stop reason: HOSPADM

## 2023-08-21 RX ORDER — LOSARTAN POTASSIUM 50 MG/1
50 TABLET ORAL 2 TIMES DAILY
Status: DISCONTINUED | OUTPATIENT
Start: 2023-08-21 | End: 2023-08-24 | Stop reason: HOSPADM

## 2023-08-21 RX ORDER — IBUPROFEN 200 MG
16-32 TABLET ORAL AS NEEDED
Status: DISCONTINUED | OUTPATIENT
Start: 2023-08-21 | End: 2023-08-22

## 2023-08-21 RX ORDER — PANTOPRAZOLE SODIUM 20 MG/1
20 TABLET, DELAYED RELEASE ORAL DAILY
Status: CANCELLED | OUTPATIENT
Start: 2023-08-22

## 2023-08-21 RX ORDER — DEXTROSE 50 % IN WATER (D50W) INTRAVENOUS SYRINGE
25 AS NEEDED
Status: DISCONTINUED | OUTPATIENT
Start: 2023-08-21 | End: 2023-08-24 | Stop reason: HOSPADM

## 2023-08-21 RX ADMIN — DOCUSATE SODIUM 100 MG: 100 CAPSULE, LIQUID FILLED ORAL at 22:09

## 2023-08-21 RX ADMIN — PREGABALIN 50 MG: 25 CAPSULE ORAL at 23:12

## 2023-08-21 RX ADMIN — SENNOSIDES 2 TABLET: 8.6 TABLET, FILM COATED ORAL at 22:09

## 2023-08-21 RX ADMIN — APIXABAN 2.5 MG: 2.5 TABLET, FILM COATED ORAL at 23:12

## 2023-08-21 RX ADMIN — HYDRALAZINE HYDROCHLORIDE 25 MG: 25 TABLET ORAL at 23:12

## 2023-08-21 RX ADMIN — IOHEXOL 85 ML: 350 INJECTION, SOLUTION INTRAVENOUS at 16:59

## 2023-08-21 RX ADMIN — ACETAMINOPHEN 975 MG: 325 TABLET, FILM COATED ORAL at 23:11

## 2023-08-21 RX ADMIN — CETIRIZINE HYDROCHLORIDE 10 MG: 10 TABLET, FILM COATED ORAL at 23:11

## 2023-08-21 RX ADMIN — LOSARTAN POTASSIUM 50 MG: 50 TABLET, FILM COATED ORAL at 23:11

## 2023-08-21 RX ADMIN — LIDOCAINE 2 PATCH: 4 PATCH TOPICAL at 20:09

## 2023-08-21 RX ADMIN — ACETAMINOPHEN 975 MG: 325 TABLET, FILM COATED ORAL at 18:06

## 2023-08-21 RX ADMIN — KETOROLAC TROMETHAMINE 15 MG: 15 INJECTION, SOLUTION INTRAMUSCULAR; INTRAVENOUS at 20:08

## 2023-08-21 RX ADMIN — TRAMADOL HYDROCHLORIDE 50 MG: 50 TABLET, COATED ORAL at 22:09

## 2023-08-21 ASSESSMENT — ENCOUNTER SYMPTOMS
HEADACHES: 0
ABDOMINAL PAIN: 0
FREQUENCY: 1
FACIAL ASYMMETRY: 0
BACK PAIN: 0

## 2023-08-21 NOTE — ED PROVIDER NOTES
Emergency Medicine Note  HPI   HISTORY OF PRESENT ILLNESS     89-year-old female presents ED with mechanical fall from standing.    No chest or abdominal pain.  No fevers chills sweats.    Patient unsure of why she fell.      Trauma  Mechanism of injury: fall     Current symptoms:       Associated symptoms:             Denies abdominal pain, chest pain and vomiting.         Patient History   PAST HISTORY     Reviewed from Nursing Triage:  Allergies       Past Medical History:   Diagnosis Date    Allergic bronchopulmonary aspergillosis (CMS/[facility]) 04/02/2018    Allergist: Dr. Arriaza.  Stable FEV1 in 8/2015.  IgE levels and eosinophils stable in 8/2015.  Managed with Fasenra, Azithromycin and Flovent.    Allergic rhinitis 04/02/2018    Pulmonologist: Dr. Walker. Managed with Flonase.    Asthma     Atrial flutter with rapid ventricular response (CMS/[facility]) 02/21/2023    Diagnosed in 02/2023 and hospitalized Managed with Apixaban and Diltiazem Echocardiogram 02/2023   Left Ventricle: Normal ventricle size. Mild concentric left ventricular hypertrophy. Preserved systolic function. Estimated EF 65-70%. No regional wall motion abnormalities. Grade I diastolic dysfunction.   Right Ventricle: Normal ventricle size. Normal systolic function.   Left Atrium: Moderately    Calcification of aorta (CMS/[facility]) 06/29/2020    Seen incidentally on CT scan.    Chronic obstructive pulmonary disease (CMS/[facility]) 04/02/2018    Pulmonologist: Dr. Walker.  Seen on PFTs in hospital in 2014.  Controlled with Spiriva and FLovent.    GERD (gastroesophageal reflux disease) 06/08/2019    Managed with Esomeprazole.  Should take medication 30 minutes prior to meal.  Advised to avoid caffeine, carbonated beverages, and should not eat within 3 hours of going to sleep.  Advised to reduce BMI < 25.     Insomnia 04/02/2018    Managed with Lorazepam as needed.    Lumbar spinal stenosis 04/02/2018    Neurosurgeon: Dr. Miguel. MRI with Central and  lateral recess spinal stenosis. Has Spondylolisthesis at L4/L5. S/P epidural injection by Dr. Martin with some relief in past. Had Lumbar fusion and Lumbar laminectomy by Dr. Miguel in 4/2015.    Macular degeneration disease     Obstructive sleep apnea syndrome 04/02/2018    Mild STACIA noted in sleep study 7/2015. Treated with nasal CPAP automatic with pressure range 5-10 CM H2Ox couple months. Off now    Osteoporosis 04/02/2018    Rheumatologist: Dr. Bradley.  Dexa scan 7/2016 with osteoporosis of lumbar spine LS-2.5, LH-2.1, RH -2.2.  Has been on Fosamax in the past for 8 years.  Worsened by Prednisone in past.  Continue vitamin D, calcium and weight bearing exercise. DEXA in 7/2017 with LS -1.7, LH -2.2, and RH -2.0.  Next due in 7/2019.  Seen by Dr. Bradley in 8/2015 who agreed with initiating Prolia. Took for 2 years. N    Primary hypertension 04/02/2018    Managed on Diltiazem and Losartan. Advised to follow a low sodium diet and keep BMI < 25.  Advised to exercise for 2.5 hours per week.  Instructed to take home blood pressure readings and call if consistently above 140/90.      Pulmonary embolism (CMS/HCC) 06/09/2019    Hematologist: Dr. Bhakta Diagnosed in 06/2019 in right upper lobe. Vascular ultrasound negative of bilateral lower extremities. Managed on Eliquis. Hypercoagulable workup was negative for beta-2 glycoprotein's, RIGO, Antithrombin III, lupus anticoagulant, protein C activity protein S activity, and prothrombin gene mutation, and factor V Leiden. Now off Apixaban as CT scan in 12/2019 was without pu    SVT (supraventricular tachycardia) (CMS/HCC)     Vertebral compression fracture (CMS/HCC)     At T12 level       Past Surgical History:   Procedure Laterality Date    APPENDECTOMY      CARPAL TUNNEL RELEASE Bilateral     CATARACT EXTRACTION W/  INTRAOCULAR LENS IMPLANT Bilateral     HYSTERECTOMY  1972    LUMBAR LAMINECTOMY  04/21/2015    S/P lumbar fusion    TONSILLECTOMY          Family History   Problem Relation Age of Onset    Glaucoma Biological Mother     Macular degeneration Biological Mother     Hypertension Biological Mother     Lung cancer Biological Father     Heart disease Biological Brother     Breast cancer Mother's Sister     No Known Problems Biological Son     No Known Problems Biological Son     No Known Problems Biological Daughter     No Known Problems Biological Daughter     No Known Problems Biological Daughter        Social History     Tobacco Use    Smoking status: Never    Smokeless tobacco: Never   Vaping Use    Vaping Use: Never used   Substance Use Topics    Alcohol use: Yes     Comment: Rarely    Drug use: No         Review of Systems   REVIEW OF SYSTEMS     Review of Systems   Constitutional: Negative for fever.   Respiratory: Negative for cough and shortness of breath.    Cardiovascular: Negative for chest pain.   Gastrointestinal: Negative for abdominal pain, diarrhea and vomiting.   Skin: Negative for color change and rash.   Neurological: Negative for weakness and numbness.   All other systems reviewed and are negative.        VITALS     ED Vitals    Date/Time Temp Pulse Resp BP SpO2 [facility]   08/21/23 1900 -- 106 18 160/92 94 % MCT   08/21/23 1601 -- 91 19 172/91 99 % MCT   08/21/23 1500 -- 92 17 163/77 99 % MCT   08/21/23 1402 36.6 °C (97.9 °F) 106 18 155/91 100 % [facility]        Pulse Ox %: 98 % (08/21/23 1839)  Pulse Ox Interpretation: Normal (08/21/23 1839)  Heart Rate: 105 (08/21/23 1839)  Rhythm Strip Interpretation: Atrial Fibrillation (08/21/23 1839)     Physical Exam   PHYSICAL EXAM     Physical Exam  HENT:      Head: Atraumatic.      Nose: Nose normal.      Mouth/Throat:      Mouth: Mucous membranes are dry.   Eyes:      Pupils: Pupils are equal, round, and reactive to light.   Cardiovascular:      Rate and Rhythm: Normal rate.      Pulses: Normal pulses.   Pulmonary:      Effort: Pulmonary effort is normal.   Abdominal:      General:  Abdomen is flat.   Musculoskeletal:         General: No swelling.   Skin:     General: Skin is warm.   Neurological:      General: No focal deficit present.      Mental Status: She is alert.           PROCEDURES     Procedures     DATA     Results     Procedure Component Value Units Date/Time    Lactic acid, Venous [[id number]]  (Normal) Collected: 08/21/23 1857    Specimen: Blood, Venous Updated: 08/21/23 1912     Lactate 1.3 mmol/L     Protime-INR [[id number]] Collected: 08/21/23 1857    Specimen: Blood, Venous Updated: 08/21/23 1908    PTT [[id number]] Collected: 08/21/23 1857    Specimen: Blood, Venous Updated: 08/21/23 1908    Type and Screen [facility] [[id number]] Collected: 08/21/23 1857    Specimen: Blood, Venous Updated: 08/21/23 1907    Drug screen panel, urine [[id number]] Collected: 08/21/23 1858    Specimen: Urine, Clean Catch Updated: 08/21/23 1907    Magnesium [[id number]] Collected: 08/21/23 1439    Specimen: Blood, Venous Updated: 08/21/23 1903    HS Troponin (with 2 hour reflex) [[id number]]  (Normal) Collected: 08/21/23 1600    Specimen: Blood, Venous Updated: 08/21/23 1648     High Sens Troponin I 8.7 pg/mL     CK, Total [[id number]]  (Normal) Collected: 08/21/23 1439    Specimen: Blood, Venous Updated: 08/21/23 1614     Total CK 48 U/L     Urinalysis with Reflex Culture [[id number]]  (Normal) Collected: 08/21/23 1512    Specimen: Urine, Clean Catch Updated: 08/21/23 1526    Narrative:      The following orders were created for panel order Urinalysis with Reflex Culture.  Procedure                               Abnormality         Status                     ---------                               -----------         ------                     UA Reflex to Culture (Ma...[[id number]]  Normal              Final result                 Please view results for these tests on the individual orders.    UA Reflex to Culture (Macroscopic) [[id number]]  (Normal) Collected: 08/21/23 1512    Specimen: Urine, Clean Catch  Updated: 08/21/23 1526     Color, Urine Colorless     Clarity, Urine Clear     Specific Gravity, Urine 1.008     pH, Urine 6.5     Leukocyte Esterase Negative     Comment: Results can be falsely negative due to high specific gravity, some antibiotics, glucose >3 g/dl, or WBC other than neutrophils.        Nitrite, Urine Negative     Protein, Urine Negative     Glucose, Urine Negative mg/dL      Ketones, Urine Negative mg/dL      Urobilinogen, Urine 0.2 EU/dL      Bilirubin, Urine Negative mg/dL      Blood, Urine Negative     Comment: The sensitivity of the occult blood test is equivalent to approximately 4 intact RBC/HPF.       Comprehensive metabolic panel [[id number]]  (Abnormal) Collected: 08/21/23 1439    Specimen: Blood, Venous Updated: 08/21/23 1509     Sodium 137 mEQ/L      Potassium 3.7 mEQ/L      Comment: Slight hemolysis result maybe affected.    Results obtained on plasma. Plasma Potassium values may be up to 0.4 mEQ/L less than serum values. The differences may be greater for patients with high platelet or white cell counts.        Chloride 104 mEQ/L      CO2 24 mEQ/L      BUN 12 mg/dL      Creatinine 0.5 mg/dL      Glucose 93 mg/dL      Calcium 8.4 mg/dL      AST (SGOT) 15 IU/L      ALT (SGPT) 14 IU/L      Alkaline Phosphatase 95 IU/L      Total Protein 6.2 g/dL      Comment: Test performed on plasma which typically contains approximately 0.4 g/dL more protein than serum.        Albumin 3.7 g/dL      Bilirubin, Total 0.5 mg/dL      eGFR >60.0 mL/min/1.73m*2      Anion Gap 9 mEQ/L     CBC and differential [[id number]]  (Abnormal) Collected: 08/21/23 1439    Specimen: Blood, Venous Updated: 08/21/23 1448     WBC 6.32 K/uL      RBC 3.55 M/uL      Hemoglobin 11.2 g/dL      Hematocrit 35.2 %      MCV 99.2 fL      MCH 31.5 pg      MCHC 31.8 g/dL      RDW 13.7 %      Platelets 368 K/uL      MPV 10.3 fL      Differential Type Auto     nRBC 0.0 %      Immature Granulocytes 0.8 %      Neutrophils 71.6 %       Lymphocytes 17.9 %      Monocytes 9.5 %      Eosinophils 0.0 %      Basophils 0.2 %      Immature Granulocytes, Absolute 0.05 K/uL      Neutrophils, Absolute 4.53 K/uL      Lymphocytes, Absolute 1.13 K/uL      Monocytes, Absolute 0.60 K/uL      Eosinophils, Absolute 0.00 K/uL      Basophils, Absolute 0.01 K/uL           Imaging Results          X-RAY HIP WITH OR WITHOUT PELVIS 2-3 VW RIGHT (Final result)  Result time 08/21/23 18:20:40   Procedure changed from X-RAY HIP WITH OR WITHOUT PELVIS 4+ VW RIGHT     Final result                 Impression:    IMPRESSION:  No fracture or dislocation of right hip.             Narrative:    CLINICAL HISTORY: Right hip pain.    COMMENT: AP radiograph of the pelvis and AP and lateral views of the right hip  are obtained.    COMPARISON: None.    There is normal osseous mineralization.  No evidence for focal bone lesion.  No fracture or dislocation.  Degenerative changes are present in the hips bilaterally. No ectopic  calcification about the right pubic bone likely related to remote fracture.                               CT ABDOMEN PELVIS WITH IV CONTRAST (Final result)  Result time 08/21/23 17:26:23    Final result                 Impression:    IMPRESSION:  There is presacral edema and bilateral nondisplaced sacral fractures.  No evidence for solid organ injury in the chest, abdomen, and pelvis.    COMMENT:    Technique: CT examination of the chest, abdomen, and pelvis was performed  utilizing contiguous 2.5 mm transaxial sections. Images were acquired following  the administration of 85 cc Omnipaque 350 intravenous contrast.  Oral contrast  was not administered.    Coronal and sagittal reformations are obtained.    CT DOSE:  One or more dose reduction techniques (e.g. automated exposure  control, adjustment of the mA and/or kV according to patient size, use of  iterative reconstruction technique) utilized for this examination    Comparison studies: CT abdomen pelvis  8/10/2017.    Heart and pericardium: The heart is normal in size.  There is no pericardial  effusion.    Great vessels: There is no evidence for thoracic aortic aneurysm.  The main  pulmonary artery is normal in caliber.  No large or central pulmonary embolism.    Mediastinum: No mediastinal adenopathy    Malka: No hilar adenopathy.    Axilla: No axillary adenopathy.    Lung parenchyma: No parenchymal consolidation. Bibasilar subpleural  scar/atelectasis. Right apical pleural based scarring.    Pleura: See under lung parenchyma. No effusion or pneumothorax.    Liver:  The liver is normal in size. Cyst in the right lobe of the liver  measures 4.0 x 5.1 cm. No evidence for hepatic or portal vein thrombus..    Gallbladder:  No gallstones.  No gallbladder wall thickening..    Spleen: Spleen is normal in size without focal mass lesion..    Pancreas: The pancreas is normal without focal mass, parenchymal atrophy, or  pancreatic duct dilation..    Adrenals: The adrenals are normal bilaterally without focal mass..    Kidneys, ureters and bladder: No hydronephrosis.  No renal calculi.  Symmetric  enhancement and excretion.  No evidence for focal mass lesion.  The bladder is  normal..    Retroperitoneal structures: There is no abdominal aortic aneurysm. There is  atherosclerotic calcification. There is no retroperitoneal adenopathy or  retroperitoneal mass..    Bowel and mesentery: No evidence of a focal inflammatory or obstructive process.    Lymph nodes: No pathologic lymphadenopathy..    Pelvis: There is presacral edema. Bilateral nondisplaced sacral fractures. No  fracture or dislocation of the hip. Heterotopic calcification about the right  pubic bone and right inferior pubic ramus may be related to remote injury.  Status post laminectomy and fusion L4-L5. Superior endplate deformity of T12  also present on 2017 CT scan.    Bones: Within normal limits.             Narrative:    CLINICAL HISTORY: Flank pain following fall.  Patient is on eliquis                               CT CHEST WITH IV CONTRAST (Final result)  Result time 08/21/23 17:26:23    Final result                 Impression:    IMPRESSION:  There is presacral edema and bilateral nondisplaced sacral fractures.  No evidence for solid organ injury in the chest, abdomen, and pelvis.    COMMENT:    Technique: CT examination of the chest, abdomen, and pelvis was performed  utilizing contiguous 2.5 mm transaxial sections. Images were acquired following  the administration of 85 cc Omnipaque 350 intravenous contrast.  Oral contrast  was not administered.    Coronal and sagittal reformations are obtained.    CT DOSE:  One or more dose reduction techniques (e.g. automated exposure  control, adjustment of the mA and/or kV according to patient size, use of  iterative reconstruction technique) utilized for this examination    Comparison studies: CT abdomen pelvis 8/10/2017.    Heart and pericardium: The heart is normal in size.  There is no pericardial  effusion.    Great vessels: There is no evidence for thoracic aortic aneurysm.  The main  pulmonary artery is normal in caliber.  No large or central pulmonary embolism.    Mediastinum: No mediastinal adenopathy    Malka: No hilar adenopathy.    Axilla: No axillary adenopathy.    Lung parenchyma: No parenchymal consolidation. Bibasilar subpleural  scar/atelectasis. Right apical pleural based scarring.    Pleura: See under lung parenchyma. No effusion or pneumothorax.    Liver:  The liver is normal in size. Cyst in the right lobe of the liver  measures 4.0 x 5.1 cm. No evidence for hepatic or portal vein thrombus..    Gallbladder:  No gallstones.  No gallbladder wall thickening..    Spleen: Spleen is normal in size without focal mass lesion..    Pancreas: The pancreas is normal without focal mass, parenchymal atrophy, or  pancreatic duct dilation..    Adrenals: The adrenals are normal bilaterally without focal mass..    Kidneys, ureters  and bladder: No hydronephrosis.  No renal calculi.  Symmetric  enhancement and excretion.  No evidence for focal mass lesion.  The bladder is  normal..    Retroperitoneal structures: There is no abdominal aortic aneurysm. There is  atherosclerotic calcification. There is no retroperitoneal adenopathy or  retroperitoneal mass..    Bowel and mesentery: No evidence of a focal inflammatory or obstructive process.    Lymph nodes: No pathologic lymphadenopathy..    Pelvis: There is presacral edema. Bilateral nondisplaced sacral fractures. No  fracture or dislocation of the hip. Heterotopic calcification about the right  pubic bone and right inferior pubic ramus may be related to remote injury.  Status post laminectomy and fusion L4-L5. Superior endplate deformity of T12  also present on 2017 CT scan.    Bones: Within normal limits.             Narrative:    CLINICAL HISTORY: Flank pain following fall. Patient is on eliquis                               CT CERVICAL SPINE WITHOUT IV CONTRAST (Final result)  Result time 08/21/23 15:17:32    Final result                 Impression:    IMPRESSION:  No acute intracranial abnormality.  No evidence for acute fracture  or traumatic subluxation of the cervical spine.  Left sphenoid and maxillary  sinus disease with hyperdense material likely related to inspissated secretions  or fungal sinusitis.  Degenerative spondylosis of the cervical spine with  predominantly multilevel right-sided foraminal narrowing.    COMMENT: A standard noncontrast head CT was performed. Noncontrast CT  examination of the cervical spine performed following the standard protocol.  Sagittal and coronal reformations rendered from axial source images. Images  reviewed in bone and soft tissue windows.      CT DOSE:  One or more dose reduction techniques (e.g. automated exposure  control, adjustment of the mA and/or kV according to patient size, use of  iterative reconstruction technique) utilized for this  examination.    Comparison:  Head CT dated 2/20/2023.    Findings:  Sulci, ventricles and basal cisterns are within normal limits for  patient's age.  Periventricular and subcortical white matter hypoattenuation,  nonspecific, likely sequela of microangiopathic disease.  Old lacunar infarct  versus perivascular space in the right inferior basal ganglia.  No acute  hemorrhage, acute territorial infarct, or mass effect is seen.  There is no  extra-axial fluid collection.  The visualized paranasal sinuses demonstrates  complete opacification of the left maxillary sinus and left sphenoid sinus with  mucoperiosteal thickening of the sinus walls.   Mastoid air cells are clear.  Bilateral ocular lens implants.      Cervicothoracic alignment: Anatomic.  Prevertebral soft tissues: Normal in thickness.  Vertebral bodies: Normal in height.  No acute fractures.  Intervertebral discs: Disc osteophyte complex at C5-C6 and C2-C3.  Cervical and upper thoracic spinal canal: Patent.    Axial images:  Skull base: Normal.  C1-2: Normal.  C2-3: Disc osteophyte complex at C2-C3 with severe left facet arthrosis and  mild uncovertebral hypertrophic changes bilaterally, contributing to mild left  foraminal narrowing and mild flattening the ventral thecal sac.  C3-4: Exuberant right facet hypertrophy, contributing to severe right  foraminal narrowing.  C4-5: Exuberant right facet hypertrophy, moderate on the left, and moderate  right uncovertebral hypertrophic changes, contributing to moderate to severe  right and moderate left foraminal narrowing.  C5-6: Disc osteophyte complex with exuberant right and moderate left facet  hypertrophy, right uncovertebral hypertrophic changes, contributing to moderate  to severe right foraminal narrowing.  C6-7: Disc bulge accentuated by uncovering of intervertebral disc, severe  right and moderate left facet hypertrophy, contributing to moderate right  foraminal narrowing  C7-T1: Disc osteophyte complex  and facet arthrosis, contributing to mild  flattening the ventral thecal sac and mild right foraminal narrowing.      Other: Biapical pleural-parenchymal scarring.               Narrative:    STUDY:  CT of the Brain and cervical spine without contrast    CLINICAL HISTORY: Trauma                               CT HEAD WITHOUT IV CONTRAST (Final result)  Result time 08/21/23 15:17:32    Final result                 Impression:    IMPRESSION:  No acute intracranial abnormality.  No evidence for acute fracture  or traumatic subluxation of the cervical spine.  Left sphenoid and maxillary  sinus disease with hyperdense material likely related to inspissated secretions  or fungal sinusitis.  Degenerative spondylosis of the cervical spine with  predominantly multilevel right-sided foraminal narrowing.    COMMENT: A standard noncontrast head CT was performed. Noncontrast CT  examination of the cervical spine performed following the standard protocol.  Sagittal and coronal reformations rendered from axial source images. Images  reviewed in bone and soft tissue windows.      CT DOSE:  One or more dose reduction techniques (e.g. automated exposure  control, adjustment of the mA and/or kV according to patient size, use of  iterative reconstruction technique) utilized for this examination.    Comparison:  Head CT dated 2/20/2023.    Findings:  Sulci, ventricles and basal cisterns are within normal limits for  patient's age.  Periventricular and subcortical white matter hypoattenuation,  nonspecific, likely sequela of microangiopathic disease.  Old lacunar infarct  versus perivascular space in the right inferior basal ganglia.  No acute  hemorrhage, acute territorial infarct, or mass effect is seen.  There is no  extra-axial fluid collection.  The visualized paranasal sinuses demonstrates  complete opacification of the left maxillary sinus and left sphenoid sinus with  mucoperiosteal thickening of the sinus walls.   Mastoid air  cells are clear.  Bilateral ocular lens implants.      Cervicothoracic alignment: Anatomic.  Prevertebral soft tissues: Normal in thickness.  Vertebral bodies: Normal in height.  No acute fractures.  Intervertebral discs: Disc osteophyte complex at C5-C6 and C2-C3.  Cervical and upper thoracic spinal canal: Patent.    Axial images:  Skull base: Normal.  C1-2: Normal.  C2-3: Disc osteophyte complex at C2-C3 with severe left facet arthrosis and  mild uncovertebral hypertrophic changes bilaterally, contributing to mild left  foraminal narrowing and mild flattening the ventral thecal sac.  C3-4: Exuberant right facet hypertrophy, contributing to severe right  foraminal narrowing.  C4-5: Exuberant right facet hypertrophy, moderate on the left, and moderate  right uncovertebral hypertrophic changes, contributing to moderate to severe  right and moderate left foraminal narrowing.  C5-6: Disc osteophyte complex with exuberant right and moderate left facet  hypertrophy, right uncovertebral hypertrophic changes, contributing to moderate  to severe right foraminal narrowing.  C6-7: Disc bulge accentuated by uncovering of intervertebral disc, severe  right and moderate left facet hypertrophy, contributing to moderate right  foraminal narrowing  C7-T1: Disc osteophyte complex and facet arthrosis, contributing to mild  flattening the ventral thecal sac and mild right foraminal narrowing.      Other: Biapical pleural-parenchymal scarring.               Narrative:    STUDY:  CT of the Brain and cervical spine without contrast    CLINICAL HISTORY: Trauma                                ECG 12 lead          Scoring tools                                  ED Course & MDM   MDM / ED COURSE / CLINICAL IMPRESSION / DISPO     Medical Decision Making  Patient anticoagulated.  Multiple pelvic fractures.  Will admit.          Closed fracture of sacrum, unspecified portion of sacrum, initial encounter (CMS/[facility]): acute illness or injury  Fall,  initial encounter: acute illness or injury  Amount and/or Complexity of Data Reviewed  Labs: ordered.  Radiology: ordered.  ECG/medicine tests: ordered.      Risk  Prescription drug management.  Decision regarding hospitalization.        Critical Care  Performed by: Scooter Bush MD  Authorized by: Scooter Bush MD    Critical care provider statement:     Critical care time (minutes):  35    Critical care time was exclusive of:  Separately billable procedures and treating other patients    Critical care was time spent personally by me on the following activities:  Development of treatment plan with patient or surrogate, , evaluation of patient's response to treatment, examination of patient, review of old charts, re-evaluation of patient's condition, pulse oximetry, ordering and review of radiographic studies, ordering and review of laboratory studies and ordering and performing treatments and interventions       Clinical Impression      Fall, initial encounter   Closed fracture of sacrum, unspecified portion of sacrum, initial encounter (CMS/[facility])               Scooter Bush MD  08/30/23 0433

## 2023-08-21 NOTE — TELEPHONE ENCOUNTER
Triage call  Nandini sent message received from Svetlana (copied/ pasted below):    Pt's daughter Shelley, called. Pt's  mother fell again on Saturday, Aug 19 th. Pt had fallen on July 10 th and had X-ray, but they were negative for any broken bones.  Pt has been going to the bathroom every hour during the night and not so much during the day. It seems the going to the bathroom every hour started after this last fall on Saturday. Her blood pressure is fine. Shelley is not sure what to do. She said she does not want to take her to the hospital because she does not think there is anything they can do for her there. She is asking for a phone call back.     Called and spoke with Dtr, Shelley Raya (on Office [employer]) who reported that t had a fall on Saturday, in her bedroom, after feeling dizzy; pt did hit the back of her head and has a lump there, but no open area and also has bruise and pain of right thigh from fall; pt also has been urinating a lot, like every hour, but no pain with urination, or urgency and urine is pale yellow with no foul odor; pt has been very dizzy for quite some time and uses walker with assistance to get to bathroom and has had significant generalized weakness; denied any: headache, lightheadedness, visual or speech changes, palpitations, fever, chills, chest pain, shortness of breath, nausea, vomiting, abdominal pain, or fatigue; the daughter also stated that the pt had 2 falls in July and saw Dr Jackson and Dr Linares after that; pt also has been having a lot of swelling in lower extremities, so has been using compression stockings, and legs not very swollen today. Due to hitting her head during her fall, significant dizziness and weakness, urinary issues, etc, I instructed the daughter to take the pt to the ED for evaluation, which she will do.    Routed to Dr Joseph (as Dr Linares is not available) as an FYI and for any further advice/ instructions.

## 2023-08-21 NOTE — ED PROVIDER NOTES
Emergency Medicine Note  HPI   HISTORY OF PRESENT ILLNESS     The patient presents with complaints of back/hip pain and head injury.   The patient has a history of atrial flutter on eliquis.   The patient denies any fevers or chills.  No chest pain or shortness of breath. No LOC.  The patient has continued low back and hip pains.  Pain mostly along right low back and hip.  Pain worsens with movement and palpation.  No fevers or chills.   No headache.  No focal weakness or numbness.            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 Arriaza.  Stable FEV1 in 8/2015.  IgE levels and eosinophils stable in 8/2015.  Managed with Fasenra, Azithromycin and Flovent.    Allergic rhinitis 04/02/2018    Pulmonologist: Dr. Walker. Managed with Flonase.    Asthma     Atrial flutter with rapid ventricular response (CMS/HCC) 02/21/2023    Diagnosed in 02/2023 and hospitalized Managed with Apixaban and Diltiazem Echocardiogram 02/2023   Left Ventricle: Normal ventricle size. Mild concentric left ventricular hypertrophy. Preserved systolic function. Estimated EF 65-70%. No regional wall motion abnormalities. Grade I diastolic dysfunction.   Right Ventricle: Normal ventricle size. Normal systolic function.   Left Atrium: Moderately    Calcification of aorta (CMS/HCC) 06/29/2020    Seen incidentally on CT scan.    Chronic obstructive pulmonary disease (CMS/HCC) 04/02/2018    Pulmonologist: Dr. Walker.  Seen on PFTs in hospital in 2014.  Controlled with Spiriva and FLovent.    GERD (gastroesophageal reflux disease) 06/08/2019    Managed with Esomeprazole.  Should take medication 30 minutes prior to meal.  Advised to avoid caffeine, carbonated beverages, and should not eat within 3 hours of going to sleep.  Advised to reduce BMI < 25.     Insomnia 04/02/2018     Managed with Lorazepam as needed.    Lumbar spinal stenosis 04/02/2018    Neurosurgeon: Dr. Miguel. MRI with Central and lateral recess spinal stenosis. Has Spondylolisthesis at L4/L5. S/P epidural injection by Dr. Martin with some relief in past. Had Lumbar fusion and Lumbar laminectomy by Dr. Miguel in 4/2015.    Macular degeneration disease     Obstructive sleep apnea syndrome 04/02/2018    Mild STACIA noted in sleep study 7/2015. Treated with nasal CPAP automatic with pressure range 5-10 CM H2Ox couple months. Off now    Osteoporosis 04/02/2018    Rheumatologist: Dr. Bradley.  Dexa scan 7/2016 with osteoporosis of lumbar spine LS-2.5, LH-2.1, RH -2.2.  Has been on Fosamax in the past for 8 years.  Worsened by Prednisone in past.  Continue vitamin D, calcium and weight bearing exercise. DEXA in 7/2017 with LS -1.7, LH -2.2, and RH -2.0.  Next due in 7/2019.  Seen by Dr. Bradley in 8/2015 who agreed with initiating Prolia. Took for 2 years. N    Primary hypertension 04/02/2018    Managed on Diltiazem and Losartan. Advised to follow a low sodium diet and keep BMI < 25.  Advised to exercise for 2.5 hours per week.  Instructed to take home blood pressure readings and call if consistently above 140/90.      Pulmonary embolism (CMS/HCC) 06/09/2019    Hematologist: Dr. Bhakta Diagnosed in 06/2019 in right upper lobe. Vascular ultrasound negative of bilateral lower extremities. Managed on Eliquis. Hypercoagulable workup was negative for beta-2 glycoprotein's, RIGO, Antithrombin III, lupus anticoagulant, protein C activity protein S activity, and prothrombin gene mutation, and factor V Leiden. Now off Apixaban as CT scan in 12/2019 was without pu    SVT (supraventricular tachycardia) (CMS/HCC)     Vertebral compression fracture (CMS/HCC)     At T12 level       Past Surgical History:   Procedure Laterality Date    APPENDECTOMY      CARPAL TUNNEL RELEASE Bilateral     CATARACT EXTRACTION W/  INTRAOCULAR LENS IMPLANT  Bilateral     HYSTERECTOMY  1972    LUMBAR LAMINECTOMY  04/21/2015    S/P lumbar fusion    TONSILLECTOMY         Family History   Problem Relation Age of Onset    Glaucoma Biological Mother     Macular degeneration Biological Mother     Hypertension Biological Mother     Lung cancer Biological Father     Heart disease Biological Brother     Breast cancer Mother's Sister     No Known Problems Biological Son     No Known Problems Biological Son     No Known Problems Biological Daughter     No Known Problems Biological Daughter     No Known Problems Biological Daughter        Social History     Tobacco Use    Smoking status: Never    Smokeless tobacco: Never   Vaping Use    Vaping Use: Never used   Substance Use Topics    Alcohol use: Yes     Comment: Rarely    Drug use: No         Review of Systems   REVIEW OF SYSTEMS     Review of Systems   Cardiovascular: Negative for chest pain.   Gastrointestinal: Negative for abdominal pain.   Genitourinary: Positive for frequency and urgency.   Musculoskeletal: Negative for back pain.   Neurological: Negative for facial asymmetry and headaches.         VITALS     ED Vitals    Date/Time Temp Pulse Resp BP SpO2 [facility]   08/21/23 1601 -- 91 19 172/91 99 % MCT   08/21/23 1500 -- 92 17 163/77 99 % MCT   08/21/23 1402 36.6 °C (97.9 °F) 106 18 155/91 100 % MCT                       Physical Exam   PHYSICAL EXAM     Physical Exam  HENT:      Head: Normocephalic.      Right Ear: Tympanic membrane normal.      Left Ear: Tympanic membrane normal.      Nose: Nose normal.      Mouth/Throat:      Mouth: Mucous membranes are dry.   Eyes:      Pupils: Pupils are equal, round, and reactive to light.   Cardiovascular:      Rate and Rhythm: Tachycardia present. Rhythm irregular.      Pulses: Normal pulses.   Pulmonary:      Effort: Pulmonary effort is normal.      Breath sounds: Normal breath sounds.   Abdominal:      General: Abdomen is flat.      Palpations: Abdomen is soft.    Musculoskeletal:      Comments: No upper extremity pain.  Pain in bilateral hips with ROM, R worse and L with TTP along right hip.  Normal cap refill and sensation and motor intact distally.   Neurological:      Mental Status: She is alert.           PROCEDURES     Procedures     DATA     Results     Procedure Component Value Units Date/Time    HS Troponin (with 2 hour reflex) [[id number]]  (Normal) Collected: 08/21/23 1600    Specimen: Blood, Venous Updated: 08/21/23 1648     High Sens Troponin I 8.7 pg/mL     CK, Total [[id number]]  (Normal) Collected: 08/21/23 1439    Specimen: Blood, Venous Updated: 08/21/23 1614     Total CK 48 U/L     Urinalysis with Reflex Culture [[id number]]  (Normal) Collected: 08/21/23 1512    Specimen: Urine, Clean Catch Updated: 08/21/23 1526    Narrative:      The following orders were created for panel order Urinalysis with Reflex Culture.  Procedure                               Abnormality         Status                     ---------                               -----------         ------                     UA Reflex to Culture (Ma...[[id number]]  Normal              Final result                 Please view results for these tests on the individual orders.    UA Reflex to Culture (Macroscopic) [[id number]]  (Normal) Collected: 08/21/23 1512    Specimen: Urine, Clean Catch Updated: 08/21/23 1526     Color, Urine Colorless     Clarity, Urine Clear     Specific Gravity, Urine 1.008     pH, Urine 6.5     Leukocyte Esterase Negative     Comment: Results can be falsely negative due to high specific gravity, some antibiotics, glucose >3 g/dl, or WBC other than neutrophils.        Nitrite, Urine Negative     Protein, Urine Negative     Glucose, Urine Negative mg/dL      Ketones, Urine Negative mg/dL      Urobilinogen, Urine 0.2 EU/dL      Bilirubin, Urine Negative mg/dL      Blood, Urine Negative     Comment: The sensitivity of the occult blood test is equivalent to approximately 4 intact  RBC/HPF.       Comprehensive metabolic panel [[id number]]  (Abnormal) Collected: 08/21/23 1439    Specimen: Blood, Venous Updated: 08/21/23 1509     Sodium 137 mEQ/L      Potassium 3.7 mEQ/L      Comment: Slight hemolysis result maybe affected.    Results obtained on plasma. Plasma Potassium values may be up to 0.4 mEQ/L less than serum values. The differences may be greater for patients with high platelet or white cell counts.        Chloride 104 mEQ/L      CO2 24 mEQ/L      BUN 12 mg/dL      Creatinine 0.5 mg/dL      Glucose 93 mg/dL      Calcium 8.4 mg/dL      AST (SGOT) 15 IU/L      ALT (SGPT) 14 IU/L      Alkaline Phosphatase 95 IU/L      Total Protein 6.2 g/dL      Comment: Test performed on plasma which typically contains approximately 0.4 g/dL more protein than serum.        Albumin 3.7 g/dL      Bilirubin, Total 0.5 mg/dL      eGFR >60.0 mL/min/1.73m*2      Anion Gap 9 mEQ/L     CBC and differential [[id number]]  (Abnormal) Collected: 08/21/23 1439    Specimen: Blood, Venous Updated: 08/21/23 1448     WBC 6.32 K/uL      RBC 3.55 M/uL      Hemoglobin 11.2 g/dL      Hematocrit 35.2 %      MCV 99.2 fL      MCH 31.5 pg      MCHC 31.8 g/dL      RDW 13.7 %      Platelets 368 K/uL      MPV 10.3 fL      Differential Type Auto     nRBC 0.0 %      Immature Granulocytes 0.8 %      Neutrophils 71.6 %      Lymphocytes 17.9 %      Monocytes 9.5 %      Eosinophils 0.0 %      Basophils 0.2 %      Immature Granulocytes, Absolute 0.05 K/uL      Neutrophils, Absolute 4.53 K/uL      Lymphocytes, Absolute 1.13 K/uL      Monocytes, Absolute 0.60 K/uL      Eosinophils, Absolute 0.00 K/uL      Basophils, Absolute 0.01 K/uL           Imaging Results          CT ABDOMEN PELVIS WITH IV CONTRAST (Final result)  Result time 08/21/23 17:26:23    Final result                 Impression:    IMPRESSION:  There is presacral edema and bilateral nondisplaced sacral fractures.  No evidence for solid organ injury in the chest, abdomen, and  pelvis.    COMMENT:    Technique: CT examination of the chest, abdomen, and pelvis was performed  utilizing contiguous 2.5 mm transaxial sections. Images were acquired following  the administration of 85 cc Omnipaque 350 intravenous contrast.  Oral contrast  was not administered.    Coronal and sagittal reformations are obtained.    CT DOSE:  One or more dose reduction techniques (e.g. automated exposure  control, adjustment of the mA and/or kV according to patient size, use of  iterative reconstruction technique) utilized for this examination    Comparison studies: CT abdomen pelvis 8/10/2017.    Heart and pericardium: The heart is normal in size.  There is no pericardial  effusion.    Great vessels: There is no evidence for thoracic aortic aneurysm.  The main  pulmonary artery is normal in caliber.  No large or central pulmonary embolism.    Mediastinum: No mediastinal adenopathy    Malka: No hilar adenopathy.    Axilla: No axillary adenopathy.    Lung parenchyma: No parenchymal consolidation. Bibasilar subpleural  scar/atelectasis. Right apical pleural based scarring.    Pleura: See under lung parenchyma. No effusion or pneumothorax.    Liver:  The liver is normal in size. Cyst in the right lobe of the liver  measures 4.0 x 5.1 cm. No evidence for hepatic or portal vein thrombus..    Gallbladder:  No gallstones.  No gallbladder wall thickening..    Spleen: Spleen is normal in size without focal mass lesion..    Pancreas: The pancreas is normal without focal mass, parenchymal atrophy, or  pancreatic duct dilation..    Adrenals: The adrenals are normal bilaterally without focal mass..    Kidneys, ureters and bladder: No hydronephrosis.  No renal calculi.  Symmetric  enhancement and excretion.  No evidence for focal mass lesion.  The bladder is  normal..    Retroperitoneal structures: There is no abdominal aortic aneurysm. There is  atherosclerotic calcification. There is no retroperitoneal adenopathy  or  retroperitoneal mass..    Bowel and mesentery: No evidence of a focal inflammatory or obstructive process.    Lymph nodes: No pathologic lymphadenopathy..    Pelvis: There is presacral edema. Bilateral nondisplaced sacral fractures. No  fracture or dislocation of the hip. Heterotopic calcification about the right  pubic bone and right inferior pubic ramus may be related to remote injury.  Status post laminectomy and fusion L4-L5. Superior endplate deformity of T12  also present on 2017 CT scan.    Bones: Within normal limits.             Narrative:    CLINICAL HISTORY: Flank pain following fall. Patient is on eliquis                               CT CHEST WITH IV CONTRAST (Final result)  Result time 08/21/23 17:26:23    Final result                 Impression:    IMPRESSION:  There is presacral edema and bilateral nondisplaced sacral fractures.  No evidence for solid organ injury in the chest, abdomen, and pelvis.    COMMENT:    Technique: CT examination of the chest, abdomen, and pelvis was performed  utilizing contiguous 2.5 mm transaxial sections. Images were acquired following  the administration of 85 cc Omnipaque 350 intravenous contrast.  Oral contrast  was not administered.    Coronal and sagittal reformations are obtained.    CT DOSE:  One or more dose reduction techniques (e.g. automated exposure  control, adjustment of the mA and/or kV according to patient size, use of  iterative reconstruction technique) utilized for this examination    Comparison studies: CT abdomen pelvis 8/10/2017.    Heart and pericardium: The heart is normal in size.  There is no pericardial  effusion.    Great vessels: There is no evidence for thoracic aortic aneurysm.  The main  pulmonary artery is normal in caliber.  No large or central pulmonary embolism.    Mediastinum: No mediastinal adenopathy    Malka: No hilar adenopathy.    Axilla: No axillary adenopathy.    Lung parenchyma: No parenchymal consolidation. Bibasilar  subpleural  scar/atelectasis. Right apical pleural based scarring.    Pleura: See under lung parenchyma. No effusion or pneumothorax.    Liver:  The liver is normal in size. Cyst in the right lobe of the liver  measures 4.0 x 5.1 cm. No evidence for hepatic or portal vein thrombus..    Gallbladder:  No gallstones.  No gallbladder wall thickening..    Spleen: Spleen is normal in size without focal mass lesion..    Pancreas: The pancreas is normal without focal mass, parenchymal atrophy, or  pancreatic duct dilation..    Adrenals: The adrenals are normal bilaterally without focal mass..    Kidneys, ureters and bladder: No hydronephrosis.  No renal calculi.  Symmetric  enhancement and excretion.  No evidence for focal mass lesion.  The bladder is  normal..    Retroperitoneal structures: There is no abdominal aortic aneurysm. There is  atherosclerotic calcification. There is no retroperitoneal adenopathy or  retroperitoneal mass..    Bowel and mesentery: No evidence of a focal inflammatory or obstructive process.    Lymph nodes: No pathologic lymphadenopathy..    Pelvis: There is presacral edema. Bilateral nondisplaced sacral fractures. No  fracture or dislocation of the hip. Heterotopic calcification about the right  pubic bone and right inferior pubic ramus may be related to remote injury.  Status post laminectomy and fusion L4-L5. Superior endplate deformity of T12  also present on 2017 CT scan.    Bones: Within normal limits.             Narrative:    CLINICAL HISTORY: Flank pain following fall. Patient is on eliquis                               CT CERVICAL SPINE WITHOUT IV CONTRAST (Final result)  Result time 08/21/23 15:17:32    Final result                 Impression:    IMPRESSION:  No acute intracranial abnormality.  No evidence for acute fracture  or traumatic subluxation of the cervical spine.  Left sphenoid and maxillary  sinus disease with hyperdense material likely related to inspissated secretions  or  fungal sinusitis.  Degenerative spondylosis of the cervical spine with  predominantly multilevel right-sided foraminal narrowing.    COMMENT: A standard noncontrast head CT was performed. Noncontrast CT  examination of the cervical spine performed following the standard protocol.  Sagittal and coronal reformations rendered from axial source images. Images  reviewed in bone and soft tissue windows.      CT DOSE:  One or more dose reduction techniques (e.g. automated exposure  control, adjustment of the mA and/or kV according to patient size, use of  iterative reconstruction technique) utilized for this examination.    Comparison:  Head CT dated 2/20/2023.    Findings:  Sulci, ventricles and basal cisterns are within normal limits for  patient's age.  Periventricular and subcortical white matter hypoattenuation,  nonspecific, likely sequela of microangiopathic disease.  Old lacunar infarct  versus perivascular space in the right inferior basal ganglia.  No acute  hemorrhage, acute territorial infarct, or mass effect is seen.  There is no  extra-axial fluid collection.  The visualized paranasal sinuses demonstrates  complete opacification of the left maxillary sinus and left sphenoid sinus with  mucoperiosteal thickening of the sinus walls.   Mastoid air cells are clear.  Bilateral ocular lens implants.      Cervicothoracic alignment: Anatomic.  Prevertebral soft tissues: Normal in thickness.  Vertebral bodies: Normal in height.  No acute fractures.  Intervertebral discs: Disc osteophyte complex at C5-C6 and C2-C3.  Cervical and upper thoracic spinal canal: Patent.    Axial images:  Skull base: Normal.  C1-2: Normal.  C2-3: Disc osteophyte complex at C2-C3 with severe left facet arthrosis and  mild uncovertebral hypertrophic changes bilaterally, contributing to mild left  foraminal narrowing and mild flattening the ventral thecal sac.  C3-4: Exuberant right facet hypertrophy, contributing to severe right  foraminal  narrowing.  C4-5: Exuberant right facet hypertrophy, moderate on the left, and moderate  right uncovertebral hypertrophic changes, contributing to moderate to severe  right and moderate left foraminal narrowing.  C5-6: Disc osteophyte complex with exuberant right and moderate left facet  hypertrophy, right uncovertebral hypertrophic changes, contributing to moderate  to severe right foraminal narrowing.  C6-7: Disc bulge accentuated by uncovering of intervertebral disc, severe  right and moderate left facet hypertrophy, contributing to moderate right  foraminal narrowing  C7-T1: Disc osteophyte complex and facet arthrosis, contributing to mild  flattening the ventral thecal sac and mild right foraminal narrowing.      Other: Biapical pleural-parenchymal scarring.               Narrative:    STUDY:  CT of the Brain and cervical spine without contrast    CLINICAL HISTORY: Trauma                               CT HEAD WITHOUT IV CONTRAST (Final result)  Result time 08/21/23 15:17:32    Final result                 Impression:    IMPRESSION:  No acute intracranial abnormality.  No evidence for acute fracture  or traumatic subluxation of the cervical spine.  Left sphenoid and maxillary  sinus disease with hyperdense material likely related to inspissated secretions  or fungal sinusitis.  Degenerative spondylosis of the cervical spine with  predominantly multilevel right-sided foraminal narrowing.    COMMENT: A standard noncontrast head CT was performed. Noncontrast CT  examination of the cervical spine performed following the standard protocol.  Sagittal and coronal reformations rendered from axial source images. Images  reviewed in bone and soft tissue windows.      CT DOSE:  One or more dose reduction techniques (e.g. automated exposure  control, adjustment of the mA and/or kV according to patient size, use of  iterative reconstruction technique) utilized for this examination.    Comparison:  Head CT dated  2/20/2023.    Findings:  Sulci, ventricles and basal cisterns are within normal limits for  patient's age.  Periventricular and subcortical white matter hypoattenuation,  nonspecific, likely sequela of microangiopathic disease.  Old lacunar infarct  versus perivascular space in the right inferior basal ganglia.  No acute  hemorrhage, acute territorial infarct, or mass effect is seen.  There is no  extra-axial fluid collection.  The visualized paranasal sinuses demonstrates  complete opacification of the left maxillary sinus and left sphenoid sinus with  mucoperiosteal thickening of the sinus walls.   Mastoid air cells are clear.  Bilateral ocular lens implants.      Cervicothoracic alignment: Anatomic.  Prevertebral soft tissues: Normal in thickness.  Vertebral bodies: Normal in height.  No acute fractures.  Intervertebral discs: Disc osteophyte complex at C5-C6 and C2-C3.  Cervical and upper thoracic spinal canal: Patent.    Axial images:  Skull base: Normal.  C1-2: Normal.  C2-3: Disc osteophyte complex at C2-C3 with severe left facet arthrosis and  mild uncovertebral hypertrophic changes bilaterally, contributing to mild left  foraminal narrowing and mild flattening the ventral thecal sac.  C3-4: Exuberant right facet hypertrophy, contributing to severe right  foraminal narrowing.  C4-5: Exuberant right facet hypertrophy, moderate on the left, and moderate  right uncovertebral hypertrophic changes, contributing to moderate to severe  right and moderate left foraminal narrowing.  C5-6: Disc osteophyte complex with exuberant right and moderate left facet  hypertrophy, right uncovertebral hypertrophic changes, contributing to moderate  to severe right foraminal narrowing.  C6-7: Disc bulge accentuated by uncovering of intervertebral disc, severe  right and moderate left facet hypertrophy, contributing to moderate right  foraminal narrowing  C7-T1: Disc osteophyte complex and facet arthrosis, contributing to  mild  flattening the ventral thecal sac and mild right foraminal narrowing.      Other: Biapical pleural-parenchymal scarring.               Narrative:    STUDY:  CT of the Brain and cervical spine without contrast    CLINICAL HISTORY: Trauma                                ECG 12 lead          Scoring tools                                  ED Course & MDM   MDM / ED COURSE / CLINICAL IMPRESSION / DISPO     Medical Decision Making  Head and back,buttock pain from fall.   Head CT and neck CT without clear acute traumatic findings.  Other CT reveals bilateral sacral fractures.  Will consult ortho and trauma for management and admission.    Closed fracture of sacrum, unspecified portion of sacrum, initial encounter (CMS/[facility], [facility] within [facility]): acute illness or injury  Fall, initial encounter: acute illness or injury  Amount and/or Complexity of Data Reviewed  Labs: ordered.  Radiology: ordered.  ECG/medicine tests: ordered.      Risk  Prescription drug management.             Clinical Impression      Fall, initial encounter               Chucho Brooks MD  08/21/23 [id number]

## 2023-08-21 NOTE — DISCHARGE INSTRUCTIONS
Senior Care Navigation Line:   [facility] offers a free Senior Care Navigation Line.  Our team serves as a resource for seniors, their families and caregivers to answer questions and provide guidance in making health care decisions.   We provide care in:  Medical Needs  Social and Psychosocial Concerns  Functional Support (transportation, home assistance, etc.)  Community Resources  Care Navigation  Offer information on a vast range of services at [facility] and the community  Facilitate referrals to community agencies, physicians and more.     Please call [phone] for more information.     Please call the TRAUMA OFFICE with any questions or concerns:      Dr. William Carlos  Trauma Program Medical Director  [address] [place] [address]  [facility] [address]  [place] [address]  Phone: [phone]          Please follow up with Dr. Saavedra in 2 weeks  Please do not take tramadol while taking dilaudid for pain.  May resume tramadol when completed dilaudid prescription

## 2023-08-21 NOTE — CONSULTS
Orthopedic Consult Note    Subjective     Patient is a 89-year-old female with PMH of a flutter on Eliquis and HTN, consulted for right-sided pelvic ring injury after a fall from standing height.  Patient states that she was pain on her underwear 2 days ago and so quickly, felt lightheaded and had a fall onto her right side.  Denies any LOC or head trauma.  Of note she does have a history of recurrent falls last she states a couple months ago.  CT scan in the ED demonstrates a right superior pubic ramus fracture and bilateral sacral ala fractures, all minimally displaced.  She denies any numbness tingling of the bilateral lower extremities.  Currently complains mainly of low back pain, no saddle anesthesia.  No bowel or bladder incontinence.  No other complaints at this time    Medical History:   Past Medical History:   Diagnosis Date    Allergic bronchopulmonary aspergillosis (CMS/[facility]) 04/02/2018    Allergist: Dr. Arriaza.  Stable FEV1 in 8/2015.  IgE levels and eosinophils stable in 8/2015.  Managed with Fasenra, Azithromycin and Flovent.    Allergic rhinitis 04/02/2018    Pulmonologist: Dr. Walker. Managed with Flonase.    Asthma     Atrial flutter with rapid ventricular response (CMS/[facility]) 02/21/2023    Diagnosed in 02/2023 and hospitalized Managed with Apixaban and Diltiazem Echocardiogram 02/2023   Left Ventricle: Normal ventricle size. Mild concentric left ventricular hypertrophy. Preserved systolic function. Estimated EF 65-70%. No regional wall motion abnormalities. Grade I diastolic dysfunction.   Right Ventricle: Normal ventricle size. Normal systolic function.   Left Atrium: Moderately    Calcification of aorta (CMS/[facility]) 06/29/2020    Seen incidentally on CT scan.    Chronic obstructive pulmonary disease (CMS/[facility]) 04/02/2018    Pulmonologist: Dr. Walker.  Seen on PFTs in hospital in 2014.  Controlled with Spiriva and FLovent.    GERD (gastroesophageal reflux disease) 06/08/2019    Managed with  Esomeprazole.  Should take medication 30 minutes prior to meal.  Advised to avoid caffeine, carbonated beverages, and should not eat within 3 hours of going to sleep.  Advised to reduce BMI < 25.     Insomnia 04/02/2018    Managed with Lorazepam as needed.    Lumbar spinal stenosis 04/02/2018    Neurosurgeon: Dr. Miguel. MRI with Central and lateral recess spinal stenosis. Has Spondylolisthesis at L4/L5. S/P epidural injection by Dr. Martin with some relief in past. Had Lumbar fusion and Lumbar laminectomy by Dr. Miguel in 4/2015.    Macular degeneration disease     Obstructive sleep apnea syndrome 04/02/2018    Mild STACIA noted in sleep study 7/2015. Treated with nasal CPAP automatic with pressure range 5-10 CM H2Ox couple months. Off now    Osteoporosis 04/02/2018    Rheumatologist: Dr. Bradley.  Dexa scan 7/2016 with osteoporosis of lumbar spine LS-2.5, LH-2.1, RH -2.2.  Has been on Fosamax in the past for 8 years.  Worsened by Prednisone in past.  Continue vitamin D, calcium and weight bearing exercise. DEXA in 7/2017 with LS -1.7, LH -2.2, and RH -2.0.  Next due in 7/2019.  Seen by Dr. Bradley in 8/2015 who agreed with initiating Prolia. Took for 2 years. N    Primary hypertension 04/02/2018    Managed on Diltiazem and Losartan. Advised to follow a low sodium diet and keep BMI < 25.  Advised to exercise for 2.5 hours per week.  Instructed to take home blood pressure readings and call if consistently above 140/90.      Pulmonary embolism (CMS/HCC) 06/09/2019    Hematologist: Dr. Bhakta Diagnosed in 06/2019 in right upper lobe. Vascular ultrasound negative of bilateral lower extremities. Managed on Eliquis. Hypercoagulable workup was negative for beta-2 glycoprotein's, RIGO, Antithrombin III, lupus anticoagulant, protein C activity protein S activity, and prothrombin gene mutation, and factor V Leiden. Now off Apixaban as CT scan in 12/2019 was without pu    SVT (supraventricular tachycardia) (CMS/HCC)      Vertebral compression fracture (CMS/HCC)     At T12 level       Surgical History:   Past Surgical History:   Procedure Laterality Date    APPENDECTOMY      CARPAL TUNNEL RELEASE Bilateral     CATARACT EXTRACTION W/  INTRAOCULAR LENS IMPLANT Bilateral     HYSTERECTOMY  1972    LUMBAR LAMINECTOMY  04/21/2015    S/P lumbar fusion    TONSILLECTOMY         Social History:   Social History     Social History Narrative    Exercise: No formal exercise. Due to back pain from spinal stenosis.       Family History:   Family History   Problem Relation Age of Onset    Glaucoma Biological Mother     Macular degeneration Biological Mother     Hypertension Biological Mother     Lung cancer Biological Father     Heart disease Biological Brother     Breast cancer Mother's Sister     No Known Problems Biological Son     No Known Problems Biological Son     No Known Problems Biological Daughter     No Known Problems Biological Daughter     No Known Problems Biological Daughter        Allergies: Mepolizumab, Morphine, and Oxycodone    Current Inpatient Medications   Medication Dose Route Frequency Provider Last Rate Last Admin    acetaminophen (TYLENOL) tablet 975 mg  975 mg oral q6h NOE Brunilda Abdi CRNP   975 mg at 08/21/23 1806    traMADoL (ULTRAM) tablet 50 mg  50 mg oral q12h PRN Brunilda Abdi CRNP            Medication List      ASK your doctor about these medications    albuterol 2.5 mg /3 mL (0.083 %) nebulizer solution  Take 3 mL (2.5 mg total) by nebulization every 6 (six) hours as needed for wheezing or shortness of breath.  Dose: 2.5 mg     apixaban 2.5 mg tablet  Commonly known as: ELIQUIS  TAKE 1 TABLET BY MOUTH TWICE A DAY TO PREVENT BLOOD CLOTS IN CHRONIC ATRIAL FIBRILLATION     azithromycin 250 mg tablet  Commonly known as: ZITHROMAX     benralizumab 30 mg/mL syringe subcutaneous syringe  Commonly known as: FASENRA  Inject 30 mg under the skin every 2 (two) months.  Dose: 30 mg      benzonatate 200 mg capsule  Commonly known as: TESSALON  TAKE 1 CAPSULE BY MOUTH 3 TIMES DAILY AS NEEDED FOR COUGH.     calcium carbonate 600 mg calcium (1,500 mg) tablet  Take 1 tablet by mouth 2 (two) times a day.  Dose: 1 tablet     CENTRUM ORAL  Take 1 tablet by mouth daily.  Dose: 1 tablet     cholecalciferol (vitamin D3) 25 mcg (1,000 unit) capsule  Take 1 tablet by mouth daily.  Dose: 1 tablet     diltiazem 300 mg 24 hr capsule  Commonly known as: TIAZAC  Take 1 capsule (300 mg total) by mouth daily.  Dose: 300 mg     docusate sodium 100 mg capsule  Commonly known as: COLACE  Take 100 mg by mouth nightly.  Dose: 100 mg     DULoxetine 60 mg capsule  Commonly known as: CYMBALTA  Take 60 mg by mouth at bedtime.  Dose: 60 mg     esomeprazole 20 mg capsule  Commonly known as: NexIUM  Take 20 mg by mouth daily as needed. Take 30 minutes prior to meal.  Dose: 20 mg     fexofenadine 180 mg tablet  Commonly known as: ALLEGRA  Take 180 mg by mouth daily as needed.  Dose: 180 mg     fluticasone propionate 50 mcg/actuation nasal spray  Commonly known as: FLONASE  Administer 1 spray into each nostril daily as needed for rhinitis or allergies.  Dose: 1 spray     furosemide 20 mg tablet  Commonly known as: LASIX  Take 0.5 tablets (10 mg total) by mouth See admin instr. Take 1/2 tablet every other day for 10 doses.  Dose: 10 mg     hydrALAZINE 25 mg tablet  Commonly known as: APRESOLINE  Take 1 tablet by mouth every 8 (eight) hours.  Dose: 1 tablet     LORazepam 1 mg tablet  Commonly known as: ATIVAN  TAKE 1 TABLET EVERY NIGHT AS NEEDED FOR ANXIETY OR SLEEP     losartan 50 mg tablet  Commonly known as: COZAAR  Take 1 tablet (50 mg total) by mouth 2 (two) times a day.  Dose: 50 mg     ofloxacin 0.3 % ophthalmic solution  Commonly known as: OCUFLOX  Only after injections into the retina     * pregabalin 50 mg capsule  Commonly known as: LYRICA  Take 50 mg by mouth 2 (two) times a day. 25 mgs in afternoon as needed  Dose: 50  mg     * pregabalin 25 mg capsule  Commonly known as: LYRICA  Take 25 mg by mouth See admin instr. Take as needed during day  Dose: 25 mg     rosuvastatin 5 mg tablet  Commonly known as: CRESTOR  Take 1 tablet (5 mg total) by mouth daily. Every other day  Dose: 5 mg     SPIRIVA RESPIMAT 2.5 mcg/actuation mist inhaler  Inhale 2 puffs daily.  Dose: 2 puff  Generic drug: tiotropium bromide     SYMBICORT 80-4.5 mcg/actuation inhaler  Inhale 2 puffs 2 (two) times a day.  Dose: 2 puff  Generic drug: budesonide-formoteroL     traMADoL 50 mg tablet  Commonly known as: ULTRAM  Take 50 mg by mouth daily as needed.  Dose: 50 mg     XIIDRA 5 % dropperette dropperette  Generic drug: lifitegrast     zoledronic acid IVPB  Commonly known as: RECLAST  Infuse 5 mg into a venous catheter See admin instr. 5 mg once yearly  Dose: 5 mg         * This list has 2 medication(s) that are the same as other medications prescribed for you. Read the directions carefully, and ask your doctor or other care provider to review them with you.              Review of Systems  See HPI    Objective   Labs  CBC Results       08/21/23 07/18/23 06/01/23     1439 1130 1515    WBC 6.32 8.99 6.12    RBC 3.55 3.15 3.87    HGB 11.2 10.7 12.4    HCT 35.2 33.6 39.3    MCV 99.2 106.7 101.6    MCH 31.5 34.0 32.0    MCHC 31.8 31.8 31.6     364 213          BMP Results       08/21/23 07/18/23 06/01/23     1439 1130 1515     141 140    K 3.7 3.8 4.3    Cl 104 107 107    CO2 24 26 27    Glucose 93 73 97    BUN 12 22 26    Creatinine 0.5 0.8 0.8    Calcium 8.4 8.9 9.0    Anion Gap 9 8 6    EGFR >60.0 >60.0 >60.0         Comment for K at 1439 on 08/21/23: Slight hemolysis result maybe affected.    Results obtained on plasma. Plasma Potassium values may be up to 0.4 mEQ/L less than serum values. The differences may be greater for patients with high platelet or white cell counts.          Microbiology Results     ** No results found for the last 720 hours. **            Imaging  CT scan of the chest abdomen pelvis demonstrates an right-sided LC 1 pelvic ring injury with a small left minimally splayed sacral fracture      Physical Exam  Temp:  [36.6 °C (97.9 °F)] 36.6 °C (97.9 °F)  Heart Rate:  [] 106  Resp:  [17-19] 18  BP: (155-172)/(77-92) 160/92  SpO2:  [94 %-100 %] 94 %     General: AVSS, NAD  Head: NC/AT  Resp: no labored breathing  Neuro: awake, alert  MSK:   Spine:  - No tenderness to C-, T-, L-spinous processes  - TTP at right sacrum/SI joint  - TTP over right pubic symphysis  - No pain with compression over ASIS bilaterally    RUE  Skin intact  No TTP of bony prominences  Compartments soft and compressible  No pain with ROM of shoulder/elbow/wrist/fingers  SILT median/ulnar/radial  Motor intact to axillary/musculocutaneous/radial/ulnar/median/AIN/PIN  Pulses 2+    LUE  Skin intact  No TTP of bony prominences  Compartments soft and compressible  No pain with ROM of shoulder/elbow/wrist/fingers  SILT median/ulnar/radial  Motor intact to axillary/musculocutaneous/radial/ulnar/median/AIN/PIN  Pulses 2+    RLE  Skin intact, scattered ecchymosis, no TTP over these areas  - No pain with logroll or axial compression of hip  - Able to straight leg raise actively  No TTP of bony prominences  Compartments soft and compressible  No pain with ROM of hip/knee/ankle/toes  SILT in sural/saphenous/DP/SP/tibial distributions  Motor intact to quad/hamstrings/EHL/FHL/TA/peroneals/GSC  Pulses 2+    LLE  Skin intact, scattered ecchymosis over lateral aspect of thigh, no TTP over these areas  - No pain with logroll or axial compression of hip  - Able to straight leg raise actively  No TTP of bony prominences  Compartments soft and compressible  No pain with ROM of hip/knee/ankle/toes  SILT in sural/saphenous/DP/SP/tibial distributions  Motor intact to quad/hamstrings/EHL/FHL/TA/peroneals/GSC  Pulses 2+    Assessment   89 y.o. female being consulted for right pubic ramus and  bilateral sacral ala fractures after a fall from standing height       Plan     -No surgical intervention indicated this time  - Fractures amenable to nonoperative treatment  - Patient can be weightbearing as tolerated bilateral lower extremities  - Recommend multimodal pain control  - PT/OT evaluation, recommend premedication prior to the sessions  - Rest of care per primary team  - Patient can follow-up as an outpatient with Dr. Saavedra 2 weeks after discharge    Scooter Wilkins DO   Orthopedic Surgery Resident  [place] Pager: [phone]  [place] Pager: [phone]    This note was created with voice-to-text dictation, please excuse any errors in spelling or grammar.  Please contact notewriter if clarification for any portion of this note is required. ]

## 2023-08-21 NOTE — PROGRESS NOTES
ACTIVATION/Consult TIMES     Time Notified: 1800   Level of Trauma: CONSULT   Trauma Team Arrival Time: n/a Time Patient Seen: ***      CONSULTS      Consultant Emergent  Urgent    Routine Time Paged Time Responded EMERGENT Arrival Time   Ortho Routine Called by ED                   *emergent requires <30 minutes response on site; urgent requires <12 hours response on site.

## 2023-08-21 NOTE — TELEPHONE ENCOUNTER
Pt's daughter Shelley, called. Pt's  mother fell again on Saturday, Aug 19 th. Pt had fallen on July 10 th and had X-ray, but they were negative for any broken bones.  Alirio has been going to the bathroom every hour during the night and not so much during the day. It seems the going to the bathroom every hour started after this last fall on Saturday. Her blood pressure is fine. Shelley is not sure what to do. She said she does not want to take her to the hospital because she does not think there is anything they can do for her there. She is asking for a phone call back.

## 2023-08-22 PROBLEM — I48.91 A-FIB (CMS/HCC): Status: ACTIVE | Noted: 2023-08-22

## 2023-08-22 LAB
ANION GAP SERPL CALC-SCNC: 9 MEQ/L (ref 3–15)
ATRIAL RATE: 99
BUN SERPL-MCNC: 12 MG/DL (ref 7–25)
CALCIUM SERPL-MCNC: 8.1 MG/DL (ref 8.6–10.3)
CHLORIDE SERPL-SCNC: 106 MEQ/L (ref 98–107)
CO2 SERPL-SCNC: 25 MEQ/L (ref 21–31)
CREAT SERPL-MCNC: 0.6 MG/DL (ref 0.6–1.2)
ERYTHROCYTE [DISTWIDTH] IN BLOOD BY AUTOMATED COUNT: 13.9 % (ref 11.7–14.4)
GFR SERPL CREATININE-BSD FRML MDRD: >60 ML/MIN/1.73M*2
GLUCOSE SERPL-MCNC: 86 MG/DL (ref 70–99)
HCT VFR BLDCO AUTO: 35.9 % (ref 35–45)
HGB BLD-MCNC: 11.8 G/DL (ref 11.8–15.7)
MAGNESIUM SERPL-MCNC: 2 MG/DL (ref 1.8–2.5)
MCH RBC QN AUTO: 32.3 PG (ref 28–33.2)
MCHC RBC AUTO-ENTMCNC: 32.9 G/DL (ref 32.2–35.5)
MCV RBC AUTO: 98.4 FL (ref 83–98)
PDW BLD AUTO: 10.1 FL (ref 9.4–12.3)
PHOSPHATE SERPL-MCNC: 3.7 MG/DL (ref 2.4–4.7)
PLATELET # BLD AUTO: 364 K/UL (ref 150–369)
POTASSIUM SERPL-SCNC: 3.7 MEQ/L (ref 3.5–5.1)
PR INTERVAL: 136
QRS DURATION: 114
QT INTERVAL: 406
QTC CALCULATION(BAZETT): 521
R AXIS: 54
RBC # BLD AUTO: 3.65 M/UL (ref 3.93–5.22)
SODIUM SERPL-SCNC: 140 MEQ/L (ref 136–145)
T WAVE AXIS: 25
VENTRICULAR RATE: 99
WBC # BLD AUTO: 5.22 K/UL (ref 3.8–10.5)

## 2023-08-22 PROCEDURE — 97162 PT EVAL MOD COMPLEX 30 MIN: CPT | Mod: GP

## 2023-08-22 PROCEDURE — 63600000 HC DRUGS/DETAIL CODE: Mod: JZ | Performed by: NURSE PRACTITIONER

## 2023-08-22 PROCEDURE — 36415 COLL VENOUS BLD VENIPUNCTURE: CPT

## 2023-08-22 PROCEDURE — 83735 ASSAY OF MAGNESIUM: CPT

## 2023-08-22 PROCEDURE — 85027 COMPLETE CBC AUTOMATED: CPT

## 2023-08-22 PROCEDURE — 93010 ELECTROCARDIOGRAM REPORT: CPT | Performed by: INTERNAL MEDICINE

## 2023-08-22 PROCEDURE — 97535 SELF CARE MNGMENT TRAINING: CPT | Mod: GO

## 2023-08-22 PROCEDURE — 97530 THERAPEUTIC ACTIVITIES: CPT | Mod: GP

## 2023-08-22 PROCEDURE — 97166 OT EVAL MOD COMPLEX 45 MIN: CPT | Mod: GO

## 2023-08-22 PROCEDURE — 84100 ASSAY OF PHOSPHORUS: CPT

## 2023-08-22 PROCEDURE — 80048 BASIC METABOLIC PNL TOTAL CA: CPT

## 2023-08-22 PROCEDURE — 63700000 HC SELF-ADMINISTRABLE DRUG: Performed by: NURSE PRACTITIONER

## 2023-08-22 PROCEDURE — 99222 1ST HOSP IP/OBS MODERATE 55: CPT | Performed by: INTERNAL MEDICINE

## 2023-08-22 PROCEDURE — 99999 PR OFFICE/OUTPT VISIT,PROCEDURE ONLY: CPT | Performed by: SURGERY

## 2023-08-22 PROCEDURE — 99232 SBSQ HOSP IP/OBS MODERATE 35: CPT | Performed by: SURGERY

## 2023-08-22 PROCEDURE — 63700000 HC SELF-ADMINISTRABLE DRUG

## 2023-08-22 PROCEDURE — 25000000 HC PHARMACY GENERAL

## 2023-08-22 PROCEDURE — 21400000 HC ROOM AND CARE CCU/INTERMEDIATE

## 2023-08-22 RX ORDER — POTASSIUM CHLORIDE 750 MG/1
20 TABLET, EXTENDED RELEASE ORAL ONCE
Status: COMPLETED | OUTPATIENT
Start: 2023-08-22 | End: 2023-08-22

## 2023-08-22 RX ORDER — HYDROMORPHONE HYDROCHLORIDE 2 MG/1
1 TABLET ORAL EVERY 6 HOURS PRN
Status: DISCONTINUED | OUTPATIENT
Start: 2023-08-22 | End: 2023-08-24 | Stop reason: HOSPADM

## 2023-08-22 RX ORDER — CALCIUM GLUCONATE 20 MG/ML
2 INJECTION, SOLUTION INTRAVENOUS ONCE
Status: COMPLETED | OUTPATIENT
Start: 2023-08-22 | End: 2023-08-22

## 2023-08-22 RX ORDER — KETOROLAC TROMETHAMINE 15 MG/ML
15 INJECTION, SOLUTION INTRAMUSCULAR; INTRAVENOUS ONCE
Status: COMPLETED | OUTPATIENT
Start: 2023-08-22 | End: 2023-08-22

## 2023-08-22 RX ORDER — HYDROMORPHONE HYDROCHLORIDE 1 MG/ML
0.25 INJECTION, SOLUTION INTRAMUSCULAR; INTRAVENOUS; SUBCUTANEOUS ONCE
Status: COMPLETED | OUTPATIENT
Start: 2023-08-22 | End: 2023-08-22

## 2023-08-22 RX ORDER — HYDROMORPHONE HYDROCHLORIDE 2 MG/1
2 TABLET ORAL EVERY 6 HOURS PRN
Status: DISCONTINUED | OUTPATIENT
Start: 2023-08-22 | End: 2023-08-24 | Stop reason: HOSPADM

## 2023-08-22 RX ADMIN — CETIRIZINE HYDROCHLORIDE 10 MG: 10 TABLET, FILM COATED ORAL at 21:15

## 2023-08-22 RX ADMIN — DOCUSATE SODIUM 100 MG: 100 CAPSULE, LIQUID FILLED ORAL at 21:15

## 2023-08-22 RX ADMIN — POTASSIUM CHLORIDE 20 MEQ: 750 TABLET, EXTENDED RELEASE ORAL at 09:49

## 2023-08-22 RX ADMIN — PREGABALIN 25 MG: 25 CAPSULE ORAL at 12:40

## 2023-08-22 RX ADMIN — CALCIUM GLUCONATE 2 G: 20 INJECTION, SOLUTION INTRAVENOUS at 09:53

## 2023-08-22 RX ADMIN — APIXABAN 2.5 MG: 2.5 TABLET, FILM COATED ORAL at 21:15

## 2023-08-22 RX ADMIN — ACETAMINOPHEN 975 MG: 325 TABLET, FILM COATED ORAL at 05:30

## 2023-08-22 RX ADMIN — LIDOCAINE 2 PATCH: 4 PATCH TOPICAL at 08:33

## 2023-08-22 RX ADMIN — APIXABAN 2.5 MG: 2.5 TABLET, FILM COATED ORAL at 08:31

## 2023-08-22 RX ADMIN — HYDRALAZINE HYDROCHLORIDE 25 MG: 25 TABLET ORAL at 05:30

## 2023-08-22 RX ADMIN — PREGABALIN 50 MG: 25 CAPSULE ORAL at 21:15

## 2023-08-22 RX ADMIN — MOMETASONE FUROATE AND FORMOTEROL FUMARATE DIHYDRATE 2 PUFF: 100; 5 AEROSOL RESPIRATORY (INHALATION) at 21:22

## 2023-08-22 RX ADMIN — HYDROMORPHONE HYDROCHLORIDE 0.25 MG: 1 INJECTION, SOLUTION INTRAMUSCULAR; INTRAVENOUS; SUBCUTANEOUS at 14:30

## 2023-08-22 RX ADMIN — DULOXETINE HYDROCHLORIDE 60 MG: 60 CAPSULE, DELAYED RELEASE ORAL at 08:32

## 2023-08-22 RX ADMIN — MOMETASONE FUROATE AND FORMOTEROL FUMARATE DIHYDRATE 2 PUFF: 100; 5 AEROSOL RESPIRATORY (INHALATION) at 09:50

## 2023-08-22 RX ADMIN — ACETAMINOPHEN 975 MG: 325 TABLET, FILM COATED ORAL at 12:40

## 2023-08-22 RX ADMIN — LOSARTAN POTASSIUM 50 MG: 50 TABLET, FILM COATED ORAL at 08:32

## 2023-08-22 RX ADMIN — ACETAMINOPHEN 975 MG: 325 TABLET, FILM COATED ORAL at 17:22

## 2023-08-22 RX ADMIN — KETOROLAC TROMETHAMINE 15 MG: 15 INJECTION, SOLUTION INTRAMUSCULAR; INTRAVENOUS at 09:48

## 2023-08-22 RX ADMIN — TRAMADOL HYDROCHLORIDE 50 MG: 50 TABLET, COATED ORAL at 08:31

## 2023-08-22 RX ADMIN — HYDRALAZINE HYDROCHLORIDE 25 MG: 25 TABLET ORAL at 13:53

## 2023-08-22 RX ADMIN — POLYETHYLENE GLYCOL 3350 17 G: 17 POWDER, FOR SOLUTION ORAL at 08:33

## 2023-08-22 RX ADMIN — LOSARTAN POTASSIUM 50 MG: 50 TABLET, FILM COATED ORAL at 21:15

## 2023-08-22 RX ADMIN — SENNOSIDES 2 TABLET: 8.6 TABLET, FILM COATED ORAL at 21:15

## 2023-08-22 RX ADMIN — HYDRALAZINE HYDROCHLORIDE 25 MG: 25 TABLET ORAL at 21:15

## 2023-08-22 RX ADMIN — KETOROLAC TROMETHAMINE 15 MG: 15 INJECTION, SOLUTION INTRAMUSCULAR; INTRAVENOUS at 17:22

## 2023-08-22 RX ADMIN — TIOTROPIUM BROMIDE INHALATION SPRAY 2 PUFF: 3.12 SPRAY, METERED RESPIRATORY (INHALATION) at 09:54

## 2023-08-22 RX ADMIN — DILTIAZEM HYDROCHLORIDE 300 MG: 180 CAPSULE, COATED, EXTENDED RELEASE ORAL at 08:32

## 2023-08-22 RX ADMIN — DOCUSATE SODIUM 100 MG: 100 CAPSULE, LIQUID FILLED ORAL at 08:33

## 2023-08-22 RX ADMIN — PREGABALIN 50 MG: 25 CAPSULE ORAL at 08:31

## 2023-08-22 ASSESSMENT — COGNITIVE AND FUNCTIONAL STATUS - GENERAL
HELP NEEDED FOR BATHING: 2 - A LOT
WALKING IN HOSPITAL ROOM: 2 - A LOT
CLIMB 3 TO 5 STEPS WITH RAILING: 2 - A LOT
STANDING UP FROM CHAIR USING ARMS: 2 - A LOT
AFFECT: WFL
MOVING TO AND FROM BED TO CHAIR: 2 - A LOT
DRESSING REGULAR UPPER BODY CLOTHING: 3 - A LITTLE
DRESSING REGULAR LOWER BODY CLOTHING: 1 - TOTAL
STANDING UP FROM CHAIR USING ARMS: 2 - A LOT
HELP NEEDED FOR PERSONAL GROOMING: 3 - A LITTLE
AFFECT: WFL
MOVING TO AND FROM BED TO CHAIR: 2 - A LOT
CLIMB 3 TO 5 STEPS WITH RAILING: 2 - A LOT
WALKING IN HOSPITAL ROOM: 2 - A LOT
EATING MEALS: 3 - A LITTLE
TOILETING: 1 - TOTAL

## 2023-08-22 NOTE — PLAN OF CARE
Problem: Self-Care Deficit  Goal: Improved Ability to Complete Activities of Daily Living  Outcome: Progressing

## 2023-08-22 NOTE — PLAN OF CARE
Care Coordination Admission Assessment Note    General Information:  Readmission Within the last 30 days: no previous admission in last 30 days  Does patient have a : No  Patient-Specific Goals (include timeframe):      Living Arrangements:  Arrived From: home  Current Living Arrangements: other (see comments) ([facility])  People in Home: alone  Home Accessibility:    Living Arrangement Comments: Pt lives in [facility] with 3 DANNY, complete first floor set up    Housing Stability and Financial Resources (SDOH):  In the last 12 months, was there a time when you were not able to pay the mortgage or rent on time?: No  In the last 12 months, how many places have you lived?: 1  In the last 12 months, was there a time when you did not have a steady place to sleep or slept in a shelter (including now)?: No  How hard is it for you to pay for the very basics like food, housing, medical care, and heating?: Not hard at all    Functional Status Prior to Admission:   Assistive Device/Animal Currently Used at Home: cane, quad, walker, front-wheeled  Functional Status Comments: Pt has been using cane or walker recently  IADL Comments: Requires assist at this time     Supports and Services:  Current Outpatient/Agency/Support Group:    Type of Current Home Care Services: home health aide  History of home care episode or rehab stay: Pt has home night aid that stays overnight with her, Pt says daughter is there most days.    Discharge Needs Assessment:   Concerns to be Addressed: care coordination/care conferences, discharge planning  Current Discharge Risk:    Anticipated Changes Related to Illness: inability to care for self    Patient/Family Anticipated Discharge Plan:  Patient/Family Anticipates Transition To: inpatient rehabilitation facility  Patient/Family Anticipated Services at Transition: home health care    Connection to Community  Not applicable      Patient Choice:   Offered/Gave Vendor List: yes  Patient's  Choice of Community Agency(s): [facility] or [facility]  Patient and/or patient guardian/advocate was made aware of their right to choose a provider. A list of eligible providers was presented and reviewed with the patient and/or patient guardian/advocate in written and/or verbal form. The list delineates providers in the patients desired geographic area who are participating in the Medicare program and/or providers contracted with the patients primary insurance. The Medicare list and quality ratings were obtained from the Medicare.gov [medicare.gov] website.    Anticipated Discharge Plan:  Met with patient. Provided education and contact information for Care Coordination services.: yes  Anticipated Discharge Disposition: skilled nursing facility     Transportation Needs (SDOH):  Transportation Concerns:    Transportation Anticipated: health plan transportation  Is Out of Hospital DNR needed at discharge?: yes    In the past 12 months, has lack of transportation kept you from medical appointments or from getting medications?: No  In the past 12 months, has lack of transportation kept you from meetings, work, or from getting things needed for daily living?: No    Concerns - comments:   CM met with Pt chairside to complete MDTP. CM verified all demographic info.     Pt has been using cane and RW recently due to fall. Pt has SC and GB at home.     Pt has [facility] for PT/OT.    Pt has a private pat aid at home for night time.     Pt denies drug use. Pt drinks wine with dinner.   ETOH <10  UDS- + cannabinoids  Education added to D/C paperwork    Pt has 3 shots of covid vaccine, mix of Moderna and Pfizer.     Pt agreeable to SNF, would like referrals sent to [facility]. TRISH Thursday

## 2023-08-22 NOTE — PROGRESS NOTES
TRAUMA SURGERY DAILY PROGRESS NOTE     PATIENT NAME:  Madelyn Ruiz YOB: 1930    AGE:  89 y.o.  GENDER: female   MRN:  [id number]  PATIENT #: [id number]     SUBJECTIVE     Pain controlled with pain meds     REVIEW OF SYSTEMS   14 point ROS negative unless otherwise specified above    VITAL SIGNS   Temperature: Temp (24hrs), Av.7 °C (98 °F), Min:36.6 °C (97.9 °F), Max:36.7 °C (98.1 °F)     BP Max:  Systolic (24hrs), Av , Min:147 , Max:180      BP Fang:  Diastolic (24hrs), Av, Min:77, Max:112    Recent:  Patient Vitals for the past 4 hrs:   BP Temp Temp src Pulse Resp SpO2   23 0000 (!) 180/108 36.7 °C (98.1 °F) Oral (!) 127 16 93 %        I/Os:  I/O this shift:  In: 120 [P.O.:120]  Out: 700 [Urine:700]     MEDICATIONS     Current Facility-Administered Medications:     acetaminophen (TYLENOL) tablet 975 mg, 975 mg, oral, q6h NOE, Brunilda Abdi CRNP, 975 mg at 23 231    apixaban (ELIQUIS) tablet 2.5 mg, 2.5 mg, oral, BID, Virginie Murillo CRNP, 2.5 mg at 23 231    cetirizine (ZyrTEC) tablet 10 mg, 10 mg, oral, Nightly, Virginie Murillo CRNP, 10 mg at 23 2311    glucose chewable tablet 16-32 g of dextrose, 16-32 g of dextrose, oral, PRN **OR** dextrose 40 % oral gel 15-30 g of dextrose, 15-30 g of dextrose, oral, PRN **OR** glucagon (GLUCAGEN) injection 1 mg, 1 mg, intramuscular, PRN **OR** dextrose 50 % in water (D50) injection 12.5 g, 25 mL, intravenous, PRN, Virginie Murillo CRNP    dilTIAZem CD (CARDIZEM CD) 24 hr ER capsule 300 mg, 300 mg, oral, Daily, Virginie Murillo CRNP    docusate sodium (COLACE) capsule 100 mg, 100 mg, oral, BID, Virginie Murillo CRNP, 100 mg at 23 2209    DULoxetine (CYMBALTA) capsule,delayed release(DR/EC) 60 mg, 60 mg, oral, Daily, Virginie Murillo CRNP    hydrALAZINE (APRESOLINE) tablet 25 mg, 25 mg, oral, q8h, Virginie Murillo CRNP, 25 mg at 23 2312    lidocaine (ASPERCREME) 4 % topical patch  2 patch, 2 patch, Topical, Daily, Virginie Murillo CRNP, 2 patch at 08/21/23 2009    LORazepam (ATIVAN) tablet 1 mg, 1 mg, oral, Nightly PRN, Virginie Murillo CRNP    losartan (COZAAR) tablet 50 mg, 50 mg, oral, BID, Virginie Murillo CRNP, 50 mg at 08/21/23 2311    mometasone-formoterol (DULERA 100) 100-5 mcg/actuation inhaler 2 puff, 2 puff, inhalation, BID (8a, 8p), Virginie Murillo CRNP    ondansetron ODT (ZOFRAN-ODT) disintegrating tablet 4 mg, 4 mg, oral, q6h PRN **OR** ondansetron (ZOFRAN) injection 4 mg, 4 mg, intravenous, q6h PRN, Virginie Murillo CRNP    polyethylene glycol (MIRALAX) 17 gram packet 17 g, 17 g, oral, Daily, Virginie Murillo CRNP    pregabalin (LYRICA) capsule 25 mg, 25 mg, oral, Daily with lunch, Virginie Murillo CRNP    pregabalin (LYRICA) capsule 50 mg, 50 mg, oral, BID, Virginie Murillo CRNP, 50 mg at 08/21/23 2312    senna (SENOKOT) tablet 2 tablet, 2 tablet, oral, Nightly, Virginie Murillo CRNP, 2 tablet at 08/21/23 2209    tiotropium bromide (SPIRIVA RESPIMAT) 2.5 mcg/actuation inhaler 2 puff, 2 puff, inhalation, Daily, Virginie Murillo CRNP    traMADoL (ULTRAM) tablet 50 mg, 50 mg, oral, q12h PRN, Brunilda Abdi CRNP, 50 mg at 08/21/23 2209    DIAGNOSTIC DATA   LABS:  Recent Results (from the past 24 hour(s))   ECG 12 lead    Collection Time: 08/21/23  2:18 PM   Result Value Ref Range    Ventricular rate 104     Atrial rate 104     QRS duration 122     QT Interval 396     QTC Calculation(Bazett) 520     R Axis 47     T Wave Axis 29    POCT Glucose    Collection Time: 08/21/23  2:38 PM   Result Value Ref Range    POCT Bedside Glucose 97 70 - 99 mg/dL    POC Test POC    CBC and differential    Collection Time: 08/21/23  2:39 PM   Result Value Ref Range    WBC 6.32 3.80 - 10.50 K/uL    RBC 3.55 (L) 3.93 - 5.22 M/uL    Hemoglobin 11.2 (L) 11.8 - 15.7 g/dL    Hematocrit 35.2 35.0 - 45.0 %    MCV 99.2 (H) 83.0 - 98.0 fL    MCH 31.5 28.0 - 33.2 pg    MCHC 31.8 (L)  32.2 - 35.5 g/dL    RDW 13.7 11.7 - 14.4 %    Platelets 368 150 - 369 K/uL    MPV 10.3 9.4 - 12.3 fL    Differential Type Auto     nRBC 0.0 <=0.0 %    Immature Granulocytes 0.8 %    Neutrophils 71.6 %    Lymphocytes 17.9 %    Monocytes 9.5 %    Eosinophils 0.0 %    Basophils 0.2 %    Immature Granulocytes, Absolute 0.05 0.00 - 0.08 K/uL    Neutrophils, Absolute 4.53 1.70 - 7.00 K/uL    Lymphocytes, Absolute 1.13 (L) 1.20 - 3.50 K/uL    Monocytes, Absolute 0.60 0.28 - 0.80 K/uL    Eosinophils, Absolute 0.00 (L) 0.04 - 0.36 K/uL    Basophils, Absolute 0.01 0.01 - 0.10 K/uL   Comprehensive metabolic panel    Collection Time: 08/21/23  2:39 PM   Result Value Ref Range    Sodium 137 136 - 145 mEQ/L    Potassium 3.7 3.5 - 5.1 mEQ/L    Chloride 104 98 - 107 mEQ/L    CO2 24 21 - 31 mEQ/L    BUN 12 7 - 25 mg/dL    Creatinine 0.5 (L) 0.6 - 1.2 mg/dL    Glucose 93 70 - 99 mg/dL    Calcium 8.4 (L) 8.6 - 10.3 mg/dL    AST (SGOT) 15 13 - 39 IU/L    ALT (SGPT) 14 7 - 52 IU/L    Alkaline Phosphatase 95 34 - 125 IU/L    Total Protein 6.2 6.0 - 8.2 g/dL    Albumin 3.7 3.5 - 5.7 g/dL    Bilirubin, Total 0.5 0.3 - 1.2 mg/dL    eGFR >60.0 >=60.0 mL/min/1.73m*2    Anion Gap 9 3 - 15 mEQ/L   CK, Total    Collection Time: 08/21/23  2:39 PM   Result Value Ref Range    Total CK 48 30 - 223 U/L   Magnesium    Collection Time: 08/21/23  2:39 PM   Result Value Ref Range    Magnesium 2.0 1.8 - 2.5 mg/dL   UA Reflex to Culture (Macroscopic)    Collection Time: 08/21/23  3:12 PM    Specimen: Urine, Clean Catch   Result Value Ref Range    Color, Urine Colorless Yellow, Colorless    Clarity, Urine Clear Clear    Specific Gravity, Urine 1.008 1.005 - 1.030    pH, Urine 6.5 4.5 - 8.0    Leukocyte Esterase Negative Negative    Nitrite, Urine Negative Negative    Protein, Urine Negative Negative    Glucose, Urine Negative Negative mg/dL    Ketones, Urine Negative Negative mg/dL    Urobilinogen, Urine 0.2 <2.0 EU/dL EU/dL    Bilirubin, Urine Negative  Negative mg/dL    Blood, Urine Negative Negative   HS Troponin (with 2 hour reflex)    Collection Time: 08/21/23  4:00 PM   Result Value Ref Range    High Sens Troponin I 8.7 <15.0 pg/mL   HIGH SENSITIVE TROPONIN I (NO REFLEX)    Collection Time: 08/21/23  6:57 PM   Result Value Ref Range    High Sens Troponin I 10.9 <15.0 pg/mL   Lactic acid, Venous    Collection Time: 08/21/23  6:57 PM   Result Value Ref Range    Lactate 1.3 0.4 - 2.0 mmol/L   Protime-INR    Collection Time: 08/21/23  6:57 PM   Result Value Ref Range    PT 14.0 12.2 - 14.5 sec    INR 1.1     PTT    Collection Time: 08/21/23  6:57 PM   Result Value Ref Range    PTT 24 23 - 35 sec   Type and Screen [facility]    Collection Time: 08/21/23  6:57 PM   Result Value Ref Range    Specimen Expiration 08/24/2023     Antibody Screen Negative     ABO A     Rh Factor Positive     History Check Previous type on file    Ethanol    Collection Time: 08/21/23  6:57 PM   Result Value Ref Range    Ethanol <10 <10 mg/dL   Drug screen panel, urine    Collection Time: 08/21/23  6:58 PM   Result Value Ref Range    PCP Scrn, Ur Not Detected Not Detected    Benzodiazepine Ur Qual Not Detected Not Detected    Cocaine Screen, Urine Not Detected Not Detected    Amphetamine+Methamphetamine Screen, Ur Not Detected Not Detected    Cannabinoid Screen, Urine Positive (A) Not Detected    Opiate Scrn, Ur Not Detected Not Detected    Barbiturate Screen, Ur Not Detected Not Detected    Fentanyl Screen, Urine Not Detected Not Detected   ABO/RH RECHECK (ADD-ON TO EXISTING HEMATOLOGY SPECIMEN IN THE LAB)    Collection Time: 08/21/23  9:32 PM   Result Value Ref Range    History Check Previous type on file        IMAGING:  I have reviewed the imaging completed within the last 24 hours.    PHYSICAL EXAM     GENERAL: NAD  NEURO: GCS 15. AAOx3.   CHEST: Symmetric expansion, non-tender to palpation  LUNGS: No use of accessory muscles or respiratory distress.   CV:S1/S2.  ABDOMEN: Soft, nontender,  nondistended.  EXTREMITIES: No gross deformities.  SKIN: Warm    PROBLEM LIST     Patient Active Problem List   Diagnosis    Allergic bronchopulmonary aspergillosis (CMS/[facility])    Asthma    Pulmonary emphysema (CMS/HCC)    Primary hypertension    Insomnia    Lumbar spinal stenosis    Obstructive sleep apnea syndrome    Osteoporosis    Medicare annual wellness visit, initial    GERD (gastroesophageal reflux disease)    Personal history of pulmonary embolism    Calcification of aorta (CMS/[facility])    Nonintractable headache    Chronic pain of left knee    Traumatic hematoma of left knee    Word finding difficulty    SVT (supraventricular tachycardia) (CMS/HCC)    Atrial flutter with rapid ventricular response (CMS/HCC)    Bilateral hip pain    Fall    Hx of long term use of blood thinners    Hematoma of left thigh    Edema    Hematoma    Fall, initial encounter    Sacral fracture, closed (CMS/HCC)         IMPRESSION/PLAN   89 y.o. y/o female s/p fall     Sacral fracture, closed (CMS/HCC)  Assessment & Plan  · Closed, ND, b/l sacral Fxs.   · Ortho following: non-op, f/u w/ Dr Saavedra OP in 2 weeks.  · WBAT b/l LE.   · Multi-modal pain control.   · PT/OT/PMR.  · Admit to trauma for eval and dispo.    Hx of long term use of blood thinners  Assessment & Plan  · Patient on Eliquis BID at home for VTE ppx in Afib.     GERD (gastroesophageal reflux disease)  Assessment & Plan  · Continue home PPI.     Lumbar spinal stenosis  Assessment & Plan  · Resume home Tramadol on a prn basis.     Primary hypertension  Assessment & Plan  · Continue home Diltiazem and Losaratan with hold parameters.   · VS per unit policy.     * Fall, initial encounter  Assessment & Plan  · Patient with frequent mechanical falls at home. Use of walker at baseline.   · Fall precautions.  · OOB with assist.   · WBAT b/l LE.        Ruchi Garza MD  ACS Attending

## 2023-08-22 NOTE — CONSULTS
Physical Medicine and Rehabilitation Consult Note    Subjective     Madelyn Ruiz is a 89 y.o. female who was admitted for Fall, initial encounter [W19.XXXA]  Closed fracture of sacrum, unspecified portion of sacrum, initial encounter (CMS/[facility]) [S32.10XA]. We were asked to see for rehabilitation needs. Patient is 89-year-old woman with a past medical history significant for hypertension, COPD, PAF on Eliquis, lumbar spinal stenosis who dressing became lightheaded and fell onto her right side with immediate onset of right-sided hip and pelvis pain.  Evaluated in the ED radiographically with right superior pubic ramus fracture and bilateral sacral vijay fractures are minimally displaced.  Evaluated by orthopedics cleared for WBAT.  Evaluated by EP and currently in NSR on diltiazem and recommendation for continued low-dose Eliquis..    Medical History:   Past Medical History:   Diagnosis Date    Allergic bronchopulmonary aspergillosis (CMS/[facility]) 04/02/2018    Allergist: Dr. Arriaza.  Stable FEV1 in 8/2015.  IgE levels and eosinophils stable in 8/2015.  Managed with Fasenra, Azithromycin and Flovent.    Allergic rhinitis 04/02/2018    Pulmonologist: Dr. Walker. Managed with Flonase.    Asthma     Atrial flutter with rapid ventricular response (CMS/HCC) 02/21/2023    Diagnosed in 02/2023 and hospitalized Managed with Apixaban and Diltiazem Echocardiogram 02/2023   Left Ventricle: Normal ventricle size. Mild concentric left ventricular hypertrophy. Preserved systolic function. Estimated EF 65-70%. No regional wall motion abnormalities. Grade I diastolic dysfunction.   Right Ventricle: Normal ventricle size. Normal systolic function.   Left Atrium: Moderately    Calcification of aorta (CMS/HCC) 06/29/2020    Seen incidentally on CT scan.    Chronic obstructive pulmonary disease (CMS/HCC) 04/02/2018    Pulmonologist: Dr. Walker.  Seen on PFTs in hospital in 2014.  Controlled with Spiriva and FLovent.    GERD  (gastroesophageal reflux disease) 06/08/2019    Managed with Esomeprazole.  Should take medication 30 minutes prior to meal.  Advised to avoid caffeine, carbonated beverages, and should not eat within 3 hours of going to sleep.  Advised to reduce BMI < 25.     Insomnia 04/02/2018    Managed with Lorazepam as needed.    Lumbar spinal stenosis 04/02/2018    Neurosurgeon: Dr. Miguel. MRI with Central and lateral recess spinal stenosis. Has Spondylolisthesis at L4/L5. S/P epidural injection by Dr. Martin with some relief in past. Had Lumbar fusion and Lumbar laminectomy by Dr. Miguel in 4/2015.    Macular degeneration disease     Obstructive sleep apnea syndrome 04/02/2018    Mild STACIA noted in sleep study 7/2015. Treated with nasal CPAP automatic with pressure range 5-10 CM H2Ox couple months. Off now    Osteoporosis 04/02/2018    Rheumatologist: Dr. Bradley.  Dexa scan 7/2016 with osteoporosis of lumbar spine LS-2.5, LH-2.1, RH -2.2.  Has been on Fosamax in the past for 8 years.  Worsened by Prednisone in past.  Continue vitamin D, calcium and weight bearing exercise. DEXA in 7/2017 with LS -1.7, LH -2.2, and RH -2.0.  Next due in 7/2019.  Seen by Dr. Bradley in 8/2015 who agreed with initiating Prolia. Took for 2 years. N    Primary hypertension 04/02/2018    Managed on Diltiazem and Losartan. Advised to follow a low sodium diet and keep BMI < 25.  Advised to exercise for 2.5 hours per week.  Instructed to take home blood pressure readings and call if consistently above 140/90.      Pulmonary embolism (CMS/HCC) 06/09/2019    Hematologist: Dr. Bhakta Diagnosed in 06/2019 in right upper lobe. Vascular ultrasound negative of bilateral lower extremities. Managed on Eliquis. Hypercoagulable workup was negative for beta-2 glycoprotein's, RIGO, Antithrombin III, lupus anticoagulant, protein C activity protein S activity, and prothrombin gene mutation, and factor V Leiden. 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  Single level set up  Social History     Social History Narrative    Exercise: No formal exercise. Due to back pain from spinal stenosis.     Lives with:    Prior Function Level:    Family History:   Family History   Problem Relation Age of Onset    Glaucoma Biological Mother     Macular degeneration Biological Mother     Hypertension Biological Mother     Lung cancer Biological Father     Heart disease Biological Brother     Breast cancer Mother's Sister     No Known Problems Biological Son     No Known Problems Biological Son     No Known Problems Biological Daughter     No Known Problems Biological Daughter     No Known Problems Biological Daughter      History also provided by: Patient, EMR, daughter at the bedside, trauma team  Allergies: Mepolizumab, Morphine, and Oxycodone     acetaminophen  975 mg oral q6h NOE    apixaban  2.5 mg oral BID    cetirizine  10 mg oral Nightly    dilTIAZem CD  300 mg oral Daily    docusate sodium  100 mg oral BID    DULoxetine  60 mg oral Daily    hydrALAZINE  25 mg oral q8h    lidocaine  2 patch Topical Daily    losartan  50 mg oral BID    mometasone-formoterol  2 puff inhalation BID (8a, 8p)    polyethylene glycol  17 g oral Daily    pregabalin  25 mg oral Daily with lunch    pregabalin  50 mg oral BID    senna  2 tablet oral Nightly    tiotropium bromide  2 puff inhalation Daily        Medication List      ASK your doctor about these medications     apixaban 2.5 mg tablet  Commonly known as: ELIQUIS  Take 2.5 mg by mouth 2 (two) times a day.  Dose: 2.5 mg  Ask about: Which instructions should I use?     azithromycin 250 mg tablet  Commonly known as: ZITHROMAX  Take 250 mg by mouth 3 (three) times a week (Mon, Wed, Fri).  Dose: 250 mg     calcium carbonate 600 mg calcium (1,500 mg) tablet  Take 1 tablet by mouth 2 (two) times a day.  Dose: 1 tablet     CENTRUM ORAL  Take 1 tablet by mouth daily.  Dose: 1 tablet     cholecalciferol (vitamin D3) 25 mcg (1,000 unit) capsule  Take 1 tablet by mouth daily.  Dose: 1 tablet     diltiazem 300 mg 24 hr capsule  Commonly known as: TIAZAC  Take 1 capsule (300 mg total) by mouth daily.  Dose: 300 mg     docusate sodium 100 mg capsule  Commonly known as: COLACE  Take 100 mg by mouth 2 (two) times a day as needed for constipation.  Dose: 100 mg     DULoxetine 60 mg capsule  Commonly known as: CYMBALTA  Take 60 mg by mouth at bedtime.  Dose: 60 mg     fexofenadine 180 mg tablet  Commonly known as: ALLEGRA  Take 180 mg by mouth daily as needed (allergies).  Dose: 180 mg     furosemide 20 mg tablet  Commonly known as: LASIX  Take 0.5 tablets (10 mg total) by mouth See admin instr. Take 1/2 tablet every other day for 10 doses.  Dose: 10 mg     hydrALAZINE 25 mg tablet  Commonly known as: APRESOLINE  Take 1 tablet by mouth every 8 (eight) hours.  Dose: 1 tablet     LORazepam 1 mg tablet  Commonly known as: ATIVAN  Take 1 mg by mouth nightly as needed for anxiety or sleep.  Dose: 1 mg  Ask about: Which instructions should I use?     losartan 50 mg tablet  Commonly known as: COZAAR  Take 1 tablet (50 mg total) by mouth 2 (two) times a day.  Dose: 50 mg     * pregabalin 50 mg capsule  Commonly known as: LYRICA  Take 50 mg by mouth 2 (two) times a day.  Dose: 50 mg     * pregabalin 25 mg capsule  Commonly known as: LYRICA  Take 25 mg by mouth daily with lunch.  Dose: 25 mg     SPIRIVA RESPIMAT 2.5 mcg/actuation mist inhaler  Inhale  2 puffs daily.  Dose: 2 puff  Generic drug: tiotropium bromide     SYMBICORT 80-4.5 mcg/actuation inhaler  Inhale 2 puffs 2 (two) times a day.  Dose: 2 puff  Generic drug: budesonide-formoteroL     traMADoL 50 mg tablet  Commonly known as: ULTRAM  Take 50 mg by mouth daily as needed.  Dose: 50 mg     XIIDRA 5 % dropperette dropperette  Generic drug: lifitegrast         * This list has 2 medication(s) that are the same as other medications prescribed for you. Read the directions carefully, and ask your doctor or other care provider to review them with you.                REVIEW OF SYSTEMS:  CONSTITUTIONAL: Normally good appetite  PULMONARY: Positive history of asthma and COPD  RHEUMATOLOGIC: Chronic low back pain due to epidural injection  NEUROLOGIC: Intermittent numbness and tingling in the feet chronically  PSYCHIATRIC: Memory preserved  HEENT: Decreased hearing has hearing a    Remainder of eleven point review of systems is unremarkable.      Objective   Labs  I have reviewed the patient's labs.  Significant abnormals are elevated cholesterol .  Lab Results   Component Value Date    WBC 5.22 08/22/2023    HGB 11.8 08/22/2023    HCT 35.9 08/22/2023     08/22/2023    CHOL 227 (H) 01/27/2023    TRIG 120 01/27/2023    HDL 62 01/27/2023    ALT 14 08/21/2023    AST 15 08/21/2023     08/22/2023    K 3.7 08/22/2023     08/22/2023    CREATININE 0.6 08/22/2023    BUN 12 08/22/2023    CO2 25 08/22/2023    TSH 2.87 01/26/2023    INR 1.1 08/21/2023    HGBA1C 5.6 11/18/2015     Imaging  CT T chest/abdomen/pelvis 2-, reviewed  IMPRESSION:  There is presacral edema and bilateral nondisplaced sacral fractures.  No evidence for solid organ injury in the chest, abdomen, and pelvis.     COMMENT:     Technique: CT examination of the chest, abdomen, and pelvis was performed  utilizing contiguous 2.5 mm transaxial sections. Images were acquired following  the administration of 85 cc Omnipaque 350 intravenous  contrast.  Oral contrast  was not administered.     Coronal and sagittal reformations are obtained.     CT DOSE:  One or more dose reduction techniques (e.g. automated exposure  control, adjustment of the mA and/or kV according to patient size, use of  iterative reconstruction technique) utilized for this examination     Comparison studies: CT abdomen pelvis 8/10/2017.     Heart and pericardium: The heart is normal in size.  There is no pericardial  effusion.     Great vessels: There is no evidence for thoracic aortic aneurysm.  The main  pulmonary artery is normal in caliber.  No large or central pulmonary embolism.     Mediastinum: No mediastinal adenopathy     Malka: No hilar adenopathy.     Axilla: No axillary adenopathy.     Lung parenchyma: No parenchymal consolidation. Bibasilar subpleural  scar/atelectasis. Right apical pleural based scarring.     Pleura: See under lung parenchyma. No effusion or pneumothorax.     Liver:  The liver is normal in size. Cyst in the right lobe of the liver  measures 4.0 x 5.1 cm. No evidence for hepatic or portal vein thrombus..     Gallbladder:  No gallstones.  No gallbladder wall thickening..     Spleen: Spleen is normal in size without focal mass lesion..     Pancreas: The pancreas is normal without focal mass, parenchymal atrophy, or  pancreatic duct dilation..     Adrenals: The adrenals are normal bilaterally without focal mass..     Kidneys, ureters and bladder: No hydronephrosis.  No renal calculi.  Symmetric  enhancement and excretion.  No evidence for focal mass lesion.  The bladder is  normal..     Retroperitoneal structures: There is no abdominal aortic aneurysm. There is  atherosclerotic calcification. There is no retroperitoneal adenopathy or  retroperitoneal mass..     Bowel and mesentery: No evidence of a focal inflammatory or obstructive process.     Lymph nodes: No pathologic lymphadenopathy..     Pelvis: There is presacral edema. Bilateral nondisplaced sacral  fractures. No  fracture or dislocation of the hip. Heterotopic calcification about the right  pubic bone and right inferior pubic ramus may be related to remote injury.  Status post laminectomy and fusion L4-L5. Superior endplate deformity of T12  also present on 2017 CT scan.     Bones: Within normal limits.      PHYSICAL EXAM: 113 pounds, BMI 22  Vitals:    08/22/23 1146   BP: 132/73   Pulse: 91   Resp: 16   Temp: 36.8 °C (98.2 °F)   SpO2: 98%   Room air,    General: Well-developed well-nourished woman no acute distress  HEENT: Normocephalic right conjunctival hemorrhage  Lungs: Respirations unlabored  Cardiac: Regular rate no JVD no pedal edema  Abdomen: Soft nondistended PureWick in place  Extremities: No atrophy no increased tone  Rheumatologic: Degenerative changes of the knees no effusion no warmth  Spine: Positive dorsal kyphosis with tenderness to palpation of the low lumbar axial spine and sacrum bilaterally  Psychiatric: Affect is appropriate she is cooperative  Neurologic: Alert and oriented x3, cranial nerves are symmetrical, hearing is impaired, speech is fluent she follows commands.  Motor examination 5/5 proximal and distal upper and lower extremities.  Sensation is altered in stocking distribution.  Deep tendon reflexes are symmetric  Mobility: Moderate assistance of 2 sit    ASSESSMENT/PLAN:    Fall, with secondary right superior pubic ramus and bilateral sacral vijay fractures all minimally displaced being treated conservatively, nonsurgically by orthopedics with WBAT with rolling walker    PAF, rate controlled  on diltiazem and chronic low-dose anticoagulation with Eliquis.  Most recent TTE with preserved LV function with LVEF 65-70%    Lumbar spinal stenosis s/p L4-L5 decompression fusion in the past with chronic low back pain, due for epidural injection next week.    Mobility deficit, acute on chronic,  now acutely decompensated s/p recurrent falls and pelvic fracture.  In light of chronic low  back pain associated with lumbar spinal stenosis and pharmacologic treatment there of, polypharmacy may be a contributing factor with respect to recurrent falls.      Rehabilitation/disposition, recommend skilled rehabilitation for individualized physical therapy, Occupational Therapy, nursing care and education prior to discharge home with close supervision from her daughter    Plan of care was discussed with patient daughter and team

## 2023-08-22 NOTE — PLAN OF CARE
Problem: Adult Inpatient Plan of Care  Goal: Plan of Care Review  Outcome: Progressing  Flowsheets (Taken 8/22/2023 5602)  Progress: improving  Outcome Evaluation: PT eval completed. Rec SNF  Plan of Care Reviewed With: patient     Problem: Mobility Impairment  Goal: Optimal Mobility  Outcome: Progressing

## 2023-08-22 NOTE — CONSULTS
Electrophysiology Consult        Subjective     Madelyn Ruiz is a 89 y.o. female with past medical history of allergic bronchopulmonary aspergillosis, COPD, hypertension, hyperlipidemia, left SCA aneurysm, pulmonary embolism in 2019, a flutter on Eliquis who was admitted for fall complicated by right pubic ramus and bilateral sacral ala fractures. Patient reports feeling lightheaded with subsequent fall on her R side. She denies loss of consciousness, head trauma.  No chest pain, shortness of breath reported preceding the event. She reports falling twice within the last 6 months. She is complaint to [employer] and denies any side effects    In the ED, vitals showed /91,   Labs significant for creatinine 0.6, hemoglobin 11.8  CT showed pubic/sacral fractures, minimally displaced.  ECG with sinus tachycardia  EP consulted for management recommendations      Past Medical History:   Diagnosis Date    Allergic bronchopulmonary aspergillosis (CMS/[facility]) 04/02/2018    Allergist: Dr. Arriaza.  Stable FEV1 in 8/2015.  IgE levels and eosinophils stable in 8/2015.  Managed with Fasenra, Azithromycin and Flovent.    Allergic rhinitis 04/02/2018    Pulmonologist: Dr. Walker. Managed with Flonase.    Asthma     Atrial flutter with rapid ventricular response (CMS/HCC) 02/21/2023    Diagnosed in 02/2023 and hospitalized Managed with Apixaban and Diltiazem Echocardiogram 02/2023   Left Ventricle: Normal ventricle size. Mild concentric left ventricular hypertrophy. Preserved systolic function. Estimated EF 65-70%. No regional wall motion abnormalities. Grade I diastolic dysfunction.   Right Ventricle: Normal ventricle size. Normal systolic function.   Left Atrium: Moderately    Calcification of aorta (CMS/HCC) 06/29/2020    Seen incidentally on CT scan.    Chronic obstructive pulmonary disease (CMS/HCC) 04/02/2018    Pulmonologist: Dr. Walker.  Seen on PFTs in hospital in 2014.  Controlled with Spiriva and  FLovent.    GERD (gastroesophageal reflux disease) 06/08/2019    Managed with Esomeprazole.  Should take medication 30 minutes prior to meal.  Advised to avoid caffeine, carbonated beverages, and should not eat within 3 hours of going to sleep.  Advised to reduce BMI < 25.     Insomnia 04/02/2018    Managed with Lorazepam as needed.    Lumbar spinal stenosis 04/02/2018    Neurosurgeon: Dr. Miguel. MRI with Central and lateral recess spinal stenosis. Has Spondylolisthesis at L4/L5. S/P epidural injection by Dr. Martin with some relief in past. Had Lumbar fusion and Lumbar laminectomy by Dr. Miguel in 4/2015.    Macular degeneration disease     Obstructive sleep apnea syndrome 04/02/2018    Mild STACIA noted in sleep study 7/2015. Treated with nasal CPAP automatic with pressure range 5-10 CM H2Ox couple months. Off now    Osteoporosis 04/02/2018    Rheumatologist: Dr. Bradley.  Dexa scan 7/2016 with osteoporosis of lumbar spine LS-2.5, LH-2.1, RH -2.2.  Has been on Fosamax in the past for 8 years.  Worsened by Prednisone in past.  Continue vitamin D, calcium and weight bearing exercise. DEXA in 7/2017 with LS -1.7, LH -2.2, and RH -2.0.  Next due in 7/2019.  Seen by Dr. Bradley in 8/2015 who agreed with initiating Prolia. Took for 2 years. N    Primary hypertension 04/02/2018    Managed on Diltiazem and Losartan. Advised to follow a low sodium diet and keep BMI < 25.  Advised to exercise for 2.5 hours per week.  Instructed to take home blood pressure readings and call if consistently above 140/90.      Pulmonary embolism (CMS/HCC) 06/09/2019    Hematologist: Dr. Bhakta Diagnosed in 06/2019 in right upper lobe. Vascular ultrasound negative of bilateral lower extremities. Managed on Eliquis. Hypercoagulable workup was negative for beta-2 glycoprotein's, RIGO, Antithrombin III, lupus anticoagulant, protein C activity protein S activity, and prothrombin gene mutation, and factor V Leiden. Now off Apixaban as CT scan  in 12/2019 was without pu    SVT (supraventricular tachycardia) (CMS/HCC)     Vertebral compression fracture (CMS/HCC)     At T12 level     Past Surgical History:   Procedure Laterality Date    APPENDECTOMY      CARPAL TUNNEL RELEASE Bilateral     CATARACT EXTRACTION W/  INTRAOCULAR LENS IMPLANT Bilateral     HYSTERECTOMY  1972    LUMBAR LAMINECTOMY  04/21/2015    S/P lumbar fusion    TONSILLECTOMY       Social History     Socioeconomic History    Marital status:     Number of children: 5   Occupational History    Occupation: Retired     Comment: Former Teacher   Tobacco Use    Smoking status: Never    Smokeless tobacco: Never   Vaping Use    Vaping Use: Never used   Substance and Sexual Activity    Alcohol use: Yes     Comment: Rarely    Drug use: No   Social History Narrative    Exercise: No formal exercise. Due to back pain from spinal stenosis.     Social Determinants of Health     Financial Resource Strain: Low Risk  (10/2/2020)    Overall Financial Resource Strain (CARDIA)     Difficulty of Paying Living Expenses: Not hard at all   Food Insecurity: No Food Insecurity (8/21/2023)    Hunger Vital Sign     Worried About Running Out of Food in the Last Year: Never true     Ran Out of Food in the Last Year: Never true   Transportation Needs: No Transportation Needs (2/21/2023)    PRAPARE - Transportation     Lack of Transportation (Medical): No     Lack of Transportation (Non-Medical): No   Physical Activity: Inactive (10/2/2020)    Exercise Vital Sign     Days of Exercise per Week: 0 days     Minutes of Exercise per Session: 0 min   Stress: No Stress Concern Present (2/21/2023)    [employer] - Occupational Stress Questionnaire     Feeling of Stress : Not at all   Housing Stability: Low Risk  (2/21/2023)    Housing Stability Vital Sign     Unable to Pay for Housing in the Last Year: No     Number of Places Lived in the Last Year: 1     Unstable Housing  in the Last Year: No     Family History   Problem Relation Age of Onset    Glaucoma Biological Mother     Macular degeneration Biological Mother     Hypertension Biological Mother     Lung cancer Biological Father     Heart disease Biological Brother     Breast cancer Mother's Sister     No Known Problems Biological Son     No Known Problems Biological Son     No Known Problems Biological Daughter     No Known Problems Biological Daughter     No Known Problems Biological Daughter      Allergies:  Mepolizumab, Morphine, and Oxycodone      acetaminophen, 975 mg, oral, q6h NOE    apixaban, 2.5 mg, oral, BID    calcium gluconate, 2 g, intravenous, Once    cetirizine, 10 mg, oral, Nightly    [DISCONTINUED] glucose, 16-32 g of dextrose, oral, PRN **OR** [DISCONTINUED] dextrose, 15-30 g of dextrose, oral, PRN **OR** [DISCONTINUED] glucagon, 1 mg, intramuscular, PRN **OR** dextrose 50 % in water (D50), 25 mL, intravenous, PRN    dilTIAZem CD, 300 mg, oral, Daily    docusate sodium, 100 mg, oral, BID    DULoxetine, 60 mg, oral, Daily    hydrALAZINE, 25 mg, oral, q8h    lidocaine, 2 patch, Topical, Daily    LORazepam, 1 mg, oral, Nightly PRN    losartan, 50 mg, oral, BID    mometasone-formoterol, 2 puff, inhalation, BID (8a, 8p)    ondansetron ODT, 4 mg, oral, q6h PRN **OR** ondansetron, 4 mg, intravenous, q6h PRN    polyethylene glycol, 17 g, oral, Daily    pregabalin, 25 mg, oral, Daily with lunch    pregabalin, 50 mg, oral, BID    senna, 2 tablet, oral, Nightly    tiotropium bromide, 2 puff, inhalation, Daily    traMADoL, 50 mg, oral, q12h PRN    Review of Systems   All other systems reviewed and are negative.      Objective   Visit Vitals  /72   Pulse 88   Temp 36.6 °C (97.9 °F) (Oral)   Resp 17   Ht 1.524 m (5')   Wt 51.3 kg (113 lb 1.5 oz)   SpO2 97%   BMI 22.09 kg/m²     Physical Exam  Constitutional:       Appearance: Normal appearance.   Cardiovascular:      Rate and Rhythm:  Normal rate and regular rhythm.      Pulses: Normal pulses.   Pulmonary:      Effort: Pulmonary effort is normal.      Breath sounds: Normal breath sounds. No rales.   Abdominal:      General: Bowel sounds are normal. There is no distension.      Palpations: Abdomen is soft.      Tenderness: There is no abdominal tenderness.   Musculoskeletal:      Cervical back: Normal range of motion.   Neurological:      General: No focal deficit present.      Mental Status: She is alert and oriented to person, place, and time.         Labs   Results from last 7 days   Lab Units 08/22/23  0537 08/21/23  1439   WBC K/uL 5.22 6.32   HEMOGLOBIN g/dL 11.8 11.2*   HEMATOCRIT % 35.9 35.2   PLATELETS K/uL 364 368     0   Lab Value Date/Time    INR 1.1 08/21/2023 1857    INR 0.9 05/02/2016 2058    INR 0.9 04/08/2015 1427     0   Lab Value Date/Time    HSTROPONINI 10.9 08/21/2023 1857    HSTROPONINI 8.7 08/21/2023 1600    HSTROPONINI 10.6 02/21/2023 0042     Results from last 7 days   Lab Units 08/22/23  0537 08/21/23  1439   SODIUM mEQ/L 140 137   POTASSIUM mEQ/L 3.7 3.7   CHLORIDE mEQ/L 106 104   CO2 mEQ/L 25 24   BUN mg/dL 12 12   CREATININE mg/dL 0.6 0.5*   CALCIUM mg/dL 8.1* 8.4*   ALBUMIN g/dL  --  3.7   BILIRUBIN TOTAL mg/dL  --  0.5   ALK PHOS IU/L  --  95   ALT IU/L  --  14   AST IU/L  --  15   GLUCOSE mg/dL 86 93       Telemetry: Afib     Cardiac Imaging    TRANSTHORACIC ECHO (TTE) COMPLETE 02/22/2023    Interpretation Summary    Left Ventricle: Normal ventricle size. Mild concentric left ventricular hypertrophy. Preserved systolic function. Estimated EF 65-70%. No regional wall motion abnormalities. Grade I diastolic dysfunction.    Right Ventricle: Normal ventricle size. Normal systolic function.    Left Atrium: Moderately dilated atrium.    Right Atrium: Mildly dilated atrium.    Aortic Valve: Tricuspid valve.  Sclerotic leaflets. No regurgitation. No stenosis. Calculated dimensionless index = 1.04.    Mitral Valve:  Anterior leaflet thickening. Sclerotic mitral valve. Normal leaflet motion. Mild mitral annular calcification. Mild regurgitation. No stenosis. Mean gradient = 2.00.    Tricuspid Valve: Normal structure. Mild regurgitation. Estimated RVSP = 32 mmHg. No significant stenosis.    Prior Study: Prior study available for comparison. Prior study date: 6/11/2019.  Compared to the prior study, biatrial enlargement and mild concentric LVH are now present.  Otherwise no significant change.    A-fib (CMS/[facility])  Assessment & Plan  History of paroxysmal atrial fibrillation dating back to February 2023 when she presented to [facility] with A-fib with RVR.  She is maintained on diltiazem 300 mg daily with apixaban 2.5 mg twice daily for [id number] of at least 4. Denies any side effects with AC  TTE 2/22/2023 with EF 65-70% and no regional wall motion abnormalities.  Moderately dilated left atrium.  ECG on presentation with Afib currently NSR    - Would continue rate control with Cardizem 300 mg daily  - Monitor patient on telemetry   - Continue Low dose Eliquis 2.5 mg BID.   Risks and Benefit discussion to decide on stopping AC in light of recurrent falls. This was discussed with patient at bedside whom in turn will discuss with her outpatient cardiologist and family and will make decisions accordingly.  - EP will sign off      This letter was generated using speech recognition software. Please excuse any typographical errors.

## 2023-08-22 NOTE — PROGRESS NOTES
Patient seen and examined bedside in ER 38.     She is awake, alert, and cooperative. C/o 7/10 sacral pain at rest, worse with movement.  She has a h/o chronic lower back pain and is on Tramadol at home.  She endorses multiple falls over the last few months and walks with a walker at home.  On exam, she has bruises of varying ages scattered on her lower extremities.     Physical Exam  Vitals and nursing note reviewed.   Constitutional:       General: She is awake. She is not in acute distress.     Appearance: Normal appearance.   HENT:      Head: Normocephalic and atraumatic.      Ears:      Comments: Mild Crow     Nose: Nose normal.      Mouth/Throat:      Mouth: Mucous membranes are moist.      Pharynx: Oropharynx is clear.   Eyes:      Extraocular Movements: Extraocular movements intact.      Pupils: Pupils are equal, round, and reactive to light.   Cardiovascular:      Rate and Rhythm: Normal rate.      Pulses: Normal pulses.           Radial pulses are 2+ on the right side and 2+ on the left side.        Dorsalis pedis pulses are 2+ on the right side and 2+ on the left side.        Posterior tibial pulses are 2+ on the right side and 2+ on the left side.      Heart sounds: Normal heart sounds. No murmur heard.     No friction rub. No gallop.   Pulmonary:      Effort: Pulmonary effort is normal.      Breath sounds: Normal breath sounds.   Abdominal:      General: Bowel sounds are normal.      Palpations: Abdomen is soft.      Tenderness: There is no abdominal tenderness. There is no guarding.   Musculoskeletal:         General: Tenderness present.      Cervical back: Full passive range of motion without pain, normal range of motion and neck supple. No tenderness.      Thoracic back: No tenderness.      Right lower leg: No edema.      Left lower leg: No edema.      Comments: Chronic lumbar spine pain.  Sacral region TTP.   Skin:     General: Skin is warm and dry.      Capillary Refill: Capillary refill takes less  than 2 seconds.      Findings: Bruising and ecchymosis present.          Neurological:      General: No focal deficit present.      Mental Status: She is alert and oriented to person, place, and time.      GCS: GCS eye subscore is 4. GCS verbal subscore is 5. GCS motor subscore is 6.      Cranial Nerves: Cranial nerves 2-12 are intact.      Sensory: Sensation is intact. No sensory deficit.      Motor: Motor function is intact.      Comments: HUTNER 5/5 strength.    Psychiatric:         Mood and Affect: Mood normal.         Behavior: Behavior normal. Behavior is cooperative.         Thought Content: Thought content normal.       Plan:  Will admit to Trauma   PT/OT evals  Pain control  WBAT b/l LE   Fall precautions.      Discussed hospital admission with patient, who is agreeable.   Plan of care was discussed with patient, who verbalized understanding.   All care questions and concerns were addressed at this time.

## 2023-08-22 NOTE — PROGRESS NOTES
I personally spoke with patient's daughter, updated them on all elements of patient care, and all questions were answered.    EMERGENCY CONTACT:   Extended Emergency Contact Information  Primary Emergency Contact: Shelley Bingham  Address: [address] [place] [address] of [place]  Home Phone: [phone]  Mobile Phone: [phone]  Relation: Designated Lay Caregiver  Secondary Emergency Contact: Venkat Ruiz   [place]  Mobile Phone: [phone]  Relation: Son         Katherin Pryor is a 15 month old male presenting for well baby exam  Denies known Latex allergy or symptoms of Latex sensitivity.  Currently is not taking any medications.    Child accompanied by mother and father  Mother's work status: full time,  and in home    DIET:      Milk - 2 Percent      Frequency - 12- 18 ounces/day      Solids: fruits, vegetables and some meats, will eat peanut butter      Appetite:- good      Food  Intolerances: shrimp, possible applesauce       Water - bottled   STOOLS: 1-3  / day  SLEEP:      Naps - 2 hour/day at , 3-4 on other days       Night - 10  hour  IMMUNIZATION REACTIONS: NO  VARICELLA STATUS: documented one dose of vaccine.  GROWTH AND DEVELOPMENT       Drinks from cup - YES       Meat pincer grasp - YES       Papa, mama specific - YES       Stands alone well - YES  MEDICATIONS: Patient is not currently taking any medication  CONCERNS:   SKIN:   -developed rash on face after eating applesauce  - Rash on back of neck: once he had HFM- parents note he develops more rashes , using fragrance free lotions, mother has eczema

## 2023-08-22 NOTE — PROGRESS NOTES
ACTIVATION/Consult TIMES     Activation or Time Notified: 18:30   Level of Trauma: CONSULT   Trauma Team Arrival Time: 18:30 Time Patient Seen: 18:30      CONSULTS      Consultant Emergent  Urgent    Routine Time Paged Time Responded EMERGENT Arrival Time   Ortho Per ED By ED                   *emergent requires <30 minutes response on site; urgent requires <12 hours response on site.

## 2023-08-22 NOTE — PLAN OF CARE
Plan of Care Review  Plan of Care Reviewed With: patient  Progress: improving  Outcome Evaluation: Patient complained of back pain, scheduled and PRN pain meds given. FSP maintained. Call light within reach.

## 2023-08-22 NOTE — H&P
TRAUMA SURGERY HISTORY & PHYSICAL     PATIENT NAME:  Madelyn Ruiz YOB: 1930    AGE:  89 y.o.  GENDER: female   MRN:  [id number]  PATIENT #: [id number]     See separate note for activation times.     Chief Complaint   Patient presents with    Fall        HISTORY OF PRESENT ILLNESS/INJURY     The patient is a 89 y.o. female       Patient is a 89-year-old female felt lightheaded and had a fall onto her right side 2 days ago, since then she has been complaining of pelvic and low bacl pain. Denies any LOC or head trauma.  Of note she does have a history of recurrent falls last month.     Pharmacological Risk Factors:   · Oral Anti-Coagulation: Eliquis for A flutter   ·     REVIEW OF SYSTEMS     14 point ROS completed and pertinent positives as listed in HPI or as stated. All others negative.    PAST MEDICAL HISTORY     Past Medical History:   Diagnosis Date    Allergic bronchopulmonary aspergillosis (CMS/[facility]) 04/02/2018    Allergist: Dr. Arriaza.  Stable FEV1 in 8/2015.  IgE levels and eosinophils stable in 8/2015.  Managed with Fasenra, Azithromycin and Flovent.    Allergic rhinitis 04/02/2018    Pulmonologist: Dr. Walker. Managed with Flonase.    Asthma     Atrial flutter with rapid ventricular response (CMS/HCC) 02/21/2023    Diagnosed in 02/2023 and hospitalized Managed with Apixaban and Diltiazem Echocardiogram 02/2023   Left Ventricle: Normal ventricle size. Mild concentric left ventricular hypertrophy. Preserved systolic function. Estimated EF 65-70%. No regional wall motion abnormalities. Grade I diastolic dysfunction.   Right Ventricle: Normal ventricle size. Normal systolic function.   Left Atrium: Moderately    Calcification of aorta (CMS/HCC) 06/29/2020    Seen incidentally on CT scan.    Chronic obstructive pulmonary disease (CMS/HCC) 04/02/2018    Pulmonologist: Dr. Walker.  Seen on PFTs in hospital in 2014.  Controlled with Spiriva and FLovent.    GERD (gastroesophageal  reflux disease) 06/08/2019    Managed with Esomeprazole.  Should take medication 30 minutes prior to meal.  Advised to avoid caffeine, carbonated beverages, and should not eat within 3 hours of going to sleep.  Advised to reduce BMI < 25.     Insomnia 04/02/2018    Managed with Lorazepam as needed.    Lumbar spinal stenosis 04/02/2018    Neurosurgeon: Dr. Miguel. MRI with Central and lateral recess spinal stenosis. Has Spondylolisthesis at L4/L5. S/P epidural injection by Dr. Martin with some relief in past. Had Lumbar fusion and Lumbar laminectomy by Dr. Miguel in 4/2015.    Macular degeneration disease     Obstructive sleep apnea syndrome 04/02/2018    Mild STACIA noted in sleep study 7/2015. Treated with nasal CPAP automatic with pressure range 5-10 CM H2Ox couple months. Off now    Osteoporosis 04/02/2018    Rheumatologist: Dr. Bradley.  Dexa scan 7/2016 with osteoporosis of lumbar spine LS-2.5, LH-2.1, RH -2.2.  Has been on Fosamax in the past for 8 years.  Worsened by Prednisone in past.  Continue vitamin D, calcium and weight bearing exercise. DEXA in 7/2017 with LS -1.7, LH -2.2, and RH -2.0.  Next due in 7/2019.  Seen by Dr. Bradley in 8/2015 who agreed with initiating Prolia. Took for 2 years. N    Primary hypertension 04/02/2018    Managed on Diltiazem and Losartan. Advised to follow a low sodium diet and keep BMI < 25.  Advised to exercise for 2.5 hours per week.  Instructed to take home blood pressure readings and call if consistently above 140/90.      Pulmonary embolism (CMS/HCC) 06/09/2019    Hematologist: Dr. Bhakta Diagnosed in 06/2019 in right upper lobe. Vascular ultrasound negative of bilateral lower extremities. Managed on Eliquis. Hypercoagulable workup was negative for beta-2 glycoprotein's, RIGO, Antithrombin III, lupus anticoagulant, protein C activity protein S activity, and prothrombin gene mutation, and factor V Leiden. Now off Apixaban as CT scan in 12/2019 was without pu    SVT  (supraventricular tachycardia) (CMS/HCC)     Vertebral compression fracture (CMS/HCC)     At T12 level       PAST SURGICAL HISTORY     Past Surgical History:   Procedure Laterality Date    APPENDECTOMY      CARPAL TUNNEL RELEASE Bilateral     CATARACT EXTRACTION W/  INTRAOCULAR LENS IMPLANT Bilateral     HYSTERECTOMY  1972    LUMBAR LAMINECTOMY  04/21/2015    S/P lumbar fusion    TONSILLECTOMY          FAMILY HISTORY     Non-contributory    SOCIAL HISTORY     Social History     Socioeconomic History    Marital status:      Spouse name: Not on file    Number of children: 5    Years of education: Not on file    Highest education level: Not on file   Occupational History    Occupation: Retired     Comment: Former Teacher   Tobacco Use    Smoking status: Never    Smokeless tobacco: Never   Vaping Use    Vaping Use: Never used   Substance and Sexual Activity    Alcohol use: Yes     Comment: Rarely    Drug use: No    Sexual activity: Not on file   Other Topics Concern    Not on file   Social History Narrative    Exercise: No formal exercise. Due to back pain from spinal stenosis.     Social Determinants of Health     Financial Resource Strain: Low Risk  (10/2/2020)    Overall Financial Resource Strain (CARDIA)     Difficulty of Paying Living Expenses: Not hard at all   Food Insecurity: No Food Insecurity (8/21/2023)    Hunger Vital Sign     Worried About Running Out of Food in the Last Year: Never true     Ran Out of Food in the Last Year: Never true   Transportation Needs: No Transportation Needs (2/21/2023)    PRAPARE - Transportation     Lack of Transportation (Medical): No     Lack of Transportation (Non-Medical): No   Physical Activity: Inactive (10/2/2020)    Exercise Vital Sign     Days of Exercise per Week: 0 days     Minutes of Exercise per Session: 0 min   Stress: No Stress Concern Present (2/21/2023)    [place] [employer] - Occupational Stress Questionnaire      Feeling of Stress : Not at all   Social Connections: Not on file   Intimate Partner Violence: Not on file   Housing Stability: Low Risk  (2/21/2023)    Housing Stability Vital Sign     Unable to Pay for Housing in the Last Year: No     Number of Places Lived in the Last Year: 1     Unstable Housing in the Last Year: No       HOME MEDICATIONS     Not in a hospital admission.    ALLERGIES     Allergies   Allergen Reactions    Mepolizumab Rash    Morphine      Other reaction(s): Vomiting    Oxycodone      Other reaction(s): Vomiting       PRIMARY CARE PHYSICIAN     Henry Linares MD    PRIMARY SURVEY   Airway: Patent  Breathing: Spontaneous, unlabored  Circulation: Central and peripheral pulses present. NSR.  Disability: GCS 15; Alert and moving all extremities        SECONDARY SURVEY     Vitals:    08/21/23 1900   BP: (!) 160/92   Pulse: (!) 106   Resp: 18   Temp:    SpO2: 94%       NEURO: A&Ox3, No focal neuro deficit.   HEENT: Face symmetric, atraumatic, pupils round and reactive bilaterally, EOMI; TMs clear bilaterally. Nares are clear. Lips and oral mucosa pink, moist and intact. Trachea midline. Tongue midline.   SPINE: Cervical spine nontender. C collar in place. T/L/S spine nontender, no stepoffs/deformities.   CHEST: Symmetric expansion, nontender, no crepitus  LUNGS: Clear to auscultation bilaterally. No use of accessory muscles or respiratory distress.   HEART: RRR  ABDOMEN: Soft, nontender, nondistended.  PELVIS: Stable, nontender.   : Genitalia unremarkable.    RECTAL: Good rectal tone. No gross blood. Anal sensation intact.  EXTREMITIES: No gross deformities. 2+ radial/DP pulses. 5/5 , bicep, tricep, dorsi/plantarflexion; sensation intact to light touch.  SKIN: warm, dry    Fast Exam: Deferred     DIAGNOSTIC DATA     LABS:  Recent Results (from the past 24 hour(s))   ECG 12 lead    Collection Time: 08/21/23  2:18 PM   Result Value Ref Range    Ventricular rate 104     Atrial rate 104      QRS duration 122     QT Interval 396     QTC Calculation(Bazett) 520     R Axis 47     T Wave Axis 29    POCT Glucose    Collection Time: 08/21/23  2:38 PM   Result Value Ref Range    POCT Bedside Glucose 97 70 - 99 mg/dL    POC Test POC    CBC and differential    Collection Time: 08/21/23  2:39 PM   Result Value Ref Range    WBC 6.32 3.80 - 10.50 K/uL    RBC 3.55 (L) 3.93 - 5.22 M/uL    Hemoglobin 11.2 (L) 11.8 - 15.7 g/dL    Hematocrit 35.2 35.0 - 45.0 %    MCV 99.2 (H) 83.0 - 98.0 fL    MCH 31.5 28.0 - 33.2 pg    MCHC 31.8 (L) 32.2 - 35.5 g/dL    RDW 13.7 11.7 - 14.4 %    Platelets 368 150 - 369 K/uL    MPV 10.3 9.4 - 12.3 fL    Differential Type Auto     nRBC 0.0 <=0.0 %    Immature Granulocytes 0.8 %    Neutrophils 71.6 %    Lymphocytes 17.9 %    Monocytes 9.5 %    Eosinophils 0.0 %    Basophils 0.2 %    Immature Granulocytes, Absolute 0.05 0.00 - 0.08 K/uL    Neutrophils, Absolute 4.53 1.70 - 7.00 K/uL    Lymphocytes, Absolute 1.13 (L) 1.20 - 3.50 K/uL    Monocytes, Absolute 0.60 0.28 - 0.80 K/uL    Eosinophils, Absolute 0.00 (L) 0.04 - 0.36 K/uL    Basophils, Absolute 0.01 0.01 - 0.10 K/uL   Comprehensive metabolic panel    Collection Time: 08/21/23  2:39 PM   Result Value Ref Range    Sodium 137 136 - 145 mEQ/L    Potassium 3.7 3.5 - 5.1 mEQ/L    Chloride 104 98 - 107 mEQ/L    CO2 24 21 - 31 mEQ/L    BUN 12 7 - 25 mg/dL    Creatinine 0.5 (L) 0.6 - 1.2 mg/dL    Glucose 93 70 - 99 mg/dL    Calcium 8.4 (L) 8.6 - 10.3 mg/dL    AST (SGOT) 15 13 - 39 IU/L    ALT (SGPT) 14 7 - 52 IU/L    Alkaline Phosphatase 95 34 - 125 IU/L    Total Protein 6.2 6.0 - 8.2 g/dL    Albumin 3.7 3.5 - 5.7 g/dL    Bilirubin, Total 0.5 0.3 - 1.2 mg/dL    eGFR >60.0 >=60.0 mL/min/1.73m*2    Anion Gap 9 3 - 15 mEQ/L   CK, Total    Collection Time: 08/21/23  2:39 PM   Result Value Ref Range    Total CK 48 30 - 223 U/L   Magnesium    Collection Time: 08/21/23  2:39 PM   Result Value Ref Range    Magnesium 2.0 1.8 - 2.5 mg/dL   UA Reflex to  Culture (Macroscopic)    Collection Time: 08/21/23  3:12 PM    Specimen: Urine, Clean Catch   Result Value Ref Range    Color, Urine Colorless Yellow, Colorless    Clarity, Urine Clear Clear    Specific Gravity, Urine 1.008 1.005 - 1.030    pH, Urine 6.5 4.5 - 8.0    Leukocyte Esterase Negative Negative    Nitrite, Urine Negative Negative    Protein, Urine Negative Negative    Glucose, Urine Negative Negative mg/dL    Ketones, Urine Negative Negative mg/dL    Urobilinogen, Urine 0.2 <2.0 EU/dL EU/dL    Bilirubin, Urine Negative Negative mg/dL    Blood, Urine Negative Negative   HS Troponin (with 2 hour reflex)    Collection Time: 08/21/23  4:00 PM   Result Value Ref Range    High Sens Troponin I 8.7 <15.0 pg/mL   HIGH SENSITIVE TROPONIN I (NO REFLEX)    Collection Time: 08/21/23  6:57 PM   Result Value Ref Range    High Sens Troponin I 10.9 <15.0 pg/mL   Lactic acid, Venous    Collection Time: 08/21/23  6:57 PM   Result Value Ref Range    Lactate 1.3 0.4 - 2.0 mmol/L   Protime-INR    Collection Time: 08/21/23  6:57 PM   Result Value Ref Range    PT 14.0 12.2 - 14.5 sec    INR 1.1     PTT    Collection Time: 08/21/23  6:57 PM   Result Value Ref Range    PTT 24 23 - 35 sec   Type and Screen H Lab    Collection Time: 08/21/23  6:57 PM   Result Value Ref Range    Specimen Expiration 08/24/2023     Antibody Screen Negative     ABO A     Rh Factor Positive     History Check Previous type on file    Ethanol    Collection Time: 08/21/23  6:57 PM   Result Value Ref Range    Ethanol <10 <10 mg/dL   Drug screen panel, urine    Collection Time: 08/21/23  6:58 PM   Result Value Ref Range    PCP Scrn, Ur Not Detected Not Detected    Benzodiazepine Ur Qual Not Detected Not Detected    Cocaine Screen, Urine Not Detected Not Detected    Amphetamine+Methamphetamine Screen, Ur Not Detected Not Detected    Cannabinoid Screen, Urine Positive (A) Not Detected    Opiate Scrn, Ur Not Detected Not Detected    Barbiturate Screen, Ur Not  Detected Not Detected    Fentanyl Screen, Urine Not Detected Not Detected        Serum creatinine: 0.5 mg/dL (L) 08/21/23 1439  Estimated creatinine clearance: 35.1 mL/min (A)    IMAGINGS:  X-RAY HIP WITH OR WITHOUT PELVIS 2-3 VW RIGHT    Result Date: 8/21/2023  IMPRESSION: No fracture or dislocation of right hip.    CT ABDOMEN PELVIS WITH IV CONTRAST    Result Date: 8/21/2023  IMPRESSION: There is presacral edema and bilateral nondisplaced sacral fractures. No evidence for solid organ injury in the chest, abdomen, and pelvis. COMMENT: Technique: CT examination of the chest, abdomen, and pelvis was performed utilizing contiguous 2.5 mm transaxial sections. Images were acquired following the administration of 85 cc Omnipaque 350 intravenous contrast.  Oral contrast was not administered. Coronal and sagittal reformations are obtained. CT DOSE:  One or more dose reduction techniques (e.g. automated exposure control, adjustment of the mA and/or kV according to patient size, use of iterative reconstruction technique) utilized for this examination Comparison studies: CT abdomen pelvis 8/10/2017. Heart and pericardium: The heart is normal in size.  There is no pericardial effusion. Great vessels: There is no evidence for thoracic aortic aneurysm.  The main pulmonary artery is normal in caliber.  No large or central pulmonary embolism. Mediastinum: No mediastinal adenopathy Malka: No hilar adenopathy. Axilla: No axillary adenopathy. Lung parenchyma: No parenchymal consolidation. Bibasilar subpleural scar/atelectasis. Right apical pleural based scarring. Pleura: See under lung parenchyma. No effusion or pneumothorax. Liver:  The liver is normal in size. Cyst in the right lobe of the liver measures 4.0 x 5.1 cm. No evidence for hepatic or portal vein thrombus.. Gallbladder:  No gallstones.  No gallbladder wall thickening.. Spleen: Spleen is normal in size without focal mass lesion.. Pancreas: The pancreas is normal without  focal mass, parenchymal atrophy, or pancreatic duct dilation.. Adrenals: The adrenals are normal bilaterally without focal mass.. Kidneys, ureters and bladder: No hydronephrosis.  No renal calculi.  Symmetric enhancement and excretion.  No evidence for focal mass lesion.  The bladder is normal.. Retroperitoneal structures: There is no abdominal aortic aneurysm. There is atherosclerotic calcification. There is no retroperitoneal adenopathy or retroperitoneal mass.. Bowel and mesentery: No evidence of a focal inflammatory or obstructive process. Lymph nodes: No pathologic lymphadenopathy.. Pelvis: There is presacral edema. Bilateral nondisplaced sacral fractures. No fracture or dislocation of the hip. Heterotopic calcification about the right pubic bone and right inferior pubic ramus may be related to remote injury. Status post laminectomy and fusion L4-L5. Superior endplate deformity of T12 also present on 2017 CT scan. Bones: Within normal limits.    CT CHEST WITH IV CONTRAST    Result Date: 8/21/2023  IMPRESSION: There is presacral edema and bilateral nondisplaced sacral fractures. No evidence for solid organ injury in the chest, abdomen, and pelvis. COMMENT: Technique: CT examination of the chest, abdomen, and pelvis was performed utilizing contiguous 2.5 mm transaxial sections. Images were acquired following the administration of 85 cc Omnipaque 350 intravenous contrast.  Oral contrast was not administered. Coronal and sagittal reformations are obtained. CT DOSE:  One or more dose reduction techniques (e.g. automated exposure control, adjustment of the mA and/or kV according to patient size, use of iterative reconstruction technique) utilized for this examination Comparison studies: CT abdomen pelvis 8/10/2017. Heart and pericardium: The heart is normal in size.  There is no pericardial effusion. Great vessels: There is no evidence for thoracic aortic aneurysm.  The main pulmonary artery is normal in caliber.  No  large or central pulmonary embolism. Mediastinum: No mediastinal adenopathy Malka: No hilar adenopathy. Axilla: No axillary adenopathy. Lung parenchyma: No parenchymal consolidation. Bibasilar subpleural scar/atelectasis. Right apical pleural based scarring. Pleura: See under lung parenchyma. No effusion or pneumothorax. Liver:  The liver is normal in size. Cyst in the right lobe of the liver measures 4.0 x 5.1 cm. No evidence for hepatic or portal vein thrombus.. Gallbladder:  No gallstones.  No gallbladder wall thickening.. Spleen: Spleen is normal in size without focal mass lesion.. Pancreas: The pancreas is normal without focal mass, parenchymal atrophy, or pancreatic duct dilation.. Adrenals: The adrenals are normal bilaterally without focal mass.. Kidneys, ureters and bladder: No hydronephrosis.  No renal calculi.  Symmetric enhancement and excretion.  No evidence for focal mass lesion.  The bladder is normal.. Retroperitoneal structures: There is no abdominal aortic aneurysm. There is atherosclerotic calcification. There is no retroperitoneal adenopathy or retroperitoneal mass.. Bowel and mesentery: No evidence of a focal inflammatory or obstructive process. Lymph nodes: No pathologic lymphadenopathy.. Pelvis: There is presacral edema. Bilateral nondisplaced sacral fractures. No fracture or dislocation of the hip. Heterotopic calcification about the right pubic bone and right inferior pubic ramus may be related to remote injury. Status post laminectomy and fusion L4-L5. Superior endplate deformity of T12 also present on 2017 CT scan. Bones: Within normal limits.    CT CERVICAL SPINE WITHOUT IV CONTRAST    Result Date: 8/21/2023  IMPRESSION:  No acute intracranial abnormality.  No evidence for acute fracture or traumatic subluxation of the cervical spine.  Left sphenoid and maxillary sinus disease with hyperdense material likely related to inspissated secretions or fungal sinusitis.  Degenerative spondylosis of  the cervical spine with predominantly multilevel right-sided foraminal narrowing. COMMENT: A standard noncontrast head CT was performed. Noncontrast CT examination of the cervical spine performed following the standard protocol. Sagittal and coronal reformations rendered from axial source images. Images reviewed in bone and soft tissue windows. CT DOSE:  One or more dose reduction techniques (e.g. automated exposure control, adjustment of the mA and/or kV according to patient size, use of iterative reconstruction technique) utilized for this examination. Comparison:  Head CT dated 2/20/2023. Findings:  Sulci, ventricles and basal cisterns are within normal limits for patient's age.  Periventricular and subcortical white matter hypoattenuation, nonspecific, likely sequela of microangiopathic disease.  Old lacunar infarct versus perivascular space in the right inferior basal ganglia.  No acute hemorrhage, acute territorial infarct, or mass effect is seen.  There is no extra-axial fluid collection.  The visualized paranasal sinuses demonstrates complete opacification of the left maxillary sinus and left sphenoid sinus with mucoperiosteal thickening of the sinus walls.   Mastoid air cells are clear. Bilateral ocular lens implants. Cervicothoracic alignment: Anatomic. Prevertebral soft tissues: Normal in thickness. Vertebral bodies: Normal in height.  No acute fractures. Intervertebral discs: Disc osteophyte complex at C5-C6 and C2-C3. Cervical and upper thoracic spinal canal: Patent. Axial images: Skull base: Normal. C1-2: Normal. C2-3: Disc osteophyte complex at C2-C3 with severe left facet arthrosis and mild uncovertebral hypertrophic changes bilaterally, contributing to mild left foraminal narrowing and mild flattening the ventral thecal sac. C3-4: Exuberant right facet hypertrophy, contributing to severe right foraminal narrowing. C4-5: Exuberant right facet hypertrophy, moderate on the left, and moderate right  uncovertebral hypertrophic changes, contributing to moderate to severe right and moderate left foraminal narrowing. C5-6: Disc osteophyte complex with exuberant right and moderate left facet hypertrophy, right uncovertebral hypertrophic changes, contributing to moderate to severe right foraminal narrowing. C6-7: Disc bulge accentuated by uncovering of intervertebral disc, severe right and moderate left facet hypertrophy, contributing to moderate right foraminal narrowing C7-T1: Disc osteophyte complex and facet arthrosis, contributing to mild flattening the ventral thecal sac and mild right foraminal narrowing. Other: Biapical pleural-parenchymal scarring.     CT HEAD WITHOUT IV CONTRAST    Result Date: 8/21/2023  IMPRESSION:  No acute intracranial abnormality.  No evidence for acute fracture or traumatic subluxation of the cervical spine.  Left sphenoid and maxillary sinus disease with hyperdense material likely related to inspissated secretions or fungal sinusitis.  Degenerative spondylosis of the cervical spine with predominantly multilevel right-sided foraminal narrowing. COMMENT: A standard noncontrast head CT was performed. Noncontrast CT examination of the cervical spine performed following the standard protocol. Sagittal and coronal reformations rendered from axial source images. Images reviewed in bone and soft tissue windows. CT DOSE:  One or more dose reduction techniques (e.g. automated exposure control, adjustment of the mA and/or kV according to patient size, use of iterative reconstruction technique) utilized for this examination. Comparison:  Head CT dated 2/20/2023. Findings:  Sulci, ventricles and basal cisterns are within normal limits for patient's age.  Periventricular and subcortical white matter hypoattenuation, nonspecific, likely sequela of microangiopathic disease.  Old lacunar infarct versus perivascular space in the right inferior basal ganglia.  No acute hemorrhage, acute territorial  infarct, or mass effect is seen.  There is no extra-axial fluid collection.  The visualized paranasal sinuses demonstrates complete opacification of the left maxillary sinus and left sphenoid sinus with mucoperiosteal thickening of the sinus walls.   Mastoid air cells are clear. Bilateral ocular lens implants. Cervicothoracic alignment: Anatomic. Prevertebral soft tissues: Normal in thickness. Vertebral bodies: Normal in height.  No acute fractures. Intervertebral discs: Disc osteophyte complex at C5-C6 and C2-C3. Cervical and upper thoracic spinal canal: Patent. Axial images: Skull base: Normal. C1-2: Normal. C2-3: Disc osteophyte complex at C2-C3 with severe left facet arthrosis and mild uncovertebral hypertrophic changes bilaterally, contributing to mild left foraminal narrowing and mild flattening the ventral thecal sac. C3-4: Exuberant right facet hypertrophy, contributing to severe right foraminal narrowing. C4-5: Exuberant right facet hypertrophy, moderate on the left, and moderate right uncovertebral hypertrophic changes, contributing to moderate to severe right and moderate left foraminal narrowing. C5-6: Disc osteophyte complex with exuberant right and moderate left facet hypertrophy, right uncovertebral hypertrophic changes, contributing to moderate to severe right foraminal narrowing. C6-7: Disc bulge accentuated by uncovering of intervertebral disc, severe right and moderate left facet hypertrophy, contributing to moderate right foraminal narrowing C7-T1: Disc osteophyte complex and facet arthrosis, contributing to mild flattening the ventral thecal sac and mild right foraminal narrowing. Other: Biapical pleural-parenchymal scarring.     MRI KNEE LEFT WITHOUT CONTRAST    Result Date: 7/26/2023  IMPRESSION: 1.  Large, partially ruptured multilocular Baker's cyst with peripheral areas of nodularity, which may represent synovitis.  However, post contrast imaging should be considered to exclude of a solid  nodular enhancing component 2.  Complex lateral meniscal tear.  Severe lateral compartment chondrosis 3.  Small oblique tear of the osteoid of the medial meniscus.  Mild to moderate medial compartment chondrosis 4.  Moderate knee joint effusion with synovitis       IMPRESSION/PLAN     Patient is a 89 y.o. female s/p fall p/e     - Syncopal evaluation   - Ortho consult for right pubic ramus and bilateral sacral ala fractures  -No surgical intervention indicated this time  - Fractures amenable to nonoperative treatment  - Patient can be weightbearing as tolerated bilateral lower extremities  - Recommend multimodal pain control  - PT/OT evaluation, recommend premedication prior to the sessions  - Rest of care per primary team  - Patient can follow-up as an outpatient with Dr. Saavedra 2 weeks after discharge      DISPOSITION:  3S med-surg     Ruchi Garza MD    ACS Attending

## 2023-08-22 NOTE — ASSESSMENT & PLAN NOTE
· Closed, ND, b/l sacral Fxs.   · Ortho following: non-op, f/u w/ Dr Saavedra OP in 2 weeks.  · WBAT b/l LE.   · Multi-modal pain control.   · PT/OT/PMR--SNF

## 2023-08-22 NOTE — ASSESSMENT & PLAN NOTE
· Patient with frequent mechanical falls at home. Use of walker at baseline.   · Fall precautions.  · OOB with assist.   · WBAT b/l LE.

## 2023-08-22 NOTE — ASSESSMENT & PLAN NOTE
History of paroxysmal atrial fibrillation dating back to February 2023 when she presented to [facility] with A-fib with RVR.  She is maintained on diltiazem 300 mg daily with apixaban 2.5 mg twice daily for [id number] of at least 4. Denies any side effects with AC  TTE 2/22/2023 with EF 65-70% and no regional wall motion abnormalities.  Moderately dilated left atrium.  ECG on presentation with Afib currently NSR    - Would continue rate control with Cardizem 300 mg daily  - Monitor patient on telemetry   - Continue Low dose Eliquis 2.5 mg BID.   Risks and Benefit discussion to decide on stopping AC in light of recurrent falls. This was discussed with patient at bedside whom in turn will discuss with her outpatient cardiologist and family and will make decisions accordingly.  - EP will sign off

## 2023-08-22 NOTE — CONSULTS
Brief Nutrition Note    Recommendations   1: Continue current diet order  2: Recommend Boost BID (chocolate)        Clinical Course: Patient is a 89 y.o. female who was admitted on 8/21/2023 with a diagnosis of Fall, initial encounter [W19.XXXA]  Closed fracture of sacrum, unspecified portion of sacrum, initial encounter (CMS/HCC) [S32.10XA].     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 Arriaza.  Stable FEV1 in 8/2015.  IgE levels and eosinophils stable in 8/2015.  Managed with Fasenra, Azithromycin and Flovent.    Allergic rhinitis 04/02/2018    Pulmonologist: Dr. Walker. Managed with Flonase.    Asthma     Atrial flutter with rapid ventricular response (CMS/HCC) 02/21/2023    Diagnosed in 02/2023 and hospitalized Managed with Apixaban and Diltiazem Echocardiogram 02/2023   Left Ventricle: Normal ventricle size. Mild concentric left ventricular hypertrophy. Preserved systolic function. Estimated EF 65-70%. No regional wall motion abnormalities. Grade I diastolic dysfunction.   Right Ventricle: Normal ventricle size. Normal systolic function.   Left Atrium: Moderately    Calcification of aorta (CMS/HCC) 06/29/2020    Seen incidentally on CT scan.    Chronic obstructive pulmonary disease (CMS/HCC) 04/02/2018    Pulmonologist: Dr. Walker.  Seen on PFTs in hospital in 2014.  Controlled with Spiriva and FLovent.    GERD (gastroesophageal reflux disease) 06/08/2019    Managed with Esomeprazole.  Should take medication 30 minutes prior to meal.  Advised to avoid caffeine, carbonated beverages, and should not eat within 3 hours of going to sleep.  Advised to reduce BMI < 25.     Insomnia 04/02/2018    Managed with Lorazepam as needed.    Lumbar spinal stenosis 04/02/2018    Neurosurgeon: Dr. Miguel. MRI with Central and lateral recess spinal stenosis. Has Spondylolisthesis at L4/L5. S/P epidural injection by Dr. Martin with some  relief in past. Had Lumbar fusion and Lumbar laminectomy by Dr. Miguel in 4/2015.    Macular degeneration disease     Obstructive sleep apnea syndrome 04/02/2018    Mild STACIA noted in sleep study 7/2015. Treated with nasal CPAP automatic with pressure range 5-10 CM H2Ox couple months. Off now    Osteoporosis 04/02/2018    Rheumatologist: Dr. Bradley.  Dexa scan 7/2016 with osteoporosis of lumbar spine LS-2.5, LH-2.1, RH -2.2.  Has been on Fosamax in the past for 8 years.  Worsened by Prednisone in past.  Continue vitamin D, calcium and weight bearing exercise. DEXA in 7/2017 with LS -1.7, LH -2.2, and RH -2.0.  Next due in 7/2019.  Seen by Dr. Bradley in 8/2015 who agreed with initiating Prolia. Took for 2 years. N    Primary hypertension 04/02/2018    Managed on Diltiazem and Losartan. Advised to follow a low sodium diet and keep BMI < 25.  Advised to exercise for 2.5 hours per week.  Instructed to take home blood pressure readings and call if consistently above 140/90.      Pulmonary embolism (CMS/HCC) 06/09/2019    Hematologist: Dr. Bhakta Diagnosed in 06/2019 in right upper lobe. Vascular ultrasound negative of bilateral lower extremities. Managed on Eliquis. Hypercoagulable workup was negative for beta-2 glycoprotein's, RIGO, Antithrombin III, lupus anticoagulant, protein C activity protein S activity, and prothrombin gene mutation, and factor V Leiden. Now off Apixaban as CT scan in 12/2019 was without pu    SVT (supraventricular tachycardia) (CMS/HCC)     Vertebral compression fracture (CMS/HCC)     At T12 level     Past Surgical History:   Procedure Laterality Date    APPENDECTOMY      CARPAL TUNNEL RELEASE Bilateral     CATARACT EXTRACTION W/  INTRAOCULAR LENS IMPLANT Bilateral     HYSTERECTOMY  1972    LUMBAR LAMINECTOMY  04/21/2015    S/P lumbar fusion    TONSILLECTOMY         Reason for Assessment  Reason For Assessment: per organizational policy (trauma)    MST Nutrition Screen Tool  Has  patient lost weight without trying?: 0-->No  Has patient been eating poorly due to decreased appetite?: 1-->Yes  MST Nutrition Screen Score: 1     Nutrition/Diet History  Intake (%): 25%  Food Allergies: no known food allergies    Physical Findings  Last Bowel Movement: 08/20/23  Skin: intact, edema (+1 b/l LE edema)         Nutrition Order  Nutrition Order: meets nutritional requirements  Nutrition Order Comments: Regular  Current TF/TPN Regimen: No     Anthropometrics  Height: 152.4 cm (5')           Current Weight  Weight Method: Bed scale  Weight: 51.3 kg (113 lb 1.5 oz)     Ideal Body Weight (IBW)  Ideal Body Weight (IBW) (kg): 45.86  % Ideal Body Weight: 111.86            Body Mass Index (BMI)  BMI (Calculated): 22.1                       Labs/Procedures/Meds  Lab Results Reviewed: reviewed, pertinent   Lab Results   Component Value Date    GLUCOSE 86 08/22/2023    CALCIUM 8.1 (L) 08/22/2023     08/22/2023    K 3.7 08/22/2023    CO2 25 08/22/2023     08/22/2023    BUN 12 08/22/2023    CREATININE 0.6 08/22/2023               Medications  Pertinent Medications Reviewed: reviewed, pertinent    acetaminophen  975 mg oral q6h NOE    apixaban  2.5 mg oral BID    calcium gluconate  2 g intravenous Once    cetirizine  10 mg oral Nightly    dilTIAZem CD  300 mg oral Daily    docusate sodium  100 mg oral BID    DULoxetine  60 mg oral Daily    hydrALAZINE  25 mg oral q8h    ketorolac  15 mg intravenous Once    lidocaine  2 patch Topical Daily    losartan  50 mg oral BID    mometasone-formoterol  2 puff inhalation BID (8a, 8p)    polyethylene glycol  17 g oral Daily    potassium chloride  20 mEq oral Once    pregabalin  25 mg oral Daily with lunch    pregabalin  50 mg oral BID    senna  2 tablet oral Nightly    tiotropium bromide  2 puff inhalation Daily             Clinical comments:    Pt screened 2/2 trauma. Presents s/p fall, found to have sacral fx. Pt seen at bedside. Notes she has a  good appetite at baseline, typically consumes 3 meals/day. Feels her appetite has been limited since admission 2/2 pain. Offered ONS to aid intakes, pt agreeable to trial Boost BID. Discussed weight hx, pt denies recent weight change. Reports UBW ~118#. Noting per chart, recent weights fluctuating. Measured at 125# 7/2023, # reflective of possible 12# weight loss x 1 month. ?accuracy of weight change given pt hx, suspect fluctuation may be 2/2 fluid shifting- per chart pt w/ edema in July. Endorsing intermittent nausea in house- reports sx well controlled at present. Zofran ordered prn. Denies constipation/diarrhea. Will continue to monitor.         Goals: PO intake >50% meals/ONS through f/u   Monitor: PO intake, diet order, lab values, weight trends, skin integrity, plan of care    Recommendations: See above       Date: 08/22/23  Signature: SHAWANDA Vega

## 2023-08-22 NOTE — PROGRESS NOTES
Spoke with patient (limited historian) and confirmed with medication list from [facility] and/or patient's own pharmacy to complete the medication reconciliation.     Prior to admission medication list:    Current Outpatient Medications:     apixaban (ELIQUIS) 2.5 mg tablet, Take 2.5 mg by mouth 2 (two) times a day.    azithromycin (ZITHROMAX) 250 mg tablet, Take 250 mg by mouth 3 (three) times a week (Mon, Wed, Fri).    calcium carbonate 600 mg calcium (1,500 mg) tablet, Take 1 tablet by mouth 2 (two) times a day.    cholecalciferol, vitamin D3, 25 mcg (1,000 unit) capsule, Take 1 tablet by mouth daily.    diltiazem (TIAZAC) 300 mg 24 hr capsule, Take 1 capsule (300 mg total) by mouth daily.    docusate sodium (COLACE) 100 mg capsule, Take 100 mg by mouth 2 (two) times a day as needed for constipation.    DULoxetine (CYMBALTA) 60 mg capsule, Take 60 mg by mouth at bedtime.     fexofenadine (ALLEGRA) 180 mg tablet, Take 180 mg by mouth daily as needed (allergies).    LORazepam (ATIVAN) 1 mg tablet, Take 1 mg by mouth nightly as needed for anxiety or sleep.    losartan (COZAAR) 50 mg tablet, Take 1 tablet (50 mg total) by mouth 2 (two) times a day.    pregabalin (LYRICA) 25 mg capsule, Take 25 mg by mouth daily with lunch.    pregabalin (LYRICA) 50 mg capsule, Take 50 mg by mouth 2 (two) times a day.    SPIRIVA RESPIMAT 2.5 mcg/actuation mist inhaler, Inhale 2 puffs daily.    SYMBICORT 80-4.5 mcg/actuation inhaler, Inhale 2 puffs 2 (two) times a day.    FOLIC ACID/MULTIVIT,IRON, (CENTRUM ORAL), Take 1 tablet by mouth daily.    furosemide (LASIX) 20 mg tablet, Take 0.5 tablets (10 mg total) by mouth See admin instr. Take 1/2 tablet every other day for 10 doses.    hydrALAZINE (APRESOLINE) 25 mg tablet, Take 1 tablet by mouth every 8 (eight) hours.    lifitegrast (XIIDRA) 5 % dropperette dropperette,     traMADoL (ULTRAM) 50 mg tablet, Take 50 mg by mouth daily as needed.    Comments about home  medications:    Patient states she takes both Spiriva and Symbicort which were last filled 90/2022 for 90 days.    Patient states she no longer takes furosemide.    Compliance:     All other medications have recent fills.

## 2023-08-22 NOTE — PROGRESS NOTES
Physical Therapy -  Initial Evaluation     Patient: Madelyn Ruiz  Location: [address]  MRN: [id number]  Today's date: 8/22/2023    HISTORY OF PRESENT ILLNESS     Madelyn is a 89 y.o. female admitted on 8/21/2023 with Fall, initial encounter [W19.XXXA]  Closed fracture of sacrum, unspecified portion of sacrum, initial encounter (CMS/[facility]) [S32.10XA]. Principal problem is Fall, initial encounter.    Past Medical History  Madelyn has a past medical history of Allergic bronchopulmonary aspergillosis (CMS/[facility]) (04/02/2018), Allergic rhinitis (04/02/2018), Asthma, Atrial flutter with rapid ventricular response (CMS/[facility]) (02/21/2023), Calcification of aorta (CMS/HCC) (06/29/2020), Chronic obstructive pulmonary disease (CMS/[facility]) (04/02/2018), GERD (gastroesophageal reflux disease) (06/08/2019), Insomnia (04/02/2018), Lumbar spinal stenosis (04/02/2018), Macular degeneration disease, Obstructive sleep apnea syndrome (04/02/2018), Osteoporosis (04/02/2018), Primary hypertension (04/02/2018), Pulmonary embolism (CMS/HCC) (06/09/2019), SVT (supraventricular tachycardia) (CMS/HCC), and Vertebral compression fracture (CMS/HCC).    History of Present Illness   Admitted s/p fall onto her right side 2 days ago, since then she has been complaining of pelvic and low bacl pain. (-) LOC   CT pelvis 9+) right superior pubic ramus fracture and bilateral sacral ala fractures, all minimally displaced--non- op per ortho [facility]    PRIOR LEVEL OF FUNCTION AND LIVING ENVIRONMENT     Prior Level of Function    Flowsheet Row Most Recent Value   Assistive Device Currently Used at Home cane, quad, walker, front-wheeled        Prior Living Environment    Flowsheet Row Most Recent Value   People in Home alone   Current Living Arrangements other (see comments)  [[facility]]   Living Environment Comment lives in a 3 story condo, states stays on 1st floor        VITALS AND PAIN     PT Vitals    Date/Time Pulse BP Who    08/22/23 0832 88 118/72 RM      PT Pain    Date/Time Pain Type Location Rating: Rest Rating: Activity Who   08/22/23 0831 Pain Assessment back 8 8 RM        Objective   OBJECTIVE     Start time:  0822  End time:  0848  Session Length: 26 min  Mode of Treatment: co-treatment, physical therapy    General Observations  Patient received supine, in bed. She was agreeable to therapy, no issues or concerns identified by nurse prior to session.      Precautions: fall, weight bearing  Extremity Weight-Bearing Status: right lower extremity, left lower extremity  Left LE Weight-Bearing Status: weight-bearing as tolerated (WBAT)  Right LE Weight-Bearing Status: weight-bearing as tolerated (WBAT)           PT Eval and Treat - 08/22/23 0822        Cognition    Orientation Status oriented x 4     Affect/Mental Status WFL     Follows Commands follows two-step commands;increased processing time needed     Comment, Cognition pt is pleasant and cooperative, pt benefits from increased time 2* pain with mobility.        Vision Assessment/Intervention    Vision Assessment corrective lenses for reading        Hearing Assessment    Hearing Status WFL        Sensory Assessment    Sensory Assessment sensation intact, upper extremities        Lower Extremity Assessment    LE Assessment ROM and Strength WFL except     General Observations dec active L knee extension and hip flexion 2* increased pain        Bed Mobility    Bed Mobility Activities sit to supine     [place] moderate assist (50-74% patient effort);2 person assist     Safety/Cues increased time to complete;moderate;verbal cues;tactile cues     Assistive Device bed rails;draw sheet;head of bed elevated     Comment OOB to R with assist x2 for upright trunk and BLE        Sit/Stand Transfer    Surface edge of bed     [place] minimum assist (75% or more patient effort);2 person assist     Safety/Cues moderate;verbal cues;hand placement;technique     Assistive Device walker,  front-wheeled     Transfer Comments from EOB rising to RW with minAx2.        Surface-to-Surface Transfers    Transfer Location bed to chair     Transfer Technique stand pivot     [place] minimum assist (75% or more patient effort);2 person assist     Safety/Cues increased time to complete;verbal cues;hand placement;proper use of assistive device     Assistive Device walker, front-wheeled     Transfer Comments steps to chair with RW support        Gait Training    [place], Gait minimum assist (75% or more patient effort);2 person assist     Safety/Cues minimal;verbal cues;proper use of assistive device;technique     Assistive Device walker, front-wheeled     Distance in Feet 3 feet     Pattern step-to     Deviations/Abnormal Patterns mundo decreased;antalgic;base of support, narrow     Comment (Gait/Stairs) side steps from bed to chair with RW support        Balance    Static Sitting Balance WNL     Dynamic Sitting Balance mild impairment     Sit to Stand Dynamic Balance mild impairment     Static Standing Balance moderate impairment     Dynamic Standing Balance moderate impairment        Impairments/Safety Issues    Impairments Affecting Function balance;endurance/activity tolerance;pain;range of motion (ROM);strength                               Education Documentation  Treatment Plan, taught by Nimisha Womack PT at 8/22/2023  2:53 PM.  Learner: Patient  Readiness: Acceptance  Method: Explanation  Response: Verbalizes Understanding  Comment: Role of PT, POC, safe mobility, WBing status        Session Outcome  Patient upright, in chair at end of session, chair alarm on, all needs met, call light in reach, personal items in reach. Nursing notified about patient's response to therapy/activity, patient's performance, and patient's position.    AM-PAC - Mobility (Current Function)     Turning form your back to your side while in flat bed without using bedrails 2 - A Lot   Moving from lying on your  back to sitting on the side of a flat bed without using bedrails 2 - A Lot   Moving to and from a bed to a chair 2 - A Lot   Standing up from a chair using your arms 2 - A Lot   To walk in a hospital room 2 - A Lot   Climbing 3-5 steps with a railing 2 - A Lot   AM-PAC Mobility Score 12      ASSESSMENT AND PLAN     Progress Summary  PT eval completed. Santa currently requires mod Ax2 for bed mobility, minAx2 for txfs and short ambulation with RW support. Pt limited by increased pain with mobility. Rec d/c to SNF when medically appropriate         PT Plan    Flowsheet Row Most Recent Value   Rehab Potential good, to achieve stated therapy goals at 08/22/2023 0822   Therapy Frequency 5 times/wk at 08/22/2023 0822   Planned Therapy Interventions balance training, bed mobility training, gait training, stair training, stretching, strengthening, ROM (range of motion) at 08/22/2023 0822          PT Discharge Recommendations    Flowsheet Row Most Recent Value   PT Recommended Discharge Disposition skilled nursing facility at 08/22/2023 0822   Anticipated Equipment Needs at Discharge (PT) none at 08/22/2023 0822               PT Goals    Flowsheet Row Most Recent Value   Bed Mobility Goal 1    Activity/Assistive Device bed mobility activities, all at 08/22/2023 0822   [place] independent at 08/22/2023 0822   Time Frame short-term goal (STG) at 08/22/2023 0822   Progress/Outcome goal ongoing at 08/22/2023 0822   Transfer Goal 1    Activity/Assistive Device all transfers at 08/22/2023 0822   [place] independent at 08/22/2023 0822   Time Frame short-term goal (STG) at 08/22/2023 0822   Progress/Outcome goal ongoing at 08/22/2023 0822   Gait Training Goal 1    Activity/Assistive Device gait (walking locomotion) at 08/22/2023 0822   [place] independent at 08/22/2023 0822   Distance 60 at 08/22/2023 0822   Time Frame short-term goal (STG) at 08/22/2023 0822   Progress/Outcome goal ongoing at 08/22/2023 0822

## 2023-08-22 NOTE — PROGRESS NOTES
Occupational Therapy -  Initial Evaluation     Patient: Madelyn Ruiz  Location: [address]  MRN: [id number]  Today's date: 8/22/2023    HISTORY OF PRESENT ILLNESS     Madelyn is a 89 y.o. female admitted on 8/21/2023 with Fall, initial encounter [W19.XXXA]  Closed fracture of sacrum, unspecified portion of sacrum, initial encounter (CMS/[facility]) [S32.10XA]. Principal problem is Fall, initial encounter.    Past Medical History  Madelyn has a past medical history of Allergic bronchopulmonary aspergillosis (CMS/[facility]) (04/02/2018), Allergic rhinitis (04/02/2018), Asthma, Atrial flutter with rapid ventricular response (CMS/[facility]) (02/21/2023), Calcification of aorta (CMS/[facility]) (06/29/2020), Chronic obstructive pulmonary disease (CMS/[facility]) (04/02/2018), GERD (gastroesophageal reflux disease) (06/08/2019), Insomnia (04/02/2018), Lumbar spinal stenosis (04/02/2018), Macular degeneration disease, Obstructive sleep apnea syndrome (04/02/2018), Osteoporosis (04/02/2018), Primary hypertension (04/02/2018), Pulmonary embolism (CMS/[facility]) (06/09/2019), SVT (supraventricular tachycardia) (CMS/HCC), and Vertebral compression fracture (CMS/HCC).    History of Present Illness   Admitted s/p fall onto her right side 2 days ago, since then she has been complaining of pelvic and low bacl pain. (-) LOC   CT pelvis 9+) right superior pubic ramus fracture and bilateral sacral ala fractures, all minimally displaced--non- op per ortho [facility]    PRIOR LEVEL OF FUNCTION AND LIVING ENVIRONMENT     Prior Level of Function    Flowsheet Row Most Recent Value   Assistive Device Currently Used at Home cane, quad, walker, front-wheeled        Prior Living Environment    Flowsheet Row Most Recent Value   People in Home alone   Current Living Ar[facility]ements other (see comments)  [facility]   Living Environment Comment lives in a 3 story condo, states stays on 1st floor        Occupational Profile    Flowsheet Row Most Recent  Value   Environmental Supports and Barriers reports supportive daughter assist with IADLS daily and had a night nurse        VITALS AND PAIN     OT Vitals    Date/Time Pulse HR Source SpO2 Pt Activity O2 Therapy BP BP Location BP Method Pt Position [facility]   08/22/23 0820 94 Monitor 98 % At rest None (Room air) 142/74 Right upper arm Automatic Lying K   08/22/23 0832 88 -- -- -- -- 118/72 -- -- -- RM      OT Pain    Date/Time Pain Type Side/Orientation Location Rating: Rest Rating: Activity [facility]   08/22/23 0820 Pain Assessment generalized pelvic 8 8 SLK   08/22/23 0831 Pain Assessment -- back 8 8 RM        Objective   OBJECTIVE     Start time:  0818  End time:  0850  Session Length: 32 min  Mode of Treatment: co-treatment, occupational therapy (to expedite discharge)    General Observations  Patient received supine, in bed. She was agreeable to therapy, no issues or concerns identified by nurse prior to session.      Precautions: fall       Limitations/Impairments: safety/cognitive      OT Eval and Treat - 08/22/23 0818        Cognition    Orientation Status oriented x 4     Affect/Mental Status WFL     Follows Commands follows two-step commands;increased processing time needed     Comment, Cognition pt pleasant and cooperative, engaged in therapy session...  Cues for safety and incresaed information processing time impacted by pain     Cognitive Interventions/Strategies occupation/activity based interventions        Vision Assessment/Intervention    Vision Assessment corrective lenses for reading   +macular deg       Hearing Assessment    Hearing Status WFL        Sensory Assessment    Sensory Assessment sensation intact, upper extremities        Upper Extremity Assessment    UE Assessment ROM and Strength WFL        Bed Mobility    Bed Mobility Activities supine to sit     [place] moderate assist (50-74% patient effort);2 person assist     Safety/Cues increased time to complete;moderate;verbal cues;tactile  cues;hand placement;sequencing;preparatory posture     Assistive Device bed rails;head of bed elevated;draw sheet     Comment limited by pain OOB to R        Sit/Stand Transfer    Surface edge of bed     [place] minimum assist (75% or more patient effort);2 person assist     Safety/Cues increased time to complete;moderate;verbal cues;tactile cues;hand placement;preparatory posture     Assistive Device walker, front-wheeled        Surface-to-Surface Transfers    Transfer Location bed to chair     Transfer Technique stand step     [place] minimum assist (75% or more patient effort);2 person assist     Safety/Cues increased time to complete;moderate;verbal cues;tactile cues;proper use of assistive device     Assistive Device walker, front-wheeled     Transfer Comments few steps around to chair, limited by pain        Upper Body Dressing    Tasks don;hospital gown;pajama/robe     [place] set up;supervision     Safety/Cues increased time to complete;minimal;verbal cues;compensatory techniques;energy conservation     Position sitting up in bed        Lower Body Dressing    Tasks don;socks     [place] dependent (less than 25% patient effort)     Position sitting up in bed     Comment limited by pain and dec functional reach        Grooming    Tasks washes, rinses and dries face;washes, rinses and dries hands;oral care (brushing teeth, cleaning dentures)     [place] set up     Safety/Cues increased time to complete;minimal     Position supported sitting        Balance    Static Sitting Balance WFL;supported;sitting, edge of bed     Dynamic Sitting Balance mild impairment;supported;sitting, edge of bed     Sit to Stand Dynamic Balance mild impairment;supported;standing     Static Standing Balance mild impairment;supported;standing     Dynamic Standing Balance mild impairment;supported;standing     Balance Interventions occupation based/functional task     Comment, Balance benefits from RW for balance  and ECON        Impairments/Safety Issues    Impairments Affecting Function balance;pain;endurance/activity tolerance;strength;range of motion (ROM)                               Education Documentation  Treatment Plan, taught by Aye Martel OT at 8/22/2023  2:00 PM.  Learner: Patient  Readiness: Acceptance  Method: Explanation, Demonstration  Response: Verbalizes Understanding, Needs Reinforcement  Comment: role/goal of OT, therapy plan of care, impt of OOB and adapted ADL/transfer training, use of call bell        Session Outcome  Patient upright, in chair at end of session, chair alarm on, all needs met, personal items in reach, call light in reach. Nursing notified about patient's performance, patient's position, and patient's response to therapy/activity.    AM-PAC - ADL (Current Function)     Putting on/taking off regular lower body clothing 1 - Total   Bathing 2 - A Lot   Toileting 1 - Total   Putting on/taking off regular upper body clothing 3 - A Little   Help for taking care of personal grooming 3 - A Little   Eating meals 3 - A Little   AM-PAC ADL Score 13      ASSESSMENT AND PLAN     Progress Summary  OT eval completed. Pt presents with dec cardiopulmonary endurance and balance furhter impacted by ++pain R hip/pelvis. Pt currently requires mod A x 2 with bed mob and min A x 2 transfers with use of RW. Pt able to complete basic grooming and UB selfcare tasks with setup and D LB dressing . Pt dilshad continue to benefit from OT to max functional ind and dec carer burden    Patient/Family Therapy Goal Statement: to have less pain    OT Plan    Flowsheet Row Most Recent Value   Rehab Potential good, to achieve stated therapy goals at 08/22/2023 0818   Therapy Frequency 5 times/wk at 08/22/2023 0818   Planned Therapy Interventions activity tolerance training, adaptive equipment training, BADL retraining, functional balance retraining, occupation/activity based interventions, ROM/therapeutic exercise,  transfer/mobility retraining at 08/22/2023 0818          OT Discharge Recommendations    Flowsheet Row Most Recent Value   OT Recommended Discharge Disposition skilled nursing facility at 08/22/2023 0818   Anticipated Equipment Needs At Discharge (OT) --  [tBD next level of care] at 08/22/2023 0818               OT Goals    Flowsheet Row Most Recent Value   Bed Mobility Goal 1    Activity/Assistive Device bed mobility activities, all at 08/22/2023 0818   [place] supervision required at 08/22/2023 0818   Time Frame by discharge at 08/22/2023 0818   Progress/Outcome goal ongoing at 08/22/2023 0818   Transfer Goal 1    Activity/Assistive Device all transfers at 08/22/2023 0818   [place] supervision required at 08/22/2023 0818   Time Frame by discharge at 08/22/2023 0818   Progress/Outcome goal ongoing at 08/22/2023 0818   Dressing Goal 1    Activity/Adaptive Equipment dressing skills, all at 08/22/2023 0818   [place] supervision required at 08/22/2023 0818   Time Frame by discharge at 08/22/2023 0818   Progress/Outcome goal ongoing at 08/22/2023 0818   Toileting Goal 1    Activity/Assistive Device toileting skills, all at 08/22/2023 0818   [place] supervision required at 08/22/2023 0818   Time Frame by discharge at 08/22/2023 0818   Progress/Outcome goal ongoing at 08/22/2023 0818

## 2023-08-22 NOTE — PLAN OF CARE
Plan of Care Review  Plan of Care Reviewed With: patient  Progress: improving  Outcome Evaluation: Received Pt from ED around 0000. Pt is AAOx4. Room air and lungs are clear. Purewick in place. Call bell within reach. Continue to monitor.    Problem: Adult Inpatient Plan of Care  Goal: Plan of Care Review  Flowsheets (Taken 8/22/2023 0151)  Progress: improving  Outcome Evaluation: Received Pt from ED around 0000. Pt is AAOx4. Room air and lungs are clear. Purewick in place. Call bell within reach. Continue to monitor.  Plan of Care Reviewed With: patient

## 2023-08-22 NOTE — HOSPITAL COURSE
Madelyn is a 89 y.o. female admitted on 8/21/2023 with Fall, initial encounter [W19.XXXA]  Closed fracture of sacrum, unspecified portion of sacrum, initial encounter (CMS/[facility]) [S32.10XA]. Principal problem is Fall, initial encounter.    Past Medical History  Madelyn has a past medical history of Allergic bronchopulmonary aspergillosis (CMS/[facility]) (04/02/2018), Allergic rhinitis (04/02/2018), Asthma, Atrial flutter with rapid ventricular response (CMS/[facility]) (02/21/2023), Calcification of aorta (CMS/HCC) (06/29/2020), Chronic obstructive pulmonary disease (CMS/HCC) (04/02/2018), GERD (gastroesophageal reflux disease) (06/08/2019), Insomnia (04/02/2018), Lumbar spinal stenosis (04/02/2018), Macular degeneration disease, Obstructive sleep apnea syndrome (04/02/2018), Osteoporosis (04/02/2018), Primary hypertension (04/02/2018), Pulmonary embolism (CMS/HCC) (06/09/2019), SVT (supraventricular tachycardia) (CMS/HCC), and Vertebral compression fracture (CMS/HCC).    History of Present Illness   Admitted s/p fall onto her right side 2 days ago, since then she has been complaining of pelvic and low bacl pain. (-) LOC   CT pelvis 9+) right superior pubic ramus fracture and bilateral sacral ala fractures, all minimally displaced--non- op per ortho [facility]

## 2023-08-23 LAB
ANION GAP SERPL CALC-SCNC: 11 MEQ/L (ref 3–15)
BUN SERPL-MCNC: 29 MG/DL (ref 7–25)
CALCIUM SERPL-MCNC: 8.8 MG/DL (ref 8.6–10.3)
CHLORIDE SERPL-SCNC: 105 MEQ/L (ref 98–107)
CO2 SERPL-SCNC: 21 MEQ/L (ref 21–31)
CREAT SERPL-MCNC: 0.8 MG/DL (ref 0.6–1.2)
ERYTHROCYTE [DISTWIDTH] IN BLOOD BY AUTOMATED COUNT: 14.1 % (ref 11.7–14.4)
GFR SERPL CREATININE-BSD FRML MDRD: >60 ML/MIN/1.73M*2
GLUCOSE SERPL-MCNC: 105 MG/DL (ref 70–99)
HCT VFR BLDCO AUTO: 37.7 % (ref 35–45)
HGB BLD-MCNC: 12 G/DL (ref 11.8–15.7)
MAGNESIUM SERPL-MCNC: 2.1 MG/DL (ref 1.8–2.5)
MCH RBC QN AUTO: 31.7 PG (ref 28–33.2)
MCHC RBC AUTO-ENTMCNC: 31.8 G/DL (ref 32.2–35.5)
MCV RBC AUTO: 99.5 FL (ref 83–98)
PDW BLD AUTO: 10.2 FL (ref 9.4–12.3)
PHOSPHATE SERPL-MCNC: 5 MG/DL (ref 2.4–4.7)
PLATELET # BLD AUTO: 369 K/UL (ref 150–369)
POTASSIUM SERPL-SCNC: 4.3 MEQ/L (ref 3.5–5.1)
RBC # BLD AUTO: 3.79 M/UL (ref 3.93–5.22)
SODIUM SERPL-SCNC: 137 MEQ/L (ref 136–145)
WBC # BLD AUTO: 8.02 K/UL (ref 3.8–10.5)

## 2023-08-23 PROCEDURE — 63700000 HC SELF-ADMINISTRABLE DRUG: Performed by: NURSE PRACTITIONER

## 2023-08-23 PROCEDURE — 83735 ASSAY OF MAGNESIUM: CPT

## 2023-08-23 PROCEDURE — 99232 SBSQ HOSP IP/OBS MODERATE 35: CPT | Performed by: SURGERY

## 2023-08-23 PROCEDURE — 25000000 HC PHARMACY GENERAL

## 2023-08-23 PROCEDURE — 36415 COLL VENOUS BLD VENIPUNCTURE: CPT

## 2023-08-23 PROCEDURE — 97535 SELF CARE MNGMENT TRAINING: CPT | Mod: GO

## 2023-08-23 PROCEDURE — 63700000 HC SELF-ADMINISTRABLE DRUG

## 2023-08-23 PROCEDURE — 85027 COMPLETE CBC AUTOMATED: CPT

## 2023-08-23 PROCEDURE — 97116 GAIT TRAINING THERAPY: CPT | Mod: GP

## 2023-08-23 PROCEDURE — 84100 ASSAY OF PHOSPHORUS: CPT

## 2023-08-23 PROCEDURE — 80048 BASIC METABOLIC PNL TOTAL CA: CPT

## 2023-08-23 PROCEDURE — 21400000 HC ROOM AND CARE CCU/INTERMEDIATE

## 2023-08-23 RX ADMIN — DOCUSATE SODIUM 100 MG: 100 CAPSULE, LIQUID FILLED ORAL at 20:55

## 2023-08-23 RX ADMIN — PREGABALIN 50 MG: 25 CAPSULE ORAL at 08:36

## 2023-08-23 RX ADMIN — HYDROMORPHONE HYDROCHLORIDE 1 MG: 2 TABLET ORAL at 08:35

## 2023-08-23 RX ADMIN — PREGABALIN 50 MG: 25 CAPSULE ORAL at 20:55

## 2023-08-23 RX ADMIN — ACETAMINOPHEN 975 MG: 325 TABLET, FILM COATED ORAL at 00:19

## 2023-08-23 RX ADMIN — MOMETASONE FUROATE AND FORMOTEROL FUMARATE DIHYDRATE 2 PUFF: 100; 5 AEROSOL RESPIRATORY (INHALATION) at 20:54

## 2023-08-23 RX ADMIN — DILTIAZEM HYDROCHLORIDE 300 MG: 180 CAPSULE, COATED, EXTENDED RELEASE ORAL at 08:36

## 2023-08-23 RX ADMIN — LIDOCAINE 2 PATCH: 4 PATCH TOPICAL at 08:37

## 2023-08-23 RX ADMIN — ACETAMINOPHEN 975 MG: 325 TABLET, FILM COATED ORAL at 12:43

## 2023-08-23 RX ADMIN — ACETAMINOPHEN 975 MG: 325 TABLET, FILM COATED ORAL at 05:27

## 2023-08-23 RX ADMIN — SENNOSIDES 2 TABLET: 8.6 TABLET, FILM COATED ORAL at 20:55

## 2023-08-23 RX ADMIN — HYDRALAZINE HYDROCHLORIDE 25 MG: 25 TABLET ORAL at 05:27

## 2023-08-23 RX ADMIN — DOCUSATE SODIUM 100 MG: 100 CAPSULE, LIQUID FILLED ORAL at 08:36

## 2023-08-23 RX ADMIN — TIOTROPIUM BROMIDE INHALATION SPRAY 2 PUFF: 3.12 SPRAY, METERED RESPIRATORY (INHALATION) at 08:37

## 2023-08-23 RX ADMIN — ACETAMINOPHEN 975 MG: 325 TABLET, FILM COATED ORAL at 18:14

## 2023-08-23 RX ADMIN — ACETAMINOPHEN 975 MG: 325 TABLET, FILM COATED ORAL at 23:43

## 2023-08-23 RX ADMIN — CETIRIZINE HYDROCHLORIDE 10 MG: 10 TABLET, FILM COATED ORAL at 20:55

## 2023-08-23 RX ADMIN — HYDRALAZINE HYDROCHLORIDE 25 MG: 25 TABLET ORAL at 13:15

## 2023-08-23 RX ADMIN — DULOXETINE HYDROCHLORIDE 60 MG: 60 CAPSULE, DELAYED RELEASE ORAL at 08:36

## 2023-08-23 RX ADMIN — LOSARTAN POTASSIUM 50 MG: 50 TABLET, FILM COATED ORAL at 08:36

## 2023-08-23 RX ADMIN — APIXABAN 2.5 MG: 2.5 TABLET, FILM COATED ORAL at 08:35

## 2023-08-23 RX ADMIN — MOMETASONE FUROATE AND FORMOTEROL FUMARATE DIHYDRATE 2 PUFF: 100; 5 AEROSOL RESPIRATORY (INHALATION) at 08:37

## 2023-08-23 RX ADMIN — HYDROMORPHONE HYDROCHLORIDE 1 MG: 2 TABLET ORAL at 20:54

## 2023-08-23 RX ADMIN — PREGABALIN 25 MG: 25 CAPSULE ORAL at 12:43

## 2023-08-23 RX ADMIN — LOSARTAN POTASSIUM 50 MG: 50 TABLET, FILM COATED ORAL at 20:55

## 2023-08-23 RX ADMIN — POLYETHYLENE GLYCOL 3350 17 G: 17 POWDER, FOR SOLUTION ORAL at 08:37

## 2023-08-23 RX ADMIN — HYDRALAZINE HYDROCHLORIDE 25 MG: 25 TABLET ORAL at 20:55

## 2023-08-23 RX ADMIN — APIXABAN 2.5 MG: 2.5 TABLET, FILM COATED ORAL at 20:55

## 2023-08-23 ASSESSMENT — COGNITIVE AND FUNCTIONAL STATUS - GENERAL
HELP NEEDED FOR PERSONAL GROOMING: 3 - A LITTLE
STANDING UP FROM CHAIR USING ARMS: 3 - A LITTLE
AFFECT: WFL
STANDING UP FROM CHAIR USING ARMS: 3 - A LITTLE
HELP NEEDED FOR BATHING: 2 - A LOT
WALKING IN HOSPITAL ROOM: 3 - A LITTLE
WALKING IN HOSPITAL ROOM: 3 - A LITTLE
DRESSING REGULAR LOWER BODY CLOTHING: 2 - A LOT
CLIMB 3 TO 5 STEPS WITH RAILING: 2 - A LOT
MOVING TO AND FROM BED TO CHAIR: 3 - A LITTLE
MOVING TO AND FROM BED TO CHAIR: 2 - A LOT
EATING MEALS: 4 - NONE
DRESSING REGULAR UPPER BODY CLOTHING: 3 - A LITTLE
TOILETING: 3 - A LITTLE
AFFECT: WFL
CLIMB 3 TO 5 STEPS WITH RAILING: 3 - A LITTLE

## 2023-08-23 NOTE — PLAN OF CARE
Plan of Care Review  Progress: improving  Outcome Evaluation: John, CURTIS. VSS, on RA. c/o right hip pain. pain medication administered as ordered. All needs met at this time. FSP.

## 2023-08-23 NOTE — PROGRESS NOTES
Occupational Therapy -  Daily Treatment/Progress Note     Patient: Madelyn Ruiz  Location: [address]  MRN: [id number]  Today's date: 8/23/2023    HISTORY OF PRESENT ILLNESS     Madelyn is a 89 y.o. female admitted on 8/21/2023 with Fall, initial encounter [W19.XXXA]  Closed fracture of sacrum, unspecified portion of sacrum, initial encounter (CMS/[facility]) [S32.10XA]. Principal problem is Fall, initial encounter.    Past Medical History  Madelyn has a past medical history of Allergic bronchopulmonary aspergillosis (CMS/[facility]) (04/02/2018), Allergic rhinitis (04/02/2018), Asthma, Atrial flutter with rapid ventricular response (CMS/[facility]) (02/21/2023), Calcification of aorta (CMS/HCC) (06/29/2020), Chronic obstructive pulmonary disease (CMS/[facility]) (04/02/2018), GERD (gastroesophageal reflux disease) (06/08/2019), Insomnia (04/02/2018), Lumbar spinal stenosis (04/02/2018), Macular degeneration disease, Obstructive sleep apnea syndrome (04/02/2018), Osteoporosis (04/02/2018), Primary hypertension (04/02/2018), Pulmonary embolism (CMS/[facility]) (06/09/2019), SVT (supraventricular tachycardia) (CMS/HCC), and Vertebral compression fracture (CMS/HCC).    History of Present Illness   Admitted s/p fall onto her right side 2 days ago, since then she has been complaining of pelvic and low bacl pain. (-) LOC   CT pelvis 9+) right superior pubic ramus fracture and bilateral sacral ala fractures, all minimally displaced--non- op per ortho [facility]    PRIOR LEVEL OF FUNCTION AND LIVING ENVIRONMENT     Prior Level of Function    Flowsheet Row Most Recent Value   Assistive Device Currently Used at Home cane, quad, walker, front-wheeled        Prior Living Environment    Flowsheet Row Most Recent Value   People in Home alone   Current Living Arrangements other (see comments)  [[facility]]   Living Environment Comment lives in a 3 story condo, states stays on 1st floor        Occupational Profile    Flowsheet Row Most  Recent Value   Environmental Supports and Barriers reports supportive daughter assist with IADLS daily and had a night nurse        VITALS AND PAIN     OT Vitals    Date/Time Pulse HR Source SpO2 Pt Activity O2 Therapy BP BP Location BP Method Pt Position [facility]   08/23/23 0927 98 Monitor 99 % At rest None (Room air) 136/85 Right upper arm Automatic Lying SLK   08/23/23 0940 103 Monitor 97 % At rest None (Room air) 138/90 Right upper arm Automatic Sitting SLK      OT Pain    Date/Time Pain Type Side/Orientation Location Rating: Rest Rating: Activity Interventions [facility]   08/23/23 0927 Pain Assessment right;generalized hip 2 - mild pain 4 - moderate pain relaxation techniques promoted;position adjusted SLK        Objective   OBJECTIVE     Start time:  0927  End time:  0942  Session Length: 15 min  Mode of Treatment: occupational therapy    General Observations  Patient received supine, in bed. She was agreeable to therapy, no issues or concerns identified by nurse prior to session.      Precautions: fall, weight bearing  Left LE Weight-Bearing Status: weight-bearing as tolerated (WBAT)  Right LE Weight-Bearing Status: weight-bearing as tolerated (WBAT)    Limitations/Impairments: safety/cognitive      OT Eval and Treat - 08/23/23 0927        Cognition    Orientation Status oriented x 4   use of orientation board independiently    Affect/Mental Status WFL     Follows Commands follows three-step commands     Comment, Cognition pleasant and cooperative, engaged in therapy session and receptive to education and safety with RW        Bed Mobility    Bed Mobility Activities supine to sit     [place] close supervision     Safety/Cues increased time to complete;minimal;verbal cues;technique     Assistive Device head of bed elevated     Comment OOB to R ,        Sit/Stand Transfer    [place] minimum assist (75% or more patient effort)     Safety/Cues increased time to complete;minimal;verbal cues;tactile cues;hand  placement;technique;proper use of assistive device     Assistive Device walker, front-wheeled        Toilet Transfer    Transfer Technique sit/stand     [place] minimum assist (75% or more patient effort)     Safety/Cues increased time to complete;minimal;tactile cues;hand placement;verbal cues;preparatory posture;technique     Assistive Device grab bars/safety frame;walker, front-wheeled     Comment std height toilet        Functional Mobility    Distance in room/bathroom     Functional Mobility [place] minimum assist (75% or more patient effort)     Safety/Cues increased time to complete;minimal;tactile cues;verbal cues;hand placement;proper use of assistive device     Assistive Device walker, front-wheeled        Upper Body Dressing    Tasks don;pajama/robe     [place] supervision     Safety/Cues increased time to complete;minimal;verbal cues;energy conservation     Position edge of bed sitting        Lower Body Dressing    Tasks don;doff;socks     [place] maximum assist (25-49% patient effort)     Safety/Cues increased time to complete;compensatory techniques     Position edge of bed sitting     Comment limited by pain and dec functtional reach        Grooming    Tasks washes, rinses and dries hands     [place] minimum assist (75% or more patient effort)     Safety/Cues increased time to complete;verbal cues;minimal;tactile cues;compensatory techniques;energy conservation     Position sink side;supported standing        Toileting    Tasks adjust/manage clothing;perform bladder hygiene     [place] minimum assist (75% or more patient effort)     Position supported sitting;supported standing     Adaptive Equipment grab bar/safety frame     Comment x1 on right to with suppported stance        Balance    Static Sitting Balance WFL     Dynamic Sitting Balance WFL;unsupported;sitting, edge of bed     Sit to Stand Dynamic Balance mild impairment;supported;standing     Static Standing  Balance supported;standing;WFL     Dynamic Standing Balance mild impairment;supported;standing     Balance Interventions occupation based/functional task     Comment, Balance [facility] RW for balance and ECON        Impairments/Safety Issues    Impairments Affecting Function balance;endurance/activity tolerance;pain;range of motion (ROM);strength     Safety Issues Affecting Function insight into deficits/self-awareness                                 Session Outcome  Patient upright, in chair at end of session, chair alarm on, call light in reach, all needs met, personal items in reach. Nursing notified about patient's performance, patient's response to therapy/activity, and patient's position.    AM-PAC - ADL (Current Function)     Putting on/taking off regular lower body clothing 2 - A Lot   Bathing 2 - A Lot   Toileting 3 - A Little   Putting on/taking off regular upper body clothing 3 - A Little   Help for taking care of personal grooming 3 - A Little   Eating meals 4 - None   AM-PAC ADL Score 17      ASSESSMENT AND PLAN     Progress Summary  OT eval completed. pt presents with improved pain managment this session and currently requires min A with all transfers and functional mob with use of RW for balance and ECON and max A distal LB selfccare tasks limited by pain and dec functional reach. Pt dilshad continue to benefit from OT to max functional ind and dec carer burden    Patient/Family Therapy Goal Statement: to have less pain    OT Plan    Flowsheet Row Most Recent Value   Rehab Potential good, to achieve stated therapy goals at 08/23/2023 0927   Therapy Frequency 5 times/wk at 08/23/2023 0927   Planned Therapy Interventions activity tolerance training, adaptive equipment training, BADL retraining, functional balance retraining, occupation/activity based interventions, ROM/therapeutic exercise, transfer/mobility retraining at 08/23/2023 0927          OT Discharge Recommendations    Flowsheet Row Most Recent Value    OT Recommended Discharge Disposition skilled nursing facility at 08/23/2023 0927   Anticipated Equipment Needs At Discharge (OT) --  [TBD next level of care] at 08/23/2023 0927               OT Goals    Flowsheet Row Most Recent Value   Bed Mobility Goal 1    Activity/Assistive Device bed mobility activities, all at 08/22/2023 0818   [place] supervision required at 08/22/2023 0818   Time Frame by discharge at 08/22/2023 0818   Progress/Outcome goal ongoing at 08/22/2023 0818   Transfer Goal 1    Activity/Assistive Device all transfers at 08/22/2023 0818   [place] supervision required at 08/22/2023 0818   Time Frame by discharge at 08/22/2023 0818   Progress/Outcome goal ongoing at 08/22/2023 0818   Dressing Goal 1    Activity/Adaptive Equipment dressing skills, all at 08/22/2023 0818   [place] supervision required at 08/22/2023 0818   Time Frame by discharge at 08/22/2023 0818   Progress/Outcome goal ongoing at 08/22/2023 0818   Toileting Goal 1    Activity/Assistive Device toileting skills, all at 08/22/2023 0818   [place] supervision required at 08/22/2023 0818   Time Frame by discharge at 08/22/2023 0818   Progress/Outcome goal ongoing at 08/22/2023 0818

## 2023-08-23 NOTE — PLAN OF CARE
Care Coordination Discharge Plan Note     Discharge Needs Assessment  Concerns to be Addressed: care coordination/care conferences, discharge planning  Current Discharge Risk:      Anticipated Discharge Plan  Anticipated Discharge Disposition: skilled nursing facility       Patient Choice  Offered/Gave Vendor List: yes  Patient's Choice of Community Agency(s): [facility]    Patient and/or patient guardian/advocate was made aware of their right to choose a provider. A list of eligible providers was presented and reviewed with the patient and/or patient guardian/advocate in written and/or verbal form. The list delineates providers in the patients desired geographic area who are participating in the Medicare program and/or providers contracted with the patients primary insurance. The Medicare list and quality ratings were obtained from the Medicare.gov [medicare.gov] website.    ---------------------------------------------------------------------------------------------------------------------    Interdisciplinary Discharge Plan Review:  Participants:advanced practice provider, physician, , nursing, social work/services, physical therapy, occupational therapy    Concerns Comments: Per trauma rounds, working on pain control. Pt was accepted at [facility] for SNF. CM will arranged transport for tomorrow Thursday. CM will follow     ADD 1125: CM met with Pt chairside. CM informed Pt [facility] accepted and [facility] was pending beds. Pt agreeable to either. CM arranged tentative transport for tomorrow at 11am with [facility]    ADD 1345: CM spoke with Pt and confirmed D/C plan.     Discharge Plan:   Disposition/Destination:   /    Discharge Facility:    Community Resources:      Discharge Transportation:  Is Out of Hospital DNR needed at Discharge: yes  Does patient need discharge transport? Yes

## 2023-08-23 NOTE — PROGRESS NOTES
Physical Therapy -  Initial Evaluation     Patient: Madelyn Ruiz  Location: [address]  MRN: [id number]  Today's date: 8/23/2023    HISTORY OF PRESENT ILLNESS     Madelyn is a 89 y.o. female admitted on 8/21/2023 with Fall, initial encounter [W19.XXXA]  Closed fracture of sacrum, unspecified portion of sacrum, initial encounter (CMS/[facility]) [S32.10XA]. Principal problem is Fall, initial encounter.    Past Medical History  Madelyn has a past medical history of Allergic bronchopulmonary aspergillosis (CMS/[facility]) (04/02/2018), Allergic rhinitis (04/02/2018), Asthma, Atrial flutter with rapid ventricular response (CMS/[facility]) (02/21/2023), Calcification of aorta (CMS/HCC) (06/29/2020), Chronic obstructive pulmonary disease (CMS/[facility]) (04/02/2018), GERD (gastroesophageal reflux disease) (06/08/2019), Insomnia (04/02/2018), Lumbar spinal stenosis (04/02/2018), Macular degeneration disease, Obstructive sleep apnea syndrome (04/02/2018), Osteoporosis (04/02/2018), Primary hypertension (04/02/2018), Pulmonary embolism (CMS/HCC) (06/09/2019), SVT (supraventricular tachycardia) (CMS/HCC), and Vertebral compression fracture (CMS/HCC).    History of Present Illness   Admitted s/p fall onto her right side 2 days ago, since then she has been complaining of pelvic and low bacl pain. (-) LOC   CT pelvis 9+) right superior pubic ramus fracture and bilateral sacral ala fractures, all minimally displaced--non- op per ortho [facility]    PRIOR LEVEL OF FUNCTION AND LIVING ENVIRONMENT     Prior Level of Function    Flowsheet Row Most Recent Value   Assistive Device Currently Used at Home cane, quad, walker, front-wheeled        Prior Living Environment    Flowsheet Row Most Recent Value   People in Home alone   Current Living Arrangements other (see comments)  [[facility]]   Living Environment Comment lives in a 3 story condo, states stays on 1st floor        VITALS AND PAIN     PT Vitals    Date/Time Pulse HR Source  SpO2 Pt Activity O2 Therapy BP BP Location BP Method Pt Position [facility]   08/23/23 0940 103 Monitor 97 % At rest None (Room air) 138/90 Right upper arm Automatic Sitting SLK        Objective   OBJECTIVE     Start time:  0928  End time:  0943  Session Length: 15 min  Mode of Treatment: co-treatment, physical therapy    General Observations  Patient received supine, in bed. She was agreeable to therapy, no issues or concerns identified by nurse prior to session.      Precautions: fall, weight bearing  Extremity Weight-Bearing Status: right lower extremity, left lower extremity  Left LE Weight-Bearing Status: weight-bearing as tolerated (WBAT)  Right LE Weight-Bearing Status: weight-bearing as tolerated (WBAT)           PT Eval and Treat - 08/23/23 0928        Cognition    Orientation Status oriented x 4     Affect/Mental Status WFL     Follows Commands follows three-step commands     Comment, Cognition Pt is pleasant and cooperative, engaged in therapy session. Receptive to education provided        Bed Mobility    Bed Mobility Activities supine to sit     [place] close supervision     Safety/Cues increased time to complete;minimal;verbal cues;technique     Assistive Device head of bed elevated     Comment OOB to R        Sit/Stand Transfer    Surface edge of bed     [place] minimum assist (75% or more patient effort);1 person assist     Safety/Cues minimal;verbal cues;hand placement;technique     Assistive Device walker, front-wheeled     Transfer Comments From EOB rising to RW        Toilet Transfer    Transfer Technique sit/stand     [place] minimum assist (75% or more patient effort);1 person assist     Safety/Cues minimal;verbal cues;hand placement     Assistive Device grab bars/safety frame;walker, front-wheeled        Gait Training    [place], Gait minimum assist (75% or more patient effort);1 person assist     Safety/Cues minimal;verbal cues;proper use of assistive device;technique      Assistive Device walker, front-wheeled     Distance in Feet 50 feet     Pattern step-through     Deviations/Abnormal Patterns gait speed decreased;mundo decreased     Comment (Gait/Stairs) Coreen for ambulation with RW support - pt requires frequent cues to maintain MAREK within RW        Balance    Static Sitting Balance WFL     Dynamic Sitting Balance WFL     Sit to Stand Dynamic Balance mild impairment     Static Standing Balance mild impairment     Dynamic Standing Balance mild impairment        Impairments/Safety Issues    Impairments Affecting Function balance;endurance/activity tolerance;strength;range of motion (ROM)                                  Session Outcome  Patient upright, in chair at end of session, chair alarm on, all needs met, call light in reach, personal items in reach. Nursing notified about patient's response to therapy/activity, patient's performance, and patient's position.    AM-PAC - Mobility (Current Function)     Turning form your back to your side while in flat bed without using bedrails 3 - A Little   Moving from lying on your back to sitting on the side of a flat bed without using bedrails 3 - A Little   Moving to and from a bed to a chair 3 - A Little   Standing up from a chair using your arms 3 - A Little   To walk in a hospital room 3 - A Little   Climbing 3-5 steps with a railing 3 - A Little   AM-PAC Mobility Score 18      ASSESSMENT AND PLAN     Progress Summary  PT follow up completed. Pt demos significant improvement with functional mobility. Performs bed mobility at close Chandrika level. Then requires Coreen for txfs from bed and toilet as well as ambulation bout with RW support. Pt continues to be limited by dec functional endurance, strength and balance. Cont to rec SNF at d/c         PT Plan    Flowsheet Row Most Recent Value   Rehab Potential good, to achieve stated therapy goals at 08/23/2023 0928   Therapy Frequency 5 times/wk at 08/23/2023 0928   Planned Therapy  Interventions balance training, bed mobility training, stair training, strengthening, transfer training, gait training at 08/23/2023 0928          PT Discharge Recommendations    Flowsheet Row Most Recent Value   PT Recommended Discharge Disposition skilled nursing facility at 08/23/2023 0928   Anticipated Equipment Needs at Discharge (PT) none at 08/23/2023 0928               PT Goals    Flowsheet Row Most Recent Value   Bed Mobility Goal 1    Activity/Assistive Device bed mobility activities, all at 08/22/2023 0822   [place] independent at 08/22/2023 0822   Time Frame short-term goal (STG) at 08/22/2023 0822   Progress/Outcome goal ongoing at 08/22/2023 0822   Transfer Goal 1    Activity/Assistive Device all transfers at 08/22/2023 0822   [place] independent at 08/22/2023 0822   Time Frame short-term goal (STG) at 08/22/2023 0822   Progress/Outcome goal ongoing at 08/22/2023 0822   Gait Training Goal 1    Activity/Assistive Device gait (walking locomotion) at 08/22/2023 0822   [place] independent at 08/22/2023 0822   Distance 60 at 08/22/2023 0822   Time Frame short-term goal (STG) at 08/22/2023 0822   Progress/Outcome goal ongoing at 08/22/2023 0822

## 2023-08-23 NOTE — PROGRESS NOTES
TRAUMA SURGERY:  TERTIARY NOTE       PATIENT NAME:  Madelyn Ruiz YOB: 1930    AGE:  89 y.o.  GENDER: female   MRN:  [id number]  PATIENT #: [id number]     PRIMARY CARE PHYSICIAN     Henry Linares MD    SUBJECTIVE     Resting comfortably in the chair eating lunch.  Denies complaints.  Pulling greater than 1000 ml on incentive spirometer.    VITAL SIGNS   Patient Vitals for the past 4 hrs:   BP Temp Temp src Pulse Resp SpO2   08/23/23 1315 (!) 110/58 -- -- 76 -- --   08/23/23 1129 (!) 112/59 36.4 °C (97.5 °F) Oral 78 18 99 %      INTAKE & OUTPUT       Intake/Output Summary (Last 24 hours) at 8/23/2023 1434  Last data filed at 8/23/2023 1200  Gross per 24 hour   Intake 560 ml   Output 600 ml   Net -40 ml     I/O this shift:  In: 360 [P.O.:360]  Out: 500 [Urine:500]    MEDICATIONS   Infusions:           Scheduled:    acetaminophen  975 mg oral q6h NOE    apixaban  2.5 mg oral BID    cetirizine  10 mg oral Nightly    dilTIAZem CD  300 mg oral Daily    docusate sodium  100 mg oral BID    DULoxetine  60 mg oral Daily    hydrALAZINE  25 mg oral q8h    lidocaine  2 patch Topical Daily    losartan  50 mg oral BID    mometasone-formoterol  2 puff inhalation BID (8a, 8p)    polyethylene glycol  17 g oral Daily    pregabalin  25 mg oral Daily with lunch    pregabalin  50 mg oral BID    senna  2 tablet oral Nightly    tiotropium bromide  2 puff inhalation Daily     Antibiotics:    PRN:     dextrose 50 % in water (D50), 25 mL, PRN  HYDROmorphone, 1 mg, q6h PRN  HYDROmorphone, 2 mg, q6h PRN  LORazepam, 1 mg, Nightly PRN  ondansetron ODT, 4 mg, q6h PRN   Or  ondansetron, 4 mg, q6h PRN       PHYSICAL EXAM     NEURO: GCS 15. AAOx3, LICHA @ 3 mm, EOMi, CN II-XII grossly intact. Non-focal on exam. Following all commands. Sensation intact.    R L MOTOR (0-5):   5 5 C4 (deltoid)   5 5 C5 (biceps)   5 5 C6 (wrist ext)   5 5 C7 (triceps)   5 5 C8 (finger flexion)   5 5 T1 (hand intrinsics)   5 5 L2 (hip  flexors)   5 5 L3 (quads) knee ext   5 5 L4 (TA) dorsiflex   5 5 L5 (EHL)   5 5 S1 (gastrocs) plantar flex   HENT: NC/AT. Nares clear. Oropharynx clear.  Midface stable.  Denies TTP.  Denies Malocclusion.  Trachea midline. Tongue midline.   SPINE: C-Spine non-tender to palp. T/L/S spine non-tender to palp, no stepoffs/deformities.   CHEST: Symmetric expansion, non-tender to palp, no crepitus  LUNGS: LCTA bilaterally. No use of accessory muscles or respiratory distress.   CV: RRR, S1/S2. NSR. +2 radial/DP/femoral pulses.   ABDOMEN: Soft, nontender, nondistended. (+) bowel sounds     : Voiding without difficulty.    EXTREMITIES: No gross deformities. Non-tender to palp in all joints.  SKIN: warm, dry.    NEW FINDINGS on Physical Exam: No    Lines, Drains, Airways, Wounds:     Peripheral IV (Adult) 08/21/23 Distal;Posterior;Right Forearm (Active)   Number of days: 2     INJURIES REVIEWED     Injuries Identified to Date:  1. Bilateral sacral fracture  2. Right pubic rami fracture     DIAGNOSTIC DATA     LABS:  Results from last 7 days   Lab Units 08/23/23  0559 08/22/23  0537 08/21/23  1439   SODIUM mEQ/L 137 140 137   POTASSIUM mEQ/L 4.3 3.7 3.7   CHLORIDE mEQ/L 105 106 104   CO2 mEQ/L 21 25 24   BUN mg/dL 29* 12 12   CREATININE mg/dL 0.8 0.6 0.5*   CALCIUM mg/dL 8.8 8.1* 8.4*   GLUCOSE mg/dL 105* 86 93   MAGNESIUM mg/dL 2.1 2.0 2.0   PHOSPHORUS mg/dL 5.0* 3.7  --    ALBUMIN g/dL  --   --  3.7   BILIRUBIN TOTAL mg/dL  --   --  0.5   ALK PHOS IU/L  --   --  95   ALT IU/L  --   --  14   AST IU/L  --   --  15     Results from last 7 days   Lab Units 08/23/23  0559 08/22/23  0537 08/21/23  1439   WBC K/uL 8.02 5.22 6.32   HEMOGLOBIN g/dL 12.0 11.8 11.2*   HEMATOCRIT % 37.7 35.9 35.2   PLATELETS K/uL 369 364 368     Results from last 7 days   Lab Units 08/21/23  1857   PTT sec 24   PROTIME sec 14.0   INR  1.1     ABG Results       06/08/19     1714    Source Of Oxygen 2L        Serum creatinine: 0.8 mg/dL 08/23/23  0559  Estimated creatinine clearance: 32.2 mL/min  Microbiology Results     ** No results found for the last 720 hours. **          IMAGING:  X-RAY HIP WITH OR WITHOUT PELVIS 2-3 VW RIGHT    Result Date: 8/21/2023  IMPRESSION: No fracture or dislocation of right hip.    CT ABDOMEN PELVIS WITH IV CONTRAST    Result Date: 8/21/2023  IMPRESSION: There is presacral edema and bilateral nondisplaced sacral fractures. No evidence for solid organ injury in the chest, abdomen, and pelvis. COMMENT: Technique: CT examination of the chest, abdomen, and pelvis was performed utilizing contiguous 2.5 mm transaxial sections. Images were acquired following the administration of 85 cc Omnipaque 350 intravenous contrast.  Oral contrast was not administered. Coronal and sagittal reformations are obtained. CT DOSE:  One or more dose reduction techniques (e.g. automated exposure control, adjustment of the mA and/or kV according to patient size, use of iterative reconstruction technique) utilized for this examination Comparison studies: CT abdomen pelvis 8/10/2017. Heart and pericardium: The heart is normal in size.  There is no pericardial effusion. Great vessels: There is no evidence for thoracic aortic aneurysm.  The main pulmonary artery is normal in caliber.  No large or central pulmonary embolism. Mediastinum: No mediastinal adenopathy Malka: No hilar adenopathy. Axilla: No axillary adenopathy. Lung parenchyma: No parenchymal consolidation. Bibasilar subpleural scar/atelectasis. Right apical pleural based scarring. Pleura: See under lung parenchyma. No effusion or pneumothorax. Liver:  The liver is normal in size. Cyst in the right lobe of the liver measures 4.0 x 5.1 cm. No evidence for hepatic or portal vein thrombus.. Gallbladder:  No gallstones.  No gallbladder wall thickening.. Spleen: Spleen is normal in size without focal mass lesion.. Pancreas: The pancreas is normal without focal mass, parenchymal atrophy, or pancreatic duct  dilation.. Adrenals: The adrenals are normal bilaterally without focal mass.. Kidneys, ureters and bladder: No hydronephrosis.  No renal calculi.  Symmetric enhancement and excretion.  No evidence for focal mass lesion.  The bladder is normal.. Retroperitoneal structures: There is no abdominal aortic aneurysm. There is atherosclerotic calcification. There is no retroperitoneal adenopathy or retroperitoneal mass.. Bowel and mesentery: No evidence of a focal inflammatory or obstructive process. Lymph nodes: No pathologic lymphadenopathy.. Pelvis: There is presacral edema. Bilateral nondisplaced sacral fractures. No fracture or dislocation of the hip. Heterotopic calcification about the right pubic bone and right inferior pubic ramus may be related to remote injury. Status post laminectomy and fusion L4-L5. Superior endplate deformity of T12 also present on 2017 CT scan. Bones: Within normal limits.    CT CHEST WITH IV CONTRAST    Result Date: 8/21/2023  IMPRESSION: There is presacral edema and bilateral nondisplaced sacral fractures. No evidence for solid organ injury in the chest, abdomen, and pelvis. COMMENT: Technique: CT examination of the chest, abdomen, and pelvis was performed utilizing contiguous 2.5 mm transaxial sections. Images were acquired following the administration of 85 cc Omnipaque 350 intravenous contrast.  Oral contrast was not administered. Coronal and sagittal reformations are obtained. CT DOSE:  One or more dose reduction techniques (e.g. automated exposure control, adjustment of the mA and/or kV according to patient size, use of iterative reconstruction technique) utilized for this examination Comparison studies: CT abdomen pelvis 8/10/2017. Heart and pericardium: The heart is normal in size.  There is no pericardial effusion. Great vessels: There is no evidence for thoracic aortic aneurysm.  The main pulmonary artery is normal in caliber.  No large or central pulmonary embolism. Mediastinum:  No mediastinal adenopathy Malka: No hilar adenopathy. Axilla: No axillary adenopathy. Lung parenchyma: No parenchymal consolidation. Bibasilar subpleural scar/atelectasis. Right apical pleural based scarring. Pleura: See under lung parenchyma. No effusion or pneumothorax. Liver:  The liver is normal in size. Cyst in the right lobe of the liver measures 4.0 x 5.1 cm. No evidence for hepatic or portal vein thrombus.. Gallbladder:  No gallstones.  No gallbladder wall thickening.. Spleen: Spleen is normal in size without focal mass lesion.. Pancreas: The pancreas is normal without focal mass, parenchymal atrophy, or pancreatic duct dilation.. Adrenals: The adrenals are normal bilaterally without focal mass.. Kidneys, ureters and bladder: No hydronephrosis.  No renal calculi.  Symmetric enhancement and excretion.  No evidence for focal mass lesion.  The bladder is normal.. Retroperitoneal structures: There is no abdominal aortic aneurysm. There is atherosclerotic calcification. There is no retroperitoneal adenopathy or retroperitoneal mass.. Bowel and mesentery: No evidence of a focal inflammatory or obstructive process. Lymph nodes: No pathologic lymphadenopathy.. Pelvis: There is presacral edema. Bilateral nondisplaced sacral fractures. No fracture or dislocation of the hip. Heterotopic calcification about the right pubic bone and right inferior pubic ramus may be related to remote injury. Status post laminectomy and fusion L4-L5. Superior endplate deformity of T12 also present on 2017 CT scan. Bones: Within normal limits.    CT CERVICAL SPINE WITHOUT IV CONTRAST    Result Date: 8/21/2023  IMPRESSION:  No acute intracranial abnormality.  No evidence for acute fracture or traumatic subluxation of the cervical spine.  Left sphenoid and maxillary sinus disease with hyperdense material likely related to inspissated secretions or fungal sinusitis.  Degenerative spondylosis of the cervical spine with predominantly multilevel  right-sided foraminal narrowing. COMMENT: A standard noncontrast head CT was performed. Noncontrast CT examination of the cervical spine performed following the standard protocol. Sagittal and coronal reformations rendered from axial source images. Images reviewed in bone and soft tissue windows. CT DOSE:  One or more dose reduction techniques (e.g. automated exposure control, adjustment of the mA and/or kV according to patient size, use of iterative reconstruction technique) utilized for this examination. Comparison:  Head CT dated 2/20/2023. Findings:  Sulci, ventricles and basal cisterns are within normal limits for patient's age.  Periventricular and subcortical white matter hypoattenuation, nonspecific, likely sequela of microangiopathic disease.  Old lacunar infarct versus perivascular space in the right inferior basal ganglia.  No acute hemorrhage, acute territorial infarct, or mass effect is seen.  There is no extra-axial fluid collection.  The visualized paranasal sinuses demonstrates complete opacification of the left maxillary sinus and left sphenoid sinus with mucoperiosteal thickening of the sinus walls.   Mastoid air cells are clear. Bilateral ocular lens implants. Cervicothoracic alignment: Anatomic. Prevertebral soft tissues: Normal in thickness. Vertebral bodies: Normal in height.  No acute fractures. Intervertebral discs: Disc osteophyte complex at C5-C6 and C2-C3. Cervical and upper thoracic spinal canal: Patent. Axial images: Skull base: Normal. C1-2: Normal. C2-3: Disc osteophyte complex at C2-C3 with severe left facet arthrosis and mild uncovertebral hypertrophic changes bilaterally, contributing to mild left foraminal narrowing and mild flattening the ventral thecal sac. C3-4: Exuberant right facet hypertrophy, contributing to severe right foraminal narrowing. C4-5: Exuberant right facet hypertrophy, moderate on the left, and moderate right uncovertebral hypertrophic changes, contributing to  moderate to severe right and moderate left foraminal narrowing. C5-6: Disc osteophyte complex with exuberant right and moderate left facet hypertrophy, right uncovertebral hypertrophic changes, contributing to moderate to severe right foraminal narrowing. C6-7: Disc bulge accentuated by uncovering of intervertebral disc, severe right and moderate left facet hypertrophy, contributing to moderate right foraminal narrowing C7-T1: Disc osteophyte complex and facet arthrosis, contributing to mild flattening the ventral thecal sac and mild right foraminal narrowing. Other: Biapical pleural-parenchymal scarring.     CT HEAD WITHOUT IV CONTRAST    Result Date: 8/21/2023  IMPRESSION:  No acute intracranial abnormality.  No evidence for acute fracture or traumatic subluxation of the cervical spine.  Left sphenoid and maxillary sinus disease with hyperdense material likely related to inspissated secretions or fungal sinusitis.  Degenerative spondylosis of the cervical spine with predominantly multilevel right-sided foraminal narrowing. COMMENT: A standard noncontrast head CT was performed. Noncontrast CT examination of the cervical spine performed following the standard protocol. Sagittal and coronal reformations rendered from axial source images. Images reviewed in bone and soft tissue windows. CT DOSE:  One or more dose reduction techniques (e.g. automated exposure control, adjustment of the mA and/or kV according to patient size, use of iterative reconstruction technique) utilized for this examination. Comparison:  Head CT dated 2/20/2023. Findings:  Sulci, ventricles and basal cisterns are within normal limits for patient's age.  Periventricular and subcortical white matter hypoattenuation, nonspecific, likely sequela of microangiopathic disease.  Old lacunar infarct versus perivascular space in the right inferior basal ganglia.  No acute hemorrhage, acute territorial infarct, or mass effect is seen.  There is no  extra-axial fluid collection.  The visualized paranasal sinuses demonstrates complete opacification of the left maxillary sinus and left sphenoid sinus with mucoperiosteal thickening of the sinus walls.   Mastoid air cells are clear. Bilateral ocular lens implants. Cervicothoracic alignment: Anatomic. Prevertebral soft tissues: Normal in thickness. Vertebral bodies: Normal in height.  No acute fractures. Intervertebral discs: Disc osteophyte complex at C5-C6 and C2-C3. Cervical and upper thoracic spinal canal: Patent. Axial images: Skull base: Normal. C1-2: Normal. C2-3: Disc osteophyte complex at C2-C3 with severe left facet arthrosis and mild uncovertebral hypertrophic changes bilaterally, contributing to mild left foraminal narrowing and mild flattening the ventral thecal sac. C3-4: Exuberant right facet hypertrophy, contributing to severe right foraminal narrowing. C4-5: Exuberant right facet hypertrophy, moderate on the left, and moderate right uncovertebral hypertrophic changes, contributing to moderate to severe right and moderate left foraminal narrowing. C5-6: Disc osteophyte complex with exuberant right and moderate left facet hypertrophy, right uncovertebral hypertrophic changes, contributing to moderate to severe right foraminal narrowing. C6-7: Disc bulge accentuated by uncovering of intervertebral disc, severe right and moderate left facet hypertrophy, contributing to moderate right foraminal narrowing C7-T1: Disc osteophyte complex and facet arthrosis, contributing to mild flattening the ventral thecal sac and mild right foraminal narrowing. Other: Biapical pleural-parenchymal scarring.     MRI KNEE LEFT WITHOUT CONTRAST    Result Date: 7/26/2023  IMPRESSION: 1.  Large, partially ruptured multilocular Baker's cyst with peripheral areas of nodularity, which may represent synovitis.  However, post contrast imaging should be considered to exclude of a solid nodular enhancing component 2.  Complex lateral  meniscal tear.  Severe lateral compartment chondrosis 3.  Small oblique tear of the osteoid of the medial meniscus.  Mild to moderate medial compartment chondrosis 4.  Moderate knee joint effusion with synovitis      Cardiac Imaging    TRANSTHORACIC ECHO (TTE) COMPLETE 02/22/2023    Interpretation Summary    Left Ventricle: Normal ventricle size. Mild concentric left ventricular hypertrophy. Preserved systolic function. Estimated EF 65-70%. No regional wall motion abnormalities. Grade I diastolic dysfunction.    Right Ventricle: Normal ventricle size. Normal systolic function.    Left Atrium: Moderately dilated atrium.    Right Atrium: Mildly dilated atrium.    Aortic Valve: Tricuspid valve.  Sclerotic leaflets. No regurgitation. No stenosis. Calculated dimensionless index = 1.04.    Mitral Valve: Anterior leaflet thickening. Sclerotic mitral valve. Normal leaflet motion. Mild mitral annular calcification. Mild regurgitation. No stenosis. Mean gradient = 2.00.    Tricuspid Valve: Normal structure. Mild regurgitation. Estimated RVSP = 32 mmHg. No significant stenosis.    Prior Study: Prior study available for comparison. Prior study date: 6/11/2019.  Compared to the prior study, biatrial enlargement and mild concentric LVH are now present.  Otherwise no significant change.      LABS AND FINAL IMPRESSIONS OF ALL IMAGING REVIEWED: Yes     INCIDENTAL FINDINGS: N/A    MEDICAL RECONCILIATION REVIEWED: Yes     HOME MEDICATIONS RESUMED AS APPROPRIATE: YES    C-SPINE CLEARANCE: YES    PROBLEM LIST     Patient Active Problem List    Diagnosis Date Noted    A-fib (CMS/[facility]) 08/22/2023    Fall, initial encounter 08/21/2023    Sacral fracture, closed (CMS/[facility]) 08/21/2023    Edema 08/08/2023    Hematoma 08/08/2023    Bilateral hip pain 07/13/2023    Fall 07/13/2023    Hx of long term use of blood thinners 07/13/2023    Hematoma of left thigh 07/13/2023    Atrial flutter with rapid ventricular response (CMS/[facility])  02/21/2023    SVT (supraventricular tachycardia) (CMS/HCC) 01/28/2023    Word finding difficulty 01/26/2023    Traumatic hematoma of left knee 10/24/2022    Chronic pain of left knee 10/19/2022    Nonintractable headache 06/04/2021    Calcification of aorta (CMS/HCC) 06/29/2020    Personal history of pulmonary embolism 06/09/2019    GERD (gastroesophageal reflux disease) 06/08/2019    Medicare annual wellness visit, initial 06/06/2018    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 female HD#2 s/p fall    Fall, initial encounter  Assessment & Plan  · Patient with frequent mechanical falls at home. Use of walker at baseline.   · Fall precautions.  · OOB with assist.   · WBAT b/l LE.    Hx of long term use of blood thinners  Assessment & Plan  · Patient on Eliquis BID at home for VTE ppx in Afib.     GERD (gastroesophageal reflux disease)  Assessment & Plan  · Continue home PPI.     Lumbar spinal stenosis  Assessment & Plan  · Resume home Tramadol on a prn basis.     Primary hypertension  Assessment & Plan  · Continue home Diltiazem and Losaratan with hold parameters.   · VS per unit policy.     * Sacral fracture, closed (CMS/HCC)  Assessment & Plan  · Closed, ND, b/l sacral Fxs.   · Ortho following: non-op, f/u w/ Dr Saavedra OP in 2 weeks.  · WBAT b/l LE.   · Multi-modal pain control.   · PT/OT/PMR--SNF     VTE PPX: SCDs & apixaban 2.5 mg BID    GI PPX:  PPI    DISPOSITION:  D/C SNF    CODE STATUS: DNR (A.N.D.)    MICA Nielsen  8/23/2023  2:34 PM

## 2023-08-23 NOTE — PROGRESS NOTES
TRAUMA SURGERY DAILY PROGRESS NOTE     SUBJECTIVE     No acute issues. Denied pain this AM. Tolerating diet & passing flatus.    REVIEW OF SYSTEMS   14 point ROS negative unless otherwise specified above    VITAL SIGNS   Temperature: Temp (24hrs), Av.8 °C (98.2 °F), Min:36.6 °C (97.9 °F), Max:36.9 °C (98.4 °F)     BP Max:  Systolic (24hrs), Av , Min:109 , Max:142      BP Fang:  Diastolic (24hrs), Av, Min:59, Max:75    Recent:    Patient Vitals for the past 4 hrs:   BP   23 0525 129/75        I/Os:  No intake/output data recorded.     MEDICATIONS     Current Facility-Administered Medications:     acetaminophen (TYLENOL) tablet 975 mg, 975 mg, oral, q6h NOE, Brunilda Abdi CRNP, 975 mg at 23    apixaban (ELIQUIS) tablet 2.5 mg, 2.5 mg, oral, BID, Virginie Murillo CRNP, 2.5 mg at 23    cetirizine (ZyrTEC) tablet 10 mg, 10 mg, oral, Nightly, Virginie Murillo CRNP, 10 mg at 23    [DISCONTINUED] glucose chewable tablet 16-32 g of dextrose, 16-32 g of dextrose, oral, PRN **OR** [DISCONTINUED] dextrose 40 % oral gel 15-30 g of dextrose, 15-30 g of dextrose, oral, PRN **OR** [DISCONTINUED] glucagon (GLUCAGEN) injection 1 mg, 1 mg, intramuscular, PRN **OR** dextrose 50 % in water (D50) injection 12.5 g, 25 mL, intravenous, PRN, Virginie Murillo CRNP    dilTIAZem CD (CARDIZEM CD) 24 hr ER capsule 300 mg, 300 mg, oral, Daily, Virginie Murillo CRNP, 300 mg at 23    docusate sodium (COLACE) capsule 100 mg, 100 mg, oral, BID, Virginie Murillo CRNP, 100 mg at 23    DULoxetine (CYMBALTA) capsule,delayed release(DR/EC) 60 mg, 60 mg, oral, Daily, Virginie Murillo CRNP, 60 mg at 23 0832    hydrALAZINE (APRESOLINE) tablet 25 mg, 25 mg, oral, q8h, Virginie Murillo CRNP, 25 mg at 23 0527    HYDROmorphone (DILAUDID) tablet 1 mg, 1 mg, oral, q6h PRN, Daisy Horn CRNP    HYDROmorphone (DILAUDID) tablet 2 mg, 2 mg, oral, q6h PRN,  Daisy Horn CRNP    lidocaine (ASPERCREME) 4 % topical patch 2 patch, 2 patch, Topical, Daily, Virginie Murillo CRNP, 2 patch at 08/22/23 0833    LORazepam (ATIVAN) tablet 1 mg, 1 mg, oral, Nightly PRN, Virginie Murillo CRNP    losartan (COZAAR) tablet 50 mg, 50 mg, oral, BID, Virginie Murillo CRNP, 50 mg at 08/22/23 2115    mometasone-formoterol (DULERA 100) 100-5 mcg/actuation inhaler 2 puff, 2 puff, inhalation, BID (8a, 8p), Virginie Murillo CRNP, 2 puff at 08/22/23 2122    ondansetron ODT (ZOFRAN-ODT) disintegrating tablet 4 mg, 4 mg, oral, q6h PRN **OR** ondansetron (ZOFRAN) injection 4 mg, 4 mg, intravenous, q6h PRN, Virginie Murillo CRNP    polyethylene glycol (MIRALAX) 17 gram packet 17 g, 17 g, oral, Daily, Virginie Murillo CRNP, 17 g at 08/22/23 0833    pregabalin (LYRICA) capsule 25 mg, 25 mg, oral, Daily with lunch, Virginie Murillo CRNP, 25 mg at 08/22/23 1240    pregabalin (LYRICA) capsule 50 mg, 50 mg, oral, BID, Virginie Murillo CRNP, 50 mg at 08/22/23 2115    senna (SENOKOT) tablet 2 tablet, 2 tablet, oral, Nightly, Virginie Murillo CRNP, 2 tablet at 08/22/23 2115    tiotropium bromide (SPIRIVA RESPIMAT) 2.5 mcg/actuation inhaler 2 puff, 2 puff, inhalation, Daily, Virginie Murillo CRNP, 2 puff at 08/22/23 0954    DIAGNOSTIC DATA   LABS:  Recent Results (from the past 24 hour(s))   CBC    Collection Time: 08/23/23  5:59 AM   Result Value Ref Range    WBC 8.02 3.80 - 10.50 K/uL    RBC 3.79 (L) 3.93 - 5.22 M/uL    Hemoglobin 12.0 11.8 - 15.7 g/dL    Hematocrit 37.7 35.0 - 45.0 %    MCV 99.5 (H) 83.0 - 98.0 fL    MCH 31.7 28.0 - 33.2 pg    MCHC 31.8 (L) 32.2 - 35.5 g/dL    RDW 14.1 11.7 - 14.4 %    Platelets 369 150 - 369 K/uL    MPV 10.2 9.4 - 12.3 fL       IMAGING:  I have reviewed the imaging completed within the last 24 hours.    PHYSICAL EXAM     GENERAL: NAD  NEURO: GCS 15. AAOx3.   CHEST: Symmetric expansion, non-tender to palpation  LUNGS: No use of accessory muscles or  respiratory distress.   CV:S1/S2.  ABDOMEN: Soft, nontender, nondistended.  EXTREMITIES: No gross deformities.  SKIN: Warm    PROBLEM LIST     Patient Active Problem List   Diagnosis    Allergic bronchopulmonary aspergillosis (CMS/[facility])    Asthma    Pulmonary emphysema (CMS/HCC)    Primary hypertension    Insomnia    Lumbar spinal stenosis    Obstructive sleep apnea syndrome    Osteoporosis    Medicare annual wellness visit, initial    GERD (gastroesophageal reflux disease)    Personal history of pulmonary embolism    Calcification of aorta (CMS/HCC)    Nonintractable headache    Chronic pain of left knee    Traumatic hematoma of left knee    Word finding difficulty    SVT (supraventricular tachycardia) (CMS/HCC)    Atrial flutter with rapid ventricular response (CMS/HCC)    Bilateral hip pain    Fall    Hx of long term use of blood thinners    Hematoma of left thigh    Edema    Hematoma    Fall, initial encounter    Sacral fracture, closed (CMS/HCC)    A-fib (CMS/HCC)         IMPRESSION/PLAN   89 y.o. y/o female s/p fall     * Sacral fracture, closed (CMS/HCC)  Assessment & Plan  · Closed, ND, b/l sacral Fxs.   · Ortho following: non-op, f/u w/ Dr Saavedra OP in 2 weeks.  · WBAT b/l LE.   · Multi-modal pain control.   · PT/OT/PMR--SNF    Fall, initial encounter  Assessment & Plan  · Patient with frequent mechanical falls at home. Use of walker at baseline.   · Fall precautions.  · OOB with assist.   · WBAT b/l LE.    Hx of long term use of blood thinners  Assessment & Plan  · Patient on Eliquis BID at home for VTE ppx in Afib.     GERD (gastroesophageal reflux disease)  Assessment & Plan  · Continue home PPI.     Lumbar spinal stenosis  Assessment & Plan  · Resume home Tramadol on a prn basis.     Primary hypertension  Assessment & Plan  · Continue home Diltiazem and Losaratan with hold parameters.   · VS per unit policy.

## 2023-08-24 VITALS
BODY MASS INDEX: 22.2 KG/M2 | HEART RATE: 82 BPM | RESPIRATION RATE: 16 BRPM | HEIGHT: 60 IN | WEIGHT: 113.1 LBS | TEMPERATURE: 97.8 F | OXYGEN SATURATION: 97 % | DIASTOLIC BLOOD PRESSURE: 56 MMHG | SYSTOLIC BLOOD PRESSURE: 122 MMHG

## 2023-08-24 PROCEDURE — 63700000 HC SELF-ADMINISTRABLE DRUG: Performed by: SURGERY

## 2023-08-24 PROCEDURE — 63700000 HC SELF-ADMINISTRABLE DRUG: Performed by: NURSE PRACTITIONER

## 2023-08-24 PROCEDURE — 99232 SBSQ HOSP IP/OBS MODERATE 35: CPT | Performed by: SURGERY

## 2023-08-24 PROCEDURE — 63700000 HC SELF-ADMINISTRABLE DRUG

## 2023-08-24 RX ORDER — HYDROMORPHONE HYDROCHLORIDE 2 MG/1
1 TABLET ORAL EVERY 6 HOURS PRN
Qty: 10 TABLET | Refills: 0 | Status: SHIPPED | OUTPATIENT
Start: 2023-08-24 | End: 2023-08-29

## 2023-08-24 RX ORDER — ADHESIVE BANDAGE
30 BANDAGE TOPICAL ONCE
Status: DISCONTINUED | OUTPATIENT
Start: 2023-08-24 | End: 2023-08-24 | Stop reason: HOSPADM

## 2023-08-24 RX ORDER — ACETAMINOPHEN 325 MG/1
975 TABLET ORAL EVERY 6 HOURS
Qty: 360 TABLET | Refills: 0
Start: 2023-08-24 | End: 2024-09-18

## 2023-08-24 RX ORDER — ADHESIVE BANDAGE
30 BANDAGE TOPICAL ONCE
Status: COMPLETED | OUTPATIENT
Start: 2023-08-24 | End: 2023-08-24

## 2023-08-24 RX ORDER — ADHESIVE BANDAGE
30 BANDAGE TOPICAL ONCE
Qty: 30 ML | Refills: 0 | Status: SHIPPED | OUTPATIENT
Start: 2023-08-24 | End: 2023-09-20 | Stop reason: ALTCHOICE

## 2023-08-24 RX ADMIN — MAGNESIUM HYDROXIDE 30 ML: 400 SUSPENSION ORAL at 10:21

## 2023-08-24 RX ADMIN — POLYETHYLENE GLYCOL 3350 17 G: 17 POWDER, FOR SOLUTION ORAL at 08:41

## 2023-08-24 RX ADMIN — PREGABALIN 50 MG: 25 CAPSULE ORAL at 08:41

## 2023-08-24 RX ADMIN — ACETAMINOPHEN 975 MG: 325 TABLET, FILM COATED ORAL at 11:12

## 2023-08-24 RX ADMIN — DOCUSATE SODIUM 100 MG: 100 CAPSULE, LIQUID FILLED ORAL at 08:42

## 2023-08-24 RX ADMIN — HYDROMORPHONE HYDROCHLORIDE 2 MG: 2 TABLET ORAL at 05:44

## 2023-08-24 RX ADMIN — TIOTROPIUM BROMIDE INHALATION SPRAY 2 PUFF: 3.12 SPRAY, METERED RESPIRATORY (INHALATION) at 08:42

## 2023-08-24 RX ADMIN — HYDROMORPHONE HYDROCHLORIDE 2 MG: 2 TABLET ORAL at 11:10

## 2023-08-24 RX ADMIN — ACETAMINOPHEN 975 MG: 325 TABLET, FILM COATED ORAL at 05:44

## 2023-08-24 RX ADMIN — LIDOCAINE 2 PATCH: 4 PATCH TOPICAL at 08:42

## 2023-08-24 RX ADMIN — DULOXETINE HYDROCHLORIDE 60 MG: 60 CAPSULE, DELAYED RELEASE ORAL at 08:43

## 2023-08-24 RX ADMIN — DILTIAZEM HYDROCHLORIDE 300 MG: 180 CAPSULE, COATED, EXTENDED RELEASE ORAL at 08:43

## 2023-08-24 RX ADMIN — MOMETASONE FUROATE AND FORMOTEROL FUMARATE DIHYDRATE 2 PUFF: 100; 5 AEROSOL RESPIRATORY (INHALATION) at 08:41

## 2023-08-24 RX ADMIN — APIXABAN 2.5 MG: 2.5 TABLET, FILM COATED ORAL at 08:42

## 2023-08-24 RX ADMIN — HYDRALAZINE HYDROCHLORIDE 25 MG: 25 TABLET ORAL at 05:44

## 2023-08-24 RX ADMIN — LOSARTAN POTASSIUM 50 MG: 50 TABLET, FILM COATED ORAL at 08:42

## 2023-08-24 ASSESSMENT — COGNITIVE AND FUNCTIONAL STATUS - GENERAL
CLIMB 3 TO 5 STEPS WITH RAILING: 3 - A LITTLE
WALKING IN HOSPITAL ROOM: 3 - A LITTLE
MOVING TO AND FROM BED TO CHAIR: 3 - A LITTLE
STANDING UP FROM CHAIR USING ARMS: 3 - A LITTLE

## 2023-08-24 NOTE — DISCHARGE SUMMARY
TRAUMA SURGERY:  DISCHARGE SUMMARY     PATIENT NAME:  Madelyn Ruiz YOB: 1930    AGE:  89 y.o.  GENDER: female   MRN:  [id number]  PATIENT #: [id number]     Admission date: 8/21/2023    Discharge date: 8/24/2023     Admitting Physician: Ruchi Garza MD     Discharge Physician:  MICA Nielsen; William Carlos MD     Primary Discharge Diagnoses:   1. Fall  2. Bilateral sacral fxs    Secondary Discharge Diagnoses:    N/A       Consults:     IP CONSULT TO ORTHOPEDIC SURGERY  IP CONSULT TO CASE MANAGEMENT  IP CONSULT TO SOCIAL WORK  IP CONSULT TO PHYSICAL MEDICINE REHAB  IP CONSULT TO ORTHOPEDIC SURGERY  IP CONSULT TO ELECTROPHYSIOLOGY     Hospital Course: 89 y.o. [place] [facility] [place] s/p mechanical fall sustaining the above-listed injuries.    Orthopedics was consulted and recommended non-operative management. Pt is WBAT BLE, f/u OP in 2 weeks.     PT/OT/PMR eval & recommended skilled nursing facility.      Discharge Medications:     Medication List      START taking these medications    acetaminophen 325 mg tablet  Commonly known as: TYLENOL  Take 3 tablets (975 mg total) by mouth every 6 (six) hours.  Dose: 975 mg     HYDROmorphone 2 mg tablet  Commonly known as: DILAUDID  Take 0.5 tablets (1 mg total) by mouth every 6 (six) hours as needed (0.5 mg moderate pain, 1 mg severe pain) for up to 5 days.  Dose: 1 mg        CONTINUE taking these medications    apixaban 2.5 mg tablet  Commonly known as: ELIQUIS  Take 2.5 mg by mouth 2 (two) times a day.  Dose: 2.5 mg     azithromycin 250 mg tablet  Commonly known as: ZITHROMAX  Take 250 mg by mouth 3 (three) times a week (Mon, Wed, Fri).  Dose: 250 mg     calcium carbonate 600 mg calcium (1,500 mg) tablet  Take 1 tablet by mouth 2 (two) times a day.  Dose: 1 tablet     CENTRUM ORAL  Take 1 tablet by mouth daily.  Dose: 1 tablet     cholecalciferol (vitamin D3) 25 mcg (1,000 unit) capsule  Take 1 tablet by mouth  daily.  Dose: 1 tablet     diltiazem 300 mg 24 hr capsule  Commonly known as: TIAZAC  Take 1 capsule (300 mg total) by mouth daily.  Dose: 300 mg     docusate sodium 100 mg capsule  Commonly known as: COLACE  Take 100 mg by mouth 2 (two) times a day as needed for constipation.  Dose: 100 mg     DULoxetine 60 mg capsule  Commonly known as: CYMBALTA  Take 60 mg by mouth at bedtime.  Dose: 60 mg     fexofenadine 180 mg tablet  Commonly known as: ALLEGRA  Take 180 mg by mouth daily as needed (allergies).  Dose: 180 mg     furosemide 20 mg tablet  Commonly known as: LASIX  Take 0.5 tablets (10 mg total) by mouth See admin instr. Take 1/2 tablet every other day for 10 doses.  Dose: 10 mg     hydrALAZINE 25 mg tablet  Commonly known as: APRESOLINE  Take 1 tablet by mouth every 8 (eight) hours.  Dose: 1 tablet     LORazepam 1 mg tablet  Commonly known as: ATIVAN  Take 1 mg by mouth nightly as needed for anxiety or sleep.  Dose: 1 mg     losartan 50 mg tablet  Commonly known as: COZAAR  Take 1 tablet (50 mg total) by mouth 2 (two) times a day.  Dose: 50 mg     * pregabalin 50 mg capsule  Commonly known as: LYRICA  Take 50 mg by mouth 2 (two) times a day.  Dose: 50 mg     * pregabalin 25 mg capsule  Commonly known as: LYRICA  Take 25 mg by mouth daily with lunch.  Dose: 25 mg     SPIRIVA RESPIMAT 2.5 mcg/actuation mist inhaler  Inhale 2 puffs daily.  Dose: 2 puff  Generic drug: tiotropium bromide     SYMBICORT 80-4.5 mcg/actuation inhaler  Inhale 2 puffs 2 (two) times a day.  Dose: 2 puff  Generic drug: budesonide-formoteroL     XIIDRA 5 % dropperette dropperette  Generic drug: lifitegrast         * This list has 2 medication(s) that are the same as other medications prescribed for you. Read the directions carefully, and ask your doctor or other care provider to review them with you.            STOP taking these medications    traMADoL 50 mg tablet  Commonly known as: ULTRAM             Home Medications:    apixaban, Take  2.5 mg by mouth 2 (two) times a day.    azithromycin, Take 250 mg by mouth 3 (three) times a week (Mon, Wed, Fri).    calcium carbonate, Take 1 tablet by mouth 2 (two) times a day.    cholecalciferol (vitamin D3), Take 1 tablet by mouth daily.    diltiazem, Take 1 capsule (300 mg total) by mouth daily.    docusate sodium, Take 100 mg by mouth 2 (two) times a day as needed for constipation.    DULoxetine, Take 60 mg by mouth at bedtime.     fexofenadine, Take 180 mg by mouth daily as needed (allergies).    LORazepam, Take 1 mg by mouth nightly as needed for anxiety or sleep.    losartan, Take 1 tablet (50 mg total) by mouth 2 (two) times a day.    pregabalin, Take 25 mg by mouth daily with lunch.    pregabalin, Take 50 mg by mouth 2 (two) times a day.    SPIRIVA RESPIMAT, Inhale 2 puffs daily.    SYMBICORT, Inhale 2 puffs 2 (two) times a day.    FOLIC ACID/MULTIVIT,IRON, (CENTRUM ORAL), Take 1 tablet by mouth daily.    furosemide, Take 0.5 tablets (10 mg total) by mouth See admin instr. Take 1/2 tablet every other day for 10 doses.    hydrALAZINE, Take 1 tablet by mouth every 8 (eight) hours.    XIIDRA,     traMADoL, Take 50 mg by mouth daily as needed.    Follow-Up Appointments:    · f/u PCP in 1 week   · f/u trauma clinic as needed   · f/u Dr Saavedra in 2 weeks      Disposition:   SNF    The patient's medication instructions, follow-up instructions, activity restrictions, and symptom management were explained to the patient and/or family prior to discharge and patient/family demonstrated a satisfactory understanding.    MICA Vera  8/24/2023  12:59 AM

## 2023-08-24 NOTE — NURSING NOTE
vignesh out. Incontinence brief placed for travel. Pt medicated with PO dilaudid before transport. Pt left unit in good condition via stretcher. All paperwork and belongings went with pt.

## 2023-08-24 NOTE — PLAN OF CARE
Care Coordination Discharge Plan Note     Discharge Needs Assessment  Concerns to be Addressed: care coordination/care conferences, discharge planning  Current Discharge Risk: physical impairment    Anticipated Discharge Plan  Anticipated Discharge Disposition: skilled nursing facility       Patient Choice  Offered/Gave Vendor List: yes  Patient's Choice of Community Agency(s): [facility]    Patient and/or patient guardian/advocate was made aware of their right to choose a provider. A list of eligible providers was presented and reviewed with the patient and/or patient guardian/advocate in written and/or verbal form. The list delineates providers in the patients desired geographic area who are participating in the Medicare program and/or providers contracted with the patients primary insurance. The Medicare list and quality ratings were obtained from the Medicare.gov [medicare.gov] website.    ---------------------------------------------------------------------------------------------------------------------    Interdisciplinary Discharge Plan Review:  Participants:advanced practice provider, dietitian/nutrition services, nursing, physical therapy, , social work/services, physician, occupational therapy    Concerns Comments: Per trauma rounds, Pt is stable for transfer to SNF. Pt accepted at [facility] and transport arranged for today at 11am with [facility]. Pt notified daughter. CM will follow.    Discharge Plan:   Disposition/Destination: Skilled Nursing Facility - Other  / Skilled Nursing Facility  Discharge Facility:   Destination - Admitted Since 8/21/2023     Service Provider Selected Services Address Phone Fax Patient Preferred Last Updated    [facility] [address] [address] [phone] -- Krupa Bowers, RN 8/23/2023 0832        Community Resources:      Discharge Transportation:  Is Out of Hospital DNR needed at  Discharge: yes  Does patient need discharge transport? Yes  Discharge Transportation Vendor: [facility] ([phone], option 5)  Type of Transportation: basic life support  What day is the transport expected?: 08/24/23  What time is the transport expected?: 1100

## 2023-08-24 NOTE — PROGRESS NOTES
I have queried the state Prescription Drug Monitoring Program [PDMP] and found I reviewed the results of the [place]. Will hold tramadol at discharge and prescribe dilaudid for 5 days, she has listed allergy to oxycodone. Patient may resume tramadol after dilaudid completion.

## 2023-08-24 NOTE — PROGRESS NOTES
TRAUMA SURGERY DAILY PROGRESS NOTE     SUBJECTIVE     No acute issues. Reports ambulating in hallway yesterday; complained of increased right pelvic pain which is expected given activity. Tolerating diet & passing flatus; request additional BM regimen.    REVIEW OF SYSTEMS   14 point ROS negative unless otherwise specified above    VITAL SIGNS   Temperature: Temp (24hrs), Av.6 °C (97.8 °F), Min:36.3 °C (97.4 °F), Max:36.8 °C (98.2 °F)     BP Max:  Systolic (24hrs), Av , Min:110 , Max:138      BP Fang:  Diastolic (24hrs), Av, Min:56, Max:65    Recent:    Patient Vitals for the past 4 hrs:   BP Temp Temp src Pulse Resp SpO2   23 0700 (!) 122/56 36.6 °C (97.8 °F) Oral 82 16 97 %        I/Os:  I/O this shift:  In: -   Out: 200 [Urine:200]     MEDICATIONS     Current Facility-Administered Medications:     acetaminophen (TYLENOL) tablet 975 mg, 975 mg, oral, q6h [facility], CRNP, 975 mg at 23 0544    apixaban (ELIQUIS) tablet 2.5 mg, 2.5 mg, oral, BID, Virginie Murillo CRNP, 2.5 mg at 23 0842    cetirizine (ZyrTEC) tablet 10 mg, 10 mg, oral, Nightly, Virginie Murillo CRNP, 10 mg at 23    [DISCONTINUED] glucose chewable tablet 16-32 g of dextrose, 16-32 g of dextrose, oral, PRN **OR** [DISCONTINUED] dextrose 40 % oral gel 15-30 g of dextrose, 15-30 g of dextrose, oral, PRN **OR** [DISCONTINUED] glucagon (GLUCAGEN) injection 1 mg, 1 mg, intramuscular, PRN **OR** dextrose 50 % in water (D50) injection 12.5 g, 25 mL, intravenous, PRN, Virginie Murillo CRNP    dilTIAZem CD (CARDIZEM CD) 24 hr ER capsule 300 mg, 300 mg, oral, Daily, Virginie Murillo CRNP, 300 mg at 23 0843    docusate sodium (COLACE) capsule 100 mg, 100 mg, oral, BID, Virginie Murillo CRNP, 100 mg at 23 0842    DULoxetine (CYMBALTA) capsule,delayed release(DR/EC) 60 mg, 60 mg, oral, Daily, Virginie Murillo CRNP, 60 mg at 23 0843    hydrALAZINE (APRESOLINE) tablet 25 mg,  25 mg, oral, q8h, Virginie Murillo CRNP, 25 mg at 08/24/23 0544    HYDROmorphone (DILAUDID) tablet 1 mg, 1 mg, oral, q6h PRN, Daisy Horn, [place], 1 mg at 08/23/23 2054    HYDROmorphone (DILAUDID) tablet 2 mg, 2 mg, oral, q6h PRN, Daisy Horn, [place], 2 mg at 08/24/23 0544    lidocaine (ASPERCREME) 4 % topical patch 2 patch, 2 patch, Topical, Daily, Virginie Murillo CRNP, 2 patch at 08/24/23 0842    LORazepam (ATIVAN) tablet 1 mg, 1 mg, oral, Nightly PRN, Virginie Murillo CRNP    losartan (COZAAR) tablet 50 mg, 50 mg, oral, BID, Virginie Murillo CRNP, 50 mg at 08/24/23 0842    mometasone-formoterol (DULERA 100) 100-5 mcg/actuation inhaler 2 puff, 2 puff, inhalation, BID (8a, 8p), Virginie Murillo CRNP, 2 puff at 08/24/23 0841    ondansetron ODT (ZOFRAN-ODT) disintegrating tablet 4 mg, 4 mg, oral, q6h PRN **OR** ondansetron (ZOFRAN) injection 4 mg, 4 mg, intravenous, q6h PRN, Virginie Murillo CRNP    polyethylene glycol (MIRALAX) 17 gram packet 17 g, 17 g, oral, Daily, Virginie Murillo CRNP, 17 g at 08/24/23 0841    pregabalin (LYRICA) capsule 25 mg, 25 mg, oral, Daily with lunch, Virginie Murillo CRNP, 25 mg at 08/23/23 1243    pregabalin (LYRICA) capsule 50 mg, 50 mg, oral, BID, Virginie Murillo CRNP, 50 mg at 08/24/23 0841    senna (SENOKOT) tablet 2 tablet, 2 tablet, oral, Nightly, Virginie Murillo CRNP, 2 tablet at 08/23/23 2055    tiotropium bromide (SPIRIVA RESPIMAT) 2.5 mcg/actuation inhaler 2 puff, 2 puff, inhalation, Daily, Virginie Murillo CRNP, 2 puff at 08/24/23 0842    DIAGNOSTIC DATA   LABS:  No results found for this or any previous visit (from the past 24 hour(s)).    IMAGING:  I have reviewed the imaging completed within the last 24 hours.    PHYSICAL EXAM     GENERAL: NAD  NEURO: GCS 15. AAOx3.   CHEST: Symmetric expansion, non-tender to palpation  LUNGS: No use of accessory muscles or respiratory distress.   CV:S1/S2.  ABDOMEN: Soft, nontender, nondistended.  EXTREMITIES: No  gross deformities.  SKIN: Warm    PROBLEM LIST     Patient Active Problem List   Diagnosis    Allergic bronchopulmonary aspergillosis (CMS/[facility])    Asthma    Pulmonary emphysema (CMS/HCC)    Primary hypertension    Insomnia    Lumbar spinal stenosis    Obstructive sleep apnea syndrome    Osteoporosis    Medicare annual wellness visit, initial    GERD (gastroesophageal reflux disease)    Personal history of pulmonary embolism    Calcification of aorta (CMS/HCC)    Nonintractable headache    Chronic pain of left knee    Traumatic hematoma of left knee    Word finding difficulty    SVT (supraventricular tachycardia) (CMS/HCC)    Atrial flutter with rapid ventricular response (CMS/HCC)    Bilateral hip pain    Fall    Hx of long term use of blood thinners    Hematoma of left thigh    Edema    Hematoma    Fall, initial encounter    Sacral fracture, closed (CMS/HCC)    A-fib (CMS/HCC)         IMPRESSION/PLAN   89 y.o. y/o female s/p fall     * Sacral fracture, closed (CMS/HCC)  Assessment & Plan  · Closed, ND, b/l sacral Fxs.   · Ortho following: non-op, f/u w/ Dr Saavedra OP in 2 weeks.  · WBAT b/l LE.   · Multi-modal pain control.   · PT/OT/PMR--SNF    Fall, initial encounter  Assessment & Plan  · Patient with frequent mechanical falls at home. Use of walker at baseline.   · Fall precautions.  · OOB with assist.   · WBAT b/l LE.    Hx of long term use of blood thinners  Assessment & Plan  · Patient on Eliquis BID at home for VTE ppx in Afib.     GERD (gastroesophageal reflux disease)  Assessment & Plan  · Continue home PPI.     Lumbar spinal stenosis  Assessment & Plan  · Resume home Tramadol on a prn basis.     Primary hypertension  Assessment & Plan  · Continue home Diltiazem and Losaratan with hold parameters.   · VS per unit policy.

## 2023-08-24 NOTE — PLAN OF CARE
Plan of Care Review  Plan of Care Reviewed With: patient  Progress: improving  Outcome Evaluation: AAOx4,VSS, on RA. c/o mild right hip pain. Pain meds given. All needs met at this time. Call light in reach, bed alarm on.

## 2023-08-30 ASSESSMENT — ENCOUNTER SYMPTOMS
SHORTNESS OF BREATH: 0
FEVER: 0
COUGH: 0
VOMITING: 0
ABDOMINAL PAIN: 0
DIARRHEA: 0
COLOR CHANGE: 0
NUMBNESS: 0
WEAKNESS: 0

## 2023-09-12 ENCOUNTER — TELEPHONE (OUTPATIENT)
Dept: SCHEDULING | Facility: CLINIC | Age: 88
End: 2023-09-12
Payer: MEDICARE

## 2023-09-12 DIAGNOSIS — S32.10XA CLOSED FRACTURE OF SACRUM, UNSPECIFIED FRACTURE MORPHOLOGY, INITIAL ENCOUNTER (CMS/HCC): Primary | ICD-10-CM

## 2023-09-12 NOTE — PROGRESS NOTES
Madelyn Ruiz is a 89 y.o. female presenting with injury of pelvis and sacrum x 3 weeks. Found to have of R pubic ramus and bilateral sacral ala fractures. Their pain is 2/10. It feels dull and is localized to their Pelvis. It does not radiate. Better at rest, worse with movement. No fevers or chills. No tingling or numbness.    Review of systems is negative except for what was included in the above HPI.    Past Medical History:   Past Medical History:   Diagnosis Date    Allergic bronchopulmonary aspergillosis (CMS/[facility]) 04/02/2018    Allergist: Dr. Arriaza.  Stable FEV1 in 8/2015.  IgE levels and eosinophils stable in 8/2015.  Managed with Fasenra, Azithromycin and Flovent.    Allergic rhinitis 04/02/2018    Pulmonologist: Dr. Walker. Managed with Flonase.    Asthma     Atrial flutter with rapid ventricular response (CMS/[facility]) 02/21/2023    Diagnosed in 02/2023 and hospitalized Managed with Apixaban and Diltiazem Echocardiogram 02/2023   Left Ventricle: Normal ventricle size. Mild concentric left ventricular hypertrophy. Preserved systolic function. Estimated EF 65-70%. No regional wall motion abnormalities. Grade I diastolic dysfunction.   Right Ventricle: Normal ventricle size. Normal systolic function.   Left Atrium: Moderately    Calcification of aorta (CMS/[facility]) 06/29/2020    Seen incidentally on CT scan.    Chronic obstructive pulmonary disease (CMS/[facility]) 04/02/2018    Pulmonologist: Dr. Walker.  Seen on PFTs in hospital in 2014.  Controlled with Spiriva and FLovent.    GERD (gastroesophageal reflux disease) 06/08/2019    Managed with Esomeprazole.  Should take medication 30 minutes prior to meal.  Advised to avoid caffeine, carbonated beverages, and should not eat within 3 hours of going to sleep.  Advised to reduce BMI < 25.     Insomnia 04/02/2018    Managed with Lorazepam as needed.    Lumbar spinal stenosis 04/02/2018    Neurosurgeon: Dr. Miguel. MRI with Central and lateral recess spinal  stenosis. Has Spondylolisthesis at L4/L5. S/P epidural injection by Dr. Martin with some relief in past. Had Lumbar fusion and Lumbar laminectomy by Dr. Miguel in 4/2015.    Macular degeneration disease     Obstructive sleep apnea syndrome 04/02/2018    Mild STACIA noted in sleep study 7/2015. Treated with nasal CPAP automatic with pressure range 5-10 CM H2Ox couple months. Off now    Osteoporosis 04/02/2018    Rheumatologist: Dr. Bradley.  Dexa scan 7/2016 with osteoporosis of lumbar spine LS-2.5, LH-2.1, RH -2.2.  Has been on Fosamax in the past for 8 years.  Worsened by Prednisone in past.  Continue vitamin D, calcium and weight bearing exercise. DEXA in 7/2017 with LS -1.7, LH -2.2, and RH -2.0.  Next due in 7/2019.  Seen by Dr. Bradley in 8/2015 who agreed with initiating Prolia. Took for 2 years. N    Primary hypertension 04/02/2018    Managed on Diltiazem and Losartan. Advised to follow a low sodium diet and keep BMI < 25.  Advised to exercise for 2.5 hours per week.  Instructed to take home blood pressure readings and call if consistently above 140/90.      Pulmonary embolism (CMS/HCC) 06/09/2019    Hematologist: Dr. Bhakta Diagnosed in 06/2019 in right upper lobe. Vascular ultrasound negative of bilateral lower extremities. Managed on Eliquis. Hypercoagulable workup was negative for beta-2 glycoprotein's, RIGO, Antithrombin III, lupus anticoagulant, protein C activity protein S activity, and prothrombin gene mutation, and factor V Leiden. Now off Apixaban as CT scan in 12/2019 was without pu    SVT (supraventricular tachycardia) (CMS/HCC)     Vertebral compression fracture (CMS/HCC)     At T12 level        Social History:  Social History     Tobacco Use    Smoking status: Never    Smokeless tobacco: Never   Vaping Use    Vaping Use: Never used   Substance Use Topics    Alcohol use: Yes     Comment: Rarely    Drug use: No        Family History:  Noncontributory  Family History   Problem Relation Age  of Onset    Glaucoma Biological Mother     Macular degeneration Biological Mother     Hypertension Biological Mother     Lung cancer Biological Father     Heart disease Biological Brother     Breast cancer Mother's Sister     No Known Problems Biological Son     No Known Problems Biological Son     No Known Problems Biological Daughter     No Known Problems Biological Daughter     No Known Problems Biological Daughter        Physical Exam:    Left and Right Lower Extremity: nontender to palpation  ROM: full ROM but painful at extremes  +ta/ehl/gsc motor  Senation intact to light touch spn/dpn/tn  Cap refill brisk    Imaging:  Xray of Pelvis was personally reviewed. My findings are:  fracture in good alignment. interval callus formation present    Assessment/Plan:  Weight bearing as tolerated both upper extremities   PT Rx  Fu 3 months  Xray pelvis

## 2023-09-13 ENCOUNTER — HOSPITAL ENCOUNTER (OUTPATIENT)
Dept: RADIOLOGY | Facility: HOSPITAL | Age: 88
Discharge: HOME | End: 2023-09-13
Attending: PHYSICIAN ASSISTANT
Payer: MEDICARE

## 2023-09-13 ENCOUNTER — OFFICE VISIT (OUTPATIENT)
Dept: ORTHOPEDICS | Facility: CLINIC | Age: 88
End: 2023-09-13
Payer: MEDICARE

## 2023-09-13 DIAGNOSIS — S32.10XA CLOSED FRACTURE OF SACRUM, UNSPECIFIED FRACTURE MORPHOLOGY, INITIAL ENCOUNTER (CMS/HCC): Primary | ICD-10-CM

## 2023-09-13 DIAGNOSIS — S32.10XA CLOSED FRACTURE OF SACRUM, UNSPECIFIED FRACTURE MORPHOLOGY, INITIAL ENCOUNTER (CMS/HCC): ICD-10-CM

## 2023-09-13 PROCEDURE — 99213 OFFICE O/P EST LOW 20 MIN: CPT | Performed by: ORTHOPAEDIC SURGERY

## 2023-09-13 PROCEDURE — 72190 X-RAY EXAM OF PELVIS: CPT

## 2023-09-18 ENCOUNTER — PATIENT OUTREACH (OUTPATIENT)
Dept: CASE MANAGEMENT | Facility: CLINIC | Age: 88
End: 2023-09-18
Payer: MEDICARE

## 2023-09-18 ASSESSMENT — ENCOUNTER SYMPTOMS
NAUSEA: 0
DIZZINESS: 0
CONSTIPATION: 0
VOMITING: 0
DIARRHEA: 0
WEAKNESS: 1
LIGHT-HEADEDNESS: 0

## 2023-09-18 NOTE — PROGRESS NOTES
NAME: Madelyn Ruiz    MRN: [id number]    YOB: 1930    Event Review:    Initial TCM Patient Outreach Date: 09/18/23    Assessment completed with: Patient  Patient stated reason for hospitalization: Unspecified fracture of sacrum  Discharge Diagnosis: Unspecified fracture of sacrum    Patient readmitted in the last 30 days: No  Discharging Facility: [facility]  Date of Last Admission: 08/21/23  Date of Last Discharge: 08/24/23    Discharging Facilty SNF/Rehab: [facility]  Date of Admission: 08/24/23  Date of Discharge: 09/17/23    Patient's perception of their health status since discharge: Improving    HPI: Spoke with pt. Pt says she was getting dressed to go to her daughter's birthday party. She stood to pull up her pants and got dizzy. She knew she was going to fall, but unable to stop herself. She landed on the floor. CT head and neck negative. CT chest/abd/pelvis- presacral edema and bilateral nondisplaced sacral fractures. X-ray hip negative fracture.  Pt treated non-operatively.     Pt says she is doing ok. She has minimal pain. Ambulating with a walker. Reinforced falls/home safety. Pt says her daughter has given her strict instructions-No stairs. Pt's son is staying with her this week. Pt's daughter Karolina will stay with her next week. Pt's daughter Demetria lives nearby and visits almost daily. Pt will have HHA overnight for the next 2 weeks, then she will see if she still needs them.      Pt saw ortho 9/13/23-x-rays showed good alignment of fractures. WBAT. F/u 3 months.    Review of Systems   Gastrointestinal: Negative for constipation, diarrhea, nausea and vomiting.   Musculoskeletal: Positive for gait problem.   Neurological: Positive for weakness. Negative for dizziness and light-headedness.       Medication Review:    Medication Review: Yes     Reported by: Patient  Any new medications prescribed at discharge?: Yes  Is the patient having any side effects they believe may  be caused by any medication additions or changes?: No  New prescriptions filled?: Yes     Do you have enough of your regularly prescribed medications?: Yes       Medication adherence problem?: No  Was a medication discrepancy indentified?: No       Nursing Interventions: No intervention needed  Reconciled the current and discharge medications: Yes  Reviewed AVS (Discharge Instructions)?: Yes    Acute Pain:    Acute pain: No    Chronic Pain:    Chronic pain: No    Diet/Nutrition:    Type of Diet: Regular  Diet Adherence: Adherent with diet    Home Care Services:    Home Care Interventions: No intervention required     Post-Discharge Durable Medical Equipment::    Durable Medical Equipment: Front wheeled walker, Grab bars, Blood pressure cuff  Does patient's condition require a scale?: No     Oxygen Use: No        DME Interventions: No intervention required    Home Management:    Living Arrangement: Alone  Support System:: Child/Children (Pt has 3 children. Daughter Demetria lives nearby.)  Type of Residence: [facility] (Pt with first floor setup.)  Home Monitoring: Blood Pressure  Any patient reported falls in the last 3 months?: Yes  Holiness or spiritual beliefs that impact treatment?: No    Appointment Scheduling:    PCP appointment scheduled: Yes     Appointment Date: 10/11/23     Appointment Provider: Dr. Linares        Follow-Ups:    Cardiology: 9/22  Dr. Saavedra (Ortho): 12/13    Interventions/ Care Coordination:    Interventions/ Care Coordination: Addressed a knowledge deficit, Encouraged patient to schedule with the PCP/Specialist  General Education: Respiratory precautions (flu, colds, pneumonia, COVID-19), Falls/Home safety       Reviewed signs/symptoms of worsening condition or complication that necessitate a call to the Physician's office.  Educated patient on access to care.  RN phone number given for future care management.    Karolina Hauser RN  [phone]

## 2023-09-19 ENCOUNTER — TELEPHONE (OUTPATIENT)
Dept: PRIMARY CARE | Facility: CLINIC | Age: 88
End: 2023-09-19
Payer: MEDICARE

## 2023-09-19 ENCOUNTER — TELEPHONE (OUTPATIENT)
Dept: PRIMARY CARE | Facility: CLINIC | Age: 88
End: 2023-09-19

## 2023-09-19 PROCEDURE — G0180 MD CERTIFICATION HHA PATIENT: HCPCS | Performed by: FAMILY MEDICINE

## 2023-09-19 NOTE — TELEPHONE ENCOUNTER
[facility] Appointment Request   Provider: Dr Linares  Appointment Type: HFU from [facility]      Reason for Visit:HFU, dc 9/16 also med check, pt put on new medication maker her feel lightheaded and dropper her pressure  ASAP   Available Day and Time: Any  Best Contact Number: [phone]    The practice will reach out to schedule your appointment within the next 2 business days.

## 2023-09-19 NOTE — TELEPHONE ENCOUNTER
Called and scheduled pt in the [facility] on 09/22/23  
Patient with history of aflutter. Patient's daughter was told by [facility] staff that heart rate was elevated and she should see cardiology. Was in ER recently s/p fall. Is on Eliquis.  
Pt has an appt 09/22 with [facility] and is requesting a sooner appt.    pts son can be reached at [phone]    
Pt's daughter called to schedule an appt for pt. Pt will be d/c from [facility] on Sunday and needs a FU appt     P: . [phone]   
Spoke with patients son he is in town and wants to to come to the patients OV. She was rescheduled for 9/20 at 3pm   
98.2

## 2023-09-19 NOTE — TELEPHONE ENCOUNTER
"Patient scheduled for hospital f/u with [facility] on Thursday, however, she was discharged from [facility] the other day and son is concerned and doesn't want to wait until Thursday to address the following issue:    She was prescribed hydralazine 25 mg 1 every 8 hours.  When given to her last night, it made her lightheaded and caused her \"head to throb.\"  He took her BP and it was 100/64. So for now, they are not giving her this medication.      He is asking if they should cut it in half or continue not giving it to her.  "

## 2023-09-20 ENCOUNTER — TELEPHONE (OUTPATIENT)
Dept: PRIMARY CARE | Facility: CLINIC | Age: 88
End: 2023-09-20
Payer: MEDICARE

## 2023-09-20 ENCOUNTER — OFFICE VISIT (OUTPATIENT)
Dept: CARDIOLOGY | Facility: CLINIC | Age: 88
End: 2023-09-20
Payer: MEDICARE

## 2023-09-20 VITALS
RESPIRATION RATE: 18 BRPM | DIASTOLIC BLOOD PRESSURE: 74 MMHG | SYSTOLIC BLOOD PRESSURE: 118 MMHG | WEIGHT: 119 LBS | HEIGHT: 60 IN | OXYGEN SATURATION: 98 % | HEART RATE: 94 BPM | BODY MASS INDEX: 23.36 KG/M2

## 2023-09-20 DIAGNOSIS — I45.10 RBBB: ICD-10-CM

## 2023-09-20 DIAGNOSIS — I67.1 BRAIN ANEURYSM: ICD-10-CM

## 2023-09-20 DIAGNOSIS — R29.6 RECURRENT FALLS: ICD-10-CM

## 2023-09-20 DIAGNOSIS — I48.92 ATRIAL FLUTTER, UNSPECIFIED TYPE (CMS/HCC): Primary | ICD-10-CM

## 2023-09-20 DIAGNOSIS — I10 PRIMARY HYPERTENSION: ICD-10-CM

## 2023-09-20 DIAGNOSIS — E78.2 MIXED HYPERLIPIDEMIA: ICD-10-CM

## 2023-09-20 PROCEDURE — 93000 ELECTROCARDIOGRAM COMPLETE: CPT | Performed by: INTERNAL MEDICINE

## 2023-09-20 PROCEDURE — 99214 OFFICE O/P EST MOD 30 MIN: CPT | Performed by: INTERNAL MEDICINE

## 2023-09-20 RX ORDER — LOSARTAN POTASSIUM 50 MG/1
50 TABLET ORAL NIGHTLY
Start: 2023-09-20 | End: 2023-10-24 | Stop reason: HOSPADM

## 2023-09-20 RX ORDER — FUROSEMIDE 20 MG/1
20 TABLET ORAL DAILY
Qty: 30 TABLET | Refills: 1
Start: 2023-09-20 | End: 2023-09-20

## 2023-09-20 RX ORDER — FUROSEMIDE 20 MG/1
20 TABLET ORAL EVERY OTHER DAY
Qty: 30 TABLET | Refills: 0 | Status: SHIPPED | OUTPATIENT
Start: 2023-09-20 | End: 2023-10-12 | Stop reason: SDUPTHER

## 2023-09-20 NOTE — PROGRESS NOTES
[facility] Cardiology  [place] Office Visit       Patient ID: Madelyn Ruiz 89 y.o. female 8/9/1930  PCP: Henry Linares MD      HPI     Madelyn Ruiz is a 89 y.o. female who presents today for post hospital follow up.     She has a past medical history significant for allergic bronchopulmonary aspergillosis, COPD, hypertension, hyperlipidemia, paroxysmal atrial flutter, left SCA aneurysm, GERD, pulmonary embolism in 2019, falls, and mild sleep apnea.    She was last seen in March 2023 and was doing well from a cardiac standpoint in sinus rhythm. She contacted our office in May 2023 with symptomatic hypertension and Losartan was increased to 50 mg BID.     In the interim, she was admitted to [facility] from 8/21/2023-8/24/2023 status post fall complicated by right pubic ramus and bilateral sacral fractures. She was treated conservatively by orthopedic and trauma surgery. She was evaluated by cardiology during her stay given pAF and risk versus benefit of continued anticoagulation. It was recommended that the patient be engaged in the decision making process and to follow up with her primary cardiologist. No changes were made to her cardiac medications at discharge.     She was discharged to [facility] following this hospitalization. She contacted our office in mid September to make an follow up appointment and for concerns of an elevated HR at rehab.      At today's visit, the patient is accompanied by her son Christ.  She is back at home and completed her rehabilitation at [facility].  She recounted the events of her fall in August and states she experienced dizziness after standing.  She subsequently fell backwards but denies loss of consciousness or syncope.  She had additional falls this year that were mechanical in nature. She uses a rolling walker for ambulation assistance.     She denies chest pain, shortness of breath, lightheadedness, dizziness, palpitations, syncope and presyncope.  She  has lower extremity edema and was recently started on Lasix every other day.  Her swelling is somewhat bothersome in appearance and discomfort.    The patient was also discharged from [facility] on hydralazine 25 mg 3 times daily.  She experienced significant hypotension related to administration with associated lightheadedness and her family discontinued this on Monday.  In addition she was on 360 mg of diltiazem at the facility but is back on 300 mg daily at home.   Medications     Current Outpatient Medications   Medication Instructions    acetaminophen (TYLENOL) 975 mg, oral, Every 6 hours    apixaban (ELIQUIS) 2.5 mg, oral, 2 times daily    azithromycin (ZITHROMAX) 250 mg, oral, 3 times weekly    calcium carbonate 600 mg calcium (1,500 mg) tablet 1 tablet, oral, 2 times daily    cholecalciferol, vitamin D3, 25 mcg (1,000 unit) capsule 1 tablet, oral, Daily    diltiazem (TIAZAC) 300 mg, oral, Daily    docusate sodium (COLACE) 100 mg, oral, 2 times daily PRN    DULoxetine (CYMBALTA) 60 mg, oral, Nightly    fexofenadine (ALLEGRA) 180 mg, oral, Daily PRN    FOLIC ACID/MULTIVIT,IRON, (CENTRUM ORAL) 1 tablet, oral, Daily    furosemide (LASIX) 20 mg, oral, Every other day    lifitegrast (XIIDRA) 5 % dropperette dropperette No dose, route, or frequency recorded.    LORazepam (ATIVAN) 1 mg, oral, Nightly PRN    losartan (COZAAR) 50 mg, oral, Nightly    pregabalin (LYRICA) 50 mg, oral, 2 times daily    pregabalin (LYRICA) 25 mg, oral, Daily with lunch, PRN    SPIRIVA RESPIMAT 2.5 mcg/actuation mist inhaler 2 puffs, inhalation, Daily    SYMBICORT 80-4.5 mcg/actuation inhaler 2 puffs, inhalation, 2 times daily         Allergies     Mepolizumab, Morphine, and Oxycodone     Vital Signs/Exam     Vitals:    09/20/23 1458   BP: 118/74   Pulse: 94   Resp: 18   SpO2: 98%   Weight: 54 kg (119 lb)   Height: 1.524 m (5')     BP Readings from Last 3 Encounters:   09/20/23 118/74   08/24/23 (!) 122/56   08/08/23  138/80     Wt Readings from Last 3 Encounters:   09/20/23 54 kg (119 lb)   08/21/23 51.3 kg (113 lb 1.5 oz)   07/13/23 57 kg (125 lb 9.6 oz)      Body mass index is 23.24 kg/m².    Physical Exam  Constitutional:       Appearance: She is well-developed.   Eyes:      Conjunctiva/sclera: Conjunctivae normal.   Cardiovascular:      Rate and Rhythm: Normal rate and regular rhythm.      Heart sounds: Normal heart sounds.   Pulmonary:      Effort: Pulmonary effort is normal.      Breath sounds: Normal breath sounds.   Abdominal:      General: There is no distension.      Palpations: Abdomen is soft.      Tenderness: There is no abdominal tenderness.   Musculoskeletal:      Right lower leg: Edema present.      Left lower leg: Edema present.   Skin:     General: Skin is warm and dry.   Neurological:      General: No focal deficit present.      Mental Status: She is alert.   Psychiatric:         Mood and Affect: Mood normal.         Behavior: Behavior normal.          Diagnostic Data     Labs:  Lab Results   Component Value Date    CHOL 227 (H) 01/27/2023    CHOL 248 (H) 11/18/2015     Lab Results   Component Value Date    HDL 62 01/27/2023    HDL 92 11/18/2015     Lab Results   Component Value Date    LDLCALC 141 (H) 01/27/2023    LDLCALC 133 (H) 11/18/2015     Lab Results   Component Value Date    TRIG 120 01/27/2023    TRIG 117 11/18/2015     Lab Results   Component Value Date    WBC 8.02 08/23/2023    HGB 12.0 08/23/2023     08/23/2023     Lab Results   Component Value Date    GLUCOSE 105 (H) 08/23/2023    CALCIUM 8.8 08/23/2023     08/23/2023    K 4.3 08/23/2023    CO2 21 08/23/2023     08/23/2023    BUN 29 (H) 08/23/2023    CREATININE 0.8 08/23/2023     Lab Results   Component Value Date    EGFR >60.0 08/23/2023     Lab Results   Component Value Date    ALBUMIN 3.7 08/21/2023    BILITOT 0.5 08/21/2023    ALKPHOS 95 08/21/2023    ALT 14 08/21/2023    AST 15 08/21/2023     Lab Results   Component Value  Date    HGBA1C 5.6 11/18/2015     Lab Results   Component Value Date    TSH 2.87 01/26/2023     CrCl cannot be calculated (Patient's most recent lab result is older than the maximum 28 days allowed.).    Echocardiogram:  Cardiac Imaging    TRANSTHORACIC ECHO (TTE) COMPLETE 02/22/2023    Interpretation Summary    Left Ventricle: Normal ventricle size. Mild concentric left ventricular hypertrophy. Preserved systolic function. Estimated EF 65-70%. No regional wall motion abnormalities. Grade I diastolic dysfunction.    Right Ventricle: Normal ventricle size. Normal systolic function.    Left Atrium: Moderately dilated atrium.    Right Atrium: Mildly dilated atrium.    Aortic Valve: Tricuspid valve.  Sclerotic leaflets. No regurgitation. No stenosis. Calculated dimensionless index = 1.04.    Mitral Valve: Anterior leaflet thickening. Sclerotic mitral valve. Normal leaflet motion. Mild mitral annular calcification. Mild regurgitation. No stenosis. Mean gradient = 2.00.    Tricuspid Valve: Normal structure. Mild regurgitation. Estimated RVSP = 32 mmHg. No significant stenosis.    Prior Study: Prior study available for comparison. Prior study date: 6/11/2019.  Compared to the prior study, biatrial enlargement and mild concentric LVH are now present.  Otherwise no significant change.      Stress Tests:       Vascular Studies:  No results found for this or any previous visit from the past 730 days.      Cardiac cath:      Peripheral:  No results found for this or any previous visit from the past 730 days.      ECG: Independently reviewed:            Assessment and Plan      Madelyn Ruiz is a 89 y.o. female seen today for the following conditions:    ..   Diagnosis Plan   1. Atrial flutter, unspecified type (CMS/HCC)  ECG 12 LEAD-OFFICE PERFORMED    Basic metabolic panel    Transthoracic echo (TTE) complete      2. Primary hypertension  Transthoracic echo (TTE) complete      3. Mixed hyperlipidemia        4.  Brain aneurysm        5. Recurrent falls        6. RBBB              Plan:    At today's visit, we reviewed her recent hospitalization in detail.  She has a history of recurrent falls and we discussed the benefit versus risk of continued therapeutic anticoagulation.  Through shared decision making, the patient has elected to continue Eliquis for stroke prevention (dose appropriate given ABC criteria).  Office ECG today demonstrates sinus rhythm with a RBBB (new since August 2023).  She will continue diltiazem 300 mg daily for rate control.    Regarding her falls, she has experienced mechanical events as well as positional dizziness.  Her blood pressure is acceptable in the office today and we discussed lenient control given her age. She has had no evidence of bradycardia.     She is euvolemic on exam with mild lower extremity edema present.  She was recently started on Lasix 10 mg every other day without much effect.  She is also wearing compression stockings.  We will plan to increase Lasix to 20 mg every other day and have ordered a metabolic panel to follow her renal function/electrolytes.    In the setting of increasing diuretic therapy, we will decrease losartan to 50 mg daily to prevent lower blood pressures. In addition she will continue off hydralazine which was already discontinued by her family earlier this week.     We will plan to see her again in approximately 6 months for an office visit and echocardiogram.      I, MICA Ruggiero, attest that this documentation has been prepared under the direction and in the presence of Michael Valentino, MD.    We discussed her recent hospitalization and the risk versus benefits of continuing anticoagulation in the setting of recurrent falls.  She ultimately decided that she would prefer to stay on anticoagulation and I think as long as we can keep her blood pressure from getting too low and causing any lightheadedness that can result in a fall this  should be safe.  I recommended stopping her hydralazine completely.  I also recommended cutting back her losartan to 50 mg once daily from twice daily.    She is euvolemic on exam but does have some mild lower extremity edema.  She is only on Lasix 10 mg every other day and it has not resulted in much improvement.  I recommended increasing this to 20 mg every other day which I think she will tolerate now that we have cut back on some of her other blood pressure medication.    I will plan to see her in follow-up in approximately 6 months.      Available medical records were reviewed and updated where appropriate including laboratory data, imaging studies, and previous outpatient and inpatient records.  Counseling/education performed.      Thank you for allowing me to participate in the care of this patient.      Michael A. Valentino, MD, PhD, [facility]  9/20/2023  4:16 PM    [facility] Office: [phone]  [facility]: [facility]     This document was generated utilizing voice recognition technology, typographical errors may be present.

## 2023-09-20 NOTE — TELEPHONE ENCOUNTER
Fredy called in from Home[facility] stating patient started homecare, patient also stop taking hydralizine  Because it makes her dizz , she also stop taking cardizem because her insurance does not cover it but she taking  tiadylt 300 mg 1 daily. Patient Discharge instruction  Listed her taking vitamin D3, 1000 units, she was taking 2000 prior , what should patient be  taking ?    Please give Fredy a call at     [phone]

## 2023-09-20 NOTE — LETTER
September 20, 2023     Henry Linares MD  [address]  [address]  [facility] [address]    Patient: Madelyn Ruiz  YOB: 1930  Date of Visit: 9/20/2023      Dear Dr. Linares:    Thank you for referring Madelyn Ruiz to me for evaluation. Below are my notes for this consultation.    If you have questions, please do not hesitate to call me. I look forward to following your patient along with you.         Sincerely,        [facility]        CC: No Recipients    Valentino, Michael A, MD PhD  9/20/2023  4:24 PM  Signed     [facility] Cardiology  [place] Office Visit       Patient ID: Madelyn Ruiz 89 y.o. female 8/9/1930  PCP: Henry Linares MD      HPI     Madelyn Ruiz is a 89 y.o. female who presents today for post hospital follow up.     She has a past medical history significant for allergic bronchopulmonary aspergillosis, COPD, hypertension, hyperlipidemia, paroxysmal atrial flutter, left SCA aneurysm, GERD, pulmonary embolism in 2019, falls, and mild sleep apnea.    She was last seen in March 2023 and was doing well from a cardiac standpoint in sinus rhythm. She contacted our office in May 2023 with symptomatic hypertension and Losartan was increased to 50 mg BID.     In the interim, she was admitted to [facility] from 8/21/2023-8/24/2023 status post fall complicated by right pubic ramus and bilateral sacral fractures. She was treated conservatively by orthopedic and trauma surgery. She was evaluated by cardiology during her stay given pAF and risk versus benefit of continued anticoagulation. It was recommended that the patient be engaged in the decision making process and to follow up with her primary cardiologist. No changes were made to her cardiac medications at discharge.     She was discharged to [facility] following this hospitalization. She contacted our office in mid September to make an follow up appointment and for concerns of an elevated HR at  rehab.      At today's visit, the patient is accompanied by her son Christ.  She is back at home and completed her rehabilitation at [facility].  She recounted the events of her fall in August and states she experienced dizziness after standing.  She subsequently fell backwards but denies loss of consciousness or syncope.  She had additional falls this year that were mechanical in nature. She uses a rolling walker for ambulation assistance.     She denies chest pain, shortness of breath, lightheadedness, dizziness, palpitations, syncope and presyncope.  She has lower extremity edema and was recently started on Lasix every other day.  Her swelling is somewhat bothersome in appearance and discomfort.    The patient was also discharged from [facility] on hydralazine 25 mg 3 times daily.  She experienced significant hypotension related to administration with associated lightheadedness and her family discontinued this on Monday.  In addition she was on 360 mg of diltiazem at the facility but is back on 300 mg daily at home.   Medications     Current Outpatient Medications   Medication Instructions    acetaminophen (TYLENOL) 975 mg, oral, Every 6 hours    apixaban (ELIQUIS) 2.5 mg, oral, 2 times daily    azithromycin (ZITHROMAX) 250 mg, oral, 3 times weekly    calcium carbonate 600 mg calcium (1,500 mg) tablet 1 tablet, oral, 2 times daily    cholecalciferol, vitamin D3, 25 mcg (1,000 unit) capsule 1 tablet, oral, Daily    diltiazem (TIAZAC) 300 mg, oral, Daily    docusate sodium (COLACE) 100 mg, oral, 2 times daily PRN    DULoxetine (CYMBALTA) 60 mg, oral, Nightly    fexofenadine (ALLEGRA) 180 mg, oral, Daily PRN    FOLIC ACID/MULTIVIT,IRON, (CENTRUM ORAL) 1 tablet, oral, Daily    furosemide (LASIX) 20 mg, oral, Every other day    lifitegrast (XIIDRA) 5 % dropperette dropperette No dose, route, or frequency recorded.    LORazepam (ATIVAN) 1 mg, oral, Nightly PRN    losartan (COZAAR) 50 mg, oral, Nightly     pregabalin (LYRICA) 50 mg, oral, 2 times daily    pregabalin (LYRICA) 25 mg, oral, Daily with lunch, PRN    SPIRIVA RESPIMAT 2.5 mcg/actuation mist inhaler 2 puffs, inhalation, Daily    SYMBICORT 80-4.5 mcg/actuation inhaler 2 puffs, inhalation, 2 times daily         Allergies     Mepolizumab, Morphine, and Oxycodone     Vital Signs/Exam     Vitals:    09/20/23 1458   BP: 118/74   Pulse: 94   Resp: 18   SpO2: 98%   Weight: 54 kg (119 lb)   Height: 1.524 m (5')     BP Readings from Last 3 Encounters:   09/20/23 118/74   08/24/23 (!) 122/56   08/08/23 138/80     Wt Readings from Last 3 Encounters:   09/20/23 54 kg (119 lb)   08/21/23 51.3 kg (113 lb 1.5 oz)   07/13/23 57 kg (125 lb 9.6 oz)      Body mass index is 23.24 kg/m².    Physical Exam  Constitutional:       Appearance: She is well-developed.   Eyes:      Conjunctiva/sclera: Conjunctivae normal.   Cardiovascular:      Rate and Rhythm: Normal rate and regular rhythm.      Heart sounds: Normal heart sounds.   Pulmonary:      Effort: Pulmonary effort is normal.      Breath sounds: Normal breath sounds.   Abdominal:      General: There is no distension.      Palpations: Abdomen is soft.      Tenderness: There is no abdominal tenderness.   Musculoskeletal:      Right lower leg: Edema present.      Left lower leg: Edema present.   Skin:     General: Skin is warm and dry.   Neurological:      General: No focal deficit present.      Mental Status: She is alert.   Psychiatric:         Mood and Affect: Mood normal.         Behavior: Behavior normal.          Diagnostic Data     Labs:  Lab Results   Component Value Date    CHOL 227 (H) 01/27/2023    CHOL 248 (H) 11/18/2015     Lab Results   Component Value Date    HDL 62 01/27/2023    HDL 92 11/18/2015     Lab Results   Component Value Date    LDLCALC 141 (H) 01/27/2023    LDLCALC 133 (H) 11/18/2015     Lab Results   Component Value Date    TRIG 120 01/27/2023    TRIG 117 11/18/2015     Lab Results   Component Value  Date    WBC 8.02 08/23/2023    HGB 12.0 08/23/2023     08/23/2023     Lab Results   Component Value Date    GLUCOSE 105 (H) 08/23/2023    CALCIUM 8.8 08/23/2023     08/23/2023    K 4.3 08/23/2023    CO2 21 08/23/2023     08/23/2023    BUN 29 (H) 08/23/2023    CREATININE 0.8 08/23/2023     Lab Results   Component Value Date    EGFR >60.0 08/23/2023     Lab Results   Component Value Date    ALBUMIN 3.7 08/21/2023    BILITOT 0.5 08/21/2023    ALKPHOS 95 08/21/2023    ALT 14 08/21/2023    AST 15 08/21/2023     Lab Results   Component Value Date    HGBA1C 5.6 11/18/2015     Lab Results   Component Value Date    TSH 2.87 01/26/2023     CrCl cannot be calculated (Patient's most recent lab result is older than the maximum 28 days allowed.).    Echocardiogram:  Cardiac Imaging    TRANSTHORACIC ECHO (TTE) COMPLETE 02/22/2023    Interpretation Summary    Left Ventricle: Normal ventricle size. Mild concentric left ventricular hypertrophy. Preserved systolic function. Estimated EF 65-70%. No regional wall motion abnormalities. Grade I diastolic dysfunction.    Right Ventricle: Normal ventricle size. Normal systolic function.    Left Atrium: Moderately dilated atrium.    Right Atrium: Mildly dilated atrium.    Aortic Valve: Tricuspid valve.  Sclerotic leaflets. No regurgitation. No stenosis. Calculated dimensionless index = 1.04.    Mitral Valve: Anterior leaflet thickening. Sclerotic mitral valve. Normal leaflet motion. Mild mitral annular calcification. Mild regurgitation. No stenosis. Mean gradient = 2.00.    Tricuspid Valve: Normal structure. Mild regurgitation. Estimated RVSP = 32 mmHg. No significant stenosis.    Prior Study: Prior study available for comparison. Prior study date: 6/11/2019.  Compared to the prior study, biatrial enlargement and mild concentric LVH are now present.  Otherwise no significant change.      Stress Tests:       Vascular Studies:  No results found for this or any  previous visit from the past 730 days.      Cardiac cath:      Peripheral:  No results found for this or any previous visit from the past 730 days.      ECG: Independently reviewed:            Assessment and Plan      Madelyn Ruiz is a 89 y.o. female seen today for the following conditions:    ..   Diagnosis Plan   1. Atrial flutter, unspecified type (CMS/HCC)  ECG 12 LEAD-OFFICE PERFORMED    Basic metabolic panel    Transthoracic echo (TTE) complete      2. Primary hypertension  Transthoracic echo (TTE) complete      3. Mixed hyperlipidemia        4. Brain aneurysm        5. Recurrent falls        6. RBBB              Plan:    At today's visit, we reviewed her recent hospitalization in detail.  She has a history of recurrent falls and we discussed the benefit versus risk of continued therapeutic anticoagulation.  Through shared decision making, the patient has elected to continue Eliquis for stroke prevention (dose appropriate given ABC criteria).  Office ECG today demonstrates sinus rhythm with a new RBBB.  She will continue diltiazem 300 mg daily for rate control.    Regarding her falls, she has experienced mechanical events as well as positional dizziness.  Her blood pressure is acceptable in the office today and we discussed lenient control given her age. She has had no evidence of bradycardia.     She is euvolemic on exam with mild lower extremity edema present.  She was recently started on Lasix 10 mg every other day without much effect.  She is also wearing compression stockings.  We will plan to increase Lasix to 20 mg every other day and have ordered a metabolic panel to follow her renal function/electrolytes.    In the setting of increasing diuretic therapy, we will decrease losartan to 50 mg daily to prevent lower blood pressures. In addition she will continue off hydralazine which was already discontinued by her family earlier this week.     We will plan to see her again in approximately 6 months  for an office visit and echocardiogram.      I, MICA Ruggiero, attest that this documentation has been prepared under the direction and in the presence of Michael Valentino, MD.    We discussed her recent hospitalization and the risk versus benefits of continuing anticoagulation in the setting of recurrent falls.  She ultimately decided that she would prefer to stay on anticoagulation and I think as long as we can keep her blood pressure from getting too low and causing any lightheadedness that can result in a fall this should be safe.  I recommended stopping her hydralazine completely.  I also recommended cutting back her losartan to 50 mg once daily from twice daily.    She is euvolemic on exam but does have some mild lower extremity edema.  She is only on Lasix 10 mg every other day and it has not resulted in much improvement.  I recommended increasing this to 20 mg every other day which I think she will tolerate now that we have cut back on some of her other blood pressure medication.    I will plan to see her in follow-up in approximately 6 months.      Available medical records were reviewed and updated where appropriate including laboratory data, imaging studies, and previous outpatient and inpatient records.  Counseling/education performed.      Thank you for allowing me to participate in the care of this patient.      Michael A. Valentino, MD, PhD, [facility]  9/20/2023  4:16 PM    [facility] [place] Office: [phone]  [facility]: [facility]     This document was generated utilizing voice recognition technology, typographical errors may be present.

## 2023-09-20 NOTE — PATIENT INSTRUCTIONS
-Stop taking Hydralazine.   -We are decreasing the Losartan to 50 mg once daily (okay take at night).  -We are increasing the Lasix to 20 mg every other day.  -Continue your current dose of Diltiazem 300 mg once daily.     -Keep an eye on your blood pressure at home. If you develop low readings or recurrent lightheadedness/dizziness, call our office.    -Get your labs checked in about a week or so. This is to monitor your kidney function/electrolytes on the water pill.

## 2023-09-21 ENCOUNTER — OFFICE VISIT (OUTPATIENT)
Dept: PRIMARY CARE | Facility: CLINIC | Age: 88
End: 2023-09-21
Payer: MEDICARE

## 2023-09-21 VITALS
SYSTOLIC BLOOD PRESSURE: 154 MMHG | HEIGHT: 60 IN | HEART RATE: 110 BPM | BODY MASS INDEX: 23.36 KG/M2 | DIASTOLIC BLOOD PRESSURE: 76 MMHG | WEIGHT: 119 LBS | OXYGEN SATURATION: 98 %

## 2023-09-21 DIAGNOSIS — I10 PRIMARY HYPERTENSION: Chronic | ICD-10-CM

## 2023-09-21 DIAGNOSIS — S32.10XS CLOSED FRACTURE OF SACRUM, UNSPECIFIED FRACTURE MORPHOLOGY, SEQUELA: ICD-10-CM

## 2023-09-21 DIAGNOSIS — M25.552 BILATERAL HIP PAIN: Primary | ICD-10-CM

## 2023-09-21 DIAGNOSIS — M25.551 BILATERAL HIP PAIN: Primary | ICD-10-CM

## 2023-09-21 PROCEDURE — 99495 TRANSJ CARE MGMT MOD F2F 14D: CPT | Performed by: NURSE PRACTITIONER

## 2023-09-21 RX ORDER — LIDOCAINE 50 MG/G
1 PATCH TOPICAL DAILY
Qty: 30 PATCH | Refills: 1 | Status: SHIPPED | OUTPATIENT
Start: 2023-09-21 | End: 2023-11-22 | Stop reason: SDUPTHER

## 2023-09-21 NOTE — PROGRESS NOTES
MICA Nettles    [facility]  [address]  [place] [address]  Phone: [phone]  Fax: [phone]       Reason for visit: Follow-up (Hospital f/)    HPI:  Madelyn Ruiz is a 89 y.o. female presenting for follow-up after hospital discharge.    Patient readmitted in the last 30 days: No    Discharge Diagnosis: Unspecified fracture of sacrum  Discharging Facility: [facility]  Date of Last Admission: 08/21/23  Date of Last Discharge: 08/24/23    Discharging Facilty SNF/Rehab: [facility]  Date of Admission: 08/24/23  Date of Discharge: 09/17/23         Initial TCM Patient Outreach Date: 09/18/23    Summary of Hospital Course:    brought to [facility] ED on 8/21/2023 s/p mechanical fall, sustained bilateral sacral fxs    admitted to [facility] from 8/21/2023-8/24/2023 status post fall complicated by right pubic ramus and bilateral sacral fractures. She was treated conservatively by orthopedic and trauma surgery. She was evaluated by cardiology during her stay given pAF and risk versus benefit of continued anticoagulation. It was recommended that the patient be engaged in the decision making process and to follow up with her primary cardiologist. No changes were made to her cardiac medications at discharge.      She was discharged to [facility] following this hospitalization. She contacted our office in mid September to make an follow up appointment and for concerns of an elevated HR at rehab.      The patient was also discharged from [facility] on hydralazine 25 mg 3 times daily.  She experienced significant hypotension related to administration with associated lightheadedness and her family discontinued this on Monday.  In addition she was on 360 mg of diltiazem at the facility but is back on 300 mg daily at home.    She has since follow up with her cardiologist yesterday who discontinued the hydralazine  Decreased losartan to 50 mg  daily  Continued diltiazem 300 mg daily  increased lasix 20 mg qod   Plan for repeat of bmp / electrolytes 1 week     Next visit with cardiology 3/2023    9/13/2023 Has followed with orthopedist, plan: physical therapy, weight bearing as tolerated and follow up 12/2023      Using lido patches - ran out, requesting script  Reports some mild pain today since patches ran out        Urinating 4-5 x per night    Prior to hospitalization had some frquency/ urgency - seems to have resolved after discharg e       Appetite is normal    Normal bowel movements     Edema has improved since yesteday        Medications prescribed at discharge reconciled with current ambulatory medication list: Yes.    Inpatient testing (labs, imaging, cardiovascular) requiring follow-up: bmp as ordered by cardiology    Advance Care Planning Documents     Document Type Status Effective Date Expiration Date Received On Description    Advance Directives and Living Will Received   10/02/20           Patient Active Problem List   Diagnosis    Allergic bronchopulmonary aspergillosis (CMS/[facility])    Asthma    Pulmonary emphysema (CMS/[facility])    Primary hypertension    Insomnia    Lumbar spinal stenosis    Obstructive sleep apnea syndrome    Osteoporosis    Medicare annual wellness visit, initial    GERD (gastroesophageal reflux disease)    Personal history of pulmonary embolism    Calcification of aorta (CMS/[facility])    Nonintractable headache    Chronic pain of left knee    Traumatic hematoma of left knee    Word finding difficulty    SVT (supraventricular tachycardia) (CMS/[facility])    Atrial flutter with rapid ventricular response (CMS/[facility])    Bilateral hip pain    Fall    Hx of long term use of blood thinners    Hematoma of left thigh    Edema    Hematoma    Fall, initial encounter    Sacral fracture, closed (CMS/[facility])    A-fib (CMS/HCC)     Past Medical History:   Diagnosis Date    Allergic bronchopulmonary aspergillosis (CMS/[facility])  04/02/2018    Allergist: Dr. Arriaza.  Stable FEV1 in 8/2015.  IgE levels and eosinophils stable in 8/2015.  Managed with Fasenra, Azithromycin and Flovent.    Allergic rhinitis 04/02/2018    Pulmonologist: Dr. Walker. Managed with Flonase.    Asthma     Atrial flutter with rapid ventricular response (CMS/HCC) 02/21/2023    Diagnosed in 02/2023 and hospitalized Managed with Apixaban and Diltiazem Echocardiogram 02/2023   Left Ventricle: Normal ventricle size. Mild concentric left ventricular hypertrophy. Preserved systolic function. Estimated EF 65-70%. No regional wall motion abnormalities. Grade I diastolic dysfunction.   Right Ventricle: Normal ventricle size. Normal systolic function.   Left Atrium: Moderately    Calcification of aorta (CMS/HCC) 06/29/2020    Seen incidentally on CT scan.    Chronic obstructive pulmonary disease (CMS/HCC) 04/02/2018    Pulmonologist: Dr. Walker.  Seen on PFTs in hospital in 2014.  Controlled with Spiriva and FLovent.    GERD (gastroesophageal reflux disease) 06/08/2019    Managed with Esomeprazole.  Should take medication 30 minutes prior to meal.  Advised to avoid caffeine, carbonated beverages, and should not eat within 3 hours of going to sleep.  Advised to reduce BMI < 25.     Insomnia 04/02/2018    Managed with Lorazepam as needed.    Lumbar spinal stenosis 04/02/2018    Neurosurgeon: Dr. Miguel. MRI with Central and lateral recess spinal stenosis. Has Spondylolisthesis at L4/L5. S/P epidural injection by Dr. Martin with some relief in past. Had Lumbar fusion and Lumbar laminectomy by Dr. Miguel in 4/2015.    Macular degeneration disease     Obstructive sleep apnea syndrome 04/02/2018    Mild STACIA noted in sleep study 7/2015. Treated with nasal CPAP automatic with pressure range 5-10 CM H2Ox couple months. Off now    Osteoporosis 04/02/2018    Rheumatologist: Dr. Bradley.  Dexa scan 7/2016 with osteoporosis of lumbar spine LS-2.5, LH-2.1, RH -2.2.  Has been on  Fosamax in the past for 8 years.  Worsened by Prednisone in past.  Continue vitamin D, calcium and weight bearing exercise. DEXA in 7/2017 with LS -1.7, LH -2.2, and RH -2.0.  Next due in 7/2019.  Seen by Dr. Bradley in 8/2015 who agreed with initiating Prolia. Took for 2 years. N    Primary hypertension 04/02/2018    Managed on Diltiazem and Losartan. Advised to follow a low sodium diet and keep BMI < 25.  Advised to exercise for 2.5 hours per week.  Instructed to take home blood pressure readings and call if consistently above 140/90.      Pulmonary embolism (CMS/HCC) 06/09/2019    Hematologist: Dr. Bhakta Diagnosed in 06/2019 in right upper lobe. Vascular ultrasound negative of bilateral lower extremities. Managed on Eliquis. Hypercoagulable workup was negative for beta-2 glycoprotein's, RIGO, Antithrombin III, lupus anticoagulant, protein C activity protein S activity, and prothrombin gene mutation, and factor V Leiden. Now off Apixaban as CT scan in 12/2019 was without pu    SVT (supraventricular tachycardia) (CMS/HCC)     Vertebral compression fracture (CMS/HCC)     At T12 level     Past Surgical History:   Procedure Laterality Date    APPENDECTOMY      CARPAL TUNNEL RELEASE Bilateral     CATARACT EXTRACTION W/  INTRAOCULAR LENS IMPLANT Bilateral     HYSTERECTOMY  1972    LUMBAR LAMINECTOMY  04/21/2015    S/P lumbar fusion    TONSILLECTOMY       Social History     Socioeconomic History    Marital status:      Spouse name: Not on file    Number of children: 5    Years of education: Not on file    Highest education level: Not on file   Occupational History    Occupation: Retired     Comment: Former Teacher   Tobacco Use    Smoking status: Never    Smokeless tobacco: Never   Vaping Use    Vaping Use: Never used   Substance and Sexual Activity    Alcohol use: Yes     Comment: Rarely    Drug use: No    Sexual activity: Not on file   Other Topics Concern    Not on file   Social History  Narrative    Exercise: No formal exercise. Due to back pain from spinal stenosis.     Social Determinants of Health     Financial Resource Strain: Low Risk  (8/22/2023)    Overall Financial Resource Strain (CARDIA)     Difficulty of Paying Living Expenses: Not hard at all   Food Insecurity: No Food Insecurity (8/21/2023)    Hunger Vital Sign     Worried About Running Out of Food in the Last Year: Never true     Ran Out of Food in the Last Year: Never true   Transportation Needs: No Transportation Needs (8/22/2023)    PRAPARE - Transportation     Lack of Transportation (Medical): No     Lack of Transportation (Non-Medical): No   Physical Activity: Inactive (10/2/2020)    Exercise Vital Sign     Days of Exercise per Week: 0 days     Minutes of Exercise per Session: 0 min   Stress: No Stress Concern Present (2/21/2023)    [employer] - Occupational Stress Questionnaire     Feeling of Stress : Not at all   Social Connections: Not on file   Intimate Partner Violence: Not on file   Housing Stability: Low Risk  (8/22/2023)    Housing Stability Vital Sign     Unable to Pay for Housing in the Last Year: No     Number of Places Lived in the Last Year: 1     Unstable Housing in the Last Year: No     Family History   Problem Relation Age of Onset    Glaucoma Biological Mother     Macular degeneration Biological Mother     Hypertension Biological Mother     Lung cancer Biological Father     Heart disease Biological Brother     Breast cancer Mother's Sister     No Known Problems Biological Son     No Known Problems Biological Son     No Known Problems Biological Daughter     No Known Problems Biological Daughter     No Known Problems Biological Daughter      Mepolizumab, Morphine, and Oxycodone  Current Outpatient Medications   Medication Sig Dispense Refill    apixaban (ELIQUIS) 2.5 mg tablet Take 2.5 mg by mouth 2 (two) times a day.      azithromycin (ZITHROMAX) 250 mg tablet  Take 250 mg by mouth 3 (three) times a week (Mon, Wed, Fri).      calcium carbonate 600 mg calcium (1,500 mg) tablet Take 1 tablet by mouth 2 (two) times a day.      cholecalciferol, vitamin D3, 25 mcg (1,000 unit) capsule Take 1 tablet by mouth daily.      diltiazem (TIAZAC) 300 mg 24 hr capsule Take 1 capsule (300 mg total) by mouth daily. 90 capsule 1    docusate sodium (COLACE) 100 mg capsule Take 100 mg by mouth 2 (two) times a day as needed for constipation.      DULoxetine (CYMBALTA) 60 mg capsule Take 60 mg by mouth at bedtime.   0    fexofenadine (ALLEGRA) 180 mg tablet Take 180 mg by mouth daily as needed (allergies).      FOLIC ACID/MULTIVIT,IRON, (CENTRUM ORAL) Take 1 tablet by mouth daily.      furosemide (LASIX) 20 mg tablet Take 1 tablet (20 mg total) by mouth every other day. 30 tablet 0    lidocaine (LIDODERM) 5 % patch Place 1 patch on the skin daily. Remove & discard patch within 12 hours or as directed by prescriber. 30 patch 1    lifitegrast (XIIDRA) 5 % dropperette dropperette       LORazepam (ATIVAN) 1 mg tablet Take 1 mg by mouth nightly as needed for anxiety or sleep.      losartan (COZAAR) 50 mg tablet Take 1 tablet (50 mg total) by mouth nightly.      pregabalin (LYRICA) 25 mg capsule Take 25 mg by mouth daily with lunch. PRN      pregabalin (LYRICA) 50 mg capsule Take 50 mg by mouth 2 (two) times a day.      SPIRIVA RESPIMAT 2.5 mcg/actuation mist inhaler Inhale 2 puffs daily.      SYMBICORT 80-4.5 mcg/actuation inhaler Inhale 2 puffs 2 (two) times a day.       No current facility-administered medications for this visit.       ROS:  Review of Systems   Constitutional: Negative for appetite change, chills and fever.   Respiratory: Negative for shortness of breath.    Cardiovascular: Negative for chest pain.   Gastrointestinal: Negative for blood in stool, diarrhea, nausea and vomiting.   Genitourinary: Positive for frequency. Negative for difficulty urinating, dysuria,  hematuria and urgency.       Vitals:    09/21/23 0803   BP: (!) 154/76   BP Location: Left upper arm   Patient Position: Sitting   Pulse: (!) 110   SpO2: 98%   Weight: 54 kg (119 lb)   Height: 1.524 m (5')       EXAM:  Physical Exam  Vitals reviewed. Exam conducted with a chaperone present.   Constitutional:       General: She is not in acute distress.     Appearance: Normal appearance. She is not ill-appearing, toxic-appearing or diaphoretic.   HENT:      Head: Normocephalic.   Eyes:      General: No scleral icterus.     Conjunctiva/sclera: Conjunctivae normal.   Cardiovascular:      Rate and Rhythm: Normal rate and regular rhythm.      Pulses: Normal pulses.      Heart sounds: Normal heart sounds. No murmur heard.     Comments: Hr 80  Pulmonary:      Effort: Pulmonary effort is normal. No respiratory distress.      Breath sounds: Normal breath sounds.   Abdominal:      General: Bowel sounds are normal. There is no distension.      Palpations: Abdomen is soft. There is no mass.      Tenderness: There is no abdominal tenderness.   Musculoskeletal:      Cervical back: Normal range of motion.      Right lower leg: Edema (trace to +1 ) present.      Left lower leg: Edema (trace to +1 ) present.   Lymphadenopathy:      Cervical: No cervical adenopathy.   Skin:     General: Skin is warm.   Neurological:      General: No focal deficit present.      Mental Status: She is alert and oriented to person, place, and time.   Psychiatric:         Mood and Affect: Mood normal.         Behavior: Behavior normal.         Thought Content: Thought content normal.         Procedures    Lab Results   Component Value Date    WBC 8.02 08/23/2023    HGB 12.0 08/23/2023    HCT 37.7 08/23/2023     08/23/2023    CHOL 227 (H) 01/27/2023    TRIG 120 01/27/2023    HDL 62 01/27/2023    ALT 14 08/21/2023    AST 15 08/21/2023     08/23/2023    K 4.3 08/23/2023     08/23/2023    CREATININE 0.8 08/23/2023    BUN 29 (H) 08/23/2023     CO2 21 08/23/2023    TSH 2.87 01/26/2023    INR 1.1 08/21/2023    HGBA1C 5.6 11/18/2015         ASSESSMENT/PLAN:  Diagnoses and all orders for this visit:    Bilateral hip pain (Primary)  Assessment & Plan:  Stable after recent fall complicated by right pubic ramus and bilateral sacral fractures   Under care of orthopedist   Plan to start PT and follow up 12/2023      Orders:  -     lidocaine (LIDODERM) 5 % patch; Place 1 patch on the skin daily. Remove & discard patch within 12 hours or as directed by prescriber.    Closed fracture of sacrum, unspecified fracture morphology, sequela  Assessment & Plan:  admitted to [facility] from 8/21/2023-8/24/2023 status post fall complicated by right pubic ramus and bilateral sacral fractures.   Under care of orthopedist  Plan for PT and follow up 12/2023  Lidocaine patch for relief- rx refilled    Orders:  -     lidocaine (LIDODERM) 5 % patch; Place 1 patch on the skin daily. Remove & discard patch within 12 hours or as directed by prescriber.    Primary hypertension  Assessment & Plan:  Stable  Under care of cardiology- visit yesterday with med adjustments made: Discontinued hydralazine  Decreased losartan to 50 mg daily  Continued diltiazem 300 mg daily  increased lasix 20 mg qod   Recommend continued home blood pressure monitoring, slow positional changes, adequate hydration  Bmp as ordered by cardiology next week        I spent a total of 40 minutes on this date of service performing the following activities:  Pre-visit medical record and diagnostic testing and medical consultant report review, obtaining history from patient, performing examination, entering orders, comprehensive medication reconciliation, counseling and education, and documentation of visit.        ARTURO Mckenzie  9/25/2023

## 2023-09-25 ASSESSMENT — ENCOUNTER SYMPTOMS
FEVER: 0
BLOOD IN STOOL: 0
DYSURIA: 0
DIFFICULTY URINATING: 0
CHILLS: 0
NAUSEA: 0
FREQUENCY: 1
VOMITING: 0
APPETITE CHANGE: 0
HEMATURIA: 0
SHORTNESS OF BREATH: 0
DIARRHEA: 0

## 2023-09-25 NOTE — ASSESSMENT & PLAN NOTE
Stable after recent fall complicated by right pubic ramus and bilateral sacral fractures   Under care of orthopedist   Plan to start PT and follow up 12/2023

## 2023-09-25 NOTE — ASSESSMENT & PLAN NOTE
admitted to [facility] from 8/21/2023-8/24/2023 status post fall complicated by right pubic ramus and bilateral sacral fractures.   Under care of orthopedist  Plan for PT and follow up 12/2023  Lidocaine patch for relief- rx refilled

## 2023-09-25 NOTE — ASSESSMENT & PLAN NOTE
Stable  Under care of cardiology- visit yesterday with med adjustments made: Discontinued hydralazine  Decreased losartan to 50 mg daily  Continued diltiazem 300 mg daily  increased lasix 20 mg qod   Recommend continued home blood pressure monitoring, slow positional changes, adequate hydration  Bmp as ordered by cardiology next week

## 2023-09-26 ENCOUNTER — PATIENT OUTREACH (OUTPATIENT)
Dept: CASE MANAGEMENT | Facility: CLINIC | Age: 88
End: 2023-09-26
Payer: MEDICARE

## 2023-09-26 NOTE — PROGRESS NOTES
TCM f/u Fall, bilateral nondisplaced sacral fractures. Treated non-operatively.       Dr. Valentino (Cardio): 9/20  PCP: 9/21    LMOM. Will f/u one week.     AURORA WalkerN, RN  [phone]

## 2023-10-03 ENCOUNTER — TELEPHONE (OUTPATIENT)
Dept: PRIMARY CARE | Facility: CLINIC | Age: 88
End: 2023-10-03
Payer: MEDICARE

## 2023-10-03 ENCOUNTER — PATIENT OUTREACH (OUTPATIENT)
Dept: CASE MANAGEMENT | Facility: CLINIC | Age: 88
End: 2023-10-03
Payer: MEDICARE

## 2023-10-03 NOTE — PROGRESS NOTES
TCM f/u Fall, bilateral nondisplaced sacral fractures. Treated non-operatively. Spoke with pt. Pt says she is doing ok. Daughter visits every other day. VN is finished. PT/OT still in the home. Pt says she has been able to shower herself without too much difficulty. She has more issues with trying too cook. She has someone coming in on Friday to cook some meals for her.     Reinforced falls/home safety. Pt says she is being very careful since she is alone.     Will f/u one week.     Karolina Hauser, AURORAN, RN  [phone]

## 2023-10-03 NOTE — TELEPHONE ENCOUNTER
· PA lidocaine patch   · Submitted CMM   · [place] Case: [id number], Status: Approved, Coverage Starts on: 1/1/2023 12:00:00 AM, Coverage Ends on: 12/31/2024 12:00:00 AM. Questions? Contact [phone]

## 2023-10-05 RX ORDER — DILTIAZEM HYDROCHLORIDE 300 MG/1
CAPSULE, EXTENDED RELEASE ORAL DAILY
Qty: 90 CAPSULE | Refills: 3 | Status: SHIPPED | OUTPATIENT
Start: 2023-10-05 | End: 2023-10-24 | Stop reason: HOSPADM

## 2023-10-05 NOTE — TELEPHONE ENCOUNTER
Medicine Refill Request    Last Office Visit: Visit date not found   Last Consult Visit: Visit date not found  Last Telemedicine Visit: Visit date not found    Next Appointment: Visit date not found      Current Outpatient Medications:     apixaban (ELIQUIS) 2.5 mg tablet, Take 2.5 mg by mouth 2 (two) times a day., Disp: , Rfl:     azithromycin (ZITHROMAX) 250 mg tablet, Take 250 mg by mouth 3 (three) times a week (Mon, Wed, Fri)., Disp: , Rfl:     calcium carbonate 600 mg calcium (1,500 mg) tablet, Take 1 tablet by mouth 2 (two) times a day., Disp: , Rfl:     cholecalciferol, vitamin D3, 25 mcg (1,000 unit) capsule, Take 1 tablet by mouth daily., Disp: , Rfl:     diltiazem (TIAZAC) 300 mg 24 hr capsule, Take 1 capsule (300 mg total) by mouth daily., Disp: 90 capsule, Rfl: 1    docusate sodium (COLACE) 100 mg capsule, Take 100 mg by mouth 2 (two) times a day as needed for constipation., Disp: , Rfl:     DULoxetine (CYMBALTA) 60 mg capsule, Take 60 mg by mouth at bedtime. , Disp: , Rfl: 0    fexofenadine (ALLEGRA) 180 mg tablet, Take 180 mg by mouth daily as needed (allergies)., Disp: , Rfl:     FOLIC ACID/MULTIVIT,IRON, (CENTRUM ORAL), Take 1 tablet by mouth daily., Disp: , Rfl:     furosemide (LASIX) 20 mg tablet, Take 1 tablet (20 mg total) by mouth every other day., Disp: 30 tablet, Rfl: 0    lidocaine (LIDODERM) 5 % patch, Place 1 patch on the skin daily. Remove & discard patch within 12 hours or as directed by prescriber., Disp: 30 patch, Rfl: 1    lifitegrast (XIIDRA) 5 % dropperette dropperette, , Disp: , Rfl:     LORazepam (ATIVAN) 1 mg tablet, Take 1 mg by mouth nightly as needed for anxiety or sleep., Disp: , Rfl:     losartan (COZAAR) 50 mg tablet, Take 1 tablet (50 mg total) by mouth nightly., Disp: , Rfl:     pregabalin (LYRICA) 25 mg capsule, Take 25 mg by mouth daily with lunch. PRN, Disp: , Rfl:     pregabalin (LYRICA) 50 mg capsule, Take 50 mg by mouth 2 (two) times a day.,  Disp: , Rfl:     SPIRIVA RESPIMAT 2.5 mcg/actuation mist inhaler, Inhale 2 puffs daily., Disp: , Rfl:     SYMBICORT 80-4.5 mcg/actuation inhaler, Inhale 2 puffs 2 (two) times a day., Disp: , Rfl:       BP Readings from Last 3 Encounters:   09/21/23 (!) 154/76   09/20/23 118/74   08/24/23 (!) 122/56       Recent Lab results:  Lab Results   Component Value Date    CHOL 227 (H) 01/27/2023   ,   Lab Results   Component Value Date    HDL 62 01/27/2023   ,   Lab Results   Component Value Date    LDLCALC 141 (H) 01/27/2023   ,   Lab Results   Component Value Date    TRIG 120 01/27/2023        Lab Results   Component Value Date    GLUCOSE 105 (H) 08/23/2023   ,   Lab Results   Component Value Date    HGBA1C 5.6 11/18/2015         Lab Results   Component Value Date    CREATININE 0.8 08/23/2023       Lab Results   Component Value Date    TSH 2.87 01/26/2023           Lab Results   Component Value Date    HGBA1C 5.6 11/18/2015

## 2023-10-10 ENCOUNTER — PATIENT OUTREACH (OUTPATIENT)
Dept: CASE MANAGEMENT | Facility: CLINIC | Age: 88
End: 2023-10-10
Payer: MEDICARE

## 2023-10-10 NOTE — PROGRESS NOTES
TCM f/u Fall, bilateral nondisplaced sacral fractures. Treated non-operatively. Spoke with pt. Pt says she is doing well. OT finished today. PT will still be in for a few more weeks through [facility]. Pt feels she is doing as well as she can be. She is able to do her own shower and getting dressed. Family still helping with meals, etc.      PCP: 10/11    Will f/u one week.     AURORA WalkerN, RN  [phone]

## 2023-10-11 ENCOUNTER — OFFICE VISIT (OUTPATIENT)
Dept: PRIMARY CARE | Facility: CLINIC | Age: 88
End: 2023-10-11
Payer: MEDICARE

## 2023-10-11 VITALS
TEMPERATURE: 97.5 F | BODY MASS INDEX: 22.16 KG/M2 | RESPIRATION RATE: 16 BRPM | SYSTOLIC BLOOD PRESSURE: 138 MMHG | OXYGEN SATURATION: 98 % | WEIGHT: 117.4 LBS | DIASTOLIC BLOOD PRESSURE: 90 MMHG | HEIGHT: 61 IN | HEART RATE: 102 BPM

## 2023-10-11 DIAGNOSIS — Z00.00 MEDICARE ANNUAL WELLNESS VISIT, SUBSEQUENT: Primary | ICD-10-CM

## 2023-10-11 PROBLEM — T14.8XXA HEMATOMA: Status: RESOLVED | Noted: 2023-08-08 | Resolved: 2023-10-11
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
## 2023-10-11 PROBLEM — S70.12XA HEMATOMA OF LEFT THIGH: Status: RESOLVED | Noted: 2023-07-13 | Resolved: 2023-10-11

## 2023-10-11 PROCEDURE — G0439 PPPS, SUBSEQ VISIT: HCPCS | Performed by: FAMILY MEDICINE

## 2023-10-11 PROCEDURE — G0008 ADMIN INFLUENZA VIRUS VAC: HCPCS | Performed by: FAMILY MEDICINE

## 2023-10-11 PROCEDURE — 90694 VACC AIIV4 NO PRSRV 0.5ML IM: CPT | Performed by: FAMILY MEDICINE

## 2023-10-11 RX ORDER — AZITHROMYCIN 250 MG/1
250 TABLET, FILM COATED ORAL 3 TIMES WEEKLY
Qty: 12 TABLET | Refills: 5
Start: 2023-10-11 | End: 2024-01-25 | Stop reason: ALTCHOICE

## 2023-10-11 RX ORDER — TRAMADOL HYDROCHLORIDE 50 MG/1
50 TABLET ORAL
Status: ON HOLD | COMMUNITY
Start: 2023-09-11 | End: 2023-12-22

## 2023-10-11 ASSESSMENT — ENCOUNTER SYMPTOMS
VOMITING: 0
CONSTIPATION: 0
ARTHRALGIAS: 0
APPETITE CHANGE: 0
ABDOMINAL PAIN: 0
DYSURIA: 0
EYE PAIN: 0
BACK PAIN: 0
EYE ITCHING: 0
DIFFICULTY URINATING: 0
HEADACHES: 0
SPEECH DIFFICULTY: 0
MYALGIAS: 0
EYE DISCHARGE: 0
FATIGUE: 0
JOINT SWELLING: 0
LIGHT-HEADEDNESS: 0
BLOOD IN STOOL: 0
UNEXPECTED WEIGHT CHANGE: 0
SINUS PRESSURE: 0
SORE THROAT: 0
FEVER: 0
DIZZINESS: 0
NECK PAIN: 0
BRUISES/BLEEDS EASILY: 0
CHILLS: 0
DYSPHORIC MOOD: 0
HEMATURIA: 0
COUGH: 0
NAUSEA: 0
FREQUENCY: 0
SHORTNESS OF BREATH: 0
EYE REDNESS: 0
PALPITATIONS: 0
DIARRHEA: 0
WHEEZING: 0
RHINORRHEA: 0
NUMBNESS: 0
CHEST TIGHTNESS: 0
TROUBLE SWALLOWING: 0
NERVOUS/ANXIOUS: 0
WEAKNESS: 0
SLEEP DISTURBANCE: 0
CONFUSION: 0

## 2023-10-11 ASSESSMENT — MINI COG
COMPLETED: YES
TOTAL SCORE: 4

## 2023-10-11 ASSESSMENT — PATIENT HEALTH QUESTIONNAIRE - PHQ9: SUM OF ALL RESPONSES TO PHQ9 QUESTIONS 1 & 2: 0

## 2023-10-11 NOTE — PROGRESS NOTES
Subjective     Madelyn Ruiz is a 89 y.o. female who presents for a subsequent annual wellness visit.     Patient Care Team:  Henry Linares MD as PCP - General  Ray Arriaza MD as Referring Physician  Aye Bhakta MD as Consulting Physician (Hematology and Oncology)  Wesley Walker MD as Consulting Physician (Pulmonary)  Fausto Bradley MD as Consulting Physician (Rheumatology)  Kleiner, Robert C, MD as Referring Physician  Alfie Torres MD as Consulting Physician (Dermatology)  Fanny Pelaez MD as Consulting Physician (Orthopedic Surgery)  Keenan Krause DO as Consulting Physician (Family Medicine)  Keenan Krause DO as Consulting Physician (Family Medicine)  Wesley You DO as Consulting Physician (Physical Medicine and Rehabilitation)  Jessika Zelaya MD as Surgeon (Neurosurgery)  Henrietta Crespo MD as Cardiologist (Interventional Cardiology)  Dinesh Goel MD as Surgeon (Otolaryngology)  Valentino, Michael A, MD PhD as Cardiologist (Cardiology)  Alejo Saavedra MD as Consulting Physician (Orthopedic Surgery)  Karolina Hauser RN as Care Manager (Care Management)    Comprehensive Medical and Social History  Patient Active Problem List   Diagnosis    Allergic bronchopulmonary aspergillosis (CMS/HCC)    Asthma    Pulmonary emphysema (CMS/HCC)    Primary hypertension    Insomnia    Lumbar spinal stenosis    Obstructive sleep apnea syndrome    Osteoporosis    Medicare annual wellness visit, subsequent    GERD (gastroesophageal reflux disease)    Personal history of pulmonary embolism    Calcification of aorta (CMS/HCC)    Nonintractable headache    Chronic pain of left knee    Traumatic hematoma of left knee    Word finding difficulty    SVT (supraventricular tachycardia)    Atrial flutter with rapid ventricular response (CMS/HCC)    Bilateral hip pain    Hx of long term use of blood thinners    Edema    Fall, initial encounter    Sacral  fracture, closed (CMS/HCC)    A-fib (CMS/HCC)     Past Medical History:   Diagnosis Date    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 Arriaza.  Stable FEV1 in 8/2015.  IgE levels and eosinophils stable in 8/2015.  Managed with Fasenra, Azithromycin and Flovent.    Allergic rhinitis 04/02/2018    Pulmonologist: Dr. Walker. Managed with Flonase.    Asthma     Atrial flutter with rapid ventricular response (CMS/HCC) 02/21/2023    Diagnosed in 02/2023 and hospitalized Managed with Apixaban and Diltiazem Echocardiogram 02/2023   Left Ventricle: Normal ventricle size. Mild concentric left ventricular hypertrophy. Preserved systolic function. Estimated EF 65-70%. No regional wall motion abnormalities. Grade I diastolic dysfunction.   Right Ventricle: Normal ventricle size. Normal systolic function.   Left Atrium: Moderately    Calcification of aorta (CMS/HCC) 06/29/2020    Seen incidentally on CT scan.    Chronic obstructive pulmonary disease (CMS/HCC) 04/02/2018    Pulmonologist: Dr. Walker.  Seen on PFTs in hospital in 2014.  Controlled with Spiriva and FLovent.    GERD (gastroesophageal reflux disease) 06/08/2019    Managed with Esomeprazole.  Should take medication 30 minutes prior to meal.  Advised to avoid caffeine, carbonated beverages, and should not eat within 3 hours of going to sleep.  Advised to reduce BMI < 25.     Insomnia 04/02/2018    Managed with Lorazepam as needed.    Lumbar spinal stenosis 04/02/2018    Neurosurgeon: Dr. Miguel. MRI with Central and lateral recess spinal stenosis. Has Spondylolisthesis at L4/L5. S/P epidural injection by Dr. Martin with some relief in past. Had Lumbar fusion and Lumbar laminectomy by Dr. Miguel in 4/2015.    Macular degeneration disease     Obstructive sleep apnea syndrome 04/02/2018    Mild STACIA noted in sleep study 7/2015. Treated with nasal CPAP automatic with pressure range 5-10 CM H2Ox couple months. Off now     Osteoporosis 04/02/2018    Rheumatologist: Dr. Bradley.  Dexa scan 7/2016 with osteoporosis of lumbar spine LS-2.5, LH-2.1, RH -2.2.  Has been on Fosamax in the past for 8 years.  Worsened by Prednisone in past.  Continue vitamin D, calcium and weight bearing exercise. DEXA in 7/2017 with LS -1.7, LH -2.2, and RH -2.0.  Next due in 7/2019.  Seen by Dr. Bradley in 8/2015 who agreed with initiating Prolia. Took for 2 years. N    Primary hypertension 04/02/2018    Managed on Diltiazem and Losartan. Advised to follow a low sodium diet and keep BMI < 25.  Advised to exercise for 2.5 hours per week.  Instructed to take home blood pressure readings and call if consistently above 140/90.      Pulmonary embolism (CMS/HCC) 06/09/2019    Hematologist: Dr. Bhakta Diagnosed in 06/2019 in right upper lobe. Vascular ultrasound negative of bilateral lower extremities. Managed on Eliquis. Hypercoagulable workup was negative for beta-2 glycoprotein's, RIGO, Antithrombin III, lupus anticoagulant, protein C activity protein S activity, and prothrombin gene mutation, and factor V Leiden. Now off Apixaban as CT scan in 12/2019 was without pu    SVT (supraventricular tachycardia) (CMS/HCC)     Vertebral compression fracture (CMS/HCC)     At T12 level     Past Surgical History:   Procedure Laterality Date    APPENDECTOMY      CARPAL TUNNEL RELEASE Bilateral     CATARACT EXTRACTION W/  INTRAOCULAR LENS IMPLANT Bilateral     HYSTERECTOMY  1972    LUMBAR LAMINECTOMY  04/21/2015    S/P lumbar fusion    TONSILLECTOMY       Allergies   Allergen Reactions    Mepolizumab Rash    Morphine      Other reaction(s): Vomiting    Oxycodone      Other reaction(s): Vomiting     Current Outpatient Medications   Medication Sig Dispense Refill    apixaban (ELIQUIS) 2.5 mg tablet Take 2.5 mg by mouth 2 (two) times a day.      azithromycin (ZITHROMAX) 250 mg tablet Take 1 tablet (250 mg total) by mouth 3 (three) times a week (Mon, Wed, Fri). 12  tablet 5    calcium carbonate 600 mg calcium (1,500 mg) tablet Take 1 tablet by mouth 2 (two) times a day.      cholecalciferol, vitamin D3, 25 mcg (1,000 unit) capsule Take 1 tablet by mouth daily.      docusate sodium (COLACE) 100 mg capsule Take 100 mg by mouth 2 (two) times a day as needed for constipation.      DULoxetine (CYMBALTA) 60 mg capsule Take 60 mg by mouth at bedtime.   0    fexofenadine (ALLEGRA) 180 mg tablet Take 180 mg by mouth daily as needed (allergies).      FOLIC ACID/MULTIVIT,IRON, (CENTRUM ORAL) Take 1 tablet by mouth daily.      furosemide (LASIX) 20 mg tablet Take 1 tablet (20 mg total) by mouth every other day. 30 tablet 0    lidocaine (LIDODERM) 5 % patch Place 1 patch on the skin daily. Remove & discard patch within 12 hours or as directed by prescriber. 30 patch 1    lifitegrast (XIIDRA) 5 % dropperette dropperette       LORazepam (ATIVAN) 1 mg tablet Take 1 mg by mouth nightly as needed for anxiety or sleep.      losartan (COZAAR) 50 mg tablet Take 1 tablet (50 mg total) by mouth nightly.      pregabalin (LYRICA) 25 mg capsule Take 25 mg by mouth daily with lunch. PRN      pregabalin (LYRICA) 50 mg capsule Take 50 mg by mouth 2 (two) times a day.      SPIRIVA RESPIMAT 2.5 mcg/actuation mist inhaler Inhale 2 puffs daily.      SYMBICORT 80-4.5 mcg/actuation inhaler Inhale 2 puffs 2 (two) times a day.      TIADYLT  mg 24 hr capsule TAKE 1 CAPSULE BY MOUTH EVERY DAY 90 capsule 3    traMADoL (ULTRAM) 50 mg tablet Take 50 mg by mouth 3 (three) times a day as needed.       No current facility-administered medications for this visit.     Social History     Tobacco Use    Smoking status: Never    Smokeless tobacco: Never   Vaping Use    Vaping Use: Never used   Substance Use Topics    Alcohol use: Yes     Comment: Rarely    Drug use: No     Family History   Problem Relation Age of Onset    Glaucoma Biological Mother     Macular degeneration Biological Mother   "   Hypertension Biological Mother     Lung cancer Biological Father     Heart disease Biological Brother     Breast cancer Mother's Sister     No Known Problems Biological Son     No Known Problems Biological Son     No Known Problems Biological Daughter     No Known Problems Biological Daughter     No Known Problems Biological Daughter      Review of Systems   Constitutional: Negative for appetite change, chills, fatigue, fever and unexpected weight change.   HENT: Negative for congestion, ear pain, hearing loss, nosebleeds, postnasal drip, rhinorrhea, sinus pressure, sneezing, sore throat and trouble swallowing.    Eyes: Negative for pain, discharge, redness, itching and visual disturbance.   Respiratory: Negative for cough, chest tightness, shortness of breath and wheezing.    Cardiovascular: Negative for chest pain, palpitations and leg swelling.   Gastrointestinal: Negative for abdominal pain, blood in stool, constipation, diarrhea, nausea and vomiting.   Endocrine: Negative for cold intolerance and heat intolerance.   Genitourinary: Negative for difficulty urinating, dysuria, frequency, hematuria, urgency, vaginal bleeding and vaginal discharge.   Musculoskeletal: Positive for gait problem. Negative for arthralgias, back pain, joint swelling, myalgias and neck pain.        Using walker   Skin: Negative for rash.   Allergic/Immunologic: Negative for environmental allergies.   Neurological: Negative for dizziness, syncope, speech difficulty, weakness, light-headedness, numbness and headaches.   Hematological: Does not bruise/bleed easily.   Psychiatric/Behavioral: Negative for confusion, dysphoric mood and sleep disturbance. The patient is not nervous/anxious.          Objective   Vitals  Vitals:    10/11/23 1109   BP: 138/90   Pulse: (!) 102   Resp: 16   Temp: 36.4 °C (97.5 °F)   TempSrc: Oral   SpO2: 98%   Weight: 53.3 kg (117 lb 6.4 oz)  Comment: with shoes   Height: 1.537 m (5' 0.5\")  Comment: with " shoes     Body mass index is 22.55 kg/m².    Wt Readings from Last 3 Encounters:   10/11/23 53.3 kg (117 lb 6.4 oz)   09/21/23 54 kg (119 lb)   09/20/23 54 kg (119 lb)     Physical Exam  Constitutional:       General: She is not in acute distress.     Appearance: Normal appearance. She is well-developed. She is not ill-appearing, toxic-appearing or diaphoretic.   HENT:      Head: Normocephalic and atraumatic.      Right Ear: Tympanic membrane, ear canal and external ear normal.      Left Ear: Tympanic membrane, ear canal and external ear normal.      Nose: No mucosal edema, congestion or rhinorrhea.      Right Sinus: No maxillary sinus tenderness or frontal sinus tenderness.      Left Sinus: No maxillary sinus tenderness or frontal sinus tenderness.      Mouth/Throat:      Mouth: No oral lesions.      Pharynx: Uvula midline. No oropharyngeal exudate or posterior oropharyngeal erythema.      Tonsils: No tonsillar exudate.   Eyes:      General: Lids are normal. No scleral icterus.        Right eye: No discharge.         Left eye: No discharge.      Conjunctiva/sclera:      Right eye: Right conjunctiva is not injected. No exudate.     Left eye: Left conjunctiva is not injected. No exudate.     Pupils: Pupils are equal, round, and reactive to light.   Neck:      Thyroid: No thyroid mass or thyromegaly.      Vascular: No carotid bruit.   Cardiovascular:      Rate and Rhythm: Normal rate and regular rhythm.      Pulses:           Popliteal pulses are 2+ on the right side and 2+ on the left side.        Dorsalis pedis pulses are 2+ on the right side and 2+ on the left side.      Heart sounds: Normal heart sounds. No murmur heard.     No gallop. No S3 or S4 sounds.   Pulmonary:      Effort: Pulmonary effort is normal. No respiratory distress.      Breath sounds: Normal breath sounds. No decreased breath sounds, wheezing, rhonchi or rales.   Chest:      Chest wall: No tenderness.   Abdominal:      General: Bowel sounds are  normal. There is no distension.      Palpations: Abdomen is soft.      Tenderness: There is no abdominal tenderness. There is no guarding or rebound.      Hernia: No hernia is present.   Musculoskeletal:      Cervical back: Normal range of motion and neck supple.      Right lower leg: No edema.      Left lower leg: No edema.   Lymphadenopathy:      Cervical: No cervical adenopathy.   Skin:     General: Skin is warm and dry.      Findings: No rash.   Neurological:      Mental Status: She is alert.   Psychiatric:         Mood and Affect: Mood normal.         Speech: Speech normal.         Behavior: Behavior normal.         Thought Content: Thought content normal.         Judgment: Judgment normal.       Advanced Care Plan                                        PHQ  Will the patient answer the depression questions?: Yes   Little interest or pleasure in doing things: Not at all   Feeling down, depressed, or hopeless: Not at all   Depression Risk: 0                                             Mini Cog  Completed: Yes  Score: 4 (missed the word chair)  Result: Negative      Get Up and Go  Result: Pass    STEADI Falls Risk   Yes using walker                                                             Immunization History   Administered Date(s) Administered    INFLUENZA VACCINE QUAD ADJUVANTED 65 and OLDER 10/06/2021, 10/19/2022    Influenza Split High Dose Preservative Free IM 11/03/2011, 11/13/2012, 10/09/2013, 10/30/2014, 11/17/2015, 09/30/2016, 09/25/2017    Influenza Vaccine 65 And Older Preservative Free 10/25/2019, 10/02/2020    Influenza Vaccine Quadrivalent 3 Yr And Older 09/18/2017, 10/22/2018    Influenza, Unspecified 10/30/2014, 11/17/2015, 09/30/2016, 09/25/2017    MMRV 06/05/2014    Pneumococcal Conjugate 08/09/1996    Pneumococcal Conjugate 13-Valent 11/17/2015    Pneumococcal Polysaccharide 10/30/2014, 09/11/2017    SARS-COV-2 (COVID-19) VACCINE PFIZER BIVALENT 12 years and older (Gray [place])  09/15/2022    SARS-COV-2 (COVID-19) VACCINE, MODERNA MONOVALENT 02/05/2021, 03/05/2021    SARS-COV-2 (COVID-19) VACCINE, MODERNA MONOVALENT BOOSTER 11/17/2021    SARS-COV-2 (COVID19) VACCINE, PFIZER MONOVALENT 09/15/2022    Tdap 03/22/2012, 08/08/2022    Varicella 06/05/2014    Zoster 01/02/2006, 01/02/2016    Zoster Vaccine Recombinant Adjuvanted (Shingrix) 05/24/2019, 09/20/2019       Health Maintenance Topics with due status: Overdue       Topic Date Due    Influenza Vaccine 08/01/2023    COVID-19 Vaccine 09/01/2023     Health Maintenance Topics with due status: Not Due       Topic Last Completion Date    DTaP, Tdap, and Td Vaccines 08/08/2022    DEXA Scan 09/20/2022    Falls Risk Screening 02/10/2023    Medicare Annual Wellness Visit 10/11/2023    Depression Screening 10/11/2023     Health Maintenance Topics with due status: Completed       Topic Last Completion Date    Pneumococcal (65 years and older) 09/11/2017    Zoster Vaccine 09/20/2019     Health Maintenance Topics with due status: Aged Out       Topic Date Due    Meningococcal ACWY Aged Out    HIB Vaccines Aged Out    Hepatitis B Vaccines Aged Out    IPV Vaccines Aged Out    HPV Vaccines Aged Out     Health Maintenance Topics with due status: Discontinued       Topic Date Due    Breast Cancer Screening Discontinued       Lab Results   Component Value Date    WBC 8.02 08/23/2023    WBC 5.22 08/22/2023    WBC 6.32 08/21/2023    HGB 12.0 08/23/2023    HGB 11.8 08/22/2023    HGB 11.2 (L) 08/21/2023    HCT 37.7 08/23/2023    HCT 35.9 08/22/2023    HCT 35.2 08/21/2023    MCV 99.5 (H) 08/23/2023    MCV 98.4 (H) 08/22/2023    MCV 99.2 (H) 08/21/2023     08/23/2023     08/22/2023     08/21/2023       Lab Results   Component Value Date    GLUCOSE 105 (H) 08/23/2023    GLUCOSE 86 08/22/2023    GLUCOSE 93 08/21/2023    CALCIUM 8.8 08/23/2023    CALCIUM 8.1 (L) 08/22/2023    CALCIUM 8.4 (L) 08/21/2023     08/23/2023      08/22/2023     08/21/2023    K 4.3 08/23/2023    K 3.7 08/22/2023    K 3.7 08/21/2023    CO2 21 08/23/2023    CO2 25 08/22/2023    CO2 24 08/21/2023     08/23/2023     08/22/2023     08/21/2023    BUN 29 (H) 08/23/2023    BUN 12 08/22/2023    BUN 12 08/21/2023    CREATININE 0.8 08/23/2023    CREATININE 0.6 08/22/2023    CREATININE 0.5 (L) 08/21/2023       Lab Results   Component Value Date    ALT 14 08/21/2023    ALT 21 06/01/2023    ALT 22 02/20/2023    AST 15 08/21/2023    AST 14 (L) 06/01/2023    AST 23 02/20/2023    ALKPHOS 95 08/21/2023    ALKPHOS 41 06/01/2023    ALKPHOS 59 02/20/2023    BILITOT 0.5 08/21/2023    BILITOT 0.8 06/01/2023    BILITOT 0.4 02/20/2023       Lab Results   Component Value Date    CHOL 227 (H) 01/27/2023    CHOL 248 (H) 11/18/2015     Lab Results   Component Value Date    HDL 62 01/27/2023    HDL 92 11/18/2015     Lab Results   Component Value Date    LDLCALC 141 (H) 01/27/2023    LDLCALC 133 (H) 11/18/2015     Lab Results   Component Value Date    TRIG 120 01/27/2023    TRIG 117 11/18/2015         Lab Results   Component Value Date    TSH 2.87 01/26/2023    TSH 1.91 10/14/2020    TSH 0.32 (L) 06/10/2019       Lab Results   Component Value Date    HEPCAB Nonreactive 07/12/2018         Hearing and Vision Screening  No results found.  See HRA for relevant hearing screening response.    Assessment/Plan   Diagnoses and all orders for this visit:    Medicare annual wellness visit, subsequent (Primary)  Assessment & Plan:  · The patient is currently stable.   · Bloodwork was up to date.   · The patient should walk as tolerated.   · All the recommend vaccinations are now up to date. FLU shot given today. Consider COVID vaccine at pharmacy  · The patient should be evaluated by the ophthalmologist as directed and dentist every 6 months.   · The patient was advised to protect the skin by applying suntan lotion containing an SPF > 30 every 2 hours while in sun or after  swimming. Instructed to avoid prolonged direct sun exposure as much as possible.   · The patient's body mass index is normal.   · The patient's  mammogram are up to date.   · The patient's DEXA scan is up to date.   · Advised to consume 1000 mg of calcium daily through the diet. If the patient is unable to consume 1000 mg through the diet, then taking calcium supplements is an acceptable alternative. The patient was informed not to consume more than 500 mg of a calcium supplement at a time.         Other orders  -     Influenza vaccine 65 and older IM preservative free  -     azithromycin (ZITHROMAX) 250 mg tablet; Take 1 tablet (250 mg total) by mouth 3 (three) times a week (Mon, Wed, Fri).      See Patient Instructions (the written plan) which was given to the patient for PPPS and health risk factors with interventions.

## 2023-10-11 NOTE — ASSESSMENT & PLAN NOTE
· The patient is currently stable.   · Bloodwork was up to date.   · The patient should walk as tolerated.   · All the recommend vaccinations are now up to date. FLU shot given today. Consider COVID vaccine at pharmacy  · The patient should be evaluated by the ophthalmologist as directed and dentist every 6 months.   · The patient was advised to protect the skin by applying suntan lotion containing an SPF > 30 every 2 hours while in sun or after swimming. Instructed to avoid prolonged direct sun exposure as much as possible.   · The patient's body mass index is normal.   · The patient's  mammogram are up to date.   · The patient's DEXA scan is up to date.   · Advised to consume 1000 mg of calcium daily through the diet. If the patient is unable to consume 1000 mg through the diet, then taking calcium supplements is an acceptable alternative. The patient was informed not to consume more than 500 mg of a calcium supplement at a time.

## 2023-10-11 NOTE — PATIENT INSTRUCTIONS
Women's Personalized Prevention Plan Services (PPPS)       Preventive Services Checklist (Assumes Average Risk Unless Otherwise Noted)  Preventive Service Target Population and Frequency Last Done Date Due   Abd Aortic Aneurysm Screening Family history of AAA (covered once)   Not needed     Alcohol Misuse Screening As necessary for those at risk (covered annually) 10/11/2023 10/2024   Breast Cancer Screening Mammogram every 1-2yrs 50-71yo; every 1-2yrs 35-48yo if patient desires after weighing potential harms and benefits (covered once 35-38yo, annually >=39yo)   Not needed    Cholesterol Screening Both risk factors: 1) >=19yo and 2)  increased risk coronary artery disease (covered every 5 years)   Not needed    Colorectal Cancer Screening >=49yo: Colonoscopy every 10 years, FIT annually or Cologuard every 3 years (up to 86yo for Cologuard)   Not needed    Diabetes Screening Any 1 risk factor: hypertension, dyslipidemia, obesity, high glucose; or Any 2 risk factors: >=66 yo, overweight, family history diabetes, history gestational diabetes or delivery of infant >9lbs (covered every 6 months) 08/23/2023 08/2024   Glaucoma Screening Any 1 risk factor: 1) diabetes, 2) family history glaucoma, 3)  >=49yo, 4)  American >=66yo (covered annually)   Every 5 weeks   Hepatitis C Screening Any 1 risk factor: 1) born between 6016-6575, 2) blood transfusion before 1992, 3) current or past injection drug use (covered once for average risk, annually for high risk)   07/12/2018   Lung Cancer Screening Low-dose chest CT if all 3 risk factors: 1) 55-76yo, 2) smoker or quit within last 15y, 3) >=30 pack years (covered annually)   Not needed    Osteoporosis Screening >=66yo (covered every 24 months)  <66yo if any 1 risk factor: 1) estrogen deficient and at risk for osteoporosis; 2) established osteoporosis on treatment; 3) x-rays show possible osteoporosis, osteopenia or vertebral fractures; 4) on steroid or  "planning to start; 5) primary hyperparathyroidism (covered as medically necessary) 09/20/2022 09/2024   Sexually Transmitted Diseases (STDs) As necessary chlamydia, gonorrhea, syphilis, hepatitis B (covered annually if not pregnant, more often in pregnancy)  HIV if any 1 risk factor present: 1) <14yo or >64yo and at increased risk, 2) 15-64yo and ask for it, or 3) pregnant (covered annually if not pregnant, up to 3x in pregnancy)   Low Risk    Vaccine: Hepatitis B As necessary if medium or high risk (series covered once)   Not needed     Vaccine: Influenza All patients without contraindications (covered annually.) 10/11/2023 Fall 2024   Vaccine: Pneumococcal Pneumovax + Prevnar (both covered, >=11 months apart) Prevnar   11/17/2015  Pneumovax  10/30/2014   Done    Vaccine: Shingrix (Shingles) >= 49yo without contraindications             (2 doses, 2 months apart) Zostavax  2006  Shingrix   05/24/2019 09/20/2019 Done    TDAP Every 10 years  08/08/2022 08/2032                                    Women's Personalized Prevention Plan Services (PPPS)                                                          Prints to [facility]                                          Health Risk Factors and Interventions  \"x\" Indicates risk is associated with this patient Risk Factor Recommended Interventions    N/A   Obesity     x Hypertension Low sodium diet and take medication      N/A  Diabetes     N/A  Fall Risk     N/A  Tobacco Use                             Health Risk Factors with Personalized Education:  ----------------------------------------------------------------------------------------------------------------------  Stress management:  Understanding Your Stress  Think about how you know when youre stressed.  Think about how your thoughts or behaviors are different when youre stressed.  Think about what triggers stress for you.  Think about how you handle stress, and whether youre making unhealthy choices in response to stress " (like smoking, drinking alcohol or overeating).  Managing Stress  Take a break from the stressful situation.  Reduce your stress levels by exercising, talking with family/friends, ensuring adequate sleep.  Consider meditation or yoga.  Make sure you plan time to do things you enjoy.  Let your PCP know if youre having problems limiting your stress.  ----------------------------------------------------------------------------------------------------------------------  Controlling Your Blood Pressure  Maintain a normal weight (body mass index between 18.5 and 24.9).  Eat more fruit, vegetables and low-fat dairy.  Eat less saturated fat and total fat.  Lower your sodium (salt) intake.  Try to stay under 1500 mg per day, but if you cannot get your intake to be that low, at least lower it by 1000 mg.  Stay active.  Try to get at least 90 to 150 minutes of exercise per week.  Try brisk walking, swimming, bicycling or dancing.  Limit alcohol intake.  When you do consume alcohol, drink no more than 1 drink per day.  If you have been prescribed medication, take it regularly and exactly as prescribed.  Let your PCP know if you have any problems or questions about your medication.  Check your blood pressure at home or at the store.  Write down your readings and share them with your PCP  ----------------------------------------------------------------------------------------------------------------------  Controlling Your Cholesterol  Reduce the amount of saturated and trans fat in your diet.  Limit intake of red meat.  Consume only low-fat or non-fat/skim dairy.  Limit fried food.  Cook with vegetable oils.  Reduce your intake of sugary foods, sugary drinks and alcohol.  Eat a diet high in fruit, vegetables and whole grains.  Get protein from fish, poultry and a small portion of nuts.  Stay active.  Try to get at least 90 to 150 minutes of exercise per week.  Try brisk walking, swimming, bicycling or dancing.  Maintain a  healthy weight by balancing your diet and exercise.  If you have been prescribed medication, take it regularly and exactly as prescribed. Let your PCP know if you have any problems or questions about your medication.  Its important to know your cholesterol numbers.  When recommended by your PCP, get the cholesterol blood test.  ---------------------------------------------------------------------------------------------------------------------   Controlling Your Osteoporosis, Strengthening Your Bones  Try to get at least 90 to 150 minutes of weight-bearing exercise per week.  Ensure intake of at least 1200mg of calcium per day.  Eat foods high in calcium like milk and other dairy, green vegetables, fruit, canned fish with soft and edible bones, nuts, calcium-set tofu.  Some foods are calcium-fortified, like bread, cereal, fruit juices and mineral water.  Help your body make vitamin D by getting 10-15 minutes per day of sunlight.    Ensure intake of at least 600IU of vitamin D per day.  Eat foods high in vitamin D like oily fish (salmon, sardines, mackerel) and eggs.  Some foods are fortified with vitamin D, like dairy and cereals.  Avoid high amounts of caffeine and salt, since they can cause the body to loose calcium.  Limit alcohol intake, since it is associated with weaker bones and is associated with falls and fractures.  Limit intake of fizzy drinks.  If you have been prescribed medication, take it regularly and exactly as prescribed.  Let your PCP know if you have any problems or questions about your medication  ----------------------------------------------------------------------------------------------------------------------  Reducing Your Risk of Falls  Tell your PCP if any of your medications make you feel tired, dizzy, lightheaded or off-balance.  Maintain coordination, flexibility and balance by ensuring regular physical activity.  Limit alcohol intake to 1 drink per day.  Consider avoiding all  alcohol intake.  Ensure good vision.  Visit an ophthalmologist or optometrist regularly for vision screening or to make sure your glasses / contact lens prescription is correct.  If you need glasses or contacts, wear them.  When you get new glasses or contacts, take time to get used to them.  Do not wear sunglasses or tinted lenses when indoors.  Ensure good hearing.  Have your hearing checked if you are having trouble hearing, or family and friends think you cannot hear them.  If you need a hearing aid, be sure it fits well and wear it.  Get enough rest.  Ensure about 7-9 hours of sleep every day.  Get up slowly from your bed or chairs.  Do not start walking until you are sure you feel steady.  Wear non-skid, rubber-soled, low-heeled shoes.  Do not walk in socks, or in shoes and slippers with smooth soles.  If your PCP or therapist recommends using a cane or walker, use it regularly.  Make your home safer.  Increase lighting throughout the house, especially at the top and bottom of stairs.  Ensure lighting is easily turned on when getting up in the middle of the night.  Make sure there are two secure rails on all stairs.  Install grab bars in the bathtub / shower and near the toilet.  Consider using a shower chair and / or a hand-held shower.  Spread sand or salt on icy surfaces.  Beware of wet surfaces, which can be icy.  Tell your PCP if you have fallen.

## 2023-10-12 RX ORDER — FUROSEMIDE 20 MG/1
20 TABLET ORAL EVERY OTHER DAY
Qty: 30 TABLET | Refills: 6 | Status: SHIPPED | OUTPATIENT
Start: 2023-10-12 | End: 2023-10-24 | Stop reason: HOSPADM

## 2023-10-12 NOTE — TELEPHONE ENCOUNTER
Medicine Refill Request  Pt would like to know if pt should be on this pills, if so please refill   Medicine Refill Insert    Name of Patient: Madelyn Ruiz    Caller's name/Relationship: Oliva daughter     Callback number: [phone]    Medication Name, Dosage, Supply: furosemide (LASIX) 20 mg tablet    Quantity left: 1 week     Pharmacy: [facility]  Last Office Visit: Visit date not found   Last Consult Visit: Visit date not found  Last Telemedicine Visit: Visit date not found    Next Appointment: Visit date not found      Current Outpatient Medications:     apixaban (ELIQUIS) 2.5 mg tablet, Take 2.5 mg by mouth 2 (two) times a day., Disp: , Rfl:     azithromycin (ZITHROMAX) 250 mg tablet, Take 1 tablet (250 mg total) by mouth 3 (three) times a week (Mon, Wed, Fri)., Disp: 12 tablet, Rfl: 5    calcium carbonate 600 mg calcium (1,500 mg) tablet, Take 1 tablet by mouth 2 (two) times a day., Disp: , Rfl:     cholecalciferol, vitamin D3, 25 mcg (1,000 unit) capsule, Take 1 tablet by mouth daily., Disp: , Rfl:     docusate sodium (COLACE) 100 mg capsule, Take 100 mg by mouth 2 (two) times a day as needed for constipation., Disp: , Rfl:     DULoxetine (CYMBALTA) 60 mg capsule, Take 60 mg by mouth at bedtime. , Disp: , Rfl: 0    fexofenadine (ALLEGRA) 180 mg tablet, Take 180 mg by mouth daily as needed (allergies)., Disp: , Rfl:     FOLIC ACID/MULTIVIT,IRON, (CENTRUM ORAL), Take 1 tablet by mouth daily., Disp: , Rfl:     furosemide (LASIX) 20 mg tablet, Take 1 tablet (20 mg total) by mouth every other day., Disp: 30 tablet, Rfl: 0    lidocaine (LIDODERM) 5 % patch, Place 1 patch on the skin daily. Remove & discard patch within 12 hours or as directed by prescriber., Disp: 30 patch, Rfl: 1    lifitegrast (XIIDRA) 5 % dropperette dropperette, , Disp: , Rfl:     LORazepam (ATIVAN) 1 mg tablet, Take 1 mg by mouth nightly as needed for anxiety or sleep., Disp: , Rfl:     losartan (COZAAR) 50 mg tablet, Take  1 tablet (50 mg total) by mouth nightly., Disp: , Rfl:     pregabalin (LYRICA) 25 mg capsule, Take 25 mg by mouth daily with lunch. PRN, Disp: , Rfl:     pregabalin (LYRICA) 50 mg capsule, Take 50 mg by mouth 2 (two) times a day., Disp: , Rfl:     SPIRIVA RESPIMAT 2.5 mcg/actuation mist inhaler, Inhale 2 puffs daily., Disp: , Rfl:     SYMBICORT 80-4.5 mcg/actuation inhaler, Inhale 2 puffs 2 (two) times a day., Disp: , Rfl:     TIADYLT  mg 24 hr capsule, TAKE 1 CAPSULE BY MOUTH EVERY DAY, Disp: 90 capsule, Rfl: 3    traMADoL (ULTRAM) 50 mg tablet, Take 50 mg by mouth 3 (three) times a day as needed., Disp: , Rfl:       BP Readings from Last 3 Encounters:   10/11/23 138/90   09/21/23 (!) 154/76   09/20/23 118/74       Recent Lab results:  Lab Results   Component Value Date    CHOL 227 (H) 01/27/2023   ,   Lab Results   Component Value Date    HDL 62 01/27/2023   ,   Lab Results   Component Value Date    LDLCALC 141 (H) 01/27/2023   ,   Lab Results   Component Value Date    TRIG 120 01/27/2023        Lab Results   Component Value Date    GLUCOSE 105 (H) 08/23/2023   ,   Lab Results   Component Value Date    HGBA1C 5.6 11/18/2015         Lab Results   Component Value Date    CREATININE 0.8 08/23/2023       Lab Results   Component Value Date    TSH 2.87 01/26/2023           Lab Results   Component Value Date    HGBA1C 5.6 11/18/2015

## 2023-10-17 ENCOUNTER — PATIENT OUTREACH (OUTPATIENT)
Dept: CASE MANAGEMENT | Facility: CLINIC | Age: 88
End: 2023-10-17
Payer: MEDICARE

## 2023-10-17 NOTE — PROGRESS NOTES
"TCM f/u Fall, bilateral nondisplaced sacral fractures. Treated non-operatively. Spoke with pt. Pt says she is \"ok\". She feels she continues to improve, but not as fast as she would like. Reinforced falls/home safety. Pt denies issues, questions or concerns. Pt is stable. Pt has RN number for future reference. Will close to CM.     AURORA WalkerN, RN  [phone]      "

## 2023-10-19 ENCOUNTER — APPOINTMENT (EMERGENCY)
Dept: RADIOLOGY | Facility: HOSPITAL | Age: 88
DRG: 312 | End: 2023-10-19
Attending: EMERGENCY MEDICINE
Payer: MEDICARE

## 2023-10-19 ENCOUNTER — TELEPHONE (OUTPATIENT)
Dept: PRIMARY CARE | Facility: CLINIC | Age: 88
End: 2023-10-19
Payer: MEDICARE

## 2023-10-19 ENCOUNTER — HOSPITAL ENCOUNTER (INPATIENT)
Facility: HOSPITAL | Age: 88
LOS: 4 days | Discharge: NURSING, REHAB OR LONG-TERM CARE | DRG: 312 | End: 2023-10-24
Attending: EMERGENCY MEDICINE | Admitting: INTERNAL MEDICINE
Payer: MEDICARE

## 2023-10-19 DIAGNOSIS — D64.9 ACUTE ON CHRONIC ANEMIA: ICD-10-CM
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
## 2023-10-19 DIAGNOSIS — R26.2 AMBULATORY DYSFUNCTION: ICD-10-CM

## 2023-10-19 DIAGNOSIS — S09.90XA CLOSED HEAD INJURY, INITIAL ENCOUNTER: Primary | ICD-10-CM

## 2023-10-19 PROBLEM — I48.92 ATRIAL FLUTTER (CMS/HCC): Status: ACTIVE | Noted: 2023-10-19

## 2023-10-19 PROBLEM — J44.9 COPD (CHRONIC OBSTRUCTIVE PULMONARY DISEASE) (CMS/HCC): Status: ACTIVE | Noted: 2023-10-19

## 2023-10-19 PROBLEM — D72.829 LEUKOCYTOSIS: Status: ACTIVE | Noted: 2023-10-19

## 2023-10-19 PROBLEM — I10 HTN (HYPERTENSION): Status: ACTIVE | Noted: 2023-10-19

## 2023-10-19 LAB
ABO + RH BLD: NORMAL
ALBUMIN SERPL-MCNC: 4 G/DL (ref 3.5–5.7)
ALP SERPL-CCNC: 56 IU/L (ref 34–125)
ALT SERPL-CCNC: 17 IU/L (ref 7–52)
AMORPH CRY #/AREA URNS HPF: ABNORMAL /HPF
ANION GAP SERPL CALC-SCNC: 8 MEQ/L (ref 3–15)
APTT PPP: <22 SEC (ref 23–35)
AST SERPL-CCNC: 17 IU/L (ref 13–39)
BACTERIA URNS QL MICRO: ABNORMAL /HPF
BASOPHILS # BLD: 0.01 K/UL (ref 0.01–0.1)
BASOPHILS NFR BLD: 0.1 %
BILIRUB SERPL-MCNC: 0.5 MG/DL (ref 0.3–1.2)
BILIRUB UR QL STRIP.AUTO: NEGATIVE MG/DL
BLD GP AB SCN SERPL QL: NEGATIVE
BUN SERPL-MCNC: 27 MG/DL (ref 7–25)
CALCIUM SERPL-MCNC: 9.6 MG/DL (ref 8.6–10.3)
CHLORIDE SERPL-SCNC: 110 MEQ/L (ref 98–107)
CK SERPL-CCNC: 250 U/L (ref 30–223)
CLARITY UR REFRACT.AUTO: CLEAR
CO2 SERPL-SCNC: 26 MEQ/L (ref 21–31)
COLOR UR AUTO: YELLOW
CREAT SERPL-MCNC: 0.7 MG/DL (ref 0.6–1.2)
D AG BLD QL: POSITIVE
DIFFERENTIAL METHOD BLD: ABNORMAL
EOSINOPHIL # BLD: 0 K/UL (ref 0.04–0.36)
EOSINOPHIL NFR BLD: 0 %
ERYTHROCYTE [DISTWIDTH] IN BLOOD BY AUTOMATED COUNT: 13.7 % (ref 11.7–14.4)
ETHANOL SERPL-MCNC: <10 MG/DL
GFR SERPL CREATININE-BSD FRML MDRD: >60 ML/MIN/1.73M*2
GLUCOSE SERPL-MCNC: 112 MG/DL (ref 70–99)
GLUCOSE UR STRIP.AUTO-MCNC: NEGATIVE MG/DL
HCT VFR BLDCO AUTO: 31.3 % (ref 35–45)
HGB BLD-MCNC: 10.2 G/DL (ref 11.8–15.7)
HGB UR QL STRIP.AUTO: NEGATIVE
HYALINE CASTS #/AREA URNS LPF: ABNORMAL /LPF
IMM GRANULOCYTES # BLD AUTO: 0.08 K/UL (ref 0–0.08)
IMM GRANULOCYTES NFR BLD AUTO: 0.6 %
INR PPP: 1
KETONES UR STRIP.AUTO-MCNC: NEGATIVE MG/DL
LABORATORY COMMENT REPORT: NORMAL
LACTATE SERPL-SCNC: 1.9 MMOL/L (ref 0.4–2)
LEUKOCYTE ESTERASE UR QL STRIP.AUTO: NEGATIVE
LIPASE SERPL-CCNC: 12 U/L (ref 11–82)
LYMPHOCYTES # BLD: 1.02 K/UL (ref 1.2–3.5)
LYMPHOCYTES NFR BLD: 8.2 %
MCH RBC QN AUTO: 31.1 PG (ref 28–33.2)
MCHC RBC AUTO-ENTMCNC: 32.6 G/DL (ref 32.2–35.5)
MCV RBC AUTO: 95.4 FL (ref 83–98)
MONOCYTES # BLD: 1.25 K/UL (ref 0.28–0.8)
MONOCYTES NFR BLD: 10 %
NEUTROPHILS # BLD: 10.14 K/UL (ref 1.7–7)
NEUTS SEG NFR BLD: 81.1 %
NITRITE UR QL STRIP.AUTO: NEGATIVE
NRBC BLD-RTO: 0 %
PDW BLD AUTO: 10.8 FL (ref 9.4–12.3)
PH UR STRIP.AUTO: 5.5 [PH]
PLATELET # BLD AUTO: 251 K/UL (ref 150–369)
POTASSIUM SERPL-SCNC: 4 MEQ/L (ref 3.5–5.1)
PROT SERPL-MCNC: 5.9 G/DL (ref 6–8.2)
PROT UR QL STRIP.AUTO: ABNORMAL
PROTHROMBIN TIME: 12.6 SEC (ref 12.2–14.5)
RBC # BLD AUTO: 3.28 M/UL (ref 3.93–5.22)
RBC #/AREA URNS HPF: ABNORMAL /HPF
SODIUM SERPL-SCNC: 144 MEQ/L (ref 136–145)
SP GR UR REFRACT.AUTO: 1.03
SPECIMEN EXP DATE BLD: NORMAL
SQUAMOUS URNS QL MICRO: ABNORMAL /HPF
TSH SERPL DL<=0.05 MIU/L-ACNC: 3.63 MIU/L (ref 0.34–5.6)
UROBILINOGEN UR STRIP-ACNC: 0.2 EU/DL
WBC # BLD AUTO: 12.5 K/UL (ref 3.8–10.5)
WBC #/AREA URNS HPF: ABNORMAL /HPF

## 2023-10-19 PROCEDURE — 85730 THROMBOPLASTIN TIME PARTIAL: CPT | Performed by: EMERGENCY MEDICINE

## 2023-10-19 PROCEDURE — G0378 HOSPITAL OBSERVATION PER HR: HCPCS

## 2023-10-19 PROCEDURE — 93005 ELECTROCARDIOGRAM TRACING: CPT | Performed by: EMERGENCY MEDICINE

## 2023-10-19 PROCEDURE — 85025 COMPLETE CBC W/AUTO DIFF WBC: CPT | Performed by: EMERGENCY MEDICINE

## 2023-10-19 PROCEDURE — 83690 ASSAY OF LIPASE: CPT | Performed by: EMERGENCY MEDICINE

## 2023-10-19 PROCEDURE — 74177 CT ABD & PELVIS W/CONTRAST: CPT | Mod: MG

## 2023-10-19 PROCEDURE — G1004 CDSM NDSC: HCPCS

## 2023-10-19 PROCEDURE — 99285 EMERGENCY DEPT VISIT HI MDM: CPT | Mod: 25

## 2023-10-19 PROCEDURE — 83605 ASSAY OF LACTIC ACID: CPT | Performed by: EMERGENCY MEDICINE

## 2023-10-19 PROCEDURE — G0480 DRUG TEST DEF 1-7 CLASSES: HCPCS | Performed by: EMERGENCY MEDICINE

## 2023-10-19 PROCEDURE — 72125 CT NECK SPINE W/O DYE: CPT | Mod: MG

## 2023-10-19 PROCEDURE — 84443 ASSAY THYROID STIM HORMONE: CPT | Performed by: NURSE PRACTITIONER

## 2023-10-19 PROCEDURE — 36415 COLL VENOUS BLD VENIPUNCTURE: CPT | Performed by: EMERGENCY MEDICINE

## 2023-10-19 PROCEDURE — 85610 PROTHROMBIN TIME: CPT | Performed by: EMERGENCY MEDICINE

## 2023-10-19 PROCEDURE — 99223 1ST HOSP IP/OBS HIGH 75: CPT | Mod: AI | Performed by: INTERNAL MEDICINE

## 2023-10-19 PROCEDURE — 63600105 HC IODINE BASED CONTRAST: Mod: JZ | Performed by: EMERGENCY MEDICINE

## 2023-10-19 PROCEDURE — 82550 ASSAY OF CK (CPK): CPT | Performed by: EMERGENCY MEDICINE

## 2023-10-19 PROCEDURE — 81001 URINALYSIS AUTO W/SCOPE: CPT | Performed by: EMERGENCY MEDICINE

## 2023-10-19 PROCEDURE — 96361 HYDRATE IV INFUSION ADD-ON: CPT | Mod: 59

## 2023-10-19 PROCEDURE — 86900 BLOOD TYPING SEROLOGIC ABO: CPT

## 2023-10-19 PROCEDURE — 63600000 HC DRUGS/DETAIL CODE: Mod: JZ | Performed by: NURSE PRACTITIONER

## 2023-10-19 PROCEDURE — 71260 CT THORAX DX C+: CPT | Mod: MG

## 2023-10-19 PROCEDURE — 25800000 HC PHARMACY IV SOLUTIONS: Performed by: EMERGENCY MEDICINE

## 2023-10-19 PROCEDURE — 63700000 HC SELF-ADMINISTRABLE DRUG: Performed by: NURSE PRACTITIONER

## 2023-10-19 PROCEDURE — 80053 COMPREHEN METABOLIC PANEL: CPT | Performed by: EMERGENCY MEDICINE

## 2023-10-19 PROCEDURE — 25000000 HC PHARMACY GENERAL: Performed by: NURSE PRACTITIONER

## 2023-10-19 RX ORDER — DOCUSATE SODIUM 100 MG/1
100 CAPSULE, LIQUID FILLED ORAL 2 TIMES DAILY PRN
Status: DISCONTINUED | OUTPATIENT
Start: 2023-10-19 | End: 2023-10-24 | Stop reason: HOSPADM

## 2023-10-19 RX ORDER — AZITHROMYCIN 250 MG/1
250 TABLET, FILM COATED ORAL 3 TIMES WEEKLY
Status: DISCONTINUED | OUTPATIENT
Start: 2023-10-20 | End: 2023-10-24 | Stop reason: HOSPADM

## 2023-10-19 RX ORDER — DILTIAZEM HYDROCHLORIDE 300 MG/1
300 CAPSULE, EXTENDED RELEASE ORAL DAILY
Status: DISCONTINUED | OUTPATIENT
Start: 2023-10-19 | End: 2023-10-22

## 2023-10-19 RX ORDER — TRAMADOL HYDROCHLORIDE 50 MG/1
50 TABLET ORAL 3 TIMES DAILY PRN
Status: DISCONTINUED | OUTPATIENT
Start: 2023-10-19 | End: 2023-10-24 | Stop reason: HOSPADM

## 2023-10-19 RX ORDER — SENNOSIDES 8.6 MG/1
1 TABLET ORAL 2 TIMES DAILY PRN
Status: DISCONTINUED | OUTPATIENT
Start: 2023-10-19 | End: 2023-10-24 | Stop reason: HOSPADM

## 2023-10-19 RX ORDER — BISACODYL 10 MG/1
10 SUPPOSITORY RECTAL DAILY PRN
Status: DISCONTINUED | OUTPATIENT
Start: 2023-10-19 | End: 2023-10-24 | Stop reason: HOSPADM

## 2023-10-19 RX ORDER — PREGABALIN 25 MG/1
50 CAPSULE ORAL 2 TIMES DAILY
Status: DISCONTINUED | OUTPATIENT
Start: 2023-10-19 | End: 2023-10-24 | Stop reason: HOSPADM

## 2023-10-19 RX ORDER — LIDOCAINE 560 MG/1
1 PATCH PERCUTANEOUS; TOPICAL; TRANSDERMAL DAILY
Status: DISCONTINUED | OUTPATIENT
Start: 2023-10-19 | End: 2023-10-24 | Stop reason: HOSPADM

## 2023-10-19 RX ORDER — LORAZEPAM 0.5 MG/1
0.5 TABLET ORAL NIGHTLY PRN
Status: DISCONTINUED | OUTPATIENT
Start: 2023-10-19 | End: 2023-10-24 | Stop reason: HOSPADM

## 2023-10-19 RX ORDER — LOSARTAN POTASSIUM 25 MG/1
50 TABLET ORAL NIGHTLY
Status: DISCONTINUED | OUTPATIENT
Start: 2023-10-19 | End: 2023-10-24

## 2023-10-19 RX ORDER — DULOXETIN HYDROCHLORIDE 30 MG/1
60 CAPSULE, DELAYED RELEASE ORAL NIGHTLY
Status: DISCONTINUED | OUTPATIENT
Start: 2023-10-19 | End: 2023-10-24 | Stop reason: HOSPADM

## 2023-10-19 RX ORDER — DEXTROSE 40 %
15-30 GEL (GRAM) ORAL AS NEEDED
Status: DISCONTINUED | OUTPATIENT
Start: 2023-10-19 | End: 2023-10-24 | Stop reason: HOSPADM

## 2023-10-19 RX ORDER — LORAZEPAM 1 MG/1
1 TABLET ORAL NIGHTLY PRN
Status: DISCONTINUED | OUTPATIENT
Start: 2023-10-19 | End: 2023-10-19

## 2023-10-19 RX ORDER — PREGABALIN 25 MG/1
25 CAPSULE ORAL
Status: DISCONTINUED | OUTPATIENT
Start: 2023-10-20 | End: 2023-10-24 | Stop reason: HOSPADM

## 2023-10-19 RX ORDER — AZITHROMYCIN 250 MG/1
250 TABLET, FILM COATED ORAL 3 TIMES WEEKLY
Status: DISCONTINUED | OUTPATIENT
Start: 2023-10-20 | End: 2023-10-19

## 2023-10-19 RX ORDER — CHOLECALCIFEROL (VITAMIN D3) 25 MCG
1000 TABLET ORAL DAILY
Status: DISCONTINUED | OUTPATIENT
Start: 2023-10-19 | End: 2023-10-24 | Stop reason: HOSPADM

## 2023-10-19 RX ORDER — FUROSEMIDE 20 MG/1
20 TABLET ORAL EVERY OTHER DAY
Status: DISCONTINUED | OUTPATIENT
Start: 2023-10-20 | End: 2023-10-24

## 2023-10-19 RX ORDER — ACETAMINOPHEN 325 MG/1
650 TABLET ORAL EVERY 4 HOURS PRN
Status: DISCONTINUED | OUTPATIENT
Start: 2023-10-19 | End: 2023-10-24 | Stop reason: HOSPADM

## 2023-10-19 RX ORDER — DEXTROSE 50 % IN WATER (D50W) INTRAVENOUS SYRINGE
25 AS NEEDED
Status: DISCONTINUED | OUTPATIENT
Start: 2023-10-19 | End: 2023-10-24 | Stop reason: HOSPADM

## 2023-10-19 RX ORDER — IBUPROFEN 200 MG
16-32 TABLET ORAL AS NEEDED
Status: DISCONTINUED | OUTPATIENT
Start: 2023-10-19 | End: 2023-10-24 | Stop reason: HOSPADM

## 2023-10-19 RX ORDER — FUROSEMIDE 20 MG/1
20 TABLET ORAL EVERY OTHER DAY
Status: DISCONTINUED | OUTPATIENT
Start: 2023-10-19 | End: 2023-10-19

## 2023-10-19 RX ORDER — SODIUM CHLORIDE, SODIUM LACTATE, POTASSIUM CHLORIDE, CALCIUM CHLORIDE 600; 310; 30; 20 MG/100ML; MG/100ML; MG/100ML; MG/100ML
INJECTION, SOLUTION INTRAVENOUS CONTINUOUS
Status: ACTIVE | OUTPATIENT
Start: 2023-10-19 | End: 2023-10-20

## 2023-10-19 RX ADMIN — TRAMADOL HYDROCHLORIDE 50 MG: 50 TABLET, COATED ORAL at 18:53

## 2023-10-19 RX ADMIN — SODIUM CHLORIDE 500 ML: 9 INJECTION, SOLUTION INTRAVENOUS at 15:00

## 2023-10-19 RX ADMIN — LIDOCAINE 1 PATCH: 4 PATCH TOPICAL at 18:56

## 2023-10-19 RX ADMIN — LOSARTAN POTASSIUM 50 MG: 25 TABLET, FILM COATED ORAL at 21:05

## 2023-10-19 RX ADMIN — PREGABALIN 50 MG: 25 CAPSULE ORAL at 21:05

## 2023-10-19 RX ADMIN — DILTIAZEM HYDROCHLORIDE 300 MG: 300 CAPSULE, EXTENDED RELEASE ORAL at 21:05

## 2023-10-19 RX ADMIN — DULOXETINE HYDROCHLORIDE 60 MG: 30 CAPSULE, DELAYED RELEASE ORAL at 21:05

## 2023-10-19 RX ADMIN — SODIUM CHLORIDE, POTASSIUM CHLORIDE, SODIUM LACTATE AND CALCIUM CHLORIDE: 600; 310; 30; 20 INJECTION, SOLUTION INTRAVENOUS at 19:20

## 2023-10-19 RX ADMIN — MULTIPLE VITAMINS W/ MINERALS TAB 1 TABLET: TAB at 18:53

## 2023-10-19 RX ADMIN — IOHEXOL 100 ML: 350 INJECTION, SOLUTION INTRAVENOUS at 13:56

## 2023-10-19 RX ADMIN — Medication 1000 UNITS: at 18:53

## 2023-10-19 ASSESSMENT — ENCOUNTER SYMPTOMS
LOSS OF CONSCIOUSNESS: 1
FEVER: 0
SHORTNESS OF BREATH: 0
VOMITING: 0
HEADACHES: 1

## 2023-10-19 ASSESSMENT — COGNITIVE AND FUNCTIONAL STATUS - GENERAL
MOVING TO AND FROM BED TO CHAIR: 3 - A LITTLE
WALKING IN HOSPITAL ROOM: 2 - A LOT
CLIMB 3 TO 5 STEPS WITH RAILING: 2 - A LOT
STANDING UP FROM CHAIR USING ARMS: 3 - A LITTLE

## 2023-10-19 NOTE — ASSESSMENT & PLAN NOTE
Most likely reactive in the s/o fall, resolved  UA bland, no infectious findings on CT CAP  Monitor off abx, trend cbc

## 2023-10-19 NOTE — ASSESSMENT & PLAN NOTE
Hgb stablized 10.2 > 8.4>9.9  (baseline appears to be around 11-12)  -Patient with diffuse ecchymosis throughout bilateral lower and upper extremities. She also has scalp laceration from the fall. No active bleeding on exam  PLAN  -Continue to monitor CBC. Transfuse of Hgb < 7  -Continue to hold Eliquis for now  -Palliative as per afib

## 2023-10-19 NOTE — ASSESSMENT & PLAN NOTE
Presents after being found down by home PT surrounded in her own blood. Sustained scalp laceration/hematoma. Has history of multiple falls, on eliquis. Recent traumatic fall in August sustaining pelvic/sacral fx. She lives alone. Daughter lives close by.   -CPK 200s. Ethanol negative.  -Unclear etiology for fall, pt cannot recall events. She did have a vodka drink last night, she is also on ativan prn unclear if she mixed. There is question if this was a syncopal event  -CTAP: No acute trauma related findings within the chest, abdomen, pelvis. Multiple old bilateral rib fractures.  Healing bilateral sacral wing fractures since 8/21/2023.  Old pelvic pubic rami fractures.   -CTH/CT c-spine: No acute intracranial abnormality. No evidence for acute fracture or traumatic subluxation of the cervical spine. Marked multilevel degenerative spondylosis, with several levels of severe right-sided foraminal narrowing.   -Positive orthostatic vital signs, received IV fluids  -TTE Feb 2023: EF 65-70%. No regional wall motion abnormalities. Grade I diastolic dysfunction.    Plan:  -Monitor on telemetry  -Encourage p.o. intake  -Fall precautions  -PT/OT recommended SNF  -She also has a R elbow skin tear from a prior fall POA; consult wound care  -See orthostasis

## 2023-10-19 NOTE — TELEPHONE ENCOUNTER
Fall in home - Kavita walked in and the patient had fallen and had been there for several hours, scraped up arm very badly, she was conscious, a little confused.  EMT came right away and took her to [facility].  Hard to tell if she had been there overnight or not. She suspects that she had fallen in the night.

## 2023-10-19 NOTE — ASSESSMENT & PLAN NOTE
Pt with a history of Afib/Aflutter, p/w syncope  - CHADSVASC: 4 (age, female, HTN)  - EKbpm, Afib with RVR, RBBB  - s/p IV Diltiazem/ PO Diltiazem 30mg/IV Diltiazem 25mg/Metoprolol 5mg IV  - Home medication: Diltiazem 300mg and Eliquis 2.5mg bid, reports compliance   - Patient received digoxin load with improved heart rate    Plan:   - Telemetry  - Increased home diltiazem to 360 mg given stable BP, however now with positive orthos  - Holding Losartan and Lasix for orthostasis  - Holding eliquis for now given fall and scalp laceration/hematoma, likely can restart since hgb stable without recurrence of bleeding.    - Palliative consulted to discuss goals of care/code status/ resumption of anticoagulation at discharge given history of recent multiple falls

## 2023-10-19 NOTE — ED PROVIDER NOTES
Emergency Medicine Note  HPI   HISTORY OF PRESENT ILLNESS       History provided by:  Patient  Injury  Location:  Head injury  Quality:  Aching  Severity:  Moderate  Onset quality:  Sudden  Timing:  Constant  Progression:  Improving  Context:  Pt was drinking vodka last night-->woke up on ground-->does not remember fall  Relieved by:  Nothing  Worsened by:  Nothing  Ineffective treatments:  None tried  Associated symptoms: headaches and loss of consciousness    Associated symptoms: no chest pain, no fever, no shortness of breath and no vomiting          Patient History   PAST HISTORY     Reviewed from Nursing Triage:       Past Medical History:   Diagnosis Date   • Allergic bronchopulmonary aspergillosis (CMS/[facility]) 04/02/2018    Allergist: Dr. Arriaza.  Stable FEV1 in 8/2015.  IgE levels and eosinophils stable in 8/2015.  Managed with Fasenra, Azithromycin and Flovent.   • Allergic rhinitis 04/02/2018    Pulmonologist: Dr. Walker. Managed with Flonase.   • Asthma    • Atrial flutter with rapid ventricular response (CMS/[facility]) 02/21/2023    Diagnosed in 02/2023 and hospitalized Managed with Apixaban and Diltiazem Echocardiogram 02/2023 •  Left Ventricle: Normal ventricle size. Mild concentric left ventricular hypertrophy. Preserved systolic function. Estimated EF 65-70%. No regional wall motion abnormalities. Grade I diastolic dysfunction. •  Right Ventricle: Normal ventricle size. Normal systolic function. •  Left Atrium: Moderately   • Calcification of aorta (CMS/[facility]) 06/29/2020    Seen incidentally on CT scan.   • Chronic obstructive pulmonary disease (CMS/[facility]) 04/02/2018    Pulmonologist: Dr. Walker.  Seen on PFTs in hospital in 2014.  Controlled with Spiriva and FLovent.   • GERD (gastroesophageal reflux disease) 06/08/2019    Managed with Esomeprazole.  Should take medication 30 minutes prior to meal.  Advised to avoid caffeine, carbonated beverages, and should not eat within 3 hours of going to sleep.  Advised to  reduce BMI < 25.    • Insomnia 04/02/2018    Managed with Lorazepam as needed.   • Lumbar spinal stenosis 04/02/2018    Neurosurgeon: Dr. Miguel. MRI with Central and lateral recess spinal stenosis. Has Spondylolisthesis at L4/L5. S/P epidural injection by Dr. Martin with some relief in past. Had Lumbar fusion and Lumbar laminectomy by Dr. Miguel in 4/2015.   • Macular degeneration disease    • Obstructive sleep apnea syndrome 04/02/2018    Mild STACIA noted in sleep study 7/2015. Treated with nasal CPAP automatic with pressure range 5-10 CM H2Ox couple months. Off now   • Osteoporosis 04/02/2018    Rheumatologist: Dr. Bradley.  Dexa scan 7/2016 with osteoporosis of lumbar spine LS-2.5, LH-2.1, RH -2.2.  Has been on Fosamax in the past for 8 years.  Worsened by Prednisone in past.  Continue vitamin D, calcium and weight bearing exercise. DEXA in 7/2017 with LS -1.7, LH -2.2, and RH -2.0.  Next due in 7/2019.  Seen by Dr. Bradley in 8/2015 who agreed with initiating Prolia. Took for 2 years. N   • Primary hypertension 04/02/2018    Managed on Diltiazem and Losartan. Advised to follow a low sodium diet and keep BMI < 25.  Advised to exercise for 2.5 hours per week.  Instructed to take home blood pressure readings and call if consistently above 140/90.     • Pulmonary embolism (CMS/HCC) 06/09/2019    Hematologist: Dr. Bhakta Diagnosed in 06/2019 in right upper lobe. Vascular ultrasound negative of bilateral lower extremities. Managed on Eliquis. Hypercoagulable workup was negative for beta-2 glycoprotein's, RIGO, Antithrombin III, lupus anticoagulant, protein C activity protein S activity, and prothrombin gene mutation, and factor V Leiden. Now off Apixaban as CT scan in 12/2019 was without pu   • SVT (supraventricular tachycardia) (CMS/HCC)    • Vertebral compression fracture (CMS/HCC)     At T12 level       Past Surgical History:   Procedure Laterality Date   • APPENDECTOMY     • CARPAL TUNNEL RELEASE Bilateral    •  CATARACT EXTRACTION W/  INTRAOCULAR LENS IMPLANT Bilateral    • HYSTERECTOMY  1972   • LUMBAR LAMINECTOMY  04/21/2015    S/P lumbar fusion   • TONSILLECTOMY         Family History   Problem Relation Age of Onset   • Glaucoma Biological Mother    • Macular degeneration Biological Mother    • Hypertension Biological Mother    • Lung cancer Biological Father    • Heart disease Biological Brother    • Breast cancer Mother's Sister    • No Known Problems Biological Son    • No Known Problems Biological Son    • No Known Problems Biological Daughter    • No Known Problems Biological Daughter    • No Known Problems Biological Daughter        Social History     Tobacco Use   • Smoking status: Never   • Smokeless tobacco: Never   Vaping Use   • Vaping Use: Never used   Substance Use Topics   • Alcohol use: Yes     Comment: Rarely   • Drug use: No         Review of Systems   REVIEW OF SYSTEMS     Review of Systems   Constitutional: Negative for fever.   Respiratory: Negative for shortness of breath.    Cardiovascular: Negative for chest pain.   Gastrointestinal: Negative for vomiting.   Neurological: Positive for loss of consciousness and headaches.         VITALS     ED Vitals    None                      Physical Exam   PHYSICAL EXAM     Physical Exam  Vitals and nursing note reviewed.   Constitutional:       General: She is not in acute distress.     Appearance: She is well-developed.   HENT:      Head: Normocephalic.      Comments: Posterior scalp laceration  Eyes:      General: No scleral icterus.     Conjunctiva/sclera: Conjunctivae normal.   Neck:      Vascular: No JVD.   Cardiovascular:      Rate and Rhythm: Normal rate and regular rhythm.   Pulmonary:      Effort: Pulmonary effort is normal. No respiratory distress.      Breath sounds: Normal breath sounds.   Abdominal:      Palpations: Abdomen is soft.      Tenderness: There is no abdominal tenderness.   Musculoskeletal:         General: No swelling or tenderness.       Cervical back: Neck supple. No tenderness (no c-spine).      Right lower leg: No edema.      Left lower leg: No edema.      Comments: R elbow abrasion   Skin:     General: Skin is warm and dry.      Findings: Bruising (on arms and legs b/l) present.   Neurological:      General: No focal deficit present.      Mental Status: She is alert and oriented to person, place, and time.      Sensory: Sensation is intact.      Motor: Motor function is intact.      Comments: Nl FNF   Psychiatric:         Mood and Affect: Mood normal.         Behavior: Behavior normal.           PROCEDURES     Procedures     DATA     Results     None          Imaging Results    None         No orders to display       Scoring tools                                  ED Course & MDM   MDM / ED COURSE / CLINICAL IMPRESSION / DISPO     Medical Decision Making  89yo COPD, STACIA, Aflutter on Eliquis, asthma and SVT c/o fall at some point last night. Pt had vodka and the deatils of the fall are unclear. Last took eliquis last night. Patient c/o mild posterior HA but is making jokes in ED.  Ambulates with walker at baseline, but is unsteady. Lives alone.  Will check labs, CT and reassess.    Acute on chronic anemia: acute illness or injury  Ambulatory dysfunction: acute illness or injury  Closed head injury, initial encounter: acute illness or injury that poses a threat to life or bodily functions  Fall, initial encounter: acute illness or injury  Amount and/or Complexity of Data Reviewed  External Data Reviewed: notes.     Details: 10/11/2023 well visit  [facility] in [place]    Labs: ordered. Decision-making details documented in ED Course.  Radiology: ordered and independent interpretation performed.     Details: HCT-no bleed  ECG/medicine tests: ordered and independent interpretation performed.     Details: Sinus tach, nl axis, incomplete RBBB, no acute ST-T changes      Risk  Prescription drug management.  Decision  regarding hospitalization.          ED Course as of 10/19/23 1639   Thu Oct 19, 2023   1355 Hgb 10.2 (last 12)  WBC 12.5  Lactate 1.9 [SHANA]   1422 HCT neg  CT chest/abd: no acute injuries [SHANA]   1638 Case d/w Dr. Cordoba who will admit to obs.  Will need PT/OT eval and serial Hgb. [SHANA]      ED Course User Index  [SHANA] Nabil Damon MD     Clinical Impression      None               Nabil Damon MD  10/19/23 1640       Nabil Damon MD  10/19/23 [id number]

## 2023-10-19 NOTE — ASSESSMENT & PLAN NOTE
Home regiment losartan 50 mg daily and diltiazem 300 mg daily  Continue diltiazem given A-fib RVR  We will hold losartan given low blood pressure  -Continue to monitor blood pressure and resume antihypertensives as tolerated

## 2023-10-20 PROBLEM — S09.90XA CLOSED HEAD INJURY, INITIAL ENCOUNTER: Status: ACTIVE | Noted: 2023-10-20

## 2023-10-20 LAB
ANION GAP SERPL CALC-SCNC: 11 MEQ/L (ref 3–15)
ATRIAL RATE: 153
BUN SERPL-MCNC: 13 MG/DL (ref 7–25)
CALCIUM SERPL-MCNC: 7.8 MG/DL (ref 8.6–10.3)
CHLORIDE SERPL-SCNC: 109 MEQ/L (ref 98–107)
CO2 SERPL-SCNC: 21 MEQ/L (ref 21–31)
CREAT SERPL-MCNC: 0.5 MG/DL (ref 0.6–1.2)
ERYTHROCYTE [DISTWIDTH] IN BLOOD BY AUTOMATED COUNT: 14.1 % (ref 11.7–14.4)
GFR SERPL CREATININE-BSD FRML MDRD: >60 ML/MIN/1.73M*2
GLUCOSE SERPL-MCNC: 78 MG/DL (ref 70–99)
HCT VFR BLDCO AUTO: 25.8 % (ref 35–45)
HGB BLD-MCNC: 8.4 G/DL (ref 11.8–15.7)
MAGNESIUM SERPL-MCNC: 1.9 MG/DL (ref 1.8–2.5)
MCH RBC QN AUTO: 31.6 PG (ref 28–33.2)
MCHC RBC AUTO-ENTMCNC: 32.6 G/DL (ref 32.2–35.5)
MCV RBC AUTO: 97 FL (ref 83–98)
PDW BLD AUTO: 11.3 FL (ref 9.4–12.3)
PLATELET # BLD AUTO: 191 K/UL (ref 150–369)
POTASSIUM SERPL-SCNC: 3.8 MEQ/L (ref 3.5–5.1)
QRS DURATION: 124
QT INTERVAL: 354
QTC CALCULATION(BAZETT): 528
R AXIS: 103
RBC # BLD AUTO: 2.66 M/UL (ref 3.93–5.22)
SODIUM SERPL-SCNC: 141 MEQ/L (ref 136–145)
T WAVE AXIS: 16
VENTRICULAR RATE: 134
WBC # BLD AUTO: 7.24 K/UL (ref 3.8–10.5)

## 2023-10-20 PROCEDURE — 36415 COLL VENOUS BLD VENIPUNCTURE: CPT | Performed by: NURSE PRACTITIONER

## 2023-10-20 PROCEDURE — 63600000 HC DRUGS/DETAIL CODE: Mod: JZ | Performed by: INTERNAL MEDICINE

## 2023-10-20 PROCEDURE — 96374 THER/PROPH/DIAG INJ IV PUSH: CPT | Mod: 59 | Performed by: INTERNAL MEDICINE

## 2023-10-20 PROCEDURE — 97162 PT EVAL MOD COMPLEX 30 MIN: CPT | Mod: GP

## 2023-10-20 PROCEDURE — 25000000 HC PHARMACY GENERAL: Performed by: STUDENT IN AN ORGANIZED HEALTH CARE EDUCATION/TRAINING PROGRAM

## 2023-10-20 PROCEDURE — 63700000 HC SELF-ADMINISTRABLE DRUG: Performed by: NURSE PRACTITIONER

## 2023-10-20 PROCEDURE — 97166 OT EVAL MOD COMPLEX 45 MIN: CPT | Mod: GO

## 2023-10-20 PROCEDURE — 93010 ELECTROCARDIOGRAM REPORT: CPT | Performed by: INTERNAL MEDICINE

## 2023-10-20 PROCEDURE — 85027 COMPLETE CBC AUTOMATED: CPT | Performed by: NURSE PRACTITIONER

## 2023-10-20 PROCEDURE — 97530 THERAPEUTIC ACTIVITIES: CPT | Mod: GO

## 2023-10-20 PROCEDURE — 96361 HYDRATE IV INFUSION ADD-ON: CPT | Mod: 59 | Performed by: INTERNAL MEDICINE

## 2023-10-20 PROCEDURE — 21400000 HC ROOM AND CARE CCU/INTERMEDIATE

## 2023-10-20 PROCEDURE — 97530 THERAPEUTIC ACTIVITIES: CPT | Mod: GP

## 2023-10-20 PROCEDURE — G0378 HOSPITAL OBSERVATION PER HR: HCPCS

## 2023-10-20 PROCEDURE — 99232 SBSQ HOSP IP/OBS MODERATE 35: CPT | Performed by: NURSE PRACTITIONER

## 2023-10-20 PROCEDURE — 63600000 HC DRUGS/DETAIL CODE: Mod: JZ | Performed by: STUDENT IN AN ORGANIZED HEALTH CARE EDUCATION/TRAINING PROGRAM

## 2023-10-20 PROCEDURE — 96375 TX/PRO/DX INJ NEW DRUG ADDON: CPT | Mod: 59 | Performed by: INTERNAL MEDICINE

## 2023-10-20 PROCEDURE — 99233 SBSQ HOSP IP/OBS HIGH 50: CPT | Performed by: INTERNAL MEDICINE

## 2023-10-20 PROCEDURE — 93005 ELECTROCARDIOGRAM TRACING: CPT | Performed by: INTERNAL MEDICINE

## 2023-10-20 PROCEDURE — 80048 BASIC METABOLIC PNL TOTAL CA: CPT | Performed by: NURSE PRACTITIONER

## 2023-10-20 PROCEDURE — 99222 1ST HOSP IP/OBS MODERATE 55: CPT | Performed by: INTERNAL MEDICINE

## 2023-10-20 PROCEDURE — 25000000 HC PHARMACY GENERAL: Performed by: NURSE PRACTITIONER

## 2023-10-20 PROCEDURE — 83735 ASSAY OF MAGNESIUM: CPT | Performed by: NURSE PRACTITIONER

## 2023-10-20 RX ORDER — METOPROLOL TARTRATE 1 MG/ML
5 INJECTION, SOLUTION INTRAVENOUS ONCE
Status: COMPLETED | OUTPATIENT
Start: 2023-10-20 | End: 2023-10-20

## 2023-10-20 RX ORDER — DILTIAZEM HYDROCHLORIDE 5 MG/ML
0.25 INJECTION INTRAVENOUS ONCE
Status: COMPLETED | OUTPATIENT
Start: 2023-10-20 | End: 2023-10-20

## 2023-10-20 RX ORDER — SODIUM CHLORIDE, SODIUM LACTATE, POTASSIUM CHLORIDE, CALCIUM CHLORIDE 600; 310; 30; 20 MG/100ML; MG/100ML; MG/100ML; MG/100ML
INJECTION, SOLUTION INTRAVENOUS CONTINUOUS
Status: ACTIVE | OUTPATIENT
Start: 2023-10-20 | End: 2023-10-21

## 2023-10-20 RX ADMIN — SODIUM CHLORIDE, POTASSIUM CHLORIDE, SODIUM LACTATE AND CALCIUM CHLORIDE 500 ML: 600; 310; 30; 20 INJECTION, SOLUTION INTRAVENOUS at 05:53

## 2023-10-20 RX ADMIN — METOPROLOL TARTRATE 5 MG: 5 INJECTION, SOLUTION INTRAVENOUS at 06:15

## 2023-10-20 RX ADMIN — MULTIPLE VITAMINS W/ MINERALS TAB 1 TABLET: TAB at 09:55

## 2023-10-20 RX ADMIN — PREGABALIN 50 MG: 25 CAPSULE ORAL at 09:54

## 2023-10-20 RX ADMIN — DULOXETINE HYDROCHLORIDE 60 MG: 30 CAPSULE, DELAYED RELEASE ORAL at 20:57

## 2023-10-20 RX ADMIN — Medication 1000 UNITS: at 09:54

## 2023-10-20 RX ADMIN — DILTIAZEM HYDROCHLORIDE 12.75 MG: 5 INJECTION INTRAVENOUS at 05:13

## 2023-10-20 RX ADMIN — SODIUM CHLORIDE, POTASSIUM CHLORIDE, SODIUM LACTATE AND CALCIUM CHLORIDE 500 ML: 600; 310; 30; 20 INJECTION, SOLUTION INTRAVENOUS at 08:05

## 2023-10-20 RX ADMIN — PREGABALIN 25 MG: 25 CAPSULE ORAL at 13:37

## 2023-10-20 RX ADMIN — LORAZEPAM 0.5 MG: 0.5 TABLET ORAL at 22:22

## 2023-10-20 RX ADMIN — SODIUM CHLORIDE, POTASSIUM CHLORIDE, SODIUM LACTATE AND CALCIUM CHLORIDE: 600; 310; 30; 20 INJECTION, SOLUTION INTRAVENOUS at 17:10

## 2023-10-20 RX ADMIN — LIDOCAINE 1 PATCH: 4 PATCH TOPICAL at 09:55

## 2023-10-20 RX ADMIN — TRAMADOL HYDROCHLORIDE 50 MG: 50 TABLET, COATED ORAL at 20:57

## 2023-10-20 RX ADMIN — MOMETASONE FUROATE AND FORMOTEROL FUMARATE DIHYDRATE 2 PUFF: 100; 5 AEROSOL RESPIRATORY (INHALATION) at 19:42

## 2023-10-20 RX ADMIN — DILTIAZEM HYDROCHLORIDE 300 MG: 300 CAPSULE, EXTENDED RELEASE ORAL at 06:24

## 2023-10-20 RX ADMIN — MOMETASONE FUROATE AND FORMOTEROL FUMARATE DIHYDRATE 2 PUFF: 100; 5 AEROSOL RESPIRATORY (INHALATION) at 09:56

## 2023-10-20 RX ADMIN — PREGABALIN 50 MG: 25 CAPSULE ORAL at 19:36

## 2023-10-20 RX ADMIN — AZITHROMYCIN 250 MG: 250 TABLET, FILM COATED ORAL at 09:54

## 2023-10-20 RX ADMIN — TIOTROPIUM BROMIDE INHALATION SPRAY 2 PUFF: 3.12 SPRAY, METERED RESPIRATORY (INHALATION) at 09:56

## 2023-10-20 RX ADMIN — SODIUM CHLORIDE, POTASSIUM CHLORIDE, SODIUM LACTATE AND CALCIUM CHLORIDE 500 ML: 600; 310; 30; 20 INJECTION, SOLUTION INTRAVENOUS at 12:27

## 2023-10-20 ASSESSMENT — ENCOUNTER SYMPTOMS
PSYCHIATRIC NEGATIVE: 1
GASTROINTESTINAL NEGATIVE: 1
SHORTNESS OF BREATH: 0
EYES NEGATIVE: 1
COUGH: 0
PALPITATIONS: 0
HEMATOLOGIC/LYMPHATIC NEGATIVE: 1
MUSCULOSKELETAL NEGATIVE: 1
RESPIRATORY NEGATIVE: 1
CONSTITUTIONAL NEGATIVE: 1
ENDOCRINE NEGATIVE: 1
NEUROLOGICAL NEGATIVE: 1
ALLERGIC/IMMUNOLOGIC NEGATIVE: 1
CARDIOVASCULAR NEGATIVE: 1

## 2023-10-20 ASSESSMENT — COGNITIVE AND FUNCTIONAL STATUS - GENERAL
WALKING IN HOSPITAL ROOM: 3 - A LITTLE
HELP NEEDED FOR BATHING: 2 - A LOT
DRESSING REGULAR UPPER BODY CLOTHING: 3 - A LITTLE
MOVING TO AND FROM BED TO CHAIR: 3 - A LITTLE
AFFECT: CONFUSED;ANXIOUS
MOVING TO AND FROM BED TO CHAIR: 3 - A LITTLE
EATING MEALS: 4 - NONE
WALKING IN HOSPITAL ROOM: 3 - A LITTLE
WALKING IN HOSPITAL ROOM: 3 - A LITTLE
MOVING TO AND FROM BED TO CHAIR: 3 - A LITTLE
HELP NEEDED FOR PERSONAL GROOMING: 3 - A LITTLE
TOILETING: 2 - A LOT
STANDING UP FROM CHAIR USING ARMS: 3 - A LITTLE
CLIMB 3 TO 5 STEPS WITH RAILING: 3 - A LITTLE
CLIMB 3 TO 5 STEPS WITH RAILING: 3 - A LITTLE
STANDING UP FROM CHAIR USING ARMS: 3 - A LITTLE
STANDING UP FROM CHAIR USING ARMS: 3 - A LITTLE
CLIMB 3 TO 5 STEPS WITH RAILING: 3 - A LITTLE
DRESSING REGULAR LOWER BODY CLOTHING: 2 - A LOT
AFFECT: CONFUSED;ANXIOUS

## 2023-10-20 NOTE — PLAN OF CARE
Problem: Adult Inpatient Plan of Care  Goal: Plan of Care Review  Outcome: Progressing  Flowsheets (Taken 10/20/2023 1499)  Progress: improving  Outcome Evaluation: PT eval completed. Rec d/c to SNF  Plan of Care Reviewed With: patient     Problem: Mobility Impairment  Goal: Optimal Mobility  Outcome: Progressing

## 2023-10-20 NOTE — ASSESSMENT & PLAN NOTE
In regards to her fall her EKG shows a right bundle branch block and her atrial fibrillation conducts as high as 140 bpm.  Her conduction system seems quite healthy and her supraventricular arrhythmias are unlikely to have caused her fall.  She is a poor historian, has dementia, consumes alcohol, and Ativan.  She has a recent echo that is relatively normal.  There is not a need for loop recorder implantation at this point in time.

## 2023-10-20 NOTE — PROGRESS NOTES
Physical Therapy -  Initial Evaluation     Patient: Madelyn Ruiz  [facility] [place] [address] [facility]  MRN: [id number]  Today's date: 10/20/2023    HISTORY OF PRESENT ILLNESS     Madelyn is a 89 y.o. female admitted on 10/19/2023 with Atrial flutter (CMS/HCC) [I48.92]  Closed head injury, initial encounter [S09.90XA]  Fall, initial encounter [W19.XXXA]  Ambulatory dysfunction [R26.2]  Acute on chronic anemia [D64.9]. Principal problem is Fall.    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), Allergic rhinitis (04/02/2018), Asthma, Atrial flutter with rapid ventricular response (CMS/HCC) (02/21/2023), Calcification of aorta (CMS/HCC) (06/29/2020), Chronic obstructive pulmonary disease (CMS/HCC) (04/02/2018), GERD (gastroesophageal reflux disease) (06/08/2019), Insomnia (04/02/2018), Lumbar spinal stenosis (04/02/2018), Macular degeneration disease, Obstructive sleep apnea syndrome (04/02/2018), Osteoporosis (04/02/2018), Primary hypertension (04/02/2018), Pulmonary embolism (CMS/HCC) (06/09/2019), SVT (supraventricular tachycardia) (CMS/[facility]), and Vertebral compression fracture (CMS/[facility]).    History of Present Illness   89 y/o h/o COPD, A flutter (apixaban), STACIA, SVT who fell. Doesn't remember the fall, though she mentions the rug (unclear if this was involved). Found down at home. Multiple abrasions and bruising noted over body.     CT head  Axial images:  Skull base: Normal.  C1-2: Normal.  C2-3: Disc osteophyte complex with exuberant left facet hypertrophy and  moderate uncovertebral hypertrophic changes bilaterally, contributing to  moderate left foraminal narrowing, mild canal stenosis.  C3-4: Exuberant right facet hypertrophy, moderate right uncovertebral  hypertrophic changes, contributing to severe right foraminal narrowing.  C4-5: Disc osteophyte complex with exuberant right and severe left  facet  hypertrophy, mild uncovertebral hypertrophic changes bilaterally, contributing  to moderate to severe right and moderate left foraminal narrowing.  C5-6: Disc osteophyte complex with severe right facet hypertrophy, severe  right uncovertebral hypertrophic changes, contributing to moderate to severe  right foraminal narrowing.  C6-7: Exuberant right and severe left facet hypertrophy, contributing to  moderate right and mild left foraminal narrowing.  C7-T1: Normal.    CT cervical   Findings:  Sulci, ventricles and basal cisterns are within normal limits for  patient's age. Scattered periventricular subcortical white matter  hypoattenuation, nonspecific, likely sequela microangiopathic disease. Prominent  perivascular space of the right basal ganglia. No acute hemorrhage, acute  territorial infarct, or mass effect is seen.  There is no extra-axial fluid  collection.  The visualized paranasal sinuses demonstrates complete  opacification with inspissated secretions of the left maxillary sinus and  similarly in the left sphenoid sinus. Bilateral ocular lens implants. Mastoid  air cells are clear. Left suboccipital scalp hematoma.           PRIOR LEVEL OF FUNCTION AND LIVING ENVIRONMENT     Prior Level of Function    Flowsheet Row Most Recent Value   Ambulation assistive equipment   Transferring independent   Toileting independent   Bathing independent   Dressing independent   Eating independent   IADLs independent   Driving/Transportation    Communication understands/communicates without difficulty   Prior Level of Function Comment reports being IND at home son and daughter in law help during the day and per EMR she has private aids at night   Assistive Device Currently Used at Home cane, quad, walker, front-wheeled, grab bar        Prior Living Environment    Flowsheet Row Most Recent Value   People in Home alone   Current Living Arrangements other (see comments)  [[facility]]   Living Environment  Comment lives in a 3 story condo w 3 DANNY        VITALS AND PAIN     PT Vitals    Date/Time Pulse HR Source SpO2 Pt Activity O2 Therapy BP Pt Position Observations [facility]   10/20/23 0927 104 Monitor 97 % At rest None (Room air) 94/60 Lying -- NB   10/20/23 0931 -- -- -- -- -- 74/57 Sitting -- NB   10/20/23 0934 128 Monitor -- -- -- 89/65 Sitting s/p ther ex NB   10/20/23 0940 122 Monitor -- -- -- 67/47 Sitting s/p STS NB   10/20/23 0942 141 Monitor -- -- -- 74/54 Sitting -- NB   10/20/23 0945 112 Monitor 97 % At rest None (Room air) 100/53 Lying placed in trendelenburg NB           Objective   OBJECTIVE     Start time:  0927  End time:  0950  Session Length: 23 min  Mode of Treatment: co-treatment, physical therapy    General Observations  Patient received supine, in bed. She was agreeable to therapy, no issues or concerns identified by nurse prior to session.      Precautions: fall              PT Eval and Treat - 10/20/23 0930        Cognition    Orientation Status oriented to;person;place;situation     Affect/Mental Status confused;anxious     Follows Commands WFL     Cognitive Function executive function deficit;safety deficit     Executive Function Deficit minimal deficit;insight/awareness of deficits;planning/decision-making;problem-solving/reasoning;judgment     Safety Deficit moderate deficit;judgment;insight into deficits/self-awareness;awareness of need for assistance     Comment, Cognition pleasant and slightly confused, needed cues for orientation. [place] dec insight to deficits.  During tx pt with ++ orthostatic BP drop and symptomatic, pt continues to ask to get OOB to walk to BR despite max education on safety precautions surround orthostatic BP drops     Cognitive Interventions/Strategies external compensatory strategy training        Vision Assessment/Intervention    Vision Assessment corrective lenses for reading        Hearing Assessment    Hearing Status WFL        Sensory Assessment    Sensory  Assessment sensation intact, lower extremities        Lower Extremity Assessment    LE Assessment ROM and Strength WFL        Bed Mobility    Bed Mobility Activities supine to sit;sit to supine     [place] minimum assist (75% or more patient effort);1 person assist     Safety/Cues minimal;verbal cues;technique     Assistive Device bed rails;head of bed elevated     Comment OOB to R with Coreen for upright trunk. Sitting EOB BP drop noted to 74/57 after LE ther ex came up to 89/60 pt symptomatic        Sit/Stand Transfer    Surface edge of bed     [place] minimum assist (75% or more patient effort);1 person assist     Safety/Cues minimal;verbal cues;hand placement     Assistive Device walker, front-wheeled     Transfer Comments from EOB standing for ~30 seconds before stating symptoms worsened, unable to maintain standing for standing BP. After returning to sitting EOB BP drop noted to 67/46. Instructed on LE ther ex, BP slightly improved to 74/54. Pt returned to supine and placed in trendelenburg position        Gait Training    [place], Gait not tested     Comment (Gait/Stairs) unable to assess 2* ++ BP drop        Balance    Static Sitting Balance WFL     Dynamic Sitting Balance WFL     Sit to Stand Dynamic Balance mild impairment     Static Standing Balance mild impairment     Dynamic Standing Balance unable to perform activity        Impairments/Safety Issues    Impairments Affecting Function balance;endurance/activity tolerance;strength;pain;cognition     Cognitive Impairments, Safety/Performance awareness, need for assistance;judgment;problem-solving/reasoning     Functional Endurance significantly limited by ++ orthostatic BP     Safety Issues Affecting Function impulsivity;judgment;insight into deficits/self-awareness                               Education Documentation  Activity, taught by Nimisha Womack, PT at 10/20/2023  1:44 PM.  Learner: Patient  Readiness: Acceptance  Method:  Explanation  Response: Needs Reinforcement  Comment: Role of PT, POC, orthostatic BP, safe mobility        Session Outcome  Patient in bed, supine at end of session, bed alarm on, all needs met, call light in reach, personal items in reach. Nursing notified about patient's response to therapy/activity, change in vital signs, patient's performance, and patient's position.    AM-PAC™ - Mobility (Current Function)     Turning form your back to your side while in flat bed without using bedrails 3 - A Little   Moving from lying on your back to sitting on the side of a flat bed without using bedrails 3 - A Little   Moving to and from a bed to a chair 3 - A Little   Standing up from a chair using your arms 3 - A Little   To walk in a hospital room 3 - A Little   Climbing 3-5 steps with a railing 3 - A Little   AM-PAC™ Mobility Score 18      ASSESSMENT AND PLAN     Progress Summary  PT eval completed. Pt recieved in bed. pt performed bed mobility to come to sitting reported dizziness upon sitting up pt laying 98/65 initially upon sitting 74/57 after ankle pumps came up to 89/60 pt sytomatic throughout. Pt performed 1x STS pt reported more dizziness upon standing BP taken at 67/46 pt laid back in trendelenburg BP came up to 100/55. Nursing made aware. due to limitations in BP, functional endurence and balance PT to rec SNF to maximize functional abilities.         PT Plan    Flowsheet Row Most Recent Value   Rehab Potential good, to achieve stated therapy goals at 10/20/2023 0930   Therapy Frequency 4 times/wk at 10/20/2023 0930   Planned Therapy Interventions balance training, home exercise program, bed mobility training, gait training, strengthening, transfer training, stair training, postural re-education at 10/20/2023 0930          PT Discharge Recommendations    Flowsheet Row Most Recent Value   PT Recommended Discharge Disposition skilled nursing facility at 10/20/2023 0930   Anticipated Equipment Needs if Discharged  Home (PT) none at 10/20/2023 0930               PT Goals    Flowsheet Row Most Recent Value   Bed Mobility Goal 1    Activity/Assistive Device bed mobility activities, all at 10/20/2023 0930   [place] independent at 10/20/2023 0930   Time Frame short-term goal (STG) at 10/20/2023 0930   Progress/Outcome goal ongoing at 10/20/2023 0930   Transfer Goal 1    Activity/Assistive Device all transfers, walker, front-wheeled at 10/20/2023 0930   [place] modified independence at 10/20/2023 0930   Time Frame short-term goal (STG) at 10/20/2023 0930   Progress/Outcome goal ongoing at 10/20/2023 0930   Gait Training Goal 1    Activity/Assistive Device gait (walking locomotion), walker, front-wheeled at 10/20/2023 0930   [place] modified independence at 10/20/2023 0930   Distance 100 at 10/20/2023 0930   Time Frame short-term goal (STG) at 10/20/2023 0930   Progress/Outcome goal ongoing at 10/20/2023 0930

## 2023-10-20 NOTE — UM PHYSICIAN REVIEW NOTE
Patient admitted yesterday with afib, remains with uncontrolled rates, soft BP, EP following.  Hgb dropped.  Care will cross second midnight.  Inpatient is appropriate.

## 2023-10-20 NOTE — HOSPITAL COURSE
Madelyn is a 89 y.o. female admitted on 10/19/2023 with Atrial flutter (CMS/[facility]) [I48.92]  Closed head injury, initial encounter [S09.90XA]  Fall, initial encounter [W19.XXXA]  Ambulatory dysfunction [R26.2]  Acute on chronic anemia [D64.9]. Principal problem is Fall.    Past Medical History  Madelyn has a past medical history of Allergic bronchopulmonary aspergillosis (CMS/[facility]) (04/02/2018), Allergic rhinitis (04/02/2018), Asthma, Atrial flutter with rapid ventricular response (CMS/[facility]) (02/21/2023), Calcification of aorta (CMS/[facility]) (06/29/2020), Chronic obstructive pulmonary disease (CMS/[facility]) (04/02/2018), GERD (gastroesophageal reflux disease) (06/08/2019), Insomnia (04/02/2018), Lumbar spinal stenosis (04/02/2018), Macular degeneration disease, Obstructive sleep apnea syndrome (04/02/2018), Osteoporosis (04/02/2018), Primary hypertension (04/02/2018), Pulmonary embolism (CMS/[facility]) (06/09/2019), SVT (supraventricular tachycardia) (CMS/[facility]), and Vertebral compression fracture (CMS/[facility]).    History of Present Illness   10/19: Admitted s/p fall.  Doesn't remember the fall, though she mentions the rug (unclear if this was involved). Found down at home. Multiple abrasions and bruising noted over body.   - CT Head and C-spine No acute intracranial abnormality.  No evidence for acute fracture or traumatic subluxation of the cervical spine.      10/23: RRT called. HR noted to in the 170s-160. Sustained. Pt asymptomatic BP -180/89. Spo2-95% EKG done

## 2023-10-20 NOTE — ASSESSMENT & PLAN NOTE
83-year-old female with known history of atrial fibrillation and atrial flutter followed by Dr. Valentino.  She is chronically on Eliquis 2.5 mg daily as well as diltiazem 300 mg daily.  She comes into the hospital status post fall at home.  She is a poor historian and not able to provide many details.  However she admits to drinking vodka 3 days/week plus mixing with Ativan at times to treat her anxiety.    On telemetry she has episodes of atrial fibrillation and atrial flutter that terminate on their own.  Her anticoagulation is being held due to her anemia and drop in hemoglobin status post fall.  She does have a scalp laceration.    Avoid digoxin.  Elderly lady with Cr 0.8 may have narrow therapuetic range for dig level  Heart rate remains elevated on Cardizem 360 mg daily.  To consider low-dose metoprolol titrate 25 mg every 8 hours; Hold Losartan for BP room  Anticoagulation has been on hold with anemia.  Patient has history of multiple mechanical fall.  She also has habit of using alcohol and lorazepam.    She may not be candidate for chronic anticoagulation unless patient will be discharged to a monitored setting.

## 2023-10-20 NOTE — PLAN OF CARE
Problem: Adult Inpatient Plan of Care  Goal: Plan of Care Review  Outcome: Not progressing  Flowsheets (Taken 10/20/2023 3852)  Progress: no change  Outcome Evaluation: Pt in SVT. BPs running low, fluid bolus given. PT/OT evaluated pt, orthostatic hypotension and dizziness/lightheadedness present, SNF recommended for d/c.  Plan of Care Reviewed With: patient  Goal: Patient-Specific Goal (Individualized)  Outcome: Not progressing  Goal: Absence of Hospital-Acquired Illness or Injury  Outcome: Not progressing  Goal: Optimal Comfort and Wellbeing  Outcome: Not progressing  Goal: Readiness for Transition of Care  Outcome: Not progressing     Problem: Fall Injury Risk  Goal: Absence of Fall and Fall-Related Injury  Outcome: Not progressing     Problem: Self-Care Deficit  Goal: Improved Ability to Complete Activities of Daily Living  Outcome: Not progressing     Problem: Mobility Impairment  Goal: Optimal Mobility  Outcome: Not progressing   Plan of Care Review  Plan of Care Reviewed With: patient  Progress: no change  Outcome Evaluation: Pt in SVT. BPs running low, fluid bolus given. PT/OT evaluated pt, orthostatic hypotension and dizziness/lightheadedness present, SNF recommended for d/c.

## 2023-10-20 NOTE — PLAN OF CARE
Care Coordination Discharge Plan Note     Discharge Needs Assessment  Concerns to be Addressed: discharge planning, care coordination/care conferences  Current Discharge Risk: dependent with mobility/activities of daily living    Anticipated Discharge Plan  Anticipated Discharge Disposition: skilled nursing facility  Type of Skilled Nursing Care Services: PT, OT, nursing      Patient Choice  Offered/Gave Vendor List: yes  Patient's Choice of Community Agency(s): [facility] or [facility]    Patient and/or patient guardian/advocate was made aware of their right to choose a provider. A list of eligible providers was presented and reviewed with the patient and/or patient guardian/advocate in written and/or verbal form. The list delineates providers in the patient’s desired geographic area who are participating in the Medicare program and/or providers contracted with the patient’s primary insurance. The Medicare list and quality ratings were obtained from the Medicare.gov [medicare.gov] website.    ---------------------------------------------------------------------------------------------------------------------    Interdisciplinary Discharge Plan Review:    Concerns Comments: Pt is medically stable for d/c over the weekend. PT/OT rec'd SNF. LAMIN followed up with Pt and daughter (Shelley: [phone]), who are agreeable with SNF. Pt's daughter states Pt was recently at [facility] and d/c home on 9/17. She prefers placement at [facility] (first choice) or [facility] (second choice). LAMIN spoke with Luann at [facility] ([phone]), who confirmed Pt was there from 8/24-9/17, so first 20 days of Medicare SNF coverage have been utilized. Luann confirmed there would now be a copay for SNF starting day 21, but states Pt's [employer] secondary insurance would cover this. LAMIN to send referrals to [facility] and [facility].    Discharge Plan:   Disposition/Destination: Skilled Nursing Facility - Other  / Skilled Nursing Facility  Discharge  Facility:    Community Resources:      Discharge Transportation:  Is Out of Hospital DNR needed at Discharge:    Does patient need discharge transport? Yes

## 2023-10-20 NOTE — PROGRESS NOTES
Hospital Medicine Service -  Daily Progress Note       SUBJECTIVE   Interval History: Patient seen and examined at bedside with no acute complaints this morning.     Overnight, patient with afib RVR for which she received Lopressor 5mg  IV and diltiazem 12.5 mg IV push.  This morning the patient looks to be in sinus tachycardia/SVT with HR in the 130s.  She denies any symptoms include chest pain, shortness of breath, or palpitations.   OBJECTIVE      Vital signs in last 24 hours:  Temp:  [36.2 °C (97.2 °F)-37.1 °C (98.7 °F)] 36.8 °C (98.3 °F)  Heart Rate:  [] 129  Resp:  [16-21] 20  BP: (116-199)/(61-97) 116/76    Intake/Output Summary (Last 24 hours) at 10/20/2023 0951  Last data filed at 10/20/2023 0806  Gross per 24 hour   Intake 1010 ml   Output 950 ml   Net 60 ml     Weights (last 7 days)     Date/Time Weight    10/19/23 1900 51.3 kg (113 lb)    10/19/23 1316 53.1 kg (117 lb)          PHYSICAL EXAMINATION      Physical Exam  Constitutional:       General: She is not in acute distress.  HENT:      Head:      Comments: Scalp laceration, no active bleeding      Mouth/Throat:      Mouth: Mucous membranes are dry.   Cardiovascular:      Rate and Rhythm: Regular rhythm. Tachycardia present.      Pulses: Normal pulses.      Heart sounds: Normal heart sounds.   Pulmonary:      Effort: Pulmonary effort is normal. No respiratory distress.      Breath sounds: Normal breath sounds. No wheezing.   Abdominal:      General: Abdomen is flat. There is no distension.      Palpations: Abdomen is soft.      Tenderness: There is no abdominal tenderness.   Musculoskeletal:         General: No swelling.      Right lower leg: No edema.      Left lower leg: No edema.   Skin:     Findings: Bruising present.      Comments: Diffuse ecchymoses throughout b/l upper and lower extremities    Neurological:      General: No focal deficit present.      Mental Status: She is alert and oriented to person, place, and time.               LINES, CATHETERS, DRAINS, AIRWAYS, AND WOUNDS   Lines, Drains, and Airways:  Wounds (agree with documentation and present on admission):  Peripheral IV (Adult) 10/19/23 Left Antecubital (Active)   Number of days: 1         Comments:      LABS / IMAGING / TELE      Labs  Results from last 7 days   Lab Units 10/20/23  0502   SODIUM mEQ/L 141   POTASSIUM mEQ/L 3.8   CHLORIDE mEQ/L 109*   CO2 mEQ/L 21   CREATININE mg/dL 0.5*   BUN mg/dL 13     Results from last 7 days   Lab Units 10/20/23  0502   WBC K/uL 7.24   HEMOGLOBIN g/dL 8.4*   HEMATOCRIT % 25.8*   PLATELETS K/uL 191     Lab Results   Component Value Date    CKTOTAL 250 (H) 10/19/2023    TROPONINI <0.02 06/08/2019       Imaging  No results found for this or any previous visit.    CT HEAD WITHOUT IV CONTRAST, CT CERVICAL SPINE WITHOUT IV CONTRAST  Narrative: STUDY:  CT of the Brain and cervical spine without contrast    CLINICAL HISTORY: Trauma.  Impression: IMPRESSION:  No acute intracranial abnormality. No evidence for acute fracture  or traumatic subluxation of the cervical spine. Marked multilevel degenerative  spondylosis, with several levels of severe right-sided foraminal narrowing.    COMMENT: A standard noncontrast head CT was performed. Noncontrast CT  examination of the cervical spine performed following the standard protocol.  Sagittal and coronal reformations rendered from axial source images. Images  reviewed in bone and soft tissue windows.    CT DOSE:  One or more dose reduction techniques (e.g. automated exposure  control, adjustment of the mA and/or kV according to patient size, use of  iterative reconstruction technique) utilized for this examination.    Comparison:  Head and cervical spine CT dated August 21, 2023.    Findings:  Sulci, ventricles and basal cisterns are within normal limits for  patient's age. Scattered periventricular subcortical white matter  hypoattenuation, nonspecific, likely sequela microangiopathic disease.  Prominent  perivascular space of the right basal ganglia. No acute hemorrhage, acute  territorial infarct, or mass effect is seen.  There is no extra-axial fluid  collection.  The visualized paranasal sinuses demonstrates complete  opacification with inspissated secretions of the left maxillary sinus and  similarly in the left sphenoid sinus. Bilateral ocular lens implants. Mastoid  air cells are clear. Left suboccipital scalp hematoma.    Cervicothoracic alignment: Mild curvature of the cervical spine to the left.  Normal cervical lordosis.  Prevertebral soft tissues: Normal in thickness.  Vertebral bodies: Normal in height.  Intervertebral discs: Disc osteophyte complex at C2-3 and C5-6.  Cervical and upper thoracic spinal canal: Patent.    Axial images:  Skull base: Normal.  C1-2: Normal.  C2-3: Disc osteophyte complex with exuberant left facet hypertrophy and  moderate uncovertebral hypertrophic changes bilaterally, contributing to  moderate left foraminal narrowing, mild canal stenosis.  C3-4: Exuberant right facet hypertrophy, moderate right uncovertebral  hypertrophic changes, contributing to severe right foraminal narrowing.  C4-5: Disc osteophyte complex with exuberant right and severe left facet  hypertrophy, mild uncovertebral hypertrophic changes bilaterally, contributing  to moderate to severe right and moderate left foraminal narrowing.  C5-6: Disc osteophyte complex with severe right facet hypertrophy, severe  right uncovertebral hypertrophic changes, contributing to moderate to severe  right foraminal narrowing.  C6-7: Exuberant right and severe left facet hypertrophy, contributing to  moderate right and mild left foraminal narrowing.  C7-T1: Normal.    Other: Right apical pleural-parenchymal scarring.  CT ABDOMEN PELVIS WITH IV CONTRAST  Narrative: CLINICAL HISTORY:  Please see associated report same date.    COMMENT:  Please see associated report same date  Impression: IMPRESSION:  Please see  associated report same date  CT CHEST WITH IV CONTRAST  Narrative: CLINICAL HISTORY: Trauma.  Impression: IMPRESSION: No acute trauma related findings within the chest, abdomen, pelvis.  Multiple old bilateral rib fractures.  Healing bilateral sacral wing fractures  since 8/21/2023.  Old pelvic pubic rami fractures.    COMMENT: Computed tomography of the chest, abdomen, pelvis was performed at 3 mm  intervals following intravenous contrast administration only.  A total of 100 cc  of Omnipaque 350 contrast material was administered intravenously.  The current  examination was compared to that dated 8/21/2023.  The mediastinum is intact.  There are no mediastinal masses or fluid collections.    The thoracic aorta and pulmonary arteries are unremarkable.    There is no pericardial effusion.    No pulmonic infiltrates are present to suggest contusion.  Chronic  pleural-parenchymal scarring has remained stable since prior imaging of  8/21/2023.    No new volume loss or pleural effusions have developed.    There is no evidence of pneumothorax.    Old bilateral rib fractures are seen.  No acute rib fractures are demonstrated.    The liver and spleen are intact.  No biliary ductal dilatation is seen.  No  gallbladder calculi are demonstrated.  A 5.60 m diameter cyst within the  inferior segment of the right hepatic lobe is redemonstrated.    The pancreas is unremarkable.    There is no evidence of bowel obstruction or acute inflammatory changes of the  bowel.  No free fluid or fluid collections are present within the abdomen and  pelvis.    There is colonic diverticulosis without evidence of diverticulitis.    Both kidneys are intact with normal nephrograms and contrast excretion.  There  are no perinephric fluid collections.    Adrenal glands are normal.  The retroperitoneum is unremarkable.    Diffuse calcific atherosclerotic changes of the aortoiliac vessels are present  without evidence of aneurysm or dissection.    No  pelvic masses are seen.    Urinary bladder appears intact, containing no calculi.    There are healing bilateral sacral wing fractures.  No acute pelvic fractures  are seen.  Old healing/healed bilateral pubic ramus fractures are  redemonstrated.    There is no evidence of acute hip fracture.    A superior compression deformity of the T12 vertebral body has remained stable.  No acute thoracolumbar spinal fracture or dislocation is demonstrated.    CT DOSE:  One or more dose reduction techniques (e.g. automated exposure  control, adjustment of the mA and/or kV according to patient size, use of  iterative reconstruction technique) utilized for this examination.      ECG/Telemetry  SVT HR 130s, RBB    ASSESSMENT AND PLAN      A-fib (CMS/[facility])  Assessment & Plan  - Pt with a history of Afib/Aflutter  - Overnight, patient with afib RVR with soft BP for which she received Lopressor 5mg  IV and diltiazem 12.5 mg IV push.  - CHADSVASC: 4 (age, female, HTN)  - EKbpm, Afib with RVR, RBBB  - telemetry: SVT, HR: 120-130sbpm  - s/p IV Diltiazem/ PO Diltiazem 30mg/IV Diltiazem 25mg/Metoprolol 5mg IV  - Home medication: Diltiazem 300mg ad Eliquis 2.5mg bid, reports compliance   PLAN  - Continue home diltiazem  - Holding eliquis for now given fall and scalp laceration/hematoma, likely can restart if hgb stable without recurrence of bleeding.  We will need to discuss resumption of anticoagulation at discharge given history of recent multiple falls.  - Consul to EP         * Fall  Assessment & Plan  -Pt presents after being found down by home PT surrounded in her own blood. Sustained scalp laceration/hematoma.  -CPK 200s. Ethanol negative.  -Unclear etiology for fall, pt cannot recall events. She did have a vodka drink last night, she is also on ativan prn unclear if she mixed. There is question if this was a syncopal event.  -Pt has a history of multiple falls. Recent traumatic fall in August sustaining pelvic/sacral fx. She  lives alone. Daughter lives close by.     -CTAP: No acute trauma related findings within the chest, abdomen, pelvis. Multiple old bilateral rib fractures.  Healing bilateral sacral wing fractures since 8/21/2023.  Old pelvic pubic rami fractures.   -CTH/CT c-spine: No acute intracranial abnormality. No evidence for acute fracture or traumatic subluxation of the cervical spine. Marked multilevel degenerative spondylosis, with several levels of severe right-sided foraminal narrowing.     Plan:  --Monitor on telemetry  --Gentle IVF overnight  --Check orthostatic VS  --Fall precautions  --PT/OT  --TTE Feb 2023: EF 65-70%. No regional wall motion abnormalities. Grade I diastolic dysfunction.  --Could consider repeating TTE +/- EP consult in the AM if etiology remains unclear pending above work up  --She also has a R elbow skin tear from a prior fall POA; consult wound care    Anemia  Assessment & Plan  -Hgb 10.2 > 8.4  (baseline appears to be around 11-12)  -Patient with diffuse ecchymosis throughout bilateral lower and upper extremities. She also has scalp laceration from the fall. No active bleeding on exam  PLAN  -Continue to monitor CBC. Transfuse of Hgb < 7  -Continue to hold Eliquis for now    Leukocytosis  Assessment & Plan  Most likely reactive in the s/o fall  UA bland, no infectious findings on CT CAP  Monitor off abx, repeat cbc    COPD (chronic obstructive pulmonary disease) (CMS/[facility])  Assessment & Plan  No evidence of acute exacerbation  Symbicort is nonformulary - use equivalent in house    Primary hypertension  Assessment & Plan  Continue losartan 50mg/day and diltiazem 300/day    Allergic bronchopulmonary aspergillosis (CMS/[facility])  Assessment & Plan  Allergist Dr. Arriaza  Managed with azithro, flovent       VTE Assessment: Padua    VTE Prophylaxis:  Current anticoagulants:  [Provider Managed Hold] apixaban (ELIQUIS) tablet 2.5 mg, oral, BID      Code Status: Full Code      Estimated Discharge Date:  10/21/2023     Disposition Planning: Pending clinical status, likely discharge within 24 to 48 hours        Jackson Velazquez MD  10/20/2023

## 2023-10-20 NOTE — PLAN OF CARE
Problem: Adult Inpatient Plan of Care  Goal: Plan of Care Review  Outcome: Progressing  Flowsheets (Taken 10/20/2023 1234)  Progress: improving  Outcome Evaluation: OT leoncio completed  Plan of Care Reviewed With: patient     Problem: Self-Care Deficit  Goal: Improved Ability to Complete Activities of Daily Living  Outcome: Progressing

## 2023-10-20 NOTE — CONSULTS
Electrophysiology Consult        Subjective  Patient seen and examined with no acute issues.  She remains in atrial fibrillation with ventricular rates around 130 bpm and soft blood pressure.  She overall feels well and is eating in bed resting comfortably.    Madelyn Ruiz is a 89 y.o. female who was admitted for status post fall at home.  Her fall resulted in a scalp laceration.  She was found down by her daughter.  She lives alone and has a history of frequent falls.  In discussing with her she also takes Ativan for anxiety, consumes vodka 3-4 times a week and has a component of baseline dementia.  She has a past medical history of COPD, atrial flutter followed by Dr. Valentino as an outpatient, obstructive sleep apnea, asthma, GERD, and SVT.  Electrophysiology was asked to see her due to management of her atrial fibrillation, atrial flutter and syncope work-up.    Past Medical History:   Diagnosis Date   • Allergic bronchopulmonary aspergillosis (CMS/[facility]) 04/02/2018    Allergist: Dr. Arriaza.  Stable FEV1 in 8/2015.  IgE levels and eosinophils stable in 8/2015.  Managed with Fasenra, Azithromycin and Flovent.   • Allergic rhinitis 04/02/2018    Pulmonologist: Dr. Walker. Managed with Flonase.   • Asthma    • Atrial flutter with rapid ventricular response (CMS/[facility]) 02/21/2023    Diagnosed in 02/2023 and hospitalized Managed with Apixaban and Diltiazem Echocardiogram 02/2023 •  Left Ventricle: Normal ventricle size. Mild concentric left ventricular hypertrophy. Preserved systolic function. Estimated EF 65-70%. No regional wall motion abnormalities. Grade I diastolic dysfunction. •  Right Ventricle: Normal ventricle size. Normal systolic function. •  Left Atrium: Moderately   • Calcification of aorta (CMS/[facility]) 06/29/2020    Seen incidentally on CT scan.   • Chronic obstructive pulmonary disease (CMS/[facility]) 04/02/2018    Pulmonologist: Dr. Walker.  Seen on PFTs in hospital in 2014.  Controlled with Spiriva  and FLovent.   • GERD (gastroesophageal reflux disease) 06/08/2019    Managed with Esomeprazole.  Should take medication 30 minutes prior to meal.  Advised to avoid caffeine, carbonated beverages, and should not eat within 3 hours of going to sleep.  Advised to reduce BMI < 25.    • Insomnia 04/02/2018    Managed with Lorazepam as needed.   • Lumbar spinal stenosis 04/02/2018    Neurosurgeon: Dr. Miguel. MRI with Central and lateral recess spinal stenosis. Has Spondylolisthesis at L4/L5. S/P epidural injection by Dr. Martin with some relief in past. Had Lumbar fusion and Lumbar laminectomy by Dr. Miguel in 4/2015.   • Macular degeneration disease    • Obstructive sleep apnea syndrome 04/02/2018    Mild STACIA noted in sleep study 7/2015. Treated with nasal CPAP automatic with pressure range 5-10 CM H2Ox couple months. Off now   • Osteoporosis 04/02/2018    Rheumatologist: Dr. Bradley.  Dexa scan 7/2016 with osteoporosis of lumbar spine LS-2.5, LH-2.1, RH -2.2.  Has been on Fosamax in the past for 8 years.  Worsened by Prednisone in past.  Continue vitamin D, calcium and weight bearing exercise. DEXA in 7/2017 with LS -1.7, LH -2.2, and RH -2.0.  Next due in 7/2019.  Seen by Dr. Bradley in 8/2015 who agreed with initiating Prolia. Took for 2 years. N   • Primary hypertension 04/02/2018    Managed on Diltiazem and Losartan. Advised to follow a low sodium diet and keep BMI < 25.  Advised to exercise for 2.5 hours per week.  Instructed to take home blood pressure readings and call if consistently above 140/90.     • Pulmonary embolism (CMS/HCC) 06/09/2019    Hematologist: Dr. Bhakta Diagnosed in 06/2019 in right upper lobe. Vascular ultrasound negative of bilateral lower extremities. Managed on Eliquis. Hypercoagulable workup was negative for beta-2 glycoprotein's, RIGO, Antithrombin III, lupus anticoagulant, protein C activity protein S activity, and prothrombin gene mutation, and factor V Leiden. Now off Apixaban as CT  scan in 12/2019 was without pu   • SVT (supraventricular tachycardia) (CMS/HCC)    • Vertebral compression fracture (CMS/HCC)     At T12 level     Past Surgical History:   Procedure Laterality Date   • APPENDECTOMY     • CARPAL TUNNEL RELEASE Bilateral    • CATARACT EXTRACTION W/  INTRAOCULAR LENS IMPLANT Bilateral    • HYSTERECTOMY  1972   • LUMBAR LAMINECTOMY  04/21/2015    S/P lumbar fusion   • TONSILLECTOMY       Social History     Socioeconomic History   • Marital status:    • Number of children: 5   Occupational History   • Occupation: Retired     Comment: Former Teacher   Tobacco Use   • Smoking status: Never   • Smokeless tobacco: Never   Vaping Use   • Vaping Use: Never used   Substance and Sexual Activity   • Alcohol use: Yes     Comment: Rarely   • Drug use: No   Social History Narrative    Exercise: No formal exercise. Due to back pain from spinal stenosis.     Social Determinants of Health     Financial Resource Strain: Low Risk  (8/22/2023)    Overall Financial Resource Strain (CARDIA)    • Difficulty of Paying Living Expenses: Not hard at all   Food Insecurity: No Food Insecurity (10/19/2023)    Hunger Vital Sign    • Worried About Running Out of Food in the Last Year: Never true    • Ran Out of Food in the Last Year: Never true   Transportation Needs: No Transportation Needs (8/22/2023)    PRAPARE - Transportation    • Lack of Transportation (Medical): No    • Lack of Transportation (Non-Medical): No   Physical Activity: Inactive (10/2/2020)    Exercise Vital Sign    • Days of Exercise per Week: 0 days    • Minutes of Exercise per Session: 0 min   Stress: No Stress Concern Present (2/21/2023)    [employer] [employer] - Occupational Stress Questionnaire    • Feeling of Stress : Not at all   Housing Stability: Low Risk  (8/22/2023)    Housing Stability Vital Sign    • Unable to Pay for Housing in the Last Year: No    • Number of Places Lived in the Last Year: 1    • Unstable  Housing in the Last Year: No     Family History   Problem Relation Age of Onset   • Glaucoma Biological Mother    • Macular degeneration Biological Mother    • Hypertension Biological Mother    • Lung cancer Biological Father    • Heart disease Biological Brother    • Breast cancer Mother's Sister    • No Known Problems Biological Son    • No Known Problems Biological Son    • No Known Problems Biological Daughter    • No Known Problems Biological Daughter    • No Known Problems Biological Daughter      Allergies:  Mepolizumab, Morphine, and Oxycodone    •  acetaminophen, 650 mg, oral, q4h PRN  •  [Provider Managed Hold] apixaban, 2.5 mg, oral, BID  •  azithromycin, 250 mg, oral, Once per day on Mon Wed Fri  •  bisacodyL, 10 mg, rectal, Daily PRN  •  cholecalciferol (vitamin D3), 1,000 Units, oral, Daily  •  glucose, 16-32 g of dextrose, oral, PRN **OR** dextrose, 15-30 g of dextrose, oral, PRN **OR** glucagon, 1 mg, intramuscular, PRN **OR** dextrose 50 % in water (D50), 25 mL, intravenous, PRN  •  dilTIAZem CD, 300 mg, oral, Daily  •  docusate sodium, 100 mg, oral, 2x daily PRN  •  DULoxetine, 60 mg, oral, Nightly  •  furosemide, 20 mg, oral, Every other day  •  lactated ringer's, 500 mL, intravenous, Once  •  lidocaine, 1 patch, Topical, Daily  •  LORazepam, 0.5 mg, oral, Nightly PRN  •  losartan, 50 mg, oral, Nightly  •  mometasone-formoterol, 2 puff, inhalation, BID (8a, 8p)  •  multivitamin, 1 tablet, oral, Daily  •  pregabalin, 25 mg, oral, Daily with lunch  •  pregabalin, 50 mg, oral, BID  •  senna, 1 tablet, oral, 2x daily PRN  •  tiotropium bromide, 2 puff, inhalation, Daily  •  traMADoL, 50 mg, oral, 3x daily PRN    Review of Systems   Constitutional: Negative.    HENT: Negative.    Eyes: Negative.    Respiratory: Negative.  Negative for cough and shortness of breath.    Cardiovascular: Negative.  Negative for chest pain, palpitations and leg swelling.   Gastrointestinal: Negative.    Endocrine: Negative.     Genitourinary: Negative.    Musculoskeletal: Negative.    Skin: Negative.    Allergic/Immunologic: Negative.    Neurological: Negative.    Hematological: Negative.    Psychiatric/Behavioral: Negative.        Objective   Visit Vitals  BP 96/63 (BP Location: Left upper arm, Patient Position: Lying)   Pulse (!) 106   Temp 36.4 °C (97.5 °F) (Oral)   Resp 16   Ht 1.524 m (5')   Wt 51.3 kg (113 lb)   SpO2 98%   BMI 22.07 kg/m²     Physical Exam  Vitals and nursing note reviewed.   Constitutional:       Appearance: She is well-developed.   HENT:      Head: Normocephalic and atraumatic.   Eyes:      Conjunctiva/sclera: Conjunctivae normal.      Pupils: Pupils are equal, round, and reactive to light.   Cardiovascular:      Rate and Rhythm: Normal rate and regular rhythm.      Pulses: Normal pulses and intact distal pulses.      Heart sounds: Normal heart sounds.   Pulmonary:      Effort: Pulmonary effort is normal.      Breath sounds: Normal breath sounds.   Abdominal:      General: Bowel sounds are normal.      Palpations: Abdomen is soft.   Musculoskeletal:         General: Normal range of motion.      Cervical back: Normal range of motion and neck supple.   Skin:     General: Skin is warm and dry.   Neurological:      General: No focal deficit present.      Mental Status: She is alert and oriented to person, place, and time.   Psychiatric:         Behavior: Behavior normal.         Thought Content: Thought content normal.         Judgment: Judgment normal.         Labs   Results from last 7 days   Lab Units 10/20/23  0502 10/19/23  1328   WBC K/uL 7.24 12.50*   HEMOGLOBIN g/dL 8.4* 10.2*   HEMATOCRIT % 25.8* 31.3*   PLATELETS K/uL 191 251     0   Lab Value Date/Time    INR 1.0 10/19/2023 1328    INR 1.1 08/21/2023 1857    INR 0.9 05/02/2016 2058    INR 0.9 04/08/2015 1427     0   Lab Value Date/Time    TROPONINI <0.02 06/08/2019 1714    TROPONINI  05/02/2016 2058     <0.02  A rise or fall of troponin with at least one  abnormal value is consistent with myocardial injury or necrosis in the presence of appropriate clinical, ECG, and/or imaging abnormalities.       Results from last 7 days   Lab Units 10/20/23  0502 10/19/23  1328   SODIUM mEQ/L 141 144   POTASSIUM mEQ/L 3.8 4.0   CHLORIDE mEQ/L 109* 110*   CO2 mEQ/L 21 26   BUN mg/dL 13 27*   CREATININE mg/dL 0.5* 0.7   CALCIUM mg/dL 7.8* 9.6   ALBUMIN g/dL  --  4.0   BILIRUBIN TOTAL mg/dL  --  0.5   ALK PHOS IU/L  --  56   ALT IU/L  --  17   AST IU/L  --  17   GLUCOSE mg/dL 78 112*     Results from last 7 days   Lab Units 10/19/23  1328   TSH mIU/L 3.63       ECG: Sinus rhythm with right bundle branch block    Telemetry: Atrial fibrillation and atrial flutter as well as sinus rhythm with right bundle branch block    Cardiac Imaging    TRANSTHORACIC ECHO (TTE) COMPLETE 02/22/2023    Interpretation Summary  •  Left Ventricle: Normal ventricle size. Mild concentric left ventricular hypertrophy. Preserved systolic function. Estimated EF 65-70%. No regional wall motion abnormalities. Grade I diastolic dysfunction.  •  Right Ventricle: Normal ventricle size. Normal systolic function.  •  Left Atrium: Moderately dilated atrium.  •  Right Atrium: Mildly dilated atrium.  •  Aortic Valve: Tricuspid valve.  Sclerotic leaflets. No regurgitation. No stenosis. Calculated dimensionless index = 1.04.  •  Mitral Valve: Anterior leaflet thickening. Sclerotic mitral valve. Normal leaflet motion. Mild mitral annular calcification. Mild regurgitation. No stenosis. Mean gradient = 2.00.  •  Tricuspid Valve: Normal structure. Mild regurgitation. Estimated RVSP = 32 mmHg. No significant stenosis.  •  Prior Study: Prior study available for comparison. Prior study date: 6/11/2019.  Compared to the prior study, biatrial enlargement and mild concentric LVH are now present.  Otherwise no significant change.      Additional cardiac testing: As listed    A-fib (CMS/[facility])  Assessment & Plan  83-year-old female  with known history of atrial fibrillation and atrial flutter followed by Dr. Valentino.  She is chronically on Eliquis 2.5 mg daily as well as diltiazem 300 mg daily.  She comes into the hospital status post fall at home.  She is a poor historian and not able to provide many details.  However she admits to drinking vodka 3 days/week plus mixing with Ativan at times to treat her anxiety.    On telemetry she has episodes of atrial fibrillation and atrial flutter that terminate on their own.  Her anticoagulation is being held due to her anemia and drop in hemoglobin status post fall.  She does have a scalp laceration.    Plan:   In regards to anticoagulation would have a risk-benefit conversation with the family.  If she has more than 4 falls in 1 year the benefit of anticoagulation likely does not outweigh the risk.  She would be at high risk for a Watchman procedure given her age of 89 and would still require dual antiplatelet therapy for 6 months post procedure.  In regards to treatment of her atrial fibrillation and atrial flutter, since anticoagulation is being held would continue to rate control as blood pressure will tolerate.  She will unlikely tolerate any additional calcium channel blocker or added beta-blocker with her soft blood pressure at this point.  We will discontinue use of losartan to allow more room for rate controlling agents.  EP will continue to follow call with questions.    * Fall  Assessment & Plan  In regards to her fall her EKG shows a right bundle branch block and her atrial fibrillation conducts as high as 140 bpm.  Her conduction system seems quite healthy and her supraventricular arrhythmias are unlikely to have caused her fall.  She is a poor historian, has dementia, consumes alcohol, and Ativan.  She has a recent echo that is relatively normal.  There is not a need for loop recorder implantation at this point in time.      This letter was generated using speech recognition software.  Please excuse any typographical errors.

## 2023-10-20 NOTE — PROGRESS NOTES
Occupational Therapy -  Initial Evaluation     Patient: Madelyn Ruiz  Location: [place] [address] [facility] [address]  MRN: [id number]  Today's date: 10/20/2023    HISTORY OF PRESENT ILLNESS     Madelyn is a 89 y.o. female admitted on 10/19/2023 with Atrial flutter (CMS/HCC) [I48.92]  Closed head injury, initial encounter [S09.90XA]  Fall, initial encounter [W19.XXXA]  Ambulatory dysfunction [R26.2]  Acute on chronic anemia [D64.9]. Principal problem is Fall.    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), Allergic rhinitis (04/02/2018), Asthma, Atrial flutter with rapid ventricular response (CMS/HCC) (02/21/2023), Calcification of aorta (CMS/HCC) (06/29/2020), Chronic obstructive pulmonary disease (CMS/HCC) (04/02/2018), GERD (gastroesophageal reflux disease) (06/08/2019), Insomnia (04/02/2018), Lumbar spinal stenosis (04/02/2018), Macular degeneration disease, Obstructive sleep apnea syndrome (04/02/2018), Osteoporosis (04/02/2018), Primary hypertension (04/02/2018), Pulmonary embolism (CMS/HCC) (06/09/2019), SVT (supraventricular tachycardia) (CMS/[facility]), and Vertebral compression fracture (CMS/[facility]).    History of Present Illness   89 y/o h/o COPD, A flutter (apixaban), STACIA, SVT who fell. Doesn't remember the fall, though she mentions the rug (unclear if this was involved). Found down at home. Multiple abrasions and bruising noted over body.     CT head  Axial images:  Skull base: Normal.  C1-2: Normal.  C2-3: Disc osteophyte complex with exuberant left facet hypertrophy and  moderate uncovertebral hypertrophic changes bilaterally, contributing to  moderate left foraminal narrowing, mild canal stenosis.  C3-4: Exuberant right facet hypertrophy, moderate right uncovertebral  hypertrophic changes, contributing to severe right foraminal narrowing.  C4-5: Disc osteophyte complex with exuberant right and severe left  facet  hypertrophy, mild uncovertebral hypertrophic changes bilaterally, contributing  to moderate to severe right and moderate left foraminal narrowing.  C5-6: Disc osteophyte complex with severe right facet hypertrophy, severe  right uncovertebral hypertrophic changes, contributing to moderate to severe  right foraminal narrowing.  C6-7: Exuberant right and severe left facet hypertrophy, contributing to  moderate right and mild left foraminal narrowing.  C7-T1: Normal.    CT cervical   Findings:  Sulci, ventricles and basal cisterns are within normal limits for  patient's age. Scattered periventricular subcortical white matter  hypoattenuation, nonspecific, likely sequela microangiopathic disease. Prominent  perivascular space of the right basal ganglia. No acute hemorrhage, acute  territorial infarct, or mass effect is seen.  There is no extra-axial fluid  collection.  The visualized paranasal sinuses demonstrates complete  opacification with inspissated secretions of the left maxillary sinus and  similarly in the left sphenoid sinus. Bilateral ocular lens implants. Mastoid  air cells are clear. Left suboccipital scalp hematoma.           PRIOR LEVEL OF FUNCTION AND LIVING ENVIRONMENT     Prior Level of Function    Flowsheet Row Most Recent Value   Ambulation assistive equipment   Transferring independent   Toileting independent   Bathing independent   Dressing independent   Eating independent   IADLs independent   Driving/Transportation    Communication understands/communicates without difficulty   Prior Level of Function Comment reports being IND at home son and daughter in law help during the day and per EMR she has private aids at night   Assistive Device Currently Used at Home cane, quad, walker, front-wheeled, grab bar        Prior Living Environment    Flowsheet Row Most Recent Value   People in Home alone   Current Living Arrangements other (see comments)  [[facility]]   Living Environment  Comment lives in a 3 story condo w 3 DANNY        Occupational Profile    Flowsheet Row Most Recent Value   Environmental Supports and Barriers private aid on duty at night        VITALS AND PAIN     OT Vitals    Date/Time Pulse HR Source SpO2 Pt Activity O2 Therapy BP Pt Position Observations [facility]   10/20/23 0931 -- -- -- -- -- 74/57 Sitting -- NB   10/20/23 0934 128 Monitor -- -- -- 89/65 Sitting s/p ther ex NB   10/20/23 0940 122 Monitor -- -- -- 67/47 Sitting s/p STS NB   10/20/23 0942 141 Monitor -- -- -- 74/54 Sitting -- NB   10/20/23 0945 112 Monitor 97 % At rest None (Room air) 100/53 Lying placed in trendelenburg NB           Objective   OBJECTIVE     Start time:  0929  End time:  0952  Session Length: 23 min  Mode of Treatment: co-treatment, occupational therapy (cotx to maximize functional endurence)    General Observations  Patient received supine, in bed. She was agreeable to therapy, no issues or concerns identified by nurse prior to session. pt recieved in bed RN cleared pt for tx    Precautions: fall              OT Eval and Treat - 10/20/23 0929        Cognition    Orientation Status oriented to;person;place;situation   able to say she fell but could not recall the details of the fall when asked day she stated 23rd    Affect/Mental Status confused;anxious     Follows Commands WFL     Comment, Cognition pleasent slightly confused needed cues for orientation        Vision Assessment/Intervention    Vision Assessment corrective lenses for reading        Hearing Assessment    Hearing Status WFL        Sensory Assessment    Sensory Assessment sensation intact, upper extremities        Upper Extremity Assessment    UE Assessment ROM and Strength WFL     General Observations limited by skin tear on R elbow        Motor Skills    Coordination WFL        Bed Mobility    Bed Mobility Activities right;supine to sit;sit to supine     [place] minimum assist (75% or more patient effort);1 person assist      Safety/Cues increased time to complete;hand placement;verbal cues     Assistive Device bed rails;head of bed elevated     Comment Pt performed bed mobility from supine to sitting req Hardik to come to upright pt endorced dizziness upon sitting up pt laying 98/65 initially upon sitting 74/57 after ankle pumps came up to 89/60 pt sytomatic        Mobility Belt    Mobility Belt Used for All Out of Bed Activity no     Reason Mobility Belt Not Used other (see comments)     Reason Mobility Belt Not Used limited OOB activity at this time        Sit/Stand Transfer    Surface edge of bed;elevated bed     [place] minimum assist (75% or more patient effort);1 person assist     Safety/Cues increased time to complete;proper use of assistive device     Assistive Device walker, front-wheeled     Transfer Comments utilized RW to come to standing able to stand for ~30 seconds before stated she was getting increasingly dizzy BP taken during standing 67/46 after stand pt placed in supine in trendelenburg for a few minutes BP came up to 100/55        Toileting    [place] dependent (less than 25% patient effort)     Comment +purwick        Balance    Static Sitting Balance WFL     Dynamic Sitting Balance WFL     Sit to Stand Dynamic Balance mild impairment;supported;standing     Static Standing Balance mild impairment;supported;standing     Dynamic Standing Balance unable to perform activity     Comment, Balance benifit from RW        Impairments/Safety Issues    Impairments Affecting Function balance;endurance/activity tolerance;strength;pain     Functional Endurance fair below baseline                               Education Documentation  Signs/Symptoms, taught by Oriana Murray OT at 10/20/2023 12:35 PM.  Learner: Patient  Readiness: Acceptance  Method: Explanation  Response: Verbalizes Understanding  Comment: POC and recommendations discussed    Risk Factors, taught by Oriana Murray OT at 10/20/2023 12:35 PM.  Learner:  Patient  Readiness: Acceptance  Method: Explanation  Response: Verbalizes Understanding  Comment: POC and recommendations discussed        Session Outcome  Patient in bed, supine at end of session, bed alarm on, all needs met, call light in reach, personal items in reach. Nursing notified about change in vital signs, patient's response to therapy/activity, and patient's performance.    AM-PAC™ - ADL (Current Function)     Putting on/taking off regular lower body clothing 2 - A Lot   Bathing 2 - A Lot   Toileting 2 - A Lot   Putting on/taking off regular upper body clothing 3 - A Little   Help for taking care of personal grooming 3 - A Little   Eating meals 4 - None   AM-PAC™ ADL Score 16      ASSESSMENT AND PLAN     Progress Summary  OT IE completed at this time. Pt recieved in bed. pt performed bed mobility to come to sitting reported dizziness upon sitting up pt laying 98/65 initially upon sitting 74/57 after ankle pumps came up to 89/60 pt sytomatic throughout. Pt performed 1x STS pt reported more dizziness upon standing BP taken at 67/46 pt laid back in trendelenburg BP came up to 100/55. Nursing made aware. due to limitations in BP, functional endurence and balance OT to rec SNF to maximize functional abilities. OT to cont to follow.    Patient/Family Therapy Goal Statement: to get home    OT Plan    Flowsheet Row Most Recent Value   Rehab Potential good, to achieve stated therapy goals at 10/20/2023 0929   Therapy Frequency 5 times/wk at 10/20/2023 0929   Planned Therapy Interventions activity tolerance training, occupation/activity based interventions, patient/caregiver education/training, strengthening exercise, adaptive equipment training, transfer/mobility retraining, BADL retraining, functional balance retraining, ROM/therapeutic exercise, neuromuscular control/coordination retraining at 10/20/2023 0929          OT Discharge Recommendations    Flowsheet Row Most Recent Value   OT Recommended Discharge  Disposition skilled nursing facility at 10/20/2023 0929   Anticipated Equipment Needs if Discharged Home (OT) none at 10/20/2023 0929               OT Goals    Flowsheet Row Most Recent Value   Transfer Goal 1    Activity/Assistive Device all transfers at 10/20/2023 0929   [place] modified independence at 10/20/2023 0929   Time Frame by discharge at 10/20/2023 0929   Progress/Outcome goal ongoing at 10/20/2023 0929   Dressing Goal 1    Activity/Adaptive Equipment dressing skills, all at 10/20/2023 0929   [place] modified independence at 10/20/2023 0929   Time Frame by discharge at 10/20/2023 0929   Progress/Outcome goal ongoing at 10/20/2023 0929   Toileting Goal 1    Activity/Assistive Device toileting skills, all at 10/20/2023 0929   [place] modified independence at 10/20/2023 0929   Time Frame by discharge at 10/20/2023 0929   Progress/Outcome goal ongoing at 10/20/2023 0929

## 2023-10-20 NOTE — PLAN OF CARE
Care Coordination Admission Assessment Note    General Information:  Readmission Within the last 30 days:    Does patient have a :    Patient-Specific Goals (include timeframe):      Living Arrangements:  Arrived From:    Current Living Arrangements:    People in Home:    Home Accessibility:    Living Arrangement Comments:      Housing Stability and Financial Resources (SDOH):  In the last 12 months, was there a time when you were not able to pay the mortgage or rent on time?:    In the last 12 months, how many places have you lived?:    In the last 12 months, was there a time when you did not have a steady place to sleep or slept in a shelter (including now)?:    How hard is it for you to pay for the very basics like food, housing, medical care, and heating?:      Functional Status Prior to Admission:   Assistive Device/Animal Currently Used at Home:    Functional Status Comments:    IADL Comments:       Supports and Services:  Current Outpatient/Agency/Support Group:    Type of Current Home Care Services:    History of home care episode or rehab stay:      Discharge Needs Assessment:   Concerns to be Addressed:    Current Discharge Risk:    Anticipated Changes Related to Illness:      Patient/Family Anticipated Discharge Plan:  Patient/Family Anticipates Transition To:  (tbd)  Patient/Family Anticipated Services at Transition:  (tbd)    Connection to Community       Patient Choice:   Offered/Gave Vendor List:    Patient's Choice of Community Agency(s):         Anticipated Discharge Plan:  Met with patient. Provided education and contact information for Care Coordination services.: yes  Anticipated Discharge Disposition:  (tbd)     Transportation Needs (SDOH):  Transportation Concerns:    Transportation Anticipated:    Is Out of Hospital DNR needed at discharge?:      In the past 12 months, has lack of transportation kept you from medical appointments or from getting medications?:    In the past 12  months, has lack of transportation kept you from meetings, work, or from getting things needed for daily living?:      Concerns - comments: assessment completed with son Venkat [phone], (unable to reach daughter Shelley and roma)- address/insruance/pharm/PCP verified. pt lives alone in [address], 1st [address] set up. pt can walk with can/walker. needs assistance with ADL - daughter Shelley has been helping during the day and private aide at nightime (Son Venkat is unsure of name of agency). pt is currently with [facility]. pt is covid vaccianted. pt had stayed at [facility] in the past.  family can transport at discharge if appropriate.

## 2023-10-20 NOTE — CONSULTS
Wound Ostomy Continence Note    Subjective    HPI Patient is a 89 y.o. female who was admitted on 10/19/2023 with a diagnosis of Atrial flutter (CMS/[facility]) [I48.92]  Closed head injury, initial encounter [S09.90XA]  Fall, initial encounter [W19.XXXA]  Ambulatory dysfunction [R26.2]  Acute on chronic anemia [D64.9].    Problem list Principal Problem:    Fall  Active Problems:    Allergic bronchopulmonary aspergillosis (CMS/[facility])    Primary hypertension    A-fib (CMS/[facility])    Atrial flutter (CMS/[facility])    COPD (chronic obstructive pulmonary disease) (CMS/[facility])    Leukocytosis    Anemia     PMH/PSH Past Medical History:   Diagnosis Date   • Allergic bronchopulmonary aspergillosis (CMS/[facility]) 04/02/2018    Allergist: Dr. Arriaza.  Stable FEV1 in 8/2015.  IgE levels and eosinophils stable in 8/2015.  Managed with Fasenra, Azithromycin and Flovent.   • Allergic rhinitis 04/02/2018    Pulmonologist: Dr. Walker. Managed with Flonase.   • Asthma    • Atrial flutter with rapid ventricular response (CMS/[facility]) 02/21/2023    Diagnosed in 02/2023 and hospitalized Managed with Apixaban and Diltiazem Echocardiogram 02/2023 •  Left Ventricle: Normal ventricle size. Mild concentric left ventricular hypertrophy. Preserved systolic function. Estimated EF 65-70%. No regional wall motion abnormalities. Grade I diastolic dysfunction. •  Right Ventricle: Normal ventricle size. Normal systolic function. •  Left Atrium: Moderately   • Calcification of aorta (CMS/[facility]) 06/29/2020    Seen incidentally on CT scan.   • Chronic obstructive pulmonary disease (CMS/[facility]) 04/02/2018    Pulmonologist: Dr. Walker.  Seen on PFTs in hospital in 2014.  Controlled with Spiriva and FLovent.   • GERD (gastroesophageal reflux disease) 06/08/2019    Managed with Esomeprazole.  Should take medication 30 minutes prior to meal.  Advised to avoid caffeine, carbonated beverages, and should not eat within 3 hours of going to sleep.  Advised to reduce BMI < 25.    • Insomnia  04/02/2018    Managed with Lorazepam as needed.   • Lumbar spinal stenosis 04/02/2018    Neurosurgeon: Dr. Miguel. MRI with Central and lateral recess spinal stenosis. Has Spondylolisthesis at L4/L5. S/P epidural injection by Dr. Martin with some relief in past. Had Lumbar fusion and Lumbar laminectomy by Dr. Miguel in 4/2015.   • Macular degeneration disease    • Obstructive sleep apnea syndrome 04/02/2018    Mild STACIA noted in sleep study 7/2015. Treated with nasal CPAP automatic with pressure range 5-10 CM H2Ox couple months. Off now   • Osteoporosis 04/02/2018    Rheumatologist: Dr. Bradley.  Dexa scan 7/2016 with osteoporosis of lumbar spine LS-2.5, LH-2.1, RH -2.2.  Has been on Fosamax in the past for 8 years.  Worsened by Prednisone in past.  Continue vitamin D, calcium and weight bearing exercise. DEXA in 7/2017 with LS -1.7, LH -2.2, and RH -2.0.  Next due in 7/2019.  Seen by Dr. Bradley in 8/2015 who agreed with initiating Prolia. Took for 2 years. N   • Primary hypertension 04/02/2018    Managed on Diltiazem and Losartan. Advised to follow a low sodium diet and keep BMI < 25.  Advised to exercise for 2.5 hours per week.  Instructed to take home blood pressure readings and call if consistently above 140/90.     • Pulmonary embolism (CMS/HCC) 06/09/2019    Hematologist: Dr. Bhakta Diagnosed in 06/2019 in right upper lobe. Vascular ultrasound negative of bilateral lower extremities. Managed on Eliquis. Hypercoagulable workup was negative for beta-2 glycoprotein's, RIGO, Antithrombin III, lupus anticoagulant, protein C activity protein S activity, and prothrombin gene mutation, and factor V Leiden. Now off Apixaban as CT scan in 12/2019 was without pu   • SVT (supraventricular tachycardia) (CMS/HCC)    • Vertebral compression fracture (CMS/HCC)     At T12 level     Past Surgical History:   Procedure Laterality Date   • APPENDECTOMY     • CARPAL TUNNEL RELEASE Bilateral    • CATARACT EXTRACTION W/  INTRAOCULAR  LENS IMPLANT Bilateral    • HYSTERECTOMY  1972   • LUMBAR LAMINECTOMY  04/21/2015    S/P lumbar fusion   • TONSILLECTOMY        Assessment and           Recommendation C/s for E elbow skin tear; POA with:  --ecchymosis at R elbow, 4x6cm, with skin tear full thickness wound 2x2cm, 100% pink, mild serous exudate, no odor; no pain complains at this moment   --Alert, BMI 22, Ghanshyam score 18, under risk for PIs;      1. Continue foam dressing for R elbow, applied with this visit after cleansing the wound   2. Maintain PI prevent bundle with powered mattress   3. Sign off, re-consult as needed     Plan f care discussed with pt and nursing at bedside and unit;             Date: 10/20/23  Signature: Elmira Lizarraga RN

## 2023-10-21 LAB
ANION GAP SERPL CALC-SCNC: 8 MEQ/L (ref 3–15)
BUN SERPL-MCNC: 13 MG/DL (ref 7–25)
CALCIUM SERPL-MCNC: 7.9 MG/DL (ref 8.6–10.3)
CHLORIDE SERPL-SCNC: 111 MEQ/L (ref 98–107)
CO2 SERPL-SCNC: 23 MEQ/L (ref 21–31)
CREAT SERPL-MCNC: 0.6 MG/DL (ref 0.6–1.2)
ERYTHROCYTE [DISTWIDTH] IN BLOOD BY AUTOMATED COUNT: 14.4 % (ref 11.7–14.4)
GFR SERPL CREATININE-BSD FRML MDRD: >60 ML/MIN/1.73M*2
GLUCOSE SERPL-MCNC: 114 MG/DL (ref 70–99)
HCT VFR BLDCO AUTO: 24.2 % (ref 35–45)
HGB BLD-MCNC: 7.8 G/DL (ref 11.8–15.7)
MAGNESIUM SERPL-MCNC: 2 MG/DL (ref 1.8–2.5)
MCH RBC QN AUTO: 31.6 PG (ref 28–33.2)
MCHC RBC AUTO-ENTMCNC: 32.2 G/DL (ref 32.2–35.5)
MCV RBC AUTO: 98 FL (ref 83–98)
PDW BLD AUTO: 10.9 FL (ref 9.4–12.3)
PHOSPHATE SERPL-MCNC: 4.4 MG/DL (ref 2.4–4.7)
PLATELET # BLD AUTO: 182 K/UL (ref 150–369)
POTASSIUM SERPL-SCNC: 3.9 MEQ/L (ref 3.5–5.1)
RBC # BLD AUTO: 2.47 M/UL (ref 3.93–5.22)
SODIUM SERPL-SCNC: 142 MEQ/L (ref 136–145)
WBC # BLD AUTO: 7.46 K/UL (ref 3.8–10.5)

## 2023-10-21 PROCEDURE — 84100 ASSAY OF PHOSPHORUS: CPT | Performed by: INTERNAL MEDICINE

## 2023-10-21 PROCEDURE — 63600000 HC DRUGS/DETAIL CODE: Performed by: INTERNAL MEDICINE

## 2023-10-21 PROCEDURE — 99233 SBSQ HOSP IP/OBS HIGH 50: CPT | Performed by: INTERNAL MEDICINE

## 2023-10-21 PROCEDURE — 83735 ASSAY OF MAGNESIUM: CPT | Performed by: INTERNAL MEDICINE

## 2023-10-21 PROCEDURE — 25000000 HC PHARMACY GENERAL: Performed by: STUDENT IN AN ORGANIZED HEALTH CARE EDUCATION/TRAINING PROGRAM

## 2023-10-21 PROCEDURE — 25000000 HC PHARMACY GENERAL: Performed by: NURSE PRACTITIONER

## 2023-10-21 PROCEDURE — 80048 BASIC METABOLIC PNL TOTAL CA: CPT | Performed by: INTERNAL MEDICINE

## 2023-10-21 PROCEDURE — 21400000 HC ROOM AND CARE CCU/INTERMEDIATE

## 2023-10-21 PROCEDURE — 36415 COLL VENOUS BLD VENIPUNCTURE: CPT | Performed by: INTERNAL MEDICINE

## 2023-10-21 PROCEDURE — 63700000 HC SELF-ADMINISTRABLE DRUG: Performed by: NURSE PRACTITIONER

## 2023-10-21 PROCEDURE — 85027 COMPLETE CBC AUTOMATED: CPT | Performed by: INTERNAL MEDICINE

## 2023-10-21 RX ORDER — DIGOXIN 0.25 MG/ML
250 INJECTION INTRAMUSCULAR; INTRAVENOUS
Qty: 4 ML | Refills: 0 | Status: COMPLETED | OUTPATIENT
Start: 2023-10-21 | End: 2023-10-22

## 2023-10-21 RX ORDER — METOPROLOL TARTRATE 1 MG/ML
5 INJECTION, SOLUTION INTRAVENOUS ONCE
Status: COMPLETED | OUTPATIENT
Start: 2023-10-21 | End: 2023-10-21

## 2023-10-21 RX ORDER — DIGOXIN 0.25 MG/ML
500 INJECTION INTRAMUSCULAR; INTRAVENOUS ONCE
Qty: 2 ML | Refills: 0 | Status: COMPLETED | OUTPATIENT
Start: 2023-10-21 | End: 2023-10-21

## 2023-10-21 RX ADMIN — DIGOXIN 250 MCG: 0.25 INJECTION INTRAMUSCULAR; INTRAVENOUS at 23:31

## 2023-10-21 RX ADMIN — PREGABALIN 50 MG: 25 CAPSULE ORAL at 19:55

## 2023-10-21 RX ADMIN — Medication 1000 UNITS: at 09:18

## 2023-10-21 RX ADMIN — DIGOXIN 500 MCG: 0.25 INJECTION INTRAMUSCULAR; INTRAVENOUS at 17:33

## 2023-10-21 RX ADMIN — MULTIPLE VITAMINS W/ MINERALS TAB 1 TABLET: TAB at 09:18

## 2023-10-21 RX ADMIN — METOPROLOL TARTRATE 5 MG: 5 INJECTION, SOLUTION INTRAVENOUS at 19:34

## 2023-10-21 RX ADMIN — DILTIAZEM HYDROCHLORIDE 300 MG: 300 CAPSULE, EXTENDED RELEASE ORAL at 09:18

## 2023-10-21 RX ADMIN — MOMETASONE FUROATE AND FORMOTEROL FUMARATE DIHYDRATE 2 PUFF: 100; 5 AEROSOL RESPIRATORY (INHALATION) at 20:30

## 2023-10-21 RX ADMIN — DULOXETINE HYDROCHLORIDE 60 MG: 30 CAPSULE, DELAYED RELEASE ORAL at 21:35

## 2023-10-21 RX ADMIN — LIDOCAINE 1 PATCH: 4 PATCH TOPICAL at 09:21

## 2023-10-21 RX ADMIN — PREGABALIN 25 MG: 25 CAPSULE ORAL at 13:05

## 2023-10-21 RX ADMIN — PREGABALIN 50 MG: 25 CAPSULE ORAL at 09:18

## 2023-10-21 ASSESSMENT — COGNITIVE AND FUNCTIONAL STATUS - GENERAL
MOVING TO AND FROM BED TO CHAIR: 3 - A LITTLE
STANDING UP FROM CHAIR USING ARMS: 3 - A LITTLE
MOVING TO AND FROM BED TO CHAIR: 3 - A LITTLE
CLIMB 3 TO 5 STEPS WITH RAILING: 3 - A LITTLE
WALKING IN HOSPITAL ROOM: 3 - A LITTLE
CLIMB 3 TO 5 STEPS WITH RAILING: 3 - A LITTLE
WALKING IN HOSPITAL ROOM: 3 - A LITTLE
STANDING UP FROM CHAIR USING ARMS: 3 - A LITTLE

## 2023-10-21 NOTE — PROGRESS NOTES
.     Electrophysiology -  Daily Progress Note       SUBJECTIVE     Patient remains clinically stable although she continues having episodes of atrial fibrillation with rates between 120s to 130s.  Was seen this morning by the EP team she does not have any active complaints and she is resting comfortably in the bed.     OBJECTIVE      Vital signs in last 24 hours:  Temp:  [36.6 °C (97.8 °F)-36.9 °C (98.5 °F)] 36.6 °C (97.8 °F)  Heart Rate:  [101-139] 139  Resp:  [18-20] 20  BP: ()/(53-98) 126/98  Physical Exam  Vitals reviewed.   Constitutional:       Appearance: Normal appearance.   Cardiovascular:      Rate and Rhythm: Tachycardia present. Rhythm irregular.      Pulses: Normal pulses.      Heart sounds: Normal heart sounds.   Pulmonary:      Effort: Pulmonary effort is normal.      Breath sounds: Normal breath sounds.   Abdominal:      General: Abdomen is flat.      Palpations: Abdomen is soft.   Skin:     General: Skin is warm and dry.   Neurological:      Mental Status: She is alert and oriented to person, place, and time. Mental status is at baseline.         •  acetaminophen, 650 mg, oral, q4h PRN  •  [Provider Managed Hold] apixaban, 2.5 mg, oral, BID  •  azithromycin, 250 mg, oral, Once per day on Mon Wed Fri  •  bisacodyL, 10 mg, rectal, Daily PRN  •  cholecalciferol (vitamin D3), 1,000 Units, oral, Daily  •  glucose, 16-32 g of dextrose, oral, PRN **OR** dextrose, 15-30 g of dextrose, oral, PRN **OR** glucagon, 1 mg, intramuscular, PRN **OR** dextrose 50 % in water (D50), 25 mL, intravenous, PRN  •  dilTIAZem CD, 300 mg, oral, Daily  •  docusate sodium, 100 mg, oral, 2x daily PRN  •  DULoxetine, 60 mg, oral, Nightly  •  [Provider Managed Hold] furosemide, 20 mg, oral, Every other day  •  lidocaine, 1 patch, Topical, Daily  •  LORazepam, 0.5 mg, oral, Nightly PRN  •  [Provider Managed Hold] losartan, 50 mg, oral, Nightly  •  mometasone-formoterol, 2 puff, inhalation, BID (8a, 8p)  •  multivitamin, 1  tablet, oral, Daily  •  pregabalin, 25 mg, oral, Daily with lunch  •  pregabalin, 50 mg, oral, BID  •  senna, 1 tablet, oral, 2x daily PRN  •  tiotropium bromide, 2 puff, inhalation, Daily  •  traMADoL, 50 mg, oral, 3x daily PRN   LABS / IMAGING / TELE        Results from last 7 days   Lab Units 10/21/23  0539 10/20/23  0502 10/19/23  1328   WBC K/uL 7.46 7.24 12.50*   HEMOGLOBIN g/dL 7.8* 8.4* 10.2*   HEMATOCRIT % 24.2* 25.8* 31.3*   PLATELETS K/uL 182 191 251     0   Lab Value Date/Time    INR 1.0 10/19/2023 1328    INR 1.1 08/21/2023 1857    INR 0.9 05/02/2016 2058    INR 0.9 04/08/2015 1427     0   Lab Value Date/Time    TROPONINI <0.02 06/08/2019 1714    TROPONINI  05/02/2016 2058     <0.02  A rise or fall of troponin with at least one abnormal value is consistent with myocardial injury or necrosis in the presence of appropriate clinical, ECG, and/or imaging abnormalities.       Results from last 7 days   Lab Units 10/21/23  0539 10/20/23  0502 10/19/23  1328   SODIUM mEQ/L 142 141 144   POTASSIUM mEQ/L 3.9 3.8 4.0   CHLORIDE mEQ/L 111* 109* 110*   CO2 mEQ/L 23 21 26   BUN mg/dL 13 13 27*   CREATININE mg/dL 0.6 0.5* 0.7   CALCIUM mg/dL 7.9* 7.8* 9.6   ALBUMIN g/dL  --   --  4.0   BILIRUBIN TOTAL mg/dL  --   --  0.5   ALK PHOS IU/L  --   --  56   ALT IU/L  --   --  17   AST IU/L  --   --  17   GLUCOSE mg/dL 114* 78 112*     Results from last 7 days   Lab Units 10/19/23  1328   TSH mIU/L 3.63     Cardiac Imaging    TRANSTHORACIC ECHO (TTE) COMPLETE 02/22/2023    Interpretation Summary  •  Left Ventricle: Normal ventricle size. Mild concentric left ventricular hypertrophy. Preserved systolic function. Estimated EF 65-70%. No regional wall motion abnormalities. Grade I diastolic dysfunction.  •  Right Ventricle: Normal ventricle size. Normal systolic function.  •  Left Atrium: Moderately dilated atrium.  •  Right Atrium: Mildly dilated atrium.  •  Aortic Valve: Tricuspid valve.  Sclerotic leaflets. No  regurgitation. No stenosis. Calculated dimensionless index = 1.04.  •  Mitral Valve: Anterior leaflet thickening. Sclerotic mitral valve. Normal leaflet motion. Mild mitral annular calcification. Mild regurgitation. No stenosis. Mean gradient = 2.00.  •  Tricuspid Valve: Normal structure. Mild regurgitation. Estimated RVSP = 32 mmHg. No significant stenosis.  •  Prior Study: Prior study available for comparison. Prior study date: 6/11/2019.  Compared to the prior study, biatrial enlargement and mild concentric LVH are now present.  Otherwise no significant change.      Telemetry: Atrial fibrillation with rapid ventricular response      ASSESSMENT AND PLAN      A-fib (CMS/[facility])  Assessment & Plan  83-year-old female with known history of atrial fibrillation and atrial flutter followed by Dr. Valentino.  She is chronically on Eliquis 2.5 mg daily as well as diltiazem 300 mg daily.  She comes into the hospital status post fall at home.  She is a poor historian and not able to provide many details.  However she admits to drinking vodka 3 days/week plus mixing with Ativan at times to treat her anxiety.    On telemetry she has episodes of atrial fibrillation and atrial flutter that terminate on their own.  Her anticoagulation is being held due to her anemia and drop in hemoglobin status post fall.  She does have a scalp laceration.    Plan:   We will continue with Cardizem p.o. for rate control and will add digoxin loading with 0.5 milligrams x1 dose and continue every 6 hours 0.25 mg for 2 doses.  If the blood pressure tolerates and the heart rate remains elevated consider increasing the dose of Cardizem.    In regards to anticoagulation please refer to prior EP notes.  We will continue to follow this patient closely.      * Fall  Assessment & Plan  In regards to her fall her EKG shows a right bundle branch block and her atrial fibrillation conducts as high as 140 bpm.  Her conduction system seems quite healthy and her  supraventricular arrhythmias are unlikely to have caused her fall.  She is a poor historian, has dementia, consumes alcohol, and Ativan.  She has a recent echo that is relatively normal.  There is not a need for loop recorder implantation at this point in time.    .Case discussed with Dr. Bhandari and with the primary team. Final recommendations are pending attending co-signature. Please page EP at [phone] with any questions       Code Status: Full Code

## 2023-10-21 NOTE — PROGRESS NOTES
Hospital Medicine Service -  Daily Progress Note       SUBJECTIVE   Interval History: No acute events overnight. Patient seen and examined at bedside with no acute complaints this morning.  Overnight and this morning, patient continues to have episodes of A-fib with rates in the 130-150.  Blood pressure more stable this morning.  She denies any acute complaints including shortness of breath, chest pain, or palpitations.     OBJECTIVE      Vital signs in last 24 hours:  Temp:  [36.6 °C (97.8 °F)-36.9 °C (98.5 °F)] 36.6 °C (97.8 °F)  Heart Rate:  [101-139] 139  Resp:  [18-20] 20  BP: ()/(53-98) 126/98    Intake/Output Summary (Last 24 hours) at 10/21/2023 1303  Last data filed at 10/21/2023 0700  Gross per 24 hour   Intake 974 ml   Output 800 ml   Net 174 ml     Weights (last 7 days)     Date/Time Weight    10/19/23 1900 51.3 kg (113 lb)    10/19/23 1316 53.1 kg (117 lb)          PHYSICAL EXAMINATION      Physical Exam  Constitutional:       General: She is not in acute distress.  HENT:      Head:      Comments: Scalp laceration, no active bleeding      Mouth/Throat:      Mouth: Mucous membranes are dry.   Cardiovascular:      Rate and Rhythm: Regular rhythm. Tachycardia present.      Pulses: Normal pulses.      Heart sounds: Normal heart sounds.   Pulmonary:      Effort: Pulmonary effort is normal. No respiratory distress.      Breath sounds: Normal breath sounds. No wheezing.   Abdominal:      General: Abdomen is flat. There is no distension.      Palpations: Abdomen is soft.      Tenderness: There is no abdominal tenderness.   Musculoskeletal:         General: No swelling.      Right lower leg: No edema.      Left lower leg: No edema.   Skin:     Findings: Bruising present.      Comments: Diffuse ecchymoses throughout b/l upper and lower extremities    Neurological:      General: No focal deficit present.      Mental Status: She is alert and oriented to person, place, and time.        LINES, CATHETERS,  DRAINS, AIRWAYS, AND WOUNDS   Lines, Drains, and Airways:  Wounds (agree with documentation and present on admission):  Peripheral IV (Adult) 10/20/23 Anterior;Right Forearm (Active)   Number of days: 1       External Urinary Catheter (Active)   Number of days: 2         Comments:      LABS / IMAGING / TELE      Labs  Results from last 7 days   Lab Units 10/21/23  0539   SODIUM mEQ/L 142   POTASSIUM mEQ/L 3.9   CHLORIDE mEQ/L 111*   CO2 mEQ/L 23   CREATININE mg/dL 0.6   BUN mg/dL 13     Results from last 7 days   Lab Units 10/21/23  0539   WBC K/uL 7.46   HEMOGLOBIN g/dL 7.8*   HEMATOCRIT % 24.2*   PLATELETS K/uL 182     Lab Results   Component Value Date    CKTOTAL 250 (H) 10/19/2023    TROPONINI <0.02 06/08/2019       Imaging  No results found for this or any previous visit.    CT HEAD WITHOUT IV CONTRAST, CT CERVICAL SPINE WITHOUT IV CONTRAST  Narrative: STUDY:  CT of the Brain and cervical spine without contrast    CLINICAL HISTORY: Trauma.  Impression: IMPRESSION:  No acute intracranial abnormality. No evidence for acute fracture  or traumatic subluxation of the cervical spine. Marked multilevel degenerative  spondylosis, with several levels of severe right-sided foraminal narrowing.    COMMENT: A standard noncontrast head CT was performed. Noncontrast CT  examination of the cervical spine performed following the standard protocol.  Sagittal and coronal reformations rendered from axial source images. Images  reviewed in bone and soft tissue windows.    CT DOSE:  One or more dose reduction techniques (e.g. automated exposure  control, adjustment of the mA and/or kV according to patient size, use of  iterative reconstruction technique) utilized for this examination.    Comparison:  Head and cervical spine CT dated August 21, 2023.    Findings:  Sulci, ventricles and basal cisterns are within normal limits for  patient's age. Scattered periventricular subcortical white matter  hypoattenuation, nonspecific, likely  sequela microangiopathic disease. Prominent  perivascular space of the right basal ganglia. No acute hemorrhage, acute  territorial infarct, or mass effect is seen.  There is no extra-axial fluid  collection.  The visualized paranasal sinuses demonstrates complete  opacification with inspissated secretions of the left maxillary sinus and  similarly in the left sphenoid sinus. Bilateral ocular lens implants. Mastoid  air cells are clear. Left suboccipital scalp hematoma.    Cervicothoracic alignment: Mild curvature of the cervical spine to the left.  Normal cervical lordosis.  Prevertebral soft tissues: Normal in thickness.  Vertebral bodies: Normal in height.  Intervertebral discs: Disc osteophyte complex at C2-3 and C5-6.  Cervical and upper thoracic spinal canal: Patent.    Axial images:  Skull base: Normal.  C1-2: Normal.  C2-3: Disc osteophyte complex with exuberant left facet hypertrophy and  moderate uncovertebral hypertrophic changes bilaterally, contributing to  moderate left foraminal narrowing, mild canal stenosis.  C3-4: Exuberant right facet hypertrophy, moderate right uncovertebral  hypertrophic changes, contributing to severe right foraminal narrowing.  C4-5: Disc osteophyte complex with exuberant right and severe left facet  hypertrophy, mild uncovertebral hypertrophic changes bilaterally, contributing  to moderate to severe right and moderate left foraminal narrowing.  C5-6: Disc osteophyte complex with severe right facet hypertrophy, severe  right uncovertebral hypertrophic changes, contributing to moderate to severe  right foraminal narrowing.  C6-7: Exuberant right and severe left facet hypertrophy, contributing to  moderate right and mild left foraminal narrowing.  C7-T1: Normal.    Other: Right apical pleural-parenchymal scarring.  CT ABDOMEN PELVIS WITH IV CONTRAST  Narrative: CLINICAL HISTORY:  Please see associated report same date.    COMMENT:  Please see associated report same  date  Impression: IMPRESSION:  Please see associated report same date  CT CHEST WITH IV CONTRAST  Narrative: CLINICAL HISTORY: Trauma.  Impression: IMPRESSION: No acute trauma related findings within the chest, abdomen, pelvis.  Multiple old bilateral rib fractures.  Healing bilateral sacral wing fractures  since 8/21/2023.  Old pelvic pubic rami fractures.    COMMENT: Computed tomography of the chest, abdomen, pelvis was performed at 3 mm  intervals following intravenous contrast administration only.  A total of 100 cc  of Omnipaque 350 contrast material was administered intravenously.  The current  examination was compared to that dated 8/21/2023.  The mediastinum is intact.  There are no mediastinal masses or fluid collections.    The thoracic aorta and pulmonary arteries are unremarkable.    There is no pericardial effusion.    No pulmonic infiltrates are present to suggest contusion.  Chronic  pleural-parenchymal scarring has remained stable since prior imaging of  8/21/2023.    No new volume loss or pleural effusions have developed.    There is no evidence of pneumothorax.    Old bilateral rib fractures are seen.  No acute rib fractures are demonstrated.    The liver and spleen are intact.  No biliary ductal dilatation is seen.  No  gallbladder calculi are demonstrated.  A 5.60 m diameter cyst within the  inferior segment of the right hepatic lobe is redemonstrated.    The pancreas is unremarkable.    There is no evidence of bowel obstruction or acute inflammatory changes of the  bowel.  No free fluid or fluid collections are present within the abdomen and  pelvis.    There is colonic diverticulosis without evidence of diverticulitis.    Both kidneys are intact with normal nephrograms and contrast excretion.  There  are no perinephric fluid collections.    Adrenal glands are normal.  The retroperitoneum is unremarkable.    Diffuse calcific atherosclerotic changes of the aortoiliac vessels are present  without  evidence of aneurysm or dissection.    No pelvic masses are seen.    Urinary bladder appears intact, containing no calculi.    There are healing bilateral sacral wing fractures.  No acute pelvic fractures  are seen.  Old healing/healed bilateral pubic ramus fractures are  redemonstrated.    There is no evidence of acute hip fracture.    A superior compression deformity of the T12 vertebral body has remained stable.  No acute thoracolumbar spinal fracture or dislocation is demonstrated.    CT DOSE:  One or more dose reduction techniques (e.g. automated exposure  control, adjustment of the mA and/or kV according to patient size, use of  iterative reconstruction technique) utilized for this examination.      ECG/Telemetry  A-fib RVR, -140    ASSESSMENT AND PLAN      A-fib (CMS/[facility])  Assessment & Plan  - Pt with a history of Afib/Aflutter  -Continues to have A-fib with rates in the 120s-150, blood pressure more stable today  - CHADSVASC: 4 (age, female, HTN)  - EKbpm, Afib with RVR, RBBB  - telemetry: SVT, HR: 120-130sbpm with episodes of A-fib with -130  - s/p IV Diltiazem/ PO Diltiazem 30mg/IV Diltiazem 25mg/Metoprolol 5mg IV  - Home medication: Diltiazem 300mg ad Eliquis 2.5mg bid, reports compliance   PLAN  - Continue home diltiazem, if rates remain uncontrolled, will attempt to increase dose if blood pressure remained stable  - Consul to EP: Plan for digoxin loading 0.5 mg x 1 with 2 following doses of 0.25 mg every 6  - Holding eliquis for now given fall and scalp laceration/hematoma, likely can restart if hgb stable without recurrence of bleeding.  We will need to discuss resumption of anticoagulation at discharge given history of recent multiple falls.      * Fall  Assessment & Plan  -Pt presents after being found down by home PT surrounded in her own blood. Sustained scalp laceration/hematoma.  -CPK 200s. Ethanol negative.  -Unclear etiology for fall, pt cannot recall events. She did have a  vodka drink last night, she is also on ativan prn unclear if she mixed. There is question if this was a syncopal event.  -Pt has a history of multiple falls. Recent traumatic fall in August sustaining pelvic/sacral fx. She lives alone. Daughter lives close by.     -CTAP: No acute trauma related findings within the chest, abdomen, pelvis. Multiple old bilateral rib fractures.  Healing bilateral sacral wing fractures since 8/21/2023.  Old pelvic pubic rami fractures.   -CTH/CT c-spine: No acute intracranial abnormality. No evidence for acute fracture or traumatic subluxation of the cervical spine. Marked multilevel degenerative spondylosis, with several levels of severe right-sided foraminal narrowing.     Plan:  --Monitor on telemetry  --Gentle IVF overnight  --Check orthostatic VS  --Fall precautions  --PT/OT  --TTE Feb 2023: EF 65-70%. No regional wall motion abnormalities. Grade I diastolic dysfunction.  --Could consider repeating TTE +/- EP consult in the AM if etiology remains unclear pending above work up  --She also has a R elbow skin tear from a prior fall POA; consult wound care    Anemia  Assessment & Plan  -Hgb 10.2 > 8.4  (baseline appears to be around 11-12)  -Patient with diffuse ecchymosis throughout bilateral lower and upper extremities. She also has scalp laceration from the fall. No active bleeding on exam  PLAN  -Continue to monitor CBC. Transfuse of Hgb < 7  -Continue to hold Eliquis for now    Leukocytosis  Assessment & Plan  Most likely reactive in the s/o fall  UA bland, no infectious findings on CT CAP  Monitor off abx, repeat cbc    COPD (chronic obstructive pulmonary disease) (CMS/[facility])  Assessment & Plan  No evidence of acute exacerbation  Symbicort is nonformulary - use equivalent in house    Primary hypertension  Assessment & Plan  Home regiment losartan 50 mg daily and diltiazem 300 mg daily  Continue diltiazem given A-fib RVR  We will hold losartan given low blood pressure  -Continue to  monitor blood pressure and resume antihypertensives as tolerated    Allergic bronchopulmonary aspergillosis (CMS/[facility])  Assessment & Plan  Allergist Dr. Arriaza  Managed with azithro, flovent       VTE Assessment: Padua    VTE Prophylaxis:  Current anticoagulants:  [Provider Managed Hold] apixaban (ELIQUIS) tablet 2.5 mg, oral, BID      Code Status: Full Code      Estimated Discharge Date: 10/22/2023     Disposition Planning: Skilled Nursing Facility - Other , Pending improved heart rate     Jackson Velazquez MD  10/21/2023

## 2023-10-21 NOTE — PLAN OF CARE
Problem: Adult Inpatient Plan of Care  Goal: Plan of Care Review  10/21/2023 1851 by Ly Greenberg RN  Outcome: Progressing  Flowsheets (Taken 10/21/2023 1851)  Progress: improving  Outcome Evaluation: Pt still in SVT-afib. HR as high as 160 throughout the day, started on digoxin. BP improved and stable throughout the day.  Plan of Care Reviewed With: patient  10/21/2023 1849 by Ly Greenberg RN  Outcome: Progressing  Flowsheets (Taken 10/21/2023 1849)  Progress: no change  Outcome Evaluation: Pt still in SVT-afib. HR as high as 160 througout the day, started on digoxin. BP improved and stable throughout the day.  Plan of Care Reviewed With: patient  Goal: Patient-Specific Goal (Individualized)  10/21/2023 1851 by Ly Greenberg RN  Outcome: Progressing  10/21/2023 1849 by Ly Greenberg RN  Outcome: Progressing  Goal: Absence of Hospital-Acquired Illness or Injury  10/21/2023 1851 by Ly Greenberg RN  Outcome: Progressing  10/21/2023 1849 by Ly Greenberg RN  Outcome: Progressing  Goal: Optimal Comfort and Wellbeing  10/21/2023 1851 by Ly Greenberg RN  Outcome: Progressing  10/21/2023 1849 by Ly Greenberg RN  Outcome: Progressing  Goal: Readiness for Transition of Care  10/21/2023 1851 by Ly Greenberg RN  Outcome: Progressing  10/21/2023 1849 by Ly Greenberg RN  Outcome: Progressing     Problem: Fall Injury Risk  Goal: Absence of Fall and Fall-Related Injury  10/21/2023 1851 by Ly Greenberg RN  Outcome: Progressing  10/21/2023 1849 by Ly Greenberg RN  Outcome: Progressing     Problem: Self-Care Deficit  Goal: Improved Ability to Complete Activities of Daily Living  10/21/2023 1851 by Ly Greenberg, RN  Outcome: Progressing  10/21/2023 1849 by Ly Greenberg RN  Outcome: Progressing     Problem: Mobility Impairment  Goal: Optimal Mobility  10/21/2023 1851 by Ly Greenberg RN  Outcome: Progressing  10/21/2023  1849 by Ly Greenberg, RN  Outcome: Progressing     Problem: Skin Injury Risk Increased  Goal: Skin Health and Integrity  10/21/2023 1851 by Ly Greenberg, RN  Outcome: Progressing  10/21/2023 1849 by Ly Greenberg, RN  Outcome: Progressing   Plan of Care Review  Plan of Care Reviewed With: patient  Progress: no change  Outcome Evaluation: Pt still in SVT-afib. HR as high as 160 througout the day, started on digoxin. BP improved and stable throughout the day.

## 2023-10-21 NOTE — PLAN OF CARE
Plan of Care Review  Plan of Care Reviewed With: patient  Progress: no change  Outcome Evaluation: Pt. in afib. BP betweein 's over night. Pt. c/o pain this evening recieved PRN tramodol. Continues to sleep throughout night. VSS. Continuing to monitor. Reinforced call bell usages.

## 2023-10-21 NOTE — PLAN OF CARE
Care Coordination Discharge Plan Note     Discharge Needs Assessment  Concerns to be Addressed: discharge planning  Current Discharge Risk: dependent with mobility/activities of daily living    Anticipated Discharge Plan  Anticipated Discharge Disposition: skilled nursing facility  Type of Skilled Nursing Care Services: PT, OT, nursing        Patient Choice  Offered/Gave Vendor List: yes  Patient's Choice of Community Agency(s): [facility] or [facility]    Patient and/or patient guardian/advocate was made aware of their right to choose a provider. A list of eligible providers was presented and reviewed with the patient and/or patient guardian/advocate in written and/or verbal form. The list delineates providers in the patient’s desired geographic area who are participating in the Medicare program and/or providers contracted with the patient’s primary insurance. The Medicare list and quality ratings were obtained from the Medicare.gov [medicare.gov] website.    ---------------------------------------------------------------------------------------------------------------------    Interdisciplinary Discharge Plan Review:  Participants:     Concerns Comments: Patient discussed during medical rounds and is not stable as she was tachy today as per MD.  Patient is for SNF when stable.  Patient prefers [facility] who is able to accept Monday when a bed is available as per allscripts.  Patient has Medicare insurance and [facility] snot require auth.  SW to remain available for emotional support and needs.    Discharge Plan:   Disposition/Destination: Skilled Nursing Facility - Other  / Skilled Nursing Facility  Discharge Facility:    Community Resources:      Discharge Transportation:  Is Out of Hospital DNR needed at Discharge:    Does patient need discharge transport? Yes

## 2023-10-22 LAB
ANION GAP SERPL CALC-SCNC: 9 MEQ/L (ref 3–15)
BUN SERPL-MCNC: 11 MG/DL (ref 7–25)
CALCIUM SERPL-MCNC: 8.3 MG/DL (ref 8.6–10.3)
CHLORIDE SERPL-SCNC: 110 MEQ/L (ref 98–107)
CO2 SERPL-SCNC: 23 MEQ/L (ref 21–31)
CREAT SERPL-MCNC: 0.5 MG/DL (ref 0.6–1.2)
GFR SERPL CREATININE-BSD FRML MDRD: >60 ML/MIN/1.73M*2
GLUCOSE SERPL-MCNC: 105 MG/DL (ref 70–99)
MAGNESIUM SERPL-MCNC: 2 MG/DL (ref 1.8–2.5)
PHOSPHATE SERPL-MCNC: 3.8 MG/DL (ref 2.4–4.7)
POTASSIUM SERPL-SCNC: 4 MEQ/L (ref 3.5–5.1)
SODIUM SERPL-SCNC: 142 MEQ/L (ref 136–145)

## 2023-10-22 PROCEDURE — 80048 BASIC METABOLIC PNL TOTAL CA: CPT | Performed by: INTERNAL MEDICINE

## 2023-10-22 PROCEDURE — 25000000 HC PHARMACY GENERAL: Performed by: NURSE PRACTITIONER

## 2023-10-22 PROCEDURE — 36415 COLL VENOUS BLD VENIPUNCTURE: CPT | Performed by: INTERNAL MEDICINE

## 2023-10-22 PROCEDURE — 63700000 HC SELF-ADMINISTRABLE DRUG: Performed by: NURSE PRACTITIONER

## 2023-10-22 PROCEDURE — 21400000 HC ROOM AND CARE CCU/INTERMEDIATE

## 2023-10-22 PROCEDURE — 99233 SBSQ HOSP IP/OBS HIGH 50: CPT | Performed by: INTERNAL MEDICINE

## 2023-10-22 PROCEDURE — 83735 ASSAY OF MAGNESIUM: CPT | Performed by: INTERNAL MEDICINE

## 2023-10-22 PROCEDURE — 63600000 HC DRUGS/DETAIL CODE: Performed by: INTERNAL MEDICINE

## 2023-10-22 PROCEDURE — 84100 ASSAY OF PHOSPHORUS: CPT | Performed by: INTERNAL MEDICINE

## 2023-10-22 RX ORDER — DILTIAZEM HYDROCHLORIDE 180 MG/1
360 CAPSULE, COATED, EXTENDED RELEASE ORAL DAILY
Status: DISCONTINUED | OUTPATIENT
Start: 2023-10-23 | End: 2023-10-24 | Stop reason: HOSPADM

## 2023-10-22 RX ORDER — BENZONATATE 100 MG/1
100 CAPSULE ORAL 3 TIMES DAILY PRN
Status: DISCONTINUED | OUTPATIENT
Start: 2023-10-22 | End: 2023-10-24 | Stop reason: HOSPADM

## 2023-10-22 RX ADMIN — TIOTROPIUM BROMIDE INHALATION SPRAY 2 PUFF: 3.12 SPRAY, METERED RESPIRATORY (INHALATION) at 09:09

## 2023-10-22 RX ADMIN — PREGABALIN 50 MG: 25 CAPSULE ORAL at 09:08

## 2023-10-22 RX ADMIN — PREGABALIN 50 MG: 25 CAPSULE ORAL at 21:14

## 2023-10-22 RX ADMIN — TRAMADOL HYDROCHLORIDE 50 MG: 50 TABLET, COATED ORAL at 12:01

## 2023-10-22 RX ADMIN — PREGABALIN 25 MG: 25 CAPSULE ORAL at 12:00

## 2023-10-22 RX ADMIN — DILTIAZEM HYDROCHLORIDE 300 MG: 300 CAPSULE, EXTENDED RELEASE ORAL at 05:45

## 2023-10-22 RX ADMIN — DULOXETINE HYDROCHLORIDE 60 MG: 30 CAPSULE, DELAYED RELEASE ORAL at 21:14

## 2023-10-22 RX ADMIN — MOMETASONE FUROATE AND FORMOTEROL FUMARATE DIHYDRATE 2 PUFF: 100; 5 AEROSOL RESPIRATORY (INHALATION) at 21:15

## 2023-10-22 RX ADMIN — MULTIPLE VITAMINS W/ MINERALS TAB 1 TABLET: TAB at 09:08

## 2023-10-22 RX ADMIN — MOMETASONE FUROATE AND FORMOTEROL FUMARATE DIHYDRATE 2 PUFF: 100; 5 AEROSOL RESPIRATORY (INHALATION) at 09:07

## 2023-10-22 RX ADMIN — Medication 1000 UNITS: at 09:08

## 2023-10-22 RX ADMIN — DIGOXIN 250 MCG: 0.25 INJECTION INTRAMUSCULAR; INTRAVENOUS at 05:18

## 2023-10-22 RX ADMIN — LIDOCAINE 1 PATCH: 4 PATCH TOPICAL at 09:07

## 2023-10-22 ASSESSMENT — COGNITIVE AND FUNCTIONAL STATUS - GENERAL
CLIMB 3 TO 5 STEPS WITH RAILING: 3 - A LITTLE
STANDING UP FROM CHAIR USING ARMS: 3 - A LITTLE
STANDING UP FROM CHAIR USING ARMS: 3 - A LITTLE
WALKING IN HOSPITAL ROOM: 3 - A LITTLE
MOVING TO AND FROM BED TO CHAIR: 3 - A LITTLE
WALKING IN HOSPITAL ROOM: 2 - A LOT
CLIMB 3 TO 5 STEPS WITH RAILING: 2 - A LOT
MOVING TO AND FROM BED TO CHAIR: 3 - A LITTLE

## 2023-10-22 NOTE — PLAN OF CARE
Care Coordination Discharge Plan Note     Discharge Needs Assessment  Concerns to be Addressed: discharge planning  Current Discharge Risk: dependent with mobility/activities of daily living    Anticipated Discharge Plan  Anticipated Discharge Disposition: skilled nursing facility  Type of Skilled Nursing Care Services: PT, OT, nursing        Patient Choice  Offered/Gave Vendor List: yes  Patient's Choice of Community Agency(s): [facility] or [facility]    Patient and/or patient guardian/advocate was made aware of their right to choose a provider. A list of eligible providers was presented and reviewed with the patient and/or patient guardian/advocate in written and/or verbal form. The list delineates providers in the patient’s desired geographic area who are participating in the Medicare program and/or providers contracted with the patient’s primary insurance. The Medicare list and quality ratings were obtained from the Medicare.gov [medicare.gov] website.    ---------------------------------------------------------------------------------------------------------------------    Interdisciplinary Discharge Plan Review:  Participants:physical therapy, , physician, social work/services    Concerns Comments: Per medical rounds, patient is medically stable for discharge today. SW reviewed chart. Patient has been recommended to go to SNF. Patient/family SNF choice is [facility]. LAMIN placed a phone call to SNF liaison Luann ([phone]) to follow up on bed availability. Luann reported that there are no beds open today, but should have one tomorrow. SW follow up needed. LAMIN provided update to the medical team. SW will continue to remain available for emotional support and discharge planning.    Discharge Plan:   Disposition/Destination: Skilled Nursing Facility - Other  / Skilled Nursing Facility  Discharge Facility:    Community Resources:      Discharge Transportation:  Is Out of Hospital DNR needed at Discharge:     Does patient need discharge transport? Yes          Care Coordination Discharge Plan Note     Discharge Needs Assessment  Concerns to be Addressed: discharge planning  Current Discharge Risk: dependent with mobility/activities of daily living    Anticipated Discharge Plan  Anticipated Discharge Disposition: skilled nursing facility  Type of Skilled Nursing Care Services: PT, OT, nursing        Patient Choice  Offered/Gave Vendor List: yes  Patient's Choice of Community Agency(s): [place] or [facility]    Patient and/or patient guardian/advocate was made aware of their right to choose a provider. A list of eligible providers was presented and reviewed with the patient and/or patient guardian/advocate in written and/or verbal form. The list delineates providers in the patient’s desired geographic area who are participating in the Medicare program and/or providers contracted with the patient’s primary insurance. The Medicare list and quality ratings were obtained from the Medicare.gov [medicare.gov] website.    ---------------------------------------------------------------------------------------------------------------------    Interdisciplinary Discharge Plan Review:  Participants:     Concerns Comments: Patient discussed during medical rounds and is not stable as she was tachy today as per MD.  Patient is for SNF when stable.  Patient prefers [facility] who is able to accept Monday when a bed is available as per allscripts.  Patient has Medicare insurance and angel snot require auth.  SW to remain available for emotional support and needs.    Discharge Plan:   Disposition/Destination: Skilled Nursing Facility - Other  / Skilled Nursing Facility  Discharge Facility:    Community Resources:      Discharge Transportation:  Is Out of Hospital DNR needed at Discharge:    Does patient need discharge transport? Yes

## 2023-10-22 NOTE — NURSING NOTE
Dr. Robertson made aware -150's. Peaked at 160 but did not sustain. IV lopressor ordered and admin'd.

## 2023-10-22 NOTE — PLAN OF CARE
Plan of Care Review  Plan of Care Reviewed With: patient  Outcome Evaluation: Patient stable t/o shift, alert x 3 with some confusion. HR 80- 100's, not sustained, Afib on tele. Prn pain meds given and effective, po meds tolerated. FSP in place.

## 2023-10-22 NOTE — DISCHARGE INSTRUCTIONS
HPI  Madelyn Ruiz is a 89 y.o. female with a past medical history of ABPA, allergic rhinitis, chronic obstructive pulmonary disease, hypertension, hyperlipidemia, GERD who presents after a fall at home, found down.     At time of admission- history was limited though pt and daughter could be redirected. Pt is currently AAOx4. Denies CP, SOB, palpitations, LH/dizziness, HA, visual disturbance, nausea/vomiting.     Pt was admitted in August for pelvis/sacral fracture. She went to [facility] rehab. Then from there she returned home. Was there until sept 17th. Has been getting PT/OT at home. She lives alone. Daughter lives about 10 minutes away. She wants to live in her own home. Daughter wants to pursue HHA.      Pt can recall that she wasn't sleeping well, she was in a lot of pain. She turned the TV on, she didn't finish her drink vodka and water, about 6-8oz. She went to bed.      Kavita with PT had a 12 o'clock appointment to see pt, she opeend the door and she found the pt on the floor covered in blood. She was between the kitchen and her dining area. Kavita called 911. Daughter shortly arrived thereafter.     Pt cannot recall falling.  She was still in her night gown. She was had gotten out of bed and made her bed. [place] in the kitchen.     Her daughter has noticed the pt has had detoriation with balance and strength. She also had a recent fall on Sunday for which she sustained a R elbow skin tear.     Has 1 vodka drink at night. Pt and daughter do not find this problematic.    Hospital Course/ Problem List on Day of Discharge  #Orthostatic hypotension  Positive orthos noted by PT. /69 (supine), 96/61 (sitting), 92/66 (standing), 140/83 (supine at end of session), Telemetry Afib 130-140 during session. Endorses palpitations and mild SOB during this. Had not received daily PO dilt yet.  -Treated with addition of Midodrine 5 mg TID and addition of Metoprolol for rate control ISO AFIB-orthostatic vital  signs improved   -Compression stockings daily   -Discontinued Lasix and Losartan for now, can follow up and discuss with cardiology and PCP    #Atrial fibrillation  Pt with a history of Afib/Aflutter, p/w syncope  CHADSVASC: 4 (age, female, HTN)  EKG on admission:134bpm, Afib with RVR, RBBB  Treated w IV Diltiazem/ PO Diltiazem 30mg/IV Diltiazem 25mg/Metoprolol 5mg IV  PTA medication: Diltiazem 300mg and Eliquis 2.5mg bid, reports compliance   - Increased home diltiazem to 360 mg given stable BP, however now with positive orthos  - Holding Losartan and Lasix for orthostasis  - Resume Eliquis, discussed with palliative and patient. Repeat CBC in 1 week       #Falls   Presents after being found down by home PT surrounded in her own blood. Sustained scalp laceration/hematoma. Has history of multiple falls, on eliquis. Recent traumatic fall in August sustaining pelvic/sacral fx. She lives alone. Daughter lives close by.   CPK 200s. Ethanol negative.  Unclear etiology for fall, pt cannot recall events. She did have a vodka drink prior, she is also on ativan prn unclear if she mixed. There is question if this was a syncopal event  -CTAP: No acute trauma related findings within the chest, abdomen, pelvis. Multiple old bilateral rib fractures.  Healing bilateral sacral wing fractures since 8/21/2023.  Old pelvic pubic rami fractures.   -CTH/CT c-spine: No acute intracranial abnormality. No evidence for acute fracture or traumatic subluxation of the cervical spine. Marked multilevel degenerative spondylosis, with several levels of severe right-sided foraminal narrowing.   -Positive orthostatic vital signs, treated as per orthostatic hypotension   -TTE Feb 2023: EF 65-70%. No regional wall motion abnormalities. Grade I diastolic dysfunction.    -PT/OT recommended SNF, [facility]   -She also has a R elbow skin tear from a prior fall POA; consulted wound care  -Decreased PRN ativan dosing from 1 mg nightly PRN to  0.5 mg nightly PRN- should discuss w PCP as Ativan should be avoided completely in the elderly population per BEERS Criteria. Also discontinue alcohol use with benzos  -Treat orthostasis w midodrine and discontinuation of Losartan and Lasix  -Treat Afib w addition of metoprolol    #Closed head injury  CTH negative   Small lac on posterior scalp cleaned and dressed by nursing. No staples/sutures.    #Anemia  Hgb stablized 10.2 > 8.4>9.9  (baseline appears to be around 11-12)  -Patient with diffuse ecchymosis throughout bilateral lower and upper extremities. She also has scalp laceration from the fall. No active bleeding on exam  -Resume Eliquis upon discharge, repeat CBC in one week w PCP (Hgb stable at 9.3 on day of discharge, no evidence of bleeding). Continue to monitor for falls and broach risks and benefits of anticoagulation outpatient.    #HTN  Home regimen losartan 50 mg daily and diltiazem 300 mg daily  Continue diltiazem given A-fib RVR  -Hold losartan and lasix until follow up w cards and pcp given low blood pressure    #COPD  No evidence of acute exacerbation  Symbicort is nonformulary - used equivalent while in the hospital.     #Allergic bronchopulmonary aspergillosis  Allergist Dr. Arriaza  Managed with [facility]

## 2023-10-22 NOTE — PLAN OF CARE
Plan of Care Review  Plan of Care Reviewed With: patient  Progress: improving  Outcome Evaluation: HR improved after one time dose of lopressor. HR 's . FSP in place

## 2023-10-22 NOTE — PROGRESS NOTES
Hospital Medicine Service -  Daily Progress Note       SUBJECTIVE   Interval History: No acute events overnight. Patient seen and examined at bedside with no acute complaints this morning. She reports feeling well.  Denies any chest pain, shortness of breath, palpitations.     OBJECTIVE      Vital signs in last 24 hours:  Temp:  [36.6 °C (97.8 °F)-37.2 °C (98.9 °F)] 37.2 °C (98.9 °F)  Heart Rate:  [] 96  Resp:  [18] 18  BP: (134-177)/(72-96) 146/72    Intake/Output Summary (Last 24 hours) at 10/22/2023 1617  Last data filed at 10/22/2023 0800  Gross per 24 hour   Intake 490 ml   Output 3000 ml   Net -2510 ml     Weights (last 7 days)     Date/Time Weight    10/19/23 1900 51.3 kg (113 lb)    10/19/23 1316 53.1 kg (117 lb)          PHYSICAL EXAMINATION      Physical Exam  Constitutional:       General: She is not in acute distress.  HENT:      Head:      Comments: Scalp laceration, no active bleeding      Mouth/Throat:      Mouth: Mucous membranes are dry.   Cardiovascular:      Rate and Rhythm: Regular rhythm.     Pulses: Normal pulses.      Heart sounds: Normal heart sounds.   Pulmonary:      Effort: Pulmonary effort is normal. No respiratory distress.      Breath sounds: Normal breath sounds. No wheezing.   Abdominal:      General: Abdomen is flat. There is no distension.      Palpations: Abdomen is soft.      Tenderness: There is no abdominal tenderness.   Musculoskeletal:         General: No swelling.      Right lower leg: No edema.      Left lower leg: No edema.   Skin:     Findings: Bruising present.      Comments: Diffuse ecchymoses throughout b/l upper and lower extremities    Neurological:      General: No focal deficit present.      Mental Status: She is alert and oriented to person, place, and time.      LINES, CATHETERS, DRAINS, AIRWAYS, AND WOUNDS   Lines, Drains, and Airways:  Wounds (agree with documentation and present on admission):  Peripheral IV (Adult) 10/21/23 Posterior;Right Hand  (Active)   Number of days: 1       External Urinary Catheter (Active)   Number of days: 3         Comments:      LABS / IMAGING / TELE      Labs  Results from last 7 days   Lab Units 10/22/23  0503   SODIUM mEQ/L 142   POTASSIUM mEQ/L 4.0   CHLORIDE mEQ/L 110*   CO2 mEQ/L 23   CREATININE mg/dL 0.5*   BUN mg/dL 11     Results from last 7 days   Lab Units 10/21/23  0539   WBC K/uL 7.46   HEMOGLOBIN g/dL 7.8*   HEMATOCRIT % 24.2*   PLATELETS K/uL 182     Lab Results   Component Value Date    CKTOTAL 250 (H) 10/19/2023    TROPONINI <0.02 06/08/2019       Imaging  No results found for this or any previous visit.    CT HEAD WITHOUT IV CONTRAST, CT CERVICAL SPINE WITHOUT IV CONTRAST  Narrative: STUDY:  CT of the Brain and cervical spine without contrast    CLINICAL HISTORY: Trauma.  Impression: IMPRESSION:  No acute intracranial abnormality. No evidence for acute fracture  or traumatic subluxation of the cervical spine. Marked multilevel degenerative  spondylosis, with several levels of severe right-sided foraminal narrowing.    COMMENT: A standard noncontrast head CT was performed. Noncontrast CT  examination of the cervical spine performed following the standard protocol.  Sagittal and coronal reformations rendered from axial source images. Images  reviewed in bone and soft tissue windows.    CT DOSE:  One or more dose reduction techniques (e.g. automated exposure  control, adjustment of the mA and/or kV according to patient size, use of  iterative reconstruction technique) utilized for this examination.    Comparison:  Head and cervical spine CT dated August 21, 2023.    Findings:  Sulci, ventricles and basal cisterns are within normal limits for  patient's age. Scattered periventricular subcortical white matter  hypoattenuation, nonspecific, likely sequela microangiopathic disease. Prominent  perivascular space of the right basal ganglia. No acute hemorrhage, acute  territorial infarct, or mass effect is seen.  There  is no extra-axial fluid  collection.  The visualized paranasal sinuses demonstrates complete  opacification with inspissated secretions of the left maxillary sinus and  similarly in the left sphenoid sinus. Bilateral ocular lens implants. Mastoid  air cells are clear. Left suboccipital scalp hematoma.    Cervicothoracic alignment: Mild curvature of the cervical spine to the left.  Normal cervical lordosis.  Prevertebral soft tissues: Normal in thickness.  Vertebral bodies: Normal in height.  Intervertebral discs: Disc osteophyte complex at C2-3 and C5-6.  Cervical and upper thoracic spinal canal: Patent.    Axial images:  Skull base: Normal.  C1-2: Normal.  C2-3: Disc osteophyte complex with exuberant left facet hypertrophy and  moderate uncovertebral hypertrophic changes bilaterally, contributing to  moderate left foraminal narrowing, mild canal stenosis.  C3-4: Exuberant right facet hypertrophy, moderate right uncovertebral  hypertrophic changes, contributing to severe right foraminal narrowing.  C4-5: Disc osteophyte complex with exuberant right and severe left facet  hypertrophy, mild uncovertebral hypertrophic changes bilaterally, contributing  to moderate to severe right and moderate left foraminal narrowing.  C5-6: Disc osteophyte complex with severe right facet hypertrophy, severe  right uncovertebral hypertrophic changes, contributing to moderate to severe  right foraminal narrowing.  C6-7: Exuberant right and severe left facet hypertrophy, contributing to  moderate right and mild left foraminal narrowing.  C7-T1: Normal.    Other: Right apical pleural-parenchymal scarring.  CT ABDOMEN PELVIS WITH IV CONTRAST  Narrative: CLINICAL HISTORY:  Please see associated report same date.    COMMENT:  Please see associated report same date  Impression: IMPRESSION:  Please see associated report same date  CT CHEST WITH IV CONTRAST  Narrative: CLINICAL HISTORY: Trauma.  Impression: IMPRESSION: No acute trauma related  findings within the chest, abdomen, pelvis.  Multiple old bilateral rib fractures.  Healing bilateral sacral wing fractures  since 8/21/2023.  Old pelvic pubic rami fractures.    COMMENT: Computed tomography of the chest, abdomen, pelvis was performed at 3 mm  intervals following intravenous contrast administration only.  A total of 100 cc  of Omnipaque 350 contrast material was administered intravenously.  The current  examination was compared to that dated 8/21/2023.  The mediastinum is intact.  There are no mediastinal masses or fluid collections.    The thoracic aorta and pulmonary arteries are unremarkable.    There is no pericardial effusion.    No pulmonic infiltrates are present to suggest contusion.  Chronic  pleural-parenchymal scarring has remained stable since prior imaging of  8/21/2023.    No new volume loss or pleural effusions have developed.    There is no evidence of pneumothorax.    Old bilateral rib fractures are seen.  No acute rib fractures are demonstrated.    The liver and spleen are intact.  No biliary ductal dilatation is seen.  No  gallbladder calculi are demonstrated.  A 5.60 m diameter cyst within the  inferior segment of the right hepatic lobe is redemonstrated.    The pancreas is unremarkable.    There is no evidence of bowel obstruction or acute inflammatory changes of the  bowel.  No free fluid or fluid collections are present within the abdomen and  pelvis.    There is colonic diverticulosis without evidence of diverticulitis.    Both kidneys are intact with normal nephrograms and contrast excretion.  There  are no perinephric fluid collections.    Adrenal glands are normal.  The retroperitoneum is unremarkable.    Diffuse calcific atherosclerotic changes of the aortoiliac vessels are present  without evidence of aneurysm or dissection.    No pelvic masses are seen.    Urinary bladder appears intact, containing no calculi.    There are healing bilateral sacral wing fractures.  No  acute pelvic fractures  are seen.  Old healing/healed bilateral pubic ramus fractures are  redemonstrated.    There is no evidence of acute hip fracture.    A superior compression deformity of the T12 vertebral body has remained stable.  No acute thoracolumbar spinal fracture or dislocation is demonstrated.    CT DOSE:  One or more dose reduction techniques (e.g. automated exposure  control, adjustment of the mA and/or kV according to patient size, use of  iterative reconstruction technique) utilized for this examination.      ECG/Telemetry  Afib HR 75-90    ASSESSMENT AND PLAN      A-fib (CMS/[facility])  Assessment & Plan  - Improved HR today  - Pt with a history of Afib/Aflutter  - CHADSVASC: 4 (age, female, HTN)  - EKbpm, Afib with RVR, RBBB  - s/p IV Diltiazem/ PO Diltiazem 30mg/IV Diltiazem 25mg/Metoprolol 5mg IV  - Home medication: Diltiazem 300mg and Eliquis 2.5mg bid, reports compliance     - Patient received digoxin load with improved heart rate  - telemetry: Afib HR 70-90  PLAN  -Increased home diltiazem to 360 mg given stable BP  -Holding eliquis for now given fall and scalp laceration/hematoma, likely can restart if hgb stable without recurrence of bleeding.  We will need to discuss resumption of anticoagulation at discharge given history of recent multiple falls.      * Fall  Assessment & Plan  -Pt presents after being found down by home PT surrounded in her own blood. Sustained scalp laceration/hematoma.  -CPK 200s. Ethanol negative.  -Unclear etiology for fall, pt cannot recall events. She did have a vodka drink last night, she is also on ativan prn unclear if she mixed. There is question if this was a syncopal event.  -Pt has a history of multiple falls. Recent traumatic fall in August sustaining pelvic/sacral fx. She lives alone. Daughter lives close by.     -CTAP: No acute trauma related findings within the chest, abdomen, pelvis. Multiple old bilateral rib fractures.  Healing bilateral sacral wing  fractures since 8/21/2023.  Old pelvic pubic rami fractures.   -CTH/CT c-spine: No acute intracranial abnormality. No evidence for acute fracture or traumatic subluxation of the cervical spine. Marked multilevel degenerative spondylosis, with several levels of severe right-sided foraminal narrowing.   -Positive orthostatic vital signs, received IV fluids    Plan:  --Monitor on telemetry  --Encourage p.o. intake  --Fall precautions  --PT/OT recommended SNF  --TTE Feb 2023: EF 65-70%. No regional wall motion abnormalities. Grade I diastolic dysfunction.  --Could consider repeating TTE +/- EP consult in the AM if etiology remains unclear pending above work up  --She also has a R elbow skin tear from a prior fall POA; consult wound care    Anemia  Assessment & Plan  -Hgb 10.2 > 8.4  (baseline appears to be around 11-12)  -Patient with diffuse ecchymosis throughout bilateral lower and upper extremities. She also has scalp laceration from the fall. No active bleeding on exam  PLAN  -Continue to monitor CBC. Transfuse of Hgb < 7  -Continue to hold Eliquis for now    Leukocytosis  Assessment & Plan  Most likely reactive in the s/o fall  UA bland, no infectious findings on CT CAP  Monitor off abx, repeat cbc    COPD (chronic obstructive pulmonary disease) (CMS/[facility])  Assessment & Plan  No evidence of acute exacerbation  Symbicort is nonformulary - use equivalent in house    Primary hypertension  Assessment & Plan  Home regiment losartan 50 mg daily and diltiazem 300 mg daily  Continue diltiazem given A-fib RVR  We will hold losartan given low blood pressure  -Continue to monitor blood pressure and resume antihypertensives as tolerated    Allergic bronchopulmonary aspergillosis (CMS/[facility])  Assessment & Plan  Allergist Dr. Arriaza  Managed with azithro, flovent       VTE Assessment: Pad[place]    VTE Prophylaxis:  Current anticoagulants:  [Provider Managed Hold] apixaban (ELIQUIS) tablet 2.5 mg, oral, BID      Code Status: Full Code       Estimated Discharge Date: 10/23/2023     Disposition Planning: Likely discharge within 24 hours  Skilled Nursing Facility - Other      Jackson Velazquez MD  10/22/2023

## 2023-10-23 PROBLEM — Z51.5 PALLIATIVE CARE BY SPECIALIST: Status: ACTIVE | Noted: 2023-10-23

## 2023-10-23 PROBLEM — R53.81 DEBILITY: Status: ACTIVE | Noted: 2023-10-23

## 2023-10-23 PROBLEM — I48.92 ATRIAL FLUTTER (CMS/HCC): Status: RESOLVED | Noted: 2023-10-19 | Resolved: 2023-10-23

## 2023-10-23 PROBLEM — I95.1 ORTHOSTATIC HYPOTENSION: Status: ACTIVE | Noted: 2023-10-23

## 2023-10-23 LAB
ANION GAP SERPL CALC-SCNC: 9 MEQ/L (ref 3–15)
ATRIAL RATE: 113
BUN SERPL-MCNC: 24 MG/DL (ref 7–25)
CALCIUM SERPL-MCNC: 8.1 MG/DL (ref 8.6–10.3)
CHLORIDE SERPL-SCNC: 108 MEQ/L (ref 98–107)
CO2 SERPL-SCNC: 23 MEQ/L (ref 21–31)
CREAT SERPL-MCNC: 0.8 MG/DL (ref 0.6–1.2)
ERYTHROCYTE [DISTWIDTH] IN BLOOD BY AUTOMATED COUNT: 14.5 % (ref 11.7–14.4)
GFR SERPL CREATININE-BSD FRML MDRD: >60 ML/MIN/1.73M*2
GLUCOSE SERPL-MCNC: 124 MG/DL (ref 70–99)
HCT VFR BLDCO AUTO: 30.7 % (ref 35–45)
HGB BLD-MCNC: 9.9 G/DL (ref 11.8–15.7)
MAGNESIUM SERPL-MCNC: 2.2 MG/DL (ref 1.8–2.5)
MCH RBC QN AUTO: 31.5 PG (ref 28–33.2)
MCHC RBC AUTO-ENTMCNC: 32.2 G/DL (ref 32.2–35.5)
MCV RBC AUTO: 97.8 FL (ref 83–98)
PDW BLD AUTO: 10.4 FL (ref 9.4–12.3)
PHOSPHATE SERPL-MCNC: 4.4 MG/DL (ref 2.4–4.7)
PLATELET # BLD AUTO: 257 K/UL (ref 150–369)
POTASSIUM SERPL-SCNC: 4.5 MEQ/L (ref 3.5–5.1)
QRS DURATION: 118
QT INTERVAL: 342
QTC CALCULATION(BAZETT): 510
R AXIS: 23
RBC # BLD AUTO: 3.14 M/UL (ref 3.93–5.22)
SODIUM SERPL-SCNC: 140 MEQ/L (ref 136–145)
T WAVE AXIS: -19
VENTRICULAR RATE: 134
WBC # BLD AUTO: 6.86 K/UL (ref 3.8–10.5)

## 2023-10-23 PROCEDURE — 97530 THERAPEUTIC ACTIVITIES: CPT | Mod: GP

## 2023-10-23 PROCEDURE — 63700000 HC SELF-ADMINISTRABLE DRUG: Performed by: NURSE PRACTITIONER

## 2023-10-23 PROCEDURE — 99291 CRITICAL CARE FIRST HOUR: CPT | Performed by: STUDENT IN AN ORGANIZED HEALTH CARE EDUCATION/TRAINING PROGRAM

## 2023-10-23 PROCEDURE — 63700000 HC SELF-ADMINISTRABLE DRUG: Performed by: INTERNAL MEDICINE

## 2023-10-23 PROCEDURE — 85027 COMPLETE CBC AUTOMATED: CPT

## 2023-10-23 PROCEDURE — 83735 ASSAY OF MAGNESIUM: CPT | Performed by: INTERNAL MEDICINE

## 2023-10-23 PROCEDURE — 63700000 HC SELF-ADMINISTRABLE DRUG: Performed by: HOSPITALIST

## 2023-10-23 PROCEDURE — 97535 SELF CARE MNGMENT TRAINING: CPT | Mod: GO

## 2023-10-23 PROCEDURE — 93010 ELECTROCARDIOGRAM REPORT: CPT | Performed by: INTERNAL MEDICINE

## 2023-10-23 PROCEDURE — 99233 SBSQ HOSP IP/OBS HIGH 50: CPT | Performed by: HOSPITALIST

## 2023-10-23 PROCEDURE — 93005 ELECTROCARDIOGRAM TRACING: CPT | Performed by: HOSPITALIST

## 2023-10-23 PROCEDURE — 80048 BASIC METABOLIC PNL TOTAL CA: CPT | Performed by: INTERNAL MEDICINE

## 2023-10-23 PROCEDURE — 25000000 HC PHARMACY GENERAL

## 2023-10-23 PROCEDURE — 97530 THERAPEUTIC ACTIVITIES: CPT | Mod: GO

## 2023-10-23 PROCEDURE — 36415 COLL VENOUS BLD VENIPUNCTURE: CPT | Performed by: INTERNAL MEDICINE

## 2023-10-23 PROCEDURE — 99223 1ST HOSP IP/OBS HIGH 75: CPT | Performed by: NURSE PRACTITIONER

## 2023-10-23 PROCEDURE — 84100 ASSAY OF PHOSPHORUS: CPT | Performed by: INTERNAL MEDICINE

## 2023-10-23 PROCEDURE — 99232 SBSQ HOSP IP/OBS MODERATE 35: CPT | Performed by: INTERNAL MEDICINE

## 2023-10-23 PROCEDURE — 21400000 HC ROOM AND CARE CCU/INTERMEDIATE

## 2023-10-23 RX ORDER — MIDODRINE HYDROCHLORIDE 5 MG/1
5 TABLET ORAL
Status: DISCONTINUED | OUTPATIENT
Start: 2023-10-23 | End: 2023-10-24 | Stop reason: HOSPADM

## 2023-10-23 RX ORDER — METOPROLOL TARTRATE 25 MG/1
25 TABLET, FILM COATED ORAL 3 TIMES DAILY
Status: DISCONTINUED | OUTPATIENT
Start: 2023-10-23 | End: 2023-10-24 | Stop reason: HOSPADM

## 2023-10-23 RX ORDER — METOPROLOL TARTRATE 1 MG/ML
INJECTION, SOLUTION INTRAVENOUS
Status: COMPLETED
Start: 2023-10-23 | End: 2023-10-23

## 2023-10-23 RX ORDER — METOPROLOL TARTRATE 1 MG/ML
5 INJECTION, SOLUTION INTRAVENOUS ONCE
Status: COMPLETED | OUTPATIENT
Start: 2023-10-23 | End: 2023-10-23

## 2023-10-23 RX ADMIN — METOPROLOL TARTRATE 5 MG: 5 INJECTION, SOLUTION INTRAVENOUS at 01:50

## 2023-10-23 RX ADMIN — MOMETASONE FUROATE AND FORMOTEROL FUMARATE DIHYDRATE 2 PUFF: 100; 5 AEROSOL RESPIRATORY (INHALATION) at 20:56

## 2023-10-23 RX ADMIN — METOPROLOL TARTRATE 5 MG: 1 INJECTION, SOLUTION INTRAVENOUS at 01:50

## 2023-10-23 RX ADMIN — METOPROLOL TARTRATE 25 MG: 25 TABLET, FILM COATED ORAL at 20:56

## 2023-10-23 RX ADMIN — DILTIAZEM HYDROCHLORIDE 360 MG: 180 CAPSULE, COATED, EXTENDED RELEASE ORAL at 09:42

## 2023-10-23 RX ADMIN — PREGABALIN 50 MG: 25 CAPSULE ORAL at 09:42

## 2023-10-23 RX ADMIN — MIDODRINE HYDROCHLORIDE 5 MG: 5 TABLET ORAL at 12:16

## 2023-10-23 RX ADMIN — MIDODRINE HYDROCHLORIDE 5 MG: 5 TABLET ORAL at 17:44

## 2023-10-23 RX ADMIN — DULOXETINE HYDROCHLORIDE 60 MG: 30 CAPSULE, DELAYED RELEASE ORAL at 20:57

## 2023-10-23 RX ADMIN — PREGABALIN 50 MG: 25 CAPSULE ORAL at 20:56

## 2023-10-23 RX ADMIN — MOMETASONE FUROATE AND FORMOTEROL FUMARATE DIHYDRATE 2 PUFF: 100; 5 AEROSOL RESPIRATORY (INHALATION) at 09:44

## 2023-10-23 RX ADMIN — METOPROLOL TARTRATE 25 MG: 25 TABLET, FILM COATED ORAL at 14:46

## 2023-10-23 RX ADMIN — AZITHROMYCIN 250 MG: 250 TABLET, FILM COATED ORAL at 09:42

## 2023-10-23 RX ADMIN — BENZONATATE 100 MG: 100 CAPSULE ORAL at 21:03

## 2023-10-23 RX ADMIN — PREGABALIN 25 MG: 25 CAPSULE ORAL at 12:16

## 2023-10-23 RX ADMIN — MULTIPLE VITAMINS W/ MINERALS TAB 1 TABLET: TAB at 09:42

## 2023-10-23 RX ADMIN — Medication 1000 UNITS: at 09:42

## 2023-10-23 RX ADMIN — TIOTROPIUM BROMIDE INHALATION SPRAY 2 PUFF: 3.12 SPRAY, METERED RESPIRATORY (INHALATION) at 09:45

## 2023-10-23 RX ADMIN — ACETAMINOPHEN 650 MG: 325 TABLET ORAL at 17:43

## 2023-10-23 RX ADMIN — LIDOCAINE 1 PATCH: 4 PATCH TOPICAL at 09:45

## 2023-10-23 ASSESSMENT — COGNITIVE AND FUNCTIONAL STATUS - GENERAL
HELP NEEDED FOR BATHING: 2 - A LOT
MOVING TO AND FROM BED TO CHAIR: 3 - A LITTLE
AFFECT: ANXIOUS
WALKING IN HOSPITAL ROOM: 2 - A LOT
CLIMB 3 TO 5 STEPS WITH RAILING: 2 - A LOT
CLIMB 3 TO 5 STEPS WITH RAILING: 2 - A LOT
EATING MEALS: 3 - A LITTLE
TOILETING: 2 - A LOT
WALKING IN HOSPITAL ROOM: 2 - A LOT
STANDING UP FROM CHAIR USING ARMS: 3 - A LITTLE
MOVING TO AND FROM BED TO CHAIR: 3 - A LITTLE
CLIMB 3 TO 5 STEPS WITH RAILING: 2 - A LOT
STANDING UP FROM CHAIR USING ARMS: 3 - A LITTLE
MOVING TO AND FROM BED TO CHAIR: 3 - A LITTLE
STANDING UP FROM CHAIR USING ARMS: 3 - A LITTLE
DRESSING REGULAR UPPER BODY CLOTHING: 3 - A LITTLE
HELP NEEDED FOR PERSONAL GROOMING: 3 - A LITTLE
WALKING IN HOSPITAL ROOM: 2 - A LOT
DRESSING REGULAR LOWER BODY CLOTHING: 2 - A LOT

## 2023-10-23 NOTE — PLAN OF CARE
Problem: Adult Inpatient Plan of Care  Goal: Plan of Care Review  Outcome: Progressing  Flowsheets (Taken 10/23/2023 1833)  Progress: improving  Outcome Evaluation: Patient A&Ox3 this shift. Requiring some reorientation this shift. Continues with Afib on monitor, rate 80s to 100s with some escalations into 130s, non-sustained. Reporting 6/10 headache this evening, PRN tylenol administered. Pictures of wounds uploaded to chart. Continuing with fall safety precautions.  Plan of Care Reviewed With: patient  Goal: Patient-Specific Goal (Individualized)  Outcome: Progressing  Goal: Absence of Hospital-Acquired Illness or Injury  Outcome: Progressing  Goal: Optimal Comfort and Wellbeing  Outcome: Progressing  Goal: Readiness for Transition of Care  Outcome: Progressing     Problem: Fall Injury Risk  Goal: Absence of Fall and Fall-Related Injury  Outcome: Progressing     Problem: Self-Care Deficit  Goal: Improved Ability to Complete Activities of Daily Living  Outcome: Progressing     Problem: Mobility Impairment  Goal: Optimal Mobility  Outcome: Progressing     Problem: Skin Injury Risk Increased  Goal: Skin Health and Integrity  Outcome: Progressing     Problem: Dysrhythmia  Goal: Normalized Cardiac Rhythm  Outcome: Progressing   Plan of Care Review  Plan of Care Reviewed With: patient  Progress: improving  Outcome Evaluation: Patient A&Ox3 this shift. Requiring some reorientation this shift. Continues with Afib on monitor, rate 80s to 100s with some escalations into 130s, non-sustained. Reporting 6/10 headache this evening, PRN tylenol administered. Pictures of wounds uploaded to chart. Continuing with fall safety precautions.

## 2023-10-23 NOTE — PROGRESS NOTES
7    Hospital Medicine Service -  Daily Progress Note       SUBJECTIVE   Interval History: Seen and evaluated at bedside with Dr. Mcclelland.  Alert and oriented x3, on room air.  Physical therapy attempted to evaluate this morning, unable to complete evaluation due to positive orthostatic vitals and rapid AF. /69 (supine), 96/61 (sitting), 92/66 (standing), 140/83 (supine at end of session). Telemetry Afib 130-140 during session. Endorses palpitations and mild SOB during this. Had not received daily PO dilt yet.     Furthermore, RRT called overnight due to rapid AF, [id number]. Remained asymptomatic w BP stable, no hypoxia.Treated w 5 mg of IV lopressor with improvement in her HR to high 90s to low 100s. Patient has been off AC in setting of fall and anemia. Hgb 7.8 yesterday--> 9.9 today.         OBJECTIVE      Vital signs in last 24 hours:  Temp:  [36.3 °C (97.4 °F)-37.2 °C (98.9 °F)] 36.6 °C (97.8 °F)  Heart Rate:  [] 122  Resp:  [18] 18  BP: (118-169)/(72-88) 140/83    Intake/Output Summary (Last 24 hours) at 10/23/2023 1000  Last data filed at 10/23/2023 0300  Gross per 24 hour   Intake 10 ml   Output 1000 ml   Net -990 ml       PHYSICAL EXAMINATION      Physical Exam  Vitals and nursing note reviewed.   Constitutional:       General: She is not in acute distress.  HENT:      Mouth/Throat:      Mouth: Mucous membranes are moist.   Eyes:      Pupils: Pupils are equal, round, and reactive to light.   Cardiovascular:      Rate and Rhythm: Tachycardia present. Rhythm irregular.      Pulses: Normal pulses.      Heart sounds: Normal heart sounds.   Pulmonary:      Effort: Pulmonary effort is normal. No respiratory distress.      Breath sounds: Normal breath sounds.   Abdominal:      General: Bowel sounds are normal. There is no distension.      Palpations: Abdomen is soft.      Tenderness: There is no abdominal tenderness.   Musculoskeletal:         General: Normal range of motion.      Right lower leg:  No edema.      Left lower leg: No edema.   Skin:     Findings: Bruising present.   Neurological:      General: No focal deficit present.      Mental Status: She is alert and oriented to person, place, and time. Mental status is at baseline.   Psychiatric:         Mood and Affect: Mood normal.         Thought Content: Thought content normal.            LINES, CATHETERS, DRAINS, AIRWAYS, AND WOUNDS   Lines, Drains, and Airways:  Wounds (agree with documentation and present on admission):  Peripheral IV (Adult) 10/21/23 Posterior;Right Hand (Active)   Number of days: 2       External Urinary Catheter (Active)   Number of days: 4       Wound Traumatic Posterior;Right Elbow (Active)   Number of days: 1         Comments:      LABS / IMAGING / TELE      Results from last 7 days   Lab Units 10/23/23  0838   WBC K/uL 6.86   HEMOGLOBIN g/dL 9.9*   HEMATOCRIT % 30.7*   PLATELETS K/uL 257     Results from last 7 days   Lab Units 10/23/23  0209 10/20/23  0502 10/19/23  1328   SODIUM mEQ/L 140   < > 144   POTASSIUM mEQ/L 4.5   < > 4.0   CHLORIDE mEQ/L 108*   < > 110*   CO2 mEQ/L 23   < > 26   BUN mg/dL 24   < > 27*   CREATININE mg/dL 0.8   < > 0.7   CALCIUM mg/dL 8.1*   < > 9.6   ALBUMIN g/dL  --   --  4.0   BILIRUBIN TOTAL mg/dL  --   --  0.5   ALK PHOS IU/L  --   --  56   ALT IU/L  --   --  17   AST IU/L  --   --  17   GLUCOSE mg/dL 124*   < > 112*    < > = values in this interval not displayed.     No results found.      ECG/Telemetry  I have independently reviewed the telemetry. Significant findings include Rapid AF.    ASSESSMENT AND PLAN      Orthostatic hypotension  Assessment & Plan  Physical therapy unable to complete evaluation 10/23 due to positive orthostatic vitals and rapid AF.   /69 (supine), 96/61 (sitting), 92/66 (standing), 140/83 (supine at end of session), Telemetry Afib 130-140 during session. Endorses palpitations and mild SOB during this. Had not received daily PO dilt yet    -Compression  stockings  -EP following, treat Afib   -Daily orthostatics   -Consider midodrine   -Continue holding losartan and lasix  -Encourage PO intake, could trial fluids since EF ~ 70%    A-fib (CMS/[facility])  Assessment & Plan  Pt with a history of Afib/Aflutter, p/w syncope  - CHADSVASC: 4 (age, female, HTN)  - EKbpm, Afib with RVR, RBBB  - s/p IV Diltiazem/ PO Diltiazem 30mg/IV Diltiazem 25mg/Metoprolol 5mg IV  - Home medication: Diltiazem 300mg and Eliquis 2.5mg bid, reports compliance   - Patient received digoxin load with improved heart rate    Plan:   - Telemetry  - Increased home diltiazem to 360 mg given stable BP, however now with positive orthos  - Holding Losartan and Lasix for orthostasis  - Holding eliquis for now given fall and scalp laceration/hematoma, likely can restart since hgb stable without recurrence of bleeding.    - Palliative consulted to discuss goals of care/code status/ resumption of anticoagulation at discharge given history of recent multiple falls      * Fall  Assessment & Plan  Presents after being found down by home PT surrounded in her own blood. Sustained scalp laceration/hematoma. Has history of multiple falls, on eliquis. Recent traumatic fall in August sustaining pelvic/sacral fx. She lives alone. Daughter lives close by.   -CPK 200s. Ethanol negative.  -Unclear etiology for fall, pt cannot recall events. She did have a vodka drink last night, she is also on ativan prn unclear if she mixed. There is question if this was a syncopal event  -CTAP: No acute trauma related findings within the chest, abdomen, pelvis. Multiple old bilateral rib fractures.  Healing bilateral sacral wing fractures since 2023.  Old pelvic pubic rami fractures.   -CTH/CT c-spine: No acute intracranial abnormality. No evidence for acute fracture or traumatic subluxation of the cervical spine. Marked multilevel degenerative spondylosis, with several levels of severe right-sided foraminal narrowing.   -Positive  orthostatic vital signs, received IV fluids  -TTE Feb 2023: EF 65-70%. No regional wall motion abnormalities. Grade I diastolic dysfunction.    Plan:  -Monitor on telemetry  -Encourage p.o. intake  -Fall precautions  -PT/OT recommended SNF  -She also has a R elbow skin tear from a prior fall POA; consult wound care  -See orthostasis     Closed head injury, initial encounter  Assessment & Plan  CTH negative   -See fall     Anemia  Assessment & Plan  Hgb stablized 10.2 > 8.4>9.9  (baseline appears to be around 11-12)  -Patient with diffuse ecchymosis throughout bilateral lower and upper extremities. She also has scalp laceration from the fall. No active bleeding on exam  PLAN  -Continue to monitor CBC. Transfuse of Hgb < 7  -Continue to hold Eliquis for now  -Palliative as per afib     Primary hypertension  Assessment & Plan  Home regiment losartan 50 mg daily and diltiazem 300 mg daily  Continue diltiazem given A-fib RVR  We will hold losartan given low blood pressure  -Continue to monitor blood pressure and resume antihypertensives as tolerated    Leukocytosis  Assessment & Plan  Most likely reactive in the s/o fall, resolved  UA bland, no infectious findings on CT CAP  Monitor off abx, trend cbc    COPD (chronic obstructive pulmonary disease) (CMS/[facility])  Assessment & Plan  No evidence of acute exacerbation  Symbicort is nonformulary - use equivalent in house    Allergic bronchopulmonary aspergillosis (CMS/[facility])  Assessment & Plan  Allergist Dr. Arriaza  Managed with azithro, flovent       VTE Assessment: Padua    VTE Prophylaxis:  Current anticoagulants:  [Provider Managed Hold] apixaban (ELIQUIS) tablet 2.5 mg, oral, BID      Code Status: Full Code      Estimated Discharge Date: 10/24/2023     Disposition Planning: EP to re evaluate d/t rapid afib overnight, treat orthostatic hypotension, palliative to see, pending DC to SNF when medically stable 24-48 hours     MICA Gale  10/23/2023

## 2023-10-23 NOTE — PROGRESS NOTES
ELECTROPHYSIOLOGY PROGRESS NOTE      SUBJECTIVE  Follow-up for atrial fibrillation.  Patient appears comfortable and no complaints.  Heart rate still elevated.    Inpatient medications:  •  acetaminophen, 650 mg, oral, q4h PRN  •  [Provider Managed Hold] apixaban, 2.5 mg, oral, BID  •  azithromycin, 250 mg, oral, Once per day on Mon Wed Fri  •  benzonatate, 100 mg, oral, 3x daily PRN  •  bisacodyL, 10 mg, rectal, Daily PRN  •  cholecalciferol (vitamin D3), 1,000 Units, oral, Daily  •  glucose, 16-32 g of dextrose, oral, PRN **OR** dextrose, 15-30 g of dextrose, oral, PRN **OR** glucagon, 1 mg, intramuscular, PRN **OR** dextrose 50 % in water (D50), 25 mL, intravenous, PRN  •  dilTIAZem CD, 360 mg, oral, Daily  •  docusate sodium, 100 mg, oral, 2x daily PRN  •  DULoxetine, 60 mg, oral, Nightly  •  [Provider Managed Hold] furosemide, 20 mg, oral, Every other day  •  lidocaine, 1 patch, Topical, Daily  •  LORazepam, 0.5 mg, oral, Nightly PRN  •  [Provider Managed Hold] losartan, 50 mg, oral, Nightly  •  midodrine, 5 mg, oral, TID AC  •  mometasone-formoterol, 2 puff, inhalation, BID (8a, 8p)  •  multivitamin, 1 tablet, oral, Daily  •  pregabalin, 25 mg, oral, Daily with lunch  •  pregabalin, 50 mg, oral, BID  •  senna, 1 tablet, oral, 2x daily PRN  •  tiotropium bromide, 2 puff, inhalation, Daily  •  traMADoL, 50 mg, oral, 3x daily PRN    OBJECTIVE  Visit Vitals  /77   Pulse (!) 118   Temp 36.9 °C (98.5 °F) (Oral)   Resp 18   Ht 1.524 m (5')   Wt 51.3 kg (113 lb)   SpO2 97%   BMI 22.07 kg/m²       Intake/Output Summary (Last 24 hours) at 10/23/2023 1331  Last data filed at 10/23/2023 1059  Gross per 24 hour   Intake 20 ml   Output 1000 ml   Net -980 ml       Physical Exam  Cardiovascular:      Rate and Rhythm: Tachycardia present. Rhythm irregular.   Pulmonary:      Effort: Pulmonary effort is normal.   Musculoskeletal:         General: Swelling present.   Neurological:      General: No focal deficit  present.      Mental Status: She is alert.         Labs:    Results from last 7 days   Lab Units 10/23/23  0209   SODIUM mEQ/L 140   POTASSIUM mEQ/L 4.5   CHLORIDE mEQ/L 108*   CO2 mEQ/L 23   BUN mg/dL 24   CREATININE mg/dL 0.8   GLUCOSE mg/dL 124*   CALCIUM mg/dL 8.1*     Results from last 7 days   Lab Units 10/23/23  0838   WBC K/uL 6.86   HEMOGLOBIN g/dL 9.9*   HEMATOCRIT % 30.7*   PLATELETS K/uL 257     Results from last 7 days   Lab Units 10/19/23  1328   INR  1.0   PTT sec <22*     Cardiology results:  Telemetry Review: Atrial fibrillation rate 90s 120s.    Echocardiogram  2/22/2023  Left Ventricle: Normal ventricle size. Mild concentric left ventricular hypertrophy. Preserved systolic function. Estimated EF 65-70%. No regional wall motion abnormalities. Grade I diastolic dysfunction.  •  Right Ventricle: Normal ventricle size. Normal systolic function.  •  Left Atrium: Moderately dilated atrium.  •  Right Atrium: Mildly dilated atrium.  •  Aortic Valve: Tricuspid valve.  Sclerotic leaflets. No regurgitation. No stenosis. Calculated dimensionless index = 1.04.  •  Mitral Valve: Anterior leaflet thickening. Sclerotic mitral valve. Normal leaflet motion. Mild mitral annular calcification. Mild regurgitation. No stenosis. Mean gradient = 2.00.  •  Tricuspid Valve: Normal structure. Mild regurgitation. Estimated RVSP = 32 mmHg. No significant stenosis.  •  Prior Study: Prior study available for comparison. Prior study date: 6/11/2019.  Compared to the prior study, biatrial enlargement and mild concentric LVH are now present.  Otherwise no significant change.    Assessment and plan  A-fib (CMS/[facility])  Assessment & Plan  83-year-old female with known history of atrial fibrillation and atrial flutter followed by Dr. Valentino.  She is chronically on Eliquis 2.5 mg daily as well as diltiazem 300 mg daily.  She comes into the hospital status post fall at home.  She is a poor historian and not able to provide many  details.  However she admits to drinking vodka 3 days/week plus mixing with Ativan at times to treat her anxiety.    On telemetry she has episodes of atrial fibrillation and atrial flutter that terminate on their own.  Her anticoagulation is being held due to her anemia and drop in hemoglobin status post fall.  She does have a scalp laceration.    Avoid digoxin.  Elderly lady with Cr 0.8 may have narrow therapuetic range for dig level  Heart rate remains elevated on Cardizem 360 mg daily.  To consider low-dose metoprolol titrate 25 mg every 8 hours; Hold Losartan for BP room  Anticoagulation has been on hold with anemia.  Patient has history of multiple mechanical fall.  She also has habit of using alcohol and lorazepam.    She may not be candidate for chronic anticoagulation unless patient will be discharged to a monitored setting.      * Fall  Assessment & Plan  In regards to her fall her EKG shows a right bundle branch block and her atrial fibrillation conducts as high as 140 bpm.  Her conduction system seems quite healthy and her supraventricular arrhythmias are unlikely to have caused her fall.  She is a poor historian, has dementia, consumes alcohol, and Ativan.  She has a recent echo that is relatively normal.  There is not a need for loop recorder implantation at this point in time.      EP will sign off.  Please call with questions.

## 2023-10-23 NOTE — PLAN OF CARE
Plan of Care Review  Plan of Care Reviewed With: patient  Progress: no change  Outcome Evaluation: Pt AOOx2 required reorientation regarding situation, Afib on monitor. FSP in place.

## 2023-10-23 NOTE — DISCHARGE SUMMARY
Hospital Medicine Service -  Inpatient Discharge Summary        BRIEF OVERVIEW   Patient: Madelyn Ruiz (8/9/1930)    Admitting Provider: Jackson Velazquez MD  Attending Provider: Brunilda Mcclelland DO Attending phys phone: [phone]    PCP: Henry Linares [phone]    Admission Date: 10/19/2023  Discharge Date: 10/24/2023     DISCHARGE DIAGNOSES      Primary Discharge Diagnosis  Fall    Secondary Discharge Diagnoses  Active Hospital Problems    Diagnosis Date Noted   • Orthostatic hypotension 10/23/2023     Priority: High   • Fall 10/19/2023     Priority: High   • A-fib (CMS/Formerly [facility]) 08/22/2023     Priority: High   • Closed head injury, initial encounter 10/20/2023     Priority: Medium   • Anemia 10/19/2023     Priority: Medium   • Primary hypertension 04/02/2018     Priority: Medium   • Palliative care by specialist 10/23/2023   • Debility 10/23/2023   • COPD (chronic obstructive pulmonary disease) (CMS/[facility]ly [facility]) 10/19/2023   • Leukocytosis 10/19/2023   • Allergic bronchopulmonary aspergillosis (CMS/[facility]) 04/02/2018      Resolved Hospital Problems    Diagnosis Date Noted Date Resolved   • Atrial flutter (CMS/Former[facility]) 10/19/2023 10/23/2023     SUMMARY OF HOSPITALIZATION      Presenting Problem/History of Present Illness  This is a 89 y.o. year-old female admitted on 10/19/2023 with Fall.    H&P per Yany BENP:  Madelyn Ruiz is a 89 y.o. female with a past medical history of ABPA, allergic rhinitis, chronic obstructive pulmonary disease, hypertension, hyperlipidemia, GERD who presents after a fall at home, found down.     To preface, pt and daughter are both difficult disorganized historians. Able to be redirected. Pt is currently AAOx4. Denies CP, SOB, palpitations, LH/dizziness, HA, visual disturbance, nausea/vomiting.     Pt was admitted in August for pelvis/sacral fracture. She went to [facility]. Then from there she returned home. Was there until sept 17th. Has been getting PT/OT at home. She  lives alone. Daughter lives about 10 minutes away. She wants to live in her own home. Daughter wants to pursue HHA.      Pt can recall that she wasn't sleeping well, she was in a lot of pain. She turned the TV on, she didn't finish her drink vodka and water, about 6-8oz. She went to bed.      Kavita with PT had a 12 o'clock appointment to see pt, she opeend the door and she found th pt on the floor covered in blood. She was between the kitchen and her dining area. Kavita called 911. Daughter shortly arrived thereafter.     Pt cannot recall falling.  She was still in her night gown. She was had gotten out of bed and made her bed. Rollaider in the kitchen.     Her daughter has noticed the pt has had detoriation with balance and strength. She also had a recent fall on Sunday for which she sustained a R elbow skin tear.     She will have 1 vodka drink at night. Pt and daughter do not find this problematic.    Hospital Course    Problem List on Day of Discharge    #Orthostatic hypotension  Positive orthos noted by PT. /69 (supine), 96/61 (sitting), 92/66 (standing), 140/83 (supine at end of session), Telemetry Afib 130-140 during session. Endorses palpitations and mild SOB during this. Had not received daily PO dilt yet.  -Treated with addition of Midodrine 5 mg TID and addition of Metoprolol for rate control and orthostatic vital signs improved   -Discontinued Lasix and Losartan for now, can follow up and discuss with cardiology and PCP    #Atrial fibrillation  Pt with a history of Afib/Aflutter, p/w syncope  CHADSVASC: 4 (age, female, HTN)  EKG on admission:134bpm, Afib with RVR, RBBB  Treated w IV Diltiazem/ PO Diltiazem 30mg/IV Diltiazem 25mg/Metoprolol 5mg IV  PTA medication: Diltiazem 300mg and Eliquis 2.5mg bid, reports compliance   - Increased home diltiazem to 360 mg given stable BP, however now with positive orthos  - Holding Losartan and Lasix for orthostasis  - Resume Eliquis, discussed with palliative  and patient. Repeat CBC in 1 week       #Falls   Presents after being found down by home PT surrounded in her own blood. Sustained scalp laceration/hematoma. Has history of multiple falls, on eliquis. Recent traumatic fall in August sustaining pelvic/sacral fx. She lives alone. Daughter lives close by.   CPK 200s. Ethanol negative.  Unclear etiology for fall, pt cannot recall events. She did have a vodka drink prior, she is also on ativan prn unclear if she mixed. There is question if this was a syncopal event  -CTAP: No acute trauma related findings within the chest, abdomen, pelvis. Multiple old bilateral rib fractures.  Healing bilateral sacral wing fractures since 8/21/2023.  Old pelvic pubic rami fractures.   -CTH/CT c-spine: No acute intracranial abnormality. No evidence for acute fracture or traumatic subluxation of the cervical spine. Marked multilevel degenerative spondylosis, with several levels of severe right-sided foraminal narrowing.   -Positive orthostatic vital signs, treated as per orthostatic hypotension   -TTE Feb 2023: EF 65-70%. No regional wall motion abnormalities. Grade I diastolic dysfunction.    -PT/OT recommended SNF, discharge to [facility]   -She also has a R elbow skin tear from a prior fall POA; consulted wound care  -Decreased PRN ativan dosing from 1 mg nightly PRN to 0.5 mg nightly PRN- should discuss w PCP as Ativan should be avoided completely in the elderly population per BEERS Criteria. Also discontinue alcohol use with benzos  -Treat orthostasis w midodrine and discontinuation of Losartan and Lasix  -Treat Afib w addition of metoprolol    #Closed head injury  CTH negative   Small lac on posterior scalp cleaned and dressed by nursing. No staples/sutures.    #Anemia  Hgb stablized 10.2 > 8.4>9.9  (baseline appears to be around 11-12)  -Patient with diffuse ecchymosis throughout bilateral lower and upper extremities. She also has scalp laceration from the fall. No active bleeding  on exam    -Resume Eliquis upon discharge, repeat CBC in one week w PCP     #HTN  Home regiment losartan 50 mg daily and diltiazem 300 mg daily  Continue diltiazem given A-fib RVR  -Hold losartan and lasix until follow up w cards and pcp given low blood pressure    #COPD  No evidence of acute exacerbation  Symbicort is nonformulary - use equivalent in house    #Allergic bronchopulmonary aspergillosis  Allergist Dr. Arriaza  Managed with azithro, flovent    Exam on Day of Discharge  Physical Exam  Vitals and nursing note reviewed.   Constitutional:       General: She is not in acute distress.  HENT:      Mouth/Throat:      Mouth: Mucous membranes are moist.   Eyes:      Pupils: Pupils are equal, round, and reactive to light.   Cardiovascular:      Rate and Rhythm: Normal rate. Rhythm irregular.      Pulses: Normal pulses.      Heart sounds: Normal heart sounds.   Pulmonary:      Effort: Pulmonary effort is normal. No respiratory distress.      Breath sounds: Normal breath sounds.   Abdominal:      General: Bowel sounds are normal. There is no distension.      Palpations: Abdomen is soft.      Tenderness: There is no abdominal tenderness.   Musculoskeletal:         General: Normal range of motion.      Right lower leg: No edema.      Left lower leg: No edema.   Skin:     Findings: Bruising present. Skin tear R elbow   Neurological:      General: No focal deficit present.      Mental Status: She is alert and oriented to person, place, and time. Mental status is at baseline.   Psychiatric:         Mood and Affect: Mood normal.         Thought Content: Thought content normal.     Consults During Admission  IP CONSULT TO WOUND OSTOMY CONTINENCE  IP CONSULT TO ELECTROPHYSIOLOGY  IP CONSULT TO PAIN/PALLIATIVE CARE    DISCHARGE MEDICATIONS               Medication List      ASK your doctor about these medications    apixaban 2.5 mg tablet  Commonly known as: ELIQUIS  Take 2.5 mg by mouth 2 (two) times a day.  Dose: 2.5 mg      azithromycin 250 mg tablet  Commonly known as: ZITHROMAX  Take 1 tablet (250 mg total) by mouth 3 (three) times a week (Mon, Wed, Fri).  Dose: 250 mg     calcium carbonate 600 mg calcium (1,500 mg) tablet  Take 1 tablet by mouth 2 (two) times a day.  Dose: 1 tablet     CENTRUM ORAL  Take 1 tablet by mouth daily.  Dose: 1 tablet     cholecalciferol (vitamin D3) 25 mcg (1,000 unit) capsule  Take 1 tablet by mouth daily.  Dose: 1 tablet     docusate sodium 100 mg capsule  Commonly known as: COLACE  Take 100 mg by mouth 2 (two) times a day as needed for constipation.  Dose: 100 mg     DULoxetine 60 mg capsule  Commonly known as: CYMBALTA  Take 60 mg by mouth at bedtime.  Dose: 60 mg     fexofenadine 180 mg tablet  Commonly known as: ALLEGRA  Take 180 mg by mouth daily as needed (allergies).  Dose: 180 mg     furosemide 20 mg tablet  Commonly known as: LASIX  Take 1 tablet (20 mg total) by mouth every other day.  Dose: 20 mg     lidocaine 5 % patch  Commonly known as: LIDODERM  Place 1 patch on the skin daily. Remove & discard patch within 12 hours or as directed by prescriber.  Dose: 1 patch     LORazepam 1 mg tablet  Commonly known as: ATIVAN  Take 1 mg by mouth nightly as needed for anxiety or sleep.  Dose: 1 mg     losartan 50 mg tablet  Commonly known as: COZAAR  Take 1 tablet (50 mg total) by mouth nightly.  Dose: 50 mg     * pregabalin 50 mg capsule  Commonly known as: LYRICA  Take 50 mg by mouth 2 (two) times a day.  Dose: 50 mg     * pregabalin 25 mg capsule  Commonly known as: LYRICA  Take 25 mg by mouth daily with lunch. PRN  Dose: 25 mg     SPIRIVA RESPIMAT 2.5 mcg/actuation mist inhaler  Inhale 2 puffs daily.  Dose: 2 puff  Generic drug: tiotropium bromide     SYMBICORT 80-4.5 mcg/actuation inhaler  Inhale 2 puffs 2 (two) times a day.  Dose: 2 puff  Generic drug: budesonide-formoteroL     TIADYLT  mg 24 hr capsule  TAKE 1 CAPSULE BY MOUTH EVERY DAY  Generic drug: diltiazem     traMADoL 50 mg  tablet  Commonly known as: ULTRAM  Take 50 mg by mouth 3 (three) times a day as needed.  Dose: 50 mg     XIIDRA 5 % dropperette dropperette  Generic drug: lifitegrast         * This list has 2 medication(s) that are the same as other medications prescribed for you. Read the directions carefully, and ask your doctor or other care provider to review them with you.                          PROCEDURES / LABS / IMAGING      Operative Procedures  na    Other Procedures  na    Pertinent Labs  Results from last 7 days   Lab Units 10/24/23  0547 10/23/23  0838 10/21/23  0539   WBC K/uL 7.34 6.86 7.46   HEMOGLOBIN g/dL 9.3* 9.9* 7.8*   HEMATOCRIT % 29.4* 30.7* 24.2*   PLATELETS K/uL 265 257 182     Results from last 7 days   Lab Units 10/24/23  0547 10/23/23  0209 10/22/23  0503 10/20/23  0502 10/19/23  1328   SODIUM mEQ/L 141 140 142   < > 144   POTASSIUM mEQ/L 4.4 4.5 4.0   < > 4.0   CHLORIDE mEQ/L 112* 108* 110*   < > 110*   CO2 mEQ/L 23 23 23   < > 26   BUN mg/dL 19 24 11   < > 27*   CREATININE mg/dL 0.5* 0.8 0.5*   < > 0.7   CALCIUM mg/dL 8.2* 8.1* 8.3*   < > 9.6   ALBUMIN g/dL  --   --   --   --  4.0   BILIRUBIN TOTAL mg/dL  --   --   --   --  0.5   ALK PHOS IU/L  --   --   --   --  56   ALT IU/L  --   --   --   --  17   AST IU/L  --   --   --   --  17   GLUCOSE mg/dL 96 124* 105*   < > 112*    < > = values in this interval not displayed.           Pertinent Imaging  CT HEAD WITHOUT IV CONTRAST    Result Date: 10/19/2023  IMPRESSION:  No acute intracranial abnormality. No evidence for acute fracture or traumatic subluxation of the cervical spine. Marked multilevel degenerative spondylosis, with several levels of severe right-sided foraminal narrowing. COMMENT: A standard noncontrast head CT was performed. Noncontrast CT examination of the cervical spine performed following the standard protocol. Sagittal and coronal reformations rendered from axial source images. Images reviewed in bone and soft tissue windows. CT DOSE:   One or more dose reduction techniques (e.g. automated exposure control, adjustment of the mA and/or kV according to patient size, use of iterative reconstruction technique) utilized for this examination. Comparison:  Head and cervical spine CT dated August 21, 2023. Findings:  Sulci, ventricles and basal cisterns are within normal limits for patient's age. Scattered periventricular subcortical white matter hypoattenuation, nonspecific, likely sequela microangiopathic disease. Prominent perivascular space of the right basal ganglia. No acute hemorrhage, acute territorial infarct, or mass effect is seen.  There is no extra-axial fluid collection.  The visualized paranasal sinuses demonstrates complete opacification with inspissated secretions of the left maxillary sinus and similarly in the left sphenoid sinus. Bilateral ocular lens implants. Mastoid air cells are clear. Left suboccipital scalp hematoma. Cervicothoracic alignment: Mild curvature of the cervical spine to the left. Normal cervical lordosis. Prevertebral soft tissues: Normal in thickness. Vertebral bodies: Normal in height. Intervertebral discs: Disc osteophyte complex at C2-3 and C5-6. Cervical and upper thoracic spinal canal: Patent. Axial images: Skull base: Normal. C1-2: Normal. C2-3: Disc osteophyte complex with exuberant left facet hypertrophy and moderate uncovertebral hypertrophic changes bilaterally, contributing to moderate left foraminal narrowing, mild canal stenosis. C3-4: Exuberant right facet hypertrophy, moderate right uncovertebral hypertrophic changes, contributing to severe right foraminal narrowing. C4-5: Disc osteophyte complex with exuberant right and severe left facet hypertrophy, mild uncovertebral hypertrophic changes bilaterally, contributing to moderate to severe right and moderate left foraminal narrowing. C5-6: Disc osteophyte complex with severe right facet hypertrophy, severe right uncovertebral hypertrophic changes,  contributing to moderate to severe right foraminal narrowing. C6-7: Exuberant right and severe left facet hypertrophy, contributing to moderate right and mild left foraminal narrowing. C7-T1: Normal. Other: Right apical pleural-parenchymal scarring.    CT CERVICAL SPINE WITHOUT IV CONTRAST    Result Date: 10/19/2023  IMPRESSION:  No acute intracranial abnormality. No evidence for acute fracture or traumatic subluxation of the cervical spine. Marked multilevel degenerative spondylosis, with several levels of severe right-sided foraminal narrowing. COMMENT: A standard noncontrast head CT was performed. Noncontrast CT examination of the cervical spine performed following the standard protocol. Sagittal and coronal reformations rendered from axial source images. Images reviewed in bone and soft tissue windows. CT DOSE:  One or more dose reduction techniques (e.g. automated exposure control, adjustment of the mA and/or kV according to patient size, use of iterative reconstruction technique) utilized for this examination. Comparison:  Head and cervical spine CT dated August 21, 2023. Findings:  Sulci, ventricles and basal cisterns are within normal limits for patient's age. Scattered periventricular subcortical white matter hypoattenuation, nonspecific, likely sequela microangiopathic disease. Prominent perivascular space of the right basal ganglia. No acute hemorrhage, acute territorial infarct, or mass effect is seen.  There is no extra-axial fluid collection.  The visualized paranasal sinuses demonstrates complete opacification with inspissated secretions of the left maxillary sinus and similarly in the left sphenoid sinus. Bilateral ocular lens implants. Mastoid air cells are clear. Left suboccipital scalp hematoma. Cervicothoracic alignment: Mild curvature of the cervical spine to the left. Normal cervical lordosis. Prevertebral soft tissues: Normal in thickness. Vertebral bodies: Normal in height. Intervertebral  discs: Disc osteophyte complex at C2-3 and C5-6. Cervical and upper thoracic spinal canal: Patent. Axial images: Skull base: Normal. C1-2: Normal. C2-3: Disc osteophyte complex with exuberant left facet hypertrophy and moderate uncovertebral hypertrophic changes bilaterally, contributing to moderate left foraminal narrowing, mild canal stenosis. C3-4: Exuberant right facet hypertrophy, moderate right uncovertebral hypertrophic changes, contributing to severe right foraminal narrowing. C4-5: Disc osteophyte complex with exuberant right and severe left facet hypertrophy, mild uncovertebral hypertrophic changes bilaterally, contributing to moderate to severe right and moderate left foraminal narrowing. C5-6: Disc osteophyte complex with severe right facet hypertrophy, severe right uncovertebral hypertrophic changes, contributing to moderate to severe right foraminal narrowing. C6-7: Exuberant right and severe left facet hypertrophy, contributing to moderate right and mild left foraminal narrowing. C7-T1: Normal. Other: Right apical pleural-parenchymal scarring.    CT ABDOMEN PELVIS WITH IV CONTRAST    Result Date: 10/19/2023  IMPRESSION:  Please see associated report same date    CT CHEST WITH IV CONTRAST    Result Date: 10/19/2023  IMPRESSION: No acute trauma related findings within the chest, abdomen, pelvis. Multiple old bilateral rib fractures.  Healing bilateral sacral wing fractures since 8/21/2023.  Old pelvic pubic rami fractures. COMMENT: Computed tomography of the chest, abdomen, pelvis was performed at 3 mm intervals following intravenous contrast administration only.  A total of 100 cc of Omnipaque 350 contrast material was administered intravenously.  The current examination was compared to that dated 8/21/2023.  The mediastinum is intact. There are no mediastinal masses or fluid collections. The thoracic aorta and pulmonary arteries are unremarkable. There is no pericardial effusion. No pulmonic  infiltrates are present to suggest contusion.  Chronic pleural-parenchymal scarring has remained stable since prior imaging of 8/21/2023. No new volume loss or pleural effusions have developed. There is no evidence of pneumothorax. Old bilateral rib fractures are seen.  No acute rib fractures are demonstrated. The liver and spleen are intact.  No biliary ductal dilatation is seen.  No gallbladder calculi are demonstrated.  A 5.60 m diameter cyst within the inferior segment of the right hepatic lobe is redemonstrated. The pancreas is unremarkable. There is no evidence of bowel obstruction or acute inflammatory changes of the bowel.  No free fluid or fluid collections are present within the abdomen and pelvis. There is colonic diverticulosis without evidence of diverticulitis. Both kidneys are intact with normal nephrograms and contrast excretion.  There are no perinephric fluid collections. Adrenal glands are normal.  The retroperitoneum is unremarkable. Diffuse calcific atherosclerotic changes of the aortoiliac vessels are present without evidence of aneurysm or dissection. No pelvic masses are seen. Urinary bladder appears intact, containing no calculi. There are healing bilateral sacral wing fractures.  No acute pelvic fractures are seen.  Old healing/healed bilateral pubic ramus fractures are redemonstrated. There is no evidence of acute hip fracture. A superior compression deformity of the T12 vertebral body has remained stable. No acute thoracolumbar spinal fracture or dislocation is demonstrated. CT DOSE:  One or more dose reduction techniques (e.g. automated exposure control, adjustment of the mA and/or kV according to patient size, use of iterative reconstruction technique) utilized for this examination.       OUTPATIENT  FOLLOW-UP / REFERRALS / PENDING TESTS        Outpatient Follow-Up Appointments            In 1 month [facility] Rory Saavedra MD [facility]     In 4 months [facility] [place] [facility] in [place]    In 4 months Michael A. Valentino, MD PhD [facility] in [place]    In 12 months Henry Linares MD [facility] in [facility]  No orders of the defined types were placed in this encounter.      Test Results Pending at Discharge  Unresulted Labs (From admission, onward)     Start     Ordered    10/21/23 0600  Basic metabolic panel  Daily      Question:  Release to patient  Answer:  Immediate   Start Status   10/24/23 0600 Scheduled   10/25/23 0600 Scheduled   10/26/23 0600 Scheduled   10/27/23 0600 Scheduled       10/20/23 1626    10/21/23 0600  Phosphorus  Daily      Question:  Release to patient  Answer:  Immediate   Start Status   10/24/23 0600 Scheduled   10/25/23 0600 Scheduled   10/26/23 0600 Scheduled   10/27/23 0600 Scheduled       10/20/23 1626    10/21/23 0600  Magnesium  Daily      Question:  Release to patient  Answer:  Immediate   Start Status   10/24/23 0600 Scheduled   10/25/23 0600 Scheduled   10/26/23 0600 Scheduled   10/27/23 0600 Scheduled       10/20/23 1626                Important Issues to Address in Follow-Up  EP Cardiology for atrial fibrillation   Cardiology as scheduled   PCP repeat CBC 1 week     DISCHARGE DISPOSITION AND DESTINATION      Disposition: Skilled Nursing Facility - Other   Destination: Skilled Nursing Facility                            Code Status At Discharge: Full Code    Physician Order for Life-Sustaining Treatment Document Status      No documents found

## 2023-10-23 NOTE — SIGNIFICANT EVENT
Internal Medicine  RRT or Code Blue Note       SUMMARY OF EVENT   This is a 89 y.o. year-old female who was admitted on 10/19/2023 with  Atrial flutter (CMS/HCC) [I48.92]  Closed head injury, initial encounter [S09.90XA]  Fall, initial encounter [W19.XXXA]  Ambulatory dysfunction [R26.2]  Acute on chronic anemia [D64.9] and is currently being treated for Fall.  I was called to the room by overhead page for RRT due to patient HR noted to be sustained at 170-160s. EKG performed, which showed atrial fibrillation with rapid ventricular response. Patient's blood pressure was 180/89 and SpO2 95%. Patient remained asymptomatic during the event, however of note, patient has been off AC in setting of fall and Hgb 8.4 > 7.8.     Physical exam notable for warm extremities without significant change from patient's baseline mental status during this admission.     Patient received 5 mg of IV lopressor with improvement in her HR to high 90s to low 100s.    Orders: 5 mg IV lopressor and BMP STAT.        CODE STATUS      Their Code Status at the start of the event was Full Code      CODE LEADER   Alfie Constantino MD       PERTINENT PHYSICAL EXAMINATION      Pre-Event Vital Signs: Temp:  [36.3 °C (97.4 °F)-37.2 °C (98.9 °F)] 36.3 °C (97.4 °F)  Heart Rate:  [] 98  Resp:  [18] 18  BP: (118-177)/(72-96) 135/73     Physical Exam  Notable for above.    SIGNIFICANT LABS / IMAGING      Labs  Results from last 7 days   Lab Units 10/23/23  0209 10/22/23  0503 10/21/23  0539 10/20/23  0502 10/19/23  1328   SODIUM mEQ/L 140 142 142   < > 144   POTASSIUM mEQ/L 4.5 4.0 3.9   < > 4.0   CHLORIDE mEQ/L 108* 110* 111*   < > 110*   CO2 mEQ/L 23 23 23   < > 26   BUN mg/dL 24 11 13   < > 27*   CREATININE mg/dL 0.8 0.5* 0.6   < > 0.7   CALCIUM mg/dL 8.1* 8.3* 7.9*   < > 9.6   ALBUMIN g/dL  --   --   --   --  4.0   BILIRUBIN TOTAL mg/dL  --   --   --   --  0.5   ALK PHOS IU/L  --   --   --   --  56   ALT IU/L  --   --   --   --  17   AST IU/L   --   --   --   --  17   GLUCOSE mg/dL 124* 105* 114*   < > 112*    < > = values in this interval not displayed.     Results from last 7 days   Lab Units 10/21/23  0539 10/20/23  0502 10/19/23  1328   WBC K/uL 7.46 7.24 12.50*   HEMOGLOBIN g/dL 7.8* 8.4* 10.2*   HEMATOCRIT % 24.2* 25.8* 31.3*   PLATELETS K/uL 182 191 251         Imaging  Not applicable    ECG/Telemetry  Atrial fibrillation with rapid ventricular response          POST-EVENT      Diagnosis: Atrial fibrillation with rapid ventricular response without hemodynamic instability    Patient condition at end of event: STABLE    Disposition: RESUME CURRENT CARE LEVEL.        PRIETO COMMUNICATION      Discussed with Attending (Jackson Velazquez MD) or Covering Attending (Dr. Conde): Yes/No: yes    Discussed with Emergency Contact: Extended Emergency Contact Information  Primary Emergency Contact: Shelley Bingham  Address: [address] [place] [address]  Home Phone: [phone]  Mobile Phone: [phone]  Relation: Designated Lay Caregiver  Secondary Emergency Contact: Venkat Ruiz   [place]  Mobile Phone: [phone]  Relation: Son: No, attempted to call patient's caregiver listed in chart without answer.     Follow-Up/Handoff: Follow up BMP. Consider administering patient's diltiazem earlier than morning scheduled dose.      ATTENDING DOCUMENTATION  ALSO SEE ATTENDING ATTESTATION SECTION OF NOTE

## 2023-10-23 NOTE — PROGRESS NOTES
Physical Therapy -  Initial Evaluation     Patient: Madelyn Ruiz  Location: [place] [address] [facility] [address]  MRN: [id number]  Today's date: 10/23/2023    HISTORY OF PRESENT ILLNESS     Madelyn is a 89 y.o. female admitted on 10/19/2023 with Atrial flutter (CMS/HCC) [I48.92]  Closed head injury, initial encounter [S09.90XA]  Fall, initial encounter [W19.XXXA]  Ambulatory dysfunction [R26.2]  Acute on chronic anemia [D64.9]. Principal problem is Fall.    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), Allergic rhinitis (04/02/2018), Asthma, Atrial flutter with rapid ventricular response (CMS/HCC) (02/21/2023), Calcification of aorta (CMS/HCC) (06/29/2020), Chronic obstructive pulmonary disease (CMS/HCC) (04/02/2018), GERD (gastroesophageal reflux disease) (06/08/2019), Insomnia (04/02/2018), Lumbar spinal stenosis (04/02/2018), Macular degeneration disease, Obstructive sleep apnea syndrome (04/02/2018), Osteoporosis (04/02/2018), Primary hypertension (04/02/2018), Pulmonary embolism (CMS/HCC) (06/09/2019), SVT (supraventricular tachycardia) (CMS/[facility]), and Vertebral compression fracture (CMS/[facility]).    History of Present Illness   10/19: Admitted s/p fall.  Doesn't remember the fall, though she mentions the rug (unclear if this was involved). Found down at home. Multiple abrasions and bruising noted over body.   - CT Head and C-spine No acute intracranial abnormality.  No evidence for acute fracture or traumatic subluxation of the cervical spine.      10/23: RRT called. HR noted to in the 170s-160. Sustained. Pt asymptomatic BP -180/89. Spo2-95% EKG done      PRIOR LEVEL OF FUNCTION AND LIVING ENVIRONMENT     Prior Level of Function    Flowsheet Row Most Recent Value   Ambulation assistive equipment   Transferring independent   Toileting independent   Bathing independent   Dressing independent   Eating independent   IADLs  independent   Driving/Transportation    Communication understands/communicates without difficulty   Prior Level of Function Comment reports being IND at home son and daughter in law help during the day and per EMR she has private aids at night   Assistive Device Currently Used at Home cane, quad, walker, front-wheeled, grab bar        Prior Living Environment    Flowsheet Row Most Recent Value   People in Home alone   Current Living Arrangements other (see comments)  [[facility]]   Living Environment Comment lives in a 3 story condo w 3 DANNY        VITALS AND PAIN     PT Vitals    Date/Time Pulse HR Source SpO2 Pt Activity O2 Therapy BP Pt Position Observations [facility]   10/23/23 0904 117 Monitor 95 % At rest None (Room air) 140/69 Lying -- NB   10/23/23 0910 137 -- -- -- -- 103/80 Sitting EOB NB   10/23/23 0911 144 -- -- -- -- 96/61 Sitting EOB NB   10/23/23 0915 135 -- -- -- -- 92/66 Sitting EOB s/p STS NB   10/23/23 0918 99 Monitor 95 % At rest None (Room air) 140/83 Lying -- NB      PT Pain    Date/Time Pain Type Rating: Rest Rating: Activity [facility]   10/23/23 0904 Pain Assessment 0 - no pain 0 - no pain NB           Objective   OBJECTIVE     Start time:  0904  End time:  0922  Session Length: 18 min  Mode of Treatment: co-treatment, physical therapy    General Observations  Patient received supine, in bed. She was agreeable to therapy, no issues or concerns identified by nurse prior to session.      Precautions: fall              PT Eval and Treat - 10/23/23 0904        Cognition    Orientation Status oriented to;place;situation;verbal cues/prompts needed for orientation     Follows Commands follows one-step commands;follows two-step commands;over 90% accuracy;increased processing time needed     Cognitive Function executive function deficit;safety deficit     Executive Function Deficit minimal deficit;insight/awareness of deficits;problem-solving/reasoning     Safety Deficit minimal deficit;awareness  of need for assistance;insight into deficits/self-awareness;problem-solving     Comment, Cognition Pt pleasant and slightly confused, requires cues for orientation. Pt continues to benefit from cues for safe mobility and safe mobility with orthostatics        Bed Mobility    Bed Mobility Activities supine to sit;sit to supine     [place] minimum assist (75% or more patient effort);1 person assist     Safety/Cues increased time to complete;minimal;verbal cues;technique     Assistive Device head of bed elevated;bed rails     Comment OOB to R with Coreen for upright trunk. Once sitting EOB pt endorses dizziness/light headedness. BP drop noted to 103/80. Pt performed LE ther ex EOB        Sit/Stand Transfer    Surface edge of bed     [place] minimum assist (75% or more patient effort);2 person assist     Safety/Cues minimal;verbal cues;hand placement     Assistive Device other (see comments)     Transfer Comments B axillary and hip support from EOB with goal to take side steps to EOB for improved positioning. BP drop s/p STS to 92/66. Returned to supine positioning s/p STS        Gait Training    [place], Gait minimum assist (75% or more patient effort);2 person assist     Safety/Cues minimal;verbal cues;technique     Assistive Device other (see comments)     Distance in Feet 2 feet     Pattern step-to     Comment (Gait/Stairs) side steps to HOB with minAx2        Balance    Static Sitting Balance WFL     Dynamic Sitting Balance mild impairment     Sit to Stand Dynamic Balance mild impairment     Static Standing Balance mild impairment     Dynamic Standing Balance mild impairment        Lower Extremity (Therapeutic Exercise)    Exercise Position/Type seated     General Exercise bilateral;ankle pumps;LAQ (long arc quad)     Reps and Sets 2x10        Impairments/Safety Issues    Impairments Affecting Function balance;endurance/activity tolerance;strength;pain;cognition     Cognitive Impairments,  Safety/Performance awareness, need for assistance;insight into deficits/self-awareness;problem-solving/reasoning     Functional Endurance Limited by ++ symptomatic orthostatic BP. HR sustained 130-140s, max at 171     Safety Issues Affecting Function insight into deficits/self-awareness;awareness of need for assistance;judgment                                  Session Outcome  Patient upright, in bed at end of session, bed alarm on, call light in reach, personal items in reach, all needs met. Nursing notified about patient's performance, patient's position, patient's response to therapy/activity, and change in vital signs.    AM-PAC™ - Mobility (Current Function)     Turning form your back to your side while in flat bed without using bedrails 3 - A Little   Moving from lying on your back to sitting on the side of a flat bed without using bedrails 3 - A Little   Moving to and from a bed to a chair 3 - A Little   Standing up from a chair using your arms 3 - A Little   To walk in a hospital room 2 - A Lot   Climbing 3-5 steps with a railing 2 - A Lot   AM-PAC™ Mobility Score 16      ASSESSMENT AND PLAN     Progress Summary  PT follow up completed. Pt continues to require Coreen for bed mobility, and requires minAx2 for STS from EOB and side steps to HOB. Pt limited during todays session by orthostic BP. See vital signs for details. Cont to rec d/c to SNF to maximize functional endurance, balance and mobility.         PT Plan    Flowsheet Row Most Recent Value   Rehab Potential good, to achieve stated therapy goals at 10/23/2023 0904   Therapy Frequency 4 times/wk at 10/23/2023 0904   Planned Therapy Interventions balance training, home exercise program, bed mobility training, gait training, strengthening, transfer training, stair training, postural re-education at 10/23/2023 0904          PT Discharge Recommendations    Flowsheet Row Most Recent Value   PT Recommended Discharge Disposition skilled nursing facility at  10/23/2023 0904   Anticipated Equipment Needs if Discharged Home (PT) none at 10/23/2023 0904               PT Goals    Flowsheet Row Most Recent Value   Bed Mobility Goal 1    Activity/Assistive Device bed mobility activities, all at 10/20/2023 0930   [place] independent at 10/20/2023 0930   Time Frame short-term goal (STG) at 10/20/2023 0930   Progress/Outcome goal ongoing at 10/20/2023 0930   Transfer Goal 1    Activity/Assistive Device all transfers, walker, front-wheeled at 10/20/2023 0930   [place] modified independence at 10/20/2023 0930   Time Frame short-term goal (STG) at 10/20/2023 0930   Progress/Outcome goal ongoing at 10/20/2023 0930   Gait Training Goal 1    Activity/Assistive Device gait (walking locomotion), walker, front-wheeled at 10/20/2023 0930   [place] modified independence at 10/20/2023 0930   Distance 100 at 10/20/2023 0930   Time Frame short-term goal (STG) at 10/20/2023 0930   Progress/Outcome goal ongoing at 10/20/2023 0930

## 2023-10-23 NOTE — ASSESSMENT & PLAN NOTE
Physical therapy unable to complete evaluation 10/23 due to positive orthostatic vitals and rapid AF.   /69 (supine), 96/61 (sitting), 92/66 (standing), 140/83 (supine at end of session), Telemetry Afib 130-140 during session. Endorses palpitations and mild SOB during this. Had not received daily PO dilt yet    -Compression stockings  -EP following, treat Afib   -Daily orthostatics   -Consider midodrine   -Continue holding losartan and lasix  -Encourage PO intake, could trial fluids since EF ~ 70%

## 2023-10-23 NOTE — ASSESSMENT & PLAN NOTE
"89yo F w/ chronic AF admitted with fall, OAC on hold 2/2 anemia/falls    - Code status: Full Code. Encouraged conversations regarding wishes  - Capacity for medical decision making: reliable  - Advance Directives: HC POA paperwork on file  - Goals of care: Goals are life-prolonging/rehab-focused. Pt fearful of a stroke, pt/family would consider resuming oral AC if pt has safe dispo plan, medical team to speak with family  - Surrogate Decision Maker: daughter Shelley \"Demetria\" Zachery and son Koby Ruiz are joint HC POAs      "

## 2023-10-23 NOTE — CONSULTS
"  [facility] Palliative Care Consult      Patient Name: Madelyn Ruiz                                                                                        Patient MRN: [id number]  Date / Time Note Created: 10/23/2023 9:14 AM  Attending Physician: Brunilda Mcclelland DO  Referring Physician: No ref. provider found  Primary Care Physician: Henry Linares MD   This is Hospital Day: 5   Reason for Consult:  Assistance with clarification of goals of care    Assessment and Plan  Debility  Assessment & Plan  -progressive debility in s/o advanced age with recent more frequent falls, on OAC for chronic afib  -underlying COPD  -PPS 50%, baseline 60%  -lives alone, daughter checks in frequently, has cane/RW    Plan:  -SNF then home w/ 24/7 care  -daughter arranging 24/7 private duty services at home  -Rec home-based palliative NP service s/p SNF    Palliative care by specialist  Assessment & Plan  89yo F w/ chronic AF admitted with fall, OAC on hold 2/2 anemia/falls    - Code status: Full Code. Encouraged conversations regarding wishes  - Capacity for medical decision making: reliable  - Advance Directives: HC POA paperwork on file  - Goals of care: Goals are life-prolonging/rehab-focused. Pt fearful of a stroke, pt/family would consider resuming oral AC if pt has safe dispo plan, medical team to speak with family  - Surrogate Decision Maker: daughter Shelley \"Demetria\" Zachery and son Koby Ruiz are joint HC POAs          HPI  Source: EMR, medical attending and NP    Madelyn Ruiz is a 89 y/o F h/o COPD, A flutter (apixaban), STACIA, SVT, recent falls, admitted for fall w/ questionable snycope, trauma w/u negative.    Palliative Care consulted for goals of care in s/o advanced age w/ afib, OAC on hold 2/2 falls/anemia. Unknown to Palliative Care team. Pt lives alone, daughter lives nearby, multiple falls recently.    Pt seen at bedside. She denies pain or SOB. No chest pain or vision changes. No n/v.      Goals of " "Care/Conversations: Life-prolonging/restorative/Rehab focused, amenable to STR/SNF. Pt is hopeful to visit son's family in [place] for Demetria, also eager to attend her granddtr's college graduation this Spring 2024. She has 5 children, dtr Shelley \"Demetria\" lives nearby. Has 12 grandchildren. Spoke with daughter eDmetria who plans to hire 24/7 private duty services s/p SNF, for mom to remain in her home. Amenable to home care services and home based Palliative Np also. Encouraged code status conversations. Daughter Demetria and son Koby are designated HC POAs. In regards to AC, pt very worried about having a stroke, pt/family would consider resuming AC since safe home plan in place.    Chart Review:  The following portions of the patient’s history were reviewed and updated as appropriate:    Review of Systems:  All other systems reviewed and negative except as noted in the HPI.    Objective    Physical Exam:  General:   No Acute Distress, frail elderly female  Eyes:  Sclera Anicteric  ENMT:  Mucus Membranes Moist  CV:  Afib, tachycardic  Lungs:  Nonlabored  Abdomen:  Bowel Sounds present  Psych:  Appropriate and Cooperative  Neuro:  Awake, Alert and Oriented  person, place, time/date and situation  Skin:  No Rash      Code Status History:  Current Code Status: Full Code    Past Family History  Family History   Problem Relation Age of Onset   • Glaucoma Biological Mother    • Macular degeneration Biological Mother    • Hypertension Biological Mother    • Lung cancer Biological Father    • Heart disease Biological Brother    • Breast cancer Mother's Sister    • No Known Problems Biological Son    • No Known Problems Biological Son    • No Known Problems Biological Daughter    • No Known Problems Biological Daughter    • No Known Problems Biological Daughter        Past Medical History  Past Medical History:   Diagnosis Date   • Allergic bronchopulmonary aspergillosis (CMS/[facility]) 04/02/2018    Allergist: Dr. Arriaza.  Stable FEV1 in " 8/2015.  IgE levels and eosinophils stable in 8/2015.  Managed with Fasenra, Azithromycin and Flovent.   • Allergic rhinitis 04/02/2018    Pulmonologist: Dr. Walker. Managed with Flonase.   • Asthma    • Atrial flutter with rapid ventricular response (CMS/HCC) 02/21/2023    Diagnosed in 02/2023 and hospitalized Managed with Apixaban and Diltiazem Echocardiogram 02/2023 •  Left Ventricle: Normal ventricle size. Mild concentric left ventricular hypertrophy. Preserved systolic function. Estimated EF 65-70%. No regional wall motion abnormalities. Grade I diastolic dysfunction. •  Right Ventricle: Normal ventricle size. Normal systolic function. •  Left Atrium: Moderately   • Calcification of aorta (CMS/HCC) 06/29/2020    Seen incidentally on CT scan.   • Chronic obstructive pulmonary disease (CMS/HCC) 04/02/2018    Pulmonologist: Dr. Walker.  Seen on PFTs in hospital in 2014.  Controlled with Spiriva and FLovent.   • GERD (gastroesophageal reflux disease) 06/08/2019    Managed with Esomeprazole.  Should take medication 30 minutes prior to meal.  Advised to avoid caffeine, carbonated beverages, and should not eat within 3 hours of going to sleep.  Advised to reduce BMI < 25.    • Insomnia 04/02/2018    Managed with Lorazepam as needed.   • Lumbar spinal stenosis 04/02/2018    Neurosurgeon: Dr. Miguel. MRI with Central and lateral recess spinal stenosis. Has Spondylolisthesis at L4/L5. S/P epidural injection by Dr. Martin with some relief in past. Had Lumbar fusion and Lumbar laminectomy by Dr. Miguel in 4/2015.   • Macular degeneration disease    • Obstructive sleep apnea syndrome 04/02/2018    Mild STACIA noted in sleep study 7/2015. Treated with nasal CPAP automatic with pressure range 5-10 CM H2Ox couple months. Off now   • Osteoporosis 04/02/2018    Rheumatologist: Dr. Bradley.  Dexa scan 7/2016 with osteoporosis of lumbar spine LS-2.5, LH-2.1, RH -2.2.  Has been on Fosamax in the past for 8 years.  Worsened by  Prednisone in past.  Continue vitamin D, calcium and weight bearing exercise. DEXA in 7/2017 with LS -1.7, LH -2.2, and RH -2.0.  Next due in 7/2019.  Seen by Dr. Bradley in 8/2015 who agreed with initiating Prolia. Took for 2 years. N   • Primary hypertension 04/02/2018    Managed on Diltiazem and Losartan. Advised to follow a low sodium diet and keep BMI < 25.  Advised to exercise for 2.5 hours per week.  Instructed to take home blood pressure readings and call if consistently above 140/90.     • Pulmonary embolism (CMS/HCC) 06/09/2019    Hematologist: Dr. Bhakta Diagnosed in 06/2019 in right upper lobe. Vascular ultrasound negative of bilateral lower extremities. Managed on Eliquis. Hypercoagulable workup was negative for beta-2 glycoprotein's, RIGO, Antithrombin III, lupus anticoagulant, protein C activity protein S activity, and prothrombin gene mutation, and factor V Leiden. Now off Apixaban as CT scan in 12/2019 was without pu   • SVT (supraventricular tachycardia) (CMS/HCC)    • Vertebral compression fracture (CMS/HCC)     At T12 level       Past Surgical History  Past Surgical History:   Procedure Laterality Date   • APPENDECTOMY     • CARPAL TUNNEL RELEASE Bilateral    • CATARACT EXTRACTION W/  INTRAOCULAR LENS IMPLANT Bilateral    • HYSTERECTOMY  1972   • LUMBAR LAMINECTOMY  04/21/2015    S/P lumbar fusion   • TONSILLECTOMY         Social History   Social History     Socioeconomic History   • Marital status:    • Number of children: 5   Occupational History   • Occupation: Retired     Comment: Former Teacher   Tobacco Use   • Smoking status: Never   • Smokeless tobacco: Never   Vaping Use   • Vaping Use: Never used   Substance and Sexual Activity   • Alcohol use: Yes     Comment: Rarely   • Drug use: No   Social History Narrative    Exercise: No formal exercise. Due to back pain from spinal stenosis.     Social Determinants of Health     Financial Resource Strain: Low Risk  (8/22/2023)    Overall  Financial Resource Strain (CARDIA)    • Difficulty of Paying Living Expenses: Not hard at all   Food Insecurity: No Food Insecurity (10/19/2023)    Hunger Vital Sign    • Worried About Running Out of Food in the Last Year: Never true    • Ran Out of Food in the Last Year: Never true   Transportation Needs: No Transportation Needs (8/22/2023)    PRAPARE - Transportation    • Lack of Transportation (Medical): No    • Lack of Transportation (Non-Medical): No   Physical Activity: Inactive (10/2/2020)    Exercise Vital Sign    • Days of Exercise per Week: 0 days    • Minutes of Exercise per Session: 0 min   Stress: No Stress Concern Present (2/21/2023)    [employer] - Occupational Stress Questionnaire    • Feeling of Stress : Not at all   Housing Stability: Low Risk  (8/22/2023)    Housing Stability Vital Sign    • Unable to Pay for Housing in the Last Year: No    • Number of Places Lived in the Last Year: 1    • Unstable Housing in the Last Year: No       Jew:  Buddhist    Home Medications:  •  apixaban, Take 2.5 mg by mouth 2 (two) times a day.  •  azithromycin, Take 1 tablet (250 mg total) by mouth 3 (three) times a week (Mon, Wed, Fri).  •  calcium carbonate, Take 1 tablet by mouth 2 (two) times a day.  •  cholecalciferol (vitamin D3), Take 1 tablet by mouth daily.  •  docusate sodium, Take 100 mg by mouth 2 (two) times a day as needed for constipation.  •  DULoxetine, Take 60 mg by mouth at bedtime.   •  fexofenadine, Take 180 mg by mouth daily as needed (allergies).  •  FOLIC ACID/MULTIVIT,IRON, (CENTRUM ORAL), Take 1 tablet by mouth daily.  •  furosemide, Take 1 tablet (20 mg total) by mouth every other day.  •  lidocaine, Place 1 patch on the skin daily. Remove & discard patch within 12 hours or as directed by prescriber.  •  XIIDRA,   •  LORazepam, Take 1 mg by mouth nightly as needed for anxiety or sleep.  •  losartan, Take 1 tablet (50 mg total) by mouth nightly.  •   pregabalin, Take 25 mg by mouth daily with lunch. PRN  •  pregabalin, Take 50 mg by mouth 2 (two) times a day.  •  SPIRIVA RESPIMAT, Inhale 2 puffs daily.  •  SYMBICORT, Inhale 2 puffs 2 (two) times a day.  •  TIADYLT ER, TAKE 1 CAPSULE BY MOUTH EVERY DAY  •  traMADoL, Take 50 mg by mouth 3 (three) times a day as needed.  [facility], [facility] AWARxE query reviewed with no concerns.    Current Medications:  •  acetaminophen, 650 mg, oral, q4h PRN  •  [Provider Managed Hold] apixaban, 2.5 mg, oral, BID  •  azithromycin, 250 mg, oral, Once per day on Mon Wed Fri  •  benzonatate, 100 mg, oral, 3x daily PRN  •  bisacodyL, 10 mg, rectal, Daily PRN  •  cholecalciferol (vitamin D3), 1,000 Units, oral, Daily  •  glucose, 16-32 g of dextrose, oral, PRN **OR** dextrose, 15-30 g of dextrose, oral, PRN **OR** glucagon, 1 mg, intramuscular, PRN **OR** dextrose 50 % in water (D50), 25 mL, intravenous, PRN  •  dilTIAZem CD, 360 mg, oral, Daily  •  docusate sodium, 100 mg, oral, 2x daily PRN  •  DULoxetine, 60 mg, oral, Nightly  •  [Provider Managed Hold] furosemide, 20 mg, oral, Every other day  •  lidocaine, 1 patch, Topical, Daily  •  LORazepam, 0.5 mg, oral, Nightly PRN  •  [Provider Managed Hold] losartan, 50 mg, oral, Nightly  •  mometasone-formoterol, 2 puff, inhalation, BID (8a, 8p)  •  multivitamin, 1 tablet, oral, Daily  •  pregabalin, 25 mg, oral, Daily with lunch  •  pregabalin, 50 mg, oral, BID  •  senna, 1 tablet, oral, 2x daily PRN  •  tiotropium bromide, 2 puff, inhalation, Daily  •  traMADoL, 50 mg, oral, 3x daily PRN    Allergies:  Allergies   Allergen Reactions   • Mepolizumab Rash   • Morphine      Other reaction(s): Vomiting   • Oxycodone      Other reaction(s): Vomiting       Vitals:  Vitals:    10/23/23 0739   BP:    Pulse: 97   Resp:    Temp:    SpO2:        Intake & Output:  I/O last 3 completed shifts:  In: 500 [P.O.:480; I.V.:20]  Out: 2500 [Urine:2500]    Laboratory Studies:  CBC Results   "    Results from last 7 days   Lab Units 10/21/23  0539 10/20/23  0502 10/19/23  1328   WBC K/uL 7.46 7.24 12.50*   HEMOGLOBIN g/dL 7.8* 8.4* 10.2*   HEMATOCRIT % 24.2* 25.8* 31.3*   PLATELETS K/uL 182 191 251   DIFF TYPE   --   --  Auto   NRBC %  --   --  0.0   IMM GRANULOCYTES %  --   --  0.6   NEUTROPHILS %  --   --  81.1   LYMPHOCYTES %  --   --  8.2   MONOCYTES %  --   --  10.0   EOSINOPHILS %  --   --  0.0   BASOPHILS %  --   --  0.1   IMM GRANUCOCYTES ABS K/uL  --   --  0.08   MONO ABS AUTO K/uL  --   --  1.25*   EOS ABS AUTO K/uL  --   --  0.00*   BASO ABS AUTO K/uL  --   --  0.01            CMP Results      Results from last 7 days   Lab Units 10/23/23  0209 10/22/23  0503 10/21/23  0539 10/20/23  0502 10/19/23  1328   SODIUM mEQ/L 140 142 142   < > 144   POTASSIUM mEQ/L 4.5 4.0 3.9   < > 4.0   CHLORIDE mEQ/L 108* 110* 111*   < > 110*   CO2 mEQ/L 23 23 23   < > 26   BUN mg/dL 24 11 13   < > 27*   CREATININE mg/dL 0.8 0.5* 0.6   < > 0.7   CALCIUM mg/dL 8.1* 8.3* 7.9*   < > 9.6   ALBUMIN g/dL  --   --   --   --  4.0   BILIRUBIN TOTAL mg/dL  --   --   --   --  0.5   ALK PHOS IU/L  --   --   --   --  56   ALT IU/L  --   --   --   --  17   AST IU/L  --   --   --   --  17   GLUCOSE mg/dL 124* 105* 114*   < > 112*    < > = values in this interval not displayed.          Troponin I Results      No results found for: \"TROPONINT\"       ABG Results                   I have reviewed the patient's labs to the time of note. No new clinical concern.    Imaging and Other Studies:     CT HEAD WITHOUT IV CONTRAST    Result Date: 10/19/2023  IMPRESSION:  No acute intracranial abnormality. No evidence for acute fracture or traumatic subluxation of the cervical spine. Marked multilevel degenerative spondylosis, with several levels of severe right-sided foraminal narrowing. COMMENT: A standard noncontrast head CT was performed. Noncontrast CT examination of the cervical spine performed following the standard protocol. Sagittal " and coronal reformations rendered from axial source images. Images reviewed in bone and soft tissue windows. CT DOSE:  One or more dose reduction techniques (e.g. automated exposure control, adjustment of the mA and/or kV according to patient size, use of iterative reconstruction technique) utilized for this examination. Comparison:  Head and cervical spine CT dated August 21, 2023. Findings:  Sulci, ventricles and basal cisterns are within normal limits for patient's age. Scattered periventricular subcortical white matter hypoattenuation, nonspecific, likely sequela microangiopathic disease. Prominent perivascular space of the right basal ganglia. No acute hemorrhage, acute territorial infarct, or mass effect is seen.  There is no extra-axial fluid collection.  The visualized paranasal sinuses demonstrates complete opacification with inspissated secretions of the left maxillary sinus and similarly in the left sphenoid sinus. Bilateral ocular lens implants. Mastoid air cells are clear. Left suboccipital scalp hematoma. Cervicothoracic alignment: Mild curvature of the cervical spine to the left. Normal cervical lordosis. Prevertebral soft tissues: Normal in thickness. Vertebral bodies: Normal in height. Intervertebral discs: Disc osteophyte complex at C2-3 and C5-6. Cervical and upper thoracic spinal canal: Patent. Axial images: Skull base: Normal. C1-2: Normal. C2-3: Disc osteophyte complex with exuberant left facet hypertrophy and moderate uncovertebral hypertrophic changes bilaterally, contributing to moderate left foraminal narrowing, mild canal stenosis. C3-4: Exuberant right facet hypertrophy, moderate right uncovertebral hypertrophic changes, contributing to severe right foraminal narrowing. C4-5: Disc osteophyte complex with exuberant right and severe left facet hypertrophy, mild uncovertebral hypertrophic changes bilaterally, contributing to moderate to severe right and moderate left foraminal narrowing.  C5-6: Disc osteophyte complex with severe right facet hypertrophy, severe right uncovertebral hypertrophic changes, contributing to moderate to severe right foraminal narrowing. C6-7: Exuberant right and severe left facet hypertrophy, contributing to moderate right and mild left foraminal narrowing. C7-T1: Normal. Other: Right apical pleural-parenchymal scarring.    CT CERVICAL SPINE WITHOUT IV CONTRAST    Result Date: 10/19/2023  IMPRESSION:  No acute intracranial abnormality. No evidence for acute fracture or traumatic subluxation of the cervical spine. Marked multilevel degenerative spondylosis, with several levels of severe right-sided foraminal narrowing. COMMENT: A standard noncontrast head CT was performed. Noncontrast CT examination of the cervical spine performed following the standard protocol. Sagittal and coronal reformations rendered from axial source images. Images reviewed in bone and soft tissue windows. CT DOSE:  One or more dose reduction techniques (e.g. automated exposure control, adjustment of the mA and/or kV according to patient size, use of iterative reconstruction technique) utilized for this examination. Comparison:  Head and cervical spine CT dated August 21, 2023. Findings:  Sulci, ventricles and basal cisterns are within normal limits for patient's age. Scattered periventricular subcortical white matter hypoattenuation, nonspecific, likely sequela microangiopathic disease. Prominent perivascular space of the right basal ganglia. No acute hemorrhage, acute territorial infarct, or mass effect is seen.  There is no extra-axial fluid collection.  The visualized paranasal sinuses demonstrates complete opacification with inspissated secretions of the left maxillary sinus and similarly in the left sphenoid sinus. Bilateral ocular lens implants. Mastoid air cells are clear. Left suboccipital scalp hematoma. Cervicothoracic alignment: Mild curvature of the cervical spine to the left. Normal  cervical lordosis. Prevertebral soft tissues: Normal in thickness. Vertebral bodies: Normal in height. Intervertebral discs: Disc osteophyte complex at C2-3 and C5-6. Cervical and upper thoracic spinal canal: Patent. Axial images: Skull base: Normal. C1-2: Normal. C2-3: Disc osteophyte complex with exuberant left facet hypertrophy and moderate uncovertebral hypertrophic changes bilaterally, contributing to moderate left foraminal narrowing, mild canal stenosis. C3-4: Exuberant right facet hypertrophy, moderate right uncovertebral hypertrophic changes, contributing to severe right foraminal narrowing. C4-5: Disc osteophyte complex with exuberant right and severe left facet hypertrophy, mild uncovertebral hypertrophic changes bilaterally, contributing to moderate to severe right and moderate left foraminal narrowing. C5-6: Disc osteophyte complex with severe right facet hypertrophy, severe right uncovertebral hypertrophic changes, contributing to moderate to severe right foraminal narrowing. C6-7: Exuberant right and severe left facet hypertrophy, contributing to moderate right and mild left foraminal narrowing. C7-T1: Normal. Other: Right apical pleural-parenchymal scarring.    CT ABDOMEN PELVIS WITH IV CONTRAST    Result Date: 10/19/2023  IMPRESSION:  Please see associated report same date    CT CHEST WITH IV CONTRAST    Result Date: 10/19/2023  IMPRESSION: No acute trauma related findings within the chest, abdomen, pelvis. Multiple old bilateral rib fractures.  Healing bilateral sacral wing fractures since 8/21/2023.  Old pelvic pubic rami fractures. COMMENT: Computed tomography of the chest, abdomen, pelvis was performed at 3 mm intervals following intravenous contrast administration only.  A total of 100 cc of Omnipaque 350 contrast material was administered intravenously.  The current examination was compared to that dated 8/21/2023.  The mediastinum is intact. There are no mediastinal masses or fluid  collections. The thoracic aorta and pulmonary arteries are unremarkable. There is no pericardial effusion. No pulmonic infiltrates are present to suggest contusion.  Chronic pleural-parenchymal scarring has remained stable since prior imaging of 8/21/2023. No new volume loss or pleural effusions have developed. There is no evidence of pneumothorax. Old bilateral rib fractures are seen.  No acute rib fractures are demonstrated. The liver and spleen are intact.  No biliary ductal dilatation is seen.  No gallbladder calculi are demonstrated.  A 5.60 m diameter cyst within the inferior segment of the right hepatic lobe is redemonstrated. The pancreas is unremarkable. There is no evidence of bowel obstruction or acute inflammatory changes of the bowel.  No free fluid or fluid collections are present within the abdomen and pelvis. There is colonic diverticulosis without evidence of diverticulitis. Both kidneys are intact with normal nephrograms and contrast excretion.  There are no perinephric fluid collections. Adrenal glands are normal.  The retroperitoneum is unremarkable. Diffuse calcific atherosclerotic changes of the aortoiliac vessels are present without evidence of aneurysm or dissection. No pelvic masses are seen. Urinary bladder appears intact, containing no calculi. There are healing bilateral sacral wing fractures.  No acute pelvic fractures are seen.  Old healing/healed bilateral pubic ramus fractures are redemonstrated. There is no evidence of acute hip fracture. A superior compression deformity of the T12 vertebral body has remained stable. No acute thoracolumbar spinal fracture or dislocation is demonstrated. CT DOSE:  One or more dose reduction techniques (e.g. automated exposure control, adjustment of the mA and/or kV according to patient size, use of iterative reconstruction technique) utilized for this examination.            I have independently reviewed the pertinent cardiac studies to the time of  note and agree with reported results. EKG 10/23 reviewed, QTC: 510                                                                      Thank you for the opportunity to participate in this patient's hospital plan of care.       Margie Vaughan, [place]  Office [phone]   Palliative Care [facility], [facility]  [phone] [facility] office  [phone] [facility] Fax  [address] [place] [address]

## 2023-10-23 NOTE — ASSESSMENT & PLAN NOTE
-progressive debility in s/o advanced age with recent more frequent falls, on OAC for chronic afib  -underlying COPD  -PPS 50%, baseline 60%  -lives alone, daughter checks in frequently, has cane/RW    Plan:  -SNF then home w/ 24/7 care  -daughter arranging 24/7 private duty services at home  -Rec home-based palliative NP service s/p SNF

## 2023-10-23 NOTE — NURSING NOTE
At 01:40 am pt HR noted to in the 170s-160. Sustained. Pt asymptomatic BP -180/89. Spo2-95%. RRT called . EKG done. IV Metoprolol 5mg given, Vs monitored. HR in the 90s. Cont to monitor.

## 2023-10-23 NOTE — PLAN OF CARE
Care Coordination Discharge Plan Note     Discharge Needs Assessment  Concerns to be Addressed: discharge planning, care coordination/care conferences  Current Discharge Risk: dependent with mobility/activities of daily living    Anticipated Discharge Plan  Anticipated Discharge Disposition: skilled nursing facility  Type of Skilled Nursing Care Services: PT, OT, nursing      Patient Choice  Offered/Gave Vendor List: yes  Patient's Choice of Community Agency(s): [facility]    Patient and/or patient guardian/advocate was made aware of their right to choose a provider. A list of eligible providers was presented and reviewed with the patient and/or patient guardian/advocate in written and/or verbal form. The list delineates providers in the patient’s desired geographic area who are participating in the Medicare program and/or providers contracted with the patient’s primary insurance. The Medicare list and quality ratings were obtained from the Medicare.gov [medicare.gov] website.    ---------------------------------------------------------------------------------------------------------------------    Interdisciplinary Discharge Plan Review:  Participants:     Concerns Comments: Pt is medically stable for d/c tomorrow. Monitoring BP today. PT/OT rec'd SNF. LAMIN spoke with Pt and daughter last week, who preferred placement at [facility] or [facility]. Pt was previously accepted at both facilities. LAMIN received update from Luann at [facility] ([phone]), who states Pt is no longer accepted due to new Covid outbreak at facility. Adam at [facility] confirmed Pt is accepted for tomorrow. Update provided to Pt and daughter (Shelley: [phone]), who are agreeable with d/c to [facility] for SNF. [facility] scheduled for 2:30pm tomorrow to SNF. Pt's daughter requesting medical update; MD and MICA notified.    Discharge Plan:   Disposition/Destination: Skilled Nursing Facility - Other  / Skilled Nursing Facility  Discharge  Facility:   Destination - Admitted Since 10/19/2023     Service Provider Selected Services Address Phone Fax Patient Preferred Last Updated    [facility] [address], [place] [address] -- [phone] -- Aranza Vargas LSW 10/23/2023 1253        Community Resources:      Discharge Transportation:  Is Out of Hospital DNR needed at Discharge:    Does patient need discharge transport? Yes  Discharge Transportation Vendor: [facility] ([phone], option [phone])  Type of Transportation: basic life support  What day is the transport expected?: 10/24/23  What time is the transport expected?: 4590

## 2023-10-23 NOTE — PROGRESS NOTES
Occupational Therapy -  Daily Treatment/Progress Note     Patient: Madelyn Ruiz  [facility] [place] [address] [facility]  MRN: [id number]  Today's date: 10/23/2023    HISTORY OF PRESENT ILLNESS     Madelyn is a 89 y.o. female admitted on 10/19/2023 with Atrial flutter (CMS/HCC) [I48.92]  Closed head injury, initial encounter [S09.90XA]  Fall, initial encounter [W19.XXXA]  Ambulatory dysfunction [R26.2]  Acute on chronic anemia [D64.9]. Principal problem is Fall.    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), Allergic rhinitis (04/02/2018), Asthma, Atrial flutter with rapid ventricular response (CMS/HCC) (02/21/2023), Calcification of aorta (CMS/HCC) (06/29/2020), Chronic obstructive pulmonary disease (CMS/HCC) (04/02/2018), GERD (gastroesophageal reflux disease) (06/08/2019), Insomnia (04/02/2018), Lumbar spinal stenosis (04/02/2018), Macular degeneration disease, Obstructive sleep apnea syndrome (04/02/2018), Osteoporosis (04/02/2018), Primary hypertension (04/02/2018), Pulmonary embolism (CMS/HCC) (06/09/2019), SVT (supraventricular tachycardia) (CMS/[facility]), and Vertebral compression fracture (CMS/[facility]).    History of Present Illness   10/19: Admitted s/p fall.  Doesn't remember the fall, though she mentions the rug (unclear if this was involved). Found down at home. Multiple abrasions and bruising noted over body.   - CT Head and C-spine No acute intracranial abnormality.  No evidence for acute fracture or traumatic subluxation of the cervical spine.      10/23: RRT called. HR noted to in the 170s-160. Sustained. Pt asymptomatic BP -180/89. Spo2-95% EKG done      PRIOR LEVEL OF FUNCTION AND LIVING ENVIRONMENT     Prior Level of Function    Flowsheet Row Most Recent Value   Dominant Hand right   Ambulation assistive equipment   Transferring independent   Toileting independent   Bathing independent   Dressing  independent   Eating independent   IADLs independent   Driving/Transportation    Communication understands/communicates without difficulty   Prior Level of Function Comment reports being IND at home son and daughter in law help during the day and per EMR she has private aids at night   Assistive Device Currently Used at Home cane, quad, walker, front-wheeled, grab bar        Prior Living Environment    Flowsheet Row Most Recent Value   People in Home alone   Current Living Arrangements other (see comments)  [[facility]]   Living Environment Comment lives in a 3 story condo w 3 DANNY        Occupational Profile    Flowsheet Row Most Recent Value   Environmental Supports and Barriers private aid on duty at night        VITALS AND PAIN     OT Vitals    Date/Time Pulse HR Source SpO2 Pt Activity O2 Therapy BP Pt Position Observations [facility]   10/23/23 0904 117 Monitor 95 % At rest None (Room air) 140/69 Lying -- NB   10/23/23 0910 137 -- -- -- -- 103/80 Sitting EOB NB   10/23/23 0911 144 -- -- -- -- 96/61 Sitting EOB NB   10/23/23 0915 135 -- -- -- -- 92/66 Sitting EOB s/p STS NB   10/23/23 0918 99 Monitor 95 % At rest None (Room air) 140/83 Lying -- NB      OT Pain    Date/Time Pain Type Rating: Rest Rating: Activity [facility]   10/23/23 0904 Pain Assessment 0 - no pain 0 - no pain NB           Objective   OBJECTIVE     Start time:  0903  End time:  0926  Session Length: 23 min  Mode of Treatment: occupational therapy, co-treatment (optimize safe mobility +postural hypotension)    General Observations  Patient received supine. She was agreeable to therapy, no issues or concerns identified by nurse prior to session.      Precautions: fall (othrostatic)       Limitations/Impairments: safety/cognitive      OT Eval and Treat - 10/23/23 0903        Cognition    Orientation Status oriented x 3;verbal cues/prompts needed for orientation     Affect/Mental Status anxious     Follows Commands follows two-step  commands;75-90% accuracy;verbal cues/prompting required     Cognitive Function executive function deficit;safety deficit     Executive Function Deficit minimal deficit;insight/awareness of deficits;judgment;information processing     Safety Deficit minimal deficit;awareness of need for assistance;judgment;insight into deficits/self-awareness     Comment, Cognition pt pleasant and engaged in therapy session, liimited by anxiety +cues for safety and insight     Cognitive Interventions/Strategies occupation/activity based interventions        Bed Mobility    Bed Mobility Activities supine to sit;sit to supine     [place] minimum assist (75% or more patient effort)     Safety/Cues increased time to complete;minimal;verbal cues;tactile cues;preparatory posture;technique;sequencing     Assistive Device bed rails;head of bed elevated     Comment OOB to R, min A to bring trunk upright +c/o dizziness, +orthostatic , completed UB/LE therex with iminimal improvment        Sit/Stand Transfer    Surface edge of bed     [place] minimum assist (75% or more patient effort);2 person assist     Safety/Cues increased time to complete;minimal;verbal cues;tactile cues;preparatory posture     Transfer Comments HHA x 2 form edge of bed, limited by postiural hypotension, able to take few steps to head of bed to optimize postion for meal        Functional Mobility    Distance few steps at bedside     Functional Mobility [place] minimum assist (75% or more patient effort);2 person assist     Safety/Cues verbal cues;hand placement;tactile cues;minimal;preparatory posture     Functional Mobility Comments HHA x 2        Upper Body Dressing    Tasks don;hospital gown;pajama/robe     [place] minimum assist (75% or more patient effort)     Safety/Cues increased time to complete;minimal;verbal cues;tactile cues;energy conservation     Position supported sitting        Lower Body Dressing    Tasks don;doff;socks     [place]  moderate assist (50-74% patient effort)     Safety/Cues increased time to complete;minimal;verbal cues;tactile cues;energy conservation;problem-solving     Position supported sitting        Grooming    Tasks washes, rinses and dries face;washes, rinses and dries hands;brushes/rubio hair     [place] set up;supervision     Safety/Cues increased time to complete;minimal;verbal cues;initiation     Position edge of bed sitting        Self-Feeding    Tasks liquids to mouth;scoop food onto utensil;utensil to mouth     [place] supervision;set up     Safety/Cues increased time to complete;minimal;verbal cues     Comment Assist to open containters , lmited FMC        Balance    Static Sitting Balance WFL     Dynamic Sitting Balance mild impairment;supported;sitting, edge of bed     Sit to Stand Dynamic Balance mild impairment;supported;standing     Static Standing Balance mild impairment;supported;standing     Dynamic Standing Balance mild impairment;supported;standing     Balance Interventions occupation based/functional task        Impairments/Safety Issues    Impairments Affecting Function balance;endurance/activity tolerance;strength;pain;cognition   othostatic hypotension    Cognitive Impairments, Safety/Performance insight into deficits/self-awareness;judgment;problem-solving/reasoning     Safety Issues Affecting Function insight into deficits/self-awareness;problem-solving;judgment        Upper Extremity (Therapeutic Exercise)    Exercise Position/Type seated     Range of Motion Exercises bilateral;shoulder flexion/extension;shoulder horizontal abduction/adduction;elbow flexion/extension     Reps and Sets 2x 10 reps w     Comment encouraged to completed +/od ay along with B LE to max strength and optimize cardioupulmoanry endurance                                  Session Outcome  Patient upright, in bed at end of session, bed alarm on, all needs met, call light in reach, personal items in reach. Nursing  notified about patient's position, patient's performance, patient's response to therapy/activity, and change in vital signs. (nsg and nedical team aware. Medical team reports will order TEDS and abdominal binder to trial)    AM-PAC™ - ADL (Current Function)     Putting on/taking off regular lower body clothing 2 - A Lot   Bathing 2 - A Lot   Toileting 2 - A Lot   Putting on/taking off regular upper body clothing 3 - A Little   Help for taking care of personal grooming 3 - A Little   Eating meals 3 - A Little   AM-PAC™ ADL Score 15      ASSESSMENT AND PLAN     Progress Summary  OT completed. Pt continues to be limited  by postural hypotension (48 pt drop) , dec balance and cardiopulmonary endurance leading to need for min A (x2) STS and few steps to head of bed, mod A with LB selfcare and setup to completed feeding and groomingt asks. Pt will continue to beneift from OT to max functional ind and dec carer burden Will need increased A at home incluidng 24/7 supervion/A Pt reports looking for carer for home. IF family unable to provide will need SNF    Patient/Family Therapy Goal Statement: to get home    OT Plan    Flowsheet Row Most Recent Value   Rehab Potential good, to achieve stated therapy goals at 10/23/2023 0903   Therapy Frequency 4 times/wk at 10/23/2023 0903   Planned Therapy Interventions activity tolerance training, occupation/activity based interventions, patient/caregiver education/training, strengthening exercise, adaptive equipment training, transfer/mobility retraining, BADL retraining, functional balance retraining, ROM/therapeutic exercise, neuromuscular control/coordination retraining at 10/23/2023 0903          OT Discharge Recommendations    Flowsheet Row Most Recent Value   OT Recommended Discharge Disposition skilled nursing facility at 10/23/2023 0903   Anticipated Equipment Needs if Discharged Home (OT) --  [has all required] at 10/23/2023 0903               OT Goals    Flowsheet Row Most  Recent Value   Transfer Goal 1    Activity/Assistive Device all transfers at 10/20/2023 0929   [place] modified independence at 10/20/2023 0929   Time Frame by discharge at 10/20/2023 0929   Progress/Outcome goal ongoing at 10/20/2023 0929   Dressing Goal 1    Activity/Adaptive Equipment dressing skills, all at 10/20/2023 0929   [place] modified independence at 10/20/2023 0929   Time Frame by discharge at 10/20/2023 0929   Progress/Outcome goal ongoing at 10/20/2023 0929   Toileting Goal 1    Activity/Assistive Device toileting skills, all at 10/20/2023 0929   [place] modified independence at 10/20/2023 0929   Time Frame by discharge at 10/20/2023 0929   Progress/Outcome goal ongoing at 10/20/2023 0929

## 2023-10-24 VITALS
WEIGHT: 113 LBS | SYSTOLIC BLOOD PRESSURE: 124 MMHG | TEMPERATURE: 98 F | HEART RATE: 61 BPM | DIASTOLIC BLOOD PRESSURE: 55 MMHG | RESPIRATION RATE: 16 BRPM | HEIGHT: 60 IN | OXYGEN SATURATION: 95 % | BODY MASS INDEX: 22.19 KG/M2

## 2023-10-24 LAB
ANION GAP SERPL CALC-SCNC: 6 MEQ/L (ref 3–15)
BUN SERPL-MCNC: 19 MG/DL (ref 7–25)
CALCIUM SERPL-MCNC: 8.2 MG/DL (ref 8.6–10.3)
CHLORIDE SERPL-SCNC: 112 MEQ/L (ref 98–107)
CO2 SERPL-SCNC: 23 MEQ/L (ref 21–31)
CREAT SERPL-MCNC: 0.5 MG/DL (ref 0.6–1.2)
ERYTHROCYTE [DISTWIDTH] IN BLOOD BY AUTOMATED COUNT: 14.7 % (ref 11.7–14.4)
GFR SERPL CREATININE-BSD FRML MDRD: >60 ML/MIN/1.73M*2
GLUCOSE SERPL-MCNC: 96 MG/DL (ref 70–99)
HCT VFR BLDCO AUTO: 29.4 % (ref 35–45)
HGB BLD-MCNC: 9.3 G/DL (ref 11.8–15.7)
MAGNESIUM SERPL-MCNC: 2.3 MG/DL (ref 1.8–2.5)
MCH RBC QN AUTO: 31.2 PG (ref 28–33.2)
MCHC RBC AUTO-ENTMCNC: 31.6 G/DL (ref 32.2–35.5)
MCV RBC AUTO: 98.7 FL (ref 83–98)
PDW BLD AUTO: 10.4 FL (ref 9.4–12.3)
PHOSPHATE SERPL-MCNC: 3.9 MG/DL (ref 2.4–4.7)
PLATELET # BLD AUTO: 265 K/UL (ref 150–369)
POTASSIUM SERPL-SCNC: 4.4 MEQ/L (ref 3.5–5.1)
RBC # BLD AUTO: 2.98 M/UL (ref 3.93–5.22)
SARS-COV-2 RNA RESP QL NAA+PROBE: NEGATIVE
SODIUM SERPL-SCNC: 141 MEQ/L (ref 136–145)
WBC # BLD AUTO: 7.34 K/UL (ref 3.8–10.5)

## 2023-10-24 PROCEDURE — 83735 ASSAY OF MAGNESIUM: CPT | Performed by: INTERNAL MEDICINE

## 2023-10-24 PROCEDURE — 84100 ASSAY OF PHOSPHORUS: CPT | Performed by: INTERNAL MEDICINE

## 2023-10-24 PROCEDURE — 63700000 HC SELF-ADMINISTRABLE DRUG: Performed by: HOSPITALIST

## 2023-10-24 PROCEDURE — 99239 HOSP IP/OBS DSCHRG MGMT >30: CPT | Performed by: HOSPITALIST

## 2023-10-24 PROCEDURE — 63700000 HC SELF-ADMINISTRABLE DRUG: Performed by: INTERNAL MEDICINE

## 2023-10-24 PROCEDURE — 85027 COMPLETE CBC AUTOMATED: CPT

## 2023-10-24 PROCEDURE — 80048 BASIC METABOLIC PNL TOTAL CA: CPT | Performed by: INTERNAL MEDICINE

## 2023-10-24 PROCEDURE — 63700000 HC SELF-ADMINISTRABLE DRUG: Performed by: NURSE PRACTITIONER

## 2023-10-24 PROCEDURE — 36415 COLL VENOUS BLD VENIPUNCTURE: CPT | Performed by: INTERNAL MEDICINE

## 2023-10-24 PROCEDURE — 87635 SARS-COV-2 COVID-19 AMP PRB: CPT

## 2023-10-24 RX ORDER — LORAZEPAM 1 MG/1
0.5 TABLET ORAL NIGHTLY PRN
Start: 2023-10-24 | End: 2023-11-22 | Stop reason: ALTCHOICE

## 2023-10-24 RX ORDER — DILTIAZEM HYDROCHLORIDE 360 MG/1
360 CAPSULE, EXTENDED RELEASE ORAL DAILY
Start: 2023-10-25 | End: 2023-11-16 | Stop reason: SDUPTHER

## 2023-10-24 RX ORDER — METOPROLOL TARTRATE 25 MG/1
25 TABLET, FILM COATED ORAL 3 TIMES DAILY
Start: 2023-10-24 | End: 2023-11-22 | Stop reason: SDUPTHER

## 2023-10-24 RX ORDER — NALOXONE HYDROCHLORIDE 4 MG/.1ML
1 SPRAY NASAL ONCE
Start: 2023-10-24 | End: 2023-10-24

## 2023-10-24 RX ORDER — MIDODRINE HYDROCHLORIDE 5 MG/1
5 TABLET ORAL
Start: 2023-10-24 | End: 2023-11-22 | Stop reason: SDUPTHER

## 2023-10-24 RX ADMIN — MIDODRINE HYDROCHLORIDE 5 MG: 5 TABLET ORAL at 11:46

## 2023-10-24 RX ADMIN — TIOTROPIUM BROMIDE INHALATION SPRAY 2 PUFF: 3.12 SPRAY, METERED RESPIRATORY (INHALATION) at 08:21

## 2023-10-24 RX ADMIN — TRAMADOL HYDROCHLORIDE 50 MG: 50 TABLET, COATED ORAL at 04:22

## 2023-10-24 RX ADMIN — METOPROLOL TARTRATE 25 MG: 25 TABLET, FILM COATED ORAL at 14:08

## 2023-10-24 RX ADMIN — METOPROLOL TARTRATE 25 MG: 25 TABLET, FILM COATED ORAL at 08:18

## 2023-10-24 RX ADMIN — MIDODRINE HYDROCHLORIDE 5 MG: 5 TABLET ORAL at 08:18

## 2023-10-24 RX ADMIN — PREGABALIN 50 MG: 25 CAPSULE ORAL at 08:17

## 2023-10-24 RX ADMIN — MULTIPLE VITAMINS W/ MINERALS TAB 1 TABLET: TAB at 08:18

## 2023-10-24 RX ADMIN — Medication 1000 UNITS: at 08:18

## 2023-10-24 RX ADMIN — DILTIAZEM HYDROCHLORIDE 360 MG: 180 CAPSULE, COATED, EXTENDED RELEASE ORAL at 08:18

## 2023-10-24 RX ADMIN — LIDOCAINE 1 PATCH: 4 PATCH TOPICAL at 08:18

## 2023-10-24 RX ADMIN — PREGABALIN 25 MG: 25 CAPSULE ORAL at 11:46

## 2023-10-24 RX ADMIN — MOMETASONE FUROATE AND FORMOTEROL FUMARATE DIHYDRATE 2 PUFF: 100; 5 AEROSOL RESPIRATORY (INHALATION) at 08:21

## 2023-10-24 RX ADMIN — ACETAMINOPHEN 650 MG: 325 TABLET ORAL at 03:09

## 2023-10-24 ASSESSMENT — COGNITIVE AND FUNCTIONAL STATUS - GENERAL
STANDING UP FROM CHAIR USING ARMS: 3 - A LITTLE
CLIMB 3 TO 5 STEPS WITH RAILING: 2 - A LOT
MOVING TO AND FROM BED TO CHAIR: 3 - A LITTLE
WALKING IN HOSPITAL ROOM: 2 - A LOT

## 2023-10-24 NOTE — PLAN OF CARE
Care Coordination Discharge Plan Note     Discharge Needs Assessment  Concerns to be Addressed: discharge planning  Current Discharge Risk: dependent with mobility/activities of daily living    Anticipated Discharge Plan  Anticipated Discharge Disposition: skilled nursing facility  Type of Skilled Nursing Care Services: PT, OT, nursing      Patient Choice  Offered/Gave Vendor List: yes  Patient's Choice of Community Agency(s): [facility]    Patient and/or patient guardian/advocate was made aware of their right to choose a provider. A list of eligible providers was presented and reviewed with the patient and/or patient guardian/advocate in written and/or verbal form. The list delineates providers in the patient’s desired geographic area who are participating in the Medicare program and/or providers contracted with the patient’s primary insurance. The Medicare list and quality ratings were obtained from the Medicare.gov [medicare.gov] website.    ---------------------------------------------------------------------------------------------------------------------    Interdisciplinary Discharge Plan Review:    Concerns Comments: Pt is medically stable for d/c today. Plan is for SNF placement at [facility]. Update provided to Adam at [facility], who confirmed Pt is accepted, but requested Covid swab be completed. MD and MICA made aware to order. [facility] scheduled for 2:30pm this afternoon to SNF. Update provided to Pt, who remains agreeable with d/c plan. LAMIN left voicemail for Pt's daughter (Shelley: [phone]) to provide update as well.     1:50pm: Covid swab resulted as negative. Update provided to Adam at [facility]. Transport confirmed for 2:30pm to SNF.    3:00pm: SW received voicemail from Pt's daughter confirming that she is in agreement with SNF at [facility] for today as well.    Discharge Plan:   Disposition/Destination: Skilled Nursing Facility - Other  / Skilled Nursing Facility  Discharge Facility:    Destination - Admitted Since 10/19/2023     Service Provider Selected Services Address Phone Fax Patient Preferred Last Updated    [facility] [address], [place] [address] -- [phone] -- Aranza Vargas LSW 10/23/2023 1253        Discharge Transportation:  Is Out of Hospital DNR needed at Discharge:    Does patient need discharge transport? Yes  Discharge Transportation Vendor: [facility] ([phone], option 5)  Type of Transportation: basic life support  What day is the transport expected?: 10/24/23  What time is the transport expected?: 9253

## 2023-10-24 NOTE — PLAN OF CARE
Plan of Care Review  Plan of Care Reviewed With: patient  Progress: improving  Outcome Evaluation: Pt foregtful requires reorientation to situation, Sinus arrhthmia on monitor. C/o pain managed with PRN  Tylenol & Tramadol . FSP maintained.

## 2023-10-24 NOTE — NURSING NOTE
Patient discharged to the [facility], report give to RN at 1420. All questions answered. Patient AxOx4, VSS, and no complaints of pain at the time of discharge. Patient transported via [facility]. All belongings returned to patient.

## 2023-10-25 ENCOUNTER — TELEPHONE (OUTPATIENT)
Dept: SCHEDULING | Facility: CLINIC | Age: 88
End: 2023-10-25
Payer: MEDICARE

## 2023-10-25 ENCOUNTER — TELEPHONE (OUTPATIENT)
Dept: CARDIOLOGY | Facility: CLINIC | Age: 88
End: 2023-10-25
Payer: MEDICARE

## 2023-10-25 NOTE — TELEPHONE ENCOUNTER
Lm that we have to reschedule appointment that was scheduled 12/13/23 at 11:45 am ,please put through to Radha .

## 2023-10-25 NOTE — TELEPHONE ENCOUNTER
Hospital Follow Up     Name of patient: Madelyn Barlownefrancisco    Caller Name: Shana    Relationship to patient: From the [facility]    New or Established: Est     Reason for visit: Fall/Head injury    Previous Cardiologist: Dr. Valentino    PCP: Dr. Henry Linares    Insurance name: Medicare    Doctor requested: Dr. Valentino    [place] of hospital [place] [facility]    Timeframe instructed to follow-up within: not specified     Best contact number: [phone]

## 2023-11-10 ENCOUNTER — TELEPHONE (OUTPATIENT)
Dept: PRIMARY CARE | Facility: CLINIC | Age: 88
End: 2023-11-10
Payer: MEDICARE

## 2023-11-10 NOTE — TELEPHONE ENCOUNTER
[facility] Appointment Request   Provider: Dr. Linares  Appointment Type: Hosp F/U  Reason for Visit: [place] for 5 days then [facility] for 2 and a half weeks  Available Day and Time: next week  Best Contact Number: [phone]

## 2023-11-13 ENCOUNTER — PATIENT OUTREACH (OUTPATIENT)
Dept: CASE MANAGEMENT | Facility: CLINIC | Age: 88
End: 2023-11-13
Payer: MEDICARE

## 2023-11-13 ASSESSMENT — ENCOUNTER SYMPTOMS
SHORTNESS OF BREATH: 0
CONSTIPATION: 0
NAUSEA: 0
DIARRHEA: 0
VOMITING: 0
WEAKNESS: 1
DIZZINESS: 0
LIGHT-HEADEDNESS: 0
FEVER: 0
PALPITATIONS: 0

## 2023-11-13 NOTE — PROGRESS NOTES
NAME: Madelyn Ruiz    MRN: [id number]    YOB: 1930    Event Review:    Initial TCM Patient Outreach Date: 11/13/23    Assessment completed with: Patient  Patient stated reason for hospitalization: Afib  Discharge Diagnosis: Afib    Patient readmitted in the last 30 days: (!) Yes  Discharging Facility: [facility]  Date of Last Admission: 10/19/23  Date of Last Discharge: 10/24/23    Discharging Facilty SNF/Rehab: [facility]  Date of Admission: 09/24/23  Date of Discharge: 11/10/23    Patient's perception of their health status since discharge: Improving    HPI: Spoke with pt. Pt admitted s/p fall in her house. Pt says she was sitting in the living room watching TV. She looked at her watch and it read 1:05 am. She got up to go to the kitchen. She isn't sure if she passed out or tripped. She woke up on the floor near the kitchen. She recalls trying to push the blood into the kitchen. Eventually she tried to crawl to the front door because she knew Kavita from PT was arriving at noon. She was able to yell to Kavita to get help. Kavita called Pt's son who told her how to get in the house. Kavita called 911. Pt found to be in afib. Pt with orthostatic blood pressures. Treated with IV Cardizem. CT head/c-spine negative acute. CT abd/pelvis negative acute.     Pt says she is doing better. Says she is happy to be home. Denies any complaints. Pt has 24 hour caregivers.     Review of Systems   Constitutional: Negative for fever.   Respiratory: Negative for shortness of breath.    Cardiovascular: Negative for palpitations.   Gastrointestinal: Negative for constipation, diarrhea, nausea and vomiting.   Neurological: Positive for weakness. Negative for dizziness and light-headedness.       Medication Review:    Medication Review: Yes     Reported by: Patient  Any new medications prescribed at discharge?: Yes  Is the patient having any side effects they believe may be caused by any medication  additions or changes?: No  New prescriptions filled?: Yes     Do you have enough of your regularly prescribed medications?: Yes       Medication adherence problem?: No  Was a medication discrepancy indentified?: No       Nursing Interventions: Nurse provided patient education  Reconciled the current and discharge medications: Yes  Reviewed AVS (Discharge Instructions)?: Yes       Acute Pain:    Acute pain: No    Chronic Pain:    Chronic pain: No    Diet/Nutrition:    Type of Diet: Regular  Diet Adherence: Adherent with diet    Home Care Services:    Home Care Agency: [facility]  Type of Home Care Services: Home PT, Home Nursing, Home OT  Home Care Interventions: No intervention required     Post-Discharge Durable Medical Equipment::    Durable Medical Equipment: Grab bars, Front wheeled walker, Cane, Blood pressure cuff  Does patient's condition require a scale?: No     Oxygen Use: No        DME Interventions: No intervention required    Home Management:    Living Arrangement: Alone  Support System:: Child/Children, Paid Caregiver (Pt has 3 children. Daughter Demetria lives nearby. Pt currently has 24/hr caregivers.)  Type of Residence: [facility] Monitoring: Blood Pressure  Any patient reported falls in the last 3 months?: Yes  Judaism or spiritual beliefs that impact treatment?: No    Appointment Scheduling:    PCP appointment scheduled: Yes     Appointment Date: 11/22/23     Appointment Provider: Dr. Linares        Follow-Ups:    Dr. Valentino (Cardio): 12/8  Dr. Mora (Ortho): 12/13    Interventions/ Care Coordination:    Interventions/ Care Coordination: Addressed a knowledge deficit, Encouraged patient to schedule with the PCP/Specialist  General Education: Respiratory precautions (flu, colds, pneumonia, COVID-19)       Reviewed signs/symptoms of worsening condition or complication that necessitate a call to the Physician's office.  Educated patient on access to care.  RN phone number given for future care  management.    Karolina Hauser, RN  [phone]

## 2023-11-13 NOTE — TELEPHONE ENCOUNTER
patient daughter called in because the patient still needs a hospital follow up with Dr. Linares. I did let patient know the time frame the office has to respond to the request.

## 2023-11-16 ENCOUNTER — TELEPHONE (OUTPATIENT)
Dept: PRIMARY CARE | Facility: CLINIC | Age: 88
End: 2023-11-16
Payer: MEDICARE

## 2023-11-16 DIAGNOSIS — D64.9 ANEMIA, UNSPECIFIED TYPE: Primary | ICD-10-CM

## 2023-11-16 PROCEDURE — 99999 PR OFFICE/OUTPT VISIT,PROCEDURE ONLY: CPT | Performed by: FAMILY MEDICINE

## 2023-11-16 RX ORDER — DILTIAZEM HYDROCHLORIDE 360 MG/1
360 CAPSULE, EXTENDED RELEASE ORAL DAILY
Qty: 90 CAPSULE | Refills: 0 | Status: SHIPPED | OUTPATIENT
Start: 2023-11-16 | End: 2023-11-22 | Stop reason: ALTCHOICE

## 2023-11-16 NOTE — TELEPHONE ENCOUNTER
Pt was recently d'c from [facility] and a rehab facility last Friday.  Pt is scheduled to see Dr Linares next Wednesday 11/22/2023, pt will be out of her Matzim LA 360mg which was prescribed while in Hospital.  Pt daughter would like to know if Dr Linares would prescribe/refill enough medication to last her until she's seen next week 11/22/2023.  Pt currently us [facility] in [place]

## 2023-11-16 NOTE — TELEPHONE ENCOUNTER
FYI...Pt has begun home health care with occupational  & physical therapy     Pt resumed taking lasix

## 2023-11-21 ENCOUNTER — TELEPHONE (OUTPATIENT)
Dept: PRIMARY CARE | Facility: CLINIC | Age: 88
End: 2023-11-21
Payer: MEDICARE

## 2023-11-21 ENCOUNTER — PATIENT OUTREACH (OUTPATIENT)
Dept: CASE MANAGEMENT | Facility: CLINIC | Age: 88
End: 2023-11-21
Payer: MEDICARE

## 2023-11-21 NOTE — TELEPHONE ENCOUNTER
Annika, is calling to let Dr. Linares know that this patient is no longer taking Cardizem. She is now taking Tiadylt. Pt does have an appointment tomorrow with Dr. Linares.     Also, pt has not had a bowel movement in four days. Pt is taking Senkot and Colace.

## 2023-11-21 NOTE — TELEPHONE ENCOUNTER
Barbara called in from Homecare stating she did an eval today and she will be  Working with patent twice a week for 3 weeks. [facility]    [phone]

## 2023-11-22 ENCOUNTER — APPOINTMENT (OUTPATIENT)
Dept: LAB | Facility: CLINIC | Age: 88
End: 2023-11-22
Attending: FAMILY MEDICINE
Payer: MEDICARE

## 2023-11-22 ENCOUNTER — OFFICE VISIT (OUTPATIENT)
Dept: PRIMARY CARE | Facility: CLINIC | Age: 88
End: 2023-11-22
Payer: MEDICARE

## 2023-11-22 VITALS
SYSTOLIC BLOOD PRESSURE: 144 MMHG | RESPIRATION RATE: 16 BRPM | OXYGEN SATURATION: 98 % | TEMPERATURE: 97.7 F | WEIGHT: 116.6 LBS | HEART RATE: 87 BPM | BODY MASS INDEX: 22.77 KG/M2 | DIASTOLIC BLOOD PRESSURE: 80 MMHG

## 2023-11-22 DIAGNOSIS — M25.551 BILATERAL HIP PAIN: ICD-10-CM

## 2023-11-22 DIAGNOSIS — I48.92 ATRIAL FLUTTER WITH RAPID VENTRICULAR RESPONSE (CMS/HCC): Chronic | ICD-10-CM

## 2023-11-22 DIAGNOSIS — I95.1 ORTHOSTATIC HYPOTENSION: Chronic | ICD-10-CM

## 2023-11-22 DIAGNOSIS — H35.3211 EXUDATIVE AGE-RELATED MACULAR DEGENERATION, RIGHT EYE, WITH ACTIVE CHOROIDAL NEOVASCULARIZATION (CMS/HCC): Primary | ICD-10-CM

## 2023-11-22 DIAGNOSIS — D50.9 IRON DEFICIENCY ANEMIA, UNSPECIFIED IRON DEFICIENCY ANEMIA TYPE: ICD-10-CM

## 2023-11-22 DIAGNOSIS — M25.552 BILATERAL HIP PAIN: ICD-10-CM

## 2023-11-22 DIAGNOSIS — S32.10XS CLOSED FRACTURE OF SACRUM, UNSPECIFIED FRACTURE MORPHOLOGY, SEQUELA: ICD-10-CM

## 2023-11-22 DIAGNOSIS — I10 PRIMARY HYPERTENSION: Chronic | ICD-10-CM
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
## 2023-11-22 PROBLEM — D72.829 LEUKOCYTOSIS: Status: RESOLVED | Noted: 2023-10-19 | Resolved: 2023-11-22

## 2023-11-22 PROBLEM — I47.10 SVT (SUPRAVENTRICULAR TACHYCARDIA) (CMS/HCC): Status: RESOLVED | Noted: 2023-01-28 | Resolved: 2023-11-22

## 2023-11-22 PROBLEM — S80.02XA TRAUMATIC HEMATOMA OF LEFT KNEE: Status: RESOLVED | Noted: 2022-10-24 | Resolved: 2023-11-22

## 2023-11-22 LAB
ALBUMIN SERPL-MCNC: 4.1 G/DL (ref 3.5–5.7)
ALP SERPL-CCNC: 58 IU/L (ref 34–125)
ALT SERPL-CCNC: 15 IU/L (ref 7–52)
ANION GAP SERPL CALC-SCNC: 8 MEQ/L (ref 3–15)
AST SERPL-CCNC: 15 IU/L (ref 13–39)
BASOPHILS # BLD: 0.01 K/UL (ref 0.01–0.1)
BASOPHILS NFR BLD: 0.2 %
BILIRUB SERPL-MCNC: 0.5 MG/DL (ref 0.3–1.2)
BUN SERPL-MCNC: 16 MG/DL (ref 7–25)
CALCIUM SERPL-MCNC: 9.3 MG/DL (ref 8.6–10.3)
CHLORIDE SERPL-SCNC: 106 MEQ/L (ref 98–107)
CO2 SERPL-SCNC: 27 MEQ/L (ref 21–31)
CREAT SERPL-MCNC: 0.7 MG/DL (ref 0.6–1.2)
DIFFERENTIAL METHOD BLD: ABNORMAL
EOSINOPHIL # BLD: 0 K/UL (ref 0.04–0.36)
EOSINOPHIL NFR BLD: 0 %
ERYTHROCYTE [DISTWIDTH] IN BLOOD BY AUTOMATED COUNT: 14.8 % (ref 11.7–14.4)
GFR SERPL CREATININE-BSD FRML MDRD: >60 ML/MIN/1.73M*2
GLUCOSE SERPL-MCNC: 59 MG/DL (ref 70–99)
HCT VFR BLDCO AUTO: 34.5 % (ref 35–45)
HGB BLD-MCNC: 10.5 G/DL (ref 11.8–15.7)
IMM GRANULOCYTES # BLD AUTO: 0.04 K/UL (ref 0–0.08)
IMM GRANULOCYTES NFR BLD AUTO: 0.6 %
LYMPHOCYTES # BLD: 1.49 K/UL (ref 1.2–3.5)
LYMPHOCYTES NFR BLD: 22.9 %
MCH RBC QN AUTO: 29.2 PG (ref 28–33.2)
MCHC RBC AUTO-ENTMCNC: 30.4 G/DL (ref 32.2–35.5)
MCV RBC AUTO: 95.8 FL (ref 83–98)
MONOCYTES # BLD: 0.92 K/UL (ref 0.28–0.8)
MONOCYTES NFR BLD: 14.1 %
NEUTROPHILS # BLD: 4.05 K/UL (ref 1.7–7)
NEUTS SEG NFR BLD: 62.2 %
NRBC BLD-RTO: 0 %
PDW BLD AUTO: 10.9 FL (ref 9.4–12.3)
PLATELET # BLD AUTO: 343 K/UL (ref 150–369)
POTASSIUM SERPL-SCNC: 3.7 MEQ/L (ref 3.5–5.1)
PROT SERPL-MCNC: 6.1 G/DL (ref 6–8.2)
RBC # BLD AUTO: 3.6 M/UL (ref 3.93–5.22)
SODIUM SERPL-SCNC: 141 MEQ/L (ref 136–145)
WBC # BLD AUTO: 6.51 K/UL (ref 3.8–10.5)

## 2023-11-22 PROCEDURE — 1111F DSCHRG MED/CURRENT MED MERGE: CPT | Performed by: FAMILY MEDICINE

## 2023-11-22 PROCEDURE — 36415 COLL VENOUS BLD VENIPUNCTURE: CPT

## 2023-11-22 PROCEDURE — 85025 COMPLETE CBC W/AUTO DIFF WBC: CPT

## 2023-11-22 PROCEDURE — 99495 TRANSJ CARE MGMT MOD F2F 14D: CPT | Performed by: FAMILY MEDICINE

## 2023-11-22 PROCEDURE — 80053 COMPREHEN METABOLIC PANEL: CPT

## 2023-11-22 RX ORDER — MIDODRINE HYDROCHLORIDE 5 MG/1
5 TABLET ORAL
Qty: 270 TABLET | Refills: 3 | Status: SHIPPED | OUTPATIENT
Start: 2023-11-22 | End: 2024-11-21

## 2023-11-22 RX ORDER — LIDOCAINE 50 MG/G
1 PATCH TOPICAL DAILY
Qty: 30 PATCH | Refills: 3 | Status: SHIPPED | OUTPATIENT
Start: 2023-11-22 | End: 2024-12-02

## 2023-11-22 RX ORDER — DILTIAZEM HYDROCHLORIDE EXTENDED-RELEASE TABLETS 360 MG/1
360 TABLET, EXTENDED RELEASE ORAL DAILY
COMMUNITY
End: 2024-02-14 | Stop reason: SDUPTHER

## 2023-11-22 RX ORDER — METOPROLOL TARTRATE 25 MG/1
25 TABLET, FILM COATED ORAL 3 TIMES DAILY
Qty: 270 TABLET | Refills: 0 | Status: SHIPPED | OUTPATIENT
Start: 2023-11-22 | End: 2023-12-08 | Stop reason: SDUPTHER

## 2023-11-22 ASSESSMENT — ENCOUNTER SYMPTOMS
WHEEZING: 0
DYSURIA: 0
CHEST TIGHTNESS: 0
SHORTNESS OF BREATH: 0
DIARRHEA: 0
CONFUSION: 0
NAUSEA: 0
LIGHT-HEADEDNESS: 0
FEVER: 0
CHILLS: 0
HEMATURIA: 0
COUGH: 0
VOMITING: 0
HEADACHES: 0
PALPITATIONS: 0
NUMBNESS: 0
DIZZINESS: 0
WEAKNESS: 0
SPEECH DIFFICULTY: 0

## 2023-11-22 NOTE — PATIENT INSTRUCTIONS
Problem List Items Addressed This Visit       Orthostatic hypotension (Chronic)    Atrial flutter with rapid ventricular response (CMS/HCC) (Chronic)    Relevant Medications    diltiazem LA (CARDIZEM LA) 360 mg 24 hr tablet    metoprolol tartrate (LOPRESSOR) 25 mg tablet    midodrine (PROAMATINE) 5 mg tablet    Primary hypertension (Chronic)    Relevant Medications    diltiazem LA (CARDIZEM LA) 360 mg 24 hr tablet    metoprolol tartrate (LOPRESSOR) 25 mg tablet    Anemia    Relevant Orders    CBC and Differential    Comprehensive metabolic panel    Bilateral hip pain    Relevant Medications    lidocaine (LIDODERM) 5 % patch    Exudative age-related macular degeneration, right eye, with active choroidal neovascularization (CMS/HCC) - Primary    Sacral fracture, closed (CMS/HCC)    Relevant Medications    lidocaine (LIDODERM) 5 % patch

## 2023-11-22 NOTE — PROGRESS NOTES
Hnery Linares MD    [facility]  [address], [address]  [place] [address]  Phone: [phone]  Fax: [phone]     History of Present Illness     Subjective     Patient ID: Madelyn Ruiz is a 89 y.o. female.    HPI      Patient readmitted in the last 30 days: (!) Yes    Discharge Diagnosis: Afib  Discharging Facility: [facility]  Date of Last Admission: 10/19/23  Date of Last Discharge: 10/24/23    Discharging Facilty SNF/Rehab: [facility]  Date of Admission: 09/24/23  Date of Discharge: 11/10/23         Initial TCM Patient Outreach Date: 11/13/23    Summary of Hospital Course:    SUMMARY OF HOSPITALIZATION      Presenting Problem/History of Present Illness  This is a 89 y.o. year-old female admitted on 10/19/2023 with Fall.     H&P per Yany BENP:  Madelyn Ruiz is a 89 y.o. female with a past medical history of ABPA, allergic rhinitis, chronic obstructive pulmonary disease, hypertension, hyperlipidemia, GERD who presents after a fall at home, found down.     To preface, pt and daughter are both difficult disorganized historians. Able to be redirected. Pt is currently AAOx4. Denies CP, SOB, palpitations, LH/dizziness, HA, visual disturbance, nausea/vomiting.     Pt was admitted in August for pelvis/sacral fracture. She went to [place] rehab. Then from there she returned home. Was there until sept 17th. Has been getting PT/OT at home. She lives alone. Daughter lives about 10 minutes away. She wants to live in her own home. Daughter wants to pursue HHA.      Pt can recall that she wasn't sleeping well, she was in a lot of pain. She turned the TV on, she didn't finish her drink vodka and water, about 6-8oz. She went to bed.      Kavita with PT had a 12 o'clock appointment to see pt, she opeend the door and she found th pt on the floor covered in blood. She was between the kitchen and her dining area. Kavita called 911. Daughter shortly  arrived thereafter.     Pt cannot recall falling.  She was still in her night gown. She was had gotten out of bed and made her bed. Rollaider in the kitchen.     Her daughter has noticed the pt has had detoriation with balance and strength. She also had a recent fall on Sunday for which she sustained a R elbow skin tear.     She will have 1 vodka drink at night. Pt and daughter do not find this problematic.     Hospital Course     Problem List on Day of Discharge     #Orthostatic hypotension  Positive orthos noted by PT. /69 (supine), 96/61 (sitting), 92/66 (standing), 140/83 (supine at end of session), Telemetry Afib 130-140 during session. Endorses palpitations and mild SOB during this. Had not received daily PO dilt yet.  -Treated with addition of Midodrine 5 mg TID and addition of Metoprolol for rate control and orthostatic vital signs improved   -Discontinued Lasix and Losartan for now, can follow up and discuss with cardiology and PCP     #Atrial fibrillation  Pt with a history of Afib/Aflutter, p/w syncope  CHADSVASC: 4 (age, female, HTN)  EKG on admission:134bpm, Afib with RVR, RBBB  Treated w IV Diltiazem/ PO Diltiazem 30mg/IV Diltiazem 25mg/Metoprolol 5mg IV  PTA medication: Diltiazem 300mg and Eliquis 2.5mg bid, reports compliance   - Increased home diltiazem to 360 mg given stable BP, however now with positive orthos  - Holding Losartan and Lasix for orthostasis  - Resume Eliquis, discussed with palliative and patient. Repeat CBC in 1 week        #Falls   Presents after being found down by home PT surrounded in her own blood. Sustained scalp laceration/hematoma. Has history of multiple falls, on eliquis. Recent traumatic fall in August sustaining pelvic/sacral fx. She lives alone. Daughter lives close by.   CPK 200s. Ethanol negative.  Unclear etiology for fall, pt cannot recall events. She did have a vodka drink prior, she is also on ativan prn unclear if she mixed. There is question if this was a  syncopal event  -CTAP: No acute trauma related findings within the chest, abdomen, pelvis. Multiple old bilateral rib fractures.  Healing bilateral sacral wing fractures since 8/21/2023.  Old pelvic pubic rami fractures.   -CTH/CT c-spine: No acute intracranial abnormality. No evidence for acute fracture or traumatic subluxation of the cervical spine. Marked multilevel degenerative spondylosis, with several levels of severe right-sided foraminal narrowing.   -Positive orthostatic vital signs, treated as per orthostatic hypotension   -TTE Feb 2023: EF 65-70%. No regional wall motion abnormalities. Grade I diastolic dysfunction.     -PT/OT recommended SNF, discharge to [facility]   -She also has a R elbow skin tear from a prior fall POA; consulted wound care  -Decreased PRN ativan dosing from 1 mg nightly PRN to 0.5 mg nightly PRN- should discuss w PCP as Ativan should be avoided completely in the elderly population per BEERS Criteria. Also discontinue alcohol use with benzos  -Treat orthostasis w midodrine and discontinuation of Losartan and Lasix  -Treat Afib w addition of metoprolol     #Closed head injury  CTH negative   Small lac on posterior scalp cleaned and dressed by nursing. No staples/sutures.     #Anemia  Hgb stablized 10.2 > 8.4>9.9  (baseline appears to be around 11-12)  -Patient with diffuse ecchymosis throughout bilateral lower and upper extremities. She also has scalp laceration from the fall. No active bleeding on exam    -Resume Eliquis upon discharge, repeat CBC in one week w PCP      #HTN  Home regiment losartan 50 mg daily and diltiazem 300 mg daily  Continue diltiazem given A-fib RVR  -Hold losartan and lasix until follow up w cards and pcp given low blood pressure     #COPD  No evidence of acute exacerbation  Symbicort is nonformulary - use equivalent in house     #Allergic bronchopulmonary aspergillosis  Allergist Dr. Arriaza  Managed with azithro, flovent     Exam on Day of Discharge  Physical  Exam  Vitals and nursing note reviewed.   Constitutional:       General: She is not in acute distress.  HENT:      Mouth/Throat:      Mouth: Mucous membranes are moist.   Eyes:      Pupils: Pupils are equal, round, and reactive to light.   Cardiovascular:      Rate and Rhythm: Normal rate. Rhythm irregular.      Pulses: Normal pulses.      Heart sounds: Normal heart sounds.   Pulmonary:      Effort: Pulmonary effort is normal. No respiratory distress.      Breath sounds: Normal breath sounds.   Abdominal:      General: Bowel sounds are normal. There is no distension.      Palpations: Abdomen is soft.      Tenderness: There is no abdominal tenderness.   Musculoskeletal:         General: Normal range of motion.      Right lower leg: No edema.      Left lower leg: No edema.   Skin:     Findings: Bruising present. Skin tear R elbow   Neurological:      General: No focal deficit present.      Mental Status: She is alert and oriented to person, place, and time. Mental status is at baseline.   Psychiatric:         Mood and Affect: Mood normal.         Thought Content: Thought content normal.             Medications prescribed at discharge reconciled with current ambulatory medication list: Yes.    Inpatient testing (labs, imaging, cardiovascular) requiring follow-up:         Past Medical/Surgical/Family/Social History       The following have been reviewed and updated as appropriate in this visit:   Allergies  Meds  Problems         Past Medical History:   Diagnosis Date    Allergic bronchopulmonary aspergillosis (CMS/[facility]) 04/02/2018    Allergist: Dr. Arriaza.  Stable FEV1 in 8/2015.  IgE levels and eosinophils stable in 8/2015.  Managed with Fasenra, Azithromycin and Flovent.    Allergic rhinitis 04/02/2018    Pulmonologist: Dr. Walker. Managed with Flonase.    Asthma     Atrial flutter with rapid ventricular response (CMS/[facility]) 02/21/2023    Diagnosed in 02/2023 and hospitalized Managed with Apixaban and Diltiazem  Echocardiogram 02/2023   Left Ventricle: Normal ventricle size. Mild concentric left ventricular hypertrophy. Preserved systolic function. Estimated EF 65-70%. No regional wall motion abnormalities. Grade I diastolic dysfunction.   Right Ventricle: Normal ventricle size. Normal systolic function.   Left Atrium: Moderately    Atrial flutter with rapid ventricular response (CMS/HCC) 2/21/2023    Diagnosed in 02/2023 and hospitalized Managed with Apixaban, Metoprolol, and Diltiazem Echocardiogram 02/2023   Left Ventricle: Normal ventricle size. Mild concentric left ventricular hypertrophy. Preserved systolic function. Estimated EF 65-70%. No regional wall motion abnormalities. Grade I diastolic dysfunction.   Right Ventricle: Normal ventricle size. Normal systolic function.   Left Atriu    Calcification of aorta (CMS/HCC) 06/29/2020    Seen incidentally on CT scan.    Chronic obstructive pulmonary disease (CMS/[facility]) 04/02/2018    Pulmonologist: Dr. Walker.  Seen on PFTs in hospital in 2014.  Controlled with Spiriva and FLovent.    GERD (gastroesophageal reflux disease) 06/08/2019    Managed with Esomeprazole.  Should take medication 30 minutes prior to meal.  Advised to avoid caffeine, carbonated beverages, and should not eat within 3 hours of going to sleep.  Advised to reduce BMI < 25.     Insomnia 04/02/2018    Managed with Lorazepam as needed.    Lumbar spinal stenosis 04/02/2018    Neurosurgeon: Dr. Miguel. MRI with Central and lateral recess spinal stenosis. Has Spondylolisthesis at L4/L5. S/P epidural injection by Dr. Martin with some relief in past. Had Lumbar fusion and Lumbar laminectomy by Dr. Miguel in 4/2015.    Macular degeneration disease     Obstructive sleep apnea syndrome 04/02/2018    Mild STACIA noted in sleep study 7/2015. Treated with nasal CPAP automatic with pressure range 5-10 CM H2Ox couple months. Off now    Orthostatic hypotension 10/23/2023    Managed on Midodrine     Osteoporosis 04/02/2018    Rheumatologist: Dr. Bradley.  Dexa scan 7/2016 with osteoporosis of lumbar spine LS-2.5, LH-2.1, RH -2.2.  Has been on Fosamax in the past for 8 years.  Worsened by Prednisone in past.  Continue vitamin D, calcium and weight bearing exercise. DEXA in 7/2017 with LS -1.7, LH -2.2, and RH -2.0.  Next due in 7/2019.  Seen by Dr. Bradley in 8/2015 who agreed with initiating Prolia. Took for 2 years. N    Primary hypertension 04/02/2018    Managed on Diltiazem and Losartan. Advised to follow a low sodium diet and keep BMI < 25.  Advised to exercise for 2.5 hours per week.  Instructed to take home blood pressure readings and call if consistently above 140/90.      Primary hypertension 4/2/2018    Managed on Diltiazem  Advised to follow a low sodium diet and keep BMI < 25.  Advised to exercise for 2.5 hours per week.  Instructed to take home blood pressure readings and call if consistently above 140/90.      Pulmonary embolism (CMS/HCC) 06/09/2019    Hematologist: Dr. Bhakta Diagnosed in 06/2019 in right upper lobe. Vascular ultrasound negative of bilateral lower extremities. Managed on Eliquis. Hypercoagulable workup was negative for beta-2 glycoprotein's, RIGO, Antithrombin III, lupus anticoagulant, protein C activity protein S activity, and prothrombin gene mutation, and factor V Leiden. Now off Apixaban as CT scan in 12/2019 was without pu    SVT (supraventricular tachycardia)     Vertebral compression fracture (CMS/HCC)     At T12 level       Past Surgical History:   Procedure Laterality Date    APPENDECTOMY      CARPAL TUNNEL RELEASE Bilateral     CATARACT EXTRACTION W/  INTRAOCULAR LENS IMPLANT Bilateral     HYSTERECTOMY  1972    LUMBAR LAMINECTOMY  04/21/2015    S/P lumbar fusion    TONSILLECTOMY         Family History   Problem Relation Age of Onset    Glaucoma Biological Mother     Macular degeneration Biological Mother     Hypertension Biological Mother     Lung cancer  Biological Father     Heart disease Biological Brother     Breast cancer Mother's Sister     No Known Problems Biological Son     No Known Problems Biological Son     No Known Problems Biological Daughter     No Known Problems Biological Daughter     No Known Problems Biological Daughter        Social History     Tobacco Use    Smoking status: Never    Smokeless tobacco: Never   Vaping Use    Vaping Use: Never used   Substance Use Topics    Alcohol use: Yes     Comment: Rarely    Drug use: No       Patient Care Team:  Henry Linares MD as PCP - General  Ray Arriaza MD as Referring Physician  Aye Bhakta MD as Consulting Physician (Hematology and Oncology)  Wesley Walker MD as Consulting Physician (Pulmonary)  Fausto Bradley MD as Consulting Physician (Rheumatology)  Kleiner, Robert C, MD as Referring Physician  Alfie Torres MD as Consulting Physician (Dermatology)  Fanny Pelaez MD as Consulting Physician (Orthopedic Surgery)  Keenan Krause DO as Consulting Physician (Family Medicine)  Keenan Krause DO as Consulting Physician (Family Medicine)  Wesley You DO as Consulting Physician (Physical Medicine and Rehabilitation)  Jessika Zelaya MD as Surgeon (Neurosurgery)  Henrietta Crespo MD as Cardiologist (Interventional Cardiology)  Dinesh Goel MD as Surgeon (Otolaryngology)  Valentino, Michael A, MD PhD as Cardiologist (Cardiology)  Alejo Saavedra MD as Consulting Physician (Orthopedic Surgery)  Karolina Hauser RN as Care Manager (Care Management)     Allergies and Medications       Allergies   Allergen Reactions    Mepolizumab Rash    Morphine      Other reaction(s): Vomiting    Oxycodone      Other reaction(s): Vomiting       Current Outpatient Medications   Medication Sig Dispense Refill    apixaban (ELIQUIS) 2.5 mg tablet Take 2.5 mg by mouth 2 (two) times a day.      azithromycin (ZITHROMAX) 250 mg tablet Take 1 tablet (250 mg total) by  mouth 3 (three) times a week (Mon, Wed, Fri). 12 tablet 5    calcium carbonate 600 mg calcium (1,500 mg) tablet Take 1 tablet by mouth 2 (two) times a day.      cholecalciferol, vitamin D3, 25 mcg (1,000 unit) capsule Take 1 tablet by mouth daily.      diltiazem LA (CARDIZEM LA) 360 mg 24 hr tablet Take 360 mg by mouth daily.      docusate sodium (COLACE) 100 mg capsule Take 100 mg by mouth 2 (two) times a day as needed for constipation.      DULoxetine (CYMBALTA) 60 mg capsule Take 60 mg by mouth at bedtime.   0    fexofenadine (ALLEGRA) 180 mg tablet Take 180 mg by mouth daily as needed (allergies).      FOLIC ACID/MULTIVIT,IRON, (CENTRUM ORAL) Take 1 tablet by mouth daily.      lidocaine (LIDODERM) 5 % patch Place 1 patch on the skin daily. Remove & discard patch within 12 hours or as directed by prescriber. 30 patch 3    metoprolol tartrate (LOPRESSOR) 25 mg tablet Take 1 tablet (25 mg total) by mouth 3 (three) times a day. 270 tablet 0    midodrine (PROAMATINE) 5 mg tablet Take 1 tablet (5 mg total) by mouth 3 (three) times a day before meals. 270 tablet 3    pregabalin (LYRICA) 25 mg capsule Take 25 mg by mouth daily with lunch. PRN      pregabalin (LYRICA) 50 mg capsule Take 50 mg by mouth 2 (two) times a day.      SPIRIVA RESPIMAT 2.5 mcg/actuation mist inhaler Inhale 2 puffs daily.      SYMBICORT 80-4.5 mcg/actuation inhaler Inhale 2 puffs 2 (two) times a day.      traMADoL (ULTRAM) 50 mg tablet Take 50 mg by mouth 3 (three) times a day as needed.      lifitegrast (XIIDRA) 5 % dropperette dropperette        No current facility-administered medications for this visit.          Review of Systems       Review of Systems   Constitutional: Negative for chills and fever.   Eyes: Negative for visual disturbance.   Respiratory: Negative for cough, chest tightness, shortness of breath and wheezing.    Cardiovascular: Positive for leg swelling. Negative for chest pain and palpitations.  "  Gastrointestinal: Negative for diarrhea, nausea and vomiting.   Genitourinary: Negative for dysuria and hematuria.   Neurological: Negative for dizziness, syncope, speech difficulty, weakness, light-headedness, numbness and headaches.   Psychiatric/Behavioral: Negative for confusion.        Physical Examination       Objective     Vitals:    11/22/23 1334   BP: (!) 144/80   Pulse: 87   Resp: 16   Temp: 36.5 °C (97.7 °F)   SpO2: 98%       Pulse Readings from Last 5 Encounters:   11/22/23 87   10/24/23 61   10/11/23 (!) 102   09/21/23 (!) 110   09/20/23 94       Wt Readings from Last 5 Encounters:   11/22/23 52.9 kg (116 lb 9.6 oz)   10/19/23 51.3 kg (113 lb)   10/11/23 53.3 kg (117 lb 6.4 oz)   09/21/23 54 kg (119 lb)   09/20/23 54 kg (119 lb)       Ht Readings from Last 5 Encounters:   10/19/23 1.524 m (5')   10/11/23 1.537 m (5' 0.5\")   09/21/23 1.524 m (5')   09/20/23 1.524 m (5')   08/21/23 1.524 m (5')       BP Readings from Last 5 Encounters:   11/22/23 (!) 144/80   10/24/23 (!) 124/55   10/11/23 138/90   09/21/23 (!) 154/76   09/20/23 118/74       BMI Readings from Last 4 Encounters:   11/22/23 22.77 kg/m²   10/19/23 22.07 kg/m²   10/11/23 22.55 kg/m²   09/21/23 23.24 kg/m²       Physical Exam  Constitutional:       General: She is not in acute distress.     Appearance: Normal appearance. She is well-developed. She is not ill-appearing, toxic-appearing or diaphoretic.   Neck:      Vascular: No carotid bruit or JVD.   Cardiovascular:      Rate and Rhythm: Normal rate and regular rhythm.      Pulses:           Dorsalis pedis pulses are 2+ on the right side and 2+ on the left side.        Posterior tibial pulses are 2+ on the right side and 2+ on the left side.      Heart sounds: Normal heart sounds, S1 normal and S2 normal. No murmur heard.     No gallop. No S3 or S4 sounds.      Comments: No edema bilaterally  Pulmonary:      Effort: Pulmonary effort is normal. No accessory muscle usage or respiratory " distress.      Breath sounds: Normal breath sounds. No wheezing, rhonchi or rales.   Abdominal:      General: Bowel sounds are normal.      Palpations: Abdomen is soft. There is no hepatomegaly or splenomegaly.      Tenderness: There is no abdominal tenderness. There is no guarding or rebound.   Musculoskeletal:      Right lower leg: Edema present.      Left lower leg: Edema present.      Comments: 1 plus pitting edema bilaterally    Neurological:      Mental Status: She is alert.   Psychiatric:         Mood and Affect: Mood normal.         Behavior: Behavior is cooperative.           Procedures     Laboratory Results     Lab Results   Component Value Date    WBC 4.77 11/06/2023    WBC 5.61 10/30/2023    WBC 7.34 10/24/2023    HGB 8.3 (L) 11/06/2023    HGB 8.0 (L) 10/30/2023    HGB 9.3 (L) 10/24/2023    HCT 27.1 (L) 11/06/2023    HCT 26.6 (L) 10/30/2023    HCT 29.4 (L) 10/24/2023    MCV 98.2 (H) 11/06/2023    .1 (H) 10/30/2023    MCV 98.7 (H) 10/24/2023     11/06/2023     10/30/2023     10/24/2023        Lab Results   Component Value Date    GLUCOSE 93 11/06/2023    GLUCOSE 92 10/30/2023    GLUCOSE 96 10/24/2023    CALCIUM 8.2 (L) 11/06/2023    CALCIUM 8.6 10/30/2023    CALCIUM 8.2 (L) 10/24/2023     11/06/2023     10/30/2023     10/24/2023    K 3.9 11/06/2023    K 4.6 10/30/2023    K 4.4 10/24/2023    CO2 25 11/06/2023    CO2 23 10/30/2023    CO2 23 10/24/2023     (H) 11/06/2023     (H) 10/30/2023     (H) 10/24/2023    BUN 17 11/06/2023    BUN 17 10/30/2023    BUN 19 10/24/2023    CREATININE 0.7 11/06/2023    CREATININE 0.8 10/30/2023    CREATININE 0.5 (L) 10/24/2023    ALKPHOS 56 10/19/2023    ALKPHOS 95 08/21/2023    ALKPHOS 41 06/01/2023    AST 17 10/19/2023    AST 15 08/21/2023    AST 14 (L) 06/01/2023    ALT 17 10/19/2023    ALT 14 08/21/2023    ALT 21 06/01/2023    BILITOT 0.5 10/19/2023    BILITOT 0.5 08/21/2023    BILITOT 0.8 06/01/2023     ALBUMIN 4.0 10/19/2023    ALBUMIN 3.7 08/21/2023    ALBUMIN 3.9 06/01/2023       Lab Results   Component Value Date    CHOL 227 (H) 01/27/2023    CHOL 248 (H) 11/18/2015     Lab Results   Component Value Date    HDL 62 01/27/2023    HDL 92 11/18/2015     Lab Results   Component Value Date    LDLCALC 141 (H) 01/27/2023    LDLCALC 133 (H) 11/18/2015     Lab Results   Component Value Date    TRIG 120 01/27/2023    TRIG 117 11/18/2015       Lab Results   Component Value Date    TSH 3.63 10/19/2023    TSH 2.87 01/26/2023    TSH 1.91 10/14/2020     Lab Results   Component Value Date    FREET4 0.79 11/18/2015     Lab Results   Component Value Date    HGBA1C 5.6 11/18/2015    HGBA1C 5.6 04/08/2015     Lab Results   Component Value Date    HEPCAB Nonreactive 07/12/2018     Lab Results   Component Value Date    MG 2.3 10/24/2023    MG 2.2 10/23/2023    MG 2.0 10/22/2023      Immunization History   Administered Date(s) Administered    INFLUENZA VACCINE QUAD ADJUVANTED 65 and OLDER 10/06/2021, 10/19/2022, 10/11/2023    Influenza Split High Dose Preservative Free IM 11/03/2011, 11/13/2012, 10/09/2013, 10/30/2014, 11/17/2015, 09/30/2016, 09/25/2017    Influenza Vaccine 65 And Older Preservative Free 10/25/2019, 10/02/2020    Influenza Vaccine Quadrivalent 3 Yr And Older 09/18/2017, 10/22/2018    Influenza, Unspecified 10/30/2014, 11/17/2015, 09/30/2016, 09/25/2017    MMRV 06/05/2014    Pneumococcal Conjugate 08/09/1996    Pneumococcal Conjugate 13-Valent 11/17/2015    Pneumococcal Polysaccharide 10/30/2014, 09/11/2017    SARS-COV-2 (COVID-19) VACCINE PFIZER BIVALENT 12 years and older ([place]) 09/15/2022    SARS-COV-2 (COVID-19) VACCINE, MODERNA MONOVALENT 02/05/2021, 03/05/2021    SARS-COV-2 (COVID-19) VACCINE, MODERNA MONOVALENT BOOSTER 11/17/2021    SARS-COV-2 (COVID19) VACCINE, PFIZER MONOVALENT 09/15/2022    Tdap 03/22/2012, 08/08/2022    Varicella 06/05/2014    Zoster 01/02/2006, 01/02/2016    Zoster  Vaccine Recombinant Adjuvanted (Shingrix) 05/24/2019, 09/20/2019     Health Maintenance Topics with due status: Overdue       Topic Date Due    COVID-19 Vaccine 09/01/2023     Health Maintenance Topics with due status: Not Due       Topic Last Completion Date    DTaP, Tdap, and Td Vaccines 08/08/2022    DEXA Scan 09/20/2022    Falls Risk Screening 02/10/2023    Medicare Annual Wellness Visit 10/11/2023    Depression Screening 10/19/2023     Health Maintenance Topics with due status: Completed       Topic Last Completion Date    Pneumococcal (65 years and older) 09/11/2017    Zoster Vaccine 09/20/2019    Influenza Vaccine 10/11/2023     Health Maintenance Topics with due status: Aged Out       Topic Date Due    Meningococcal ACWY Aged Out    HIB Vaccines Aged Out    Hepatitis B Vaccines Aged Out    IPV Vaccines Aged Out    HPV Vaccines Aged Out     Health Maintenance Topics with due status: Discontinued       Topic Date Due    Breast Cancer Screening Discontinued        Assessment and Plan       Assessment/Plan     Problem List Items Addressed This Visit     Orthostatic hypotension (Chronic)    Overview     · Managed on Midodrine         Atrial flutter with rapid ventricular response (CMS/HCC) (Chronic)    Overview     · Diagnosed in 02/2023 and hospitalized  · Managed with Apixaban, Metoprolol, and Diltiazem  · Echocardiogram 02/2023    Left Ventricle: Normal ventricle size. Mild concentric left ventricular hypertrophy. Preserved systolic function. Estimated EF 65-70%. No regional wall motion abnormalities. Grade I diastolic dysfunction.    Right Ventricle: Normal ventricle size. Normal systolic function.    Left Atrium: Moderately dilated atrium.    Right Atrium: Mildly dilated atrium.    Aortic Valve: Tricuspid valve.  Sclerotic leaflets. No regurgitation. No stenosis. Calculated dimensionless index = 1.04.    Mitral Valve: Anterior leaflet thickening. Sclerotic mitral valve. Normal leaflet motion. Mild  mitral annular calcification. Mild regurgitation. No stenosis. Mean gradient = 2.00.    Tricuspid Valve: Normal structure. Mild regurgitation. Estimated RVSP = 32 mmHg. No significant stenosis.    Prior Study: Prior study available for comparison. Prior study date: 6/11/2019.  Compared to the prior study, biatrial enlargement and mild concentric LVH are now present.  Otherwise no significant change.     ·          Relevant Medications    diltiazem LA (CARDIZEM LA) 360 mg 24 hr tablet    metoprolol tartrate (LOPRESSOR) 25 mg tablet    midodrine (PROAMATINE) 5 mg tablet    Primary hypertension (Chronic)    Overview     · Managed on Diltiazem   · Advised to follow a low sodium diet and keep BMI < 25.   · Advised to exercise for 2.5 hours per week.   · Instructed to take home blood pressure readings and call if consistently above 140/90.             Relevant Medications    diltiazem LA (CARDIZEM LA) 360 mg 24 hr tablet    metoprolol tartrate (LOPRESSOR) 25 mg tablet    Anemia    Relevant Orders    CBC and Differential    Comprehensive metabolic panel    Bilateral hip pain    Relevant Medications    lidocaine (LIDODERM) 5 % patch    Exudative age-related macular degeneration, right eye, with active choroidal neovascularization (CMS/HCC) - Primary    Sacral fracture, closed (CMS/HCC)    Relevant Medications    lidocaine (LIDODERM) 5 % patch       Return in about 3 months (around 2/22/2024) for Recheck.    Orders Placed This Encounter   Procedures    CBC and Differential     Standing Status:   Future     Standing Expiration Date:   11/22/2024     Order Specific Question:   Release to patient     Answer:   Immediate [1]    Comprehensive metabolic panel     Standing Status:   Future     Standing Expiration Date:   11/22/2024     Order Specific Question:   Release to patient     Answer:   Immediate [1]              Henry Linares MD    11/22/2023

## 2023-11-24 NOTE — TELEPHONE ENCOUNTER
----- Message from Jennifer Jackson, DO sent at 11/24/2023 11:26 AM EST -----  Please let patient know that kidneys,liver,electrolytes are all normal. Her blood sugar was low but she is not on a medication that would lower it so just due to fasting. Her blood count has improved from the last one so please check again in 3 more weeks for further improvement.

## 2023-11-24 NOTE — RESULT ENCOUNTER NOTE
Please let patient know that kidneys,liver,electrolytes are all normal. Her blood sugar was low but she is not on a medication that would lower it so just due to fasting. Her blood count has improved from the last one so please check again in 3 more weeks for further improvement.

## 2023-11-30 ENCOUNTER — PATIENT OUTREACH (OUTPATIENT)
Dept: CASE MANAGEMENT | Facility: CLINIC | Age: 88
End: 2023-11-30
Payer: MEDICARE

## 2023-12-05 ENCOUNTER — TELEPHONE (OUTPATIENT)
Dept: PRIMARY CARE | Facility: CLINIC | Age: 88
End: 2023-12-05
Payer: MEDICARE

## 2023-12-05 NOTE — TELEPHONE ENCOUNTER
Prema from [facility] calling the patient has constipation and the daughter is going to try prune juice and mirelax.  Prema can be reached at [phone]

## 2023-12-07 ENCOUNTER — PATIENT OUTREACH (OUTPATIENT)
Dept: CASE MANAGEMENT | Facility: CLINIC | Age: 88
End: 2023-12-07
Payer: MEDICARE

## 2023-12-07 NOTE — PROGRESS NOTES
[facility] Cardiology  [place] Office Visit       Patient ID: Madelyn Ruiz 89 y.o. female 8/9/1930  PCP: Henry Linares MD      HPI     Madelyn Ruiz is a 89 y.o. female with a past medical history significant for allergic bronchopulmonary aspergillosis, COPD, hypertension, hyperlipidemia, paroxysmal atrial flutter, left SCA aneurysm, GERD, pulmonary embolism in 2019, recurrent falls, and mild sleep apnea.    She was last seen in follow up, accompanied by her son Christ, on 9/20/2023 following an admission at [facility] from 8/21/2023-8/24/2023 with a fall complicated by right pubic ramus and bilateral sacral fractures. She was treated conservatively. She was evaluated by cardiology during her stay given pAF and risk versus benefit of continued anticoagulation. It was recommended that the patient be engaged in the decision making process and to follow up with her primary cardiologist. No changes were made to her cardiac medications at discharge. She was discharged to [place] following this hospitalization. At discharge from [place], she was started on hydralazine 25 mg TID and experienced significant hypotension with lightheadedness so family discontinued it. After detailed discussion and shared decision making, she elected to continue Eliquis. Lasix was increased from 10 mg every other day to 20 mg every other day due to lower extremity edema. Losartan was decreased to 50 mg daily to prevent hypotension. She was advised to continue off of hydralazine.      She was then admitted to [facility] from 10/19-10/24/2023 with fall. She was found on the ground by her home physical therapist, covered in blood and still in her night gown. She typically drinks vodka with water in the evening and also uses Ativan. She sustained a small laceration to the posterior scalp which did not require staples or sutures. CPK 200s. Orthostatic vitals were positive. Losartan and Lasix were held. She  was started on midodrine 5 mg TID. Diltiazem was increased from 300 mg daily to 360 mg daily due to rapid atrial fibrillation. Safety of Eliquis was discussed and she was ultimately discharged back on Eliquis to [facility].     She is accompanied by her daughter to today's visit.  She has not had any fall since hospital discharge. She is now at home and has a live-in caretaker to provide 24-hour care.  The patient herself reports feeling well.  She denies any chest pain or shortness of breath.  She also denies any palpitations, lightheaded/dizziness, or syncope.  She was taken off of Lasix at hospital discharge but this was resumed at [facility] due to worsening lower extremity swelling.  She was taking it daily but is now taking it every other day.         Medications     Current Outpatient Medications   Medication Instructions   • apixaban (ELIQUIS) 2.5 mg, oral, 2 times daily   • azithromycin (ZITHROMAX) 250 mg, oral, 3 times weekly   • calcium carbonate 600 mg calcium (1,500 mg) tablet 1 tablet, oral, 2 times daily   • cholecalciferol, vitamin D3, 25 mcg (1,000 unit) capsule 1 tablet, oral, Daily   • diltiazem LA (CARDIZEM LA) 360 mg, oral, Daily   • docusate sodium (COLACE) 100 mg, oral, 2 times daily PRN   • DULoxetine (CYMBALTA) 60 mg, oral, Nightly   • fexofenadine (ALLEGRA) 180 mg, oral, Daily PRN   • FOLIC ACID/MULTIVIT,IRON, (CENTRUM ORAL) 1 tablet, oral, Daily   • furosemide (LASIX) 20 mg, oral, Daily, Every other day, 4 times weekly   • lidocaine (LIDODERM) 5 % patch 1 patch, transdermal, Daily, Remove & discard patch within 12 hours or as directed by prescriber.   • lifitegrast (XIIDRA) 5 % dropperette dropperette No dose, route, or frequency recorded.   • metoprolol tartrate (LOPRESSOR) 25 mg, oral, 2 times daily   • midodrine (PROAMATINE) 5 mg, oral, 3 times daily before meals   • pregabalin (LYRICA) 50 mg, oral, 2 times daily   • pregabalin (LYRICA) 25 mg, oral, Daily with lunch, PRN   •  rosuvastatin (CRESTOR) 5 mg, oral, Daily, MWF   • SPIRIVA RESPIMAT 2.5 mcg/actuation mist inhaler 2 puffs, inhalation, Daily   • SYMBICORT 80-4.5 mcg/actuation inhaler 2 puffs, inhalation, 2 times daily   • traMADoL (ULTRAM) 50 mg, oral, 3 times daily PRN         Allergies     Mepolizumab, Morphine, and Oxycodone     Vital Signs/Exam     Vitals:    12/08/23 1503   BP: 132/62   Pulse: 63   Resp: 18   SpO2: 98%   Weight: 52.2 kg (115 lb)     BP Readings from Last 3 Encounters:   12/08/23 132/62   11/22/23 (!) 144/80   10/24/23 (!) 124/55     Wt Readings from Last 3 Encounters:   12/08/23 52.2 kg (115 lb)   11/22/23 52.9 kg (116 lb 9.6 oz)   10/19/23 51.3 kg (113 lb)      Body mass index is 22.46 kg/m².    Physical Exam  Constitutional:       Appearance: She is well-developed.   Eyes:      Conjunctiva/sclera: Conjunctivae normal.   Cardiovascular:      Rate and Rhythm: Normal rate and regular rhythm.      Heart sounds: Normal heart sounds.   Pulmonary:      Effort: Pulmonary effort is normal.      Breath sounds: Normal breath sounds.   Abdominal:      General: There is no distension.      Palpations: Abdomen is soft.      Tenderness: There is no abdominal tenderness.   Musculoskeletal:      Right lower leg: Edema (trace) present.      Left lower leg: Edema (trace) present.   Skin:     General: Skin is warm and dry.   Neurological:      General: No focal deficit present.      Mental Status: She is alert.   Psychiatric:         Mood and Affect: Mood normal.         Behavior: Behavior normal.            Diagnostic Data   Diagnostic data was personally reviewed. Available medical records were reviewed and counseling/education was performed where appropriate.  Speech recognition software was used. Typographical errors may be present.    Labs:  Lab Results   Component Value Date    CHOL 227 (H) 01/27/2023    CHOL 248 (H) 11/18/2015     Lab Results   Component Value Date    HDL 62 01/27/2023    HDL 92 11/18/2015     Lab  Results   Component Value Date    LDLCALC 141 (H) 01/27/2023    LDLCALC 133 (H) 11/18/2015     Lab Results   Component Value Date    TRIG 120 01/27/2023    TRIG 117 11/18/2015     Lab Results   Component Value Date    WBC 6.51 11/22/2023    HGB 10.5 (L) 11/22/2023     11/22/2023     Lab Results   Component Value Date    GLUCOSE 59 (L) 11/22/2023    CALCIUM 9.3 11/22/2023     11/22/2023    K 3.7 11/22/2023    CO2 27 11/22/2023     11/22/2023    BUN 16 11/22/2023    CREATININE 0.7 11/22/2023     Lab Results   Component Value Date    EGFR >60.0 11/22/2023     Lab Results   Component Value Date    ALBUMIN 4.1 11/22/2023    BILITOT 0.5 11/22/2023    ALKPHOS 58 11/22/2023    ALT 15 11/22/2023    AST 15 11/22/2023     Lab Results   Component Value Date    HGBA1C 5.6 11/18/2015     High Sens Troponin I   Date Value Ref Range Status   08/21/2023 10.9 <15.0 pg/mL Final   08/21/2023 8.7 <15.0 pg/mL Final   02/21/2023 10.6 <15.0 pg/mL Final   02/20/2023 7.7 <15.0 pg/mL Final   01/26/2023 5.3 <15.0 pg/mL Final     Lab Results   Component Value Date    TSH 3.63 10/19/2023     Estimated Creatinine Clearance: 32.2 mL/min (by C-G formula based on SCr of 0.7 mg/dL).    Echocardiogram:  Results for orders placed during the hospital encounter of 02/20/23    Transthoracic Echo (TTE) Complete    Interpretation Summary  •  Left Ventricle: Normal ventricle size. Mild concentric left ventricular hypertrophy. Preserved systolic function. Estimated EF 65-70%. No regional wall motion abnormalities. Grade I diastolic dysfunction.  •  Right Ventricle: Normal ventricle size. Normal systolic function.  •  Left Atrium: Moderately dilated atrium.  •  Right Atrium: Mildly dilated atrium.  •  Aortic Valve: Tricuspid valve.  Sclerotic leaflets. No regurgitation. No stenosis. Calculated dimensionless index = 1.04.  •  Mitral Valve: Anterior leaflet thickening. Sclerotic mitral valve. Normal leaflet motion. Mild mitral annular  calcification. Mild regurgitation. No stenosis. Mean gradient = 2.00.  •  Tricuspid Valve: Normal structure. Mild regurgitation. Estimated RVSP = 32 mmHg. No significant stenosis.  •  Prior Study: Prior study available for comparison. Prior study date: 6/11/2019.  Compared to the prior study, biatrial enlargement and mild concentric LVH are now present.  Otherwise no significant change.          Cardiac monitor:    ECG:            Assessment and Plan      Madelyn Ruiz is a 89 y.o. female seen for the following conditions:      Diagnosis Plan   1. Atrial flutter, unspecified type (CMS/HCC)  ECG 12 LEAD-OFFICE PERFORMED    Transthoracic echo (TTE) complete      2. Recurrent falls  Transthoracic echo (TTE) complete      3. Primary hypertension  Transthoracic echo (TTE) complete      4. Mixed hyperlipidemia  Transthoracic echo (TTE) complete      5. RBBB  Transthoracic echo (TTE) complete        Plan:     She currently has a live-in caretaker and therefore her fall risk is much lower than it had been in the past.  We will plan to continue on Eliquis for anticoagulation for now particularly since she has not had any falls since her hospital admission 2 months ago.    She is in sinus rhythm at today's visit.  I told her she can cut back her metoprolol dosing to twice daily dosing.  She is currently taking it 3 times daily.  She will continue on her current dosing of diltiazem.  She is also on midodrine given her orthostatic hypotension which she will continue.    She examines fairly euvolemic and we will continue on her current regimen of every other day Lasix 20 mg.    I will plan to see her in follow-up in approximately 4 months at which point we will recheck an echocardiogram.    Michael A. Valentino, MD PhD   [facility] [place] Head Office: [phone]  [facility] [place]: [facility]   12/8/2023

## 2023-12-08 ENCOUNTER — OFFICE VISIT (OUTPATIENT)
Dept: CARDIOLOGY | Facility: CLINIC | Age: 88
End: 2023-12-08
Payer: MEDICARE

## 2023-12-08 VITALS
OXYGEN SATURATION: 98 % | SYSTOLIC BLOOD PRESSURE: 132 MMHG | HEART RATE: 63 BPM | WEIGHT: 115 LBS | DIASTOLIC BLOOD PRESSURE: 62 MMHG | RESPIRATION RATE: 18 BRPM | BODY MASS INDEX: 22.46 KG/M2

## 2023-12-08 DIAGNOSIS — I48.92 ATRIAL FLUTTER, UNSPECIFIED TYPE (CMS/HCC): Primary | ICD-10-CM

## 2023-12-08 DIAGNOSIS — I10 PRIMARY HYPERTENSION: ICD-10-CM

## 2023-12-08 DIAGNOSIS — I45.10 RBBB: ICD-10-CM

## 2023-12-08 DIAGNOSIS — E78.2 MIXED HYPERLIPIDEMIA: ICD-10-CM

## 2023-12-08 DIAGNOSIS — R29.6 RECURRENT FALLS: ICD-10-CM

## 2023-12-08 PROCEDURE — 93000 ELECTROCARDIOGRAM COMPLETE: CPT | Performed by: INTERNAL MEDICINE

## 2023-12-08 PROCEDURE — 99215 OFFICE O/P EST HI 40 MIN: CPT | Performed by: INTERNAL MEDICINE

## 2023-12-08 RX ORDER — METOPROLOL TARTRATE 25 MG/1
25 TABLET, FILM COATED ORAL 2 TIMES DAILY
Qty: 180 TABLET | Refills: 3 | Status: SHIPPED | OUTPATIENT
Start: 2023-12-08 | End: 2024-11-21

## 2023-12-08 RX ORDER — ROSUVASTATIN CALCIUM 5 MG/1
5 TABLET, COATED ORAL 3 TIMES WEEKLY
COMMUNITY
End: 2024-01-25 | Stop reason: ALTCHOICE

## 2023-12-08 RX ORDER — FUROSEMIDE 20 MG/1
20 TABLET ORAL
COMMUNITY
End: 2024-01-25 | Stop reason: ALTCHOICE

## 2023-12-08 NOTE — LETTER
December 8, 2023     Henry Linares MD  [address]  [address]  [facility] [address]    Patient: Madelyn Ruiz  YOB: 1930  Date of Visit: 12/8/2023      Dear Dr. Linares:    Thank you for referring Madelyn Ruiz to me for evaluation. Below are my notes for this consultation.    If you have questions, please do not hesitate to call me. I look forward to following your patient along with you.         Sincerely,        Michael A. Valentino, MD PhD        CC: No Recipients    Valentino, Michael A, MD PhD  12/8/2023  3:29 PM  Signed     [facility] Cardiology  [place] Office Visit       Patient ID: Madelyn Ruiz 89 y.o. female 8/9/1930  PCP: Henry Linares MD      HPI     Madelyn Ruiz is a 89 y.o. female with a past medical history significant for allergic bronchopulmonary aspergillosis, COPD, hypertension, hyperlipidemia, paroxysmal atrial flutter, left SCA aneurysm, GERD, pulmonary embolism in 2019, recurrent falls, and mild sleep apnea.    She was last seen in follow up, accompanied by her son Christ, on 9/20/2023 following an admission at [facility] from 8/21/2023-8/24/2023 with a fall complicated by right pubic ramus and bilateral sacral fractures. She was treated conservatively. She was evaluated by cardiology during her stay given pAF and risk versus benefit of continued anticoagulation. It was recommended that the patient be engaged in the decision making process and to follow up with her primary cardiologist. No changes were made to her cardiac medications at discharge. She was discharged to [place] following this hospitalization. At discharge from [place], she was started on hydralazine 25 mg TID and experienced significant hypotension with lightheadedness so family discontinued it. After detailed discussion and shared decision making, she elected to continue Eliquis. Lasix was increased from 10 mg every other day to 20 mg every other day due to lower extremity  edema. Losartan was decreased to 50 mg daily to prevent hypotension. She was advised to continue off of hydralazine.      She was then admitted to [facility] from 10/19-10/24/2023 with fall. She was found on the ground by her home physical therapist, covered in blood and still in her night gown. She typically drinks vodka with water in the evening and also uses Ativan. She sustained a small laceration to the posterior scalp which did not require staples or sutures. CPK 200s. Orthostatic vitals were positive. Losartan and Lasix were held. She was started on midodrine 5 mg TID. Diltiazem was increased from 300 mg daily to 360 mg daily due to rapid atrial fibrillation. Safety of Eliquis was discussed and she was ultimately discharged back on Eliquis to [facility].     She is accompanied by her daughter to today's visit.  She has not had any fall since hospital discharge. She is now at home and has a live-in caretaker to provide 24-hour care.  The patient herself reports feeling well.  She denies any chest pain or shortness of breath.  She also denies any palpitations, lightheaded/dizziness, or syncope.  She was taken off of Lasix at hospital discharge but this was resumed at [facility] due to worsening lower extremity swelling.  She was taking it daily but is now taking it every other day.         Medications     Current Outpatient Medications   Medication Instructions   • apixaban (ELIQUIS) 2.5 mg, oral, 2 times daily   • azithromycin (ZITHROMAX) 250 mg, oral, 3 times weekly   • calcium carbonate 600 mg calcium (1,500 mg) tablet 1 tablet, oral, 2 times daily   • cholecalciferol, vitamin D3, 25 mcg (1,000 unit) capsule 1 tablet, oral, Daily   • diltiazem LA (CARDIZEM LA) 360 mg, oral, Daily   • docusate sodium (COLACE) 100 mg, oral, 2 times daily PRN   • DULoxetine (CYMBALTA) 60 mg, oral, Nightly   • fexofenadine (ALLEGRA) 180 mg, oral, Daily PRN   • FOLIC ACID/MULTIVIT,IRON, (CENTRUM ORAL) 1 tablet,  oral, Daily   • furosemide (LASIX) 20 mg, oral, Daily, Every other day, 4 times weekly   • lidocaine (LIDODERM) 5 % patch 1 patch, transdermal, Daily, Remove & discard patch within 12 hours or as directed by prescriber.   • lifitegrast (XIIDRA) 5 % dropperette dropperette No dose, route, or frequency recorded.   • metoprolol tartrate (LOPRESSOR) 25 mg, oral, 2 times daily   • midodrine (PROAMATINE) 5 mg, oral, 3 times daily before meals   • pregabalin (LYRICA) 50 mg, oral, 2 times daily   • pregabalin (LYRICA) 25 mg, oral, Daily with lunch, PRN   • rosuvastatin (CRESTOR) 5 mg, oral, Daily, MWF   • SPIRIVA RESPIMAT 2.5 mcg/actuation mist inhaler 2 puffs, inhalation, Daily   • SYMBICORT 80-4.5 mcg/actuation inhaler 2 puffs, inhalation, 2 times daily   • traMADoL (ULTRAM) 50 mg, oral, 3 times daily PRN         Allergies     Mepolizumab, Morphine, and Oxycodone     Vital Signs/Exam     Vitals:    12/08/23 1503   BP: 132/62   Pulse: 63   Resp: 18   SpO2: 98%   Weight: 52.2 kg (115 lb)     BP Readings from Last 3 Encounters:   12/08/23 132/62   11/22/23 (!) 144/80   10/24/23 (!) 124/55     Wt Readings from Last 3 Encounters:   12/08/23 52.2 kg (115 lb)   11/22/23 52.9 kg (116 lb 9.6 oz)   10/19/23 51.3 kg (113 lb)      Body mass index is 22.46 kg/m².    Physical Exam  Constitutional:       Appearance: She is well-developed.   Eyes:      Conjunctiva/sclera: Conjunctivae normal.   Cardiovascular:      Rate and Rhythm: Normal rate and regular rhythm.      Heart sounds: Normal heart sounds.   Pulmonary:      Effort: Pulmonary effort is normal.      Breath sounds: Normal breath sounds.   Abdominal:      General: There is no distension.      Palpations: Abdomen is soft.      Tenderness: There is no abdominal tenderness.   Musculoskeletal:      Right lower leg: Edema (trace) present.      Left lower leg: Edema (trace) present.   Skin:     General: Skin is warm and dry.   Neurological:      General: No focal deficit present.       Mental Status: She is alert.   Psychiatric:         Mood and Affect: Mood normal.         Behavior: Behavior normal.            Diagnostic Data   Diagnostic data was personally reviewed. Available medical records were reviewed and counseling/education was performed where appropriate.  Speech recognition software was used. Typographical errors may be present.    Labs:  Lab Results   Component Value Date    CHOL 227 (H) 01/27/2023    CHOL 248 (H) 11/18/2015     Lab Results   Component Value Date    HDL 62 01/27/2023    HDL 92 11/18/2015     Lab Results   Component Value Date    LDLCALC 141 (H) 01/27/2023    LDLCALC 133 (H) 11/18/2015     Lab Results   Component Value Date    TRIG 120 01/27/2023    TRIG 117 11/18/2015     Lab Results   Component Value Date    WBC 6.51 11/22/2023    HGB 10.5 (L) 11/22/2023     11/22/2023     Lab Results   Component Value Date    GLUCOSE 59 (L) 11/22/2023    CALCIUM 9.3 11/22/2023     11/22/2023    K 3.7 11/22/2023    CO2 27 11/22/2023     11/22/2023    BUN 16 11/22/2023    CREATININE 0.7 11/22/2023     Lab Results   Component Value Date    EGFR >60.0 11/22/2023     Lab Results   Component Value Date    ALBUMIN 4.1 11/22/2023    BILITOT 0.5 11/22/2023    ALKPHOS 58 11/22/2023    ALT 15 11/22/2023    AST 15 11/22/2023     Lab Results   Component Value Date    HGBA1C 5.6 11/18/2015     High Sens Troponin I   Date Value Ref Range Status   08/21/2023 10.9 <15.0 pg/mL Final   08/21/2023 8.7 <15.0 pg/mL Final   02/21/2023 10.6 <15.0 pg/mL Final   02/20/2023 7.7 <15.0 pg/mL Final   01/26/2023 5.3 <15.0 pg/mL Final     Lab Results   Component Value Date    TSH 3.63 10/19/2023     Estimated Creatinine Clearance: 32.2 mL/min (by C-G formula based on SCr of 0.7 mg/dL).    Echocardiogram:  Results for orders placed during the hospital encounter of 02/20/23    Transthoracic Echo (TTE) Complete    Interpretation Summary  •  Left Ventricle: Normal ventricle size. Mild concentric left  ventricular hypertrophy. Preserved systolic function. Estimated EF 65-70%. No regional wall motion abnormalities. Grade I diastolic dysfunction.  •  Right Ventricle: Normal ventricle size. Normal systolic function.  •  Left Atrium: Moderately dilated atrium.  •  Right Atrium: Mildly dilated atrium.  •  Aortic Valve: Tricuspid valve.  Sclerotic leaflets. No regurgitation. No stenosis. Calculated dimensionless index = 1.04.  •  Mitral Valve: Anterior leaflet thickening. Sclerotic mitral valve. Normal leaflet motion. Mild mitral annular calcification. Mild regurgitation. No stenosis. Mean gradient = 2.00.  •  Tricuspid Valve: Normal structure. Mild regurgitation. Estimated RVSP = 32 mmHg. No significant stenosis.  •  Prior Study: Prior study available for comparison. Prior study date: 6/11/2019.  Compared to the prior study, biatrial enlargement and mild concentric LVH are now present.  Otherwise no significant change.          Cardiac monitor:    ECG:            Assessment and Plan      Madelyn Ruiz is a 89 y.o. female seen for the following conditions:      Diagnosis Plan   1. Atrial flutter, unspecified type (CMS/HCC)  ECG 12 LEAD-OFFICE PERFORMED    Transthoracic echo (TTE) complete      2. Recurrent falls  Transthoracic echo (TTE) complete      3. Primary hypertension  Transthoracic echo (TTE) complete      4. Mixed hyperlipidemia  Transthoracic echo (TTE) complete      5. RBBB  Transthoracic echo (TTE) complete        Plan:     She currently has a live-in caretaker and therefore her fall risk is much lower than it had been in the past.  We will plan to continue on Eliquis for anticoagulation for now particularly since she has not had any falls since her hospital admission 2 months ago.    She is in sinus rhythm at today's visit.  I told her she can cut back her metoprolol dosing to twice daily dosing.  She is currently taking it 3 times daily.  She will continue on her current dosing of diltiazem.  She is  also on midodrine given her orthostatic hypotension which she will continue.    She examines fairly euvolemic and we will continue on her current regimen of every other day Lasix 20 mg.    I will plan to see her in follow-up in approximately 4 months at which point we will recheck an echocardiogram.    Michael A. Valentino, MD PhD   [facility] [place] Office: [phone]  [facility] [place]: [facility]   12/8/2023

## 2023-12-12 DIAGNOSIS — S32.10XA CLOSED FRACTURE OF SACRUM, UNSPECIFIED FRACTURE MORPHOLOGY, INITIAL ENCOUNTER (CMS/HCC): Primary | ICD-10-CM

## 2023-12-12 NOTE — PROGRESS NOTES
Madelyn Ruiz is a 89 y.o. female presenting with 3.5 months s/p nonop treatment of Right pelvis and b/l sacrum fx. Their pain is 1/10. It feels aching and is localized to their Pelvis. It does not radiate. Better at rest, worse with movement. No fevers or chills. No tingling or numbness.    Review of systems is negative except for what was included in the above HPI.    Past Medical History:   Past Medical History:   Diagnosis Date   • Allergic bronchopulmonary aspergillosis (CMS/[facility]) 04/02/2018    Allergist: Dr. Arriaza.  Stable FEV1 in 8/2015.  IgE levels and eosinophils stable in 8/2015.  Managed with Fasenra, Azithromycin and Flovent.   • Allergic rhinitis 04/02/2018    Pulmonologist: Dr. Walker. Managed with Flonase.   • Asthma    • Atrial flutter with rapid ventricular response (CMS/[facility]) 02/21/2023    Diagnosed in 02/2023 and hospitalized Managed with Apixaban and Diltiazem Echocardiogram 02/2023 •  Left Ventricle: Normal ventricle size. Mild concentric left ventricular hypertrophy. Preserved systolic function. Estimated EF 65-70%. No regional wall motion abnormalities. Grade I diastolic dysfunction. •  Right Ventricle: Normal ventricle size. Normal systolic function. •  Left Atrium: Moderately   • Atrial flutter with rapid ventricular response (CMS/[facility]) 2/21/2023    Diagnosed in 02/2023 and hospitalized Managed with Apixaban, Metoprolol, and Diltiazem Echocardiogram 02/2023 •  Left Ventricle: Normal ventricle size. Mild concentric left ventricular hypertrophy. Preserved systolic function. Estimated EF 65-70%. No regional wall motion abnormalities. Grade I diastolic dysfunction. •  Right Ventricle: Normal ventricle size. Normal systolic function. •  Left Atriu   • Calcification of aorta (CMS/[facility]) 06/29/2020    Seen incidentally on CT scan.   • Chronic obstructive pulmonary disease (CMS/[facility]) 04/02/2018    Pulmonologist: Dr. Walker.  Seen on PFTs in hospital in 2014.  Controlled with Spiriva and FLovent.   •  GERD (gastroesophageal reflux disease) 06/08/2019    Managed with Esomeprazole.  Should take medication 30 minutes prior to meal.  Advised to avoid caffeine, carbonated beverages, and should not eat within 3 hours of going to sleep.  Advised to reduce BMI < 25.    • Insomnia 04/02/2018    Managed with Lorazepam as needed.   • Lumbar spinal stenosis 04/02/2018    Neurosurgeon: Dr. Miguel. MRI with Central and lateral recess spinal stenosis. Has Spondylolisthesis at L4/L5. S/P epidural injection by Dr. Martin with some relief in past. Had Lumbar fusion and Lumbar laminectomy by Dr. Miguel in 4/2015.   • Macular degeneration disease    • Obstructive sleep apnea syndrome 04/02/2018    Mild STACIA noted in sleep study 7/2015. Treated with nasal CPAP automatic with pressure range 5-10 CM H2Ox couple months. Off now   • Orthostatic hypotension 10/23/2023    Managed on Midodrine   • Osteoporosis 04/02/2018    Rheumatologist: Dr. Bradley.  Dexa scan 7/2016 with osteoporosis of lumbar spine LS-2.5, LH-2.1, RH -2.2.  Has been on Fosamax in the past for 8 years.  Worsened by Prednisone in past.  Continue vitamin D, calcium and weight bearing exercise. DEXA in 7/2017 with LS -1.7, LH -2.2, and RH -2.0.  Next due in 7/2019.  Seen by Dr. Bradley in 8/2015 who agreed with initiating Prolia. Took for 2 years. N   • Primary hypertension 04/02/2018    Managed on Diltiazem and Losartan. Advised to follow a low sodium diet and keep BMI < 25.  Advised to exercise for 2.5 hours per week.  Instructed to take home blood pressure readings and call if consistently above 140/90.     • Primary hypertension 4/2/2018    Managed on Diltiazem  Advised to follow a low sodium diet and keep BMI < 25.  Advised to exercise for 2.5 hours per week.  Instructed to take home blood pressure readings and call if consistently above 140/90.     • Pulmonary embolism (CMS/HCC) 06/09/2019    Hematologist: Dr. Bhakta Diagnosed in 06/2019 in right upper lobe. Vascular  ultrasound negative of bilateral lower extremities. Managed on Eliquis. Hypercoagulable workup was negative for beta-2 glycoprotein's, RIGO, Antithrombin III, lupus anticoagulant, protein C activity protein S activity, and prothrombin gene mutation, and factor V Leiden. Now off Apixaban as CT scan in 12/2019 was without pu   • SVT (supraventricular tachycardia)    • Vertebral compression fracture (CMS/HCC)     At T12 level        Social History:  Social History     Tobacco Use   • Smoking status: Never   • Smokeless tobacco: Never   Vaping Use   • Vaping Use: Never used   Substance Use Topics   • Alcohol use: Yes     Comment: Rarely   • Drug use: No        Family History:  Noncontributory  Family History   Problem Relation Age of Onset   • Glaucoma Biological Mother    • Macular degeneration Biological Mother    • Hypertension Biological Mother    • Lung cancer Biological Father    • Heart disease Biological Brother    • Breast cancer Mother's Sister    • No Known Problems Biological Son    • No Known Problems Biological Son    • No Known Problems Biological Daughter    • No Known Problems Biological Daughter    • No Known Problems Biological Daughter        Physical Exam:    Left and Right Lower Extremity: nontender to palpation  ROM: improving ROM but stiff at extremes  +ta/ehl/gsc motor  Senation intact to light touch spn/dpn/tn  Cap refill brisk    Imaging:  Xray of Pelvis was personally reviewed. My findings are:  fracture in good alignment. interval callus formation present    Assessment/Plan:  Weight bearing as tolerated both lower extremities   PT Rx  Fu as needed

## 2023-12-13 ENCOUNTER — HOSPITAL ENCOUNTER (OUTPATIENT)
Dept: RADIOLOGY | Facility: HOSPITAL | Age: 88
Discharge: HOME | End: 2023-12-13
Attending: PHYSICIAN ASSISTANT
Payer: MEDICARE

## 2023-12-13 ENCOUNTER — OFFICE VISIT (OUTPATIENT)
Dept: ORTHOPEDICS | Facility: CLINIC | Age: 88
End: 2023-12-13
Payer: MEDICARE

## 2023-12-13 DIAGNOSIS — S32.10XA CLOSED FRACTURE OF SACRUM, UNSPECIFIED FRACTURE MORPHOLOGY, INITIAL ENCOUNTER (CMS/HCC): ICD-10-CM

## 2023-12-13 DIAGNOSIS — S32.10XA CLOSED FRACTURE OF SACRUM, UNSPECIFIED FRACTURE MORPHOLOGY, INITIAL ENCOUNTER (CMS/HCC): Primary | ICD-10-CM

## 2023-12-13 PROCEDURE — 99213 OFFICE O/P EST LOW 20 MIN: CPT | Performed by: ORTHOPAEDIC SURGERY

## 2023-12-13 PROCEDURE — 72190 X-RAY EXAM OF PELVIS: CPT

## 2023-12-14 ENCOUNTER — TELEPHONE (OUTPATIENT)
Dept: PRIMARY CARE | Facility: CLINIC | Age: 88
End: 2023-12-14
Payer: MEDICARE

## 2023-12-14 ENCOUNTER — PATIENT OUTREACH (OUTPATIENT)
Dept: CASE MANAGEMENT | Facility: CLINIC | Age: 88
End: 2023-12-14
Payer: MEDICARE

## 2023-12-14 DIAGNOSIS — R29.6 FREQUENT FALLS: ICD-10-CM

## 2023-12-14 DIAGNOSIS — R26.89 IMBALANCE: Primary | ICD-10-CM

## 2023-12-19 ENCOUNTER — HOSPITAL ENCOUNTER (EMERGENCY)
Facility: HOSPITAL | Age: 88
Discharge: ANOTHER HOSPITAL | End: 2023-12-19
Attending: EMERGENCY MEDICINE
Payer: MEDICARE

## 2023-12-19 ENCOUNTER — HOSPITAL ENCOUNTER (INPATIENT)
Facility: HOSPITAL | Age: 88
LOS: 3 days | Discharge: HOME HEALTH | DRG: 184 | End: 2023-12-22
Attending: SURGERY | Admitting: SURGERY
Payer: MEDICARE

## 2023-12-19 ENCOUNTER — APPOINTMENT (EMERGENCY)
Dept: RADIOLOGY | Facility: HOSPITAL | Age: 88
End: 2023-12-19
Payer: MEDICARE

## 2023-12-19 VITALS
DIASTOLIC BLOOD PRESSURE: 3 MMHG | WEIGHT: 115 LBS | OXYGEN SATURATION: 98 % | HEART RATE: 58 BPM | HEIGHT: 60 IN | SYSTOLIC BLOOD PRESSURE: 139 MMHG | RESPIRATION RATE: 16 BRPM | TEMPERATURE: 97.8 F | BODY MASS INDEX: 22.58 KG/M2

## 2023-12-19 DIAGNOSIS — J90 PLEURAL EFFUSION: ICD-10-CM

## 2023-12-19 DIAGNOSIS — S42.292A OTHER CLOSED DISPLACED FRACTURE OF PROXIMAL END OF LEFT HUMERUS, INITIAL ENCOUNTER: ICD-10-CM

## 2023-12-19 DIAGNOSIS — S27.0XXA TRAUMATIC PNEUMOTHORAX, INITIAL ENCOUNTER: ICD-10-CM

## 2023-12-19 DIAGNOSIS — S22.49XA CLOSED FRACTURE OF MULTIPLE RIBS, UNSPECIFIED LATERALITY, INITIAL ENCOUNTER: Primary | ICD-10-CM

## 2023-12-19 DIAGNOSIS — S32.10XS CLOSED FRACTURE OF SACRUM, UNSPECIFIED FRACTURE MORPHOLOGY, SEQUELA: ICD-10-CM
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
## 2023-12-19 DIAGNOSIS — M25.551 BILATERAL HIP PAIN: ICD-10-CM

## 2023-12-19 DIAGNOSIS — M25.552 BILATERAL HIP PAIN: ICD-10-CM
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
## 2023-12-19 PROBLEM — S42.309A HUMERUS FRACTURE: Status: ACTIVE | Noted: 2023-12-19

## 2023-12-19 PROBLEM — I48.92 ATRIAL FLUTTER WITH RAPID VENTRICULAR RESPONSE (CMS/HCC): Chronic | Status: RESOLVED | Noted: 2023-02-21 | Resolved: 2023-12-19

## 2023-12-19 LAB
ABO + RH BLD: NORMAL
ANION GAP SERPL CALC-SCNC: 5 MEQ/L (ref 3–15)
APTT PPP: 25 SEC (ref 23–35)
BASOPHILS # BLD: 0 K/UL (ref 0.01–0.1)
BASOPHILS NFR BLD: 0 %
BLD GP AB SCN SERPL QL: NEGATIVE
BUN SERPL-MCNC: 16 MG/DL (ref 7–25)
CALCIUM SERPL-MCNC: 9.1 MG/DL (ref 8.6–10.3)
CHLORIDE SERPL-SCNC: 106 MEQ/L (ref 98–107)
CO2 SERPL-SCNC: 28 MEQ/L (ref 21–31)
CREAT SERPL-MCNC: 0.7 MG/DL (ref 0.6–1.2)
D AG BLD QL: POSITIVE
DIFFERENTIAL METHOD BLD: ABNORMAL
EGFRCR SERPLBLD CKD-EPI 2021: >60 ML/MIN/1.73M*2
EOSINOPHIL # BLD: 0 K/UL (ref 0.04–0.36)
EOSINOPHIL NFR BLD: 0 %
ERYTHROCYTE [DISTWIDTH] IN BLOOD BY AUTOMATED COUNT: 15.3 % (ref 11.7–14.4)
ETHANOL SERPL-MCNC: <10 MG/DL
GLUCOSE SERPL-MCNC: 101 MG/DL (ref 70–99)
HCT VFR BLDCO AUTO: 31.9 % (ref 35–45)
HGB BLD-MCNC: 9.8 G/DL (ref 11.8–15.7)
IMM GRANULOCYTES # BLD AUTO: 0.04 K/UL (ref 0–0.08)
IMM GRANULOCYTES NFR BLD AUTO: 0.5 %
INR PPP: 1.1
LABORATORY COMMENT REPORT: NORMAL
LACTATE SERPL-SCNC: 1 MMOL/L (ref 0.4–2)
LYMPHOCYTES # BLD: 0.66 K/UL (ref 1.2–3.5)
LYMPHOCYTES NFR BLD: 9 %
MCH RBC QN AUTO: 28.4 PG (ref 28–33.2)
MCHC RBC AUTO-ENTMCNC: 30.7 G/DL (ref 32.2–35.5)
MCV RBC AUTO: 92.5 FL (ref 83–98)
MONOCYTES # BLD: 0.22 K/UL (ref 0.28–0.8)
MONOCYTES NFR BLD: 3 %
MRSA DNA SPEC QL NAA+PROBE: NEGATIVE
NEUTROPHILS # BLD: 6.45 K/UL (ref 1.7–7)
NEUTS SEG NFR BLD: 87.5 %
NRBC BLD-RTO: 0 %
PDW BLD AUTO: 10.2 FL (ref 9.4–12.3)
PLATELET # BLD AUTO: 259 K/UL (ref 150–369)
POTASSIUM SERPL-SCNC: 4 MEQ/L (ref 3.5–5.1)
PROTHROMBIN TIME: 13.8 SEC (ref 12.2–14.5)
RBC # BLD AUTO: 3.45 M/UL (ref 3.93–5.22)
SODIUM SERPL-SCNC: 139 MEQ/L (ref 136–145)
SPECIMEN EXP DATE BLD: NORMAL
WBC # BLD AUTO: 7.37 K/UL (ref 3.8–10.5)

## 2023-12-19 PROCEDURE — 73060 X-RAY EXAM OF HUMERUS: CPT | Mod: LT

## 2023-12-19 PROCEDURE — 63600105 HC IODINE BASED CONTRAST: Mod: JZ

## 2023-12-19 PROCEDURE — 80048 BASIC METABOLIC PNL TOTAL CA: CPT

## 2023-12-19 PROCEDURE — 85025 COMPLETE CBC W/AUTO DIFF WBC: CPT

## 2023-12-19 PROCEDURE — 99284 EMERGENCY DEPT VISIT MOD MDM: CPT | Mod: 25

## 2023-12-19 PROCEDURE — 73030 X-RAY EXAM OF SHOULDER: CPT | Mod: LT

## 2023-12-19 PROCEDURE — 36415 COLL VENOUS BLD VENIPUNCTURE: CPT

## 2023-12-19 PROCEDURE — 23605 CLTX PRX HMRL FX MNPJ+-TRACT: CPT | Mod: LT | Performed by: ORTHOPAEDIC SURGERY

## 2023-12-19 PROCEDURE — 83605 ASSAY OF LACTIC ACID: CPT

## 2023-12-19 PROCEDURE — 93005 ELECTROCARDIOGRAM TRACING: CPT

## 2023-12-19 PROCEDURE — G0480 DRUG TEST DEF 1-7 CLASSES: HCPCS

## 2023-12-19 PROCEDURE — 99291 CRITICAL CARE FIRST HOUR: CPT | Mod: 25

## 2023-12-19 PROCEDURE — 85610 PROTHROMBIN TIME: CPT

## 2023-12-19 PROCEDURE — 85730 THROMBOPLASTIN TIME PARTIAL: CPT

## 2023-12-19 PROCEDURE — 20000000 HC ROOM AND CARE ICU

## 2023-12-19 PROCEDURE — 63700000 HC SELF-ADMINISTRABLE DRUG

## 2023-12-19 PROCEDURE — 63600000 HC DRUGS/DETAIL CODE: Mod: JZ

## 2023-12-19 PROCEDURE — 86901 BLOOD TYPING SEROLOGIC RH(D): CPT

## 2023-12-19 PROCEDURE — G1004 CDSM NDSC: HCPCS

## 2023-12-19 PROCEDURE — 86850 RBC ANTIBODY SCREEN: CPT

## 2023-12-19 PROCEDURE — 99223 1ST HOSP IP/OBS HIGH 75: CPT | Mod: 57 | Performed by: ORTHOPAEDIC SURGERY

## 2023-12-19 PROCEDURE — 99223 1ST HOSP IP/OBS HIGH 75: CPT | Performed by: SURGERY

## 2023-12-19 PROCEDURE — 71260 CT THORAX DX C+: CPT | Mod: MG

## 2023-12-19 PROCEDURE — 87641 MR-STAPH DNA AMP PROBE: CPT

## 2023-12-19 RX ORDER — DEXTROSE 50 % IN WATER (D50W) INTRAVENOUS SYRINGE
25 AS NEEDED
Status: DISCONTINUED | OUTPATIENT
Start: 2023-12-19 | End: 2023-12-22 | Stop reason: HOSPADM

## 2023-12-19 RX ORDER — DOCUSATE SODIUM 100 MG/1
100 CAPSULE, LIQUID FILLED ORAL 2 TIMES DAILY
Status: DISCONTINUED | OUTPATIENT
Start: 2023-12-19 | End: 2023-12-22 | Stop reason: HOSPADM

## 2023-12-19 RX ORDER — MIDODRINE HYDROCHLORIDE 5 MG/1
5 TABLET ORAL
Status: DISCONTINUED | OUTPATIENT
Start: 2023-12-20 | End: 2023-12-22 | Stop reason: HOSPADM

## 2023-12-19 RX ORDER — LIDOCAINE 560 MG/1
1 PATCH PERCUTANEOUS; TOPICAL; TRANSDERMAL DAILY
Status: DISCONTINUED | OUTPATIENT
Start: 2023-12-19 | End: 2023-12-22 | Stop reason: HOSPADM

## 2023-12-19 RX ORDER — METOPROLOL TARTRATE 25 MG/1
25 TABLET, FILM COATED ORAL 2 TIMES DAILY
Status: DISCONTINUED | OUTPATIENT
Start: 2023-12-20 | End: 2023-12-22 | Stop reason: HOSPADM

## 2023-12-19 RX ORDER — IOPAMIDOL 755 MG/ML
100 INJECTION, SOLUTION INTRAVASCULAR
Status: COMPLETED | OUTPATIENT
Start: 2023-12-19 | End: 2023-12-19

## 2023-12-19 RX ORDER — AZITHROMYCIN 250 MG/1
250 TABLET, FILM COATED ORAL 3 TIMES WEEKLY
Status: DISCONTINUED | OUTPATIENT
Start: 2023-12-20 | End: 2023-12-22 | Stop reason: HOSPADM

## 2023-12-19 RX ORDER — DULOXETIN HYDROCHLORIDE 60 MG/1
60 CAPSULE, DELAYED RELEASE ORAL DAILY
Status: DISCONTINUED | OUTPATIENT
Start: 2023-12-20 | End: 2023-12-22 | Stop reason: HOSPADM

## 2023-12-19 RX ORDER — OXYCODONE HYDROCHLORIDE 5 MG/1
5 TABLET ORAL EVERY 4 HOURS PRN
Status: DISCONTINUED | OUTPATIENT
Start: 2023-12-19 | End: 2023-12-19

## 2023-12-19 RX ORDER — ACETAMINOPHEN 325 MG/1
650 TABLET ORAL EVERY 4 HOURS
Status: DISCONTINUED | OUTPATIENT
Start: 2023-12-19 | End: 2023-12-21

## 2023-12-19 RX ORDER — IBUPROFEN 200 MG
16-32 TABLET ORAL AS NEEDED
Status: DISCONTINUED | OUTPATIENT
Start: 2023-12-19 | End: 2023-12-21

## 2023-12-19 RX ORDER — SENNOSIDES 8.6 MG/1
2 TABLET ORAL NIGHTLY
Status: DISCONTINUED | OUTPATIENT
Start: 2023-12-19 | End: 2023-12-22 | Stop reason: HOSPADM

## 2023-12-19 RX ORDER — ENOXAPARIN SODIUM 100 MG/ML
30 INJECTION SUBCUTANEOUS
Status: DISCONTINUED | OUTPATIENT
Start: 2023-12-19 | End: 2023-12-20

## 2023-12-19 RX ORDER — ROSUVASTATIN CALCIUM 10 MG/1
5 TABLET, COATED ORAL 3 TIMES WEEKLY
Status: DISCONTINUED | OUTPATIENT
Start: 2023-12-20 | End: 2023-12-22 | Stop reason: HOSPADM

## 2023-12-19 RX ORDER — LIDOCAINE 560 MG/1
1 PATCH PERCUTANEOUS; TOPICAL; TRANSDERMAL ONCE
Status: DISCONTINUED | OUTPATIENT
Start: 2023-12-19 | End: 2023-12-19 | Stop reason: HOSPADM

## 2023-12-19 RX ORDER — POLYETHYLENE GLYCOL 3350 17 G/17G
17 POWDER, FOR SOLUTION ORAL DAILY
Status: DISCONTINUED | OUTPATIENT
Start: 2023-12-20 | End: 2023-12-22 | Stop reason: HOSPADM

## 2023-12-19 RX ORDER — DEXTROSE 40 %
15-30 GEL (GRAM) ORAL AS NEEDED
Status: DISCONTINUED | OUTPATIENT
Start: 2023-12-19 | End: 2023-12-21

## 2023-12-19 RX ORDER — TRAMADOL HYDROCHLORIDE 50 MG/1
50 TABLET ORAL EVERY 8 HOURS PRN
Status: DISCONTINUED | OUTPATIENT
Start: 2023-12-19 | End: 2023-12-22 | Stop reason: HOSPADM

## 2023-12-19 RX ORDER — PREGABALIN 25 MG/1
50 CAPSULE ORAL 2 TIMES DAILY
Status: DISCONTINUED | OUTPATIENT
Start: 2023-12-19 | End: 2023-12-22 | Stop reason: HOSPADM

## 2023-12-19 RX ADMIN — SENNOSIDES 2 TABLET: 8.6 TABLET, FILM COATED ORAL at 20:56

## 2023-12-19 RX ADMIN — LIDOCAINE 1 PATCH: 4 PATCH TOPICAL at 20:56

## 2023-12-19 RX ADMIN — DOCUSATE SODIUM 100 MG: 100 CAPSULE, LIQUID FILLED ORAL at 20:56

## 2023-12-19 RX ADMIN — IOPAMIDOL 100 ML: 755 INJECTION, SOLUTION INTRAVENOUS at 15:22

## 2023-12-19 RX ADMIN — ENOXAPARIN SODIUM 30 MG: 30 INJECTION SUBCUTANEOUS at 21:07

## 2023-12-19 RX ADMIN — ACETAMINOPHEN 650 MG: 325 TABLET ORAL at 20:56

## 2023-12-19 ASSESSMENT — ENCOUNTER SYMPTOMS
FEVER: 0
FACIAL SWELLING: 0
CONFUSION: 0
HEADACHES: 0
LIGHT-HEADEDNESS: 0
DIZZINESS: 0
ABDOMINAL DISTENTION: 0
HEMATURIA: 0
NAUSEA: 0
SHORTNESS OF BREATH: 0
WEAKNESS: 0
NUMBNESS: 0
PALPITATIONS: 0
JOINT SWELLING: 0
VOMITING: 0
ARTHRALGIAS: 1
FATIGUE: 0
ABDOMINAL PAIN: 0
BLOOD IN STOOL: 0
SPEECH DIFFICULTY: 0
NECK PAIN: 0
COLOR CHANGE: 0
BACK PAIN: 0
WOUND: 0
FACIAL ASYMMETRY: 0

## 2023-12-19 NOTE — ED PROVIDER NOTES
Emergency Medicine Note  HPI   HISTORY OF PRESENT ILLNESS     89-year-old female with history of atrial flutter (on Eliquis), COPD, PE presents to the ED for evaluation status post fall that occurred just prior to arrival.  Patient was carrying gifts with her rollator when she tripped and fell onto left shoulder.  No head injury or loss of consciousness.  In the ED, patient complains of left shoulder pain.  Denies headache, dizziness, lightheadedness, focal weakness, numbness or tingling, speech or visual changes, chest pain, shortness of breath, abdominal pain, lower extremity injury.  On thinners.      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 Arriaza.  Stable FEV1 in 8/2015.  IgE levels and eosinophils stable in 8/2015.  Managed with Fasenra, Azithromycin and Flovent.   • Allergic rhinitis 04/02/2018    Pulmonologist: Dr. Walker. Managed with Flonase.   • Asthma    • Atrial flutter with rapid ventricular response (CMS/HCC) 02/21/2023    Diagnosed in 02/2023 and hospitalized Managed with Apixaban and Diltiazem Echocardiogram 02/2023 •  Left Ventricle: Normal ventricle size. Mild concentric left ventricular hypertrophy. Preserved systolic function. Estimated EF 65-70%. No regional wall motion abnormalities. Grade I diastolic dysfunction. •  Right Ventricle: Normal ventricle size. Normal systolic function. •  Left Atrium: Moderately   • Atrial flutter with rapid ventricular response (CMS/HCC) 2/21/2023    Diagnosed in 02/2023 and hospitalized Managed with Apixaban, Metoprolol, and Diltiazem Echocardiogram 02/2023 •  Left Ventricle: Normal ventricle size. Mild concentric left ventricular hypertrophy. Preserved systolic function. Estimated EF 65-70%. No regional wall motion abnormalities. Grade I  diastolic dysfunction. •  Right Ventricle: Normal ventricle size. Normal systolic function. •  Left Atriu   • Calcification of aorta (CMS/HCC) 06/29/2020    Seen incidentally on CT scan.   • Chronic obstructive pulmonary disease (CMS/[facility]) 04/02/2018    Pulmonologist: Dr. Walker.  Seen on PFTs in hospital in 2014.  Controlled with Spiriva and FLovent.   • GERD (gastroesophageal reflux disease) 06/08/2019    Managed with Esomeprazole.  Should take medication 30 minutes prior to meal.  Advised to avoid caffeine, carbonated beverages, and should not eat within 3 hours of going to sleep.  Advised to reduce BMI < 25.    • Insomnia 04/02/2018    Managed with Lorazepam as needed.   • Lumbar spinal stenosis 04/02/2018    Neurosurgeon: Dr. Miguel. MRI with Central and lateral recess spinal stenosis. Has Spondylolisthesis at L4/L5. S/P epidural injection by Dr. Martin with some relief in past. Had Lumbar fusion and Lumbar laminectomy by Dr. Miguel in 4/2015.   • Macular degeneration disease    • Obstructive sleep apnea syndrome 04/02/2018    Mild STACIA noted in sleep study 7/2015. Treated with nasal CPAP automatic with pressure range 5-10 CM H2Ox couple months. Off now   • Orthostatic hypotension 10/23/2023    Managed on Midodrine   • Osteoporosis 04/02/2018    Rheumatologist: Dr. Bradley.  Dexa scan 7/2016 with osteoporosis of lumbar spine LS-2.5, LH-2.1, RH -2.2.  Has been on Fosamax in the past for 8 years.  Worsened by Prednisone in past.  Continue vitamin D, calcium and weight bearing exercise. DEXA in 7/2017 with LS -1.7, LH -2.2, and RH -2.0.  Next due in 7/2019.  Seen by Dr. Bradley in 8/2015 who agreed with initiating Prolia. Took for 2 years. N   • Primary hypertension 04/02/2018    Managed on Diltiazem and Losartan. Advised to follow a low sodium diet and keep BMI < 25.  Advised to exercise for 2.5 hours per week.  Instructed to take home blood pressure readings and call if consistently above 140/90.     • Primary  hypertension 4/2/2018    Managed on Diltiazem  Advised to follow a low sodium diet and keep BMI < 25.  Advised to exercise for 2.5 hours per week.  Instructed to take home blood pressure readings and call if consistently above 140/90.     • Pulmonary embolism (CMS/HCC) 06/09/2019    Hematologist: Dr. Bhakta Diagnosed in 06/2019 in right upper lobe. Vascular ultrasound negative of bilateral lower extremities. Managed on Eliquis. Hypercoagulable workup was negative for beta-2 glycoprotein's, RIGO, Antithrombin III, lupus anticoagulant, protein C activity protein S activity, and prothrombin gene mutation, and factor V Leiden. Now off Apixaban as CT scan in 12/2019 was without pu   • SVT (supraventricular tachycardia)    • Vertebral compression fracture (CMS/HCC)     At T12 level       Past Surgical History:   Procedure Laterality Date   • APPENDECTOMY     • CARPAL TUNNEL RELEASE Bilateral    • CATARACT EXTRACTION W/  INTRAOCULAR LENS IMPLANT Bilateral    • HYSTERECTOMY  1972   • LUMBAR LAMINECTOMY  04/21/2015    S/P lumbar fusion   • TONSILLECTOMY         Family History   Problem Relation Age of Onset   • Glaucoma Biological Mother    • Macular degeneration Biological Mother    • Hypertension Biological Mother    • Lung cancer Biological Father    • Heart disease Biological Brother    • Breast cancer Mother's Sister    • No Known Problems Biological Son    • No Known Problems Biological Son    • No Known Problems Biological Daughter    • No Known Problems Biological Daughter    • No Known Problems Biological Daughter        Social History     Tobacco Use   • Smoking status: Never   • Smokeless tobacco: Never   Vaping Use   • Vaping Use: Never used   Substance Use Topics   • Alcohol use: Yes     Comment: Rarely   • Drug use: No         Review of Systems   REVIEW OF SYSTEMS     Review of Systems   Constitutional: Negative for fatigue and fever.   HENT: Negative for facial swelling and nosebleeds.    Eyes: Negative for  visual disturbance.   Respiratory: Negative for shortness of breath.    Cardiovascular: Negative for chest pain and palpitations.   Gastrointestinal: Negative for abdominal distention, abdominal pain, blood in stool, nausea and vomiting.   Genitourinary: Negative for hematuria.   Musculoskeletal: Positive for arthralgias (L shoulder pain). Negative for back pain, joint swelling and neck pain.   Skin: Negative for color change, pallor and wound.   Neurological: Negative for dizziness, syncope, facial asymmetry, speech difficulty, weakness, light-headedness, numbness and headaches.   Psychiatric/Behavioral: Negative for confusion.         VITALS     ED Vitals    Date/Time Temp Pulse Resp BP SpO2 [facility]   12/19/23 1927 -- 58 16 139/3 98 % KMD   12/19/23 1716 -- 60 18 113/69 98 % CC   12/19/23 1642 36.6 °C (97.8 °F) -- -- -- -- CC   12/19/23 1540 -- 64 -- 166/80 92 % CC   12/19/23 1357 -- -- -- 168/76 -- CC   12/19/23 1356 -- 61 18 168/7 99 % CC        Pulse Ox %: 99 % (12/19/23 1409)  Pulse Ox Interpretation: Normal (12/19/23 1409)           Physical Exam   PHYSICAL EXAM     Physical Exam  Vitals and nursing note reviewed.   Constitutional:       General: She is not in acute distress.     Appearance: Normal appearance.   HENT:      Head: Normocephalic and atraumatic.      Right Ear: External ear normal.      Left Ear: External ear normal.      Nose: Nose normal.      Mouth/Throat:      Mouth: Mucous membranes are moist.      Pharynx: Oropharynx is clear.   Eyes:      Extraocular Movements: Extraocular movements intact.      Right eye: No nystagmus.      Left eye: No nystagmus.      Pupils: Pupils are equal, round, and reactive to light.   Cardiovascular:      Rate and Rhythm: Normal rate and regular rhythm.      Pulses: Normal pulses.   Pulmonary:      Effort: Pulmonary effort is normal. No respiratory distress.      Breath sounds: Normal breath sounds.   Chest:      Chest wall: No tenderness.   Abdominal:       Palpations: Abdomen is soft.      Tenderness: There is no abdominal tenderness. There is no right CVA tenderness, left CVA tenderness, guarding or rebound.   Musculoskeletal:         General: Normal range of motion.      Right shoulder: No tenderness or bony tenderness. Normal range of motion.      Left shoulder: Tenderness and bony tenderness (Posterior/scapular TTP) present. Normal range of motion.      Right upper arm: No tenderness or bony tenderness.      Left upper arm: No tenderness or bony tenderness.      Right elbow: Normal range of motion. No tenderness.      Left elbow: Normal range of motion. No tenderness.      Right forearm: No tenderness or bony tenderness.      Left forearm: No tenderness or bony tenderness.      Right wrist: No tenderness or bony tenderness. Normal range of motion. Normal pulse.      Left wrist: No tenderness or bony tenderness. Normal range of motion. Normal pulse.      Right hand: No tenderness or bony tenderness. Normal range of motion. Normal capillary refill. Normal pulse.      Left hand: No tenderness or bony tenderness. Normal range of motion. Normal capillary refill. Normal pulse.      Cervical back: Normal, normal range of motion and neck supple. No tenderness or bony tenderness. Normal range of motion.      Thoracic back: Normal. No tenderness or bony tenderness. Normal range of motion.      Lumbar back: Normal. No tenderness or bony tenderness. Normal range of motion.      Right hip: No tenderness or bony tenderness. Normal range of motion.      Left hip: No tenderness or bony tenderness. Normal range of motion.      Right upper leg: No tenderness or bony tenderness.      Left upper leg: No tenderness or bony tenderness.      Right knee: No bony tenderness. Normal range of motion. No tenderness.      Left knee: No bony tenderness. Normal range of motion. No tenderness.      Right lower leg: No tenderness or bony tenderness.      Left lower leg: No tenderness or bony  tenderness.      Right ankle: No tenderness. Normal range of motion. Normal pulse.      Left ankle: No tenderness. Normal range of motion. Normal pulse.      Right foot: Normal range of motion and normal capillary refill. No tenderness or bony tenderness. Normal pulse.      Left foot: Normal range of motion and normal capillary refill. No tenderness or bony tenderness. Normal pulse.   Skin:     General: Skin is warm and dry.      Capillary Refill: Capillary refill takes less than 2 seconds.   Neurological:      General: No focal deficit present.      Mental Status: She is alert and oriented to person, place, and time. Mental status is at baseline.      GCS: GCS eye subscore is 4. GCS verbal subscore is 5. GCS motor subscore is 6.      Cranial Nerves: Cranial nerves 2-12 are intact. No cranial nerve deficit, dysarthria or facial asymmetry.      Sensory: Sensation is intact. No sensory deficit.      Motor: Motor function is intact. No weakness.      Coordination: Coordination is intact. Coordination normal.           PROCEDURES     Procedures     DATA     Results     Procedure Component Value Units Date/Time    Basic metabolic panel [[id number]]  (Abnormal) Collected: 12/19/23 1539    Specimen: Blood, Venous Updated: 12/19/23 1610     Sodium 139 mEQ/L      Potassium 4.0 mEQ/L      Comment: Results obtained on plasma. Plasma Potassium values may be up to 0.4 mEQ/L less than serum values. The differences may be greater for patients with high platelet or white cell counts.        Chloride 106 mEQ/L      CO2 28 mEQ/L      BUN 16 mg/dL      Creatinine 0.7 mg/dL      Glucose 101 mg/dL      Calcium 9.1 mg/dL      eGFR >60.0 mL/min/1.73m*2      Comment: Calculation based on the Chronic Kidney Disease Epidemiology Collaboration (CKD-EPI) equation refit without adjustment for race.        Anion Gap 5 mEQ/L     CBC and differential [[id number]]  (Abnormal) Collected: 12/19/23 1539    Specimen: Blood, Venous Updated: 12/19/23 9816      WBC 7.37 K/uL      RBC 3.45 M/uL      Hemoglobin 9.8 g/dL      Hematocrit 31.9 %      MCV 92.5 fL      MCH 28.4 pg      MCHC 30.7 g/dL      RDW 15.3 %      Platelets 259 K/uL      MPV 10.2 fL      Differential Type Auto     nRBC 0.0 %      Immature Granulocytes 0.5 %      Neutrophils 87.5 %      Lymphocytes 9.0 %      Monocytes 3.0 %      Eosinophils 0.0 %      Basophils 0.0 %      Immature Granulocytes, Absolute 0.04 K/uL      Neutrophils, Absolute 6.45 K/uL      Lymphocytes, Absolute 0.66 K/uL      Monocytes, Absolute 0.22 K/uL      Eosinophils, Absolute 0.00 K/uL      Basophils, Absolute 0.00 K/uL           Imaging Results          CT CHEST WITH IV CONTRAST (Final result)  Result time 12/19/23 15:30:24    Final result                 Impression:    IMPRESSION: Acute, displaced left lateral fifth and sixth and nondisplaced  seventh rib fractures.  Trace left basilar pneumothorax.    Chronic appearing right rib fractures.  Chronic appearing compression deformity  of T12.  Chronic fractures of the pelvis as above.    Small to moderate bilateral lower lobe pleural effusions with atelectasis.    No findings of acute traumatic injury to the abdominal pelvic viscera.      Finding:    New or increased pneumothorax   Acuity: Unexpected  Status:  CLOSED    Critical read back was performed and results were read back by Baljeet Owusu DO  on  12/19/2023 3:29 PM               Narrative:    CLINICAL HISTORY:   Injury, trauma    Comment:   Helical CT imaging of the chest, abdomen and pelvis was obtained.  Multiplanar reformats reviewed.    Administered contrast and dose:  100mL of iopamidoL (ISOVUE-370) 370 mg iodine /mL (76 %) injection 100 mL            CT DOSE:   One or more dose reduction techniques (e.g. automated exposure  control, adjustment of the mA and/or kV according to patient size, use of  iterative reconstruction technique) utilized for this examination.    Comparison: CT chest, abdomen, and pelvis  10/19/2023.    Patent central trachea.  Heterogeneous attenuation to the visualized thyroid  gland.  Given pulsation artifact mild to moderate atheromatous change of the  thoracic aorta without obvious aneurysm.  There may be mild multichamber  enlargement of heart.  Calcification of mitral annulus.  Mild calcification of  the coronary arteries.    No enlarged by size supraclavicular, axillary, internal mammary, mediastinal,  retrocrural, or hilar lymph nodes.    Small to moderate bilateral pleural effusions with atelectasis.  Mild biapical  pleural thickening.  Mild dependent change or atelectasis within the bilateral  upper lobes and right middle lobe.    Trace left basilar pneumothorax.        Hypodense 5.6 cm lesion within the right lobe the liver, remain so on delayed  images, unchanged.  The main portal vein is patent.    No gallstones or biliary duct dilatation by CT    No obvious abnormal enhancement of the spleen or pancreas.    No discrete adrenal gland mass.    The kidneys have symmetric nephrograms without hydronephrosis.  Symmetric  excretion of contrast material on delayed images.  Mild to moderate atheromatous  change of the abdominal aorta without aneurysm.    In the absence of oral contrast no findings of GI obstruction.  Mild to moderate  colonic fecal retention.  Normal appearance to the terminal ileum.  Although the  appendix is not clearly seen, no secondary findings of appendicitis.  Colonic  diverticulosis without findings of diverticulitis.      The osseous structures have chronic appearing right sixth through 10th rib  fractures.  Relatively acute appearing displaced left lateral fifth, sixth and  nondisplaced seventh rib fractures.  There is localized pleural thickening.  Mild to moderate degenerative change of the thoracic and lumbar spines.  Stable  chronic appearing compression deformity of T12, loss of height less than 25%  posterior fusion at L4 and L5 with laminectomy.  Chronic  appearing right  superior and inferior pubic rami fractures.  Chronic appearing left inferior  pubic ramus fracture.  Chronic appearing fracture of the anterior margin of the  left acetabulum.  There may be chronic bilateral sacral fractures.                               CT ABDOMEN PELVIS WITH IV CONTRAST (Final result)  Result time 12/19/23 15:30:24    Final result                 Impression:    IMPRESSION: Acute, displaced left lateral fifth and sixth and nondisplaced  seventh rib fractures.  Trace left basilar pneumothorax.    Chronic appearing right rib fractures.  Chronic appearing compression deformity  of T12.  Chronic fractures of the pelvis as above.    Small to moderate bilateral lower lobe pleural effusions with atelectasis.    No findings of acute traumatic injury to the abdominal pelvic viscera.      Finding:    New or increased pneumothorax   Acuity: Unexpected  Status:  CLOSED    Critical read back was performed and results were read back by Baljeet Owusu DO  on  12/19/2023 3:29 PM               Narrative:    CLINICAL HISTORY:   Injury, trauma    Comment:   Helical CT imaging of the chest, abdomen and pelvis was obtained.  Multiplanar reformats reviewed.    Administered contrast and dose:  100mL of iopamidoL (ISOVUE-370) 370 mg iodine /mL (76 %) injection 100 mL            CT DOSE:   One or more dose reduction techniques (e.g. automated exposure  control, adjustment of the mA and/or kV according to patient size, use of  iterative reconstruction technique) utilized for this examination.    Comparison: CT chest, abdomen, and pelvis 10/19/2023.    Patent central trachea.  Heterogeneous attenuation to the visualized thyroid  gland.  Given pulsation artifact mild to moderate atheromatous change of the  thoracic aorta without obvious aneurysm.  There may be mild multichamber  enlargement of heart.  Calcification of mitral annulus.  Mild calcification of  the coronary arteries.    No enlarged by size  supraclavicular, axillary, internal mammary, mediastinal,  retrocrural, or hilar lymph nodes.    Small to moderate bilateral pleural effusions with atelectasis.  Mild biapical  pleural thickening.  Mild dependent change or atelectasis within the bilateral  upper lobes and right middle lobe.    Trace left basilar pneumothorax.        Hypodense 5.6 cm lesion within the right lobe the liver, remain so on delayed  images, unchanged.  The main portal vein is patent.    No gallstones or biliary duct dilatation by CT    No obvious abnormal enhancement of the spleen or pancreas.    No discrete adrenal gland mass.    The kidneys have symmetric nephrograms without hydronephrosis.  Symmetric  excretion of contrast material on delayed images.  Mild to moderate atheromatous  change of the abdominal aorta without aneurysm.    In the absence of oral contrast no findings of GI obstruction.  Mild to moderate  colonic fecal retention.  Normal appearance to the terminal ileum.  Although the  appendix is not clearly seen, no secondary findings of appendicitis.  Colonic  diverticulosis without findings of diverticulitis.      The osseous structures have chronic appearing right sixth through 10th rib  fractures.  Relatively acute appearing displaced left lateral fifth, sixth and  nondisplaced seventh rib fractures.  There is localized pleural thickening.  Mild to moderate degenerative change of the thoracic and lumbar spines.  Stable  chronic appearing compression deformity of T12, loss of height less than 25%  posterior fusion at L4 and L5 with laminectomy.  Chronic appearing right  superior and inferior pubic rami fractures.  Chronic appearing left inferior  pubic ramus fracture.  Chronic appearing fracture of the anterior margin of the  left acetabulum.  There may be chronic bilateral sacral fractures.                               CT HEAD WITHOUT IV CONTRAST (Final result)  Result time 12/19/23 15:18:38    Final result                  Impression:    IMPRESSION:  1. No acute hemorrhage. No acute infarction in a major vascular territory. No  mass or mass effect.  2. Stable chronic changes as described in the discussion.                 Narrative:    CLINICAL HISTORY: Fall, head trauma.    COMMENT: CT of the brain was performed using contiguous 2.5 mm transaxial  sections without intravenous contrast. Images were reconstructed in the coronal  and sagittal planes by the technologist.    CT DOSE:  One or more dose reduction techniques (e.g. automated exposure  control, adjustment of the mA and/or kV according to patient size, use of  iterative reconstruction technique) utilized for this examination.    Comparison studies: CT head 10/19/2023.    The ventricles and cortical sulci are prominent, consistent with stable  involution of the brain parenchyma.  Low density of the periventricular and  subcortical white matter represents stable small vessel disease.  No acute  hemorrhage. No acute infarction in a major vascular territory. No mass or mass  effect.    There are atherosclerotic vascular calcifications at the skull base. There are  bilateral ocular lens replacements. Densely inspissated secretions versus fungal  overgrowth noted within the left maxillary and left sphenoid sinuses. The other  visualized paranasal sinuses are well-aerated. The mastoid air cells are  well-aerated.                               CT CERVICAL SPINE WITHOUT IV CONTRAST (Final result)  Result time 12/19/23 15:23:17    Final result                 Impression:    IMPRESSION: No evidence of fracture or traumatic cervical subluxation.  Multilevel spondylosis.               Narrative:    CLINICAL HISTORY: Fall, head trauma.    COMMENT: Noncontrast CT examination of the cervical spine performed following  the [facility] standard protocol. Sagittal and coronal reformations  rendered from axial source images. Images reviewed in bone and soft tissue  windows.    CT DOSE:   One or more dose reduction techniques (e.g. automated exposure  control, adjustment of the mA and/or kV according to patient size, use of  iterative reconstruction technique) utilized for this examination.    COMPARISON: None.    Cervicothoracic alignment: Mild anterolisthesis of C6 on C7 on the basis of  facet arthropathy.  Prevertebral soft tissues: Normal in thickness.  Vertebral bodies: Chronic superior endplate compression deformity of T2.  Intervertebral discs: Mild loss of disc height at C2-C3 and C7-T1.  Cervical and upper thoracic spinal canal: Mild acquired compromise at C2-C3  and C5-C6.    Axial images: There are disc osteophyte complexes and there is uncovertebral  hypertrophy and facet hypertrophy at multiple levels. This results in varying  degrees of neural foraminal stenosis, appearing severe on the right at C3-C4,  moderate to severe on the right at C4-C5 and severe on the right at C5-C6.    Extra vertebral soft tissues: Pleural-parenchymal thickening at the lung  apices bilaterally. Calcified pleural plaques of the right lung apex. After  chronic calcifications at the common carotid artery bifurcations.                               X-RAY SHOULDER LEFT 2+ VIEWS (Final result)  Result time 12/19/23 14:29:15    Final result                 Impression:    IMPRESSION:  Left fifth and sixth posterior lateral rib fractures.  Linear sclerosis at the humeral neck, possibly representing nondisplaced  fracture.  Severe glenohumeral degenerative changes with large inferiorly directed humeral  head marginal osteophytes.    Actionable Finding: Follow up should be considered.    COMMENT:  3 views of the left shoulder performed.  2 views left humerus performed.    Comparison made with the CT chest of October 19, 2023. Linear sclerosis now seen  along the medial margin of the humeral neck near the surgical neck. Scattered  minimal degenerative changes with large marginal osteophytes most notably a  large  inferiorly directed humeral head marginal ossified again seen.  Acromioclavicular intervals normal. Glenohumeral alignment is normal. Left fifth  and sixth posterior lateral rib fractures are now seen.    No evidence of a left humeral fracture distal to the humeral neck.               Narrative:    CLINICAL HISTORY: pain s/p fall                               X-RAY HUMERUS LEFT (Final result)  Result time 12/19/23 14:29:15    Final result                 Impression:    IMPRESSION:  Left fifth and sixth posterior lateral rib fractures.  Linear sclerosis at the humeral neck, possibly representing nondisplaced  fracture.  Severe glenohumeral degenerative changes with large inferiorly directed humeral  head marginal osteophytes.    Actionable Finding: Follow up should be considered.    COMMENT:  3 views of the left shoulder performed.  2 views left humerus performed.    Comparison made with the CT chest of October 19, 2023. Linear sclerosis now seen  along the medial margin of the humeral neck near the surgical neck. Scattered  minimal degenerative changes with large marginal osteophytes most notably a  large inferiorly directed humeral head marginal ossified again seen.  Acromioclavicular intervals normal. Glenohumeral alignment is normal. Left fifth  and sixth posterior lateral rib fractures are now seen.    No evidence of a left humeral fracture distal to the humeral neck.               Narrative:    CLINICAL HISTORY: pain s/p fall                                ECG 12 lead          Scoring tools                                  ED Course & MDM   MDM / ED COURSE / CLINICAL IMPRESSION / DISPO     Medical Decision Making  Closed fracture of multiple ribs, unspecified laterality, initial encounter: acute illness or injury  Fall, initial encounter: acute illness or injury  Other closed displaced fracture of proximal end of left humerus, initial encounter: acute illness or injury  Pleural effusion: acute illness or  injury  Traumatic pneumothorax, initial encounter: acute illness or injury  Amount and/or Complexity of Data Reviewed  Labs: ordered.  Radiology: ordered. Decision-making details documented in ED Course.  ECG/medicine tests: ordered.      Risk  OTC drugs.  Prescription drug management.          ED Course as of 12/19/23 1958   Tue Dec 19, 2023   1409 Took tylenol PTA.  Denies pain at this time [EG]   1450 Patient with a humerus fracture.  Also seen is fifth and sixth rib fracture.  Patient had no complaints of rib pain    In any event given the multiple fractures we will pan scan patient/trauma scan [SAMIR]   1451 I personally reviewed the imaging [SAMIR]   1531 CT HEAD WITHOUT IV CONTRAST  --  IMPRESSION:  1. No acute hemorrhage. No acute infarction in a major vascular territory. No  mass or mass effect.  2. Stable chronic changes as described in the discussion.    [EG]   1531 CT CERVICAL SPINE WITHOUT IV CONTRAST     --  IMPRESSION: No evidence of fracture or traumatic cervical subluxation.  Multilevel spondylosis.    [EG]   1537 CT CHEST WITH IV CONTRAST  --  IMPRESSION: Acute, displaced left lateral fifth and sixth and nondisplaced  seventh rib fractures.  Trace left basilar pneumothorax.     Chronic appearing right rib fractures.  Chronic appearing compression deformity  of T12.  Chronic fractures of the pelvis as above.     Small to moderate bilateral lower lobe pleural effusions with atelectasis.     No findings of acute traumatic injury to the abdominal pelvic viscera.        Finding:    New or increased pneumothorax   Acuity: Unexpected  Status:  CLOSED     Critical read back was performed and results were read back by Baljeet Owusu DO,  on  12/19/2023 3:29 PM [EG]   0515 CRITICAL CARE:      I spent [40] minutes of critical care time with this patient.  This did not include time spent on separately reported billable procedures. []     Without the aforementioned there is a high probability of imminent or  life-threatening deterioration in the patients condition.    [SAMIR]   1543 S/w trauma - recommends adding on UA, EKG.  Accepted under trauma, Dr Ford Garcia, service for admission to SDU.   [EG]      ED Course User Index  [EG] Prema Brownlee PA C  [SAMIR] Baljeet Owusu,      Clinical Impression      Closed fracture of multiple ribs, unspecified laterality, initial encounter   Traumatic pneumothorax, initial encounter   Other closed displaced fracture of proximal end of left humerus, initial encounter   Fall, initial encounter   Pleural effusion     _________________     ED Disposition   Transfer to Another Facility                   Prema Brownlee PA C  12/19/23 1958

## 2023-12-19 NOTE — ED ATTESTATION NOTE
Physician Attestation:     I have personally seen and examined the patient, participated in the management, and agree with the findings in the above note except as where stated.      I have personally performed the key components of the encounter and provided a substantive portion of the care and medical decision making.    The Physician Assistant and I discussed  the case, workup, and disposition.        Chief Complaint  Chief Complaint   Patient presents with   • Fall       My focused history, examination, assessment, and plan of care is as follows:      History  89 y.o. m  Presents for eval of fall on AC  Pain to left shoulder  Denies head injury       Physical  Vital signs reviewed   Atraumatic  No cervical ttp  No rib ttp, no flail chest, no crepitus  Lungs cta  rrr  abd soft nt   Full rom of shoulder  No bony ttp  nv intact   Lower rext full rom no pain     MDM / Plan  -Pt provides history   -daughter assists  -ems independent hx   -reviewed prior imaging and notes   Plan for ct and xray  R/o ic hemorrahge  ro fx  ro dislocation         Pt with 3 rib fxs  Humerus fx  Trace ptx    hemodynam stable no indication for chest tube will place on 02  Will transfer to trauma         *Refer to ED Workup tab and PA chart for further documentation.     Baljeet Owusu, DO  12/19/23 1411       Baljeet Owusu, DO  12/19/23 1531

## 2023-12-20 ENCOUNTER — APPOINTMENT (INPATIENT)
Dept: RADIOLOGY | Facility: HOSPITAL | Age: 88
DRG: 184 | End: 2023-12-20
Payer: MEDICARE

## 2023-12-20 LAB
AMORPH CRY #/AREA URNS HPF: 2 /HPF
AMPHET UR QL SCN: NOT DETECTED
ANION GAP SERPL CALC-SCNC: 12 MEQ/L (ref 3–15)
ATRIAL RATE: 62
BACTERIA URNS QL MICRO: ABNORMAL /HPF
BARBITURATES UR QL SCN: NOT DETECTED
BENZODIAZ UR QL SCN: NOT DETECTED
BILIRUB UR QL STRIP.AUTO: NEGATIVE MG/DL
BUN SERPL-MCNC: 17 MG/DL (ref 7–25)
CALCIUM SERPL-MCNC: 8.8 MG/DL (ref 8.6–10.3)
CANNABINOIDS UR QL SCN: NOT DETECTED
CHLORIDE SERPL-SCNC: 107 MEQ/L (ref 98–107)
CLARITY UR REFRACT.AUTO: CLEAR
CO2 SERPL-SCNC: 24 MEQ/L (ref 21–31)
COCAINE UR QL SCN: NOT DETECTED
COLOR UR AUTO: COLORLESS
CREAT SERPL-MCNC: 0.7 MG/DL (ref 0.6–1.2)
EGFRCR SERPLBLD CKD-EPI 2021: >60 ML/MIN/1.73M*2
ERYTHROCYTE [DISTWIDTH] IN BLOOD BY AUTOMATED COUNT: 15.4 % (ref 11.7–14.4)
FENTANYL URINE SCR: NOT DETECTED
GLUCOSE SERPL-MCNC: 96 MG/DL (ref 70–99)
GLUCOSE UR STRIP.AUTO-MCNC: NEGATIVE MG/DL
HCT VFR BLDCO AUTO: 32.6 % (ref 35–45)
HGB BLD-MCNC: 10.4 G/DL (ref 11.8–15.7)
HGB UR QL STRIP.AUTO: NEGATIVE
HYALINE CASTS #/AREA URNS LPF: ABNORMAL /LPF
KETONES UR STRIP.AUTO-MCNC: 1 MG/DL
LEUKOCYTE ESTERASE UR QL STRIP.AUTO: NEGATIVE
MAGNESIUM SERPL-MCNC: 2.1 MG/DL (ref 1.8–2.5)
MCH RBC QN AUTO: 28.7 PG (ref 28–33.2)
MCHC RBC AUTO-ENTMCNC: 31.9 G/DL (ref 32.2–35.5)
MCV RBC AUTO: 90.1 FL (ref 83–98)
NITRITE UR QL STRIP.AUTO: NEGATIVE
OPIATES UR QL SCN: NOT DETECTED
P AXIS: 79
PCP UR QL SCN: NOT DETECTED
PDW BLD AUTO: 11.2 FL (ref 9.4–12.3)
PH UR STRIP.AUTO: 7 [PH]
PHOSPHATE SERPL-MCNC: 4.5 MG/DL (ref 2.4–4.7)
PLATELET # BLD AUTO: 258 K/UL (ref 150–369)
POTASSIUM SERPL-SCNC: 3.7 MEQ/L (ref 3.5–5.1)
PR INTERVAL: 126
PROT UR QL STRIP.AUTO: ABNORMAL
QRS DURATION: 140
QT INTERVAL: 478
QTC CALCULATION(BAZETT): 485
R AXIS: 135
RBC # BLD AUTO: 3.62 M/UL (ref 3.93–5.22)
RBC #/AREA URNS HPF: ABNORMAL /HPF
RENAL EPI CELLS URNS QL MICRO: ABNORMAL /HPF
SODIUM SERPL-SCNC: 143 MEQ/L (ref 136–145)
SP GR UR REFRACT.AUTO: >1.035
SQUAMOUS URNS QL MICRO: ABNORMAL /HPF
T WAVE AXIS: 1
UROBILINOGEN UR STRIP-ACNC: 0.2 EU/DL
VENTRICULAR RATE: 62
WBC # BLD AUTO: 5.82 K/UL (ref 3.8–10.5)
WBC #/AREA URNS HPF: ABNORMAL /HPF

## 2023-12-20 PROCEDURE — 36415 COLL VENOUS BLD VENIPUNCTURE: CPT

## 2023-12-20 PROCEDURE — 71045 X-RAY EXAM CHEST 1 VIEW: CPT

## 2023-12-20 PROCEDURE — 97116 GAIT TRAINING THERAPY: CPT | Mod: GP

## 2023-12-20 PROCEDURE — 80307 DRUG TEST PRSMV CHEM ANLYZR: CPT

## 2023-12-20 PROCEDURE — 81001 URINALYSIS AUTO W/SCOPE: CPT

## 2023-12-20 PROCEDURE — 85027 COMPLETE CBC AUTOMATED: CPT

## 2023-12-20 PROCEDURE — 63700000 HC SELF-ADMINISTRABLE DRUG

## 2023-12-20 PROCEDURE — 63700000 HC SELF-ADMINISTRABLE DRUG: Performed by: NURSE PRACTITIONER

## 2023-12-20 PROCEDURE — 21400000 HC ROOM AND CARE CCU/INTERMEDIATE

## 2023-12-20 PROCEDURE — 93010 ELECTROCARDIOGRAM REPORT: CPT | Performed by: INTERNAL MEDICINE

## 2023-12-20 PROCEDURE — 84100 ASSAY OF PHOSPHORUS: CPT

## 2023-12-20 PROCEDURE — 25000000 HC PHARMACY GENERAL

## 2023-12-20 PROCEDURE — 83735 ASSAY OF MAGNESIUM: CPT

## 2023-12-20 PROCEDURE — 63600000 HC DRUGS/DETAIL CODE: Mod: JZ

## 2023-12-20 PROCEDURE — 97535 SELF CARE MNGMENT TRAINING: CPT | Mod: GO

## 2023-12-20 PROCEDURE — 97166 OT EVAL MOD COMPLEX 45 MIN: CPT | Mod: GO

## 2023-12-20 PROCEDURE — 80048 BASIC METABOLIC PNL TOTAL CA: CPT

## 2023-12-20 PROCEDURE — 97161 PT EVAL LOW COMPLEX 20 MIN: CPT | Mod: GP

## 2023-12-20 PROCEDURE — 99232 SBSQ HOSP IP/OBS MODERATE 35: CPT | Performed by: SURGERY

## 2023-12-20 PROCEDURE — 94640 AIRWAY INHALATION TREATMENT: CPT

## 2023-12-20 RX ORDER — TRAMADOL HYDROCHLORIDE 50 MG/1
50 TABLET ORAL
COMMUNITY
End: 2023-12-22 | Stop reason: HOSPADM

## 2023-12-20 RX ADMIN — PREGABALIN 50 MG: 25 CAPSULE ORAL at 08:17

## 2023-12-20 RX ADMIN — ROSUVASTATIN CALCIUM 5 MG: 10 TABLET, FILM COATED ORAL at 18:07

## 2023-12-20 RX ADMIN — APIXABAN 2.5 MG: 5 TABLET, FILM COATED ORAL at 19:54

## 2023-12-20 RX ADMIN — POLYETHYLENE GLYCOL 3350 17 G: 17 POWDER, FOR SOLUTION ORAL at 08:18

## 2023-12-20 RX ADMIN — AZITHROMYCIN 250 MG: 250 TABLET, FILM COATED ORAL at 08:24

## 2023-12-20 RX ADMIN — ACETAMINOPHEN 650 MG: 325 TABLET ORAL at 05:18

## 2023-12-20 RX ADMIN — DOCUSATE SODIUM 100 MG: 100 CAPSULE, LIQUID FILLED ORAL at 08:17

## 2023-12-20 RX ADMIN — ACETAMINOPHEN 650 MG: 325 TABLET ORAL at 13:34

## 2023-12-20 RX ADMIN — TRAMADOL HYDROCHLORIDE 50 MG: 50 TABLET, COATED ORAL at 08:21

## 2023-12-20 RX ADMIN — MIDODRINE HYDROCHLORIDE 5 MG: 5 TABLET ORAL at 09:10

## 2023-12-20 RX ADMIN — ACETAMINOPHEN 650 MG: 325 TABLET ORAL at 21:35

## 2023-12-20 RX ADMIN — DULOXETINE HYDROCHLORIDE 60 MG: 60 CAPSULE, DELAYED RELEASE ORAL at 08:18

## 2023-12-20 RX ADMIN — MIDODRINE HYDROCHLORIDE 5 MG: 5 TABLET ORAL at 16:54

## 2023-12-20 RX ADMIN — TRAMADOL HYDROCHLORIDE 50 MG: 50 TABLET, COATED ORAL at 16:54

## 2023-12-20 RX ADMIN — METOPROLOL TARTRATE 25 MG: 25 TABLET, FILM COATED ORAL at 08:17

## 2023-12-20 RX ADMIN — APIXABAN 2.5 MG: 5 TABLET, FILM COATED ORAL at 14:12

## 2023-12-20 RX ADMIN — SENNOSIDES 2 TABLET: 8.6 TABLET, FILM COATED ORAL at 21:36

## 2023-12-20 RX ADMIN — DILTIAZEM HYDROCHLORIDE 300 MG: 180 CAPSULE, COATED, EXTENDED RELEASE ORAL at 08:24

## 2023-12-20 RX ADMIN — MIDODRINE HYDROCHLORIDE 5 MG: 5 TABLET ORAL at 14:17

## 2023-12-20 RX ADMIN — ENOXAPARIN SODIUM 30 MG: 30 INJECTION SUBCUTANEOUS at 05:18

## 2023-12-20 RX ADMIN — DOCUSATE SODIUM 100 MG: 100 CAPSULE, LIQUID FILLED ORAL at 19:54

## 2023-12-20 RX ADMIN — METOPROLOL TARTRATE 25 MG: 25 TABLET, FILM COATED ORAL at 19:54

## 2023-12-20 RX ADMIN — TIOTROPIUM BROMIDE INHALATION SPRAY 2 PUFF: 3.12 SPRAY, METERED RESPIRATORY (INHALATION) at 08:37

## 2023-12-20 RX ADMIN — ACETAMINOPHEN 650 MG: 325 TABLET ORAL at 08:17

## 2023-12-20 RX ADMIN — MOMETASONE FUROATE AND FORMOTEROL FUMARATE DIHYDRATE 2 PUFF: 100; 5 AEROSOL RESPIRATORY (INHALATION) at 19:54

## 2023-12-20 RX ADMIN — MOMETASONE FUROATE AND FORMOTEROL FUMARATE DIHYDRATE 2 PUFF: 100; 5 AEROSOL RESPIRATORY (INHALATION) at 08:37

## 2023-12-20 RX ADMIN — PREGABALIN 50 MG: 25 CAPSULE ORAL at 19:54

## 2023-12-20 RX ADMIN — ACETAMINOPHEN 650 MG: 325 TABLET ORAL at 16:54

## 2023-12-20 ASSESSMENT — COGNITIVE AND FUNCTIONAL STATUS - GENERAL
STANDING UP FROM CHAIR USING ARMS: 3 - A LITTLE
MOVING TO AND FROM BED TO CHAIR: 3 - A LITTLE
MOVING TO AND FROM BED TO CHAIR: 3 - A LITTLE
EATING MEALS: 3 - A LITTLE
TOILETING: 2 - A LOT
WALKING IN HOSPITAL ROOM: 3 - A LITTLE
HELP NEEDED FOR BATHING: 2 - A LOT
WALKING IN HOSPITAL ROOM: 3 - A LITTLE
HELP NEEDED FOR PERSONAL GROOMING: 3 - A LITTLE
STANDING UP FROM CHAIR USING ARMS: 3 - A LITTLE
AFFECT: WFL
CLIMB 3 TO 5 STEPS WITH RAILING: 2 - A LOT
DRESSING REGULAR LOWER BODY CLOTHING: 2 - A LOT
DRESSING REGULAR UPPER BODY CLOTHING: 2 - A LOT
CLIMB 3 TO 5 STEPS WITH RAILING: 2 - A LOT
AFFECT: WFL

## 2023-12-20 NOTE — H&P
TRAUMA SURGERY:  HISTORY & PHYSICAL     PATIENT NAME:  Madelyn Ruiz YOB: 1930    AGE:  89 y.o.  GENDER: female   MRN:  [id number]  PATIENT #: [id number]     No chief complaint on file.    ACTIVATION/Consult TIMES     TIMES ARE APPROXIMATE:    Activation or Time Notified: Accepted time to [facility] 1544  Arrived to [facility] 2030   Level of Trauma: Transfer   Trauma Team Arrival Time: n/a Time Patient Seen: 2030     CONSULTS      Consultant Emergent  Urgent    Routine Time Paged Time Responded EMERGENT Arrival Time   Ortho Routine  2045 2045                        HISTORY OF PRESENT ILLNESS/INJURY     Mechanism of Injury: Mechanical fall    89 y.o. female who was transferred from [facility] on 12/19/2023 s/p fall. Per patient she was carrying gifts with her rollator and she fell and hit her left side on a glass table. She states she did not hit her head and denies LOC. She endorsed mild nausea after the fall. Prior to the fall he did not experience any dizziness, lightheadedness, shortness of breath of chest pain. On arrival to [facility] she endorsed LUE pain.     Relevant PMH: See below     Pharmacological Risk Factors:   · Oral Anti-Coagulation: Eliquis for A flutter  · Antiplatelet Therapy: NONE     Patient Vitals for the past 24 hrs:   BP Temp Temp src Pulse Resp SpO2 Height Weight   12/19/23 2100 (!) 165/123 -- -- 68 (!) 32 99 % -- --   12/19/23 2027 (!) 167/77 36.2 °C (97.1 °F) Temporal 63 (!) 23 98 % 1.524 m (5') 51.7 kg (113 lb 15.7 oz)        REVIEW OF SYSTEMS     14 point ROS completed and pertinent positives as listed in HPI or as stated. All others negative.    PAST MEDICAL HISTORY     Past Medical History:   Diagnosis Date   • Allergic bronchopulmonary aspergillosis (CMS/[facility]) 04/02/2018    Allergist: Dr. Arriaza.  Stable FEV1 in 8/2015.  IgE levels and eosinophils stable in 8/2015.  Managed with Fasenra, Azithromycin and Flovent.   • Allergic rhinitis 04/02/2018    Pulmonologist: Dr. Walker. Managed with  Flonase.   • Asthma    • Atrial flutter with rapid ventricular response (CMS/HCC) 02/21/2023    Diagnosed in 02/2023 and hospitalized Managed with Apixaban and Diltiazem Echocardiogram 02/2023 •  Left Ventricle: Normal ventricle size. Mild concentric left ventricular hypertrophy. Preserved systolic function. Estimated EF 65-70%. No regional wall motion abnormalities. Grade I diastolic dysfunction. •  Right Ventricle: Normal ventricle size. Normal systolic function. •  Left Atrium: Moderately   • Atrial flutter with rapid ventricular response (CMS/HCC) 2/21/2023    Diagnosed in 02/2023 and hospitalized Managed with Apixaban, Metoprolol, and Diltiazem Echocardiogram 02/2023 •  Left Ventricle: Normal ventricle size. Mild concentric left ventricular hypertrophy. Preserved systolic function. Estimated EF 65-70%. No regional wall motion abnormalities. Grade I diastolic dysfunction. •  Right Ventricle: Normal ventricle size. Normal systolic function. •  Left Atriu   • Calcification of aorta (CMS/[facility]) 06/29/2020    Seen incidentally on CT scan.   • Chronic obstructive pulmonary disease (CMS/[facility]) 04/02/2018    Pulmonologist: Dr. Walker.  Seen on PFTs in hospital in 2014.  Controlled with Spiriva and FLovent.   • GERD (gastroesophageal reflux disease) 06/08/2019    Managed with Esomeprazole.  Should take medication 30 minutes prior to meal.  Advised to avoid caffeine, carbonated beverages, and should not eat within 3 hours of going to sleep.  Advised to reduce BMI < 25.    • Insomnia 04/02/2018    Managed with Lorazepam as needed.   • Lumbar spinal stenosis 04/02/2018    Neurosurgeon: Dr. Miguel. MRI with Central and lateral recess spinal stenosis. Has Spondylolisthesis at L4/L5. S/P epidural injection by Dr. Martin with some relief in past. Had Lumbar fusion and Lumbar laminectomy by Dr. Miguel in 4/2015.   • Macular degeneration disease    • Obstructive sleep apnea syndrome 04/02/2018    Mild STACIA noted in sleep study  7/2015. Treated with nasal CPAP automatic with pressure range 5-10 CM H2Ox couple months. Off now   • Orthostatic hypotension 10/23/2023    Managed on Midodrine   • Osteoporosis 04/02/2018    Rheumatologist: Dr. Bradley.  Dexa scan 7/2016 with osteoporosis of lumbar spine LS-2.5, LH-2.1, RH -2.2.  Has been on Fosamax in the past for 8 years.  Worsened by Prednisone in past.  Continue vitamin D, calcium and weight bearing exercise. DEXA in 7/2017 with LS -1.7, LH -2.2, and RH -2.0.  Next due in 7/2019.  Seen by Dr. Bradley in 8/2015 who agreed with initiating Prolia. Took for 2 years. N   • Primary hypertension 04/02/2018    Managed on Diltiazem and Losartan. Advised to follow a low sodium diet and keep BMI < 25.  Advised to exercise for 2.5 hours per week.  Instructed to take home blood pressure readings and call if consistently above 140/90.     • Primary hypertension 4/2/2018    Managed on Diltiazem  Advised to follow a low sodium diet and keep BMI < 25.  Advised to exercise for 2.5 hours per week.  Instructed to take home blood pressure readings and call if consistently above 140/90.     • Pulmonary embolism (CMS/HCC) 06/09/2019    Hematologist: Dr. Bhakta Diagnosed in 06/2019 in right upper lobe. Vascular ultrasound negative of bilateral lower extremities. Managed on Eliquis. Hypercoagulable workup was negative for beta-2 glycoprotein's, RIGO, Antithrombin III, lupus anticoagulant, protein C activity protein S activity, and prothrombin gene mutation, and factor V Leiden. Now off Apixaban as CT scan in 12/2019 was without pu   • SVT (supraventricular tachycardia)    • Vertebral compression fracture (CMS/HCC)     At T12 level       PAST SURGICAL HISTORY     Past Surgical History:   Procedure Laterality Date   • APPENDECTOMY     • CARPAL TUNNEL RELEASE Bilateral    • CATARACT EXTRACTION W/  INTRAOCULAR LENS IMPLANT Bilateral    • HYSTERECTOMY  1972   • LUMBAR LAMINECTOMY  04/21/2015    S/P lumbar fusion   •  TONSILLECTOMY          FAMILY HISTORY     Non-contributory    SOCIAL HISTORY     Social History     Socioeconomic History   • Marital status:      Spouse name: Not on file   • Number of children: 5   • Years of education: Not on file   • Highest education level: Not on file   Occupational History   • Occupation: Retired     Comment: Former Teacher   Tobacco Use   • Smoking status: Never   • Smokeless tobacco: Never   Vaping Use   • Vaping Use: Never used   Substance and Sexual Activity   • Alcohol use: Yes     Comment: Rarely   • Drug use: No   • Sexual activity: Not on file   Other Topics Concern   • Not on file   Social History Narrative    Exercise: No formal exercise. Due to back pain from spinal stenosis.     Social Determinants of Health     Financial Resource Strain: Low Risk  (8/22/2023)    Overall Financial Resource Strain (CARDIA)    • Difficulty of Paying Living Expenses: Not hard at all   Food Insecurity: No Food Insecurity (12/19/2023)    Hunger Vital Sign    • Worried About Running Out of Food in the Last Year: Never true    • Ran Out of Food in the Last Year: Never true   Transportation Needs: No Transportation Needs (8/22/2023)    PRAPARE - Transportation    • Lack of Transportation (Medical): No    • Lack of Transportation (Non-Medical): No   Physical Activity: Inactive (10/2/2020)    Exercise Vital Sign    • Days of Exercise per Week: 0 days    • Minutes of Exercise per Session: 0 min   Stress: No Stress Concern Present (2/21/2023)    [employer] - Occupational Stress Questionnaire    • Feeling of Stress : Not at all   Social Connections: Not on file   Intimate Partner Violence: Not on file   Housing Stability: Low Risk  (8/22/2023)    Housing Stability Vital Sign    • Unable to Pay for Housing in the Last Year: No    • Number of Places Lived in the Last Year: 1    • Unstable Housing in the Last Year: No       HOME MEDICATIONS     •  apixaban, Take 2.5 mg by  mouth 2 (two) times a day.  •  azithromycin, Take 1 tablet (250 mg total) by mouth 3 (three) times a week (Mon, Wed, Fri).  •  calcium carbonate, Take 1 tablet by mouth 2 (two) times a day.  •  cholecalciferol (vitamin D3), Take 1 tablet by mouth daily.  •  diltiazem LA, Take 360 mg by mouth daily.  •  docusate sodium, Take 100 mg by mouth 2 (two) times a day as needed for constipation.  •  DULoxetine, Take 60 mg by mouth at bedtime.   •  fexofenadine, Take 180 mg by mouth daily as needed (allergies).  •  FOLIC ACID/MULTIVIT,IRON, (CENTRUM ORAL), Take 1 tablet by mouth daily.  •  furosemide, Take 20 mg by mouth daily. Every other day, 4 times weekly  •  lidocaine, Place 1 patch on the skin daily. Remove & discard patch within 12 hours or as directed by prescriber.  •  XIIDRA,   •  metoprolol tartrate, Take 1 tablet (25 mg total) by mouth 2 (two) times a day.  •  midodrine, Take 1 tablet (5 mg total) by mouth 3 (three) times a day before meals.  •  pregabalin, Take 25 mg by mouth daily with lunch. PRN  •  pregabalin, Take 50 mg by mouth 2 (two) times a day.  •  rosuvastatin, Take 5 mg by mouth daily. MWF  •  SPIRIVA RESPIMAT, Inhale 2 puffs daily.  •  SYMBICORT, Inhale 2 puffs 2 (two) times a day.  •  traMADoL, Take 50 mg by mouth 3 (three) times a day as needed.    ALLERGIES     Allergies   Allergen Reactions   • Mepolizumab Rash   • Morphine      Other reaction(s): Vomiting   • Oxycodone      Other reaction(s): Vomiting       PRIMARY CARE PHYSICIAN     Henry Linares MD    Physical Assessment     NEURO: GCS 15.  AAOx3. Non-focal on exam. Sensation Intact.    R L MOTOR (0-5):   5 4 C4 (deltoid)   5 4 C5 (biceps)   5 5 C6 (wrist ext)   5 5 C7 (triceps)   5 5 C8 (finger flexion)   5 5 T1 (hand intrinsics)   5 5 L2 (hip flexors)   5 5 L3 (quads) knee ext   5 5 L4 (TA) dorsiflex   5 5 L5 (EHL)    5 5 S1 (gastrocs) plantar flex       HEENT: NCAT. Midface is stable. NO malocclusion. LICHA @ 3mm, EOMi. Neck supple.  Trachea ML.   SPINE: Denies midline c-spine tenderness. Denies T/L/S spine tenderness. There are no stepoffs/deformities.   CHEST: Equal chest expansion, denies chest wall tenderness, no crepitus.  LUNGS: LCTA bilaterally. No use of accessory muscles or respiratory distress.   CV: RRR, S1/S2. +2 radial/DP/femoral pulses.  ABDOMEN: Soft, nontender, nondistended.   PELVIS: Stable, non-tender.   : Genitalia unremarkable.  RECTAL: Digital rectal exam deferred.   EXTREMITIES: No palpable deformities.    SKIN: warm, dry, old ecchymosis noted to left hip      DIAGNOSTIC DATA     LABS:  Recent Results (from the past 24 hour(s))   CBC and differential    Collection Time: 12/19/23  3:39 PM   Result Value Ref Range    WBC 7.37 3.80 - 10.50 K/uL    RBC 3.45 (L) 3.93 - 5.22 M/uL    Hemoglobin 9.8 (L) 11.8 - 15.7 g/dL    Hematocrit 31.9 (L) 35.0 - 45.0 %    MCV 92.5 83.0 - 98.0 fL    MCH 28.4 28.0 - 33.2 pg    MCHC 30.7 (L) 32.2 - 35.5 g/dL    RDW 15.3 (H) 11.7 - 14.4 %    Platelets 259 150 - 369 K/uL    MPV 10.2 9.4 - 12.3 fL    Differential Type Auto     nRBC 0.0 <=0.0 %    Immature Granulocytes 0.5 %    Neutrophils 87.5 %    Lymphocytes 9.0 %    Monocytes 3.0 %    Eosinophils 0.0 %    Basophils 0.0 %    Immature Granulocytes, Absolute 0.04 0.00 - 0.08 K/uL    Neutrophils, Absolute 6.45 1.70 - 7.00 K/uL    Lymphocytes, Absolute 0.66 (L) 1.20 - 3.50 K/uL    Monocytes, Absolute 0.22 (L) 0.28 - 0.80 K/uL    Eosinophils, Absolute 0.00 (L) 0.04 - 0.36 K/uL    Basophils, Absolute 0.00 (L) 0.01 - 0.10 K/uL   Basic metabolic panel    Collection Time: 12/19/23  3:39 PM   Result Value Ref Range    Sodium 139 136 - 145 mEQ/L    Potassium 4.0 3.5 - 5.1 mEQ/L    Chloride 106 98 - 107 mEQ/L    CO2 28 21 - 31 mEQ/L    BUN 16 7 - 25 mg/dL    Creatinine 0.7 0.6 - 1.2 mg/dL    Glucose 101 (H) 70 - 99 mg/dL    Calcium 9.1 8.6 - 10.3 mg/dL    eGFR >60.0 >=60.0 mL/min/1.73m*2    Anion Gap 5 3 - 15 mEQ/L   ECG 12 lead    Collection Time: 12/19/23   4:18 PM   Result Value Ref Range    Ventricular rate 62     Atrial rate 62     LA Interval 126     QRS duration 140     QT Interval 478     QTC Calculation(Bazett) 485     P Axis 79     R Axis 135     T Wave Axis 1    Protime-INR    Collection Time: 12/19/23  8:47 PM   Result Value Ref Range    PT 13.8 12.2 - 14.5 sec    INR 1.1     PTT    Collection Time: 12/19/23  8:47 PM   Result Value Ref Range    PTT 25 23 - 35 sec   Ethanol    Collection Time: 12/19/23  8:47 PM   Result Value Ref Range    Ethanol <10 <10 mg/dL   Lactic acid, Venous    Collection Time: 12/19/23  8:47 PM   Result Value Ref Range    Lactate 1.0 0.4 - 2.0 mmol/L        Serum creatinine: 0.7 mg/dL 12/19/23 1539  Estimated creatinine clearance: 32.2 mL/min    IMAGINGS:  CT CHEST WITH IV CONTRAST    Result Date: 12/19/2023  IMPRESSION: Acute, displaced left lateral fifth and sixth and nondisplaced seventh rib fractures.  Trace left basilar pneumothorax. Chronic appearing right rib fractures.  Chronic appearing compression deformity of T12.  Chronic fractures of the pelvis as above. Small to moderate bilateral lower lobe pleural effusions with atelectasis. No findings of acute traumatic injury to the abdominal pelvic viscera. Finding:    New or increased pneumothorax   Acuity: Unexpected  Status:  CLOSED Critical read back was performed and results were read back by Baljeet Owusu DO  on  12/19/2023 3:29 PM     CT ABDOMEN PELVIS WITH IV CONTRAST    Result Date: 12/19/2023  IMPRESSION: Acute, displaced left lateral fifth and sixth and nondisplaced seventh rib fractures.  Trace left basilar pneumothorax. Chronic appearing right rib fractures.  Chronic appearing compression deformity of T12.  Chronic fractures of the pelvis as above. Small to moderate bilateral lower lobe pleural effusions with atelectasis. No findings of acute traumatic injury to the abdominal pelvic viscera. Finding:    New or increased pneumothorax   Acuity: Unexpected  Status:   CLOSED Critical read back was performed and results were read back by Baljeet Owusu DO  on  12/19/2023 3:29 PM     CT CERVICAL SPINE WITHOUT IV CONTRAST    Result Date: 12/19/2023  IMPRESSION: No evidence of fracture or traumatic cervical subluxation. Multilevel spondylosis.     CT HEAD WITHOUT IV CONTRAST    Result Date: 12/19/2023  IMPRESSION: 1. No acute hemorrhage. No acute infarction in a major vascular territory. No mass or mass effect. 2. Stable chronic changes as described in the discussion.     X-RAY SHOULDER LEFT 2+ VIEWS    Result Date: 12/19/2023  IMPRESSION: Left fifth and sixth posterior lateral rib fractures. Linear sclerosis at the humeral neck, possibly representing nondisplaced fracture. Severe glenohumeral degenerative changes with large inferiorly directed humeral head marginal osteophytes. Actionable Finding: Follow up should be considered. COMMENT: 3 views of the left shoulder performed. 2 views left humerus performed. Comparison made with the CT chest of October 19, 2023. Linear sclerosis now seen along the medial margin of the humeral neck near the surgical neck. Scattered minimal degenerative changes with large marginal osteophytes most notably a large inferiorly directed humeral head marginal ossified again seen. Acromioclavicular intervals normal. Glenohumeral alignment is normal. Left fifth and sixth posterior lateral rib fractures are now seen. No evidence of a left humeral fracture distal to the humeral neck.     X-RAY HUMERUS LEFT    Result Date: 12/19/2023  IMPRESSION: Left fifth and sixth posterior lateral rib fractures. Linear sclerosis at the humeral neck, possibly representing nondisplaced fracture. Severe glenohumeral degenerative changes with large inferiorly directed humeral head marginal osteophytes. Actionable Finding: Follow up should be considered. COMMENT: 3 views of the left shoulder performed. 2 views left humerus performed. Comparison made with the CT chest of  October 19, 2023. Linear sclerosis now seen along the medial margin of the humeral neck near the surgical neck. Scattered minimal degenerative changes with large marginal osteophytes most notably a large inferiorly directed humeral head marginal ossified again seen. Acromioclavicular intervals normal. Glenohumeral alignment is normal. Left fifth and sixth posterior lateral rib fractures are now seen. No evidence of a left humeral fracture distal to the humeral neck.     X-RAY PELVIS 3+ VIEWS    Result Date: 12/13/2023  IMPRESSION: Osteopenia with healing nondisplaced bilateral inferior pubic rami fractures and right superior/parasymphyseal fracture. Mildly increased callus formation and sclerosis present compared to prior radiograph. No new fracture seen.        IMPRESSION/PLAN     89 y.o. female s/p fall    Orthostatic hypotension  Assessment & Plan  · Continue home dose of midodrine  · Trend BP    Humerus fracture  Assessment & Plan  · S/p mechanical fall, c/o LUE pain. XR of Left arm reveals linear sclerosis at the humeral neck, possibly representing nondisplaced fracture.  · Appreciate Ortho consults and rec's  · NWB to LUE, sling in place  · Multimodal pain control      Ribs, multiple fractures  Assessment & Plan  · S/p mechanical fall. CT of C/A/P reveals acute, displaced left lateral fifth and sixth and nondisplaced seventh rib fractures.  A/x small PTX    · Monitor sPO2 maintains sats > 93  · I/S, C&DB  · Multimodal pain control with tylenol, Lidoderm patch, and tramadol  · Repeat CXR in AM  · PT/OT/PMR consult appreciated      COPD (chronic obstructive pulmonary disease) (CMS/[facility])  Assessment & Plan  · H/O COPD  · Continue home dose Azithromycin  · Continue home Symbicort ([employer] formulary inpatient) and Spiriva inhalers      Atrial flutter (CMS/[facility])  Assessment & Plan  · Known history of atrial flutter, currently on Eliquis. Will hold pending ortho's consult and recs  · Continue home dose of metoprolol and  diltiazem   · Monitor on Tele    * Fall at home, initial encounter  Assessment & Plan  · See associated plan      VTE PPX: SCDs & Lovenox 30 mg BID    GI PPX:  none needed    DISPOSITION:  Admit to SICU     Discussed code status with patient, she wishes to be a DNR.       MICA Mercado  12/19/2023  10:33 PM

## 2023-12-20 NOTE — PLAN OF CARE
Problem: Adult Inpatient Plan of Care  Goal: Plan of Care Review  Outcome: Progressing  Flowsheets (Taken 12/20/2023 0020)  Outcome Evaluation: OT IE completed. Recommend SNF - if patient elects to go home, she will need her 24/7 caregivers to assist w/ transfers, mobility and ADLs.  Plan of Care Reviewed With: patient  Goal: Patient-Specific Goal (Individualized)  Outcome: Progressing     Problem: Self-Care Deficit  Goal: Improved Ability to Complete Activities of Daily Living  Outcome: Progressing

## 2023-12-20 NOTE — ASSESSMENT & PLAN NOTE
· H/O COPD  · Continue home dose Azithromycin  · Continue home Symbicort ([facility] inpatient) and Spiriva inhalers

## 2023-12-20 NOTE — ASSESSMENT & PLAN NOTE
· S/p mechanical fall. CT of C/A/P reveals acute, displaced left lateral fifth and sixth and nondisplaced seventh rib fractures.  A/x small PTX    · Monitor sPO2 maintains sats > 93  · I/S, C&DB  · Multimodal pain control with tylenol, Lidoderm patch, and tramadol  · Repeat CXR with no large PTX   · PT/OT/PMR consult appreciated

## 2023-12-20 NOTE — PLAN OF CARE
Plan of Care Review  Plan of Care Reviewed With: patient  Progress: improving  Outcome Evaluation: AAO. VSS. 2LPM O2 VNC. Left arm sling in place. Pain management continues.

## 2023-12-20 NOTE — CONSULTS
Orthopedic Consult Note    Subjective     We are being consulted on a 89 year old F with a pmhx below who is presenting with L shoulder pain after a fall from standing height. Patient states that she was carrying gifts with her rollator. Patient experienced immediate pain and inability to weight bear on the affected extremity. Patient was brought to [facility] where imaging revealed a L nondisplaced proximal humerus fracture. Patient denies numbness/tingling/fevers/chills/ha/cp/sob/nvd. Patient states pain is in her L shoulder, described as sharp. Worse with movement, improved with rest and pain control. Ambulates at baseline with walker and rollator. Patient is  on eliquis for A flutter.       Medical History:   Past Medical History:   Diagnosis Date   • Allergic bronchopulmonary aspergillosis (CMS/[facility]) 04/02/2018    Allergist: Dr. Arriaza.  Stable FEV1 in 8/2015.  IgE levels and eosinophils stable in 8/2015.  Managed with Fasenra, Azithromycin and Flovent.   • Allergic rhinitis 04/02/2018    Pulmonologist: Dr. Walker. Managed with Flonase.   • Asthma    • Atrial flutter with rapid ventricular response (CMS/[facility]) 02/21/2023    Diagnosed in 02/2023 and hospitalized Managed with Apixaban and Diltiazem Echocardiogram 02/2023 •  Left Ventricle: Normal ventricle size. Mild concentric left ventricular hypertrophy. Preserved systolic function. Estimated EF 65-70%. No regional wall motion abnormalities. Grade I diastolic dysfunction. •  Right Ventricle: Normal ventricle size. Normal systolic function. •  Left Atrium: Moderately   • Atrial flutter with rapid ventricular response (CMS/[facility]) 2/21/2023    Diagnosed in 02/2023 and hospitalized Managed with Apixaban, Metoprolol, and Diltiazem Echocardiogram 02/2023 •  Left Ventricle: Normal ventricle size. Mild concentric left ventricular hypertrophy. Preserved systolic function. Estimated EF 65-70%. No regional wall motion abnormalities. Grade I diastolic dysfunction. •   Right Ventricle: Normal ventricle size. Normal systolic function. •  Left Atriu   • Calcification of aorta (CMS/HCC) 06/29/2020    Seen incidentally on CT scan.   • Chronic obstructive pulmonary disease (CMS/HCC) 04/02/2018    Pulmonologist: Dr. Walker.  Seen on PFTs in hospital in 2014.  Controlled with Spiriva and FLovent.   • GERD (gastroesophageal reflux disease) 06/08/2019    Managed with Esomeprazole.  Should take medication 30 minutes prior to meal.  Advised to avoid caffeine, carbonated beverages, and should not eat within 3 hours of going to sleep.  Advised to reduce BMI < 25.    • Insomnia 04/02/2018    Managed with Lorazepam as needed.   • Lumbar spinal stenosis 04/02/2018    Neurosurgeon: Dr. Miguel. MRI with Central and lateral recess spinal stenosis. Has Spondylolisthesis at L4/L5. S/P epidural injection by Dr. Martin with some relief in past. Had Lumbar fusion and Lumbar laminectomy by Dr. Miguel in 4/2015.   • Macular degeneration disease    • Obstructive sleep apnea syndrome 04/02/2018    Mild STACIA noted in sleep study 7/2015. Treated with nasal CPAP automatic with pressure range 5-10 CM H2Ox couple months. Off now   • Orthostatic hypotension 10/23/2023    Managed on Midodrine   • Osteoporosis 04/02/2018    Rheumatologist: Dr. Bradley.  Dexa scan 7/2016 with osteoporosis of lumbar spine LS-2.5, LH-2.1, RH -2.2.  Has been on Fosamax in the past for 8 years.  Worsened by Prednisone in past.  Continue vitamin D, calcium and weight bearing exercise. DEXA in 7/2017 with LS -1.7, LH -2.2, and RH -2.0.  Next due in 7/2019.  Seen by Dr. Bradley in 8/2015 who agreed with initiating Prolia. Took for 2 years. N   • Primary hypertension 04/02/2018    Managed on Diltiazem and Losartan. Advised to follow a low sodium diet and keep BMI < 25.  Advised to exercise for 2.5 hours per week.  Instructed to take home blood pressure readings and call if consistently above 140/90.     • Primary hypertension 4/2/2018     Managed on Diltiazem  Advised to follow a low sodium diet and keep BMI < 25.  Advised to exercise for 2.5 hours per week.  Instructed to take home blood pressure readings and call if consistently above 140/90.     • Pulmonary embolism (CMS/HCC) 06/09/2019    Hematologist: Dr. Bhakta Diagnosed in 06/2019 in right upper lobe. Vascular ultrasound negative of bilateral lower extremities. Managed on Eliquis. Hypercoagulable workup was negative for beta-2 glycoprotein's, RIGO, Antithrombin III, lupus anticoagulant, protein C activity protein S activity, and prothrombin gene mutation, and factor V Leiden. Now off Apixaban as CT scan in 12/2019 was without pu   • SVT (supraventricular tachycardia)    • Vertebral compression fracture (CMS/HCC)     At T12 level       Surgical History:   Past Surgical History:   Procedure Laterality Date   • APPENDECTOMY     • CARPAL TUNNEL RELEASE Bilateral    • CATARACT EXTRACTION W/  INTRAOCULAR LENS IMPLANT Bilateral    • HYSTERECTOMY  1972   • LUMBAR LAMINECTOMY  04/21/2015    S/P lumbar fusion   • TONSILLECTOMY         Social History:   Social History     Social History Narrative    Exercise: No formal exercise. Due to back pain from spinal stenosis.       Family History:   Family History   Problem Relation Age of Onset   • Glaucoma Biological Mother    • Macular degeneration Biological Mother    • Hypertension Biological Mother    • Lung cancer Biological Father    • Heart disease Biological Brother    • Breast cancer Mother's Sister    • No Known Problems Biological Son    • No Known Problems Biological Son    • No Known Problems Biological Daughter    • No Known Problems Biological Daughter    • No Known Problems Biological Daughter        Allergies: Mepolizumab, Morphine, and Oxycodone    Current Inpatient Medications   Medication Dose Route Frequency Provider Last Rate Last Admin   • acetaminophen (TYLENOL) tablet 650 mg  650 mg oral q4h Rebekah Calabrese CRNP   650 mg at  12/19/23 2056   • glucose chewable tablet 16-32 g of dextrose  16-32 g of dextrose oral PRN Rebekah Gustafson CRNP        Or   • dextrose 40 % oral gel 15-30 g of dextrose  15-30 g of dextrose oral PRN Rebekah Gustafson CRNP        Or   • glucagon (GLUCAGEN) injection 1 mg  1 mg intramuscular PRN Rebekah Gustafson CRNP        Or   • dextrose 50 % in water (D50) injection 12.5 g  25 mL intravenous PRN Rebekah Gustafson CRNP       • docusate sodium (COLACE) capsule 100 mg  100 mg oral BID Rebekah Gustafson CRNP   100 mg at 12/19/23 2056   • enoxaparin (LOVENOX) syringe 30 mg  30 mg subcutaneous q12h (6a, 6p) Rebekah Gustafson CRNP       • lidocaine (ASPERCREME) 4 % topical patch 1 patch  1 patch Topical Daily Rebekah Gustafson CRNP   1 patch at 12/19/23 2056   • oxyCODONE (ROXICODONE) immediate release tablet 5 mg  5 mg oral q4h PRN Rebekah Gustafson CRNP       • [START ON 12/20/2023] polyethylene glycol (MIRALAX) 17 gram packet 17 g  17 g oral Daily Rebekah Gustafson CRNP       • senna (SENOKOT) tablet 2 tablet  2 tablet oral Nightly Rebekah Gustafson CRNP   2 tablet at 12/19/23 2056        Medication List      ASK your doctor about these medications    apixaban 2.5 mg tablet  Commonly known as: ELIQUIS  Take 2.5 mg by mouth 2 (two) times a day.  Dose: 2.5 mg     azithromycin 250 mg tablet  Commonly known as: ZITHROMAX  Take 1 tablet (250 mg total) by mouth 3 (three) times a week (Mon, Wed, Fri).  Dose: 250 mg     calcium carbonate 600 mg calcium (1,500 mg) tablet  Take 1 tablet by mouth 2 (two) times a day.  Dose: 1 tablet     CENTRUM ORAL  Take 1 tablet by mouth daily.  Dose: 1 tablet     cholecalciferol (vitamin D3) 25 mcg (1,000 unit) capsule  Take 1 tablet by mouth daily.  Dose: 1 tablet     diltiazem  mg 24 hr tablet  Commonly known as: CARDIZEM LA  Take 360 mg by mouth daily.  Dose: 360 mg     docusate sodium 100 mg capsule  Commonly known as: COLACE  Take 100 mg by mouth 2 (two) times a day as  needed for constipation.  Dose: 100 mg     DULoxetine 60 mg capsule  Commonly known as: CYMBALTA  Take 60 mg by mouth at bedtime.  Dose: 60 mg     fexofenadine 180 mg tablet  Commonly known as: ALLEGRA  Take 180 mg by mouth daily as needed (allergies).  Dose: 180 mg     furosemide 20 mg tablet  Commonly known as: LASIX  Take 20 mg by mouth daily. Every other day, 4 times weekly  Dose: 20 mg     lidocaine 5 % patch  Commonly known as: LIDODERM  Place 1 patch on the skin daily. Remove & discard patch within 12 hours or as directed by prescriber.  Dose: 1 patch     metoprolol tartrate 25 mg tablet  Commonly known as: LOPRESSOR  Take 1 tablet (25 mg total) by mouth 2 (two) times a day.  Dose: 25 mg     midodrine 5 mg tablet  Commonly known as: PROAMATINE  Take 1 tablet (5 mg total) by mouth 3 (three) times a day before meals.  Dose: 5 mg     * pregabalin 50 mg capsule  Commonly known as: LYRICA  Take 50 mg by mouth 2 (two) times a day.  Dose: 50 mg     * pregabalin 25 mg capsule  Commonly known as: LYRICA  Take 25 mg by mouth daily with lunch. PRN  Dose: 25 mg     rosuvastatin 10 mg tablet  Commonly known as: CRESTOR  Take 5 mg by mouth daily. MWF  Dose: 5 mg     SPIRIVA RESPIMAT 2.5 mcg/actuation mist inhaler  Inhale 2 puffs daily.  Dose: 2 puff  Generic drug: tiotropium bromide     SYMBICORT 80-4.5 mcg/actuation inhaler  Inhale 2 puffs 2 (two) times a day.  Dose: 2 puff  Generic drug: budesonide-formoteroL     traMADoL 50 mg tablet  Commonly known as: ULTRAM  Take 50 mg by mouth 3 (three) times a day as needed.  Dose: 50 mg     XIIDRA 5 % dropperette dropperette  Generic drug: lifitegrast         * This list has 2 medication(s) that are the same as other medications prescribed for you. Read the directions carefully, and ask your doctor or other care provider to review them with you.              Review of Systems  See HPI    Objective   Labs  CBC Results       12/19/23 11/22/23 11/06/23     1539 1429 0805    WBC 7.37  6.51 4.77    RBC 3.45 3.60 2.76    HGB 9.8 10.5 8.3    HCT 31.9 34.5 27.1    MCV 92.5 95.8 98.2    MCH 28.4 29.2 30.1    MCHC 30.7 30.4 30.6     343 306         BMP Results       12/19/23 11/22/23 11/06/23     1539 1429 0805     141 142    K 4.0 3.7 3.9    Cl 106 106 110    CO2 28 27 25    Glucose 101 59 93    BUN 16 16 17    Creatinine 0.7 0.7 0.7    Calcium 9.1 9.3 8.2    Anion Gap 5 8 7    EGFR >60.0 >60.0 >60.0         Comment for K at 1539 on 12/19/23: Results obtained on plasma. Plasma Potassium values may be up to 0.4 mEQ/L less than serum values. The differences may be greater for patients with high platelet or white cell counts.    Comment for EGFR at 1539 on 12/19/23: Calculation based on the Chronic Kidney Disease Epidemiology Collaboration (CKD-EPI) equation refit without adjustment for race.             Imaging    XR of the L shoulder and humerus without an obvious fracture line   CT CAP with a nondisplaced linear lucency in the humeral neck, reduced glenohumeral joint, no additional fractures appreciated.    Physical Exam  Temp:  [36.2 °C (97.1 °F)-36.6 °C (97.8 °F)] 36.2 °C (97.1 °F)  Heart Rate:  [58-64] 63  Resp:  [16-23] 23  BP: (113-168)/(3-80) 167/77  SpO2:  [92 %-99 %] 98 %     General: AVSS, NAD  Head: NC/AT  Resp: no labored breathing  Neuro: awake, alert  MSK:     C-T-L Spine:  -No palpable step-offs or masses  -No TTP paraspinally or over SP  -Full painless cervical ROM  -Motor strength 5/5 B/L    RUE:  -Skin intact  -Compartments soft and compressible  -No TTP of bony prominences  -No pain or crepitus with ROM of shoulder/elbow/wrist/fingers  -SILT median/ulnar/radial  -Motor intact to axillary/musculocutaneous/radial/ulnar/median/AIN/PIN  -Radial Pulse 2+  -Hand wwp, BCR    LUE:  -Skin intact  -Compartments soft and compressible  -TTP about L shoulder  -No pain or crepitus with ROM of elbow/wrist/fingers  -SILT median/ulnar/radial  -Motor intact to  axillary/musculocutaneous/radial/ulnar/median/AIN/PIN  -Radial Pulse 2+  -Hand wwp, BCR    RLE:  -Skin intact  -Compartments soft and compressible  -No TTP of bony prominences  -No pain or crepitus with ROM of hip/knee/ankle/toes  -Negative Logroll  -Able to SLR  -No pain with Axial load  -No pain with axial compression of flexed hip and knee  -No pain with compression at ASIS  -SILT in sural/saphenous/DP/SP/tibial distributions  -Motor intact to quad/hamstrings/EHL/TA/peroneals/GSC  -DP Pulse 2+  -Foot wwp, BCR    LLE:  -Skin intact  -Compartments soft and compressible  -No TTP of bony prominences  -No pain or crepitus with ROM of hip/knee/ankle/toes  -Negative Logroll  -Able to SLR  -No pain with Axial load  -No pain with axial compression of flexed hip and knee  -No pain with compression at ASIS  -SILT in sural/saphenous/DP/SP/tibial distributions  -Motor intact to quad/hamstrings/EHL/TA/peroneals/GSC  -DP Pulse 2+  -Foot wwp, BCR    Assessment   89 y.o. female being consulted for a nondisplaced L proximal humerus fracture. Plan for nonoperative management.       Plan   TEO BECERRA in sling  Pain control  PT/OT   Follow up with Dr. Saavedra as an outpatient      Iveth Matthews, DO   Orthopedic Surgery PGY2  Pager: [phone]    This note was created using voice-to-text dictation software, please excuse any errors in translation.

## 2023-12-20 NOTE — DISCHARGE INSTRUCTIONS
"     [facility] Sp[facility] At [address] [facility], [address]  [place] [address]   [phone]    After Visit Orthopaedic Care Instructions   Surgeon: Dr. Alejo Saavedra MD          You broke your proximal humerus, please follow up with Dr. Saavedra in 2 weeks      Weight Bearing Status of Affected Extremity:     Non Weight Bearing      Information:  DO NOT drive while taking pain medication.  Take Stool Softeners while taking pain medication to prevent constipation.  You may resume your previous diet.  Call the Dr. Saavedra's Office before returning to work.  If you have any questions or concerns, please do not hesitate to call Dr. Saavedra's office.    Office Follow Up:  -Please call [phone] as soon as you are discharged from [facility] to confirm your 2 Week post-op appointment.             If you had surgery for a fracture:  -You will need to get a post-op X-Ray, A Prescription will be provided to you before you are discharged from the hospital.  -YOU MUST REPORT TO RADIOLOGY 1 HOUR BEFORE YOUR FOLLOW UP APPOINTMENT WITH DR SAAVEDRA.       Please arrive to [facility] approximately 1 hour prior to your scheduled office visit with Dr. Saavedra to have an X-ray of your fracture performed.     Go to outpatient radiology to have the X-ray done.  No appointment or preauthorization is      necessary  it is \"walk-in\".  Dr. Saavedra will be able to view the films during your office visit through the radiology intranet.    Senior Care Navigation Line:   [facility] offers a free Senior Care Navigation Line.  Our team serves as a resource for seniors, their families and caregivers to answer questions and provide guidance in making health care decisions.   We provide care in:  Medical Needs  Social and Psychosocial Concerns  Functional Support (transportation, home assistance, etc.)  Community Resources  Care Navigation  Offer information on a vast range of services at [facility] and the community  Facilitate referrals to community agencies, physicians and more.    Please call [phone] for more information.      Please call the TRAUMA OFFICE with any questions or concerns:     Dr. William Carlos  Trauma Program Medical Director  [address]  [facility] [address]  [place] [address]  Phone: [phone]

## 2023-12-20 NOTE — PROGRESS NOTES
Spoke with patient and daughter and confirmed with medication list from [facility] and/or patient's own pharmacy to complete the medication reconciliation.     Prior to admission medication list:    Medications Prior to Admission:   •  apixaban (ELIQUIS) 2.5 mg tablet, Take 2.5 mg by mouth 2 (two) times a day.  •  azithromycin (ZITHROMAX) 250 mg tablet, Take 1 tablet (250 mg total) by mouth 3 (three) times a week (Mon, Wed, Fri).  •  calcium carbonate 600 mg calcium (1,500 mg) tablet, Take 1 tablet by mouth daily.  •  cholecalciferol, vitamin D3, 25 mcg (1,000 unit) capsule, Take 1 tablet by mouth daily.  •  diltiazem LA (CARDIZEM LA) 360 mg 24 hr tablet, Take 360 mg by mouth daily.  •  DULoxetine (CYMBALTA) 60 mg capsule, Take 60 mg by mouth at bedtime.   •  fexofenadine (ALLEGRA) 180 mg tablet, Take 180 mg by mouth nightly.  •  FOLIC ACID/MULTIVIT,IRON, (CENTRUM ORAL), Take 1 tablet by mouth daily.  •  furosemide (LASIX) 20 mg tablet, Take 20 mg by mouth 4 (four) times a week (Sun, Tue, Thu, Sat). 4 times weekly: Sun/Tues/Thurs/Sat  •  metoprolol tartrate (LOPRESSOR) 25 mg tablet, Take 1 tablet (25 mg total) by mouth 2 (two) times a day.  •  midodrine (PROAMATINE) 5 mg tablet, Take 1 tablet (5 mg total) by mouth 3 (three) times a day before meals.  •  pregabalin (LYRICA) 50 mg capsule, Take 50 mg by mouth 2 (two) times a day. Morning and Evening  •  rosuvastatin (CRESTOR) 5 mg tablet, Take 5 mg by mouth 3 (three) times a week (Mon, Wed, Fri). MWF  •  SPIRIVA RESPIMAT 2.5 mcg/actuation mist inhaler, Inhale 2 puffs daily.  •  SYMBICORT 80-4.5 mcg/actuation inhaler, Inhale 2 puffs 2 (two) times a day.  •  traMADoL (ULTRAM) 50 mg tablet, Take 50 mg by mouth 2 (two) times a day. Pt also takes it as needed at lunch time.  •  docusate sodium (COLACE) 100 mg capsule, Take 100 mg by mouth 2 (two) times a day as needed for constipation.  •  lidocaine (LIDODERM) 5 % patch, Place 1 patch on the skin daily. Remove &  discard patch within 12 hours or as directed by prescriber. (Patient taking differently: Place 1 patch on the skin daily as needed. Remove & discard patch within 12 hours or as directed by prescriber.)  •  pregabalin (LYRICA) 25 mg capsule, Take 25 mg by mouth daily with lunch. PRN  •  traMADoL (ULTRAM) 50 mg tablet, Take 50 mg by mouth daily with lunch. Lunch dose is as needed.  • Systane (preservative-free) eye drops - instill 1 drop as needed into eye(s) for dry eyes.     Comments about home medications:  - The lunchtime dose of both pregabalin and tramadol is as needed.   - The patient no longer takes lorazepam or losartan.     Compliance:   - Consistent fills, no compliance concerns based on patient interview     Additional comments:   -  The patient's daughter fills a pill box for the patient.

## 2023-12-20 NOTE — ASSESSMENT & PLAN NOTE
· Known history of atrial flutter, currently on Eliquis and is reinstated. Was held for a few days post the fall  · Continue home dose of metoprolol and diltiazem   · Monitor on Tele

## 2023-12-20 NOTE — PLAN OF CARE
Care Coordination Admission Assessment Note    General Information:  Readmission Within the last 30 days: no previous admission in last 30 days  Does patient have a : No  Patient-Specific Goals (include timeframe):      Living Arrangements:  Arrived From: home  Current Living Arrangements: home  People in Home: other (see comments) (Live in [facility])  Home Accessibility:    Living Arrangement Comments: Pt lives at home, 3 [address]AN[address], one level set up    Housing Stability and Utility Access (SDOH):  In the last 12 months, was there a time when you were not able to pay the mortgage or rent on time?: No  In the last 12 months, how many places have you lived?: 1  In the last 12 months, was there a time when you did not have a steady place to sleep or slept in a shelter (including now)?: No  In the past 12 months has the electric, gas, oil, or water company threatened to shut off services in your home?: No    Functional Status Prior to Admission:   Assistive Device/Animal Currently Used at Home: walker, front-wheeled, other (see comments), grab bar, shower chair, commode, 3-in-1 (rollator)  Functional Status Comments: Pt ambulates using rollator or standard walker  IADL Comments: assist     Supports and Services:  Current Outpatient/Agency/Support Group:    Type of Current Home Care Services:    History of home care episode or rehab stay: [facility] [place] [facility]    Discharge Needs Assessment:   Concerns to be Addressed: care coordination/care conferences, discharge planning  Current Discharge Risk: physical impairment  Anticipated Changes Related to Illness: inability to care for self    Patient/Family Anticipated Discharge Plan:  Patient/Family Anticipates Transition To: home with help/services  Patient/Family Anticipated Services at Transition: durable medical equipment, home health care    Connection to Community  Not applicable      Patient Choice:   Offered/Gave Vendor List: no  Patient's Choice of  Community Agency(s): [facility]       Anticipated Discharge Plan:  Met with patient. Provided education and contact information for Care Coordination services.: yes  Anticipated Discharge Disposition: home with assistance, home with home health  Type of Home Care Services: home health aide, home PT, nursing, home OT      Transportation Needs (SDOH):  Transportation Concerns: none  Transportation Anticipated: family or friend will provide  Is Out of Hospital DNR needed at discharge?: no    In the past 12 months, has lack of transportation kept you from medical appointments or from getting medications?: No  In the past 12 months, has lack of transportation kept you from meetings, work, or from getting things needed for daily living?: No    Concerns - comments: Pt is a 89 y.o. female with past medical history significant for a flutter on anticoagulation, PE, COPD chronic low back pain, who presents to [facility] from home on 12/19/2023 with left shoulder pain after fall from standing.     CM spoke with Pt's daughter Shelley to complete MDTP. CM verified all demographic info and introduced CC role.     Pt lives at home with . All one level set up. [facility].     Pt is covid vaccinated with 3 Moderna and 2 Pfizer.     Pt does not use any D&A  ETOH <10  UDS -  No intervention required.     CM discussed plan with Shelley, hopeful for Pt to return when medically stable. CM will follow.

## 2023-12-20 NOTE — PROGRESS NOTES
[facility] CRITICAL CARE:  TERTIARY NOTE       PATIENT NAME:  Madelyn Ruiz YOB: 1930    AGE:  89 y.o.  GENDER: female   MRN:  [id number]  PATIENT #: [id number]     PRIMARY CARE PHYSICIAN     Henry Linares MD    SUBJECTIVE     No acute events overnight. Endorses well controlled pain in left chest wall and arm. Pulling 1100 ml on IS.  Denies palps/SOB. Denies N/V.     VITAL SIGNS   Patient Vitals for the past 4 hrs:   BP Temp Temp src Pulse Resp SpO2   12/20/23 0700 (!) 143/65 -- -- (!) 58 19 100 %   12/20/23 0600 (!) 160/68 -- -- 60 16 100 %   12/20/23 0500 (!) 142/63 -- -- (!) 54 18 99 %   12/20/23 0400 130/61 36.2 °C (97.2 °F) Temporal (!) 58 18 99 %      INTAKE & OUTPUT       Intake/Output Summary (Last 24 hours) at 12/20/2023 0751  Last data filed at 12/20/2023 0518  Gross per 24 hour   Intake 100 ml   Output 350 ml   Net -250 ml     No intake/output data recorded.    MEDICATIONS   Infusions:           Scheduled:   • acetaminophen  650 mg oral q4h NOE   • azithromycin  250 mg oral Once per day on Monday Wednesday Friday   • dilTIAZem CD  300 mg oral Daily   • docusate sodium  100 mg oral BID   • DULoxetine  60 mg oral Daily   • enoxaparin  30 mg subcutaneous q12h (6a, 6p)   • lidocaine  1 patch Topical Daily   • metoprolol tartrate  25 mg oral BID   • midodrine  5 mg oral TID AC   • mometasone-formoterol  2 puff inhalation BID (8a, 8p)   • polyethylene glycol  17 g oral Daily   • pregabalin  50 mg oral BID   • rosuvastatin  5 mg oral Once per day on Monday Wednesday Friday   • senna  2 tablet oral Nightly   • tiotropium bromide  2 puff inhalation Daily     Antibiotics:  Anti-infectives (From admission, onward)    Start     Dose/Rate Route Frequency Ordered Stop    12/20/23 0900  azithromycin (ZITHROMAX) tablet 250 mg         250 mg oral Once per day on Monday Wednesday Friday 12/19/23 2232          PRN:     glucose, 16-32 g of dextrose, PRN   Or  dextrose, 15-30 g of dextrose, PRN    Or  glucagon, 1 mg, PRN   Or  dextrose 50 % in water (D50), 25 mL, PRN  traMADoL, 50 mg, q8h PRN       PHYSICAL EXAM     NEURO: GCS 15. AAOx3, LICHA @ 3 mm, EOMi, CN II-XII grossly intact. Non-focal on exam. Following all commands. Sensation intact.    R L MOTOR (0-5):   5 5 C4 (deltoid)   5 5 C5 (biceps)   5 5 C6 (wrist ext)   5 5 C7 (triceps)   5 5 C8 (finger flexion)   5 5 T1 (hand intrinsics)   5 5 L2 (hip flexors)   5 5 L3 (quads) knee ext   5 5 L4 (TA) dorsiflex   5 5 L5 (EHL)   5 5 S1 (gastrocs) plantar flex   HENT: NC/AT. Nares clear. Oropharynx clear.  Midface stable.  Denies TTP.  Denies Malocclusion.  Trachea midline. Tongue midline.   SPINE: C-Spine non-tender to palp. T/L/S spine non-tender to palp, no stepoffs/deformities.   CHEST: Symmetric expansion, L chest wall tender to palp, no crepitus  LUNGS: LCTA bilaterally. No use of accessory muscles or respiratory distress.   CV: RRR, S1/S2. NSR. +2 radial/DP/femoral pulses.   ABDOMEN: Soft, nontender, nondistended. (+) bowel sounds     : Voiding without difficulty.    EXTREMITIES: No gross deformities. L shoulder pain, worsens with movement  SKIN: warm, dry.    NEW FINDINGS on Physical Exam: No    Lines, Drains, Airways, Wounds:     Peripheral IV (Adult) 12/19/23 Anterior;Proximal;Right Forearm (Active)   Number of days: 1       External Urinary Catheter Female (one size) (Active)   Number of days: 1     INJURIES REVIEWED     Injuries Identified to Date:  1. Left rib fractures 5 to 7 with trace basilar PTX  2. L humerus fx    DIAGNOSTIC DATA     LABS:  Results from last 7 days   Lab Units 12/20/23  0541 12/19/23  1539   SODIUM mEQ/L 143 139   POTASSIUM mEQ/L 3.7 4.0   CHLORIDE mEQ/L 107 106   CO2 mEQ/L 24 28   BUN mg/dL 17 16   CREATININE mg/dL 0.7 0.7   CALCIUM mg/dL 8.8 9.1   GLUCOSE mg/dL 96 101*   MAGNESIUM mg/dL 2.1  --    PHOSPHORUS mg/dL 4.5  --      Results from last 7 days   Lab Units 12/20/23  0541 12/19/23  1539   WBC K/uL 5.82 7.37    HEMOGLOBIN g/dL 10.4* 9.8*   HEMATOCRIT % 32.6* 31.9*   PLATELETS K/uL 258 259     Results from last 7 days   Lab Units 12/19/23 2047   PTT sec 25   PROTIME sec 13.8   INR  1.1     ABG Results       06/08/19     1714    Source Of Oxygen 2L        Serum creatinine: 0.7 mg/dL 12/20/23 0541  Estimated creatinine clearance: 32.2 mL/min  Microbiology Results     Procedure Component Value Units Date/Time    MRSA Screen, Nares Only Nose [[id number]]  (Normal) Collected: 12/19/23 2048    Specimen: Nasal Swab from Nose Updated: 12/19/23 2338     MRSA DNA, Nares Negative          IMAGING:  CT CHEST WITH IV CONTRAST    Result Date: 12/19/2023  IMPRESSION: Acute, displaced left lateral fifth and sixth and nondisplaced seventh rib fractures.  Trace left basilar pneumothorax. Chronic appearing right rib fractures.  Chronic appearing compression deformity of T12.  Chronic fractures of the pelvis as above. Small to moderate bilateral lower lobe pleural effusions with atelectasis. No findings of acute traumatic injury to the abdominal pelvic viscera. Finding:    New or increased pneumothorax   Acuity: Unexpected  Status:  CLOSED Critical read back was performed and results were read back by Baljeet Owusu DO,  on  12/19/2023 3:29 PM     CT ABDOMEN PELVIS WITH IV CONTRAST    Result Date: 12/19/2023  IMPRESSION: Acute, displaced left lateral fifth and sixth and nondisplaced seventh rib fractures.  Trace left basilar pneumothorax. Chronic appearing right rib fractures.  Chronic appearing compression deformity of T12.  Chronic fractures of the pelvis as above. Small to moderate bilateral lower lobe pleural effusions with atelectasis. No findings of acute traumatic injury to the abdominal pelvic viscera. Finding:    New or increased pneumothorax   Acuity: Unexpected  Status:  CLOSED Critical read back was performed and results were read back by Baljeet Owusu DO,  on  12/19/2023 3:29 PM     CT CERVICAL SPINE WITHOUT IV  CONTRAST    Result Date: 12/19/2023  IMPRESSION: No evidence of fracture or traumatic cervical subluxation. Multilevel spondylosis.     CT HEAD WITHOUT IV CONTRAST    Result Date: 12/19/2023  IMPRESSION: 1. No acute hemorrhage. No acute infarction in a major vascular territory. No mass or mass effect. 2. Stable chronic changes as described in the discussion.     X-RAY SHOULDER LEFT 2+ VIEWS    Result Date: 12/19/2023  IMPRESSION: Left fifth and sixth posterior lateral rib fractures. Linear sclerosis at the humeral neck, possibly representing nondisplaced fracture. Severe glenohumeral degenerative changes with large inferiorly directed humeral head marginal osteophytes. Actionable Finding: Follow up should be considered. COMMENT: 3 views of the left shoulder performed. 2 views left humerus performed. Comparison made with the CT chest of October 19, 2023. Linear sclerosis now seen along the medial margin of the humeral neck near the surgical neck. Scattered minimal degenerative changes with large marginal osteophytes most notably a large inferiorly directed humeral head marginal ossified again seen. Acromioclavicular intervals normal. Glenohumeral alignment is normal. Left fifth and sixth posterior lateral rib fractures are now seen. No evidence of a left humeral fracture distal to the humeral neck.     X-RAY HUMERUS LEFT    Result Date: 12/19/2023  IMPRESSION: Left fifth and sixth posterior lateral rib fractures. Linear sclerosis at the humeral neck, possibly representing nondisplaced fracture. Severe glenohumeral degenerative changes with large inferiorly directed humeral head marginal osteophytes. Actionable Finding: Follow up should be considered. COMMENT: 3 views of the left shoulder performed. 2 views left humerus performed. Comparison made with the CT chest of October 19, 2023. Linear sclerosis now seen along the medial margin of the humeral neck near the surgical neck. Scattered minimal degenerative changes  with large marginal osteophytes most notably a large inferiorly directed humeral head marginal ossified again seen. Acromioclavicular intervals normal. Glenohumeral alignment is normal. Left fifth and sixth posterior lateral rib fractures are now seen. No evidence of a left humeral fracture distal to the humeral neck.     X-RAY PELVIS 3+ VIEWS    Result Date: 12/13/2023  IMPRESSION: Osteopenia with healing nondisplaced bilateral inferior pubic rami fractures and right superior/parasymphyseal fracture. Mildly increased callus formation and sclerosis present compared to prior radiograph. No new fracture seen.       Cardiac Imaging    TRANSTHORACIC ECHO (TTE) COMPLETE 02/22/2023    Interpretation Summary  •  Left Ventricle: Normal ventricle size. Mild concentric left ventricular hypertrophy. Preserved systolic function. Estimated EF 65-70%. No regional wall motion abnormalities. Grade I diastolic dysfunction.  •  Right Ventricle: Normal ventricle size. Normal systolic function.  •  Left Atrium: Moderately dilated atrium.  •  Right Atrium: Mildly dilated atrium.  •  Aortic Valve: Tricuspid valve.  Sclerotic leaflets. No regurgitation. No stenosis. Calculated dimensionless index = 1.04.  •  Mitral Valve: Anterior leaflet thickening. Sclerotic mitral valve. Normal leaflet motion. Mild mitral annular calcification. Mild regurgitation. No stenosis. Mean gradient = 2.00.  •  Tricuspid Valve: Normal structure. Mild regurgitation. Estimated RVSP = 32 mmHg. No significant stenosis.  •  Prior Study: Prior study available for comparison. Prior study date: 6/11/2019.  Compared to the prior study, biatrial enlargement and mild concentric LVH are now present.  Otherwise no significant change.      LABS AND FINAL IMPRESSIONS OF ALL IMAGING REVIEWED: Yes     INCIDENTAL FINDINGS: N/A    MEDICAL RECONCILIATION REVIEWED: Yes  - in progress, pharmacy reviewing    HOME MEDICATIONS RESUMED AS APPROPRIATE: YES - awaiting clarification of  some medications by pharmacy  .  C-SPINE CLEARANCE: YES    PROBLEM LIST     Patient Active Problem List    Diagnosis Date Noted   • Orthostatic hypotension 10/23/2023     Priority: High   • Fall at home, initial encounter 12/19/2023   • Ribs, multiple fractures 12/19/2023   • Humerus fracture 12/19/2023   • Exudative age-related macular degeneration, right eye, with active choroidal neovascularization (CMS/[facility]) 11/22/2023   • Palliative care by specialist 10/23/2023   • Debility 10/23/2023   • Closed head injury, initial encounter 10/20/2023   • Atrial flutter (CMS/[facility]) 10/19/2023   • Fall 10/19/2023   • COPD (chronic obstructive pulmonary disease) (CMS/[facility]) 10/19/2023   • Anemia 10/19/2023   • A-fib (CMS/[facility]) 08/22/2023   • Sacral fracture, closed (CMS/[facility]) 08/21/2023   • Edema 08/08/2023   • Bilateral hip pain 07/13/2023   • Hx of long term use of blood thinners 07/13/2023   • Word finding difficulty 01/26/2023   • Chronic pain of left knee 10/19/2022   • Nonintractable headache 06/04/2021   • Calcification of aorta (CMS/[facility]) 06/29/2020   • Personal history of pulmonary embolism 06/09/2019   • GERD (gastroesophageal reflux disease) 06/08/2019   • Medicare annual wellness visit, subsequent 06/06/2018   • Allergic bronchopulmonary aspergillosis (CMS/[facility]) 04/02/2018   • Pulmonary emphysema (CMS/[facility]) 04/02/2018   • Primary hypertension 04/02/2018   • Insomnia 04/02/2018   • Lumbar spinal stenosis 04/02/2018   • Obstructive sleep apnea syndrome 04/02/2018   • Osteoporosis 04/02/2018   • Asthma 06/16/2016     IMPRESSION/PLAN     89 y.o. female HD#1 s/p mechanical fall    NEURO: Multimodal pain control. Fall precautions. Early mobilization.  PT/OT/PMR evals    PULM:. Wean supplemental FiO2 for goal sat > 93%. IS hourly, goal > 1L. Encourage coughing, deep breathing    CV: Orthostatic hypotension on midodrine, AF rhythm controlled on diltiazem and metoprolol. continuehome meds. Will resume home eliquis today.      GI:  Regular diet, bowel regimen    RENAL: voiding. Monitor electrolytes, replete per protocol for goal K > 4, Mg > 2    ID: Afebrile, wbc 5.82. No signs of infection, abx not needed at this time    HEME: Hgb 10.4  Plt 258. HD stable.    ENDO: no issues    MSK: orthopedic surgery consulted, non-op management for L humerus fx. NWB LUE    VTE PPX: SCDs & home Eliquis    GI PPX:  none needed    DISPOSITION:  Transfer to SDU    CODE STATUS: DNR (A.N.D.)    MICA Swenson  12/20/2023  11:38 AM

## 2023-12-20 NOTE — PROGRESS NOTES
Physical Therapy -  Initial Evaluation     Patient: Madelyn Ruiz  Location: [facility] SICU SICU12  MRN: [id number]  Today's date: 12/20/2023    HISTORY OF PRESENT ILLNESS     Madelyn is a 89 y.o. female admitted on 12/19/2023 with Fall at home, initial encounter [W19.XXXA, Y92.009]. Principal problem is Fall at home, initial encounter.    Past Medical History  Madelyn has a past medical history of Allergic bronchopulmonary aspergillosis (CMS/[facility]) (04/02/2018), Allergic rhinitis (04/02/2018), Asthma, Atrial flutter with rapid ventricular response (CMS/[facility]) (02/21/2023), Atrial flutter with rapid ventricular response (CMS/[facility]) (2/21/2023), Calcification of aorta (CMS/[facility]) (06/29/2020), Chronic obstructive pulmonary disease (CMS/[facility]) (04/02/2018), GERD (gastroesophageal reflux disease) (06/08/2019), Insomnia (04/02/2018), Lumbar spinal stenosis (04/02/2018), Macular degeneration disease, Obstructive sleep apnea syndrome (04/02/2018), Orthostatic hypotension (10/23/2023), Osteoporosis (04/02/2018), Primary hypertension (04/02/2018), Primary hypertension (4/2/2018), Pulmonary embolism (CMS/[facility]) (06/09/2019), SVT (supraventricular tachycardia), and Vertebral compression fracture (CMS/[facility]).    History of Present Illness    Patient is a 89 year-old female transferred from [facility] s/p mechanical fall while carrying gifts while using her Rollator. Imaging reveals left 5-7 rib fractures, small pneumothorax and pleural effusion. Also w/ acute left proximal humerus fracture - NWB in sling. Incidental findings of T12 compression fracture and pelvic fracture - both chronic    PMH = COPD and a. Flutter - on Eliquis        PRIOR LEVEL OF FUNCTION AND LIVING ENVIRONMENT     Prior Level of Function    Flowsheet Row Most Recent Value   Dominant Hand right   Ambulation assistive equipment  [Rollator]   Transferring assistive equipment  [Rollator]   Toileting assistive equipment  [Commode over toilet]   Bathing  "assistive equipment and person  [Shower chair + grab bars]   Dressing assistive person   Eating independent   IADLs assistive person   Communication understands/communicates without difficulty   Prior Level of Function Comment Transfers/ambulates w/ Rollator. Lives alone although has friend/home health aid \"Ella\" who assists w/ daytime IADLs, supervises for bathing and assists w/ dressing. Reports private home health aids who assist during the night. Overall, has around-the-clock assistance. Denies any assistance required for bed mobility, transfers, ambulation or toileting.   Assistive Device Currently Used at Home walker, front-wheeled, other (see comments), grab bar, shower chair, commode, 3-in-1  [rollator]        Prior Living Environment    Flowsheet Row Most Recent Value   People in Home other (see comments)  [Live in [facility]]   Current Living Arrangements home   Living Environment Comment 3-story townhouse w/ 3 DANNY w/ unilateral railing,  first-floor set-up w/ walk-in shower - grab bars and shower chair        VITALS AND PAIN     PT Vitals    Date/Time Pulse HR Source Resp SpO2 Pt Activity O2 Therapy O2 Del Method O2 Flow Rate BP MAP BP Location BP Method Pt Position [facility]   12/20/23 1105 58 -- 22 100 % At rest Supplemental oxygen Nasal cannula 1 L/min 163/81 -- Right upper arm Automatic Sitting PER   12/20/23 1115 64 -- 35 98 % -- -- -- -- 144/82 107 mmHg -- -- -- PER   12/20/23 1125 65 -- 25 -- -- -- -- -- -- 101 mmHg -- -- -- KNR   12/20/23 1125 -- Monitor -- 98 % At rest None (Room air) -- -- 143/70 -- Right upper arm Automatic Sitting PER      PT Pain    Date/Time Pain Type Side/Orientation Location Rating: Rest Rating: Activity Interventions [facility]   12/20/23 1105 Pain Assessment left arm 2 - mild pain 10 - excruciating pain care clustered;pillow support provided;position adjusted PER           Objective   OBJECTIVE     Start time:  1103  End time:  1127  Session Length: 24 min  Mode of Treatment: physical " therapy, co-treatment (co-treat to expedite d/c)    General Observations  Patient received upright, in chair. She was agreeable to therapy, no issues or concerns identified by nurse prior to session. Pt recieved in recliner chair on 1L O2 by N/C with LUE sling, pt awake/alert and agreeable to therapy.    Precautions: brace worn at all times, fall, weight bearing (LUE sling at all times)  Extremity Weight-Bearing Status: left upper extremity  Left UE Weight-Bearing Status: non weight-bearing (NWB)    Limitations/Impairments: hearing   Services  Do You Speak a Language Other Than English at Home?: no      PT Eval and Treat - 12/20/23 1103        Cognition    Orientation Status oriented x 3     Affect/Mental Status WFL     Follows Commands follows one-step commands;delayed response/completion;repetition of directions required;verbal cues/prompting required     Cognitive Function executive function deficit;safety deficit     Executive Function Deficit moderate deficit;insight/awareness of deficits;self-monitoring/self-correction     Safety Deficit minimal deficit;awareness of need for assistance;insight into deficits/self-awareness     Comment, Cognition Pt pleasant and cooperative throughout session. Pt has good insite into situation with moments of confusion, but demonstrates good carry over of LUE NWB status.        Vision Assessment/Intervention    Vision Assessment WFL;corrective lenses for distance;other (see comments)   magnifier for reading       Hearing Assessment    Hearing Status hearing aid, bilateral;hearing impairment, bilaterally     Impact of Hearing Impairment on Functional Status Pt does not have aids during session.        Sensory Assessment    Sensory Assessment sensation intact, lower extremities        Lower Extremity Assessment    LE Assessment ROM and Strength WFL        Lower Extremity Strength    Hip, Left (Strength) 4-     Knee, Left (Strength) 5     Ankle, Left (Strength) 5      Hip, Right (Strength) 5     Knee, Right (Strength) 5     Ankle, Right (Strength) 5        Bed Mobility    Comment Pt recieved OOB at start of session.        Mobility Belt    Mobility Belt Used for All Out of Bed Activity no     Reason Mobility Belt Not Used medical contraindication     Reason Mobility Belt Not Used T12 compression fracture        Sit/Stand Transfer    Surface chair with arm rests     [place] minimum assist (75% or more patient effort);1 person assist     Safety/Cues increased time to complete;minimal;verbal cues;tactile cues;hand placement;maintaining precautions;technique     Assistive Device none   HHA    Transfer Comments Pt used HHA while mantaining LUE NWB for transfer sit>stand, pt has poor eccentric control during descent.        Gait Training    [place], Gait minimum assist (75% or more patient effort);1 person assist     Safety/Cues minimal;verbal cues;technique     Assistive Device none;other (see comments)   HHA    Distance in Feet 80 feet   40 x2    Pattern step-through     Deviations/Abnormal Patterns gait speed decreased;mundo decreased;base of support, narrow;step length decreased;stride length decreased     Comment (Gait/Stairs) Pt amb w/HHA for 40ftx2 within room and to door. Pt demonstrated L lateral lean during amb despite VC/TC for upright posture.        Balance    Static Sitting Balance WFL;sitting in chair     Dynamic Sitting Balance WFL;sitting in chair     Sit to Stand Dynamic Balance mild impairment;supported     Static Standing Balance mild impairment;supported     Dynamic Standing Balance mild impairment;supported     Comment, Balance Pt required HHA to maintain balance with mobility.        Impairments/Safety Issues    Impairments Affecting Function balance;endurance/activity tolerance;pain     Functional Endurance Pt functional endurance decreased but seems not far from baseline. Pt limited by WB status and pain of LUE.                                Education Documentation    Home Safety, taught by Gita Don PT at 12/20/2023  2:16 PM.  Learner: Patient  Readiness: Acceptance  Method: Explanation  Response: Verbalizes Understanding  Comment: role of PT, safe mobility    Energy Conservation, taught by Gita Don PT at 12/20/2023  2:16 PM.  Learner: Patient  Readiness: Acceptance  Method: Explanation  Response: Verbalizes Understanding  Comment: role of PT, safe mobility      Session Outcome  Patient upright, in chair at end of session, chair alarm on, call light in reach, personal items in reach, all needs met. Nursing notified about patient's response to therapy/activity, change in vital signs, patient's performance, and patient's position.    AM-PAC™ - Mobility (Current Function)     Turning form your back to your side while in flat bed without using bedrails 2 - A Lot   Moving from lying on your back to sitting on the side of a flat bed without using bedrails 2 - A Lot   Moving to and from a bed to a chair 3 - A Little   Standing up from a chair using your arms 3 - A Little   To walk in a hospital room 3 - A Little   Climbing 3-5 steps with a railing 2 - A Lot   AM-PAC™ Mobility Score 15      ASSESSMENT AND PLAN     Progress Summary  PT eval completed. Pt Min Ax1 for all functional mobility w/ HHA. Pt able to complete STS from recliner chair and toilet while maintaining LUE NWB. Pt amb within room and to door about 40ftx2 w/HHA. Rec Home w/ 24/7 support.         PT Plan    Flowsheet Row Most Recent Value   Rehab Potential good, to achieve stated therapy goals at 12/20/2023 1103   Therapy Frequency 5 times/wk at 12/20/2023 1103   Planned Therapy Interventions balance training, bed mobility training, gait training, ROM (range of motion), stair training, strengthening, stretching, transfer training at 12/20/2023 1103          PT Discharge Recommendations    Flowsheet Row Most Recent Value   PT Recommended Discharge Disposition home with  assistance  [Pts 24/7 care, with live in A] at 12/20/2023 1103   Anticipated Equipment Needs if Discharged Home (PT) none at 12/20/2023 1103               PT Goals    Flowsheet Row Most Recent Value   Bed Mobility Goal 1    Activity/Assistive Device bed mobility activities, all at 12/20/2023 1103   [place] modified independence at 12/20/2023 1103   Time Frame short-term goal (STG), by discharge at 12/20/2023 1103   Progress/Outcome goal ongoing at 12/20/2023 1103   Transfer Goal 1    Activity/Assistive Device all transfers at 12/20/2023 1103   [place] modified independence at 12/20/2023 1103   Time Frame short-term goal (STG), by discharge at 12/20/2023 1103   Progress/Outcome goal ongoing at 12/20/2023 1103   Gait Training Goal 1    Activity/Assistive Device gait (walking locomotion), assistive device use, cane, straight at 12/20/2023 1103   [place] supervision required at 12/20/2023 1103   Distance 100 at 12/20/2023 1103   Time Frame short-term goal (STG), by discharge at 12/20/2023 1103   Progress/Outcome goal ongoing at 12/20/2023 1103

## 2023-12-20 NOTE — PLAN OF CARE
Problem: Adult Inpatient Plan of Care  Goal: Plan of Care Review  Outcome: Progressing  Flowsheets (Taken 12/20/2023 1412)  Progress: improving  Outcome Evaluation: PT eval completed. Pt Min Ax1 for all functional mobility w/ HHA. Pt able to complete STS from recliner chair and toilet while maintaining LUE NWB. Pt amb within room and to door about 40ftx2 w/HHA. Rec Home w/ 24/7 support.  Plan of Care Reviewed With: patient     Problem: Mobility Impairment  Goal: Optimal Mobility  Outcome: Progressing

## 2023-12-20 NOTE — CONSULTS
Physical Medicine and Rehabilitation Consult Note    Referring Physician: Ailyn Negrete MD [[id number]]      Subjective   Madelyn Ruiz is a 89 y.o. female with past medical history significant for a flutter on anticoagulation, PE, COPD chronic low back pain, who presents to [facility] [place] [facility] from home on 12/19/2023 with left shoulder pain after fall from standing.  In the ED, she was found to have a left nondisplaced proximal humerus fracture in addition to acute left 5 through 7 rib fractures.  She was seen by orthopedics who recommended conservative treatment.  Patient is currently NWB in left upper extremity sling.  This morning, she confirms that she was carrying presents With Her Rollator, When She Lost Her Footing Falling and Hitting Her Shoulder.  She Currently Rates As Moderate to Severe Right She Currently Endorses Moderate to Severe Left Shoulder Pain That Worsens  With ROM and movement.   At baseline, she is modified independent with a rollator. She lives in [facility] with 24 hour aides.       Past Medical History:   Diagnosis Date   • Allergic bronchopulmonary aspergillosis (CMS/[facility]) 04/02/2018    Allergist: Dr. Arriaza.  Stable FEV1 in 8/2015.  IgE levels and eosinophils stable in 8/2015.  Managed with Fasenra, Azithromycin and Flovent.   • Allergic rhinitis 04/02/2018    Pulmonologist: Dr. Walker. Managed with Flonase.   • Asthma    • Atrial flutter with rapid ventricular response (CMS/HCC) 02/21/2023    Diagnosed in 02/2023 and hospitalized Managed with Apixaban and Diltiazem Echocardiogram 02/2023 •  Left Ventricle: Normal ventricle size. Mild concentric left ventricular hypertrophy. Preserved systolic function. Estimated EF 65-70%. No regional wall motion abnormalities. Grade I diastolic dysfunction. •  Right Ventricle: Normal ventricle size. Normal systolic function. •  Left Atrium: Moderately   • Atrial flutter with rapid ventricular response (CMS/HCC) 2/21/2023    Diagnosed in 02/2023 and hospitalized  Managed with Apixaban, Metoprolol, and Diltiazem Echocardiogram 02/2023 •  Left Ventricle: Normal ventricle size. Mild concentric left ventricular hypertrophy. Preserved systolic function. Estimated EF 65-70%. No regional wall motion abnormalities. Grade I diastolic dysfunction. •  Right Ventricle: Normal ventricle size. Normal systolic function. •  Left Atriu   • Calcification of aorta (CMS/HCC) 06/29/2020    Seen incidentally on CT scan.   • Chronic obstructive pulmonary disease (CMS/HCC) 04/02/2018    Pulmonologist: Dr. Walker.  Seen on PFTs in hospital in 2014.  Controlled with Spiriva and FLovent.   • GERD (gastroesophageal reflux disease) 06/08/2019    Managed with Esomeprazole.  Should take medication 30 minutes prior to meal.  Advised to avoid caffeine, carbonated beverages, and should not eat within 3 hours of going to sleep.  Advised to reduce BMI < 25.    • Insomnia 04/02/2018    Managed with Lorazepam as needed.   • Lumbar spinal stenosis 04/02/2018    Neurosurgeon: Dr. Miguel. MRI with Central and lateral recess spinal stenosis. Has Spondylolisthesis at L4/L5. S/P epidural injection by Dr. Martin with some relief in past. Had Lumbar fusion and Lumbar laminectomy by Dr. Miguel in 4/2015.   • Macular degeneration disease    • Obstructive sleep apnea syndrome 04/02/2018    Mild STACIA noted in sleep study 7/2015. Treated with nasal CPAP automatic with pressure range 5-10 CM H2Ox couple months. Off now   • Orthostatic hypotension 10/23/2023    Managed on Midodrine   • Osteoporosis 04/02/2018    Rheumatologist: Dr. Bradley.  Dexa scan 7/2016 with osteoporosis of lumbar spine LS-2.5, LH-2.1, RH -2.2.  Has been on Fosamax in the past for 8 years.  Worsened by Prednisone in past.  Continue vitamin D, calcium and weight bearing exercise. DEXA in 7/2017 with LS -1.7, LH -2.2, and RH -2.0.  Next due in 7/2019.  Seen by Dr. Bradley in 8/2015 who agreed with initiating Prolia. Took for 2 years. N   • Primary  hypertension 04/02/2018    Managed on Diltiazem and Losartan. Advised to follow a low sodium diet and keep BMI < 25.  Advised to exercise for 2.5 hours per week.  Instructed to take home blood pressure readings and call if consistently above 140/90.     • Primary hypertension 4/2/2018    Managed on Diltiazem  Advised to follow a low sodium diet and keep BMI < 25.  Advised to exercise for 2.5 hours per week.  Instructed to take home blood pressure readings and call if consistently above 140/90.     • Pulmonary embolism (CMS/HCC) 06/09/2019    Hematologist: Dr. Bhakta Diagnosed in 06/2019 in right upper lobe. Vascular ultrasound negative of bilateral lower extremities. Managed on Eliquis. Hypercoagulable workup was negative for beta-2 glycoprotein's, RIGO, Antithrombin III, lupus anticoagulant, protein C activity protein S activity, and prothrombin gene mutation, and factor V Leiden. Now off Apixaban as CT scan in 12/2019 was without pu   • SVT (supraventricular tachycardia)    • Vertebral compression fracture (CMS/HCC)     At T12 level       Past Surgical History:   Procedure Laterality Date   • APPENDECTOMY     • CARPAL TUNNEL RELEASE Bilateral    • CATARACT EXTRACTION W/  INTRAOCULAR LENS IMPLANT Bilateral    • HYSTERECTOMY  1972   • LUMBAR LAMINECTOMY  04/21/2015    S/P lumbar fusion   • TONSILLECTOMY         Allergies: Mepolizumab, Morphine, and Oxycodone    Social History     Social History Narrative    Exercise: No formal exercise. Due to back pain from spinal stenosis.     History also provided by chart review    Family History:   Family History   Problem Relation Age of Onset   • Glaucoma Biological Mother    • Macular degeneration Biological Mother    • Hypertension Biological Mother    • Lung cancer Biological Father    • Heart disease Biological Brother    • Breast cancer Mother's Sister    • No Known Problems Biological Son    • No Known Problems Biological Son    • No Known Problems Biological Daughter     • No Known Problems Biological Daughter    • No Known Problems Biological Daughter         Meds:    • acetaminophen  650 mg oral q4h NOE   • azithromycin  250 mg oral Once per day on Monday Wednesday Friday   • dilTIAZem CD  300 mg oral Daily   • docusate sodium  100 mg oral BID   • DULoxetine  60 mg oral Daily   • enoxaparin  30 mg subcutaneous q12h (6a, 6p)   • lidocaine  1 patch Topical Daily   • metoprolol tartrate  25 mg oral BID   • midodrine  5 mg oral TID AC   • mometasone-formoterol  2 puff inhalation BID (8a, 8p)   • polyethylene glycol  17 g oral Daily   • pregabalin  50 mg oral BID   • rosuvastatin  5 mg oral Once per day on Monday Wednesday Friday   • senna  2 tablet oral Nightly   • tiotropium bromide  2 puff inhalation Daily       Review of Systems  All 11 systems were reviewed and negative except as noted in the HPI.    Objective       Lab Results   Component Value Date    WBC 5.82 12/20/2023    HGB 10.4 (L) 12/20/2023    HCT 32.6 (L) 12/20/2023     12/20/2023    CHOL 227 (H) 01/27/2023    TRIG 120 01/27/2023    HDL 62 01/27/2023    ALT 15 11/22/2023    AST 15 11/22/2023     12/20/2023    K 3.7 12/20/2023     12/20/2023    CREATININE 0.7 12/20/2023    BUN 17 12/20/2023    CO2 24 12/20/2023    TSH 3.63 10/19/2023    INR 1.1 12/19/2023    HGBA1C 5.6 11/18/2015       Labs   Labs from today reviewed.  WBC 5.8, hemoglobin 10.4.  Sodium 143, potassium 3.7, creatinine 0.7  Imaging   CXR from this morning reviewed.  Left-sided pleural effusion.    Physical Exam  Visit Vitals  BP (!) 143/65   Pulse (!) 58   Temp 36.2 °C (97.2 °F) (Temporal)   Resp 19   Ht 1.524 m (5')   Wt 51.7 kg (113 lb 15.7 oz)   SpO2 100%   BMI 22.26 kg/m²     General: No acute distress.   HEENT: No jaundice.  Oral mucosa moist.   Atraumatic.  No nuchal rigidity.  Neck: Supple. Full ROM  Lungs: Breathing unlabored.  No rales or rhonchi.  Heart: tele personally reviewd, NSR. No peripheral edema.  Abdomen: Bowel sounds:  na. Soft, NT   Skin: No rash. Warm and dry   Extremities: No acutely inflamed joints.  ROM functional.  Psych: pleasant, appropriate affect   Neurological:  Alert and oriented with intact speech and cognition.  Sensation: subjectively preserved to touch.  Motor testing shows no focal weakness. Limited shoulder abduction due to pain, 5/5 BUE     Assessment       89-year-old female with acute on chronic functional deficits mobility, transfers, ADLs 2/2  1.  Fall with subsequent left proximal humerus fracture, left 5 through 7 rib fractures.  She was seen by orthopedics for her left humeral fracture.  She is currently NWB in [facility] with outpatient orthopedic follow-up.  2.  Left 5 through 7 rib fractures: Continue multimodal analgesia.  Encourage out of bed to the chair as tolerated.  Continue ISS and pulmonary toileting.  Will continue to monitor SpO2 with mobility.  3.  Difficulty walking and ADL deficit: At baseline, patient is modified independent to ambulate with a rolling walker.      Rehab rec: Tells me she has 24 hour aides at home, would rec dc home with   Plan of care was discussed with primary team    Kaitlin Madden DO  12/20/2023  8:03 AM  Pager [phone]

## 2023-12-20 NOTE — HOSPITAL COURSE
Madelyn is a 89 y.o. female admitted on 12/19/2023 with Fall at home, initial encounter [W19.XXXA, Y92.009]. Principal problem is Fall at home, initial encounter.    Past Medical History  Madelyn has a past medical history of Allergic bronchopulmonary aspergillosis (CMS/[facility]) (04/02/2018), Allergic rhinitis (04/02/2018), Asthma, Atrial flutter with rapid ventricular response (CMS/HCC) (02/21/2023), Atrial flutter with rapid ventricular response (CMS/HCC) (2/21/2023), Calcification of aorta (CMS/[facility]) (06/29/2020), Chronic obstructive pulmonary disease (CMS/[facility]) (04/02/2018), GERD (gastroesophageal reflux disease) (06/08/2019), Insomnia (04/02/2018), Lumbar spinal stenosis (04/02/2018), Macular degeneration disease, Obstructive sleep apnea syndrome (04/02/2018), Orthostatic hypotension (10/23/2023), Osteoporosis (04/02/2018), Primary hypertension (04/02/2018), Primary hypertension (4/2/2018), Pulmonary embolism (CMS/[facility]) (06/09/2019), SVT (supraventricular tachycardia), and Vertebral compression fracture (CMS/[facility]).    History of Present Illness    Patient is a 89 year-old female transferred from [facility] s/p mechanical fall while carrying gifts while using her Rollator. Imaging reveals left 5-7 rib fractures, small pneumothorax and pleural effusion. Also w/ acute left proximal humerus fracture - NWB in sling. Incidental findings of T12 compression fracture and pelvic fracture - both chronic    PMH = COPD and [facility]

## 2023-12-20 NOTE — ASSESSMENT & PLAN NOTE
· S/p mechanical fall, c/o LUE pain. XR of Left arm reveals linear sclerosis at the humeral neck, possibly representing nondisplaced fracture  · Appreciate Ortho consults and rec's   · Non-op management planned  · NWB to LUE, sling in place  · Multimodal pain control

## 2023-12-20 NOTE — CONSULTS
Brief Nutrition Note    Recommendations   1. Continue Regular diet.     2. Daily MVI.     Clinical Course: Patient is a 89 y.o. female who was admitted on 12/19/2023 with a diagnosis of Fall at home, initial encounter [W19.XXXA, Y92.009].     Past Medical History:   Diagnosis Date   • Allergic bronchopulmonary aspergillosis (CMS/[facility]) 04/02/2018    Allergist: Dr. Arriaza.  Stable FEV1 in 8/2015.  IgE levels and eosinophils stable in 8/2015.  Managed with Fasenra, Azithromycin and Flovent.   • Allergic rhinitis 04/02/2018    Pulmonologist: Dr. Walker. Managed with Flonase.   • Asthma    • Atrial flutter with rapid ventricular response (CMS/[facility]) 02/21/2023    Diagnosed in 02/2023 and hospitalized Managed with Apixaban and Diltiazem Echocardiogram 02/2023 •  Left Ventricle: Normal ventricle size. Mild concentric left ventricular hypertrophy. Preserved systolic function. Estimated EF 65-70%. No regional wall motion abnormalities. Grade I diastolic dysfunction. •  Right Ventricle: Normal ventricle size. Normal systolic function. •  Left Atrium: Moderately   • Atrial flutter with rapid ventricular response (CMS/[facility]) 2/21/2023    Diagnosed in 02/2023 and hospitalized Managed with Apixaban, Metoprolol, and Diltiazem Echocardiogram 02/2023 •  Left Ventricle: Normal ventricle size. Mild concentric left ventricular hypertrophy. Preserved systolic function. Estimated EF 65-70%. No regional wall motion abnormalities. Grade I diastolic dysfunction. •  Right Ventricle: Normal ventricle size. Normal systolic function. •  Left Atriu   • Calcification of aorta (CMS/[facility]) 06/29/2020    Seen incidentally on CT scan.   • Chronic obstructive pulmonary disease (CMS/[facility]) 04/02/2018    Pulmonologist: Dr. Walker.  Seen on PFTs in hospital in 2014.  Controlled with Spiriva and FLovent.   • GERD (gastroesophageal reflux disease) 06/08/2019    Managed with Esomeprazole.  Should take medication 30 minutes prior to meal.  Advised to avoid caffeine,  carbonated beverages, and should not eat within 3 hours of going to sleep.  Advised to reduce BMI < 25.    • Insomnia 04/02/2018    Managed with Lorazepam as needed.   • Lumbar spinal stenosis 04/02/2018    Neurosurgeon: Dr. Miguel. MRI with Central and lateral recess spinal stenosis. Has Spondylolisthesis at L4/L5. S/P epidural injection by Dr. Martin with some relief in past. Had Lumbar fusion and Lumbar laminectomy by Dr. Miguel in 4/2015.   • Macular degeneration disease    • Obstructive sleep apnea syndrome 04/02/2018    Mild STACIA noted in sleep study 7/2015. Treated with nasal CPAP automatic with pressure range 5-10 CM H2Ox couple months. Off now   • Orthostatic hypotension 10/23/2023    Managed on Midodrine   • Osteoporosis 04/02/2018    Rheumatologist: Dr. Bradley.  Dexa scan 7/2016 with osteoporosis of lumbar spine LS-2.5, LH-2.1, RH -2.2.  Has been on Fosamax in the past for 8 years.  Worsened by Prednisone in past.  Continue vitamin D, calcium and weight bearing exercise. DEXA in 7/2017 with LS -1.7, LH -2.2, and RH -2.0.  Next due in 7/2019.  Seen by Dr. Bradley in 8/2015 who agreed with initiating Prolia. Took for 2 years. N   • Primary hypertension 04/02/2018    Managed on Diltiazem and Losartan. Advised to follow a low sodium diet and keep BMI < 25.  Advised to exercise for 2.5 hours per week.  Instructed to take home blood pressure readings and call if consistently above 140/90.     • Primary hypertension 4/2/2018    Managed on Diltiazem  Advised to follow a low sodium diet and keep BMI < 25.  Advised to exercise for 2.5 hours per week.  Instructed to take home blood pressure readings and call if consistently above 140/90.     • Pulmonary embolism (CMS/HCC) 06/09/2019    Hematologist: Dr. Bhakta Diagnosed in 06/2019 in right upper lobe. Vascular ultrasound negative of bilateral lower extremities. Managed on Eliquis. Hypercoagulable workup was negative for beta-2 glycoprotein's, RIGO, Antithrombin III,  lupus anticoagulant, protein C activity protein S activity, and prothrombin gene mutation, and factor V Leiden. Now off Apixaban as CT scan in 12/2019 was without pu   • SVT (supraventricular tachycardia)    • Vertebral compression fracture (CMS/HCC)     At T12 level     Past Surgical History:   Procedure Laterality Date   • APPENDECTOMY     • CARPAL TUNNEL RELEASE Bilateral    • CATARACT EXTRACTION W/  INTRAOCULAR LENS IMPLANT Bilateral    • HYSTERECTOMY  1972   • LUMBAR LAMINECTOMY  04/21/2015    S/P lumbar fusion   • TONSILLECTOMY         Reason for Assessment  Reason For Assessment: identified at risk by screening criteria    [facility] Nutrition Screen Tool  Has patient lost weight without trying?: 0-->No  Has patient been eating poorly due to decreased appetite?: 0-->No  [facility] Nutrition Screen Score: 0     Nutrition/Diet History  Appetite Prior to Admission: Good-50-75%    Physical Findings  Overall Physical Appearance: other (see comments) (L arm sling)  Last Bowel Movement:  (prior to admission)  Skin: intact     [id number] Physical Appearance  Overall Physical Appearance: other (see comments) (L arm sling)  Last Bowel Movement:  (prior to admission)  Skin: intact     Nutrition Order  Nutrition Order: meets nutritional requirements  Nutrition Order Comments: Regular     Anthropometrics  Height: 152.4 cm (5')           Current Weight  Weight Method: Bed scale  Weight: 51.7 kg (113 lb 15.7 oz)     Ideal Body Weight (IBW)  Ideal Body Weight (IBW) (kg): 45.86  % Ideal Body Weight: 112.73         Body Mass Index (BMI)  BMI (Calculated): 22.3       Labs/Procedures/Meds  Lab Results Reviewed: reviewed, pertinent   BMP Results       12/20/23 12/19/23 11/22/23     0541 1539 1429     139 141    K 3.7 4.0 3.7    Cl 107 106 106    CO2 24 28 27    Glucose 96 101 59    BUN 17 16 16    Creatinine 0.7 0.7 0.7    Calcium 8.8 9.1 9.3    Anion Gap 12 5 8    EGFR >60.0 >60.0 >60.0         Comment for K at 1539 on 12/19/23: Results  obtained on plasma. Plasma Potassium values may be up to 0.4 mEQ/L less than serum values. The differences may be greater for patients with high platelet or white cell counts.    Comment for EGFR at 0541 on 23: Calculation based on the Chronic Kidney Disease Epidemiology Collaboration (CKD-EPI) equation refit without adjustment for race.    Comment for EGFR at 1539 on 23: Calculation based on the Chronic Kidney Disease Epidemiology Collaboration (CKD-EPI) equation refit without adjustment for race.      M.1, Phos: 4.5      Medications  Pertinent Medications Reviewed: reviewed, pertinent   Scheduled Meds:  • acetaminophen  650 mg oral q4h NOE   • apixaban  2.5 mg oral BID   • azithromycin  250 mg oral Once per day on    • dilTIAZem CD  300 mg oral Daily   • docusate sodium  100 mg oral BID   • DULoxetine  60 mg oral Daily   • lidocaine  1 patch Topical Daily   • metoprolol tartrate  25 mg oral BID   • midodrine  5 mg oral TID AC   • mometasone-formoterol  2 puff inhalation BID (8a, 8p)   • polyethylene glycol  17 g oral Daily   • pregabalin  50 mg oral BID   • rosuvastatin  5 mg oral Once per day on    • senna  2 tablet oral Nightly   • tiotropium bromide  2 puff inhalation Daily         Clinical comments: Pt seen for trauma service. Pt s/p fall on L shoulder. Pt with L humerus fx, rib fx. Pt reports good appetite PTA. Pt states appetite has gradually decreased over the past 10 years. Pt reports wt has remained stable, UBW 115lb. Wt appears stable per EPIC records with fluctuations noted. Pt awaiting breakfast to be delivered. Pt to need assistance with meal set up due to L arm in sling- Pt is R handed to feed self.     Goals: PO intake >75% of meals.   Monitor: PO intake, weights, labs, skin, plan of care.     Recommendations: See above       Date: 23  Signature: SHAWANDA George

## 2023-12-20 NOTE — PLAN OF CARE
Pt. Weaned down to room air, OOB to chair for most of shift, assist x2 OOB. PRN Tramadol admin for L arm and L rib pain. Worked with PT/OT. Able to tolerate meals. Transferred to [facility], all belongings sent with pt.. Pt. Daughter updated on new room.

## 2023-12-20 NOTE — ASSESSMENT & PLAN NOTE
· History of paroxsymal a flutter, on Eliquis outpatient. Will hold pending ortho's consult  · Continue home dose of metoprolol and Cardizem   ·  Monitor on Tele

## 2023-12-20 NOTE — PROGRESS NOTES
Occupational Therapy -  Initial Evaluation     Patient: Madelyn Ruiz  Location: [facility] SICU SICU12  MRN: [id number]  Today's date: 12/20/2023    HISTORY OF PRESENT ILLNESS     Madelyn is a 89 y.o. female admitted on 12/19/2023 with Fall at home, initial encounter [W19.XXXA, Y92.009]. Principal problem is Fall at home, initial encounter.    Past Medical History  Madelyn has a past medical history of Allergic bronchopulmonary aspergillosis (CMS/[facility]) (04/02/2018), Allergic rhinitis (04/02/2018), Asthma, Atrial flutter with rapid ventricular response (CMS/[facility]) (02/21/2023), Atrial flutter with rapid ventricular response (CMS/[facility]) (2/21/2023), Calcification of aorta (CMS/[facility]) (06/29/2020), Chronic obstructive pulmonary disease (CMS/[facility]) (04/02/2018), GERD (gastroesophageal reflux disease) (06/08/2019), Insomnia (04/02/2018), Lumbar spinal stenosis (04/02/2018), Macular degeneration disease, Obstructive sleep apnea syndrome (04/02/2018), Orthostatic hypotension (10/23/2023), Osteoporosis (04/02/2018), Primary hypertension (04/02/2018), Primary hypertension (4/2/2018), Pulmonary embolism (CMS/[facility]) (06/09/2019), SVT (supraventricular tachycardia), and Vertebral compression fracture (CMS/[facility]).    History of Present Illness    Patient is a 89 year-old female transferred from [facility] s/p mechanical fall while carrying gifts while using her Rollator. Imaging reveals left 5-7 rib fractures, small pneumothorax and pleural effusion. Also w/ acute left proximal humerus fracture - NWB in sling. Incidental findings of T12 compression fracture and pelvic fracture - both chronic    PMH = COPD and a. Flutter - on Eliquis        PRIOR LEVEL OF FUNCTION AND LIVING ENVIRONMENT     Prior Level of Function    Flowsheet Row Most Recent Value   Dominant Hand right   Ambulation assistive equipment  [Rollator]   Transferring assistive equipment  [Rollator]   Toileting assistive equipment  [Commode over toilet]   Bathing  "assistive equipment and person  [Shower chair + grab bars]   Dressing assistive person   Eating independent   IADLs assistive person   Communication understands/communicates without difficulty   Prior Level of Function Comment Transfers/ambulates w/ Rollator. Lives alone although has friend/home health aid \"Ella\" who assists w/ daytime IADLs, supervises for bathing and assists w/ dressing. Reports private home health aids who assist during the night. Overall, has around-the-clock assistance. Denies any assistance required for bed mobility, transfers, ambulation or toileting.   Assistive Device Currently Used at Home walker, front-wheeled, other (see comments), grab bar, shower chair, commode, 3-in-1  [rollator]        Prior Living Environment    Flowsheet Row Most Recent Value   People in Home other (see comments)  [Live in [facility]]   Current Living Arrangements home   Living Environment Comment 3-story townhouse w/ 3 DANNY w/ unilateral railing,  first-floor set-up w/ walk-in shower - grab bars and shower chair        Occupational Profile    Flowsheet Row Most Recent Value   Occupational History/Life Experiences Has children   Environmental Supports and Barriers Has 24-7 caregiver support,  owns adequate equipment        VITALS AND PAIN     OT Vitals    Date/Time Pulse HR Source Resp SpO2 Pt Activity O2 Therapy O2 Del Method O2 Flow Rate BP MAP BP Location BP Method Pt Position [facility]   12/20/23 1105 58 -- 22 100 % At rest Supplemental oxygen Nasal cannula 1 L/min 163/81 -- Right upper arm Automatic Sitting PER   12/20/23 1115 64 -- 35 98 % -- -- -- -- 144/82 107 mmHg -- -- -- PER   12/20/23 1125 65 -- 25 -- -- -- -- -- -- 101 mmHg -- -- -- KNR   12/20/23 1125 -- Monitor -- 98 % At rest None (Room air) -- -- 143/70 -- Right upper arm Automatic Sitting PER      OT Pain    Date/Time Pain Type Side/Orientation Location Rating: Rest Rating: Activity Interventions [facility]   12/20/23 1105 Pain Assessment left arm 2 - mild pain 10 " - excruciating pain care clustered;pillow support provided;position adjusted PER           Objective   OBJECTIVE     Start time:  1104  End time:  1131  Session Length: 27 min  Mode of Treatment: co-treatment, occupational therapy (Trauma evaluation; expedite discharge)    General Observations  Patient received upright, in chair. She was agreeable to therapy, no issues or concerns identified by nurse prior to session. Rec'd upright in recliner; 1L N/C; LUE sling in place    Precautions: brace worn at all times, fall, weight bearing (LUE sling AAT)  Extremity Weight-Bearing Status: left upper extremity  Left UE Weight-Bearing Status: non weight-bearing (NWB)    Limitations/Impairments: hearing      OT Eval and Treat - 12/20/23 1104        Cognition    Orientation Status oriented x 3     Affect/Mental Status WFL     Follows Commands follows one-step commands;delayed response/completion;repetition of directions required;verbal cues/prompting required     Cognitive Function executive function deficit;safety deficit     Executive Function Deficit moderate deficit;insight/awareness of deficits;self-monitoring/self-correction     Safety Deficit minimal deficit;awareness of need for assistance;insight into deficits/self-awareness     Comment, Cognition Pleasant/cooperative; appears to have fair insight into current situation although transient episodes of confusion/difficulty relaying adequate information re: prior level of function and assistance available at home. Demonstrates good carry-over of LUE NWB status w/ cues throughout session        Vision Assessment/Intervention    Vision Assessment WFL;corrective lenses for distance   Uses magnifier for reading       Hearing Assessment    Hearing Status hearing aid, bilateral;hearing impairment, bilaterally     Impact of Hearing Impairment on Functional Status Hearing aids not available at time of session        Upper Extremity Assessment    UE Assessment ROM and Strength WFL  except        Upper Extremity Range of Motion    Shoulder, Left (ROM) AROM not assessed due to humerus fracture     Shoulder, Right (ROM) WFL        Upper Extremity Strength    Shoulder, Left (Strength) MMT not assessed due to humerus fracture     Shoulder, Right (Strength) WFL        Mobility Belt    Mobility Belt Used for All Out of Bed Activity no     Reason Mobility Belt Not Used medical contraindication     Reason Mobility Belt Not Used T12 compression fracture        Sit/Stand Transfer    Surface chair with arm rests     [place] minimum assist (75% or more patient effort)     Safety/Cues increased time to complete;minimal;verbal cues;hand placement;maintaining precautions;technique     Assistive Device none     Transfer Comments Handheld assistance provided w/ good integration of LUE NWB status; performed from/to low height recliner. Poor control during stand >> sit descent        Functional Mobility    Distance in room/bathroom     Functional Mobility [place] minimum assist (75% or more patient effort)     Safety/Cues increased time to complete;verbal cues;maintaining center of gravity over base of support     Assistive Device none     Functional Mobility Comments RUE handheld assistance provided for ambulation in room; generally unsteady w/ notable left-sided lean requiring cues to correct although no overt loss of balance observed        Lower Body Dressing    Tasks don;socks     [place] maximum assist (25-49% patient effort)     Safety/Cues increased time to complete;moderate;verbal cues;compensatory techniques     Position supported sitting     Adaptive Equipment none     Comment Attempts distal BLE reach via figure-4 positioning w/ ability to achieve ~50% full range        Toileting    Comment Attempted to void while seated on toilet in room; however, unsuccessful        Balance    Static Sitting Balance WFL;supported;sitting in chair     Dynamic Sitting Balance WFL;supported;sitting in  chair     Sit to Stand Dynamic Balance mild impairment;supported     Static Standing Balance mild impairment;supported     Dynamic Standing Balance mild impairment;supported     Comment, Balance Left-sided lean during ambulation; requires min (A) RUE handheld assistance to correct        Impairments/Safety Issues    Impairments Affecting Function balance;cognition;endurance/activity tolerance;pain;other (see comments)   LUE NWB status    Cognitive Impairments, Safety/Performance attention;problem-solving/reasoning;safety precaution follow-through                               Education Documentation  Treatment Plan, taught by García Foster OT at 12/20/2023  3:49 PM.  Learner: Patient  Readiness: Acceptance  Method: Explanation  Response: Verbalizes Understanding, Needs Reinforcement  Comment: OT role/POC; safety w/ transfers, ADLs and mobility; LUE NWB status        Session Outcome  Patient upright, in chair at end of session, chair alarm on, all needs met, call light in reach, personal items in reach. Nursing notified about patient's performance, patient's position, and patient's response to therapy/activity.    AM-PAC™ - ADL (Current Function)     Putting on/taking off regular lower body clothing 2 - A Lot   Bathing 2 - A Lot   Toileting 2 - A Lot   Putting on/taking off regular upper body clothing 2 - A Lot   Help for taking care of personal grooming 3 - A Little   Eating meals 3 - A Little   AM-PAC™ ADL Score 14      ASSESSMENT AND PLAN     Progress Summary  OT IE completed for patient admitted from home s/p fall resulting in left humerus fracture now NWB in sling. At baseline, she functions at mod (I) level w/ Rollator and has around-the-clock assistance for IADLs, bathing and dressing. On evaluation today, she demonstrates fairly good awareness/integration of LUE NWB status although does require cues throughout tasks for safety. She requires min (A) w/ RUE handheld assistance for transfers and ambulation in  room w/ notable left-sided lean demonstrated. She requires full assistance for dressing ADLs. At this time, she would need assistance for all transfers, ambulation and ADL performance from caregivers at home, otherwise recommend SNF. Discussed w/ care coordination and patient.    Patient/Family Therapy Goal Statement: To go home    OT Plan    Flowsheet Row Most Recent Value   Rehab Potential good, to achieve stated therapy goals at 12/20/2023 1104   Therapy Frequency 5 times/wk at 12/20/2023 1104   Planned Therapy Interventions activity tolerance training, occupation/activity based interventions, patient/caregiver education/training, strengthening exercise, transfer/mobility retraining, BADL retraining, functional balance retraining, passive ROM/stretching, ROM/therapeutic exercise at 12/20/2023 1104          OT Discharge Recommendations    Flowsheet Row Most Recent Value   OT Recommended Discharge Disposition skilled nursing facility, home with assistance, home with home health  [SNF versus home w/ 24/7 caregivers and (A) for all mobility/ADLs] at 12/20/2023 1104   Anticipated Equipment Needs if Discharged Home (OT) none  [Owns all required equipment] at 12/20/2023 1104               OT Goals    Flowsheet Row Most Recent Value   Bed Mobility Goal 1    Activity/Assistive Device bed mobility activities, all at 12/20/2023 1104   [place] minimum assist (75% or more patient effort), verbal cues required at 12/20/2023 1104   Time Frame by discharge at 12/20/2023 1104   Strategies/Barriers LUE NWB at 12/20/2023 1104   Progress/Outcome goal ongoing at 12/20/2023 1104   Transfer Goal 1    Activity/Assistive Device sit-to-stand/stand-to-sit, no assistive device at 12/20/2023 1104   [place] supervision required, verbal cues required at 12/20/2023 1104   Time Frame by discharge at 12/20/2023 1104   Strategies/Barriers LUE NWB at 12/20/2023 1104   Progress/Outcome goal ongoing at 12/20/2023 1104   Transfer Goal 2     Activity/Assistive Device toilet, grab bar/safety frame at 12/20/2023 1104   [place] supervision required, verbal cues required at 12/20/2023 1104   Time Frame by discharge at 12/20/2023 1104   Strategies/Barriers LUE NWB at 12/20/2023 1104   Progress/Outcome goal ongoing at 12/20/2023 1104   Dressing Goal 1    Activity/Adaptive Equipment dressing skills, all at 12/20/2023 1104   [place] minimum assist (75% or more patient effort), verbal cues required, tactile cues required, set-up required at 12/20/2023 1104   Time Frame by discharge at 12/20/2023 1104   Strategies/Barriers LUE NWB at 12/20/2023 1104   Progress/Outcome goal ongoing at 12/20/2023 1104   Caregiver Training Goal 1    Caregiver Training Goal 1 Caregivers will demonstrate good understanding and implementation of LUE NWB status into functional transfers, mobility and ADLs. at 12/20/2023 1104   Time Frame by discharge at 12/20/2023 1104   Progress/Outcome goal ongoing at 12/20/2023 1104

## 2023-12-21 LAB
ANION GAP SERPL CALC-SCNC: 12 MEQ/L (ref 3–15)
BUN SERPL-MCNC: 16 MG/DL (ref 7–25)
CALCIUM SERPL-MCNC: 8.8 MG/DL (ref 8.6–10.3)
CHLORIDE SERPL-SCNC: 108 MEQ/L (ref 98–107)
CO2 SERPL-SCNC: 25 MEQ/L (ref 21–31)
CREAT SERPL-MCNC: 0.7 MG/DL (ref 0.6–1.2)
EGFRCR SERPLBLD CKD-EPI 2021: >60 ML/MIN/1.73M*2
ERYTHROCYTE [DISTWIDTH] IN BLOOD BY AUTOMATED COUNT: 15.7 % (ref 11.7–14.4)
GLUCOSE SERPL-MCNC: 88 MG/DL (ref 70–99)
HCT VFR BLDCO AUTO: 37.5 % (ref 35–45)
HGB BLD-MCNC: 11.7 G/DL (ref 11.8–15.7)
MCH RBC QN AUTO: 28.5 PG (ref 28–33.2)
MCHC RBC AUTO-ENTMCNC: 31.2 G/DL (ref 32.2–35.5)
MCV RBC AUTO: 91.5 FL (ref 83–98)
PDW BLD AUTO: 10.5 FL (ref 9.4–12.3)
PLATELET # BLD AUTO: 272 K/UL (ref 150–369)
POTASSIUM SERPL-SCNC: 4.1 MEQ/L (ref 3.5–5.1)
RBC # BLD AUTO: 4.1 M/UL (ref 3.93–5.22)
SODIUM SERPL-SCNC: 145 MEQ/L (ref 136–145)
WBC # BLD AUTO: 5.81 K/UL (ref 3.8–10.5)

## 2023-12-21 PROCEDURE — 63700000 HC SELF-ADMINISTRABLE DRUG

## 2023-12-21 PROCEDURE — 63700000 HC SELF-ADMINISTRABLE DRUG: Performed by: NURSE PRACTITIONER

## 2023-12-21 PROCEDURE — 21400000 HC ROOM AND CARE CCU/INTERMEDIATE

## 2023-12-21 PROCEDURE — 85027 COMPLETE CBC AUTOMATED: CPT

## 2023-12-21 PROCEDURE — 97535 SELF CARE MNGMENT TRAINING: CPT | Mod: GO

## 2023-12-21 PROCEDURE — 80048 BASIC METABOLIC PNL TOTAL CA: CPT

## 2023-12-21 PROCEDURE — 36415 COLL VENOUS BLD VENIPUNCTURE: CPT

## 2023-12-21 PROCEDURE — 99232 SBSQ HOSP IP/OBS MODERATE 35: CPT | Performed by: SURGERY

## 2023-12-21 RX ORDER — TRAMADOL HYDROCHLORIDE 50 MG/1
25 TABLET ORAL ONCE
Status: COMPLETED | OUTPATIENT
Start: 2023-12-21 | End: 2023-12-21

## 2023-12-21 RX ORDER — ACETAMINOPHEN 500 MG
1000 TABLET ORAL EVERY 6 HOURS
Status: DISCONTINUED | OUTPATIENT
Start: 2023-12-21 | End: 2023-12-22 | Stop reason: HOSPADM

## 2023-12-21 RX ORDER — IBUPROFEN/PSEUDOEPHEDRINE HCL 200MG-30MG
6 TABLET ORAL NIGHTLY
Status: DISCONTINUED | OUTPATIENT
Start: 2023-12-21 | End: 2023-12-22 | Stop reason: HOSPADM

## 2023-12-21 RX ORDER — LORAZEPAM 0.5 MG/1
0.5 TABLET ORAL ONCE
Status: COMPLETED | OUTPATIENT
Start: 2023-12-21 | End: 2023-12-21

## 2023-12-21 RX ADMIN — METOPROLOL TARTRATE 25 MG: 25 TABLET, FILM COATED ORAL at 19:48

## 2023-12-21 RX ADMIN — MOMETASONE FUROATE AND FORMOTEROL FUMARATE DIHYDRATE 2 PUFF: 100; 5 AEROSOL RESPIRATORY (INHALATION) at 08:42

## 2023-12-21 RX ADMIN — APIXABAN 2.5 MG: 5 TABLET, FILM COATED ORAL at 19:48

## 2023-12-21 RX ADMIN — DOCUSATE SODIUM 100 MG: 100 CAPSULE, LIQUID FILLED ORAL at 08:42

## 2023-12-21 RX ADMIN — LIDOCAINE 1 PATCH: 4 PATCH TOPICAL at 08:41

## 2023-12-21 RX ADMIN — ACETAMINOPHEN 650 MG: 325 TABLET ORAL at 04:48

## 2023-12-21 RX ADMIN — APIXABAN 2.5 MG: 5 TABLET, FILM COATED ORAL at 08:41

## 2023-12-21 RX ADMIN — ACETAMINOPHEN 650 MG: 325 TABLET ORAL at 13:00

## 2023-12-21 RX ADMIN — MOMETASONE FUROATE AND FORMOTEROL FUMARATE DIHYDRATE 2 PUFF: 100; 5 AEROSOL RESPIRATORY (INHALATION) at 19:48

## 2023-12-21 RX ADMIN — LORAZEPAM 0.5 MG: 0.5 TABLET ORAL at 23:32

## 2023-12-21 RX ADMIN — PREGABALIN 50 MG: 25 CAPSULE ORAL at 19:48

## 2023-12-21 RX ADMIN — METOPROLOL TARTRATE 25 MG: 25 TABLET, FILM COATED ORAL at 08:41

## 2023-12-21 RX ADMIN — TRAMADOL HYDROCHLORIDE 50 MG: 50 TABLET, COATED ORAL at 09:39

## 2023-12-21 RX ADMIN — ACETAMINOPHEN 1000 MG: 500 TABLET ORAL at 23:31

## 2023-12-21 RX ADMIN — DOCUSATE SODIUM 100 MG: 100 CAPSULE, LIQUID FILLED ORAL at 19:48

## 2023-12-21 RX ADMIN — TIOTROPIUM BROMIDE INHALATION SPRAY 2 PUFF: 3.12 SPRAY, METERED RESPIRATORY (INHALATION) at 08:43

## 2023-12-21 RX ADMIN — DILTIAZEM HYDROCHLORIDE 300 MG: 180 CAPSULE, COATED, EXTENDED RELEASE ORAL at 08:42

## 2023-12-21 RX ADMIN — PREGABALIN 50 MG: 25 CAPSULE ORAL at 08:41

## 2023-12-21 RX ADMIN — DULOXETINE HYDROCHLORIDE 60 MG: 60 CAPSULE, DELAYED RELEASE ORAL at 08:41

## 2023-12-21 RX ADMIN — ACETAMINOPHEN 650 MG: 325 TABLET ORAL at 08:43

## 2023-12-21 RX ADMIN — ACETAMINOPHEN 1000 MG: 500 TABLET ORAL at 17:19

## 2023-12-21 RX ADMIN — SENNOSIDES 2 TABLET: 8.6 TABLET, FILM COATED ORAL at 22:01

## 2023-12-21 RX ADMIN — Medication 6 MG: at 23:33

## 2023-12-21 RX ADMIN — POLYETHYLENE GLYCOL 3350 17 G: 17 POWDER, FOR SOLUTION ORAL at 08:41

## 2023-12-21 RX ADMIN — TRAMADOL HYDROCHLORIDE 25 MG: 50 TABLET, COATED ORAL at 13:50

## 2023-12-21 RX ADMIN — TRAMADOL HYDROCHLORIDE 50 MG: 50 TABLET, COATED ORAL at 17:19

## 2023-12-21 ASSESSMENT — COGNITIVE AND FUNCTIONAL STATUS - GENERAL
EATING MEALS: 3 - A LITTLE
WALKING IN HOSPITAL ROOM: 2 - A LOT
STANDING UP FROM CHAIR USING ARMS: 2 - A LOT
HELP NEEDED FOR PERSONAL GROOMING: 3 - A LITTLE
MOVING TO AND FROM BED TO CHAIR: 3 - A LITTLE
TOILETING: 2 - A LOT
MOVING TO AND FROM BED TO CHAIR: 3 - A LITTLE
HELP NEEDED FOR BATHING: 2 - A LOT
CLIMB 3 TO 5 STEPS WITH RAILING: 2 - A LOT
WALKING IN HOSPITAL ROOM: 2 - A LOT
CLIMB 3 TO 5 STEPS WITH RAILING: 2 - A LOT
CLIMB 3 TO 5 STEPS WITH RAILING: 2 - A LOT
DRESSING REGULAR LOWER BODY CLOTHING: 2 - A LOT
MOVING TO AND FROM BED TO CHAIR: 3 - A LITTLE
WALKING IN HOSPITAL ROOM: 2 - A LOT
DRESSING REGULAR UPPER BODY CLOTHING: 2 - A LOT
AFFECT: WFL
STANDING UP FROM CHAIR USING ARMS: 2 - A LOT
STANDING UP FROM CHAIR USING ARMS: 2 - A LOT

## 2023-12-21 NOTE — PLAN OF CARE
Problem: Adult Inpatient Plan of Care  Goal: Plan of Care Review  Outcome: Progressing  Flowsheets (Taken 12/21/2023 0408)  Progress: improving  Outcome Evaluation: VSS. RA. A+Ox4. Multimodal pain control. NWB LUE w/ sling intact. PW in place w/ adequate urine o/p. Call bell in reach.  Plan of Care Reviewed With: patient   Plan of Care Review  Plan of Care Reviewed With: patient  Progress: improving  Outcome Evaluation: VSS. RA. A+Ox4. Multimodal pain control. NWB LUE w/ sling intact. PW in place w/ adequate urine o/p. Call bell in reach.

## 2023-12-21 NOTE — PLAN OF CARE
Care Coordination Discharge Plan Note     Discharge Needs Assessment  Concerns to be Addressed: discharge planning, care coordination/care conferences  Current Discharge Risk: physical impairment    Anticipated Discharge Plan  Anticipated Discharge Disposition: home with assistance, home with home health  Type of Home Care Services: home health aide, home PT, nursing, home OT        Patient Choice  Offered/Gave Vendor List: no  Patient's Choice of Community Agency(s): [facility]         ---------------------------------------------------------------------------------------------------------------------    Interdisciplinary Discharge Plan Review:  Participants:advanced practice provider, , dietitian/nutrition services, physical therapy, physician, nursing, social work/services, occupational therapy    Concerns Comments: Per trauma rounds, Pt is stable to return home with live in [facility]. CM called Pt's daughter Shelley to discuss dispo plan. Shelley will reach out to [facility] to see when services would be able to resume. CM made referral to [facility] to resume skilled HC services. CM will await call back from Pt's daughter to solidify D/C plan. TRISH Tomorrow. CM will follow.    Discharge Plan:   Disposition/Destination:   /    Discharge Facility:    Community Resources:      Discharge Transportation:  Is Out of Hospital DNR needed at Discharge: no  Does patient need discharge transport?

## 2023-12-21 NOTE — PROGRESS NOTES
TRAUMA SURGERY DAILY PROGRESS NOTE     PATIENT NAME:  Madelyn Ruiz YOB: 1930    AGE:  89 y.o.  GENDER: female   MRN:  [id number]  PATIENT #: [id number]     SUBJECTIVE   No events overnight.  Pending discharge home tomorrow.  Pain is controlled.    REVIEW OF SYSTEMS   14 point ROS negative unless otherwise specified above    VITAL SIGNS   Temperature: Temp (24hrs), Av.5 °C (97.7 °F), Min:36.4 °C (97.5 °F), Max:36.7 °C (98 °F)     BP Max:  Systolic (24hrs), Av , Min:111 , Max:195      BP Fang:  Diastolic (24hrs), Av, Min:65, Max:92    Recent:    Patient Vitals for the past 4 hrs:   BP Temp Temp src Pulse Resp SpO2   23 0928 (!) 174/84 -- -- 72 -- --   23 0913 (!) 163/75 -- -- 82 -- 99 %   23 0753 (!) 195/92 36.4 °C (97.5 °F) Temporal 70 18 97 %        I/Os:  No intake/output data recorded.     MEDICATIONS     Current Facility-Administered Medications:   •  acetaminophen (TYLENOL) tablet 650 mg, 650 mg, oral, q4h NOE, Rebekah Gustafson CRNP, 650 mg at 23 0843  •  apixaban (ELIQUIS) tablet 2.5 mg, 2.5 mg, oral, BID, Brunilda Abdi CRNP, 2.5 mg at 23 0841  •  azithromycin (ZITHROMAX) tablet 250 mg, 250 mg, oral, Once per day on , Mojgan Crowe CRNP, 250 mg at 23 0824  •  [DISCONTINUED] glucose chewable tablet 16-32 g of dextrose, 16-32 g of dextrose, oral, PRN **OR** [DISCONTINUED] dextrose 40 % oral gel 15-30 g of dextrose, 15-30 g of dextrose, oral, PRN **OR** [DISCONTINUED] glucagon (GLUCAGEN) injection 1 mg, 1 mg, intramuscular, PRN **OR** dextrose 50 % in water (D50) injection 12.5 g, 25 mL, intravenous, PRN, Rebekah Gustafsno CRNP  •  dilTIAZem CD (CARDIZEM CD) 24 hr ER capsule 300 mg, 300 mg, oral, Daily, Mojgan Crowe CRNP, 300 mg at 23 0842  •  docusate sodium (COLACE) capsule 100 mg, 100 mg, oral, BID, Rebekah Gustafson CRNP, 100 mg at 23 0842  •  DULoxetine (CYMBALTA) capsule,delayed  release(DR/EC) 60 mg, 60 mg, oral, Daily, Mojgan Crowe CRNP, 60 mg at 12/21/23 0841  •  lidocaine (ASPERCREME) 4 % topical patch 1 patch, 1 patch, Topical, Daily, Rebekah Gustafson CRNP, 1 patch at 12/21/23 0841  •  metoprolol tartrate (LOPRESSOR) tablet 25 mg, 25 mg, oral, BID, Mojgan Crowe CRNP, 25 mg at 12/21/23 0841  •  midodrine (PROAMATINE) tablet 5 mg, 5 mg, oral, TID AC, Mojgan Crowe CRNP, 5 mg at 12/20/23 1654  •  mometasone-formoterol (DULERA 100) 100-5 mcg/actuation inhaler 2 puff, 2 puff, inhalation, BID (8a, 8p), Mojgan Crowe CRNP, 2 puff at 12/21/23 0842  •  polyethylene glycol (MIRALAX) 17 gram packet 17 g, 17 g, oral, Daily, Rebekah Gustafson CRNP, 17 g at 12/21/23 0841  •  pregabalin (LYRICA) capsule 50 mg, 50 mg, oral, BID, Mojgan Crowe CRNP, 50 mg at 12/21/23 0841  •  rosuvastatin (CRESTOR) tablet 5 mg, 5 mg, oral, Once per day on Monday Wednesday Friday, Mojgan Crowe CRNP, 5 mg at 12/20/23 1807  •  senna (SENOKOT) tablet 2 tablet, 2 tablet, oral, Nightly, Rebekah Gustafson CRNP, 2 tablet at 12/20/23 2136  •  tiotropium bromide (SPIRIVA RESPIMAT) 2.5 mcg/actuation inhaler 2 puff, 2 puff, inhalation, Daily, Mojgan Crowe CRNP, 2 puff at 12/21/23 0843  •  traMADoL (ULTRAM) tablet 50 mg, 50 mg, oral, q8h PRN, Rebekah Gustafson CRNP, 50 mg at 12/21/23 0939    DIAGNOSTIC DATA   LABS:  Recent Results (from the past 24 hour(s))   CBC    Collection Time: 12/21/23  5:57 AM   Result Value Ref Range    WBC 5.81 3.80 - 10.50 K/uL    RBC 4.10 3.93 - 5.22 M/uL    Hemoglobin 11.7 (L) 11.8 - 15.7 g/dL    Hematocrit 37.5 35.0 - 45.0 %    MCV 91.5 83.0 - 98.0 fL    MCH 28.5 28.0 - 33.2 pg    MCHC 31.2 (L) 32.2 - 35.5 g/dL    RDW 15.7 (H) 11.7 - 14.4 %    Platelets 272 150 - 369 K/uL    MPV 10.5 9.4 - 12.3 fL   Basic metabolic panel    Collection Time: 12/21/23  5:57 AM   Result Value Ref Range    Sodium 145 136 - 145 mEQ/L    Potassium 4.1 3.5 - 5.1 mEQ/L    Chloride 108 (H) 98 - 107 mEQ/L     CO2 25 21 - 31 mEQ/L    BUN 16 7 - 25 mg/dL    Creatinine 0.7 0.6 - 1.2 mg/dL    Glucose 88 70 - 99 mg/dL    Calcium 8.8 8.6 - 10.3 mg/dL    eGFR >60.0 >=60.0 mL/min/1.73m*2    Anion Gap 12 3 - 15 mEQ/L       IMAGING:  I have reviewed the imaging completed within the last 24 hours.    PHYSICAL EXAM     GENERAL: NAD  NEURO: GCS 15. AAOx3. Non-focal on exam. Following all commands. Sensation intact.   HENT: NC/AT.   CHEST: Symmetric expansion, left chest tender to palpation  LUNGS: LCTA bilaterally. No use of accessory muscles or respiratory distress.   CV: RRR, S1/S2.  ABDOMEN: Soft, nontender, nondistended.  EXTREMITIES: Left arm in sling  SKIN: Warm    PROBLEM LIST     Patient Active Problem List   Diagnosis   • Allergic bronchopulmonary aspergillosis (CMS/HCC)   • Asthma   • Pulmonary emphysema (CMS/HCC)   • Primary hypertension   • Insomnia   • Lumbar spinal stenosis   • Obstructive sleep apnea syndrome   • Osteoporosis   • Medicare annual wellness visit, subsequent   • GERD (gastroesophageal reflux disease)   • Personal history of pulmonary embolism   • Calcification of aorta (CMS/HCC)   • Nonintractable headache   • Chronic pain of left knee   • Word finding difficulty   • Bilateral hip pain   • Hx of long term use of blood thinners   • Edema   • Sacral fracture, closed (CMS/HCC)   • A-fib (CMS/HCC)   • Atrial flutter (CMS/HCC)   • Fall   • COPD (chronic obstructive pulmonary disease) (CMS/[facility])   • Anemia   • Closed head injury, initial encounter   • Orthostatic hypotension   • Palliative care by specialist   • Debility   • Exudative age-related macular degeneration, right eye, with active choroidal neovascularization (CMS/[facility])   • Fall at home, initial encounter   • Ribs, multiple fractures   • Humerus fracture         IMPRESSION/PLAN   89 y.o. y/o female s/p fall    Humerus fracture  Assessment & Plan  · S/p mechanical fall, c/o LUE pain. XR of Left arm reveals linear sclerosis at the humeral neck,  possibly representing nondisplaced fracture  · Appreciate Ortho consults and rec's   · Non-op management planned  · NWB to LUE, sling in place  · Multimodal pain control    Ribs, multiple fractures  Assessment & Plan  · S/p mechanical fall. CT of C/A/P reveals acute, displaced left lateral fifth and sixth and nondisplaced seventh rib fractures.  A/x small PTX    · Monitor sPO2 maintains sats > 93  · I/S, C&DB  · Multimodal pain control with tylenol, Lidoderm patch, and tramadol  · Repeat CXR in AM  · PT/OT/PMR consult appreciated    Orthostatic hypotension  Assessment & Plan  · Continue home dose of midodrine  · Trend BP    COPD (chronic obstructive pulmonary disease) (CMS/[facility])  Assessment & Plan  · H/O COPD  · Continue home dose Azithromycin  · Continue home [facility]icort [facility] and Spiriva inhalers    Atrial flutter (CMS/[facility])  Assessment & Plan  · Known history of atrial flutter, currently on Eliquis. Held pending ortho's consult and recs, will resume today as plan is non-op management  · Continue home dose of metoprolol and diltiazem   · Monitor on Tele    * Fall at home, initial encounter  Assessment & Plan  · See associated plan      Dilshad Bowen MD

## 2023-12-21 NOTE — PROGRESS NOTES
Occupational Therapy -  Daily Treatment/Progress Note     Patient: Madelyn Ruiz  Location: [place] [address]  MRN: [id number]  Today's date: 12/21/2023    HISTORY OF PRESENT ILLNESS     Madelyn is a 89 y.o. female admitted on 12/19/2023 with Fall at home, initial encounter [W19.XXXA, Y92.009]. Principal problem is Fall at home, initial encounter.    Past Medical History  Madelyn has a past medical history of Allergic bronchopulmonary aspergillosis (CMS/[facility]) (04/02/2018), Allergic rhinitis (04/02/2018), Asthma, Atrial flutter with rapid ventricular response (CMS/[facility]) (02/21/2023), Atrial flutter with rapid ventricular response (CMS/HCC) (2/21/2023), Calcification of aorta (CMS/[facility]) (06/29/2020), Chronic obstructive pulmonary disease (CMS/[facility]) (04/02/2018), GERD (gastroesophageal reflux disease) (06/08/2019), Insomnia (04/02/2018), Lumbar spinal stenosis (04/02/2018), Macular degeneration disease, Obstructive sleep apnea syndrome (04/02/2018), Orthostatic hypotension (10/23/2023), Osteoporosis (04/02/2018), Primary hypertension (04/02/2018), Primary hypertension (4/2/2018), Pulmonary embolism (CMS/[facility]) (06/09/2019), SVT (supraventricular tachycardia), and Vertebral compression fracture (CMS/[facility]).    History of Present Illness    Patient is a 89 year-old female transferred from [facility] s/p mechanical fall while carrying gifts while using her Rollator. Imaging reveals left 5-7 rib fractures, small pneumothorax and pleural effusion. Also w/ acute left proximal humerus fracture - NWB in sling. Incidental findings of T12 compression fracture and pelvic fracture - both chronic    PMH = COPD and a. Flutter - on Eliquis        PRIOR LEVEL OF FUNCTION AND LIVING ENVIRONMENT     Prior Level of Function    Flowsheet Row Most Recent Value   Dominant Hand right   Ambulation assistive equipment  [Rollator]   Transferring assistive equipment  [Rollator]   Toileting assistive equipment  [Commode over  "toilet]   Bathing assistive equipment and person  [Shower chair + grab bars]   Dressing assistive person   Eating independent   IADLs assistive person   Communication understands/communicates without difficulty   Prior Level of Function Comment Transfers/ambulates w/ Rollator. Lives alone although has friend/home health aid \"Ella\" who assists w/ daytime IADLs, supervises for bathing and assists w/ dressing. Reports private home health aids who assist during the night. Overall, has around-the-clock assistance. Denies any assistance required for bed mobility, transfers, ambulation or toileting.   Assistive Device Currently Used at Home walker, front-wheeled, other (see comments), grab bar, shower chair, commode, 3-in-1  [rollator]        Prior Living Environment    Flowsheet Row Most Recent Value   People in Home other (see comments)  [Live in [facility]]   Current Living Arrangements home   Living Environment Comment 3-story townhouse w/ 3 DANNY w/ unilateral railing,  first-floor set-up w/ walk-in shower - grab bars and shower chair        Occupational Profile    Flowsheet Row Most Recent Value   Occupational History/Life Experiences Has children   Environmental Supports and Barriers Has 24-7 caregiver support,  owns adequate equipment        VITALS AND PAIN     OT Vitals    Date/Time Pulse HR Source SpO2 Pt Activity O2 Therapy BP MAP BP Location BP Method Pt Position [facility]   12/21/23 0913 82 Monitor 99 % At rest None (Room air) 163/75 108 mmHg Right upper arm Automatic Lying DLD   12/21/23 0928 72 Monitor -- At rest None (Room air) 174/84 120 mmHg Right upper arm Automatic Sitting DLD      OT Pain    Date/Time Pain Type Location Rating: Rest [facility]   12/21/23 0913 Pain Assessment flank 9 DLD        Objective   OBJECTIVE     Start time:  0902  End time:  0930  Session Length: 28 min  Mode of Treatment: individual therapy, occupational therapy    General Observations  Patient received in bed, upright. She was no issues or " "concerns identified by nurse prior to session, agreeable to therapy. Pt pleasant and cooperative, LUE sling in place and re-adjusted sitting EOB    Precautions: brace worn at all times, fall, weight bearing (LUE sling AAT)  Extremity Weight-Bearing Status: left upper extremity  Left UE Weight-Bearing Status: non weight-bearing (NWB)    Limitations/Impairments: hearing   Services  Do You Speak a Language Other Than English at Home?: no      OT Eval and Treat - 12/21/23 0902        Cognition    Orientation Status oriented x 3     Affect/Mental Status WFL     Follows Commands follows two-step commands;verbal cues/prompting required     Cognitive Function executive function deficit;safety deficit     Executive Function Deficit minimal deficit;organization/sequencing;problem-solving/reasoning     Safety Deficit minimal deficit;insight into deficits/self-awareness;safety precautions follow-through/compliance     Comment, Cognition Pt benefits from encouragement and increased time for processing. Otherwise pt aware for need of assistance, i.e \"I will need help\" when discussing OOB mobility        Bed Mobility    Bed Mobility Activities supine to sit     [place] minimum assist (75% or more patient effort);1 person assist     Safety/Cues increased time to complete;verbal cues;sequencing;technique     Assistive Device head of bed elevated;bed rails     Comment OOB to R side, light assist at trunk during offload, vc's provided for sequencing. Pt using bed rail w/ RUE        Sit/Stand Transfer    Surface edge of bed     [place] minimum assist (75% or more patient effort);1 person assist     Safety/Cues increased time to complete;verbal cues;sequencing;technique     Assistive Device none     Transfer Comments Pt required Coreen for buttock offload/steadying, vc's provided for sequencing and technique. Pt cued to avoid reaching for bed w/ LUE d/t NWB status        Surface-to-Surface Transfers    Transfer " Location commode     Transfer Technique stand step     [place] minimum assist (75% or more patient effort);1 person assist     Safety/Cues increased time to complete;sequencing;technique     Assistive Device none     Transfer Comments Coreen provided for steadying, vc's provided for sequencing and technique        Toilet Transfer    Transfer Technique sit/stand     [place] minimum assist (75% or more patient effort);1 person assist     Safety/Cues increased time to complete;verbal cues;sequencing;technique     Assistive Device none     Comment Coreen provided for ascend/descent, vc's provided to push through commode w/ RUE        Grooming    Tasks washes, rinses and dries face;oral care (brushing teeth, cleaning dentures);brushes/rubio hair     Vian set up     Position supported sitting     Setup Assistance obtain supplies     Adaptive Equipment none     Comment Pt instructed to open containers w/ R hand and stabilize items w/ L hand. Pt able to complete grooming tasks w/ set-up        Toileting    Tasks adjust/manage clothing;perform bladder hygiene     [place] minimum assist (75% or more patient effort);1 person assist     Position supported standing     Adaptive Equipment commode, 3-in-1     Comment Pt completed vita hygiene and clothing management in standing w/ RW, Coreen provided for steadying        Self-Feeding    Tasks finger foods;liquids to mouth     Vian set up     Safety/Cues increased time to complete     Position sitting up in bed     Adaptive Equipment none     Comment Pt finishing breakfast at end of session w/ set-up        Balance    Static Sitting Balance WFL;unsupported;sitting, edge of bed     Dynamic Sitting Balance mild impairment;unsupported;sitting, edge of bed     Sit to Stand Dynamic Balance mild impairment;supported     Static Standing Balance mild impairment;supported     Dynamic Standing Balance mild impairment;supported     Comment, Balance Close S-Coreen  sitting EOB, Coreen for functional mobility        Impairments/Safety Issues    Impairments Affecting Function balance;cognition;endurance/activity tolerance;pain;other (see comments)     Cognitive Impairments, Safety/Performance attention;problem-solving/reasoning;safety precaution follow-through     Functional Endurance Fair, anticipate not far from baseline             Session Outcome  Patient upright, in chair at end of session, chair alarm on, all needs met, call light in reach, personal items in reach. Nursing notified about change in vital signs, patient's performance, patient's position, and patient's response to therapy/activity.    AM-PAC™ - ADL (Current Function)     Putting on/taking off regular lower body clothing 2 - A Lot   Bathing 2 - A Lot   Toileting 2 - A Lot   Putting on/taking off regular upper body clothing 2 - A Lot   Help for taking care of personal grooming 3 - A Little   Eating meals 3 - A Little   AM-PAC™ ADL Score 14      ASSESSMENT AND PLAN     Progress Summary  OT tx completed. Pt required Coreen for bed mobility, close S-Coreen for EOB sitting, Coreen for STS tsf, Coreen for bed to commode to chair tsf, Coreen for toileting, and set-up for grooming in sitting. Anticipate pt to require min-modA w/ remainder of BADLs d/t impaired endurance, strength, balance, and NWB status. Recommend SNF    Patient/Family Therapy Goal Statement: To regain PLOF    OT Plan    Flowsheet Row Most Recent Value   Rehab Potential good, to achieve stated therapy goals at 12/21/2023 0902   Therapy Frequency 5 times/wk at 12/21/2023 0902   Planned Therapy Interventions activity tolerance training, occupation/activity based interventions, patient/caregiver education/training, strengthening exercise, transfer/mobility retraining, BADL retraining, functional balance retraining, passive ROM/stretching, ROM/therapeutic exercise at 12/21/2023 0902        OT Discharge Recommendations    Flowsheet Row Most Recent Value   OT  Recommended Discharge Disposition skilled nursing facility at 12/21/2023 0902   Anticipated Equipment Needs if Discharged Home (OT) none  [Owns all required equipment] at 12/21/2023 0902        OT Goals    Flowsheet Row Most Recent Value   Bed Mobility Goal 1    Activity/Assistive Device bed mobility activities, all at 12/20/2023 1104   [place] minimum assist (75% or more patient effort), verbal cues required at 12/20/2023 1104   Time Frame by discharge at 12/20/2023 1104   Strategies/Barriers LUE NWB at 12/20/2023 1104   Progress/Outcome goal ongoing at 12/20/2023 1104   Transfer Goal 1    Activity/Assistive Device sit-to-stand/stand-to-sit, no assistive device at 12/20/2023 1104   [place] supervision required, verbal cues required at 12/20/2023 1104   Time Frame by discharge at 12/21/2023 0902   Strategies/Barriers LUE NWB at 12/20/2023 1104   Progress/Outcome goal ongoing at 12/21/2023 0902   Transfer Goal 2    Activity/Assistive Device toilet, grab bar/safety frame at 12/20/2023 1104   [place] supervision required, verbal cues required at 12/20/2023 1104   Time Frame by discharge at 12/20/2023 1104   Strategies/Barriers LUE NWB at 12/20/2023 1104   Progress/Outcome goal ongoing at 12/20/2023 1104   Dressing Goal 1    Activity/Adaptive Equipment dressing skills, all at 12/20/2023 1104   [place] minimum assist (75% or more patient effort), verbal cues required, tactile cues required, set-up required at 12/20/2023 1104   Time Frame by discharge at 12/21/2023 0902   Strategies/Barriers LUE NWB at 12/20/2023 1104   Progress/Outcome goal ongoing at 12/21/2023 0902   Caregiver Training Goal 1    Caregiver Training Goal 1 Caregivers will demonstrate good understanding and implementation of LUE NWB status into functional transfers, mobility and ADLs. at 12/20/2023 1104   Time Frame by discharge at 12/20/2023 1104   Progress/Outcome goal ongoing at 12/20/2023 1104

## 2023-12-21 NOTE — PROGRESS NOTES
[facility] received consult.  Reviewed case.  Spoke with patient's daughter.  Patient receiving services PTA. Referred patient to [facility] for RN, PT and OT with anticipated discharge 12 22 2023.  Thank you

## 2023-12-22 VITALS
WEIGHT: 113.98 LBS | TEMPERATURE: 98.4 F | HEIGHT: 60 IN | DIASTOLIC BLOOD PRESSURE: 86 MMHG | SYSTOLIC BLOOD PRESSURE: 179 MMHG | BODY MASS INDEX: 22.38 KG/M2 | RESPIRATION RATE: 18 BRPM | OXYGEN SATURATION: 97 % | HEART RATE: 65 BPM

## 2023-12-22 PROCEDURE — 63700000 HC SELF-ADMINISTRABLE DRUG

## 2023-12-22 PROCEDURE — 99238 HOSP IP/OBS DSCHRG MGMT 30/<: CPT | Performed by: SURGERY

## 2023-12-22 PROCEDURE — 63700000 HC SELF-ADMINISTRABLE DRUG: Performed by: NURSE PRACTITIONER

## 2023-12-22 PROCEDURE — 97530 THERAPEUTIC ACTIVITIES: CPT | Mod: GP

## 2023-12-22 RX ORDER — TRAMADOL HYDROCHLORIDE 50 MG/1
50 TABLET ORAL EVERY 8 HOURS PRN
Qty: 10 TABLET | Refills: 0 | Status: SHIPPED | OUTPATIENT
Start: 2023-12-22 | End: 2023-12-22

## 2023-12-22 RX ORDER — TRAMADOL HYDROCHLORIDE 50 MG/1
50 TABLET ORAL EVERY 8 HOURS PRN
Start: 2023-12-22

## 2023-12-22 RX ORDER — IBUPROFEN/PSEUDOEPHEDRINE HCL 200MG-30MG
6 TABLET ORAL NIGHTLY
COMMUNITY
Start: 2023-12-22

## 2023-12-22 RX ORDER — ACETAMINOPHEN 500 MG
1000 TABLET ORAL EVERY 6 HOURS
COMMUNITY
Start: 2023-12-22 | End: 2024-09-18

## 2023-12-22 RX ADMIN — METOPROLOL TARTRATE 25 MG: 25 TABLET, FILM COATED ORAL at 09:14

## 2023-12-22 RX ADMIN — POLYETHYLENE GLYCOL 3350 17 G: 17 POWDER, FOR SOLUTION ORAL at 09:23

## 2023-12-22 RX ADMIN — AZITHROMYCIN 250 MG: 250 TABLET, FILM COATED ORAL at 09:29

## 2023-12-22 RX ADMIN — TIOTROPIUM BROMIDE INHALATION SPRAY 2 PUFF: 3.12 SPRAY, METERED RESPIRATORY (INHALATION) at 09:24

## 2023-12-22 RX ADMIN — DULOXETINE HYDROCHLORIDE 60 MG: 60 CAPSULE, DELAYED RELEASE ORAL at 09:14

## 2023-12-22 RX ADMIN — ACETAMINOPHEN 1000 MG: 500 TABLET ORAL at 13:29

## 2023-12-22 RX ADMIN — MOMETASONE FUROATE AND FORMOTEROL FUMARATE DIHYDRATE 2 PUFF: 100; 5 AEROSOL RESPIRATORY (INHALATION) at 09:25

## 2023-12-22 RX ADMIN — LIDOCAINE 1 PATCH: 4 PATCH TOPICAL at 09:24

## 2023-12-22 RX ADMIN — APIXABAN 2.5 MG: 5 TABLET, FILM COATED ORAL at 09:14

## 2023-12-22 RX ADMIN — ACETAMINOPHEN 1000 MG: 500 TABLET ORAL at 07:09

## 2023-12-22 RX ADMIN — PREGABALIN 50 MG: 25 CAPSULE ORAL at 09:14

## 2023-12-22 RX ADMIN — DOCUSATE SODIUM 100 MG: 100 CAPSULE, LIQUID FILLED ORAL at 09:13

## 2023-12-22 RX ADMIN — DILTIAZEM HYDROCHLORIDE 300 MG: 180 CAPSULE, COATED, EXTENDED RELEASE ORAL at 09:13

## 2023-12-22 RX ADMIN — TRAMADOL HYDROCHLORIDE 50 MG: 50 TABLET, COATED ORAL at 09:29

## 2023-12-22 ASSESSMENT — COGNITIVE AND FUNCTIONAL STATUS - GENERAL
MOVING TO AND FROM BED TO CHAIR: 3 - A LITTLE
MOVING TO AND FROM BED TO CHAIR: 3 - A LITTLE
CLIMB 3 TO 5 STEPS WITH RAILING: 3 - A LITTLE
WALKING IN HOSPITAL ROOM: 3 - A LITTLE
WALKING IN HOSPITAL ROOM: 3 - A LITTLE
CLIMB 3 TO 5 STEPS WITH RAILING: 3 - A LITTLE
STANDING UP FROM CHAIR USING ARMS: 3 - A LITTLE
AFFECT: WFL
STANDING UP FROM CHAIR USING ARMS: 3 - A LITTLE

## 2023-12-22 NOTE — PLAN OF CARE
Plan of Care Review  Plan of Care Reviewed With: patient  Progress: improving  Outcome Evaluation: Patient verbalized knowledge of discharge planning.  Problem: Adult Inpatient Plan of Care  Goal: Plan of Care Review  Outcome: Met  Flowsheets (Taken 12/22/2023 1113)  Progress: improving  Outcome Evaluation: Patient verbalized knowledge of discharge planning.  Plan of Care Reviewed With: patient

## 2023-12-22 NOTE — PROGRESS NOTES
TRAUMA SURGERY DAILY PROGRESS NOTE     PATIENT NAME:  Madelyn Ruiz YOB: 1930    AGE:  89 y.o.  GENDER: female   MRN:  [id number]  PATIENT #: [id number]       CHIEF COMPLAINT   Fall at home, initial encounter    SUBJECTIVE   Afebrile VSS. No acute events overnight.      VITAL SIGNS   Temperature: Temp (24hrs), Av.5 °C (97.7 °F), Min:36.4 °C (97.5 °F), Max:36.7 °C (98.1 °F)     BP Max:  Systolic (24hrs), Av , Min:120 , Max:186      BP Fang:  Diastolic (24hrs), Av, Min:65, Max:91    Recent:    Patient Vitals for the past 4 hrs:   BP Temp Temp src Pulse Resp SpO2   23 0913 (!) 186/91 -- -- 67 -- --   23 0738 (!) 157/80 36.6 °C (97.9 °F) Oral 67 17 97 %        I/Os:  No intake/output data recorded.     MEDICATIONS     Current Facility-Administered Medications:   •  acetaminophen (TYLENOL) tablet 1,000 mg, 1,000 mg, oral, q6h [facility], Daisy Horn CRNP, 1,000 mg at 23 0709  •  apixaban (ELIQUIS) tablet 2.5 mg, 2.5 mg, oral, BID, Brunilda Abdi CRNP, 2.5 mg at 23 0914  •  azithromycin (ZITHROMAX) tablet 250 mg, 250 mg, oral, Once per day on , Mojgan Crowe CRNP, 250 mg at 23 0929  •  [DISCONTINUED] glucose chewable tablet 16-32 g of dextrose, 16-32 g of dextrose, oral, PRN **OR** [DISCONTINUED] dextrose 40 % oral gel 15-30 g of dextrose, 15-30 g of dextrose, oral, PRN **OR** [DISCONTINUED] glucagon (GLUCAGEN) injection 1 mg, 1 mg, intramuscular, PRN **OR** dextrose 50 % in water (D50) injection 12.5 g, 25 mL, intravenous, PRN, Rebekah Gustafson CRNP  •  dilTIAZem CD (CARDIZEM CD) 24 hr ER capsule 300 mg, 300 mg, oral, Daily, Mojgan Crowe CRNP, 300 mg at 23  •  docusate sodium (COLACE) capsule 100 mg, 100 mg, oral, BID, Rebekah Gustafson CRNP, 100 mg at 23  •  DULoxetine (CYMBALTA) capsule,delayed release(DR/EC) 60 mg, 60 mg, oral, Daily, Mojgan Crowe CRNP, 60 mg at 23 09  •  lidocaine  (ASPERCREME) 4 % topical patch 1 patch, 1 patch, Topical, Daily, Rebekah Gustafson CRNP, 1 patch at 12/22/23 0924  •  melatonin ODT 6 mg, 6 mg, oral, Nightly, Virginie Murillo CRNP, 6 mg at 12/21/23 2333  •  metoprolol tartrate (LOPRESSOR) tablet 25 mg, 25 mg, oral, BID, Mojgan Crowe CRNP, 25 mg at 12/22/23 0914  •  midodrine (PROAMATINE) tablet 5 mg, 5 mg, oral, TID AC, Mojagn Crowe CRNP, 5 mg at 12/20/23 1654  •  mometasone-formoterol (DULERA 100) 100-5 mcg/actuation inhaler 2 puff, 2 puff, inhalation, BID (8a, 8p), Mojgan Crowe CRNP, 2 puff at 12/22/23 0925  •  polyethylene glycol (MIRALAX) 17 gram packet 17 g, 17 g, oral, Daily, Rebekah Gustafson CRNP, 17 g at 12/22/23 0923  •  pregabalin (LYRICA) capsule 50 mg, 50 mg, oral, BID, Mojgan Crowe CRNP, 50 mg at 12/22/23 0914  •  rosuvastatin (CRESTOR) tablet 5 mg, 5 mg, oral, Once per day on Monday Wednesday Friday, Mojgan Crowe CRNP, 5 mg at 12/20/23 1807  •  senna (SENOKOT) tablet 2 tablet, 2 tablet, oral, Nightly, Rebekah Gustafson CRNP, 2 tablet at 12/21/23 2201  •  tiotropium bromide (SPIRIVA RESPIMAT) 2.5 mcg/actuation inhaler 2 puff, 2 puff, inhalation, Daily, Mojgan Crowe CRNP, 2 puff at 12/22/23 0924  •  traMADoL (ULTRAM) tablet 50 mg, 50 mg, oral, q8h PRN, Rebekah Gustafson CRNP, 50 mg at 12/22/23 0929    DIAGNOSTIC DATA   LABS:  No results found for this or any previous visit (from the past 24 hour(s)).    IMAGING:  I have reviewed the imaging completed within the last 24 hours.    PHYSICAL EXAM    Physical Exam  Constitutional:       General: She is not in acute distress.     Appearance: She is well-developed.   HENT:      Head: Normocephalic.      Nose: Nose normal.      Mouth/Throat:      Mouth: Mucous membranes are dry.   Eyes:      Extraocular Movements: Extraocular movements intact.      Conjunctiva/sclera: Conjunctivae normal.   Neck:      Trachea: No tracheal deviation.   Cardiovascular:      Rate and Rhythm: Normal rate.       Pulses: Normal pulses.   Pulmonary:      Effort: Pulmonary effort is normal. No respiratory distress.      Breath sounds: No stridor.      Comments: L chest wall tenderness  Abdominal:      General: There is no distension.      Palpations: Abdomen is soft.      Tenderness: There is no abdominal tenderness.      Hernia: No hernia is present.   Musculoskeletal:         General: Swelling and tenderness present.      Cervical back: Neck supple.      Comments: Decreased ROM In the L arm   Skin:     General: Skin is warm and dry.   Neurological:      Mental Status: She is alert and oriented to person, place, and time.      Sensory: No sensory deficit.      Motor: No weakness or abnormal muscle tone.   Psychiatric:         Behavior: Behavior normal.            PROBLEM LIST     Patient Active Problem List   Diagnosis   • Allergic bronchopulmonary aspergillosis (CMS/HCC)   • Asthma   • Pulmonary emphysema (CMS/HCC)   • Primary hypertension   • Insomnia   • Lumbar spinal stenosis   • Obstructive sleep apnea syndrome   • Osteoporosis   • Medicare annual wellness visit, subsequent   • GERD (gastroesophageal reflux disease)   • Personal history of pulmonary embolism   • Calcification of aorta (CMS/HCC)   • Nonintractable headache   • Chronic pain of left knee   • Word finding difficulty   • Bilateral hip pain   • Hx of long term use of blood thinners   • Edema   • Sacral fracture, closed (CMS/HCC)   • A-fib (CMS/HCC)   • Atrial flutter (CMS/HCC)   • Fall   • COPD (chronic obstructive pulmonary disease) (CMS/HCC)   • Anemia   • Closed head injury, initial encounter   • Orthostatic hypotension   • Palliative care by specialist   • Debility   • Exudative age-related macular degeneration, right eye, with active choroidal neovascularization (CMS/HCC)   • Fall at home, initial encounter   • Ribs, multiple fractures   • Humerus fracture         IMPRESSION/PLAN   89 y.o. y/o female s/p fall    Humerus fracture  Assessment &  Plan  · S/p mechanical fall, c/o LUE pain. XR of Left arm reveals linear sclerosis at the humeral neck, possibly representing nondisplaced fracture  · Appreciate Ortho consults and rec's   · Non-op management planned  · NWB to LUE, sling in place  · Multimodal pain control      Ribs, multiple fractures  Assessment & Plan  · S/p mechanical fall. CT of C/A/P reveals acute, displaced left lateral fifth and sixth and nondisplaced seventh rib fractures.  A/x small PTX    · Monitor sPO2 maintains sats > 93  · I/S, C&DB  · Multimodal pain control with tylenol, Lidoderm patch, and tramadol  · Repeat CXR with no large PTX   · PT/OT/PMR consult appreciated    Orthostatic hypotension  Assessment & Plan  · Continue home dose of midodrine  · Trend BP    COPD (chronic obstructive pulmonary disease) (CMS/[facility])  Assessment & Plan  · H/O COPD  · Continue home dose Azithromycin  · Continue home Symbicort ([employer] formulary inpatient) and Spiriva inhalers      Atrial flutter (CMS/[facility])  Assessment & Plan  · Known history of atrial flutter, currently on Eliquis and is reinstated. Was held for a few days post the fall  · Continue home dose of metoprolol and diltiazem   · Monitor on Tele    * Fall at home, initial encounter  Assessment & Plan  · See associated plan       AUTHOR:  William Carlos MD  Trauma Attending  Trauma Service pager #[phone], [phone]  12/22/2023  10:14 AM

## 2023-12-22 NOTE — NURSING NOTE
IV vignesh discontinued. Discharge instructios reviewed with patient and her daughter. Both expressed understanding. Haydee Zepeda RN    12/22@1253 Addendum: Patient discharged home accompanied by family. Haydee Zepeda RN

## 2023-12-22 NOTE — PROGRESS NOTES
Physical Therapy -  Daily Treatment/Progress Note     Patient: Madelyn Ruiz  Location: [address]  MRN: [id number]  Today's date: 12/22/2023    HISTORY OF PRESENT ILLNESS     Madelyn is a 89 y.o. female admitted on 12/19/2023 with Fall at home, initial encounter [W19.XXXA, Y92.009]. Principal problem is Fall at home, initial encounter.    Past Medical History  Madelyn has a past medical history of Allergic bronchopulmonary aspergillosis (CMS/formerly [facility]) (04/02/2018), Allergic rhinitis (04/02/2018), Asthma, Atrial flutter with rapid ventricular response (CMS/formerly [facility]) (02/21/2023), Atrial flutter with rapid ventricular response (CMS/HCC) (2/21/2023), Calcification of aorta (CMS/formerly [facility]) (06/29/2020), Chronic obstructive pulmonary disease (CMS/formerly [facility]) (04/02/2018), GERD (gastroesophageal reflux disease) (06/08/2019), Insomnia (04/02/2018), Lumbar spinal stenosis (04/02/2018), Macular degeneration disease, Obstructive sleep apnea syndrome (04/02/2018), Orthostatic hypotension (10/23/2023), Osteoporosis (04/02/2018), Primary hypertension (04/02/2018), Primary hypertension (4/2/2018), Pulmonary embolism (CMS/formerly [facility]) (06/09/2019), SVT (supraventricular tachycardia), and Vertebral compression fracture (CMS/formerly [facility]).    History of Present Illness    Patient is a 89 year-old female transferred from [facility] s/p mechanical fall while carrying gifts while using her Rollator. Imaging reveals left 5-7 rib fractures, small pneumothorax and pleural effusion. Also w/ acute left proximal humerus fracture - NWB in sling. Incidental findings of T12 compression fracture and pelvic fracture - both chronic    PMH = COPD and a. Flutter - on Eliquis        PRIOR LEVEL OF FUNCTION AND LIVING ENVIRONMENT     Prior Level of Function    Flowsheet Row Most Recent Value   Dominant Hand right   Ambulation assistive equipment  [Rollator]   Transferring assistive equipment  [Rollator]   Toileting assistive equipment  [Commode over toilet]  "  Bathing assistive equipment and person  [Shower chair + grab bars]   Dressing assistive person   Eating independent   IADLs assistive person   Communication understands/communicates without difficulty   Prior Level of Function Comment Transfers/ambulates w/ Rollator. Lives alone although has friend/home health aid \"Ella\" who assists w/ daytime IADLs, supervises for bathing and assists w/ dressing. Reports private home health aids who assist during the night. Overall, has around-the-clock assistance. Denies any assistance required for bed mobility, transfers, ambulation or toileting.   Assistive Device Currently Used at Home walker, front-wheeled, other (see comments), grab bar, shower chair, commode, 3-in-1  [rollator]        Prior Living Environment    Flowsheet Row Most Recent Value   People in Home other (see comments)  [Live in [facility]]   Current Living Arrangements home   Living Environment Comment 3-story townhouse w/ 3 DANNY w/ unilateral railing,  first-floor set-up w/ walk-in shower - grab bars and shower chair        VITALS AND PAIN     PT Vitals    Date/Time Observations [facility]   12/22/23 1509 vitals not assessed as pt being discharged [place]      PT Pain    Date/Time Pain Type Side/Orientation Location Rating: Rest Rating: Activity Interventions [facility]   12/22/23 1509 Pain Assessment left flank rib fx site 4 - moderate pain 6 - moderate-severe pain position adjusted [place]           Objective   OBJECTIVE     Start time:  1509  End time:  1526  Session Length: 17 min  Mode of Treatment: individual therapy, physical therapy    General Observations  Patient received  (being txf bed -> chair by PCT). She was no issues or concerns identified by nurse prior to session, agreeable to therapy. daughter present, performed family training    Precautions: brace worn at all times, fall, weight bearing (LUE sling AAT)  Extremity Weight-Bearing Status: left upper extremity  Left UE Weight-Bearing Status: non weight-bearing " (NWB)    Limitations/Impairments: hearing, safety/cognitive      PT Eval and Treat - 12/22/23 1509        Cognition    Orientation Status oriented x 3     Affect/Mental Status WFL     Follows Commands follows two-step commands;over 90% accuracy;repetition of directions required;verbal cues/prompting required     Comment, Cognition Pleasant and cooperative, benefits from repetition of safety cues. Daugther receptive to all education        Bed Mobility    Morgan not tested     Comment OOB throughout        Mobility Belt    Mobility Belt Used for All Out of Bed Activity no     Reason Mobility Belt Not Used medical contraindication     Reason Mobility Belt Not Used rib fx and T/S comp fx        Sit/Stand Transfer    Surface chair with arm rests     Morgan minimum assist (75% or more patient effort);1 person assist     Safety/Cues increased time to complete;technique;sequencing;hand placement;maintaining precautions     Assistive Device other (see comments)     Transfer Comments assist to don LUE sling prior to stand. assist at R axilla for balance and to supplement force production. cues for upright posture. assist for eccentric control to transport chair        Gait Training    Morgan, Gait maximum assist (25-49% patient effort);1 person assist     Safety/Cues technique;sequencing;maintaining precautions     Assistive Device other (see comments)   (R) HHA    Distance in Feet 120 feet     Pattern step-to     Deviations/Abnormal Patterns step length decreased;mundo decreased     Comment (Gait/Stairs) Assist for balance w/ hallway ambulation. Pt performing short distances at close supervision level w/o HHA though reinforced to pt and daughter HHA is safest option at this time.   educated on goal of SPC use for mobility, progressing with home health PT       Stairs Training    Morgan, Stairs not tested     Comment discussed w/ daugther use of RUE on L railing, and side-step step-to pattern. also  educated on proper guarding position/technique, dtr receptive        Balance    Static Sitting Balance WFL;sitting in chair     Dynamic Sitting Balance mild impairment;sitting in chair     Sit to Stand Dynamic Balance mild impairment;supported     Static Standing Balance mild impairment;supported     Dynamic Standing Balance mild impairment;supported     Comment, Balance benefits from HHA for standing balance, though is able to stand brief periods w/ supervision        Impairments/Safety Issues    Impairments Affecting Function balance;cognition;endurance/activity tolerance;pain;strength     Cognitive Impairments, Safety/Performance problem-solving/reasoning;safety precaution follow-through;safety precaution awareness;judgment;insight into deficits/self-awareness;awareness, need for assistance                               Education Documentation  Treatment Plan, taught by Deidre Gerber, PT at 12/22/2023  3:40 PM.  Learner: Family, Patient  Readiness: Acceptance  Method: Explanation  Response: Verbalizes Understanding, Needs Reinforcement  Comment: role of PT, PoC, safety w/ mobility, NWB LUE, HHA, home PT, stair safety, elevation of seated surfaces        Session Outcome  Patient  (in transport chair w/ PCT, being d/c) at end of session, all needs met. Nursing notified about patient's position, patient's performance, and patient's response to therapy/activity.    AM-PAC™ - Mobility (Current Function)     Turning form your back to your side while in flat bed without using bedrails 3 - A Little   Moving from lying on your back to sitting on the side of a flat bed without using bedrails 3 - A Little   Moving to and from a bed to a chair 3 - A Little   Standing up from a chair using your arms 3 - A Little   To walk in a hospital room 3 - A Little   Climbing 3-5 steps with a railing 3 - A Little   AM-PAC™ Mobility Score 18      ASSESSMENT AND PLAN     Progress Summary  Family training session completed.  Pt  requires minAx1 for STS txfs, also minAx1 for ambulation w/ (R)HHA in halls. Pt able to ambulate for brief periods at close supervision level but is most steady w/ HHA. Educated pt and daugther on importance of assist for all mobility and ADLs from live-in home health aide upon d/c. Pt and daughter receptive to all safety education. Will continue to follow to maximize functional independence and safety given rib fx and NWB LUE. Rec home w/ 24/7 assist and HHPT    Patient/Family Therapy Goals Statement: go home    PT Plan    Flowsheet Row Most Recent Value   Rehab Potential good, to achieve stated therapy goals at 12/22/2023 1509   Therapy Frequency 5 times/wk at 12/22/2023 1509   Planned Therapy Interventions balance training, bed mobility training, gait training, ROM (range of motion), stair training, strengthening, stretching, transfer training, patient/family education, postural re-education, home exercise program at 12/22/2023 1509          PT Discharge Recommendations    Flowsheet Row Most Recent Value   PT Recommended Discharge Disposition home with assistance, home with home health  [has 24/7 live-in aides] at 12/22/2023 1509   Anticipated Equipment Needs if Discharged Home (PT) none at 12/22/2023 1509               PT Goals    Flowsheet Row Most Recent Value   Bed Mobility Goal 1    Activity/Assistive Device bed mobility activities, all at 12/20/2023 1103   [place] modified independence at 12/20/2023 1103   Time Frame short-term goal (STG), by discharge at 12/20/2023 1103   Progress/Outcome goal ongoing at 12/20/2023 1103   Transfer Goal 1    Activity/Assistive Device all transfers at 12/20/2023 1103   [place] modified independence at 12/20/2023 1103   Time Frame short-term goal (STG), by discharge at 12/20/2023 1103   Progress/Outcome goal ongoing at 12/20/2023 1103   Gait Training Goal 1    Activity/Assistive Device gait (walking locomotion), assistive device use, cane, straight at 12/20/2023  1103   [place] supervision required at 12/20/2023 1103   Distance 100 at 12/20/2023 1103   Time Frame short-term goal (STG), by discharge at 12/20/2023 1103   Progress/Outcome goal ongoing at 12/20/2023 1103

## 2023-12-22 NOTE — DISCHARGE SUMMARY
TRAUMA SURGERY:  DISCHARGE SUMMARY     PATIENT NAME:  Madelyn Ruiz YOB: 1930    AGE:  89 y.o.  GENDER: female   MRN:  [id number]  PATIENT #: [id number]     Admission date: 12/19/2023    Discharge date: 12/22/2023     Admitting Physician: Ailyn Hector MD     Discharge Physician:  MICA Villa Ma; Lul Stern MD     Primary Discharge Diagnoses:   1.  L 5th-7th rib fractures  2.  L humerus fracture  3.  Trace L PTX    Secondary Discharge Diagnoses:    Patient Active Problem List   Diagnosis   • Allergic bronchopulmonary aspergillosis (CMS/[facility])   • Asthma   • Pulmonary emphysema (CMS/[facility])   • Primary hypertension   • Insomnia   • Lumbar spinal stenosis   • Obstructive sleep apnea syndrome   • Osteoporosis   • Medicare annual wellness visit, subsequent   • GERD (gastroesophageal reflux disease)   • Personal history of pulmonary embolism   • Calcification of aorta (CMS/[facility])   • Nonintractable headache   • Chronic pain of left knee   • Word finding difficulty   • Bilateral hip pain   • Hx of long term use of blood thinners   • Edema   • Sacral fracture, closed (CMS/[facility])   • A-fib (CMS/[facility])   • Atrial flutter (CMS/[facility])   • Fall   • COPD (chronic obstructive pulmonary disease) (CMS/[facility])   • Anemia   • Closed head injury, initial encounter   • Orthostatic hypotension   • Palliative care by specialist   • Debility   • Exudative age-related macular degeneration, right eye, with active choroidal neovascularization (CMS/[facility])   • Fall at home, initial encounter   • Ribs, multiple fractures   • Humerus fracture     Consults:     IP CONSULT TO CASE MANAGEMENT  IP CONSULT TO SOCIAL WORK  IP CONSULT TO ORTHOPEDIC SURGERY  IP CONSULT TO PHYSICAL MEDICINE REHAB  IP CONSULT TO SPIRITUAL HEALTH AND EDUCATION     Hospital Course: 89 y.o. female who was [facility] above[facility]listed [facility].    Ortho eval, non-op, NWB LUE in sling.    PT/OT/PMR eval & recommended  home health.      Discharge Medications:     Medication List      START taking these medications    acetaminophen 500 mg tablet  Commonly known as: TYLENOL  Take 2 tablets (1,000 mg total) by mouth every 6 (six) hours.  Dose: 1,000 mg     melatonin ODT  Take 2 tablets (6 mg total) by mouth nightly.  Dose: 6 mg        CHANGE how you take these medications    lidocaine 5 % patch  Commonly known as: LIDODERM  Place 1 patch on the skin daily. Remove & discard patch within 12 hours or as directed by prescriber.  Dose: 1 patch     traMADoL 50 mg tablet  Commonly known as: ULTRAM  Take 1 tablet (50 mg total) by mouth every 8 (eight) hours as needed for pain. Pt also takes it as needed at lunch time.  Dose: 50 mg  What changed:   · when to take this  · reasons to take this  · Another medication with the same name was removed. Continue taking this medication, and follow the directions you see here.        CONTINUE taking these medications    apixaban 2.5 mg tablet  Commonly known as: ELIQUIS  Take 2.5 mg by mouth 2 (two) times a day.  Dose: 2.5 mg     azithromycin 250 mg tablet  Commonly known as: ZITHROMAX  Take 1 tablet (250 mg total) by mouth 3 (three) times a week (Mon, Wed, Fri).  Dose: 250 mg     calcium carbonate 600 mg calcium (1,500 mg) tablet  Take 1 tablet by mouth daily.  Dose: 1 tablet     CENTRUM ORAL  Take 1 tablet by mouth daily.  Dose: 1 tablet     cholecalciferol (vitamin D3) 25 mcg (1,000 unit) capsule  Take 1 tablet by mouth daily.  Dose: 1 tablet     diltiazem  mg 24 hr tablet  Commonly known as: CARDIZEM LA  Take 360 mg by mouth daily.  Dose: 360 mg     docusate sodium 100 mg capsule  Commonly known as: COLACE  Take 100 mg by mouth 2 (two) times a day as needed for constipation.  Dose: 100 mg     DULoxetine 60 mg capsule  Commonly known as: CYMBALTA  Take 60 mg by mouth at bedtime.  Dose: 60 mg     fexofenadine 180 mg tablet  Commonly known as: ALLEGRA  Take 180 mg by mouth nightly.  Dose: 180  mg     furosemide 20 mg tablet  Commonly known as: LASIX  Take 20 mg by mouth 4 (four) times a week (Sun, Tue, Thu, Sat). 4 times weekly: Sun/Tues/Thurs/Sat  Dose: 20 mg     metoprolol tartrate 25 mg tablet  Commonly known as: LOPRESSOR  Take 1 tablet (25 mg total) by mouth 2 (two) times a day.  Dose: 25 mg     midodrine 5 mg tablet  Commonly known as: PROAMATINE  Take 1 tablet (5 mg total) by mouth 3 (three) times a day before meals.  Dose: 5 mg     * pregabalin 50 mg capsule  Commonly known as: LYRICA  Take 50 mg by mouth 2 (two) times a day. Morning and Evening  Dose: 50 mg     * pregabalin 25 mg capsule  Commonly known as: LYRICA  Take 25 mg by mouth daily with lunch. PRN  Dose: 25 mg     rosuvastatin 5 mg tablet  Commonly known as: CRESTOR  Take 5 mg by mouth 3 (three) times a week (Mon, Wed, Fri). MWF  Dose: 5 mg     SPIRIVA RESPIMAT 2.5 mcg/actuation mist inhaler  Inhale 2 puffs daily.  Dose: 2 puff  Generic drug: tiotropium bromide     SYMBICORT 80-4.5 mcg/actuation inhaler  Inhale 2 puffs 2 (two) times a day.  Dose: 2 puff  Generic drug: budesonide-formoteroL     SYSTANE (PF) OPHT  Administer 1 drop into affected eye(s) as needed (dry eyes).  Dose: 1 drop         * This list has 2 medication(s) that are the same as other medications prescribed for you. Read the directions carefully, and ask your doctor or other care provider to review them with you.                 Home Medications:  •  apixaban, Take 2.5 mg by mouth 2 (two) times a day.  •  azithromycin, Take 1 tablet (250 mg total) by mouth 3 (three) times a week (Mon, Wed, Fri).  •  calcium carbonate, Take 1 tablet by mouth daily.  •  cholecalciferol (vitamin D3), Take 1 tablet by mouth daily.  •  diltiazem LA, Take 360 mg by mouth daily.  •  DULoxetine, Take 60 mg by mouth at bedtime.   •  fexofenadine, Take 180 mg by mouth nightly.  •  FOLIC ACID/MULTIVIT,IRON, (CENTRUM ORAL), Take 1 tablet by mouth daily.  •  furosemide, Take 20 mg by mouth 4  (four) times a week (Sun, Tue, Thu, Sat). 4 times weekly: Sun/Tues/Thurs/Sat  •  metoprolol tartrate, Take 1 tablet (25 mg total) by mouth 2 (two) times a day.  •  midodrine, Take 1 tablet (5 mg total) by mouth 3 (three) times a day before meals.  •  pregabalin, Take 50 mg by mouth 2 (two) times a day. Morning and Evening  •  rosuvastatin, Take 5 mg by mouth 3 (three) times a week (Mon, Wed, Fri). MWF  •  SPIRIVA RESPIMAT, Inhale 2 puffs daily.  •  SYMBICORT, Inhale 2 puffs 2 (two) times a day.  •  docusate sodium, Take 100 mg by mouth 2 (two) times a day as needed for constipation.  •  lidocaine, Place 1 patch on the skin daily. Remove & discard patch within 12 hours or as directed by prescriber.  •  pregabalin, Take 25 mg by mouth daily with lunch. PRN  •  propylene glycol/peg 400/PF (SYSTANE, PF, OPHT), Administer 1 drop into affected eye(s) as needed (dry eyes).  •  traMADoL, Take 50 mg by mouth daily with lunch. Lunch dose is as needed.  •  [DISCONTINUED] traMADoL, Take 50 mg by mouth 2 (two) times a day. Pt also takes it as needed at lunch time.    Follow-Up Appointments:    · f/u PCP in 1 week   · f/u trauma clinic as needed   · f/u Dr Saavedra as needed       Disposition:   home    The patient's medication instructions, follow-up instructions, activity restrictions, and symptom management were explained to the patient and/or family prior to discharge and patient/family demonstrated a satisfactory understanding.    MICA Sevilla Ma  12/22/2023  4:00 PM

## 2023-12-22 NOTE — PLAN OF CARE
Care Coordination Discharge Plan Note     Discharge Needs Assessment  Concerns to be Addressed: discharge planning, care coordination/care conferences  Current Discharge Risk: physical impairment    Anticipated Discharge Plan  Anticipated Discharge Disposition: home with home health, home with assistance  Type of Home Care Services: home OT, home PT, nursing, attendant        Patient Choice  Offered/Gave Vendor List: yes  Patient's Choice of Community Agency(s): [facility]    Patient and/or patient guardian/advocate was made aware of their right to choose a provider. A list of eligible providers was presented and reviewed with the patient and/or patient guardian/advocate in written and/or verbal form. The list delineates providers in the patient’s desired geographic area who are participating in the Medicare program and/or providers contracted with the patient’s primary insurance. The Medicare list and quality ratings were obtained from the Medicare.gov [medicare.gov] website.    ---------------------------------------------------------------------------------------------------------------------    Interdisciplinary Discharge Plan Review:  Participants:advanced practice provider, patient, physician, , nursing, social work/services, physical therapy, occupational therapy, dietitian/nutrition services    Concerns Comments: Per trauma rounds, Pt is stable for discharge today. Daughter Shelley will be here this afternoon to take Pt home. Pt's live in care will resume about 1pm today. CM will continue to follow for any additional needs.    Discharge Plan:   Disposition/Destination: Home Health Care - [facility] / Home  Discharge Facility:    Community Resources:      Discharge Transportation:  Is Out of Hospital DNR needed at Discharge: no  Does patient need discharge transport? No

## 2023-12-22 NOTE — PROGRESS NOTES
I personally spoke with patient's daughter, updated them on all elements of patient care, and all questions were answered.    EMERGENCY CONTACT:   Extended Emergency Contact Information  Primary Emergency Contact: Shelley Bingham  Address: [address] [place] [address] [place]  Home Phone: [phone]  Mobile Phone: [phone]  Relation: Daughter  Secondary Emergency Contact: Venkat Ruiz   [place]  Mobile Phone: [phone]  Relation: Son

## 2023-12-26 ENCOUNTER — PATIENT OUTREACH (OUTPATIENT)
Dept: CASE MANAGEMENT | Facility: CLINIC | Age: 88
End: 2023-12-26
Payer: MEDICARE

## 2023-12-26 ASSESSMENT — ENCOUNTER SYMPTOMS
FATIGUE: 1
FREQUENCY: 1
ARTHRALGIAS: 1
MYALGIAS: 1
RESPIRATORY NEGATIVE: 1
WEAKNESS: 1
BACK PAIN: 1
CARDIOVASCULAR NEGATIVE: 1

## 2023-12-26 NOTE — Clinical Note
Cassius Turcios,  I covered this DC WILBER drop. I see that you have been following patient.  Let me know if you want me to continue to follow; or if you want to pick back up from here.  If so, take my name off care team.  I have set next contact for 1/4.

## 2023-12-26 NOTE — PROGRESS NOTES
NAME: Madelyn Ruiz    MRN: [id number]    YOB: 1930    Event Review:    Initial TCM Patient Outreach Date: 12/26/23    Assessment completed with: Patient     Discharge Diagnosis: fall at home. Fracture left ribs 5-7, left humerus.    Patient readmitted in the last 30 days: No  Discharging Facility: [facility]  Date of Last Admission: 12/19/23  Date of Last Discharge: 12/22/23    Patient's perception of their health status since discharge: Same  HPI:  Spoke with patient following her [place] [facility]  Patient presented to the hospital following a mechanical fall;   Xray showed left rib fractures 5-7, humeral head fracture, left; small PTX and bilateral pleural effusion.  /123.  PMH includes atrial flutter on anticoagulation, PE, COPD chronic low back pain.  Patient had consults to Orthopedic surgery, Physical Medicine and Rehab.  Patient was discharged to home with 24 hour HHA and referral to [facility] for SN.PT/OT.; home care RN has been to the home. Patient reports ongoing  left shoulder pain and rib pain rated 5-6/10 . Patient is controlling pain with tramadol, Tylenol, lidoderm patch  Patient is NWB LUE;  She has the assist of HHA and family for OOB to the .   Patient reports urinary frequency and urgency and hesitance to drink a lot of water; stressed importance to stay hydrated. . Patient defers PCP TCM visit at this time to previously scheduled PCP follow up 2/14. Patient reports her daughter is coordinating appointments; and that she has phoned PCP and will schedule with Dr. Saavedra per AVS.      Review of Systems   Constitutional: Positive for fatigue.   Respiratory: Negative.    Cardiovascular: Negative.    Genitourinary: Positive for frequency.   Musculoskeletal: Positive for arthralgias, back pain, gait problem and myalgias.   Neurological: Positive for weakness.   Medication Review:  Medication Review: No  If No, please explain: Pt reviewed with Visiting  Nurse  Reported by: Patient  Any new medications prescribed at discharge?: Yes  Is the patient having any side effects they believe may be caused by any medication additions or changes?: No  New prescriptions filled?: Yes     Do you have enough of your regularly prescribed medications?: Yes     Medication adherence problem?: No  Was a medication discrepancy indentified?: No     Nursing Interventions: No intervention needed  Reconciled the current and discharge medications: Yes  Reviewed AVS (Discharge Instructions)?: Yes  Acute Pain:  Acute pain: Yes  Limitation of routine activities due to acute pain?: yes  Acute pain timing: constant  Location of acute pain: ribs and shoulder  Chronic Pain:  Chronic pain: No   Diet/Nutrition:  Type of Diet: Regular  Home Care Services:  Home Care Agency: [facility]  Type of Home Care Services: Home PT, Home OT, Home Nursing  Home Care Interventions: No intervention required   Post-Discharge Durable Medical Equipment::  Durable Medical Equipment: Rollator, Shower/tub seat, Manual wheelchair  Does patient's condition require a scale?: No     Oxygen Use: No  DME Interventions: No intervention required  Home Management:  Living Arrangement: Alone, Paid Caregiver  Support System:: Paid Caregiver, Child/Children  Type of Residence: [facility] Monitoring: Blood Pressure  Any patient reported falls in the last 3 months?: Yes  Orthodox or spiritual beliefs that impact treatment?: No  Appointment Scheduling:  PCP appointment scheduled: No   Patient Scheduling Dispositions: Pt prefers to see specialist   Follow-Ups:   Relevant Labs & Tests: xray huerus and ribs  Relevant Specialist Follow-ups: Trauma  Interventions/ Care Coordination:  General Education: Falls/Home safety     Reviewed signs/symptoms of worsening condition or complication that necessitate a call to the Physician's office.  Educated patient on access to care.  RN phone number given for future care management.    Venus  TIERRA Everett

## 2023-12-28 ENCOUNTER — TELEPHONE (OUTPATIENT)
Dept: PRIMARY CARE | Facility: CLINIC | Age: 88
End: 2023-12-28
Payer: MEDICARE

## 2023-12-28 NOTE — TELEPHONE ENCOUNTER
ED discharge follow up  Pt to the ED with left shoulder pain, after a mechanical fall, landing on her left shoulder; found to have multiple closed left rib fractures (5th-7th), a traumatic pneumothorax, pleural effusion and a closed displaced fracture of proximal end of her left humerus. Pt was transferred to [facility], where she was admitted to the SICU; then pt transferred out of the SICU on 12/20/23 and then was discharged from [facility] on 12/22/23.    Pt has a hospital follow up scheduled, on 1/2/24 with Dr Linares, so no call required.  
Simple: Patient demonstrates quick and easy understanding

## 2023-12-29 ENCOUNTER — TELEPHONE (OUTPATIENT)
Dept: SCHEDULING | Facility: CLINIC | Age: 88
End: 2023-12-29
Payer: MEDICARE

## 2023-12-29 NOTE — TELEPHONE ENCOUNTER
Pt of Dr. Valentino w/hx allergic bronchopulmonary aspergillosis, COPD, HTN, hyperlipidemia, paroxysmal atrial flutter, left SCA aneurysm, GERD, PE (2019), recurrent falls and mild STACIA recently d/c'd on 12/22 from [facility] following fall w/hip fracture.    Call from TANYA Lau w/[facility] to clarify pt medication Diltiazem. Per hospital d/c directions, pt to be taking:    Diltiazem 360mg daily    Pt has 300mg Diltiazem tabs in home.    I reviewed OV note from Dr. Valentino (12/8) which documents that back in October, diltiazem was increased to 360mg during an admission w/rapid afib. Patient has had multiple rehab and SNF stays.    During this most recent admission, MAR notes that pt was receiving Diltiazem 180mg and 120mg tablets for total dose of 300mg daily.     I advised Judi to have pt continue with the current dose of Diltiazem 300mg that she has in home and if any changes, she will receive callback from our office. Judi can be reached at [phone].     Also asked Judi to notify us if patient has ROSEANNA or elevated HR/BP. Next OV in our office is 3/27/24.    Thank you.

## 2023-12-29 NOTE — TELEPHONE ENCOUNTER
I spoke with Judi- Patient was taking 300 mg the whole time at home. And there was no 360 mg bottle of diltiazem in her home. She was unable to get a clear picture of whether afib was symptomatic or not, but her vital signs were normal today. For now, she will continue on the 300 mg dosing of the medication as it was what she has been taking and what she was given while she was hospitalized recently. There was no noted rapid afib during her most recent hospital admission.

## 2023-12-29 NOTE — TELEPHONE ENCOUNTER
"Pt of Dr. Valentino w/hx allergic bronchopulmonary aspergillosis, COPD, HTN, hyperlipidemia, paroxysmal atrial flutter, left SCA aneurysm, GERD, PE (2019), recurrent falls and mild STACIA recently d/c'd on 12/22 [place] [facility] [place] w/[place] fracture.    I reviewed OV note from Dr. Valentino (12/8) which documents that back in October, diltiazem was increased to 360mg during an admission w/rapid afib. Patient has had multiple rehab and SNF stays.     Most recently, she was admitted to the hospital after mechanical fall with multiple rib and left shoulder fracture. She was taking 360 mg of diltiazem prior to admission. On H&P, diltiazem was listed as 360 mg, but during admission it was changed to 300 mg (all reviewed notes said to \"continue on home dosing of diltiazem\" so there was no specific reason listed for the change in dose). She was then discharged on 360 mg.     I called Judi for further info- no answer, left voicemail asking for return call. I also called patient and her daughter- no answer on either, I left voicemail for patient's daughter to discuss medication. I would like to know a few things:    1. Was patient taking 300 mg at home the whole time or was she was taking 360 mg at home prior to most recent admission.  2. Was patient symptomatic with the ROSEANNA or if it was just found on telemetry and treated.   3. I would like to know what her VS were today.     Will await call back, but they are continuing on the 300 mg for now per previous message.   "

## 2024-01-02 ENCOUNTER — TELEPHONE (OUTPATIENT)
Dept: PRIMARY CARE | Facility: CLINIC | Age: 89
End: 2024-01-02

## 2024-01-02 NOTE — TELEPHONE ENCOUNTER
Evaluated yesterday for home care PT and will be seen in the home twice a week for 3 weeks. This is just an FYI.

## 2024-01-03 ENCOUNTER — PATIENT OUTREACH (OUTPATIENT)
Dept: CASE MANAGEMENT | Facility: CLINIC | Age: 89
End: 2024-01-03
Payer: MEDICARE

## 2024-01-03 PROCEDURE — G0179 MD RECERTIFICATION HHA PT: HCPCS | Performed by: FAMILY MEDICINE

## 2024-01-03 NOTE — PROGRESS NOTES
TCM f/u fracture left 5th-7th ribs and left humerus. Spoke with pt. Pt says she 24 hour caregivers. Pt says she needs assistance walking and can't use the rollator due to her left humerus fracture. Pain has improved. She is more frustrated she can't use her left hand. She is hoping ortho will give her clearance to use her rollator. Reinforced falls/home safety. Pt verbalized understanding.     PCP: 1/25  Dr. Saavedra (Ortho): 1/31        Will f/u one week.     Karolina Hauser, AURORAN, RN  [phone]

## 2024-01-09 ENCOUNTER — PATIENT OUTREACH (OUTPATIENT)
Dept: CASE MANAGEMENT | Facility: CLINIC | Age: 89
End: 2024-01-09
Payer: MEDICARE

## 2024-01-09 RX ORDER — METOPROLOL TARTRATE 25 MG/1
25 TABLET, FILM COATED ORAL 3 TIMES DAILY
Qty: 270 TABLET | Refills: 1 | OUTPATIENT
Start: 2024-01-09

## 2024-01-09 RX ORDER — DILTIAZEM HYDROCHLORIDE 300 MG/1
CAPSULE, EXTENDED RELEASE ORAL DAILY
Qty: 30 CAPSULE | Refills: 0 | OUTPATIENT
Start: 2024-01-09

## 2024-01-09 NOTE — PROGRESS NOTES
TCM f/u fracture left 5th-7th ribs and left humerus. Spoke with pt. Says she is doing well. She is hoping to be able to walk again. Ribs are doing ok. Left arm is healing. She has trouble with the sling because it is too big. Pt says she tries to hold her arm close to her chest instead. VN/PT think she is doing ok and holding her arm in the correct position. Pt is hoping Ortho will let her use her rollator.     PCP: 1/25  Dr. Saavedra (Ortho): 1/31    Will f/u one week.     Karolina Hauser, AURORAN, RN  [phone]

## 2024-01-09 NOTE — TELEPHONE ENCOUNTER
Medicine Refill Request    Last Office Visit: 11/22/2023   Last Consult Visit: 11/12/2021  Last Telemedicine Visit: 8/3/2020 Henry Linares MD    Next Appointment: 1/25/2024      Current Outpatient Medications:   •  acetaminophen (TYLENOL) 500 mg tablet, Take 2 tablets (1,000 mg total) by mouth every 6 (six) hours., Disp: , Rfl:   •  apixaban (ELIQUIS) 2.5 mg tablet, Take 2.5 mg by mouth 2 (two) times a day., Disp: , Rfl:   •  azithromycin (ZITHROMAX) 250 mg tablet, Take 1 tablet (250 mg total) by mouth 3 (three) times a week (Mon, Wed, Fri)., Disp: 12 tablet, Rfl: 5  •  calcium carbonate 600 mg calcium (1,500 mg) tablet, Take 1 tablet by mouth daily., Disp: , Rfl:   •  cholecalciferol, vitamin D3, 25 mcg (1,000 unit) capsule, Take 1 tablet by mouth daily., Disp: , Rfl:   •  diltiazem LA (CARDIZEM LA) 360 mg 24 hr tablet, Take 360 mg by mouth daily., Disp: , Rfl:   •  docusate sodium (COLACE) 100 mg capsule, Take 100 mg by mouth 2 (two) times a day as needed for constipation., Disp: , Rfl:   •  DULoxetine (CYMBALTA) 60 mg capsule, Take 60 mg by mouth at bedtime. , Disp: , Rfl: 0  •  fexofenadine (ALLEGRA) 180 mg tablet, Take 180 mg by mouth nightly., Disp: , Rfl:   •  FOLIC ACID/MULTIVIT,IRON, (CENTRUM ORAL), Take 1 tablet by mouth daily., Disp: , Rfl:   •  furosemide (LASIX) 20 mg tablet, Take 20 mg by mouth 4 (four) times a week (Sun, Tue, Thu, Sat). 4 times weekly: Sun/Tues/Thurs/Sat, Disp: , Rfl:   •  lidocaine (LIDODERM) 5 % patch, Place 1 patch on the skin daily. Remove & discard patch within 12 hours or as directed by prescriber., Disp: 30 patch, Rfl: 3  •  melatonin ODT, Take 2 tablets (6 mg total) by mouth nightly., Disp: , Rfl:   •  metoprolol tartrate (LOPRESSOR) 25 mg tablet, Take 1 tablet (25 mg total) by mouth 2 (two) times a day., Disp: 180 tablet, Rfl: 3  •  midodrine (PROAMATINE) 5 mg tablet, Take 1 tablet (5 mg total) by mouth 3 (three) times a day before meals., Disp: 270 tablet, Rfl: 3  •   pregabalin (LYRICA) 25 mg capsule, Take 25 mg by mouth daily with lunch. PRN, Disp: , Rfl:   •  pregabalin (LYRICA) 50 mg capsule, Take 50 mg by mouth 2 (two) times a day. Morning and Evening, Disp: , Rfl:   •  propylene glycol/peg 400/PF (SYSTANE, PF, OPHT), Administer 1 drop into affected eye(s) as needed (dry eyes)., Disp: , Rfl:   •  rosuvastatin (CRESTOR) 5 mg tablet, Take 5 mg by mouth 3 (three) times a week (Mon, Wed, Fri). MWF, Disp: , Rfl:   •  SPIRIVA RESPIMAT 2.5 mcg/actuation mist inhaler, Inhale 2 puffs daily., Disp: , Rfl:   •  SYMBICORT 80-4.5 mcg/actuation inhaler, Inhale 2 puffs 2 (two) times a day., Disp: , Rfl:   •  traMADoL (ULTRAM) 50 mg tablet, Take 1 tablet (50 mg total) by mouth every 8 (eight) hours as needed for pain. Pt also takes it as needed at lunch time., Disp: , Rfl:       BP Readings from Last 3 Encounters:   12/22/23 (!) 179/86   12/19/23 (!) 139/3   12/08/23 132/62       Recent Lab results:  Lab Results   Component Value Date    CHOL 227 (H) 01/27/2023   ,   Lab Results   Component Value Date    HDL 62 01/27/2023   ,   Lab Results   Component Value Date    LDLCALC 141 (H) 01/27/2023   ,   Lab Results   Component Value Date    TRIG 120 01/27/2023        Lab Results   Component Value Date    GLUCOSE 88 12/21/2023   ,   Lab Results   Component Value Date    HGBA1C 5.6 11/18/2015         Lab Results   Component Value Date    CREATININE 0.7 12/21/2023       Lab Results   Component Value Date    TSH 3.63 10/19/2023           Lab Results   Component Value Date    HGBA1C 5.6 11/18/2015

## 2024-01-12 NOTE — TELEPHONE ENCOUNTER
Medicine Refill Request    Pt's daughter Shelley puente requests a refill of             apixaban (ELIQUIS) 2.5 mg tablet        Last Office Visit: Visit date not found   Last Consult Visit: Visit date not found  Last Telemedicine Visit: Visit date not found    Next Appointment: Visit date not found      Current Outpatient Medications:   •  acetaminophen (TYLENOL) 500 mg tablet, Take 2 tablets (1,000 mg total) by mouth every 6 (six) hours., Disp: , Rfl:   •  apixaban (ELIQUIS) 2.5 mg tablet, Take 2.5 mg by mouth 2 (two) times a day., Disp: , Rfl:   •  azithromycin (ZITHROMAX) 250 mg tablet, Take 1 tablet (250 mg total) by mouth 3 (three) times a week (Mon, Wed, Fri)., Disp: 12 tablet, Rfl: 5  •  calcium carbonate 600 mg calcium (1,500 mg) tablet, Take 1 tablet by mouth daily., Disp: , Rfl:   •  cholecalciferol, vitamin D3, 25 mcg (1,000 unit) capsule, Take 1 tablet by mouth daily., Disp: , Rfl:   •  diltiazem LA (CARDIZEM LA) 360 mg 24 hr tablet, Take 360 mg by mouth daily., Disp: , Rfl:   •  docusate sodium (COLACE) 100 mg capsule, Take 100 mg by mouth 2 (two) times a day as needed for constipation., Disp: , Rfl:   •  DULoxetine (CYMBALTA) 60 mg capsule, Take 60 mg by mouth at bedtime. , Disp: , Rfl: 0  •  fexofenadine (ALLEGRA) 180 mg tablet, Take 180 mg by mouth nightly., Disp: , Rfl:   •  FOLIC ACID/MULTIVIT,IRON, (CENTRUM ORAL), Take 1 tablet by mouth daily., Disp: , Rfl:   •  furosemide (LASIX) 20 mg tablet, Take 20 mg by mouth 4 (four) times a week (Sun, Tue, Thu, Sat). 4 times weekly: Sun/Tues/Thurs/Sat, Disp: , Rfl:   •  lidocaine (LIDODERM) 5 % patch, Place 1 patch on the skin daily. Remove & discard patch within 12 hours or as directed by prescriber., Disp: 30 patch, Rfl: 3  •  melatonin ODT, Take 2 tablets (6 mg total) by mouth nightly., Disp: , Rfl:   •  metoprolol tartrate (LOPRESSOR) 25 mg tablet, Take 1 tablet (25 mg total) by mouth 2 (two) times a day., Disp: 180 tablet, Rfl: 3  •  midodrine  (PROAMATINE) 5 mg tablet, Take 1 tablet (5 mg total) by mouth 3 (three) times a day before meals., Disp: 270 tablet, Rfl: 3  •  pregabalin (LYRICA) 25 mg capsule, Take 25 mg by mouth daily with lunch. PRN, Disp: , Rfl:   •  pregabalin (LYRICA) 50 mg capsule, Take 50 mg by mouth 2 (two) times a day. Morning and Evening, Disp: , Rfl:   •  propylene glycol/peg 400/PF (SYSTANE, PF, OPHT), Administer 1 drop into affected eye(s) as needed (dry eyes)., Disp: , Rfl:   •  rosuvastatin (CRESTOR) 5 mg tablet, Take 5 mg by mouth 3 (three) times a week (Mon, Wed, Fri). MWF, Disp: , Rfl:   •  SPIRIVA RESPIMAT 2.5 mcg/actuation mist inhaler, Inhale 2 puffs daily., Disp: , Rfl:   •  SYMBICORT 80-4.5 mcg/actuation inhaler, Inhale 2 puffs 2 (two) times a day., Disp: , Rfl:   •  traMADoL (ULTRAM) 50 mg tablet, Take 1 tablet (50 mg total) by mouth every 8 (eight) hours as needed for pain. Pt also takes it as needed at lunch time., Disp: , Rfl:       BP Readings from Last 3 Encounters:   12/22/23 (!) 179/86   12/19/23 (!) 139/3   12/08/23 132/62       Recent Lab results:  Lab Results   Component Value Date    CHOL 227 (H) 01/27/2023   ,   Lab Results   Component Value Date    HDL 62 01/27/2023   ,   Lab Results   Component Value Date    LDLCALC 141 (H) 01/27/2023   ,   Lab Results   Component Value Date    TRIG 120 01/27/2023        Lab Results   Component Value Date    GLUCOSE 88 12/21/2023   ,   Lab Results   Component Value Date    HGBA1C 5.6 11/18/2015         Lab Results   Component Value Date    CREATININE 0.7 12/21/2023       Lab Results   Component Value Date    TSH 3.63 10/19/2023           Lab Results   Component Value Date    HGBA1C 5.6 11/18/2015

## 2024-01-16 ENCOUNTER — PATIENT OUTREACH (OUTPATIENT)
Dept: CASE MANAGEMENT | Facility: CLINIC | Age: 89
End: 2024-01-16
Payer: MEDICARE

## 2024-01-16 NOTE — PROGRESS NOTES
TCM f/u fracture left 5th-7th ribs and left humerus fracture. Spoke with pt. Pt says she is doing ok. Pt is able to get around using her walker being careful with her left arm. VN called ortho to make sure it was ok that she uses her walker. She no longer needs to wear the sling. Pt has 24 hour aides helping her.     PCP: 1/25  Dr. Saavedra (Ortho): 1/31    Will f/u one week.     Karolina Hauser, AURORAN, RN  [phone]

## 2024-01-23 ENCOUNTER — PATIENT OUTREACH (OUTPATIENT)
Dept: CASE MANAGEMENT | Facility: CLINIC | Age: 89
End: 2024-01-23
Payer: MEDICARE

## 2024-01-23 NOTE — PROGRESS NOTES
TCM f/u fracture left 5th-7th ribs and left humerus fracture. No answer no machine. Will f/u one week.     AURORA WalkerN, RN  [phone]

## 2024-01-25 ENCOUNTER — OFFICE VISIT (OUTPATIENT)
Dept: PRIMARY CARE | Facility: CLINIC | Age: 89
End: 2024-01-25
Payer: MEDICARE

## 2024-01-25 VITALS
HEIGHT: 61 IN | BODY MASS INDEX: 20.62 KG/M2 | HEART RATE: 138 BPM | RESPIRATION RATE: 16 BRPM | OXYGEN SATURATION: 94 % | WEIGHT: 109.2 LBS | TEMPERATURE: 97.3 F | SYSTOLIC BLOOD PRESSURE: 144 MMHG | DIASTOLIC BLOOD PRESSURE: 88 MMHG

## 2024-01-25 DIAGNOSIS — J43.9 PULMONARY EMPHYSEMA, UNSPECIFIED EMPHYSEMA TYPE (CMS/HCC): Chronic | ICD-10-CM

## 2024-01-25 DIAGNOSIS — B44.81 ALLERGIC BRONCHOPULMONARY ASPERGILLOSIS (CMS/HCC): Chronic | ICD-10-CM

## 2024-01-25 DIAGNOSIS — H35.3211 EXUDATIVE AGE-RELATED MACULAR DEGENERATION, RIGHT EYE, WITH ACTIVE CHOROIDAL NEOVASCULARIZATION (CMS/HCC): ICD-10-CM

## 2024-01-25 DIAGNOSIS — I70.0 CALCIFICATION OF AORTA (CMS/HCC): Chronic | ICD-10-CM

## 2024-01-25 DIAGNOSIS — J44.9 CHRONIC OBSTRUCTIVE PULMONARY DISEASE, UNSPECIFIED COPD TYPE (CMS/HCC): ICD-10-CM

## 2024-01-25 DIAGNOSIS — S42.295D OTHER CLOSED NONDISPLACED FRACTURE OF PROXIMAL END OF LEFT HUMERUS WITH ROUTINE HEALING, SUBSEQUENT ENCOUNTER: Primary | ICD-10-CM

## 2024-01-25 DIAGNOSIS — S22.42XD CLOSED FRACTURE OF MULTIPLE RIBS OF LEFT SIDE WITH ROUTINE HEALING, SUBSEQUENT ENCOUNTER: ICD-10-CM

## 2024-01-25 DIAGNOSIS — I48.0 PAROXYSMAL ATRIAL FIBRILLATION (CMS/HCC): Chronic | ICD-10-CM

## 2024-01-25 PROBLEM — I48.92 ATRIAL FLUTTER (CMS/HCC): Status: RESOLVED | Noted: 2023-10-19 | Resolved: 2024-01-25

## 2024-01-25 PROBLEM — R60.9 EDEMA: Status: RESOLVED | Noted: 2023-08-08 | Resolved: 2024-01-25

## 2024-01-25 PROBLEM — S32.10XA SACRAL FRACTURE, CLOSED (CMS/HCC): Status: RESOLVED | Noted: 2023-08-21 | Resolved: 2024-01-25

## 2024-01-25 PROBLEM — R51.9 NONINTRACTABLE HEADACHE: Status: RESOLVED | Noted: 2021-06-04 | Resolved: 2024-01-25

## 2024-01-25 PROBLEM — I48.91 A-FIB (CMS/HCC): Chronic | Status: ACTIVE | Noted: 2023-08-22

## 2024-01-25 PROCEDURE — 99214 OFFICE O/P EST MOD 30 MIN: CPT | Performed by: FAMILY MEDICINE

## 2024-01-25 RX ORDER — AZITHROMYCIN 250 MG/1
250 TABLET, FILM COATED ORAL 3 TIMES WEEKLY
Qty: 12 TABLET | Refills: 5 | Status: SHIPPED | OUTPATIENT
Start: 2024-01-26 | End: 2024-09-13

## 2024-01-25 ASSESSMENT — ENCOUNTER SYMPTOMS
FEVER: 0
SHORTNESS OF BREATH: 0
ABDOMINAL PAIN: 0
CHILLS: 0
NAUSEA: 0
COUGH: 0
DIARRHEA: 0
VOMITING: 0
PALPITATIONS: 0
FREQUENCY: 0
DYSURIA: 0
CHEST TIGHTNESS: 0
WHEEZING: 0

## 2024-01-25 NOTE — PATIENT INSTRUCTIONS
Problem List Items Addressed This Visit       A-fib (CMS/HCC) (Chronic)     Stable.         Allergic bronchopulmonary aspergillosis (CMS/HCC) (Chronic)     Continue with current medications and follow up with pulmonology          Calcification of aorta (CMS/HCC) (Chronic)    COPD (chronic obstructive pulmonary disease) (CMS/HCC) (Chronic)    Pulmonary emphysema (CMS/HCC) (Chronic)    Exudative age-related macular degeneration, right eye, with active choroidal neovascularization (CMS/HCC)    Humerus fracture - Primary     Appears to be healing. Await new xrays and follow up with orthopedics.         Ribs, multiple fractures     Appears to be healed and patient asymptomatic. Await follow up with orthopedics and new xray.         Complication of Admission: Admission (12/19/2023)

## 2024-01-30 ENCOUNTER — PATIENT OUTREACH (OUTPATIENT)
Dept: CASE MANAGEMENT | Facility: CLINIC | Age: 89
End: 2024-01-30
Payer: MEDICARE

## 2024-01-30 DIAGNOSIS — S42.295D OTHER CLOSED NONDISPLACED FRACTURE OF PROXIMAL END OF LEFT HUMERUS WITH ROUTINE HEALING, SUBSEQUENT ENCOUNTER: Primary | ICD-10-CM

## 2024-01-30 NOTE — PROGRESS NOTES
TCM f/u fracture left 5th-7th ribs and left humerus fracture. 2nd LMOM. Will close to CM.     RUTH ANN Walker, RN  [phone]

## 2024-01-31 ENCOUNTER — OFFICE VISIT (OUTPATIENT)
Dept: ORTHOPEDICS | Facility: CLINIC | Age: 89
End: 2024-01-31
Payer: MEDICARE

## 2024-01-31 ENCOUNTER — HOSPITAL ENCOUNTER (OUTPATIENT)
Dept: RADIOLOGY | Facility: HOSPITAL | Age: 89
Discharge: HOME | End: 2024-01-31
Attending: PHYSICIAN ASSISTANT
Payer: MEDICARE

## 2024-01-31 DIAGNOSIS — S42.295A OTHER CLOSED NONDISPLACED FRACTURE OF PROXIMAL END OF LEFT HUMERUS, INITIAL ENCOUNTER: Primary | ICD-10-CM

## 2024-01-31 DIAGNOSIS — S42.295D OTHER CLOSED NONDISPLACED FRACTURE OF PROXIMAL END OF LEFT HUMERUS WITH ROUTINE HEALING, SUBSEQUENT ENCOUNTER: ICD-10-CM

## 2024-01-31 PROCEDURE — 99024 POSTOP FOLLOW-UP VISIT: CPT | Performed by: ORTHOPAEDIC SURGERY

## 2024-01-31 PROCEDURE — 73030 X-RAY EXAM OF SHOULDER: CPT | Mod: LT

## 2024-02-01 NOTE — PROGRESS NOTES
Madelyn Ruiz is a 89 y.o. female who is 6 weeks status post nonoperative treatment of Left proximal humerus. Minimal pain of the shoulder at rest or with ROM.    Physical Exam:    Left Upper Extremity: nontender to palpation  ROM: FF: 120 Abd: 120  +rad/ain/pin/uln motor  Senation intact to light touch rad/med/uln  Cap refill brisk    Imaging:  Xray of Left Shoulder was personally reviewed. My findings are:  fracture in good alignment. interval callus formation present    Assessment/Plan:  Weight bearing as tolerated left upper extremity   PT Rx  Fu 8 weeks if no improvement

## 2024-02-07 RX ORDER — DILTIAZEM HYDROCHLORIDE 300 MG/1
CAPSULE, EXTENDED RELEASE ORAL DAILY
Qty: 30 CAPSULE | Refills: 0 | OUTPATIENT
Start: 2024-02-07

## 2024-02-07 RX ORDER — METOPROLOL TARTRATE 25 MG/1
25 TABLET, FILM COATED ORAL 3 TIMES DAILY
Qty: 270 TABLET | Refills: 0 | OUTPATIENT
Start: 2024-02-07

## 2024-02-09 ENCOUNTER — PATIENT OUTREACH (OUTPATIENT)
Dept: CASE MANAGEMENT | Facility: CLINIC | Age: 89
End: 2024-02-09
Payer: MEDICARE

## 2024-02-09 NOTE — PROGRESS NOTES
Complex care follow up call made:  Per Josiah patient was discharged from home care.  Spoke with patient who stated that she was discharged from home care and that she will be getting another home PT company starting soon.  She reported that she is doing ok. No issues or concerns.  She has a paid caregiver with her who took down my number. Instructed to reach out with any concerns or questions.  She is agreeable to follow up calls. ACM instructed to call into the office for any immediate questions or concerns.  RN will follow up at a later time.    Julita Guzmán RN, BSN  Ambulatory Care Manager  [phone]

## 2024-02-12 ENCOUNTER — TELEPHONE (OUTPATIENT)
Dept: PRIMARY CARE | Facility: CLINIC | Age: 89
End: 2024-02-12
Payer: MEDICARE

## 2024-02-12 NOTE — TELEPHONE ENCOUNTER
· Spoke with carlo HERNANDES required for lidocaine patches she said she will send info   · Submitted CMM Received approval 1-13-24 to 2-11-25   · Spoke with taj @ [facility] informed .

## 2024-02-13 ENCOUNTER — TELEPHONE (OUTPATIENT)
Dept: PRIMARY CARE | Facility: CLINIC | Age: 89
End: 2024-02-13
Payer: MEDICARE

## 2024-02-13 DIAGNOSIS — R26.89 IMBALANCE: ICD-10-CM

## 2024-02-13 DIAGNOSIS — R29.6 FREQUENT FALLS: Primary | ICD-10-CM

## 2024-02-13 NOTE — TELEPHONE ENCOUNTER
· I faxed the scripts I received the confirmation for both    Bladder non-tender and non-distended. Urine clear yellow. Bladder non-tender and non-distended. Urine clear yellow.

## 2024-02-14 RX ORDER — DILTIAZEM HYDROCHLORIDE EXTENDED-RELEASE TABLETS 360 MG/1
360 TABLET, EXTENDED RELEASE ORAL DAILY
Qty: 90 TABLET | Refills: 3 | Status: SHIPPED | OUTPATIENT
Start: 2024-02-14 | End: 2025-01-31

## 2024-02-14 NOTE — TELEPHONE ENCOUNTER
Tiadylt  300mg (diltiazem HCL ER cap)  prescribed by Dr Valentino 10/2023 with 3 refills    Pts daughter is trying to get this refilled before she goes away  please ecribe this to [facility].    Any questions please call Shelley at   [phone] to discuss. ty    .

## 2024-02-16 ENCOUNTER — PATIENT OUTREACH (OUTPATIENT)
Dept: CASE MANAGEMENT | Facility: CLINIC | Age: 89
End: 2024-02-16
Payer: MEDICARE

## 2024-02-16 NOTE — PROGRESS NOTES
Complex care follow up call: Spoke with patient's daughter who reported that she is doing ok. She is eating well and moving her bowels.  She feels that she is getting stronger. [facility] is scheduled but have not come out. She is like 3 on their list.  She reported that she called the office requesting a refill on her lorazepam. It does not appear to be on her list, but if she does not hear back from someone, instructed to reach out and I will send an in basket.  No other issues or concerns.  ACM instructed to call into the office for any immediate questions or concerns.  RN will follow up at a later time.    Julita Guzmán RN, BSN  Ambulatory Care Manager  [phone]

## 2024-02-27 ENCOUNTER — APPOINTMENT (EMERGENCY)
Dept: RADIOLOGY | Facility: HOSPITAL | Age: 89
End: 2024-02-27
Payer: MEDICARE

## 2024-02-27 ENCOUNTER — HOSPITAL ENCOUNTER (EMERGENCY)
Facility: HOSPITAL | Age: 89
Discharge: HOME | End: 2024-02-27
Attending: EMERGENCY MEDICINE | Admitting: EMERGENCY MEDICINE
Payer: MEDICARE

## 2024-02-27 VITALS
WEIGHT: 102 LBS | SYSTOLIC BLOOD PRESSURE: 152 MMHG | HEART RATE: 62 BPM | DIASTOLIC BLOOD PRESSURE: 111 MMHG | OXYGEN SATURATION: 94 % | BODY MASS INDEX: 20.03 KG/M2 | RESPIRATION RATE: 18 BRPM | HEIGHT: 60 IN | TEMPERATURE: 98.5 F

## 2024-02-27 DIAGNOSIS — S51.019A SKIN TEAR OF ELBOW WITHOUT COMPLICATION, INITIAL ENCOUNTER: ICD-10-CM
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
## 2024-02-27 DIAGNOSIS — S09.90XA HEAD INJURY, INITIAL ENCOUNTER: ICD-10-CM

## 2024-02-27 PROCEDURE — 73080 X-RAY EXAM OF ELBOW: CPT | Mod: LT

## 2024-02-27 PROCEDURE — 73110 X-RAY EXAM OF WRIST: CPT | Mod: LT

## 2024-02-27 PROCEDURE — 99284 EMERGENCY DEPT VISIT MOD MDM: CPT | Mod: 25

## 2024-02-27 PROCEDURE — 70450 CT HEAD/BRAIN W/O DYE: CPT

## 2024-02-27 PROCEDURE — 73564 X-RAY EXAM KNEE 4 OR MORE: CPT | Mod: LT

## 2024-02-27 ASSESSMENT — ENCOUNTER SYMPTOMS
NAUSEA: 0
DIZZINESS: 0
HEADACHES: 0
MYALGIAS: 1
WOUND: 1
VOMITING: 0
ABDOMINAL PAIN: 0
SHORTNESS OF BREATH: 0

## 2024-02-28 ENCOUNTER — PATIENT OUTREACH (OUTPATIENT)
Dept: CASE MANAGEMENT | Facility: CLINIC | Age: 89
End: 2024-02-28
Payer: MEDICARE

## 2024-02-28 NOTE — ED PROVIDER NOTES
Emergency Medicine Note  HPI   HISTORY OF PRESENT ILLNESS     Patient is a 89-year-old female with past medical history of A-fib on Eliquis who presents for evaluation of head injury status post fall yesterday.  Patient is accompanied by her home health aide.  Patient reports she tripped and fell in her closet.  She uses a walker at baseline but was not using it at this time and lost her balance, hitting her left hand, left elbow, left knee, and left forehead.  Patient was seen at urgent care today and sent to the emergency department for evaluation given fall and head injury on blood thinners.  She denies loss of consciousness, vomiting, hearing or vision changes, chest pain, or abdominal pain.            Patient History   PAST HISTORY     Reviewed from Nursing Triage:       Past Medical History:   Diagnosis Date   • A-fib (CMS/[facility]) 8/22/2023    Managed on Apixaban and Metoprolol.    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 Arriaza.  Stable FEV1 in 8/2015.  IgE levels and eosinophils stable in 8/2015.  Managed with Fasenra, Azithromycin and Flovent.   • Allergic rhinitis 04/02/2018    Pulmonologist: Dr. Walker. Managed with Flonase.   • Asthma    • Atrial flutter with rapid ventricular response (CMS/[facility]) 02/21/2023    Diagnosed in 02/2023 and hospitalized Managed with Apixaban and Diltiazem Echocardiogram 02/2023 •  Left Ventricle: Normal ventricle size. Mild concentric left ventricular hypertrophy. Preserved systolic function. Estimated EF 65-70%. No regional wall motion abnormalities. Grade I diastolic dysfunction. •  Right Ventricle: Normal ventricle size. Normal systolic function. •  Left Atrium: Moderately   • Atrial flutter with rapid ventricular response (CMS/[facility]) 2/21/2023    Diagnosed in 02/2023 and hospitalized Managed with Apixaban, Metoprolol, and Diltiazem Echocardiogram 02/2023 •  Left Ventricle: Normal ventricle size. Mild concentric left ventricular  hypertrophy. Preserved systolic function. Estimated EF 65-70%. No regional wall motion abnormalities. Grade I diastolic dysfunction. •  Right Ventricle: Normal ventricle size. Normal systolic function. •  Left Atriu   • Calcification of aorta (CMS/[facility]) 06/29/2020    Seen incidentally on CT scan.   • Chronic obstructive pulmonary disease (CMS/[facility]) 04/02/2018    Pulmonologist: Dr. Walker.  Seen on PFTs in hospital in 2014.  Controlled with Spiriva and FLovent.   • COPD (chronic obstructive pulmonary disease) (CMS/[facility]) 10/19/2023    Managed with Symbicort and Spiriva   • GERD (gastroesophageal reflux disease) 06/08/2019    Managed with Esomeprazole.  Should take medication 30 minutes prior to meal.  Advised to avoid caffeine, carbonated beverages, and should not eat within 3 hours of going to sleep.  Advised to reduce BMI < 25.    • Insomnia 04/02/2018    Managed with Lorazepam as needed.   • Lumbar spinal stenosis 04/02/2018    Neurosurgeon: Dr. Miguel. MRI with Central and lateral recess spinal stenosis. Has Spondylolisthesis at L4/L5. S/P epidural injection by Dr. Martin with some relief in past. Had Lumbar fusion and Lumbar laminectomy by Dr. Miguel in 4/2015.   • Macular degeneration disease    • Obstructive sleep apnea syndrome 04/02/2018    Mild STACIA noted in sleep study 7/2015. Treated with nasal CPAP automatic with pressure range 5-10 CM H2Ox couple months. Off now   • Orthostatic hypotension 10/23/2023    Managed on Midodrine   • Osteoporosis 04/02/2018    Rheumatologist: Dr. Bradley.  Dexa scan 7/2016 with osteoporosis of lumbar spine LS-2.5, LH-2.1, RH -2.2.  Has been on Fosamax in the past for 8 years.  Worsened by Prednisone in past.  Continue vitamin D, calcium and weight bearing exercise. DEXA in 7/2017 with LS -1.7, LH -2.2, and RH -2.0.  Next due in 7/2019.  Seen by Dr. Bradley in 8/2015 who agreed with initiating Prolia. Took for 2 years. N   • Primary hypertension 04/02/2018    Managed on Diltiazem  and Losartan. Advised to follow a low sodium diet and keep BMI < 25.  Advised to exercise for 2.5 hours per week.  Instructed to take home blood pressure readings and call if consistently above 140/90.     • Primary hypertension 4/2/2018    Managed on Diltiazem  Advised to follow a low sodium diet and keep BMI < 25.  Advised to exercise for 2.5 hours per week.  Instructed to take home blood pressure readings and call if consistently above 140/90.     • Pulmonary embolism (CMS/HCC) 06/09/2019    Hematologist: Dr. Bhakta Diagnosed in 06/2019 in right upper lobe. Vascular ultrasound negative of bilateral lower extremities. Managed on Eliquis. Hypercoagulable workup was negative for beta-2 glycoprotein's, RIGO, Antithrombin III, lupus anticoagulant, protein C activity protein S activity, and prothrombin gene mutation, and factor V Leiden. Now off Apixaban as CT scan in 12/2019 was without pu   • Sacral fracture, closed (CMS/HCC) 8/21/2023   • SVT (supraventricular tachycardia)    • Vertebral compression fracture (CMS/HCC)     At T12 level       Past Surgical History:   Procedure Laterality Date   • APPENDECTOMY     • CARPAL TUNNEL RELEASE Bilateral    • CATARACT EXTRACTION W/  INTRAOCULAR LENS IMPLANT Bilateral    • HYSTERECTOMY  1972   • LUMBAR LAMINECTOMY  04/21/2015    S/P lumbar fusion   • TONSILLECTOMY         Family History   Problem Relation Age of Onset   • Glaucoma Biological Mother    • Macular degeneration Biological Mother    • Hypertension Biological Mother    • Lung cancer Biological Father    • Heart disease Biological Brother    • Breast cancer Mother's Sister    • No Known Problems Biological Son    • No Known Problems Biological Son    • No Known Problems Biological Daughter    • No Known Problems Biological Daughter    • No Known Problems Biological Daughter        Social History     Tobacco Use   • Smoking status: Never   • Smokeless tobacco: Never   Vaping Use   • Vaping Use: Never used   Substance  Use Topics   • Alcohol use: Yes     Comment: Rarely   • Drug use: No         Review of Systems   REVIEW OF SYSTEMS     Review of Systems   Respiratory: Negative for shortness of breath.    Cardiovascular: Negative for chest pain.   Gastrointestinal: Negative for abdominal pain, nausea and vomiting.   Musculoskeletal: Positive for myalgias (Left knee pain).   Skin: Positive for wound (Left elbow skin tear).   Neurological: Negative for dizziness, syncope and headaches.         VITALS     ED Vitals    Date/Time Temp Pulse Resp BP SpO2 [facility]   02/27/24 2212 -- 62 18 152/111 94 %    02/27/24 2211 -- 62 18 152/111 94 %    02/27/24 1955 36.9 °C (98.5 °F) 58 18 135/79 97 % EMU        Pulse Ox %: 97 % (02/27/24 2031)  Pulse Ox Interpretation: Normal (02/27/24 2031)           Physical Exam   PHYSICAL EXAM     Physical Exam  Constitutional:       General: She is not in acute distress.     Appearance: She is not ill-appearing, toxic-appearing or diaphoretic.   HENT:      Head: Contusion present.        Comments: Minor left forehead bruise  Cardiovascular:      Rate and Rhythm: Normal rate.   Pulmonary:      Effort: Pulmonary effort is normal. No respiratory distress.      Breath sounds: No wheezing, rhonchi or rales.   Abdominal:      General: There is no distension.      Palpations: Abdomen is soft.      Tenderness: There is no abdominal tenderness. There is no guarding or rebound.   Musculoskeletal:      Cervical back: Full passive range of motion without pain. No pain with movement, spinous process tenderness or muscular tenderness.      Comments: Moderate skin tear on the left elbow.  Normal strength and range of motion of the left elbow.    Left knee: Bruising with minimal bony  tenderness to palpation.  Range of motion and strength preserved.       Neurological:      Mental Status: She is alert.           PROCEDURES     Procedures     DATA     Results     None          Imaging Results          X-RAY KNEE LEFT 4+ VIEWS  (Preliminary result)  Result time 02/27/24 21:56:41    Preliminary Interpretation    Interpreted by me, no acute fracture or dislocation                             X-RAY ELBOW LEFT 3+ VIEWS (Preliminary result)  Result time 02/27/24 21:56:49    Preliminary Interpretation    Interpreted by me, no acute fracture or fat pad sign                             X-RAY WRIST LEFT 3+ VIEWS (Preliminary result)  Result time 02/27/24 21:56:34    Preliminary Interpretation    Interpreted by me, no acute fracture                             CT HEAD WITHOUT IV CONTRAST (Final result)  Result time 02/27/24 21:14:37    Final result                 Impression:    IMPRESSION:  1.  No identifiable acute intracranial abnormality.  2.  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             Narrative:    CLINICAL HISTORY: Status post fall, head injury on blood thinners.    COMMENT: Unenhanced CT of the head was performed with sagittal and coronal  reconstructions. Comparison is made with head CT from 12/19/2023.    CT DOSE:  One or more dose reduction techniques (e.g. automated exposure  control, adjustment of the mA and/or kV according to patient size, use of  iterative reconstruction technique) utilized for this examination.    There is no evidence of an acute intracranial hemorrhage, acute territorial  infarct, mass effect, or hydrocephalus.  There is no change chronic prominent  lacunar infarct in the right basal ganglia.  Periventricular predominant white  matter hypoattenuation is nonspecific but commonly attributable to chronic small  vessel ischemic changes.  Intracranial vascular calcifications are noted. The  calvarium appears intact.  There is complete opacification of the left sphenoid  sinus and near complete opacification of the left maxillary sinus, containing  heterogeneous partially calcified material, associated mild sinus wall  thickening, similar to prior.  The  other visualized paranasal sinuses and  mastoid air cells are grossly clear.                                No orders to display       Scoring tools                                  ED Course & MDM   MDM / ED COURSE / CLINICAL IMPRESSION / DISPO     Medical Decision Making  Patient is a 89-year-old female with past medical history of A-fib on Eliquis who presents for evaluation of head injury status post fall yesterday.  Patient with forehead bruising and left elbow skin tear.  Will evaluate with CT head given fall and head injury on blood thinners, as well as left elbow x-ray, left knee x-ray, and nonadherent gauze dressing to skin tear.      Fall, initial encounter: acute illness or injury  Head injury, initial encounter: acute illness or injury  Skin tear of elbow without complication, initial encounter: acute illness or injury  Amount and/or Complexity of Data Reviewed  Radiology: ordered and independent interpretation performed. Decision-making details documented in ED Course.          ED Course as of 02/27/24 2304 Tue Feb 27, 2024 2127 CT HEAD WITHOUT IV CONTRAST  IMPRESSION:  1.  No identifiable acute intracranial abnormality.  2.  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 [CM]   2303 Patient well-appearing and stable for discharge.  Trauma workup negative.  Left skin tear dressed with nonadherent gauze dressing. [CM]      ED Course User Index  [CM] Lance Mcmanus PA C     Clinical Impression      Fall, initial encounter   Head injury, initial encounter   Skin tear of elbow without complication, initial encounter     _________________     ED Disposition   Discharge                   Lance Mcmanus PA C  02/27/24 2304

## 2024-02-28 NOTE — ED ATTESTATION NOTE
I have personally seen and examined the patient. I personally performed the key components of the encounter and provided a substantive portion of the care and medical decision making. I reviewed and agree with the physician assistant’s assessment and plan of care, except as where stated below.    My examination, assessment, and plan of care of Madelyn Ruiz is as follows:     Patient is a 89-year-old female who presents emergency department for evaluation of a fall.  Fall occurred yesterday.  She describes a mechanical ground-level fall while in her closet.  She did strike her head as well as her left arm.  She presented to an urgent care center today where they wrapped the skin tear on the left elbow which she was referred to the emergency department for imaging as she is on Eliquis.    On exam, patient is awake, alert.  Heart rate is normal.  Respirations are nonlabored.  She has a hematoma to the forehead.  No midline C-spine tenderness.  She has a skin tear to the left elbow but good range of motion of the elbow with hematoma to the dorsal lateral surface of the left hand.    Imaging was obtained.  No intracranial injury.  No fractures of the extremities.  Will redress her skin tear, discharged with recommendations for outpatient follow-up     Margie Wheat MD  02/27/24 3604

## 2024-02-28 NOTE — DISCHARGE INSTRUCTIONS
Your CAT scan did not show any brain bleeding or scalp fracture.  Your x-rays did not show any acute fracture either.  Continue to use your walker to help stay balanced while walking.  Continue to wrap your wound and wash it with warm soapy water.  Follow-up with your primary care doctor soon as possible.  Return the emergency department with any new, worsening, or other concerning symptoms.

## 2024-02-28 NOTE — PROGRESS NOTES
Complex care follow up call:  Patient was seen in the [facility] ED 2/27 after she had a fall in the home.  She fell into her closet and hit her left hand, elbow, knee and forehead.  No LOC , vomiting , headache or visual changes.  Her xray's of hand , elbow and knee were all negative.  Her CT of the brain was negative for an acute bleed or fractures. She sustained a skin tear, which was cleansed and a non adherent dressing was applied.     Spoke with patient who stated that she is doing ok. She has a paid caregiver with her. She denied any headaches, visual issues, nausea or vomiting.  She declined a PCP appointment at this time as her daughter is away on vacation. She will contact the office once she is home if an appointment is needed. She reported that her left elbow wound is with the dressing intact.  Instructed to keep it clean and dry, changing the dressing, and signs of infection.  ACM instructed to call into the office for any immediate questions or concerns.  RN will follow up at a later time.    Julita Guzmán RN, BSN  Ambulatory Care Manager  [phone]

## 2024-03-04 ENCOUNTER — OFFICE VISIT (OUTPATIENT)
Dept: PRIMARY CARE | Facility: CLINIC | Age: 89
End: 2024-03-04
Payer: MEDICARE

## 2024-03-04 VITALS
WEIGHT: 109 LBS | HEART RATE: 57 BPM | HEIGHT: 60 IN | OXYGEN SATURATION: 98 % | DIASTOLIC BLOOD PRESSURE: 98 MMHG | SYSTOLIC BLOOD PRESSURE: 148 MMHG | BODY MASS INDEX: 21.4 KG/M2

## 2024-03-04 DIAGNOSIS — I48.0 PAROXYSMAL ATRIAL FIBRILLATION (CMS/HCC): Chronic | ICD-10-CM

## 2024-03-04 DIAGNOSIS — H02.9 EYELID LESION: ICD-10-CM

## 2024-03-04 DIAGNOSIS — M25.562 ACUTE PAIN OF LEFT KNEE: ICD-10-CM

## 2024-03-04 DIAGNOSIS — T14.8XXA OPEN WOUND OF SKIN: ICD-10-CM
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
## 2024-03-04 PROCEDURE — 99214 OFFICE O/P EST MOD 30 MIN: CPT | Performed by: NURSE PRACTITIONER

## 2024-03-04 ASSESSMENT — ENCOUNTER SYMPTOMS
VOMITING: 0
FREQUENCY: 0
CHILLS: 0
WOUND: 1
HEMATURIA: 0
SHORTNESS OF BREATH: 0
NAUSEA: 0
FEVER: 0
DIARRHEA: 0
DIFFICULTY URINATING: 0
DIZZINESS: 0
DYSURIA: 0
WEAKNESS: 1

## 2024-03-04 ASSESSMENT — VISUAL ACUITY: OU: 1

## 2024-03-04 NOTE — PROGRESS NOTES
MICA Nettles    [facility] Medicine  [address]  [place] [address]  Phone: [phone]  Fax: [phone]     History of Present Illness         Patient ID: Madelyn Ruiz is a 89 y.o. female.    PCP: Henry Linares MD      HPI    Patient presents for ER follow up    Presents with friend today      Presented to ER 2/28/2024 for fall  Tripped and fell in the closet, lost balance   Struck her left hand, left elbow, left knee, left forehead  States was unable to get back up herself    Denies loss of consciousness, syncope        Uses walker at baseline    On eliquis for afib    Xray knee, elbow, wrist: no acute fracture  CT head: no acute abnormality      Reports mild headache - improves with medication    Left elbow wound  Left knee hematoma, bruising and discomfort      Planning follow up with sports med next week      Has PT, OT coming to the house  Reports having walker at home that she uses through the house and is comfortable with using       Denies change in bowel, bladder function    Working on adequate hydration with water      Denies fever, chills      Left eyelid nodule  Noticed since approximately 2/28/2024   No erythema  Vision intact     Past Medical/Surgical/Family/Social History         The following have been reviewed and updated as appropriate in this visit:   Tobacco  Allergies  Meds  Problems  Med Hx  Surg Hx  Fam Hx         Past Medical History:   Diagnosis Date   • A-fib (CMS/[facility]) 8/22/2023    Managed on Apixaban and Metoprolol.    • Allergic bronchopulmonary aspergillosis (CMS/[facility]) 04/02/2018    Allergist: Dr. Arriaza.  Stable FEV1 in 8/2015.  IgE levels and eosinophils stable in 8/2015.  Managed with Fasenra, Azithromycin and Flovent.   • Allergic rhinitis 04/02/2018    Pulmonologist: Dr. Walker. Managed with Flonase.   • Asthma    • Atrial flutter with rapid ventricular response (CMS/[facility]) 02/21/2023    Diagnosed in 02/2023 and  hospitalized Managed with Apixaban and Diltiazem Echocardiogram 02/2023 •  Left Ventricle: Normal ventricle size. Mild concentric left ventricular hypertrophy. Preserved systolic function. Estimated EF 65-70%. No regional wall motion abnormalities. Grade I diastolic dysfunction. •  Right Ventricle: Normal ventricle size. Normal systolic function. •  Left Atrium: Moderately   • Atrial flutter with rapid ventricular response (CMS/HCC) 2/21/2023    Diagnosed in 02/2023 and hospitalized Managed with Apixaban, Metoprolol, and Diltiazem Echocardiogram 02/2023 •  Left Ventricle: Normal ventricle size. Mild concentric left ventricular hypertrophy. Preserved systolic function. Estimated EF 65-70%. No regional wall motion abnormalities. Grade I diastolic dysfunction. •  Right Ventricle: Normal ventricle size. Normal systolic function. •  Left Atriu   • Calcification of aorta (CMS/[facility]) 06/29/2020    Seen incidentally on CT scan.   • Chronic obstructive pulmonary disease (CMS/[facility]) 04/02/2018    Pulmonologist: Dr. Walker.  Seen on PFTs in hospital in 2014.  Controlled with Spiriva and FLovent.   • COPD (chronic obstructive pulmonary disease) (CMS/HCC) 10/19/2023    Managed with Symbicort and Spiriva   • GERD (gastroesophageal reflux disease) 06/08/2019    Managed with Esomeprazole.  Should take medication 30 minutes prior to meal.  Advised to avoid caffeine, carbonated beverages, and should not eat within 3 hours of going to sleep.  Advised to reduce BMI < 25.    • Insomnia 04/02/2018    Managed with Lorazepam as needed.   • Lumbar spinal stenosis 04/02/2018    Neurosurgeon: Dr. Miguel. MRI with Central and lateral recess spinal stenosis. Has Spondylolisthesis at L4/L5. S/P epidural injection by Dr. Martin with some relief in past. Had Lumbar fusion and Lumbar laminectomy by Dr. Miguel in 4/2015.   • Macular degeneration disease    • Obstructive sleep apnea syndrome 04/02/2018    Mild STACIA noted in sleep study 7/2015. Treated  with nasal CPAP automatic with pressure range 5-10 CM H2Ox couple months. Off now   • Orthostatic hypotension 10/23/2023    Managed on Midodrine   • Osteoporosis 04/02/2018    Rheumatologist: Dr. Bradley.  Dexa scan 7/2016 with osteoporosis of lumbar spine LS-2.5, LH-2.1, RH -2.2.  Has been on Fosamax in the past for 8 years.  Worsened by Prednisone in past.  Continue vitamin D, calcium and weight bearing exercise. DEXA in 7/2017 with LS -1.7, LH -2.2, and RH -2.0.  Next due in 7/2019.  Seen by Dr. Bradley in 8/2015 who agreed with initiating Prolia. Took for 2 years. N   • Primary hypertension 04/02/2018    Managed on Diltiazem and Losartan. Advised to follow a low sodium diet and keep BMI < 25.  Advised to exercise for 2.5 hours per week.  Instructed to take home blood pressure readings and call if consistently above 140/90.     • Primary hypertension 4/2/2018    Managed on Diltiazem  Advised to follow a low sodium diet and keep BMI < 25.  Advised to exercise for 2.5 hours per week.  Instructed to take home blood pressure readings and call if consistently above 140/90.     • Pulmonary embolism (CMS/HCC) 06/09/2019    Hematologist: Dr. Bhakta Diagnosed in 06/2019 in right upper lobe. Vascular ultrasound negative of bilateral lower extremities. Managed on Eliquis. Hypercoagulable workup was negative for beta-2 glycoprotein's, RIGO, Antithrombin III, lupus anticoagulant, protein C activity protein S activity, and prothrombin gene mutation, and factor V Leiden. Now off Apixaban as CT scan in 12/2019 was without pu   • Sacral fracture, closed (CMS/HCC) 8/21/2023   • SVT (supraventricular tachycardia)    • Vertebral compression fracture (CMS/HCC)     At T12 level       Past Surgical History:   Procedure Laterality Date   • APPENDECTOMY     • CARPAL TUNNEL RELEASE Bilateral    • CATARACT EXTRACTION W/  INTRAOCULAR LENS IMPLANT Bilateral    • HYSTERECTOMY  1972   • LUMBAR LAMINECTOMY  04/21/2015    S/P lumbar fusion   •  TONSILLECTOMY         Family History   Problem Relation Age of Onset   • Glaucoma Biological Mother    • Macular degeneration Biological Mother    • Hypertension Biological Mother    • Lung cancer Biological Father    • Heart disease Biological Brother    • Breast cancer Mother's Sister    • No Known Problems Biological Son    • No Known Problems Biological Son    • No Known Problems Biological Daughter    • No Known Problems Biological Daughter    • No Known Problems Biological Daughter        Social History     Tobacco Use   • Smoking status: Never   • Smokeless tobacco: Never   Vaping Use   • Vaping Use: Never used   Substance Use Topics   • Alcohol use: Yes     Comment: Rarely   • Drug use: No       Patient Care Team:  Henry Linares MD as PCP - General  Ray Arriaza MD as Referring Physician  Aye Bhakta MD as Consulting Physician (Hematology and Oncology)  Wesley Walker MD as Consulting Physician (Pulmonary)  Fausto Bradley MD as Consulting Physician (Rheumatology)  Kleiner, Robert C, MD as Referring Physician  Alfie Torres MD as Consulting Physician (Dermatology)  Fanny Pelaez MD as Consulting Physician (Orthopedic Surgery)  Keenan Krause DO as Consulting Physician (Family Medicine)  Keenan Krause DO as Consulting Physician (Family Medicine)  Wesley You DO as Consulting Physician (Physical Medicine and Rehabilitation)  Jessika Zelaya MD as Surgeon (Neurosurgery)  Henrietta Crespo MD as Cardiologist (Interventional Cardiology)  Dinesh Goel MD as Surgeon (Otolaryngology)  Valentino, Michael A, MD PhD as Cardiologist (Cardiology)  Alejo Saavedra MD as Consulting Physician (Orthopedic Surgery)  Julita Guzmán, RN as Care Manager (Care Management)    Allergies and Medications         Allergies   Allergen Reactions   • Mepolizumab Rash   • Morphine      Other reaction(s): Vomiting   • Oxycodone      Other reaction(s): Vomiting         Current Outpatient  Medications   Medication Sig Dispense Refill   • acetaminophen (TYLENOL) 325 mg tablet Take 3 tablets (975 mg total) by mouth every 6 (six) hours. 360 tablet 0   • acetaminophen (TYLENOL) 500 mg tablet Take 2 tablets (1,000 mg total) by mouth every 6 (six) hours.     • apixaban (ELIQUIS) 2.5 mg tablet Take 1 tablet (2.5 mg total) by mouth 2 (two) times a day. 180 tablet 3   • azithromycin (ZITHROMAX) 250 mg tablet Take 1 tablet (250 mg total) by mouth 3 (three) times a week (Mon, Wed, Fri). 12 tablet 5   • calcium carbonate 600 mg calcium (1,500 mg) tablet Take 1 tablet by mouth daily.     • cholecalciferol, vitamin D3, 25 mcg (1,000 unit) capsule Take 1 tablet by mouth daily.     • diltiazem LA (CARDIZEM LA) 360 mg 24 hr tablet Take 1 tablet (360 mg total) by mouth daily. 90 tablet 3   • docusate sodium (COLACE) 100 mg capsule Take 100 mg by mouth 2 (two) times a day as needed for constipation.     • DULoxetine (CYMBALTA) 60 mg capsule Take 60 mg by mouth at bedtime.   0   • fexofenadine (ALLEGRA) 180 mg tablet Take 180 mg by mouth nightly.     • FOLIC ACID/MULTIVIT,IRON, (CENTRUM ORAL) Take 1 tablet by mouth daily.     • lidocaine (LIDODERM) 5 % patch Place 1 patch on the skin daily. Remove & discard patch within 12 hours or as directed by prescriber. 30 patch 3   • LORazepam (ATIVAN) 1 mg tablet TAKE 1 TABLET BY MOUTH NIGHTLY AS NEEDED FOR ANXIETY OR SLEEP. 30 tablet 0   • melatonin ODT Take 2 tablets (6 mg total) by mouth nightly.     • metoprolol tartrate (LOPRESSOR) 25 mg tablet Take 1 tablet (25 mg total) by mouth 2 (two) times a day. 180 tablet 3   • midodrine (PROAMATINE) 5 mg tablet Take 1 tablet (5 mg total) by mouth 3 (three) times a day before meals. 270 tablet 3   • pregabalin (LYRICA) 25 mg capsule Take 25 mg by mouth daily with lunch. PRN     • pregabalin (LYRICA) 50 mg capsule Take 50 mg by mouth 2 (two) times a day. Morning and Evening     • propylene glycol/peg 400/PF (SYSTANE, PF, OPHT)  Administer 1 drop into affected eye(s) as needed (dry eyes).     • SPIRIVA RESPIMAT 2.5 mcg/actuation mist inhaler Inhale 2 puffs daily.     • SYMBICORT 80-4.5 mcg/actuation inhaler Inhale 2 puffs 2 (two) times a day.     • traMADoL (ULTRAM) 50 mg tablet Take 1 tablet (50 mg total) by mouth every 8 (eight) hours as needed for pain. Pt also takes it as needed at lunch time.       No current facility-administered medications for this visit.       Review of Systems         Review of Systems   Constitutional: Negative for chills and fever.   Respiratory: Negative for shortness of breath.    Cardiovascular: Negative for chest pain.   Gastrointestinal: Negative for diarrhea, nausea and vomiting.   Genitourinary: Negative for difficulty urinating, dysuria, frequency and hematuria.   Skin: Positive for wound (left elbow).        Bruising left elbow, left forehead, left knee and left lower ext   Neurological: Positive for weakness. Negative for dizziness and syncope.         Physical Examination           Vitals:    03/04/24 1402   BP: (!) 148/98   Pulse: (!) 57   SpO2: 98%       Wt Readings from Last 3 Encounters:   03/04/24 49.4 kg (109 lb)   02/27/24 46.3 kg (102 lb)   01/25/24 49.5 kg (109 lb 3.2 oz)       Ht Readings from Last 1 Encounters:   03/04/24 1.524 m (5')       BMI Readings from Last 3 Encounters:   03/04/24 21.29 kg/m²   02/27/24 19.92 kg/m²   01/25/24 20.63 kg/m²       Physical Exam  Vitals reviewed.   Constitutional:       General: She is not in acute distress.     Appearance: Normal appearance. She is not ill-appearing, toxic-appearing or diaphoretic.   HENT:      Head: Normocephalic.   Eyes:      General: Vision grossly intact. No scleral icterus.     Extraocular Movements:      Right eye: Normal extraocular motion.      Conjunctiva/sclera: Conjunctivae normal.      Right eye: Right conjunctiva is not injected.      Left eye: Left conjunctiva is not injected.     Cardiovascular:      Rate and Rhythm:  Normal rate and regular rhythm.      Pulses: Normal pulses.      Heart sounds: Normal heart sounds. No murmur heard.  Pulmonary:      Effort: Pulmonary effort is normal. No respiratory distress.      Breath sounds: Normal breath sounds.   Musculoskeletal:      Cervical back: Normal range of motion.      Left knee: Effusion and ecchymosis present.      Right lower leg: No edema.      Left lower leg: No edema.      Comments: No calf pain to palpation, or tenderness    Lymphadenopathy:      Cervical: No cervical adenopathy.   Skin:     General: Skin is warm.      Findings: Bruising (left forehead, left upper ext and elbow, left knee and left lower ext) present.      Comments: Left elbow wound- dressing changed  No surrounding erythema, no active drainage   Neurological:      General: No focal deficit present.      Mental Status: She is alert and oriented to person, place, and time.      Gait: Gait abnormal (using walker, unsteady ).   Psychiatric:         Mood and Affect: Mood normal.         Behavior: Behavior normal.         Thought Content: Thought content normal.           Assessment and Plan         Assessment/Plan     Diagnoses and all orders for this visit:    Fall at home, subsequent encounter (Primary)  Assessment & Plan:  Fall at home 2/28/2024 sp ER eval  Head impact, denies loss of consciousness/syncope  CT negative   2nd fall over past month  Restarting home PT/OT  States using walker at home- discussed importance of use of device as recommended       Open wound of skin  Assessment & Plan:  Left elbow  Sustained during fall 2/28/2024  Dressing changed performed- area appears stable, no surrounding erythema and no active drainage  Wound redressed  Discussed appropriate skin care- wash mild soap and water BID  Close monitoring for any changes, development of s/s infection and must notify if any should occur        Eyelid lesion  Assessment & Plan:  Likely chalazion  No erythema, drainage, vision changes    Advised warm compress 4-6 x per day  Follow up ophth if no improvement       Paroxysmal atrial fibrillation (CMS/HCC)  Assessment & Plan:  Stable  Managed on apixaban and metoprolol  Under care of cardiology       Acute pain of left knee  Assessment & Plan:  Acute on chronic  Worse since fall 2/28/2024  Notes significant bruising and anterior fluid collection  Likely hematoma  Discussed supportive management, continued use of assistive device  Follow up next week as scheduled with sports med         No follow-ups on file.    No orders of the defined types were placed in this encounter.              ARTURO Mckenzie    3/4/2024     I spent a total of 30 minutes on this date of service performing the following activities:  Pre-visit medical record and diagnostic testing and medical consultant report review, obtaining history from patient, performing examination, entering orders, comprehensive medication reconciliation, counseling and education, and documentation of visit.

## 2024-03-05 NOTE — ASSESSMENT & PLAN NOTE
Fall at home 2/28/2024 sp ER eval  Head impact, denies loss of consciousness/syncope  CT negative   2nd fall over past month  Restarting home PT/OT  States using walker at home- discussed importance of use of device as recommended

## 2024-03-05 NOTE — ASSESSMENT & PLAN NOTE
Left elbow  Sustained during fall 2/28/2024  Dressing changed performed- area appears stable, no surrounding erythema and no active drainage  Wound redressed  Discussed appropriate skin care- wash mild soap and water BID  Close monitoring for any changes, development of s/s infection and must notify if any should occur

## 2024-03-05 NOTE — ASSESSMENT & PLAN NOTE
Acute on chronic  Worse since fall 2/28/2024  Notes significant bruising and anterior fluid collection  Likely hematoma  Discussed supportive management, continued use of assistive device  Follow up next week as scheduled with sports med

## 2024-03-05 NOTE — ASSESSMENT & PLAN NOTE
Likely chalazion  No erythema, drainage, vision changes   Advised warm compress 4-6 x per day  Follow up ophth if no improvement

## 2024-03-06 ENCOUNTER — TELEPHONE (OUTPATIENT)
Dept: PRIMARY CARE | Facility: CLINIC | Age: 89
End: 2024-03-06
Payer: MEDICARE

## 2024-03-06 ENCOUNTER — PATIENT OUTREACH (OUTPATIENT)
Dept: CASE MANAGEMENT | Facility: CLINIC | Age: 89
End: 2024-03-06
Payer: MEDICARE

## 2024-03-06 NOTE — TELEPHONE ENCOUNTER
ED discharge follow up  Pt to the ED with head injury, status post fall the prior day, and also an left elbow skin tear.  Pt had an ED follow up appt, on 3/4/24, with Ute/ ARTURO, so no call required.

## 2024-03-06 NOTE — PROGRESS NOTES
Complex care follow up: No answer. Left message with return call information.  If no return call, will follow up at a later date.     Julita Guzmán RN, BSN  Ambulatory Care Manager  [phone]

## 2024-03-13 ENCOUNTER — PATIENT OUTREACH (OUTPATIENT)
Dept: CASE MANAGEMENT | Facility: CLINIC | Age: 89
End: 2024-03-13
Payer: MEDICARE

## 2024-03-13 NOTE — PROGRESS NOTES
Complex care follow up call made: spoke with patient who stated that she is doing well. She denied any further falls in the home.  Her elbow wound is healing and per the patient her daughter left the dressing off of it yesterday. She is eating well and moving her bowels. No refills needed at this time.  No other issues or concerns. ACM instructed to call into the office for any immediate questions or concerns.  RN will follow up at a later time.    Julita Guzmán RN, BSN  Ambulatory Care Manager  [phone]

## 2024-03-26 NOTE — PROGRESS NOTES
[facility] Cardiology  [place] Office Visit       Patient ID: Madelyn Ruiz 89 y.o. female 8/9/1930  PCP: Hnery Linares MD      HPI     Madelyn Ruiz is a 89 y.o. female with a past medical history significant for allergic bronchopulmonary aspergillosis, COPD, hypertension, hyperlipidemia, paroxysmal atrial flutter/fibrillation, left SCA aneurysm, GERD, pulmonary embolism in 2019, recurrent falls, and mild sleep apnea.    To review her prior history, she had an admission to [facility] in August 2023 with a fall complicated by right pubic ramus and bilateral sacral fractures.  She was treated conservatively and cardiology was consulted for risk versus benefit conversation of anticoagulation.  She was discharged to a nursing facility and continued on Eliquis.  She was readmitted to [facility] in October 2023 with a fall after being found on the ground by her home physical therapist.  She sustained a small laceration to her scalp and orthostatic vital signs were positive.  Losartan and Lasix were held, and she was started on midodrine 5 mg 3 times daily.  Diltiazem was also increased to 360 mg daily due to rapid atrial fibrillation.  The use of anticoagulation was again discussed and she was discharged on Eliquis.    She was last seen in December 2023 and remained in sinus rhythm. Her metoprolol was reduced to twice daily dosing (previously three times daily) and she was living at home with a 24 hour care giver. She was continued on Eliquis and felt to be fairly euvolemic on Lasix 20 mg every other daily.     In the interim, the patient was admitted to [facility] in December 2023 after suffering a mechanical fall complicated by left rib fractures, left humerus fracture and trace left pneumothorax.  She was evaluated by orthopedic surgery and nonoperative treatment was recommended.  At discharge, no changes were made to her cardiac medications.    Following this  hospitalization, her home care nurse contacted our office stating she was only taking diltiazem 300 mg daily.    Most recently, she presented to [facility] ER on 2/27/2024 after a trip and fall at home.  Trauma imaging was unremarkable and she was discharged home.    She presents today for routine follow up.  She has not had any further falls since she was in the ER last month.  She does have 24-hour care at home.  She denies any chest pain or shortness of breath.  She also denies any lightheaded/dizziness, near-syncope, or syncope.             Medications     Current Outpatient Medications   Medication Instructions    acetaminophen (TYLENOL) 975 mg, oral, Every 6 hours    acetaminophen (TYLENOL) 1,000 mg, oral, Every 6 hours    apixaban (ELIQUIS) 2.5 mg, oral, 2 times daily    azithromycin (ZITHROMAX) 250 mg, oral, 3 times weekly    calcium carbonate 600 mg calcium (1,500 mg) tablet 1 tablet, oral, Daily    cholecalciferol, vitamin D3, 25 mcg (1,000 unit) capsule 1 tablet, oral, Daily    diltiazem LA (CARDIZEM LA) 360 mg, oral, Daily    docusate sodium (COLACE) 100 mg, oral, 2 times daily PRN    DULoxetine (CYMBALTA) 60 mg, oral, Nightly    fexofenadine (ALLEGRA) 180 mg, oral, Nightly    FOLIC ACID/MULTIVIT,IRON, (CENTRUM ORAL) 1 tablet, oral, Daily    lidocaine (LIDODERM) 5 % patch 1 patch, transdermal, Daily, Remove & discard patch within 12 hours or as directed by prescriber.    LORazepam (ATIVAN) 1 mg tablet TAKE 1 TABLET BY MOUTH NIGHTLY AS NEEDED FOR ANXIETY OR SLEEP.    melatonin 6 mg, oral, Nightly    metoprolol tartrate (LOPRESSOR) 25 mg, oral, 2 times daily    midodrine (PROAMATINE) 5 mg, oral, 3 times daily before meals    pregabalin (LYRICA) 50 mg, oral, 2 times daily, Morning and Evening    pregabalin (LYRICA) 25 mg, oral, Daily with lunch, PRN    propylene glycol/peg 400/PF (SYSTANE, PF, OPHT) 1 drop, ophthalmic, As needed    SPIRIVA RESPIMAT 2.5 mcg/actuation mist inhaler 2 puffs,  inhalation, Daily    SYMBICORT 80-4.5 mcg/actuation inhaler 2 puffs, inhalation, 2 times daily    traMADoL (ULTRAM) 50 mg, oral, Every 8 hours PRN, Pt also takes it as needed at lunch time.         Allergies     Mepolizumab, Morphine, and Oxycodone     Vital Signs/Exam     There were no vitals filed for this visit.  BP Readings from Last 3 Encounters:   03/04/24 (!) 148/98   02/27/24 (!) 152/111   01/25/24 (!) 144/88     Wt Readings from Last 3 Encounters:   03/04/24 49.4 kg (109 lb)   02/27/24 46.3 kg (102 lb)   01/25/24 49.5 kg (109 lb 3.2 oz)      There is no height or weight on file to calculate BMI.    Physical Exam  Constitutional:       Appearance: She is well-developed.   Eyes:      Conjunctiva/sclera: Conjunctivae normal.   Cardiovascular:      Rate and Rhythm: Normal rate and regular rhythm.      Heart sounds: Normal heart sounds.   Pulmonary:      Effort: Pulmonary effort is normal.      Breath sounds: Normal breath sounds.   Abdominal:      General: There is no distension.      Palpations: Abdomen is soft.      Tenderness: There is no abdominal tenderness.   Musculoskeletal:      Right lower leg: Edema (trace) present.      Left lower leg: Edema (trace) present.   Skin:     General: Skin is warm and dry.   Neurological:      General: No focal deficit present.      Mental Status: She is alert.   Psychiatric:         Mood and Affect: Mood normal.         Behavior: Behavior normal.            Diagnostic Data   Diagnostic data was personally reviewed. Available medical records were reviewed and counseling/education was performed where appropriate.  Speech recognition software was used. Typographical errors may be present.    Labs:  Lab Results   Component Value Date    CHOL 227 (H) 01/27/2023    CHOL 248 (H) 11/18/2015     Lab Results   Component Value Date    HDL 62 01/27/2023    HDL 92 11/18/2015     Lab Results   Component Value Date    LDLCALC 141 (H) 01/27/2023    LDLCALC 133 (H) 11/18/2015     Lab  Results   Component Value Date    TRIG 120 01/27/2023    TRIG 117 11/18/2015     Lab Results   Component Value Date    WBC 5.81 12/21/2023    HGB 11.7 (L) 12/21/2023     12/21/2023     Lab Results   Component Value Date    GLUCOSE 88 12/21/2023    CALCIUM 8.8 12/21/2023     12/21/2023    K 4.1 12/21/2023    CO2 25 12/21/2023     (H) 12/21/2023    BUN 16 12/21/2023    CREATININE 0.7 12/21/2023     Lab Results   Component Value Date    EGFR >60.0 12/21/2023     Lab Results   Component Value Date    ALBUMIN 4.1 11/22/2023    BILITOT 0.5 11/22/2023    ALKPHOS 58 11/22/2023    ALT 15 11/22/2023    AST 15 11/22/2023     Lab Results   Component Value Date    HGBA1C 5.6 11/18/2015     High Sens Troponin I   Date Value Ref Range Status   08/21/2023 10.9 <15.0 pg/mL Final   08/21/2023 8.7 <15.0 pg/mL Final   02/21/2023 10.6 <15.0 pg/mL Final   02/20/2023 7.7 <15.0 pg/mL Final   01/26/2023 5.3 <15.0 pg/mL Final     Lab Results   Component Value Date    TSH 3.63 10/19/2023     CrCl cannot be calculated (Patient's most recent lab result is older than the maximum 28 days allowed.).    Echocardiogram:  Results for orders placed during the hospital encounter of 03/27/24    Transthoracic echo (TTE) complete    Interpretation Summary    Left Ventricle: Ventricle not well visualized. Normal ventricle size. Normal wall thickness. Preserved systolic function. Estimated EF 65-70%. Indeterminate diastolic filling pattern. Unable to assess left atrial pressure.    Right Ventricle: Normal ventricle size. Normal systolic function.    Left Atrium: Moderate to severely dilated atrium.    Right Atrium: Mildly dilated atrium.    Aortic Valve: Tricuspid valve.  Sclerotic leaflets. No regurgitation. No stenosis.    Mitral Valve: Mild mitral annular calcification. Mild to moderate regurgitation.    Tricuspid Valve: Normal structure. Mild regurgitation. Estimated RVSP = 30 mmHg. No significant stenosis.    Pulmonic Valve: Normal  structure. No regurgitation. No stenosis.    Aorta: Aortic root normal. Sinuses of Valsalva normal-sized. Ascending aorta normal-sized.    IVC/SVC: Inferior vena cava is a small caliber vessel (<1.7cm). Inferior vena cava collapses >50% during inspiration.    Pericardium: No evidence of pericardial effusion.    When compared to previous study there has been no significant change      LE Venous Ultrasound:  Results for orders placed during the hospital encounter of 06/08/19    Ultrasound venous leg bilateral    Interpretation Summary  No evidence of deep vein thrombus (DVT) in either lower extremity.      Cardiac monitor:    ECG:            Assessment and Plan      Madelyn Ruiz is a 89 y.o. female seen for the following conditions:        Diagnosis Plan   1. Atrial fibrillation and flutter (CMS/HCC)        2. Recurrent falls        3. Primary hypertension        4. Mixed hyperlipidemia        5. RBBB          Plan:     We again discussed the risk versus benefits of continuing anticoagulation.  She has had frequent falls but thus far has not had any major bleeding issues related to her anticoagulation.  She has had both paroxysmal atrial fibrillation as well as a prior PE and therefore obviously there is some risk with taking her off of anticoagulation.  At this point we agreed to continue on her current dosing of Eliquis and we can revisit this in the future if she were to have more falls particularly if they are complicated by any excessive bleeding related to trauma.    I will plan to see her in follow-up in approximately 6 months.    I will plan to see her in follow-up in approximately 4 months at which point we will recheck an echocardiogram.    Michael A. Valentino, MD, [facility] [place] Office: [phone]  Primary [facility]: [facility]   3/26/2024

## 2024-03-27 ENCOUNTER — HOSPITAL ENCOUNTER (OUTPATIENT)
Dept: CARDIOLOGY | Facility: CLINIC | Age: 89
Discharge: HOME | End: 2024-03-27
Attending: NURSE PRACTITIONER
Payer: MEDICARE

## 2024-03-27 ENCOUNTER — PATIENT OUTREACH (OUTPATIENT)
Dept: CASE MANAGEMENT | Facility: CLINIC | Age: 89
End: 2024-03-27
Payer: MEDICARE

## 2024-03-27 ENCOUNTER — OFFICE VISIT (OUTPATIENT)
Dept: CARDIOLOGY | Facility: CLINIC | Age: 89
End: 2024-03-27
Payer: MEDICARE

## 2024-03-27 VITALS
WEIGHT: 109 LBS | SYSTOLIC BLOOD PRESSURE: 151 MMHG | HEIGHT: 60 IN | DIASTOLIC BLOOD PRESSURE: 96 MMHG | BODY MASS INDEX: 21.4 KG/M2

## 2024-03-27 VITALS
OXYGEN SATURATION: 98 % | WEIGHT: 105.8 LBS | BODY MASS INDEX: 19.47 KG/M2 | RESPIRATION RATE: 18 BRPM | HEART RATE: 84 BPM | SYSTOLIC BLOOD PRESSURE: 130 MMHG | DIASTOLIC BLOOD PRESSURE: 74 MMHG | HEIGHT: 62 IN

## 2024-03-27 DIAGNOSIS — I10 PRIMARY HYPERTENSION: ICD-10-CM

## 2024-03-27 DIAGNOSIS — I48.91 ATRIAL FIBRILLATION AND FLUTTER (CMS/HCC): Primary | ICD-10-CM

## 2024-03-27 DIAGNOSIS — I48.92 ATRIAL FIBRILLATION AND FLUTTER (CMS/HCC): Primary | ICD-10-CM

## 2024-03-27 DIAGNOSIS — R29.6 RECURRENT FALLS: ICD-10-CM

## 2024-03-27 DIAGNOSIS — I48.92 ATRIAL FLUTTER, UNSPECIFIED TYPE (CMS/HCC): ICD-10-CM

## 2024-03-27 DIAGNOSIS — E78.2 MIXED HYPERLIPIDEMIA: ICD-10-CM

## 2024-03-27 DIAGNOSIS — I45.10 RBBB: ICD-10-CM

## 2024-03-27 LAB
AORTIC ROOT ANNULUS: 2.6 CM
ASCENDING AORTA: 3.1 CM
ATRIAL RATE: 88
AV PEAK GRADIENT: 11 MMHG
AV PEAK VELOCITY-S: 1.66 M/S
AV VALVE AREA: 1.72 CM2
BSA FOR ECHO PROCEDURE: 1.45 M2
E WAVE DECELERATION TIME: 280 MS
E/A RATIO: 0.8
E/E' RATIO: 14.5
E/LAT E' RATIO: 9.6
EDV (BP): 71.9 CM3
EF (A4C): 73.2 %
EF A2C: 67.9 %
EJECTION FRACTION: 69.5 %
EST RIGHT VENT SYSTOLIC PRESSURE BY TRICUSPID REGURGITATION JET: 30 MMHG
ESV (BP): 21.9 CM3
FRACTIONAL SHORTENING: 42.49 %
INTERVENTRICULAR SEPTUM: 1.1 CM
LA ESV (BP): 106 CM3
LA ESV INDEX (A2C): 76.55 CM3/M2
LA ESV INDEX (BP): 73.1 CM3/M2
LA/AORTA RATIO: 1.5
LAAS-AP2: 29.2 CM2
LAAS-AP4: 28.3 CM2
LAD 2D: 3.9 CM
LALD A4C: 6.37 CM
LALD A4C: 6.38 CM
LAV-S: 111 CM3
LEFT ATRIUM VOLUME INDEX: 69.66 CM3/M2
LEFT ATRIUM VOLUME: 101 CM3
LEFT INTERNAL DIMENSION IN SYSTOLE: 1.99 CM (ref 2.21–3.34)
LEFT VENTRICLE DIASTOLIC VOLUME INDEX: 55.72 CM3/M2
LEFT VENTRICLE DIASTOLIC VOLUME: 80.8 CM3
LEFT VENTRICLE SYSTOLIC VOLUME INDEX: 14.9 CM3/M2
LEFT VENTRICLE SYSTOLIC VOLUME: 21.6 CM3
LEFT VENTRICULAR INTERNAL DIMENSION IN DIASTOLE: 3.46 CM (ref 3.72–5.16)
LEFT VENTRICULAR POSTERIOR WALL IN END DIASTOLE: 1.13 CM (ref 0.47–0.87)
LV DIASTOLIC VOLUME: 59.7 CM3
LV ESV (APICAL 2 CHAMBER): 19.2 CM3
LVAD-AP2: 20.3 CM2
LVAD-AP4: 25.7 CM2
LVAS-AP2: 9.6 CM2
LVAS-AP4: 11 CM2
LVEDVI(A2C): 41.17 CM3/M2
LVEDVI(BP): 49.59 CM3/M2
LVESVI(A2C): 13.24 CM3/M2
LVESVI(BP): 15.1 CM3/M2
LVLD-AP2: 6.1 CM
LVLD-AP4: 6.64 CM
LVLS-AP2: 4.26 CM
LVLS-AP4: 4.93 CM
LVOT 2D: 2 CM
LVOT A: 3.14 CM2
LVOT PEAK VELOCITY: 1.16 M/S
LVOT PG: 5 MMHG
MLH CV ECHO AVA INDEX VELOCITY RATIO: 1.2
MV E'TISSUE VEL-LAT: 0.08 M/S
MV E'TISSUE VEL-MED: 0.05 M/S
MV PEAK A VEL: 0.95 M/S
MV PEAK E VEL: 0.78 M/S
P AXIS: 112
POSTERIOR WALL: 1.04 CM
POSTERIOR WALL: 1.22 CM
PR INTERVAL: 150
PV PEAK GRADIENT: 3 MMHG
PV PV: 0.9 M/S
QRS DURATION: 114
QT INTERVAL: 400
QTC CALCULATION(BAZETT): 484
R AXIS: 31
RAP: 3 MMHG
RVOT VMAX: 0.87 M/S
SEPTAL TISSUE DOPPLER FREE WALL LATE DIA VELOCITY (APICAL 4 CHAMBER VIEW): 0.14 M/S
T WAVE AXIS: 5
TR MAX PG: 26.83 MMHG
TRICUSPID VALVE PEAK REGURGITATION VELOCITY: 2.59 M/S
VENTRICULAR RATE: 88
Z-SCORE OF LEFT VENTRICULAR DIMENSION IN END DIASTOLE: -2.35
Z-SCORE OF LEFT VENTRICULAR DIMENSION IN END SYSTOLE: -2.46
Z-SCORE OF LEFT VENTRICULAR POSTERIOR WALL IN END DIASTOLE: 2.99

## 2024-03-27 PROCEDURE — 99214 OFFICE O/P EST MOD 30 MIN: CPT | Performed by: INTERNAL MEDICINE

## 2024-03-27 PROCEDURE — 93000 ELECTROCARDIOGRAM COMPLETE: CPT | Performed by: INTERNAL MEDICINE

## 2024-03-27 PROCEDURE — 93306 TTE W/DOPPLER COMPLETE: CPT | Performed by: INTERNAL MEDICINE

## 2024-03-27 NOTE — LETTER
March 27, 2024     Henry Linares MD  [address]  [address]  [facility] [address]    Patient: Madelyn Ruiz  YOB: 1930  Date of Visit: 3/27/2024      Dear Dr. Linares:    Thank you for referring Madelyn Ruiz to me for evaluation. Below are my notes for this consultation.    If you have questions, please do not hesitate to call me. I look forward to following your patient along with you.         Sincerely,        Michael A. Valentino, MD PhD        CC: Janna Thompson CRNP  3/26/2024 10:24 AM  Sign when Signing Visit     [facility] Cardiology  [place] Office Visit       Patient ID: Madelyn Ruiz 89 y.o. female 8/9/1930  PCP: Henry Linares MD      [facility]     Madelyn Ruiz is a 89 y.o. female with a past medical history significant for allergic bronchopulmonary aspergillosis, COPD, hypertension, hyperlipidemia, paroxysmal atrial flutter/fibrillation, left SCA aneurysm, GERD, pulmonary embolism in 2019, recurrent falls, and mild sleep apnea.    To review her prior history, [facility] complicated by right pubic ramus and bilateral sacral fractures.  She was treated conservatively and cardiology was consulted for risk versus benefit conversation of anticoagulation.  She was discharged to a nursing facility and continued on Eliquis.  [facility] with [facility] after being found on the ground by her home physical therapist.  She sustained a small laceration to her scalp and orthostatic vital signs were positive.  Losartan and Lasix were held, and she was started on midodrine 5 mg 3 times daily.  Diltiazem was also increased to 360 mg daily due to rapid atrial fibrillation.  The use of anticoagulation was again discussed and she was discharged on Eliquis.    She was last seen in December 2023 and remained in sinus rhythm. Her metoprolol was reduced to  twice daily dosing (previously three times daily) and she was living at home with a 24 hour care giver. She was continued on Eliquis and felt to be fairly euvolemic on Lasix 20 mg every other daily.     In the interim, the patient was admitted to [facility] in December 2023 after suffering a mechanical fall complicated by left rib fractures, left humerus fracture and trace left pneumothorax.  She was evaluated by orthopedic surgery and nonoperative treatment was recommended.  At discharge, no changes were made to her cardiac medications.    Following this hospitalization, her home care nurse contacted our office stating she was only taking diltiazem 300 mg daily.    Most recently, she presented to [facility] ER on 2/27/2024 after a trip and fall at home.  Trauma imaging was unremarkable and she was discharged home.    She presents today for routine follow up.  She has not had any further falls since she was in the ER last month.  She does have 24-hour care at home.  She denies any chest pain or shortness of breath.  She also denies any lightheaded/dizziness, near-syncope, or syncope.             Medications     Current Outpatient Medications   Medication Instructions   • acetaminophen (TYLENOL) 975 mg, oral, Every 6 hours   • acetaminophen (TYLENOL) 1,000 mg, oral, Every 6 hours   • apixaban (ELIQUIS) 2.5 mg, oral, 2 times daily   • azithromycin (ZITHROMAX) 250 mg, oral, 3 times weekly   • calcium carbonate 600 mg calcium (1,500 mg) tablet 1 tablet, oral, Daily   • cholecalciferol, vitamin D3, 25 mcg (1,000 unit) capsule 1 tablet, oral, Daily   • diltiazem LA (CARDIZEM LA) 360 mg, oral, Daily   • docusate sodium (COLACE) 100 mg, oral, 2 times daily PRN   • DULoxetine (CYMBALTA) 60 mg, oral, Nightly   • fexofenadine (ALLEGRA) 180 mg, oral, Nightly   • FOLIC ACID/MULTIVIT,IRON, (CENTRUM ORAL) 1 tablet, oral, Daily   • lidocaine (LIDODERM) 5 % patch 1 patch, transdermal, Daily, Remove &  discard patch within 12 hours or as directed by prescriber.   • LORazepam (ATIVAN) 1 mg tablet TAKE 1 TABLET BY MOUTH NIGHTLY AS NEEDED FOR ANXIETY OR SLEEP.   • melatonin 6 mg, oral, Nightly   • metoprolol tartrate (LOPRESSOR) 25 mg, oral, 2 times daily   • midodrine (PROAMATINE) 5 mg, oral, 3 times daily before meals   • pregabalin (LYRICA) 50 mg, oral, 2 times daily, Morning and Evening   • pregabalin (LYRICA) 25 mg, oral, Daily with lunch, PRN   • propylene glycol/peg 400/PF (SYSTANE, PF, OPHT) 1 drop, ophthalmic, As needed   • SPIRIVA RESPIMAT 2.5 mcg/actuation mist inhaler 2 puffs, inhalation, Daily   • SYMBICORT 80-4.5 mcg/actuation inhaler 2 puffs, inhalation, 2 times daily   • traMADoL (ULTRAM) 50 mg, oral, Every 8 hours PRN, Pt also takes it as needed at lunch time.         Allergies     Mepolizumab, Morphine, and Oxycodone     Vital Signs/Exam     There were no vitals filed for this visit.  BP Readings from Last 3 Encounters:   03/04/24 (!) 148/98   02/27/24 (!) 152/111   01/25/24 (!) 144/88     Wt Readings from Last 3 Encounters:   03/04/24 49.4 kg (109 lb)   02/27/24 46.3 kg (102 lb)   01/25/24 49.5 kg (109 lb 3.2 oz)      There is no height or weight on file to calculate BMI.    Physical Exam  Constitutional:       Appearance: She is well-developed.   Eyes:      Conjunctiva/sclera: Conjunctivae normal.   Cardiovascular:      Rate and Rhythm: Normal rate and regular rhythm.      Heart sounds: Normal heart sounds.   Pulmonary:      Effort: Pulmonary effort is normal.      Breath sounds: Normal breath sounds.   Abdominal:      General: There is no distension.      Palpations: Abdomen is soft.      Tenderness: There is no abdominal tenderness.   Musculoskeletal:      Right lower leg: Edema (trace) present.      Left lower leg: Edema (trace) present.   Skin:     General: Skin is warm and dry.   Neurological:      General: No focal deficit present.      Mental Status: She is alert.   Psychiatric:          Mood and Affect: Mood normal.         Behavior: Behavior normal.            Diagnostic Data   Diagnostic data was personally reviewed. Available medical records were reviewed and counseling/education was performed where appropriate.  Speech recognition software was used. Typographical errors may be present.    Labs:  Lab Results   Component Value Date    CHOL 227 (H) 01/27/2023    CHOL 248 (H) 11/18/2015     Lab Results   Component Value Date    HDL 62 01/27/2023    HDL 92 11/18/2015     Lab Results   Component Value Date    LDLCALC 141 (H) 01/27/2023    LDLCALC 133 (H) 11/18/2015     Lab Results   Component Value Date    TRIG 120 01/27/2023    TRIG 117 11/18/2015     Lab Results   Component Value Date    WBC 5.81 12/21/2023    HGB 11.7 (L) 12/21/2023     12/21/2023     Lab Results   Component Value Date    GLUCOSE 88 12/21/2023    CALCIUM 8.8 12/21/2023     12/21/2023    K 4.1 12/21/2023    CO2 25 12/21/2023     (H) 12/21/2023    BUN 16 12/21/2023    CREATININE 0.7 12/21/2023     Lab Results   Component Value Date    EGFR >60.0 12/21/2023     Lab Results   Component Value Date    ALBUMIN 4.1 11/22/2023    BILITOT 0.5 11/22/2023    ALKPHOS 58 11/22/2023    ALT 15 11/22/2023    AST 15 11/22/2023     Lab Results   Component Value Date    HGBA1C 5.6 11/18/2015     High Sens Troponin I   Date Value Ref Range Status   08/21/2023 10.9 <15.0 pg/mL Final   08/21/2023 8.7 <15.0 pg/mL Final   02/21/2023 10.6 <15.0 pg/mL Final   02/20/2023 7.7 <15.0 pg/mL Final   01/26/2023 5.3 <15.0 pg/mL Final     Lab Results   Component Value Date    TSH 3.63 10/19/2023     CrCl cannot be calculated (Patient's most recent lab result is older than the maximum 28 days allowed.).    Echocardiogram:  Results for orders placed during the hospital encounter of 03/27/24    Transthoracic echo (TTE) complete    Interpretation Summary  •  Left Ventricle: Ventricle not well visualized. Normal ventricle size. Normal wall thickness.  Preserved systolic function. Estimated EF 65-70%. Indeterminate diastolic filling pattern. Unable to assess left atrial pressure.  •  Right Ventricle: Normal ventricle size. Normal systolic function.  •  Left Atrium: Moderate to severely dilated atrium.  •  Right Atrium: Mildly dilated atrium.  •  Aortic Valve: Tricuspid valve.  Sclerotic leaflets. No regurgitation. No stenosis.  •  Mitral Valve: Mild mitral annular calcification. Mild to moderate regurgitation.  •  Tricuspid Valve: Normal structure. Mild regurgitation. Estimated RVSP = 30 mmHg. No significant stenosis.  •  Pulmonic Valve: Normal structure. No regurgitation. No stenosis.  •  Aorta: Aortic root normal. Sinuses of Valsalva normal-sized. Ascending aorta normal-sized.  •  IVC/SVC: Inferior vena cava is a small caliber vessel (<1.7cm). Inferior vena cava collapses >50% during inspiration.  •  Pericardium: No evidence of pericardial effusion.  •  When compared to previous study there has been no significant change      LE Venous Ultrasound:  Results for orders placed during the hospital encounter of 06/08/19    Ultrasound venous leg bilateral    Interpretation Summary  No evidence of deep vein thrombus (DVT) in either lower extremity.      Cardiac monitor:    ECG:            Assessment and Plan      Madelyn Ruiz is a 89 y.o. female seen for the following conditions:        Diagnosis Plan   1. Atrial fibrillation and flutter (CMS/HCC)        2. Recurrent falls        3. Primary hypertension        4. Mixed hyperlipidemia        5. RBBB          Plan:     We again discussed the risk versus benefits of continuing anticoagulation.  She has had frequent falls but thus far has not had any major bleeding issues related to her anticoagulation.  She has had both paroxysmal atrial fibrillation as well as a prior PE and therefore obviously there is some risk with taking her off of anticoagulation.  At this point we agreed to continue on her current dosing of  Eliquis and we can revisit this in the future if she were to have more falls particularly if they are complicated by any excessive bleeding related to trauma.    I will plan to see her in follow-up in approximately 6 months.    I will plan to see her in follow-up in approximately 4 months at which point we will recheck an echocardiogram.    Michael A. Valentino, MD, [facility] [place] Office: [phone]  Primary [facility] [place]: [facility]   3/26/2024

## 2024-03-27 NOTE — PROGRESS NOTES
Complex care follow up call made: No answer. Left message with return call information.  If no return call, will follow up at a later date.     Julita Guzmán RN, BSN  Ambulatory Care Manager  [phone]

## 2024-04-02 ENCOUNTER — TELEPHONE (OUTPATIENT)
Dept: PRIMARY CARE | Facility: CLINIC | Age: 89
End: 2024-04-02
Payer: MEDICARE

## 2024-04-02 NOTE — TELEPHONE ENCOUNTER
Jody from [facility] left a VM in regards to the pt and some concerns she had. The pt was confused yesterday more than usual. She thought it was the day after Thanksgiving and could not say what the date was or the day. Difficulty mainly with word finding. Her daughter felt this may be from being out and about the day prior and feeling exhausted but pt has not been feeling well since about Friday.

## 2024-04-05 NOTE — TELEPHONE ENCOUNTER
I took a call from [facility] she is with the patient she said that she was following up from earlier this week patient is more confused , blood pressure elevated , headache & nausea she would like to discuss with you today she said that she would keep her cell phone on her she said she is reachable on her cell [phone] when you are available .

## 2024-04-08 NOTE — TELEPHONE ENCOUNTER
I spoke to Madelyn who's not interesting in scheduling office visit with Dr Vijay joyce the present time.  Pt states she will call office at a later time for appointment with Dr Linares

## 2024-04-10 ENCOUNTER — PATIENT OUTREACH (OUTPATIENT)
Dept: CASE MANAGEMENT | Facility: CLINIC | Age: 89
End: 2024-04-10
Payer: MEDICARE

## 2024-04-10 NOTE — PROGRESS NOTES
Complex care follow up call made: Spoke with the patient's daughter who stated that she was confused last evening, but she was good for her yesterday.Spoke with patient who stated that she is doing ok. She denied any fevers, chills or urinary symptoms.  She is eating and moving her bowels. She was heading out to a eye doctor appointment.  Instructed to call if the confusion would worsen. ACM instructed to call into the office for any immediate questions or concerns.  RN will follow up at a later time.    Julita Guzmán RN, BSN  Ambulatory Care Manager  [phone]

## 2024-04-22 ENCOUNTER — TELEPHONE (OUTPATIENT)
Dept: PRIMARY CARE | Facility: CLINIC | Age: 89
End: 2024-04-22
Payer: MEDICARE

## 2024-04-22 NOTE — TELEPHONE ENCOUNTER
Pt is constipated for a few days now. She has been taking Miralax, Senekot, and Colace. No BM. Pt has been sitting on the toilet with no success. She is shaking and unhappy. Dtr can not get BP read on her and she is nervous about the shaking. Pt has been eating and drinking.

## 2024-04-22 NOTE — TELEPHONE ENCOUNTER
Would have her try otc Ducolox tabs to see if produces formed BM if Miralax  and others have not worked. See when she had last BM?

## 2024-04-22 NOTE — TELEPHONE ENCOUNTER
Spoke with patient's daughter roma informed her per the Dr she said her last BM was a week ago she was going to do an enema ( too much stool )

## 2024-04-24 ENCOUNTER — PATIENT OUTREACH (OUTPATIENT)
Dept: CASE MANAGEMENT | Facility: CLINIC | Age: 89
End: 2024-04-24
Payer: MEDICARE

## 2024-04-24 NOTE — PROGRESS NOTES
Complex care follow up care:  Spoke with the patient who stated that she is doing ok, she is feeling tired today after PT.  She denied any further falls. She did move her bowels. Instructed on increased fluids, warmed prune juice and increased fiber.  No other issues or concerns.  Instructed to reach into the office with any future issues, concerns or questions.     Julita SIMONN, RN  Ambulatory Care Manager   [phone]

## 2024-05-14 ENCOUNTER — OFFICE VISIT (OUTPATIENT)
Dept: PRIMARY CARE | Facility: CLINIC | Age: 89
End: 2024-05-14
Payer: MEDICARE

## 2024-05-14 VITALS
WEIGHT: 108.4 LBS | RESPIRATION RATE: 16 BRPM | HEART RATE: 64 BPM | SYSTOLIC BLOOD PRESSURE: 136 MMHG | BODY MASS INDEX: 19.83 KG/M2 | TEMPERATURE: 97.3 F | DIASTOLIC BLOOD PRESSURE: 78 MMHG | OXYGEN SATURATION: 94 %

## 2024-05-14 DIAGNOSIS — I10 PRIMARY HYPERTENSION: Primary | Chronic | ICD-10-CM

## 2024-05-14 DIAGNOSIS — I48.0 PAROXYSMAL ATRIAL FIBRILLATION (CMS/HCC): Chronic | ICD-10-CM

## 2024-05-14 DIAGNOSIS — F41.9 ANXIETY: ICD-10-CM

## 2024-05-14 PROBLEM — H02.9 EYELID LESION: Status: RESOLVED | Noted: 2024-03-04 | Resolved: 2024-05-14

## 2024-05-14 PROBLEM — M25.562 ACUTE PAIN OF LEFT KNEE: Status: RESOLVED | Noted: 2024-03-04 | Resolved: 2024-05-14
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
## 2024-05-14 PROBLEM — S42.309A HUMERUS FRACTURE: Status: RESOLVED | Noted: 2023-12-19 | Resolved: 2024-05-14
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
## 2024-05-14 PROBLEM — S22.49XA RIBS, MULTIPLE FRACTURES: Status: RESOLVED | Noted: 2023-12-19 | Resolved: 2024-05-14
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
## 2024-05-14 PROCEDURE — 99214 OFFICE O/P EST MOD 30 MIN: CPT | Performed by: FAMILY MEDICINE

## 2024-05-14 ASSESSMENT — ENCOUNTER SYMPTOMS
PALPITATIONS: 0
WEAKNESS: 0
SPEECH DIFFICULTY: 0
HEADACHES: 0
SHORTNESS OF BREATH: 0
HYPERTENSION: 1
CONFUSION: 0
NAUSEA: 0
NUMBNESS: 0
CHEST TIGHTNESS: 0
LIGHT-HEADEDNESS: 0
DIZZINESS: 0
COUGH: 0
VOMITING: 0
BLURRED VISION: 0
ORTHOPNEA: 0

## 2024-05-14 NOTE — PATIENT INSTRUCTIONS
Problem List Items Addressed This Visit       A-fib (CMS/HCC) (Chronic)     Stable on Apixaban and Metoprolol.          Primary hypertension - Primary (Chronic)     Blood pressure controlled with Diltiazem and Metoprolol.

## 2024-05-14 NOTE — PROGRESS NOTES
Henry Linares MD    [facility]  [address]  [place] [address]  Phone: [phone]  Fax: [phone]     History of Present Illness     Subjective     Patient ID: Madelyn Ruiz is a 89 y.o. female.    Hypertension  This is a chronic problem. Associated symptoms include peripheral edema. Pertinent negatives include no blurred vision, chest pain, headaches, orthopnea, palpitations or shortness of breath. Past treatments include calcium channel blockers. There is no history of CVA, heart failure, left ventricular hypertrophy or PVD.        Past Medical/Surgical/Family/Social History       The following have been reviewed and updated as appropriate in this visit:   Allergies  Meds  Problems         Past Medical History:   Diagnosis Date    A-fib (CMS/[facility]) 08/22/2023    Managed on Apixaban and Metoprolol.   Echocardiogram 03/2024:    Left Ventricle: Ventricle not well visualized. Normal ventricle size. Normal wall thickness. Preserved systolic function. Estimated EF 65-70%. Indeterminate diastolic filling pattern. Unable to assess left atrial pressure.    Right Ventricle: Normal ventricle size. Normal systolic function.    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 Arriaza.  Stable FEV1 in 8/2015.  IgE levels and eosinophils stable in 8/2015.  Managed with Fasenra, Azithromycin and Flovent.    Allergic rhinitis 04/02/2018    Pulmonologist: Dr. Walker. Managed with Flonase.    Asthma     Atrial flutter with rapid ventricular response (CMS/[facility]) 02/21/2023    Diagnosed in 02/2023 and hospitalized Managed with Apixaban and Diltiazem Echocardiogram 02/2023   Left Ventricle: Normal ventricle size. Mild concentric left ventricular hypertrophy. Preserved systolic function. Estimated EF 65-70%. No regional wall motion abnormalities. Grade I diastolic dysfunction.   Right Ventricle: Normal  ventricle size. Normal systolic function.   Left Atrium: Moderately    Atrial flutter with rapid ventricular response (CMS/[facility]) 02/21/2023    Diagnosed in 02/2023 and hospitalized Managed with Apixaban, Metoprolol, and Diltiazem Echocardiogram 02/2023   Left Ventricle: Normal ventricle size. Mild concentric left ventricular hypertrophy. Preserved systolic function. Estimated EF 65-70%. No regional wall motion abnormalities. Grade I diastolic dysfunction.   Right Ventricle: Normal ventricle size. Normal systolic function.   Left Atriu    Calcification of aorta (CMS/[facility]) 06/29/2020    Seen incidentally on CT scan.    Chronic obstructive pulmonary disease (CMS/[facility]) 04/02/2018    Pulmonologist: Dr. Walker.  Seen on PFTs in hospital in 2014.  Controlled with Spiriva and FLovent.    COPD (chronic obstructive pulmonary disease) (CMS/[facility]) 10/19/2023    Managed with Symbicort and Spiriva    GERD (gastroesophageal reflux disease) 06/08/2019    Managed with Esomeprazole.  Should take medication 30 minutes prior to meal.  Advised to avoid caffeine, carbonated beverages, and should not eat within 3 hours of going to sleep.  Advised to reduce BMI < 25.     Insomnia 04/02/2018    Managed with Lorazepam as needed.    Lumbar spinal stenosis 04/02/2018    Neurosurgeon: Dr. Miguel. MRI with Central and lateral recess spinal stenosis. Has Spondylolisthesis at L4/L5. S/P epidural injection by Dr. Martin with some relief in past. Had Lumbar fusion and Lumbar laminectomy by Dr. Miguel in 4/2015.    Macular degeneration disease     Obstructive sleep apnea syndrome 04/02/2018    Mild STACIA noted in sleep study 7/2015. Treated with nasal CPAP automatic with pressure range 5-10 CM H2Ox couple months. Off now    Orthostatic hypotension 10/23/2023    Managed on Midodrine    Osteoporosis 04/02/2018    Rheumatologist: Dr. Bradley.  Dexa scan 7/2016 with osteoporosis of lumbar spine LS-2.5, LH-2.1, RH -2.2.  Has been on Fosamax in the past for 8  years.  Worsened by Prednisone in past.  Continue vitamin D, calcium and weight bearing exercise. DEXA in 7/2017 with LS -1.7, LH -2.2, and RH -2.0.  Next due in 7/2019.  Seen by Dr. Bradley in 8/2015 who agreed with initiating Prolia. Took for 2 years. N    Primary hypertension 04/02/2018    Managed on Diltiazem and Losartan. Advised to follow a low sodium diet and keep BMI < 25.  Advised to exercise for 2.5 hours per week.  Instructed to take home blood pressure readings and call if consistently above 140/90.      Primary hypertension 04/02/2018    Managed on Diltiazem  Advised to follow a low sodium diet and keep BMI < 25.  Advised to exercise for 2.5 hours per week.  Instructed to take home blood pressure readings and call if consistently above 140/90.      Pulmonary embolism (CMS/HCC) 06/09/2019    Hematologist: Dr. Bhakta Diagnosed in 06/2019 in right upper lobe. Vascular ultrasound negative of bilateral lower extremities. Managed on Eliquis. Hypercoagulable workup was negative for beta-2 glycoprotein's, RIGO, Antithrombin III, lupus anticoagulant, protein C activity protein S activity, and prothrombin gene mutation, and factor V Leiden. Now off Apixaban as CT scan in 12/2019 was without pu    Sacral fracture, closed (CMS/HCC) 08/21/2023    SVT (supraventricular tachycardia)     Vertebral compression fracture (CMS/HCC)     At T12 level       Past Surgical History:   Procedure Laterality Date    APPENDECTOMY      CARPAL TUNNEL RELEASE Bilateral     CATARACT EXTRACTION W/  INTRAOCULAR LENS IMPLANT Bilateral     HYSTERECTOMY  1972    LUMBAR LAMINECTOMY  04/21/2015    S/P lumbar fusion    TONSILLECTOMY         Family History   Problem Relation Age of Onset    Glaucoma Biological Mother     Macular degeneration Biological Mother     Hypertension Biological Mother     Lung cancer Biological Father     Heart disease Biological Brother     Breast cancer Mother's Sister     No Known Problems Biological Son     No Known  Problems Biological Son     No Known Problems Biological Daughter     No Known Problems Biological Daughter     No Known Problems Biological Daughter        Social History     Tobacco Use    Smoking status: Never    Smokeless tobacco: Never   Vaping Use    Vaping Use: Never used   Substance Use Topics    Alcohol use: Yes     Comment: Rarely    Drug use: No       Patient Care Team:  Henry Linares MD as PCP - General  CriRay padron MD as Referring Physician  Aye Bhakta MD as Consulting Physician (Hematology and Oncology)  Wesley Walker MD as Consulting Physician (Pulmonary)  Fausto Bradley MD as Consulting Physician (Rheumatology)  Kleiner, Robert C, MD as Referring Physician  Alfie Torres MD as Consulting Physician (Dermatology)  Fanny Pelaez MD as Consulting Physician (Orthopedic Surgery)  Keenan Krause DO as Consulting Physician (Family Medicine)  Keenan Krause DO as Consulting Physician (Family Medicine)  Wesley You DO as Consulting Physician (Physical Medicine and Rehabilitation)  Jessika Zelaya MD as Surgeon (Neurosurgery)  Henrietta Crsepo MD as Cardiologist (Interventional Cardiology)  Dinesh Goel MD as Surgeon (Otolaryngology)  Valentino, Michael A, MD PhD as Cardiologist (Cardiology)  Alejo Saavedra MD as Consulting Physician (Orthopedic Surgery)  Karolina Hauser RN as Care Manager (Care Management)     Allergies and Medications       Allergies   Allergen Reactions    Mepolizumab Rash    Morphine      Other reaction(s): Vomiting    Oxycodone      Other reaction(s): Vomiting       Current Outpatient Medications   Medication Sig Dispense Refill    acetaminophen (TYLENOL) 325 mg tablet Take 3 tablets (975 mg total) by mouth every 6 (six) hours. 360 tablet 0    acetaminophen (TYLENOL) 500 mg tablet Take 2 tablets (1,000 mg total) by mouth every 6 (six) hours.      apixaban (ELIQUIS) 2.5 mg tablet Take 1 tablet (2.5 mg total) by mouth 2 (two) times a  day. 180 tablet 3    azithromycin (ZITHROMAX) 250 mg tablet Take 1 tablet (250 mg total) by mouth 3 (three) times a week (Mon, Wed, Fri). 12 tablet 5    calcium carbonate 600 mg calcium (1,500 mg) tablet Take 1 tablet by mouth daily.      cholecalciferol, vitamin D3, 25 mcg (1,000 unit) capsule Take 1 tablet by mouth daily.      diltiazem LA (CARDIZEM LA) 360 mg 24 hr tablet Take 1 tablet (360 mg total) by mouth daily. 90 tablet 3    docusate sodium (COLACE) 100 mg capsule Take 100 mg by mouth 2 (two) times a day as needed for constipation.      DULoxetine (CYMBALTA) 60 mg capsule Take 60 mg by mouth at bedtime.   0    fexofenadine (ALLEGRA) 180 mg tablet Take 180 mg by mouth nightly.      FOLIC ACID/MULTIVIT,IRON, (CENTRUM ORAL) Take 1 tablet by mouth daily.      lidocaine (LIDODERM) 5 % patch Place 1 patch on the skin daily. Remove & discard patch within 12 hours or as directed by prescriber. 30 patch 3    LORazepam (ATIVAN) 1 mg tablet TAKE 1 TABLET BY MOUTH NIGHTLY AS NEEDED FOR ANXIETY OR SLEEP. 30 tablet 0    melatonin ODT Take 2 tablets (6 mg total) by mouth nightly.      metoprolol tartrate (LOPRESSOR) 25 mg tablet Take 1 tablet (25 mg total) by mouth 2 (two) times a day. 180 tablet 3    midodrine (PROAMATINE) 5 mg tablet Take 1 tablet (5 mg total) by mouth 3 (three) times a day before meals. 270 tablet 3    pregabalin (LYRICA) 25 mg capsule Take 25 mg by mouth daily with lunch. PRN      pregabalin (LYRICA) 50 mg capsule Take 50 mg by mouth 2 (two) times a day. Morning and Evening      propylene glycol/peg 400/PF (SYSTANE, PF, OPHT) Administer 1 drop into affected eye(s) as needed (dry eyes).      SPIRIVA RESPIMAT 2.5 mcg/actuation mist inhaler Inhale 2 puffs daily.      SYMBICORT 80-4.5 mcg/actuation inhaler Inhale 2 puffs 2 (two) times a day.      traMADoL (ULTRAM) 50 mg tablet Take 1 tablet (50 mg total) by mouth every 8 (eight) hours as needed for pain. Pt also takes it as needed at lunch time.    "    No current facility-administered medications for this visit.          Review of Systems       Review of Systems   Eyes:  Negative for blurred vision and visual disturbance.   Respiratory:  Negative for cough, chest tightness and shortness of breath.    Cardiovascular:  Negative for chest pain, palpitations, orthopnea and leg swelling.   Gastrointestinal:  Negative for nausea and vomiting.   Neurological:  Negative for dizziness, syncope, speech difficulty, weakness, light-headedness, numbness and headaches.   Psychiatric/Behavioral:  Negative for confusion.         Physical Examination       Objective     Vitals:    05/14/24 1115   BP: 136/78   Pulse: 64   Resp: 16   Temp: 36.3 °C (97.3 °F)   SpO2: 94%       Pulse Readings from Last 5 Encounters:   05/14/24 64   03/27/24 84   03/04/24 (!) 57   02/27/24 62   01/25/24 (!) 138       Wt Readings from Last 5 Encounters:   05/14/24 49.2 kg (108 lb 6.4 oz)   03/27/24 49.4 kg (109 lb)   03/27/24 48 kg (105 lb 12.8 oz)   03/04/24 49.4 kg (109 lb)   02/27/24 46.3 kg (102 lb)       Ht Readings from Last 5 Encounters:   03/27/24 1.524 m (5')   03/27/24 1.575 m (5' 2\")   03/04/24 1.524 m (5')   02/27/24 1.524 m (5')   01/25/24 1.549 m (5' 1\")       BP Readings from Last 5 Encounters:   05/14/24 136/78   03/27/24 (!) 151/96   03/27/24 130/74   03/04/24 (!) 148/98   02/27/24 (!) 152/111       BMI Readings from Last 4 Encounters:   05/14/24 19.83 kg/m²   03/27/24 21.29 kg/m²   03/27/24 19.35 kg/m²   03/04/24 21.29 kg/m²       Physical Exam  Constitutional:       Appearance: Normal appearance.   Cardiovascular:      Rate and Rhythm: Normal rate and regular rhythm.      Pulses: Normal pulses.      Heart sounds: Normal heart sounds. No murmur heard.     No gallop.   Pulmonary:      Effort: Pulmonary effort is normal.      Breath sounds: Normal breath sounds. No wheezing, rhonchi or rales.   Chest:      Chest wall: No tenderness.   Musculoskeletal:      Right lower leg: Edema " present.      Left lower leg: Edema present.      Comments: 1 plus pitting edema bilaterally     Neurological:      Mental Status: She is alert.   Psychiatric:         Mood and Affect: Mood normal.         Behavior: Behavior normal.          Laboratory Results     Lab Results   Component Value Date    WBC 5.81 12/21/2023    WBC 5.82 12/20/2023    WBC 7.37 12/19/2023    HGB 11.7 (L) 12/21/2023    HGB 10.4 (L) 12/20/2023    HGB 9.8 (L) 12/19/2023    HCT 37.5 12/21/2023    HCT 32.6 (L) 12/20/2023    HCT 31.9 (L) 12/19/2023    MCV 91.5 12/21/2023    MCV 90.1 12/20/2023    MCV 92.5 12/19/2023     12/21/2023     12/20/2023     12/19/2023        Lab Results   Component Value Date    GLUCOSE 88 12/21/2023    GLUCOSE 96 12/20/2023    GLUCOSE 101 (H) 12/19/2023    CALCIUM 8.8 12/21/2023    CALCIUM 8.8 12/20/2023    CALCIUM 9.1 12/19/2023     12/21/2023     12/20/2023     12/19/2023    K 4.1 12/21/2023    K 3.7 12/20/2023    K 4.0 12/19/2023    CO2 25 12/21/2023    CO2 24 12/20/2023    CO2 28 12/19/2023     (H) 12/21/2023     12/20/2023     12/19/2023    BUN 16 12/21/2023    BUN 17 12/20/2023    BUN 16 12/19/2023    CREATININE 0.7 12/21/2023    CREATININE 0.7 12/20/2023    CREATININE 0.7 12/19/2023    ALKPHOS 58 11/22/2023    ALKPHOS 56 10/19/2023    ALKPHOS 95 08/21/2023    AST 15 11/22/2023    AST 17 10/19/2023    AST 15 08/21/2023    ALT 15 11/22/2023    ALT 17 10/19/2023    ALT 14 08/21/2023    BILITOT 0.5 11/22/2023    BILITOT 0.5 10/19/2023    BILITOT 0.5 08/21/2023    ALBUMIN 4.1 11/22/2023    ALBUMIN 4.0 10/19/2023    ALBUMIN 3.7 08/21/2023       Lab Results   Component Value Date    CHOL 227 (H) 01/27/2023    CHOL 248 (H) 11/18/2015     Lab Results   Component Value Date    HDL 62 01/27/2023    HDL 92 11/18/2015     Lab Results   Component Value Date    LDLCALC 141 (H) 01/27/2023    LDLCALC 133 (H) 11/18/2015     Lab Results   Component Value Date    TRIG 120  01/27/2023    TRIG 117 11/18/2015       Lab Results   Component Value Date    TSH 3.63 10/19/2023    TSH 2.87 01/26/2023    TSH 1.91 10/14/2020     Lab Results   Component Value Date    FREET4 0.79 11/18/2015         Lab Results   Component Value Date    HGBA1C 5.6 11/18/2015    HGBA1C 5.6 04/08/2015       Lab Results   Component Value Date    HEPCAB Nonreactive 07/12/2018       Lab Results   Component Value Date    MG 2.1 12/20/2023    MG 2.3 10/24/2023    MG 2.2 10/23/2023          Immunization History   Administered Date(s) Administered    INFLUENZA VACCINE QUAD ADJUVANTED 65 and OLDER 10/06/2021, 10/19/2022, 10/11/2023    Influenza Split High Dose Preservative Free IM 11/03/2011, 11/13/2012, 10/09/2013, 10/30/2014, 11/17/2015, 09/30/2016, 09/25/2017    Influenza Vaccine 65 And Older Preservative Free 10/25/2019, 10/02/2020    Influenza Vaccine Quadrivalent 3 Yr And Older 09/18/2017, 10/22/2018    Influenza, Unspecified 10/30/2014, 11/17/2015, 09/30/2016, 09/25/2017    MMRV 06/05/2014    Pneumococcal Conjugate 08/09/1996    Pneumococcal Conjugate 13-Valent 11/17/2015    Pneumococcal Polysaccharide 10/30/2014, 09/11/2017    SARS-COV-2 (COVID-19) VACCINE PFIZER BIVALENT 12 years and older ([place]) 09/15/2022    SARS-COV-2 (COVID-19) VACCINE, MODERNA MONOVALENT 02/05/2021, 03/05/2021    SARS-COV-2 (COVID-19) VACCINE, MODERNA MONOVALENT BOOSTER 11/17/2021    SARS-COV-2 (COVID19) VACCINE, PFIZER MONOVALENT 09/15/2022    Tdap 03/22/2012, 08/08/2022    Varicella 06/05/2014    Zoster 01/02/2006, 01/02/2016    Zoster Vaccine Recombinant Adjuvanted (Shingrix) 05/24/2019, 09/20/2019         Health Maintenance Topics with due status: Overdue       Topic Date Due    RSV (60+ years old [shared decision making] or in pregnancy during 32 through 36 weeks) Never done    COVID-19 Vaccine 09/01/2023     Health Maintenance Topics with due status: Not Due       Topic Last Completion Date    DTaP, Tdap, and Td Vaccines 08/08/2022     DEXA Scan 09/20/2022    Medicare Annual Wellness Visit 10/11/2023    Depression Screening 12/19/2023    Falls Risk Screening 05/14/2024     Health Maintenance Topics with due status: Completed       Topic Last Completion Date    Pneumococcal (65 years and older) 09/11/2017    Zoster Vaccine 09/20/2019    Influenza Vaccine 10/11/2023     Health Maintenance Topics with due status: Aged Out       Topic Date Due    Meningococcal ACWY Aged Out    RSV <20 months Aged Out    HIB Vaccines Aged Out    Hepatitis B Vaccines Aged Out    IPV Vaccines Aged Out    HPV Vaccines Aged Out     Health Maintenance Topics with due status: Discontinued       Topic Date Due    Breast Cancer Screening Discontinued        Assessment and Plan       Assessment/Plan     Problem List Items Addressed This Visit       A-fib (CMS/HCC) (Chronic)    Overview     Managed on Apixaban and Metoprolol.   Echocardiogram 03/2024:    Left Ventricle: Ventricle not well visualized. Normal ventricle size. Normal wall thickness. Preserved systolic function. Estimated EF 65-70%. Indeterminate diastolic filling pattern. Unable to assess left atrial pressure.    Right Ventricle: Normal ventricle size. Normal systolic function.    Left Atrium: Moderate to severely dilated atrium.    Right Atrium: Mildly dilated atrium.    Aortic Valve: Tricuspid valve.  Sclerotic leaflets. No regurgitation. No stenosis.    Mitral Valve: Mild mitral annular calcification. Mild to moderate regurgitation.    Tricuspid Valve: Normal structure. Mild regurgitation. Estimated RVSP = 30 mmHg. No significant stenosis.    Pulmonic Valve: Normal structure. No regurgitation. No stenosis.    Aorta: Aortic root normal. Sinuses of Valsalva normal-sized. Ascending aorta normal-sized.    IVC/SVC: Inferior vena cava is a small caliber vessel (<1.7cm). Inferior vena cava collapses >50% during inspiration.    Pericardium: No evidence of pericardial effusion.    When compared to previous study there has  been no significant change           Current Assessment & Plan     Stable on Apixaban and Metoprolol.          Primary hypertension - Primary (Chronic)    Overview     Managed on Diltiazem   Advised to follow a low sodium diet and keep BMI < 25.   Advised to exercise for 2.5 hours per week.   Instructed to take home blood pressure readings and call if consistently above 140/90.             Current Assessment & Plan     Blood pressure controlled with Diltiazem and Metoprolol.             Return if symptoms worsen or fail to improve, for Recheck.    No orders of the defined types were placed in this encounter.             Henry Linares MD    5/14/2024

## 2024-05-15 RX ORDER — LORAZEPAM 1 MG/1
1 TABLET ORAL NIGHTLY PRN
Qty: 30 TABLET | Refills: 0 | Status: SHIPPED | OUTPATIENT
Start: 2024-05-15 | End: 2024-06-01 | Stop reason: SDUPTHER

## 2024-05-15 NOTE — TELEPHONE ENCOUNTER
Medicine Refill Request  Pdmp LF & written 2-16-24 # 30 for 30 days   Last Office Visit: 5/14/2024   Last Consult Visit: 11/12/2021  Last Telemedicine Visit: 8/3/2020 Henry Linares MD    Next Appointment: 10/18/2024      Current Outpatient Medications:     acetaminophen (TYLENOL) 325 mg tablet, Take 3 tablets (975 mg total) by mouth every 6 (six) hours., Disp: 360 tablet, Rfl: 0    acetaminophen (TYLENOL) 500 mg tablet, Take 2 tablets (1,000 mg total) by mouth every 6 (six) hours., Disp: , Rfl:     apixaban (ELIQUIS) 2.5 mg tablet, Take 1 tablet (2.5 mg total) by mouth 2 (two) times a day., Disp: 180 tablet, Rfl: 3    azithromycin (ZITHROMAX) 250 mg tablet, Take 1 tablet (250 mg total) by mouth 3 (three) times a week (Mon, Wed, Fri)., Disp: 12 tablet, Rfl: 5    calcium carbonate 600 mg calcium (1,500 mg) tablet, Take 1 tablet by mouth daily., Disp: , Rfl:     cholecalciferol, vitamin D3, 25 mcg (1,000 unit) capsule, Take 1 tablet by mouth daily., Disp: , Rfl:     diltiazem LA (CARDIZEM LA) 360 mg 24 hr tablet, Take 1 tablet (360 mg total) by mouth daily., Disp: 90 tablet, Rfl: 3    docusate sodium (COLACE) 100 mg capsule, Take 100 mg by mouth 2 (two) times a day as needed for constipation., Disp: , Rfl:     DULoxetine (CYMBALTA) 60 mg capsule, Take 60 mg by mouth at bedtime. , Disp: , Rfl: 0    fexofenadine (ALLEGRA) 180 mg tablet, Take 180 mg by mouth nightly., Disp: , Rfl:     FOLIC ACID/MULTIVIT,IRON, (CENTRUM ORAL), Take 1 tablet by mouth daily., Disp: , Rfl:     lidocaine (LIDODERM) 5 % patch, Place 1 patch on the skin daily. Remove & discard patch within 12 hours or as directed by prescriber., Disp: 30 patch, Rfl: 3    LORazepam (ATIVAN) 1 mg tablet, TAKE 1 TABLET BY MOUTH NIGHTLY AS NEEDED FOR ANXIETY OR SLEEP., Disp: 30 tablet, Rfl: 0    melatonin ODT, Take 2 tablets (6 mg total) by mouth nightly., Disp: , Rfl:     metoprolol tartrate (LOPRESSOR) 25 mg tablet, Take 1 tablet (25 mg total) by mouth 2  (two) times a day., Disp: 180 tablet, Rfl: 3    midodrine (PROAMATINE) 5 mg tablet, Take 1 tablet (5 mg total) by mouth 3 (three) times a day before meals., Disp: 270 tablet, Rfl: 3    pregabalin (LYRICA) 25 mg capsule, Take 25 mg by mouth daily with lunch. PRN, Disp: , Rfl:     pregabalin (LYRICA) 50 mg capsule, Take 50 mg by mouth 2 (two) times a day. Morning and Evening, Disp: , Rfl:     propylene glycol/peg 400/PF (SYSTANE, PF, OPHT), Administer 1 drop into affected eye(s) as needed (dry eyes)., Disp: , Rfl:     SPIRIVA RESPIMAT 2.5 mcg/actuation mist inhaler, Inhale 2 puffs daily., Disp: , Rfl:     SYMBICORT 80-4.5 mcg/actuation inhaler, Inhale 2 puffs 2 (two) times a day., Disp: , Rfl:     traMADoL (ULTRAM) 50 mg tablet, Take 1 tablet (50 mg total) by mouth every 8 (eight) hours as needed for pain. Pt also takes it as needed at lunch time., Disp: , Rfl:       BP Readings from Last 3 Encounters:   05/14/24 136/78   03/27/24 (!) 151/96   03/27/24 130/74       Recent Lab results:  Lab Results   Component Value Date    CHOL 227 (H) 01/27/2023   ,   Lab Results   Component Value Date    HDL 62 01/27/2023   ,   Lab Results   Component Value Date    LDLCALC 141 (H) 01/27/2023   ,   Lab Results   Component Value Date    TRIG 120 01/27/2023        Lab Results   Component Value Date    GLUCOSE 88 12/21/2023   ,   Lab Results   Component Value Date    HGBA1C 5.6 11/18/2015         Lab Results   Component Value Date    CREATININE 0.7 12/21/2023       Lab Results   Component Value Date    TSH 3.63 10/19/2023           Lab Results   Component Value Date    HGBA1C 5.6 11/18/2015

## 2024-06-01 DIAGNOSIS — F41.9 ANXIETY: ICD-10-CM

## 2024-06-03 RX ORDER — LORAZEPAM 1 MG/1
1 TABLET ORAL NIGHTLY PRN
Qty: 30 TABLET | Refills: 5 | Status: SHIPPED | OUTPATIENT
Start: 2024-06-03 | End: 2025-02-17

## 2024-06-03 NOTE — TELEPHONE ENCOUNTER
Medicine Refill Request  Pdmp LF & Written 2-16-24 # 30 for 30 days   Last Office Visit: 5/14/2024   Last Consult Visit: 11/12/2021  Last Telemedicine Visit: 8/3/2020 Henry Linares MD    Next Appointment: 10/18/2024      Current Outpatient Medications:     acetaminophen (TYLENOL) 325 mg tablet, Take 3 tablets (975 mg total) by mouth every 6 (six) hours., Disp: 360 tablet, Rfl: 0    acetaminophen (TYLENOL) 500 mg tablet, Take 2 tablets (1,000 mg total) by mouth every 6 (six) hours., Disp: , Rfl:     apixaban (ELIQUIS) 2.5 mg tablet, Take 1 tablet (2.5 mg total) by mouth 2 (two) times a day., Disp: 180 tablet, Rfl: 3    azithromycin (ZITHROMAX) 250 mg tablet, Take 1 tablet (250 mg total) by mouth 3 (three) times a week (Mon, Wed, Fri)., Disp: 12 tablet, Rfl: 5    calcium carbonate 600 mg calcium (1,500 mg) tablet, Take 1 tablet by mouth daily., Disp: , Rfl:     cholecalciferol, vitamin D3, 25 mcg (1,000 unit) capsule, Take 1 tablet by mouth daily., Disp: , Rfl:     diltiazem LA (CARDIZEM LA) 360 mg 24 hr tablet, Take 1 tablet (360 mg total) by mouth daily., Disp: 90 tablet, Rfl: 3    docusate sodium (COLACE) 100 mg capsule, Take 100 mg by mouth 2 (two) times a day as needed for constipation., Disp: , Rfl:     DULoxetine (CYMBALTA) 60 mg capsule, Take 60 mg by mouth at bedtime. , Disp: , Rfl: 0    fexofenadine (ALLEGRA) 180 mg tablet, Take 180 mg by mouth nightly., Disp: , Rfl:     FOLIC ACID/MULTIVIT,IRON, (CENTRUM ORAL), Take 1 tablet by mouth daily., Disp: , Rfl:     lidocaine (LIDODERM) 5 % patch, Place 1 patch on the skin daily. Remove & discard patch within 12 hours or as directed by prescriber., Disp: 30 patch, Rfl: 3    LORazepam (ATIVAN) 1 mg tablet, Take 1 tablet (1 mg total) by mouth nightly as needed for anxiety., Disp: 30 tablet, Rfl: 0    melatonin ODT, Take 2 tablets (6 mg total) by mouth nightly., Disp: , Rfl:     metoprolol tartrate (LOPRESSOR) 25 mg tablet, Take 1 tablet (25 mg total) by mouth 2  (two) times a day., Disp: 180 tablet, Rfl: 3    midodrine (PROAMATINE) 5 mg tablet, Take 1 tablet (5 mg total) by mouth 3 (three) times a day before meals., Disp: 270 tablet, Rfl: 3    pregabalin (LYRICA) 25 mg capsule, Take 25 mg by mouth daily with lunch. PRN, Disp: , Rfl:     pregabalin (LYRICA) 50 mg capsule, Take 50 mg by mouth 2 (two) times a day. Morning and Evening, Disp: , Rfl:     propylene glycol/peg 400/PF (SYSTANE, PF, OPHT), Administer 1 drop into affected eye(s) as needed (dry eyes)., Disp: , Rfl:     SPIRIVA RESPIMAT 2.5 mcg/actuation mist inhaler, Inhale 2 puffs daily., Disp: , Rfl:     SYMBICORT 80-4.5 mcg/actuation inhaler, Inhale 2 puffs 2 (two) times a day., Disp: , Rfl:     traMADoL (ULTRAM) 50 mg tablet, Take 1 tablet (50 mg total) by mouth every 8 (eight) hours as needed for pain. Pt also takes it as needed at lunch time., Disp: , Rfl:       BP Readings from Last 3 Encounters:   05/14/24 136/78   03/27/24 (!) 151/96   03/27/24 130/74       Recent Lab results:  Lab Results   Component Value Date    CHOL 227 (H) 01/27/2023   ,   Lab Results   Component Value Date    HDL 62 01/27/2023   ,   Lab Results   Component Value Date    LDLCALC 141 (H) 01/27/2023   ,   Lab Results   Component Value Date    TRIG 120 01/27/2023        Lab Results   Component Value Date    GLUCOSE 88 12/21/2023   ,   Lab Results   Component Value Date    HGBA1C 5.6 11/18/2015         Lab Results   Component Value Date    CREATININE 0.7 12/21/2023       Lab Results   Component Value Date    TSH 3.63 10/19/2023           Lab Results   Component Value Date    HGBA1C 5.6 11/18/2015

## 2024-06-26 ENCOUNTER — NURSE TRIAGE (OUTPATIENT)
Dept: PRIMARY CARE | Facility: CLINIC | Age: 89
End: 2024-06-26
Payer: MEDICARE

## 2024-06-26 NOTE — TELEPHONE ENCOUNTER
Let patient know if she is not getting better or worsening, needs to go to ER for evaluation. I can also see her in the office for evaluation and treatment

## 2024-07-01 ENCOUNTER — TELEPHONE (OUTPATIENT)
Dept: PRIMARY CARE | Facility: CLINIC | Age: 89
End: 2024-07-01
Payer: MEDICARE

## 2024-07-01 DIAGNOSIS — M54.50 ACUTE LOW BACK PAIN, UNSPECIFIED BACK PAIN LATERALITY, UNSPECIFIED WHETHER SCIATICA PRESENT: Primary | ICD-10-CM

## 2024-07-01 NOTE — TELEPHONE ENCOUNTER
I called van I left her a voicemail on her cell also spoke with patient's daughter kenia regarding

## 2024-07-01 NOTE — TELEPHONE ENCOUNTER
I took a call from [facility] she is there now with the patient she sustained a fall a week ago she had bruising & a bump on her head mild headache refused to go to emergency room she is there and the patient is complaining of chronic right side low back pain which the therapist has seen for 3 months of therapy & patient hasn't complained of she would like to know what you recommend no other symptoms she doesn't want to go to the emergency room

## 2024-07-02 ENCOUNTER — HOSPITAL ENCOUNTER (OUTPATIENT)
Dept: RADIOLOGY | Facility: CLINIC | Age: 89
Discharge: HOME | End: 2024-07-02
Attending: FAMILY MEDICINE
Payer: MEDICARE

## 2024-07-02 DIAGNOSIS — M54.50 ACUTE LOW BACK PAIN, UNSPECIFIED BACK PAIN LATERALITY, UNSPECIFIED WHETHER SCIATICA PRESENT: ICD-10-CM

## 2024-07-02 PROCEDURE — 72110 X-RAY EXAM L-2 SPINE 4/>VWS: CPT

## 2024-07-03 ENCOUNTER — TELEPHONE (OUTPATIENT)
Dept: PRIMARY CARE | Facility: CLINIC | Age: 89
End: 2024-07-03
Payer: MEDICARE

## 2024-07-03 NOTE — TELEPHONE ENCOUNTER
----- Message from Henry Linares MD sent at 7/3/2024 12:24 PM EDT -----  Let patient's daughter know that xray shows no new fracture. NO change since 12/19/2023. If pain persist would recommend physical therapy consultation..   I called kenia I left a message on her cell regarding .   I took a call from kenia she said she already sees a [facility] at [facility] ( [facility] ) she will inform her .

## 2024-07-03 NOTE — RESULT ENCOUNTER NOTE
Let patient's daughter know that xray shows no new fracture. NO change since 12/19/2023. If pain persist would recommend physical therapy consultation..

## 2024-09-13 RX ORDER — AZITHROMYCIN 250 MG/1
250 TABLET, FILM COATED ORAL 3 TIMES WEEKLY
Qty: 12 TABLET | Refills: 5 | Status: SHIPPED | OUTPATIENT
Start: 2024-09-13 | End: 2025-03-24

## 2024-09-13 NOTE — TELEPHONE ENCOUNTER
Medicine Refill Request    Last Office Visit: 5/14/2024   Last Consult Visit: 11/12/2021  Last Telemedicine Visit: Visit date not found    Next Appointment: 10/18/2024      Current Outpatient Medications:     acetaminophen (TYLENOL) 325 mg tablet, Take 3 tablets (975 mg total) by mouth every 6 (six) hours., Disp: 360 tablet, Rfl: 0    acetaminophen (TYLENOL) 500 mg tablet, Take 2 tablets (1,000 mg total) by mouth every 6 (six) hours., Disp: , Rfl:     apixaban (ELIQUIS) 2.5 mg tablet, Take 1 tablet (2.5 mg total) by mouth 2 (two) times a day., Disp: 180 tablet, Rfl: 3    azithromycin (ZITHROMAX) 250 mg tablet, Take 1 tablet (250 mg total) by mouth 3 (three) times a week (Mon, Wed, Fri)., Disp: 12 tablet, Rfl: 5    calcium carbonate 600 mg calcium (1,500 mg) tablet, Take 1 tablet by mouth daily., Disp: , Rfl:     cholecalciferol, vitamin D3, 25 mcg (1,000 unit) capsule, Take 1 tablet by mouth daily., Disp: , Rfl:     diltiazem LA (CARDIZEM LA) 360 mg 24 hr tablet, Take 1 tablet (360 mg total) by mouth daily., Disp: 90 tablet, Rfl: 3    docusate sodium (COLACE) 100 mg capsule, Take 100 mg by mouth 2 (two) times a day as needed for constipation., Disp: , Rfl:     DULoxetine (CYMBALTA) 60 mg capsule, Take 60 mg by mouth at bedtime. , Disp: , Rfl: 0    fexofenadine (ALLEGRA) 180 mg tablet, Take 180 mg by mouth nightly., Disp: , Rfl:     FOLIC ACID/MULTIVIT,IRON, (CENTRUM ORAL), Take 1 tablet by mouth daily., Disp: , Rfl:     lidocaine (LIDODERM) 5 % patch, Place 1 patch on the skin daily. Remove & discard patch within 12 hours or as directed by prescriber., Disp: 30 patch, Rfl: 3    LORazepam (ATIVAN) 1 mg tablet, Take 1 tablet (1 mg total) by mouth nightly as needed for anxiety., Disp: 30 tablet, Rfl: 5    melatonin ODT, Take 2 tablets (6 mg total) by mouth nightly., Disp: , Rfl:     metoprolol tartrate (LOPRESSOR) 25 mg tablet, Take 1 tablet (25 mg total) by mouth 2 (two) times a day., Disp: 180 tablet, Rfl: 3     midodrine (PROAMATINE) 5 mg tablet, Take 1 tablet (5 mg total) by mouth 3 (three) times a day before meals., Disp: 270 tablet, Rfl: 3    pregabalin (LYRICA) 25 mg capsule, Take 25 mg by mouth daily with lunch. PRN, Disp: , Rfl:     pregabalin (LYRICA) 50 mg capsule, Take 50 mg by mouth 2 (two) times a day. Morning and Evening, Disp: , Rfl:     propylene glycol/peg 400/PF (SYSTANE, PF, OPHT), Administer 1 drop into affected eye(s) as needed (dry eyes)., Disp: , Rfl:     SPIRIVA RESPIMAT 2.5 mcg/actuation mist inhaler, Inhale 2 puffs daily., Disp: , Rfl:     SYMBICORT 80-4.5 mcg/actuation inhaler, Inhale 2 puffs 2 (two) times a day., Disp: , Rfl:     traMADoL (ULTRAM) 50 mg tablet, Take 1 tablet (50 mg total) by mouth every 8 (eight) hours as needed for pain. Pt also takes it as needed at lunch time., Disp: , Rfl:       BP Readings from Last 3 Encounters:   05/14/24 136/78   03/27/24 (!) 151/96   03/27/24 130/74       Recent Lab results:  Lab Results   Component Value Date    CHOL 227 (H) 01/27/2023   ,   Lab Results   Component Value Date    HDL 62 01/27/2023   ,   Lab Results   Component Value Date    LDLCALC 141 (H) 01/27/2023   ,   Lab Results   Component Value Date    TRIG 120 01/27/2023        Lab Results   Component Value Date    GLUCOSE 88 12/21/2023   ,   Lab Results   Component Value Date    HGBA1C 5.6 11/18/2015         Lab Results   Component Value Date    CREATININE 0.7 12/21/2023       Lab Results   Component Value Date    TSH 3.63 10/19/2023           Lab Results   Component Value Date    HGBA1C 5.6 11/18/2015

## 2024-09-18 ENCOUNTER — OFFICE VISIT (OUTPATIENT)
Dept: CARDIOLOGY | Facility: CLINIC | Age: 89
End: 2024-09-18
Payer: MEDICARE

## 2024-09-18 VITALS
WEIGHT: 107 LBS | HEIGHT: 60 IN | SYSTOLIC BLOOD PRESSURE: 120 MMHG | HEART RATE: 57 BPM | OXYGEN SATURATION: 97 % | RESPIRATION RATE: 18 BRPM | DIASTOLIC BLOOD PRESSURE: 70 MMHG | BODY MASS INDEX: 21.01 KG/M2

## 2024-09-18 DIAGNOSIS — E78.2 MIXED HYPERLIPIDEMIA: ICD-10-CM

## 2024-09-18 DIAGNOSIS — R29.6 RECURRENT FALLS: ICD-10-CM

## 2024-09-18 DIAGNOSIS — I10 PRIMARY HYPERTENSION: ICD-10-CM

## 2024-09-18 DIAGNOSIS — I48.92 ATRIAL FIBRILLATION AND FLUTTER (CMS/HCC): Primary | ICD-10-CM

## 2024-09-18 DIAGNOSIS — I45.10 RBBB: ICD-10-CM

## 2024-09-18 DIAGNOSIS — I48.91 ATRIAL FIBRILLATION AND FLUTTER (CMS/HCC): Primary | ICD-10-CM

## 2024-09-18 LAB
ATRIAL RATE: 60
P AXIS: 108
PR INTERVAL: 150
QRS DURATION: 124
QT INTERVAL: 456
QTC CALCULATION(BAZETT): 456
R AXIS: 69
T WAVE AXIS: -5
VENTRICULAR RATE: 60

## 2024-09-18 PROCEDURE — 93000 ELECTROCARDIOGRAM COMPLETE: CPT | Performed by: INTERNAL MEDICINE

## 2024-09-18 PROCEDURE — 99214 OFFICE O/P EST MOD 30 MIN: CPT | Performed by: INTERNAL MEDICINE

## 2024-09-18 NOTE — PROGRESS NOTES
[facility] Office Visit       Patient ID: Madelyn Ruiz 89 y.o. female 8/9/1930  PCP: Henry Linares MD      HPI     Madelyn Ruiz is a 89 y.o. female with a past medical history significant for allergic bronchopulmonary aspergillosis, COPD, hypertension, hyperlipidemia, paroxysmal atrial flutter, left SCA aneurysm, GERD, pulmonary embolism in 2019, recurrent falls, and mild sleep apnea.    She was last seen in follow up, accompanied by her son Christ, on 9/20/2023 following an admission at [facility] from 8/21/2023-8/24/2023 with a fall complicated by right pubic ramus and bilateral sacral fractures. She was treated conservatively. She was evaluated by cardiology during her stay given pAF and risk versus benefit of continued anticoagulation. It was recommended that the patient be engaged in the decision making process and to follow up with her primary cardiologist. No changes were made to her cardiac medications at discharge. She was discharged to [facility] following this hospitalization. At discharge from [facility], she was started on hydralazine 25 mg TID and experienced significant hypotension with lightheadedness so family discontinued it. After detailed discussion and shared decision making, she elected to continue Eliquis. Lasix was increased from 10 mg every other day to 20 mg every other day due to lower extremity edema. Losartan was decreased to 50 mg daily to prevent hypotension. She was advised to continue off of hydralazine.      She was then admitted to [facility] from 10/19-10/24/2023 with fall. She was found on the ground by her home physical therapist, covered in blood and still in her night gown. She typically drinks vodka with water in the evening and also uses Ativan. She sustained a small laceration to the posterior scalp which did not require staples or sutures. CPK 200s. Orthostatic vitals were positive. Losartan and Lasix were held. She  was started on midodrine 5 mg TID. Diltiazem was increased from 300 mg daily to 360 mg daily due to rapid atrial fibrillation. Safety of Eliquis was discussed and she was ultimately discharged back on Eliquis to [facility].     I last saw her in March 2024.  At that time she had not had any recurrent falls and was living at home with a live-in caretaker providing 24-hour care.    Overall she has been doing well since her last visit.  She has not had any recurrent falls.  She also denies any palpitations or lightheaded/dizziness.  In addition she denies any chest pain or shortness of breath.       Medications     Current Outpatient Medications   Medication Instructions    acetaminophen (TYLENOL ARTHRITIS PAIN ORAL) 600 mg, Daily    apixaban (ELIQUIS) 2.5 mg, oral, 2 times daily    azithromycin (ZITHROMAX) 250 mg, oral, 3 times weekly    calcium carbonate 600 mg calcium (1,500 mg) tablet 1 tablet, Daily    cholecalciferol, vitamin D3, 25 mcg (1,000 unit) capsule 1 tablet, Daily    diltiazem LA (CARDIZEM LA) 360 mg, oral, Daily    docusate sodium (COLACE) 100 mg, 2 times daily PRN    DULoxetine (CYMBALTA) 60 mg, Nightly    fexofenadine (ALLEGRA) 180 mg, Nightly    lidocaine (LIDODERM) 5 % patch 1 patch, transdermal, Daily, Remove & discard patch within 12 hours or as directed by prescriber.    LORazepam (ATIVAN) 1 mg, oral, Nightly PRN    melatonin 6 mg, oral, Nightly    metoprolol tartrate (LOPRESSOR) 25 mg, oral, 2 times daily    midodrine (PROAMATINE) 5 mg, oral, 3 times daily before meals    pregabalin (LYRICA) 50 mg, 2 times daily    pregabalin (LYRICA) 25 mg, Daily with lunch    propylene glycol/peg 400/PF (SYSTANE, PF, OPHT) 1 drop, As needed    SPIRIVA RESPIMAT 2.5 mcg/actuation mist inhaler 2 puffs, Daily    SYMBICORT 80-4.5 mcg/actuation inhaler 2 puffs, 2 times daily    traMADoL (ULTRAM) 50 mg, oral, Every 8 hours PRN, Pt also takes it as needed at lunch time.         Allergies     Mepolizumab, Morphine, and  Oxycodone     Vital Signs/Exam     Vitals:    09/18/24 1314   BP: 120/70   Pulse: (!) 57   Resp: 18   SpO2: 97%   Weight: 48.5 kg (107 lb)   Height: 1.524 m (5')     BP Readings from Last 3 Encounters:   09/18/24 120/70   05/14/24 136/78   03/27/24 (!) 151/96     Wt Readings from Last 3 Encounters:   09/18/24 48.5 kg (107 lb)   05/14/24 49.2 kg (108 lb 6.4 oz)   03/27/24 49.4 kg (109 lb)      Body mass index is 20.9 kg/m².    Physical Exam  Constitutional:       Appearance: She is well-developed.   Eyes:      Conjunctiva/sclera: Conjunctivae normal.   Cardiovascular:      Rate and Rhythm: Normal rate and regular rhythm.      Heart sounds: Normal heart sounds.   Pulmonary:      Effort: Pulmonary effort is normal.      Breath sounds: Normal breath sounds.   Abdominal:      General: There is no distension.      Palpations: Abdomen is soft.      Tenderness: There is no abdominal tenderness.   Musculoskeletal:      Right lower leg: Edema (trace) present.      Left lower leg: Edema (trace) present.   Skin:     General: Skin is warm and dry.   Neurological:      General: No focal deficit present.      Mental Status: She is alert.   Psychiatric:         Mood and Affect: Mood normal.         Behavior: Behavior normal.            Diagnostic Data   Diagnostic data was personally reviewed. Available medical records were reviewed and counseling/education was performed where appropriate.  Speech recognition software was used. Typographical errors may be present.    Labs:  Lab Results   Component Value Date    CHOL 227 (H) 01/27/2023    CHOL 248 (H) 11/18/2015     Lab Results   Component Value Date    HDL 62 01/27/2023    HDL 92 11/18/2015     Lab Results   Component Value Date    LDLCALC 141 (H) 01/27/2023    LDLCALC 133 (H) 11/18/2015     Lab Results   Component Value Date    TRIG 120 01/27/2023    TRIG 117 11/18/2015     Lab Results   Component Value Date    WBC 5.81 12/21/2023    HGB 11.7 (L) 12/21/2023     12/21/2023      Lab Results   Component Value Date    GLUCOSE 88 12/21/2023    CALCIUM 8.8 12/21/2023     12/21/2023    K 4.1 12/21/2023    CO2 25 12/21/2023     (H) 12/21/2023    BUN 16 12/21/2023    CREATININE 0.7 12/21/2023     Lab Results   Component Value Date    EGFR >60.0 12/21/2023     Lab Results   Component Value Date    ALBUMIN 4.1 11/22/2023    BILITOT 0.5 11/22/2023    ALKPHOS 58 11/22/2023    ALT 15 11/22/2023    AST 15 11/22/2023     Lab Results   Component Value Date    HGBA1C 5.6 11/18/2015     High Sens Troponin I   Date Value Ref Range Status   08/21/2023 10.9 <15.0 pg/mL Final   08/21/2023 8.7 <15.0 pg/mL Final   02/21/2023 10.6 <15.0 pg/mL Final   02/20/2023 7.7 <15.0 pg/mL Final   01/26/2023 5.3 <15.0 pg/mL Final     Lab Results   Component Value Date    TSH 3.63 10/19/2023     CrCl cannot be calculated (Patient's most recent lab result is older than the maximum 28 days allowed.).    Echocardiogram:  Results for orders placed during the hospital encounter of 02/20/23    Transthoracic Echo (TTE) Complete    Interpretation Summary    Left Ventricle: Normal ventricle size. Mild concentric left ventricular hypertrophy. Preserved systolic function. Estimated EF 65-70%. No regional wall motion abnormalities. Grade I diastolic dysfunction.    Right Ventricle: Normal ventricle size. Normal systolic function.    Left Atrium: Moderately dilated atrium.    Right Atrium: Mildly dilated atrium.    Aortic Valve: Tricuspid valve.  Sclerotic leaflets. No regurgitation. No stenosis. Calculated dimensionless index = 1.04.    Mitral Valve: Anterior leaflet thickening. Sclerotic mitral valve. Normal leaflet motion. Mild mitral annular calcification. Mild regurgitation. No stenosis. Mean gradient = 2.00.    Tricuspid Valve: Normal structure. Mild regurgitation. Estimated RVSP = 32 mmHg. No significant stenosis.    Prior Study: Prior study available for comparison. Prior study date: 6/11/2019.  Compared to the prior  study, biatrial enlargement and mild concentric LVH are now present.  Otherwise no significant change.          Cardiac monitor:    ECG:            Assessment and Plan      Madelyn Ruiz is a 89 y.o. female seen for the following conditions:      Diagnosis Plan   1. Atrial fibrillation and flutter (CMS/HCC)  [facility] MUSE ECG 12 lead (clinic performed)    Transthoracic echo (TTE) complete      2. RBBB  Transthoracic echo (TTE) complete      3. Primary hypertension        4. Mixed hyperlipidemia        5. Recurrent falls          Plan:     She currently has a live-in caretaker and therefore her fall risk is much lower than it had been in the past.  I am very happy that she has not had any falls resulting in hospitalization over the last 6 months.  We will plan to continue on Eliquis for anticoagulation for now.    She is in sinus rhythm at today's visit.  She will continue on metoprolol and diltiazem for her paroxysmal atrial fibrillation/flutter.  She is also on midodrine given her orthostatic hypotension which she will continue.    She examines fairly euvolemic and we will continue on her current regimen of every other day Lasix 20 mg.    I will plan to see her in follow-up in approximately 4 months at which point we will recheck an echocardiogram.    Michael A. Valentino, MD PhD   [facility] [place] Office: [phone]  Primary [facility]: [facility]   9/18/2024

## 2024-09-18 NOTE — LETTER
September 18, 2024     Henry Linares MD  [address]  [address]  [facility] [address]    Patient: Madelyn Ruiz  YOB: 1930  Date of Visit: 9/18/2024      Dear Dr. Linares:    Thank you for referring Madelyn Ruiz to me for evaluation. Below are my notes for this consultation.    If you have questions, please do not hesitate to call me. I look forward to following your patient along with you.         Sincerely,        Michael A. Valentino, MD PhD        CC: No Recipients    Valentino, Michael A, MD PhD  9/18/2024  1:36 PM  Sign when Signing Visit     [facility] Cardiology  [place] Office Visit       Patient ID: Madelyn Ruiz 89 y.o. female 8/9/1930  PCP: Henry Linares MD      [facility]     Madelyn Ruiz is a 89 y.o. female with a past medical history significant for allergic bronchopulmonary aspergillosis, COPD, hypertension, hyperlipidemia, paroxysmal atrial flutter, left SCA aneurysm, GERD, pulmonary embolism in 2019, recurrent falls, and mild sleep apnea.    She was last seen in follow up, accompanied by her son Christ, on 9/20/2023 following an admission at [facility] from 8/21/2023-8/24/2023 with a fall complicated by right pubic ramus and bilateral sacral fractures. She was treated conservatively. She was evaluated by cardiology during her stay given pAF and risk versus benefit of continued anticoagulation. It was recommended that the patient be engaged in the decision making process and to follow up with her primary cardiologist. No changes were made to her cardiac medications at discharge. She was discharged to [facility] following this hospitalization. At discharge from [facility], she was started on hydralazine 25 mg TID and experienced significant hypotension with lightheadedness so family discontinued it. After detailed discussion and shared decision making, she elected to continue Eliquis. Lasix was increased from 10 mg every other day to 20 mg every other day due  to lower extremity edema. Losartan was decreased to 50 mg daily to prevent hypotension. She was advised to continue off of hydralazine.      She was then admitted to [facility] from 10/19-10/24/2023 with fall. She was found on the ground by her home physical therapist, covered in blood and still in her night gown. She typically drinks vodka with water in the evening and also uses Ativan. She sustained a small laceration to the posterior scalp which did not require staples or sutures. CPK 200s. Orthostatic vitals were positive. Losartan and Lasix were held. She was started on midodrine 5 mg TID. Diltiazem was increased from 300 mg daily to 360 mg daily due to rapid atrial fibrillation. Safety of Eliquis was discussed and she was ultimately discharged back on Eliquis to [facility].     I last saw her in March 2024.  At that time she had not had any recurrent falls and was living at home with a live-in caretaker providing 24-hour care.    Overall she has been doing well since her last visit.  She has not had any recurrent falls.  She also denies any palpitations or lightheaded/dizziness.  In addition she denies any chest pain or shortness of breath.       Medications     Current Outpatient Medications   Medication Instructions   • acetaminophen (TYLENOL ARTHRITIS PAIN ORAL) 600 mg, Daily   • apixaban (ELIQUIS) 2.5 mg, oral, 2 times daily   • azithromycin (ZITHROMAX) 250 mg, oral, 3 times weekly   • calcium carbonate 600 mg calcium (1,500 mg) tablet 1 tablet, Daily   • cholecalciferol, vitamin D3, 25 mcg (1,000 unit) capsule 1 tablet, Daily   • diltiazem LA (CARDIZEM LA) 360 mg, oral, Daily   • docusate sodium (COLACE) 100 mg, 2 times daily PRN   • DULoxetine (CYMBALTA) 60 mg, Nightly   • fexofenadine (ALLEGRA) 180 mg, Nightly   • lidocaine (LIDODERM) 5 % patch 1 patch, transdermal, Daily, Remove & discard patch within 12 hours or as directed by prescriber.   • LORazepam (ATIVAN) 1 mg, oral, Nightly PRN   •  melatonin 6 mg, oral, Nightly   • metoprolol tartrate (LOPRESSOR) 25 mg, oral, 2 times daily   • midodrine (PROAMATINE) 5 mg, oral, 3 times daily before meals   • pregabalin (LYRICA) 50 mg, 2 times daily   • pregabalin (LYRICA) 25 mg, Daily with lunch   • propylene glycol/peg 400/PF (SYSTANE, PF, OPHT) 1 drop, As needed   • SPIRIVA RESPIMAT 2.5 mcg/actuation mist inhaler 2 puffs, Daily   • SYMBICORT 80-4.5 mcg/actuation inhaler 2 puffs, 2 times daily   • traMADoL (ULTRAM) 50 mg, oral, Every 8 hours PRN, Pt also takes it as needed at lunch time.         Allergies     Mepolizumab, Morphine, and Oxycodone     Vital Signs/Exam     Vitals:    09/18/24 1314   BP: 120/70   Pulse: (!) 57   Resp: 18   SpO2: 97%   Weight: 48.5 kg (107 lb)   Height: 1.524 m (5')     BP Readings from Last 3 Encounters:   09/18/24 120/70   05/14/24 136/78   03/27/24 (!) 151/96     Wt Readings from Last 3 Encounters:   09/18/24 48.5 kg (107 lb)   05/14/24 49.2 kg (108 lb 6.4 oz)   03/27/24 49.4 kg (109 lb)      Body mass index is 20.9 kg/m².    Physical Exam  Constitutional:       Appearance: She is well-developed.   Eyes:      Conjunctiva/sclera: Conjunctivae normal.   Cardiovascular:      Rate and Rhythm: Normal rate and regular rhythm.      Heart sounds: Normal heart sounds.   Pulmonary:      Effort: Pulmonary effort is normal.      Breath sounds: Normal breath sounds.   Abdominal:      General: There is no distension.      Palpations: Abdomen is soft.      Tenderness: There is no abdominal tenderness.   Musculoskeletal:      Right lower leg: Edema (trace) present.      Left lower leg: Edema (trace) present.   Skin:     General: Skin is warm and dry.   Neurological:      General: No focal deficit present.      Mental Status: She is alert.   Psychiatric:         Mood and Affect: Mood normal.         Behavior: Behavior normal.            Diagnostic Data   Diagnostic data was personally reviewed. Available medical records were reviewed and  counseling/education was performed where appropriate.  Speech recognition software was used. Typographical errors may be present.    Labs:  Lab Results   Component Value Date    CHOL 227 (H) 01/27/2023    CHOL 248 (H) 11/18/2015     Lab Results   Component Value Date    HDL 62 01/27/2023    HDL 92 11/18/2015     Lab Results   Component Value Date    LDLCALC 141 (H) 01/27/2023    LDLCALC 133 (H) 11/18/2015     Lab Results   Component Value Date    TRIG 120 01/27/2023    TRIG 117 11/18/2015     Lab Results   Component Value Date    WBC 5.81 12/21/2023    HGB 11.7 (L) 12/21/2023     12/21/2023     Lab Results   Component Value Date    GLUCOSE 88 12/21/2023    CALCIUM 8.8 12/21/2023     12/21/2023    K 4.1 12/21/2023    CO2 25 12/21/2023     (H) 12/21/2023    BUN 16 12/21/2023    CREATININE 0.7 12/21/2023     Lab Results   Component Value Date    EGFR >60.0 12/21/2023     Lab Results   Component Value Date    ALBUMIN 4.1 11/22/2023    BILITOT 0.5 11/22/2023    ALKPHOS 58 11/22/2023    ALT 15 11/22/2023    AST 15 11/22/2023     Lab Results   Component Value Date    HGBA1C 5.6 11/18/2015     High Sens Troponin I   Date Value Ref Range Status   08/21/2023 10.9 <15.0 pg/mL Final   08/21/2023 8.7 <15.0 pg/mL Final   02/21/2023 10.6 <15.0 pg/mL Final   02/20/2023 7.7 <15.0 pg/mL Final   01/26/2023 5.3 <15.0 pg/mL Final     Lab Results   Component Value Date    TSH 3.63 10/19/2023     CrCl cannot be calculated (Patient's most recent lab result is older than the maximum 28 days allowed.).    Echocardiogram:  Results for orders placed during the hospital encounter of 02/20/23    Transthoracic Echo (TTE) Complete    Interpretation Summary  •  Left Ventricle: Normal ventricle size. Mild concentric left ventricular hypertrophy. Preserved systolic function. Estimated EF 65-70%. No regional wall motion abnormalities. Grade I diastolic dysfunction.  •  Right Ventricle: Normal ventricle size. Normal systolic  function.  •  Left Atrium: Moderately dilated atrium.  •  Right Atrium: Mildly dilated atrium.  •  Aortic Valve: Tricuspid valve.  Sclerotic leaflets. No regurgitation. No stenosis. Calculated dimensionless index = 1.04.  •  Mitral Valve: Anterior leaflet thickening. Sclerotic mitral valve. Normal leaflet motion. Mild mitral annular calcification. Mild regurgitation. No stenosis. Mean gradient = 2.00.  •  Tricuspid Valve: Normal structure. Mild regurgitation. Estimated RVSP = 32 mmHg. No significant stenosis.  •  Prior Study: Prior study available for comparison. Prior study date: 6/11/2019.  Compared to the prior study, biatrial enlargement and mild concentric LVH are now present.  Otherwise no significant change.          Cardiac monitor:    ECG:            Assessment and Plan      Madelyn Ruiz is a 89 y.o. female seen for the following conditions:      Diagnosis Plan   1. Atrial fibrillation and flutter (CMS/HCC)  [facility] MUSE ECG 12 lead (clinic performed)    Transthoracic echo (TTE) complete      2. RBBB  Transthoracic echo (TTE) complete      3. Primary hypertension        4. Mixed hyperlipidemia        5. Recurrent falls          Plan:     She currently has a live-in caretaker and therefore her fall risk is much lower than it had been in the past.  I am very happy that she has not had any falls resulting in hospitalization over the last 6 months.  We will plan to continue on Eliquis for anticoagulation for now.    She is in sinus rhythm at today's visit.  She will continue on metoprolol and diltiazem for her paroxysmal atrial fibrillation/flutter.  She is also on midodrine given her orthostatic hypotension which she will continue.    She examines fairly euvolemic and we will continue on her current regimen of every other day Lasix 20 mg.    I will plan to see her in follow-up in approximately 4 months at which point we will recheck an echocardiogram.    Michael A. Valentino, MD PhD   [facility] [place]  [place] Office: [phone]  [facility]: [facility]   9/18/2024

## 2024-10-09 ENCOUNTER — TRANSCRIBE ORDERS (OUTPATIENT)
Dept: SCHEDULING | Age: 89
End: 2024-10-09

## 2024-10-09 DIAGNOSIS — M81.0 AGE-RELATED OSTEOPOROSIS WITHOUT CURRENT PATHOLOGICAL FRACTURE: Primary | ICD-10-CM

## 2024-10-16 ENCOUNTER — HOSPITAL ENCOUNTER (OUTPATIENT)
Dept: RADIOLOGY | Facility: CLINIC | Age: 89
Discharge: HOME | End: 2024-10-16
Attending: INTERNAL MEDICINE
Payer: MEDICARE

## 2024-10-16 ENCOUNTER — TRANSCRIBE ORDERS (OUTPATIENT)
Dept: LAB | Facility: CLINIC | Age: 89
End: 2024-10-16

## 2024-10-16 ENCOUNTER — APPOINTMENT (OUTPATIENT)
Dept: LAB | Facility: CLINIC | Age: 89
End: 2024-10-16
Attending: INTERNAL MEDICINE
Payer: MEDICARE

## 2024-10-16 DIAGNOSIS — E55.9 VITAMIN D DEFICIENCY, UNSPECIFIED: ICD-10-CM

## 2024-10-16 DIAGNOSIS — Z79.899 OTHER LONG TERM (CURRENT) DRUG THERAPY: ICD-10-CM

## 2024-10-16 DIAGNOSIS — M81.0 AGE-RELATED OSTEOPOROSIS WITHOUT CURRENT PATHOLOGICAL FRACTURE: ICD-10-CM

## 2024-10-16 DIAGNOSIS — M81.0 AGE-RELATED OSTEOPOROSIS WITHOUT CURRENT PATHOLOGICAL FRACTURE: Primary | ICD-10-CM

## 2024-10-16 LAB
ALBUMIN SERPL-MCNC: 4 G/DL (ref 3.5–5.7)
ALP SERPL-CCNC: 47 IU/L (ref 34–125)
ALT SERPL-CCNC: 16 IU/L (ref 7–52)
ANION GAP SERPL CALC-SCNC: 7 MEQ/L (ref 3–15)
AST SERPL-CCNC: 12 IU/L (ref 13–39)
BASOPHILS # BLD: 0.01 K/UL (ref 0.01–0.1)
BASOPHILS NFR BLD: 0.2 %
BILIRUB SERPL-MCNC: 0.4 MG/DL (ref 0.3–1.2)
BUN SERPL-MCNC: 19 MG/DL (ref 7–25)
CALCIUM SERPL-MCNC: 9.6 MG/DL (ref 8.6–10.3)
CHLORIDE SERPL-SCNC: 107 MEQ/L (ref 98–107)
CO2 SERPL-SCNC: 29 MEQ/L (ref 21–31)
CREAT SERPL-MCNC: 0.8 MG/DL (ref 0.6–1.2)
DIFFERENTIAL METHOD BLD: ABNORMAL
EGFRCR SERPLBLD CKD-EPI 2021: >60 ML/MIN/1.73M*2
EOSINOPHIL # BLD: 0 K/UL (ref 0.04–0.36)
EOSINOPHIL NFR BLD: 0 %
ERYTHROCYTE [DISTWIDTH] IN BLOOD BY AUTOMATED COUNT: 16.5 % (ref 11.7–14.4)
GLUCOSE SERPL-MCNC: 72 MG/DL (ref 70–99)
HCT VFR BLD AUTO: 39.3 % (ref 35–45)
HGB BLD-MCNC: 12.5 G/DL (ref 11.8–15.7)
IMM GRANULOCYTES # BLD AUTO: 0.04 K/UL (ref 0–0.08)
IMM GRANULOCYTES NFR BLD AUTO: 0.8 %
LYMPHOCYTES # BLD: 1.5 K/UL (ref 1.2–3.5)
LYMPHOCYTES NFR BLD: 28.5 %
MCH RBC QN AUTO: 31.8 PG (ref 28–33.2)
MCHC RBC AUTO-ENTMCNC: 31.8 G/DL (ref 32.2–35.5)
MCV RBC AUTO: 100 FL (ref 83–98)
MONOCYTES # BLD: 0.52 K/UL (ref 0.28–0.8)
MONOCYTES NFR BLD: 9.9 %
NEUTROPHILS # BLD: 3.19 K/UL (ref 1.7–7)
NEUTS SEG NFR BLD: 60.6 %
NRBC BLD-RTO: 0 %
PLATELET # BLD AUTO: 294 K/UL (ref 150–369)
PMV BLD AUTO: 10.1 FL (ref 9.4–12.3)
POTASSIUM SERPL-SCNC: 3.5 MEQ/L (ref 3.5–5.1)
PROT SERPL-MCNC: 6.2 G/DL (ref 6–8.2)
RBC # BLD AUTO: 3.93 M/UL (ref 3.93–5.22)
SODIUM SERPL-SCNC: 143 MEQ/L (ref 136–145)
WBC # BLD AUTO: 5.26 K/UL (ref 3.8–10.5)

## 2024-10-16 PROCEDURE — 85025 COMPLETE CBC W/AUTO DIFF WBC: CPT

## 2024-10-16 PROCEDURE — 77080 DXA BONE DENSITY AXIAL: CPT

## 2024-10-16 PROCEDURE — 82306 VITAMIN D 25 HYDROXY: CPT

## 2024-10-16 PROCEDURE — 80053 COMPREHEN METABOLIC PANEL: CPT

## 2024-10-16 PROCEDURE — 36415 COLL VENOUS BLD VENIPUNCTURE: CPT

## 2024-10-17 LAB — 25(OH)D3 SERPL-MCNC: 29 NG/ML (ref 30–100)

## 2024-10-18 ENCOUNTER — OFFICE VISIT (OUTPATIENT)
Dept: PRIMARY CARE | Facility: CLINIC | Age: 89
End: 2024-10-18
Payer: MEDICARE

## 2024-10-18 VITALS
DIASTOLIC BLOOD PRESSURE: 94 MMHG | OXYGEN SATURATION: 97 % | WEIGHT: 108.8 LBS | SYSTOLIC BLOOD PRESSURE: 172 MMHG | HEART RATE: 86 BPM | RESPIRATION RATE: 16 BRPM | BODY MASS INDEX: 20.54 KG/M2 | HEIGHT: 61 IN | TEMPERATURE: 97.4 F

## 2024-10-18 DIAGNOSIS — Z23 NEED FOR INFLUENZA VACCINATION: ICD-10-CM

## 2024-10-18 DIAGNOSIS — M81.0 OSTEOPOROSIS, UNSPECIFIED OSTEOPOROSIS TYPE, UNSPECIFIED PATHOLOGICAL FRACTURE PRESENCE: Chronic | ICD-10-CM

## 2024-10-18 DIAGNOSIS — Z00.00 MEDICARE ANNUAL WELLNESS VISIT, SUBSEQUENT: Primary | ICD-10-CM

## 2024-10-18 PROBLEM — T14.8XXA OPEN WOUND OF SKIN: Status: RESOLVED | Noted: 2024-03-04 | Resolved: 2024-10-18

## 2024-10-18 PROCEDURE — 90662 IIV NO PRSV INCREASED AG IM: CPT | Performed by: FAMILY MEDICINE

## 2024-10-18 PROCEDURE — G0008 ADMIN INFLUENZA VIRUS VAC: HCPCS | Performed by: FAMILY MEDICINE

## 2024-10-18 PROCEDURE — G0439 PPPS, SUBSEQ VISIT: HCPCS | Performed by: FAMILY MEDICINE

## 2024-10-18 SDOH — ECONOMIC STABILITY: TRANSPORTATION INSECURITY
IN THE PAST 12 MONTHS, HAS LACK OF TRANSPORTATION KEPT YOU FROM MEETINGS, WORK, OR FROM GETTING THINGS NEEDED FOR DAILY LIVING?: NO

## 2024-10-18 SDOH — ECONOMIC STABILITY: FOOD INSECURITY: WITHIN THE PAST 12 MONTHS, YOU WORRIED THAT YOUR FOOD WOULD RUN OUT BEFORE YOU GOT MONEY TO BUY MORE.: NEVER TRUE

## 2024-10-18 SDOH — ECONOMIC STABILITY: FOOD INSECURITY: WITHIN THE PAST 12 MONTHS, THE FOOD YOU BOUGHT JUST DIDN'T LAST AND YOU DIDN'T HAVE MONEY TO GET MORE.: NEVER TRUE

## 2024-10-18 SDOH — ECONOMIC STABILITY: TRANSPORTATION INSECURITY
IN THE PAST 12 MONTHS, HAS THE LACK OF TRANSPORTATION KEPT YOU FROM MEDICAL APPOINTMENTS OR FROM GETTING MEDICATIONS?: NO

## 2024-10-18 ASSESSMENT — ENCOUNTER SYMPTOMS
APPETITE CHANGE: 0
LIGHT-HEADEDNESS: 0
NAUSEA: 0
DIFFICULTY URINATING: 0
SORE THROAT: 0
JOINT SWELLING: 0
SLEEP DISTURBANCE: 0
CHEST TIGHTNESS: 0
HEADACHES: 0
SPEECH DIFFICULTY: 0
NECK PAIN: 0
EYE DISCHARGE: 0
FREQUENCY: 0
BLOOD IN STOOL: 0
SHORTNESS OF BREATH: 0
RHINORRHEA: 0
COUGH: 0
DIARRHEA: 0
DYSPHORIC MOOD: 0
DYSURIA: 0
PALPITATIONS: 0
WEAKNESS: 0
NERVOUS/ANXIOUS: 0
MYALGIAS: 0
DIZZINESS: 0
CHILLS: 0
EYE ITCHING: 0
VOMITING: 0
CONSTIPATION: 0
ABDOMINAL PAIN: 0
EYE REDNESS: 0
EYE PAIN: 0
HEMATURIA: 0
FATIGUE: 0
SINUS PRESSURE: 0
WHEEZING: 0
BACK PAIN: 0
BRUISES/BLEEDS EASILY: 0
FEVER: 0
CONFUSION: 0
NUMBNESS: 0
UNEXPECTED WEIGHT CHANGE: 0
ARTHRALGIAS: 0
TROUBLE SWALLOWING: 0

## 2024-10-18 ASSESSMENT — PATIENT HEALTH QUESTIONNAIRE - PHQ9: SUM OF ALL RESPONSES TO PHQ9 QUESTIONS 1 & 2: 0

## 2024-10-18 ASSESSMENT — SOCIAL DETERMINANTS OF HEALTH (SDOH): HOW HARD IS IT FOR YOU TO PAY FOR THE VERY BASICS LIKE FOOD, HOUSING, MEDICAL CARE, AND HEATING?: NOT HARD AT ALL

## 2024-10-18 ASSESSMENT — MINI COG
TOTAL SCORE: 5
COMPLETED: YES

## 2024-10-18 NOTE — PROGRESS NOTES
Subjective     Madelyn Ruiz is a 89 y.o. female who presents for a subsequent annual wellness visit.     Patient Care Team:  Henry Linares MD as PCP - General  Ray Arriaza MD as Referring Physician  Aye Bhakta MD as Consulting Physician (Hematology and Oncology)  Wesley Walker MD as Consulting Physician (Pulmonary)  Fausto Bradley MD as Consulting Physician (Rheumatology)  Kleiner, Robert C, MD as Referring Physician  Alfie Torres MD as Consulting Physician (Dermatology)  Fanny Pelaez MD as Consulting Physician (Orthopedic Surgery)  Keenan Krause DO as Consulting Physician (Family Medicine)  Keenan Krause DO as Consulting Physician (Family Medicine)  Wesley You DO as Consulting Physician (Physical Medicine and Rehabilitation)  Jessika Zelaya MD as Surgeon (Neurosurgery)  Henrietta Crespo MD as Cardiologist (Interventional Cardiology)  Dinesh Goel MD as Surgeon (Otolaryngology)  Valentino, Michael A, MD PhD as Cardiologist (Cardiology)  Alejo Saavedra MD as Consulting Physician (Orthopedic Surgery)  Karolina Hauser RN as Care Manager (Care Management)    Comprehensive Medical and Social History  Patient Active Problem List   Diagnosis    Allergic bronchopulmonary aspergillosis (CMS/HCC)    Pulmonary emphysema (CMS/HCC)    Primary hypertension    Insomnia    Lumbar spinal stenosis    Obstructive sleep apnea syndrome    Osteoporosis    Medicare annual wellness visit, subsequent    GERD (gastroesophageal reflux disease)    Personal history of pulmonary embolism    Calcification of aorta (CMS/HCC)    Chronic pain of left knee    Word finding difficulty    Bilateral hip pain    Hx of long term use of blood thinners    A-fib (CMS/HCC)    COPD (chronic obstructive pulmonary disease) (CMS/HCC)    Anemia    Closed head injury, initial encounter    Orthostatic hypotension    Palliative care by specialist    Debility    Exudative age-related macular  degeneration, right eye, with active choroidal neovascularization (CMS/[facility])     Past Medical History:   Diagnosis Date    A-fib (CMS/[facility]) 08/22/2023    Managed on Apixaban and Metoprolol.   Echocardiogram 03/2024:    Left Ventricle: Ventricle not well visualized. Normal ventricle size. Normal wall thickness. Preserved systolic function. Estimated EF 65-70%. Indeterminate diastolic filling pattern. Unable to assess left atrial pressure.    Right Ventricle: Normal ventricle size. Normal systolic function.    Left Atrium: Moderate to severely dil    Allergic bronchopulmonary aspergillosis (CMS/[facility]) 04/02/2018    Allergist: Dr. Arriaza.  Stable FEV1 in 8/2015.  IgE levels and eosinophils stable in 8/2015.  Managed with Fasenra, Azithromycin and Flovent.    Allergic rhinitis 04/02/2018    Pulmonologist: Dr. Walker. Managed with Flonase.    Asthma     Atrial flutter with rapid ventricular response (CMS/[facility]) 02/21/2023    Diagnosed in 02/2023 and hospitalized Managed with Apixaban, Metoprolol, and Diltiazem Echocardiogram 02/2023   Left Ventricle: Normal ventricle size. Mild concentric left ventricular hypertrophy. Preserved systolic function. Estimated EF 65-70%. No regional wall motion abnormalities. Grade I diastolic dysfunction.   Right Ventricle: Normal ventricle size. Normal systolic function.   Left Atriu    Calcification of aorta (CMS/[facility]) 06/29/2020    Seen incidentally on CT scan.    Chronic obstructive pulmonary disease (CMS/[facility]) 04/02/2018    Pulmonologist: Dr. Walker.  Seen on PFTs in hospital in 2014.  Controlled with Spiriva and FLovent.    COPD (chronic obstructive pulmonary disease) (CMS/[facility]) 10/19/2023    Managed with Symbicort and Spiriva    GERD (gastroesophageal reflux disease) 06/08/2019    Managed with Esomeprazole.  Should take medication 30 minutes prior to meal.  Advised to avoid caffeine, carbonated beverages, and should not eat within 3 hours of going to sleep.  Advised to reduce BMI < 25.      Insomnia 04/02/2018    Managed with Lorazepam as needed.    Lumbar spinal stenosis 04/02/2018    Neurosurgeon: Dr. Miguel. MRI with Central and lateral recess spinal stenosis. Has Spondylolisthesis at L4/L5. S/P epidural injection by Dr. Martin with some relief in past. Had Lumbar fusion and Lumbar laminectomy by Dr. Miguel in 4/2015.    Macular degeneration disease     Obstructive sleep apnea syndrome 04/02/2018    Mild STACIA noted in sleep study 7/2015. Treated with nasal CPAP automatic with pressure range 5-10 CM H2Ox couple months. Off now    Orthostatic hypotension 10/23/2023    Managed on Midodrine    Osteoporosis 04/02/2018    Rheumatologist: Dr. Bradley.     Dexa scan 7/2016 with osteoporosis of lumbar spine LS-2.5, LH-2.1, RH -2.2.     Has been on Fosamax in the past for 8 years.     Worsened by Prednisone in past.     Continue vitamin D, calcium and weight bearing exercise.    DEXA in 7/2017 with LS -1.7, LH -2.2, and RH -2.0.     Next due in 7/2019.     Seen by Dr. Bradley in 8/2015 who agreed with initiating Prolia    Primary hypertension 04/02/2018    Managed on Diltiazem  Advised to follow a low sodium diet and keep BMI < 25.  Advised to exercise for 2.5 hours per week.  Instructed to take home blood pressure readings and call if consistently above 140/90.      Pulmonary embolism (CMS/HCC) 06/09/2019    Hematologist: Dr. Bhakta Diagnosed in 06/2019 in right upper lobe. Vascular ultrasound negative of bilateral lower extremities. Managed on Eliquis. Hypercoagulable workup was negative for beta-2 glycoprotein's, RIGO, Antithrombin III, lupus anticoagulant, protein C activity protein S activity, and prothrombin gene mutation, and factor V Leiden. Now off Apixaban as CT scan in 12/2019 was without pu    Sacral fracture, closed (CMS/HCC) 08/21/2023    SVT (supraventricular tachycardia) (CMS/HCC)     Vertebral compression fracture (CMS/HCC)     At T12 level     Past Surgical History   Procedure Laterality Date     Appendectomy      Carpal tunnel release Bilateral     Cataract extraction w/  intraocular lens implant Bilateral     Hysterectomy  1972    Lumbar laminectomy  04/21/2015    S/P lumbar fusion    Tonsillectomy       Allergies   Allergen Reactions    Mepolizumab Rash    Morphine      Other reaction(s): Vomiting    Oxycodone      Other reaction(s): Vomiting     Current Outpatient Medications   Medication Sig Dispense Refill    acetaminophen (TYLENOL ARTHRITIS PAIN ORAL) Take 600 mg by mouth once daily.      apixaban (ELIQUIS) 2.5 mg tablet Take 1 tablet (2.5 mg total) by mouth 2 (two) times a day. 180 tablet 3    azithromycin (ZITHROMAX) 250 mg tablet TAKE 1 TABLET (250 MG TOTAL) BY MOUTH 3 (THREE) TIMES A WEEK (MON, WED, FRI). 12 tablet 5    calcium carbonate 600 mg calcium (1,500 mg) tablet Take 1 tablet by mouth daily.      cholecalciferol, vitamin D3, 25 mcg (1,000 unit) capsule Take 1 tablet by mouth daily.      diltiazem LA (CARDIZEM LA) 360 mg 24 hr tablet Take 1 tablet (360 mg total) by mouth daily. 90 tablet 3    docusate sodium (COLACE) 100 mg capsule Take 100 mg by mouth 2 (two) times a day as needed for constipation.      DULoxetine (CYMBALTA) 60 mg capsule Take 60 mg by mouth at bedtime.   0    fexofenadine (ALLEGRA) 180 mg tablet Take 180 mg by mouth nightly.      lidocaine (LIDODERM) 5 % patch Place 1 patch on the skin daily. Remove & discard patch within 12 hours or as directed by prescriber. 30 patch 3    LORazepam (ATIVAN) 1 mg tablet Take 1 tablet (1 mg total) by mouth nightly as needed for anxiety. 30 tablet 5    melatonin ODT Take 2 tablets (6 mg total) by mouth nightly.      metoprolol tartrate (LOPRESSOR) 25 mg tablet Take 1 tablet (25 mg total) by mouth 2 (two) times a day. 180 tablet 3    midodrine (PROAMATINE) 5 mg tablet Take 1 tablet (5 mg total) by mouth 3 (three) times a day before meals. 270 tablet 3    pregabalin (LYRICA) 25 mg capsule Take 25 mg by mouth daily with lunch. PRN (Patient  taking differently: Take 25 mg by mouth daily with lunch.)      pregabalin (LYRICA) 50 mg capsule Take 50 mg by mouth 2 (two) times a day. Morning and Evening      propylene glycol/peg 400/PF (SYSTANE, PF, OPHT) Administer 1 drop into affected eye(s) as needed (dry eyes).      SPIRIVA RESPIMAT 2.5 mcg/actuation mist inhaler Inhale 2 puffs daily.      SYMBICORT 80-4.5 mcg/actuation inhaler Inhale 2 puffs 2 (two) times a day.      traMADoL (ULTRAM) 50 mg tablet Take 1 tablet (50 mg total) by mouth every 8 (eight) hours as needed for pain. Pt also takes it as needed at lunch time. (Patient taking differently: Take 50 mg by mouth 3 (three) times a day. Pt also takes it as needed at lunch time.)       No current facility-administered medications for this visit.     Social History     Tobacco Use    Smoking status: Never    Smokeless tobacco: Never   Vaping Use    Vaping status: Never Used   Substance Use Topics    Alcohol use: Yes     Comment: Rarely    Drug use: No     Family History   Problem Relation Name Age of Onset    Glaucoma Biological Mother      Macular degeneration Biological Mother      Hypertension Biological Mother      Lung cancer Biological Father      Heart disease Biological Brother      Breast cancer Mother's Sister      No Known Problems Biological Son      No Known Problems Biological Son      No Known Problems Biological Daughter      No Known Problems Biological Daughter      No Known Problems Biological Daughter       Review of Systems   Constitutional:  Negative for appetite change, chills, fatigue, fever and unexpected weight change.   HENT:  Negative for congestion, ear pain, hearing loss, nosebleeds, postnasal drip, rhinorrhea, sinus pressure, sneezing, sore throat and trouble swallowing.    Eyes:  Negative for pain, discharge, redness, itching and visual disturbance.   Respiratory:  Negative for cough, chest tightness, shortness of breath and wheezing.    Cardiovascular:  Negative for chest  "pain, palpitations and leg swelling.   Gastrointestinal:  Negative for abdominal pain, blood in stool, constipation, diarrhea, nausea and vomiting.   Endocrine: Negative for cold intolerance and heat intolerance.   Genitourinary:  Negative for difficulty urinating, dysuria, frequency, hematuria, urgency, vaginal bleeding and vaginal discharge.   Musculoskeletal:  Negative for arthralgias, back pain, gait problem, joint swelling, myalgias and neck pain.   Skin:  Negative for rash.   Allergic/Immunologic: Negative for environmental allergies.   Neurological:  Negative for dizziness, syncope, speech difficulty, weakness, light-headedness, numbness and headaches.   Hematological:  Does not bruise/bleed easily.   Psychiatric/Behavioral:  Negative for confusion, dysphoric mood and sleep disturbance. The patient is not nervous/anxious.      Objective   Vitals  Vitals:    10/18/24 1123   BP: (!) 172/94   BP Location: Left upper arm   Pulse: 86   Resp: 16   Temp: 36.3 °C (97.4 °F)   TempSrc: Oral   SpO2: 97%   Weight: 49.4 kg (108 lb 12.8 oz)  Comment: with shoes   Height: 1.549 m (5' 1\")  Comment: with shoes     Body mass index is 20.56 kg/m².    Wt Readings from Last 3 Encounters:   10/18/24 49.4 kg (108 lb 12.8 oz)   09/18/24 48.5 kg (107 lb)   05/14/24 49.2 kg (108 lb 6.4 oz)     Physical Exam  Constitutional:       General: She is not in acute distress.     Appearance: Normal appearance. She is well-developed. She is not ill-appearing, toxic-appearing or diaphoretic.   HENT:      Head: Normocephalic and atraumatic.      Right Ear: Tympanic membrane, ear canal and external ear normal.      Left Ear: Tympanic membrane, ear canal and external ear normal.      Nose: No mucosal edema, congestion or rhinorrhea.      Right Sinus: No maxillary sinus tenderness or frontal sinus tenderness.      Left Sinus: No maxillary sinus tenderness or frontal sinus tenderness.      Mouth/Throat:      Mouth: No oral lesions.      Pharynx: " Uvula midline. No oropharyngeal exudate or posterior oropharyngeal erythema.      Tonsils: No tonsillar exudate.   Eyes:      General: Lids are normal. No scleral icterus.        Right eye: No discharge.         Left eye: No discharge.      Conjunctiva/sclera:      Right eye: Right conjunctiva is not injected. No exudate.     Left eye: Left conjunctiva is not injected. No exudate.     Pupils: Pupils are equal, round, and reactive to light.   Neck:      Thyroid: No thyroid mass or thyromegaly.      Vascular: No carotid bruit.   Cardiovascular:      Rate and Rhythm: Normal rate and regular rhythm.      Pulses:           Popliteal pulses are 2+ on the right side and 2+ on the left side.        Dorsalis pedis pulses are 2+ on the right side and 2+ on the left side.      Heart sounds: Normal heart sounds. No murmur heard.     No gallop. No S3 or S4 sounds.   Pulmonary:      Effort: Pulmonary effort is normal. No respiratory distress.      Breath sounds: Normal breath sounds. No decreased breath sounds, wheezing, rhonchi or rales.   Chest:      Chest wall: No tenderness.   Abdominal:      General: Bowel sounds are normal. There is no distension.      Palpations: Abdomen is soft.      Tenderness: There is no abdominal tenderness. There is no guarding or rebound.      Hernia: No hernia is present.   Musculoskeletal:      Cervical back: Normal range of motion and neck supple.      Right lower leg: No edema.      Left lower leg: No edema.   Lymphadenopathy:      Cervical: No cervical adenopathy.   Skin:     General: Skin is warm and dry.      Findings: No rash.   Neurological:      Mental Status: She is alert.   Psychiatric:         Mood and Affect: Mood normal.         Speech: Speech normal.         Behavior: Behavior normal.         Thought Content: Thought content normal.         Judgment: Judgment normal.       Advanced Care Plan  Does patient have advance directive?: Yes       Patient has Advance Directive: Advance  Directive in physical chart                             PHQ  Will the patient answer the depression questions?: Yes   Little interest or pleasure in doing things: Not at all   Feeling down, depressed, or hopeless: Not at all   Depression Risk: 0                                             Mini Cog  Completed: Yes  Score: 5  Result: Negative    Get Up and Go  Result: Pass    STEADI Falls Risk  One or more falls in the last year: Yes   How many times: 2 or more   Was the patient injured in any fall: Yes   Has trouble stepping up onto a curb: Yes   Advised to use a cane or walker to get around safely: Yes   Often has to rush to the toilet: No   Feels unsteady when walking: Yes   Has lost some feeling in feet: No   Often feels sad or depressed: No   Steadies self on furniture while walking at home: Yes   Takes medication that makes him/her feel lightheaded or more tired than usual: No   Worried about falling: Yes   Takes medicine to sleep or improve mood: Yes (melantonin & lorazepan prn)   Needs to push with hands when rising from a chair: Yes   Falls screen completed: Yes     Hearing and Vision Screening  No results found.  See HRA for relevant hearing screening response.    Diet and Exercise   Heart healthy diet    See social determinants for exercise      Immunization History   Administered Date(s) Administered    INFLUENZA VACCINE QUAD ADJUVANTED 65 and OLDER 10/06/2021, 10/19/2022, 10/11/2023    Influenza Trivalent High Dose Preservative Free 65 yrs and up IM 11/03/2011, 11/13/2012, 10/09/2013, 10/30/2014, 11/17/2015, 09/30/2016, 09/25/2017    Influenza Vaccine 65 And Older Preservative Free 10/25/2019, 10/02/2020    Influenza Vaccine Quadrivalent 3 Yr And Older 09/18/2017, 10/22/2018    Influenza, Unspecified 10/30/2014, 11/17/2015, 09/30/2016, 09/25/2017    MMRV 06/05/2014    Pneumococcal Conjugate 08/09/1996    Pneumococcal Conjugate 13-Valent 11/17/2015    Pneumococcal Polysaccharide 10/30/2014, 09/11/2017     SARS-COV-2 (COVID-19) VACCINE PFIZER BIVALENT 12 years and older ([place]) 09/15/2022    SARS-COV-2 (COVID-19) VACCINE, MODERNA MONOVALENT 02/05/2021, 03/05/2021, 11/17/2021    SARS-COV-2 (COVID-19) VACCINE, MODERNA MONOVALENT BOOSTER 11/17/2021    SARS-COV-2 (COVID19) VACCINE, PFIZER MONOVALENT 09/15/2022    Tdap 03/22/2012, 08/08/2022    Varicella 06/05/2014    Zoster 01/02/2006, 01/02/2016    Zoster Vaccine Recombinant Adjuvanted (Shingrix) 05/24/2019, 09/20/2019       Health Maintenance Topics with due status: Overdue       Topic Date Due    Influenza Vaccine 08/01/2024    COVID-19 Vaccine 09/01/2024     Health Maintenance Topics with due status: Not Due       Topic Last Completion Date    DTaP, Tdap, and Td Vaccines 08/08/2022    DEXA Scan 10/16/2024    Medicare Annual Wellness Visit 10/18/2024    Depression Screening 10/18/2024    Falls Risk Screening 10/18/2024     Health Maintenance Topics with due status: Completed       Topic Last Completion Date    Pneumococcal (65 years and older) 09/11/2017    Zoster Vaccine 09/20/2019     Health Maintenance Topics with due status: Aged Out       Topic Date Due    Meningococcal ACWY Aged Out    RSV <20 months Aged Out    HIB Vaccines Aged Out    Hepatitis B Vaccines Aged Out    IPV Vaccines Aged Out    HPV Vaccines Aged Out     Health Maintenance Topics with due status: Discontinued       Topic Date Due    RSV Vaccine Discontinued    Breast Cancer Screening Discontinued       Lab Results   Component Value Date    WBC 5.26 10/16/2024    WBC 5.81 12/21/2023    WBC 5.82 12/20/2023    HGB 12.5 10/16/2024    HGB 11.7 (L) 12/21/2023    HGB 10.4 (L) 12/20/2023    HCT 39.3 10/16/2024    HCT 37.5 12/21/2023    HCT 32.6 (L) 12/20/2023    .0 (H) 10/16/2024    MCV 91.5 12/21/2023    MCV 90.1 12/20/2023     10/16/2024     12/21/2023     12/20/2023       Lab Results   Component Value Date    GLUCOSE 72 10/16/2024    GLUCOSE 88 12/21/2023    GLUCOSE 96  12/20/2023    CALCIUM 9.6 10/16/2024    CALCIUM 8.8 12/21/2023    CALCIUM 8.8 12/20/2023     10/16/2024     12/21/2023     12/20/2023    K 3.5 10/16/2024    K 4.1 12/21/2023    K 3.7 12/20/2023    CO2 29 10/16/2024    CO2 25 12/21/2023    CO2 24 12/20/2023     10/16/2024     (H) 12/21/2023     12/20/2023    BUN 19 10/16/2024    BUN 16 12/21/2023    BUN 17 12/20/2023    CREATININE 0.8 10/16/2024    CREATININE 0.7 12/21/2023    CREATININE 0.7 12/20/2023       Lab Results   Component Value Date    ALT 16 10/16/2024    ALT 15 11/22/2023    ALT 17 10/19/2023    AST 12 (L) 10/16/2024    AST 15 11/22/2023    AST 17 10/19/2023    ALKPHOS 47 10/16/2024    ALKPHOS 58 11/22/2023    ALKPHOS 56 10/19/2023    BILITOT 0.4 10/16/2024    BILITOT 0.5 11/22/2023    BILITOT 0.5 10/19/2023       Lab Results   Component Value Date    CHOL 227 (H) 01/27/2023    CHOL 248 (H) 11/18/2015     Lab Results   Component Value Date    HDL 62 01/27/2023    HDL 92 11/18/2015     Lab Results   Component Value Date    LDLCALC 141 (H) 01/27/2023    LDLCALC 133 (H) 11/18/2015     Lab Results   Component Value Date    TRIG 120 01/27/2023    TRIG 117 11/18/2015     Lab Results   Component Value Date    TSH 3.63 10/19/2023    TSH 2.87 01/26/2023    TSH 1.91 10/14/2020       Lab Results   Component Value Date    HEPCAB Nonreactive 07/12/2018       Assessment/Plan   Diagnoses and all orders for this visit:    Medicare annual wellness visit, subsequent (Primary)  Assessment & Plan:  The patient is currently stable.   Bloodwork was up to date .   Further recommendations will be provided to the patient based on the results of the blood tests.   The patient should continue to walk as tolerated.   All the recommend vaccinations are not up to date. FLU shot given today. Should get RSV and COVID vaccines at the pharmacy.  The patient should be evaluated by the ophthalmologist every 5 weeks and dentist every 6 months.   The  patient was advised to protect the skin by applying suntan lotion containing an SPF > 30 every 2 hours while in sun or after swimming. Instructed to avoid prolonged direct sun exposure as much as possible.   The patient's body mass index is normal   The patient's DEXA scan is up to date.   Advised to consume 1000 mg of calcium daily through the diet. If the patient is unable to consume 1000 mg through the diet, then taking calcium supplements is an acceptable alternative. The patient was informed not to consume more than 500 mg of a calcium supplement at a time.     Foods that are rich in calcium  Many foods can help increase calcium in your diet, even if you have lactose intolerance or eat a vegan diet. Try eating a variety of these foods to meet your daily calcium requirements:  Dairy products  Milk and dairy products are among the best calcium sources. Just a few servings per day can give you all the calcium you need. When choosing dairy products, don’t forget to look at the calories, fat and sodium, which can be high in some dairy foods.  Food Serving Size Calcium   Vanilla yogurt (low-fat) 8 ounces 388 mg   Milk, 1% 1 cup 310 mg   Ricotta cheese, whole milk 1/2 cup 289 mg   Greek yogurt, plain (low-fat) 8 ounces 261 mg   Cottage cheese, 2% fat 1 cup 227 mg   Cheddar cheese 1 ounce 200 mg   Vegetables and fruit juices  Leafy greens and broccoli are excellent sources of calcium. Generally, fruits aren’t calcium-rich, but some juices are fortified. This means the  has added calcium to the product. Fortified orange juice has more calcium than a glass of milk.  Food Serving Size Calcium   Orange juice, calcium-fortified 1 cup 349 mg   Vlad greens, cooked 1 cup 268 mg   Spinach, cooked 1 cup 245 mg   Bok dariusz, cooked 1 cup 158 mg   Kale, cooked 1 cup 177 mg   Broccoli, cooked 1 cup 62 mg   Protein-rich foods  Some proteins, such as tofu with added calcium sulfate, and canned fish with bones, are high  in calcium. You may need to read the labels closely to decipher which foods can deliver your daily calcium dose.  Food Serving Size Calcium   Tofu, prepared with calcium sulfate 1/2 cup 434 mg   Sardines, canned, with bones 3 ounces 324 mg   Soy milk, fortified 1 cup 299 mg   Black beans, canned 1 cup 239 mg   [place], canned, with bones 3 ounces 181 mg   Grains  Grains aren’t usually rich in calcium but are an important part of a balanced diet. Some breads and cereals are fortified and can be a great option for boosting your calcium intake. Check packaging and labels to find fortified options that you might enjoy.  Nuts and seeds  Some nuts and seeds contain a robust amount of calcium. Try incorporating brandon or sesame seeds into a smoothie, salad or your morning cereal.  Food Serving Size Calcium   [place] milk, unsweetened 1 cup 482 mg   Almonds 1/4 cup 92 mg   Sesame seeds 1 tablespoon 88 mg   Brandon seeds 1 tablespoon 78 mg   Tahini (sesame butter or paste) 1 tablespoon 64 mg         Osteoporosis, unspecified osteoporosis type, unspecified pathological fracture presence  Assessment & Plan:  Continue with yearly Reclast      Need for influenza vaccination  -     Influenza vaccine high dose trivalent 65+ IM (Fluzone High Dose)        See Patient Instructions (the written plan) which was given to the patient for PPPS and health risk factors with interventions.

## 2024-10-18 NOTE — PATIENT INSTRUCTIONS
Women's Personalized Prevention Plan Services (PPPS)       Preventive Services Checklist (Assumes Average Risk Unless Otherwise Noted)  Preventive Service Target Population and Frequency Last Done Date Due   Abd Aortic Aneurysm Screening Family history of AAA (covered once)   Not needed     Alcohol Misuse Screening As necessary for those at risk (covered annually) 10/18/2024 10/0025   Breast Cancer Screening Mammogram every 1-2yrs 50-71yo; every 1-2yrs 35-50yo if patient desires after weighing potential harms and benefits (covered once 35-40yo, annually >=41yo)   Not needed    Cholesterol Screening Both risk factors: 1) >=19yo and 2)  increased risk coronary artery disease (covered every 5 years) 01/27/2023 Not needed    Colorectal Cancer Screening >=51yo: Colonoscopy every 10 years, FIT annually or Cologuard every 3 years (up to 84yo for Cologuard)   Not needed    Diabetes Screening Any 1 risk factor: hypertension, dyslipidemia, obesity, high glucose; or Any 2 risk factors: >=66 yo, overweight, family history diabetes, history gestational diabetes or delivery of infant >9lbs (covered every 6 months) 10/16/2024 10/2025   Glaucoma Screening Any 1 risk factor: 1) diabetes, 2) family history glaucoma, 3)  >=51yo, 4)  American >=66yo (covered annually)   Every 5 weeks   Hepatitis C Screening Any 1 risk factor: 1) born between 4394-9070, 2) blood transfusion before 1992, 3) current or past injection drug use (covered once for average risk, annually for high risk)   07/12/2018   Lung Cancer Screening Low-dose chest CT if all 3 risk factors: 1) 55-76yo, 2) smoker or quit within last 15y, 3) >=30 pack years (covered annually)   Not needed    Osteoporosis Screening >=66yo (covered every 24 months)  <66yo if any 1 risk factor: 1) estrogen deficient and at risk for osteoporosis; 2) established osteoporosis on treatment; 3) x-rays show possible osteoporosis, osteopenia or vertebral fractures; 4) on  "steroid or planning to start; 5) primary hyperparathyroidism (covered as medically necessary) 10/16/2024 10/2026   Sexually Transmitted Diseases (STDs) As necessary chlamydia, gonorrhea, syphilis, hepatitis B (covered annually if not pregnant, more often in pregnancy)  HIV if any 1 risk factor present: 1) <14yo or >66yo and at increased risk, 2) 15-66yo and ask for it, or 3) pregnant (covered annually if not pregnant, up to 3x in pregnancy)   Low Risk    Vaccine: Hepatitis B As necessary if medium or high risk (series covered once)   Not needed     Vaccine: Influenza All patients without contraindications (covered annually.) 10/18/2024 Fall 2025   Vaccine: Pneumococcal Pneumovax + Prevnar (both covered, >=11 months apart) Prevnar   11/17/2015  Pneumovax  10/30/2014   Done    Vaccine: Shingrix (Shingles) >= 51yo without contraindications             (2 doses, 2 months apart) Zostavax  2006  Shingrix   05/24/2019 09/20/2019 Done    TDAP Every 10 years  08/08/2022 08/2032                                    Women's Personalized Prevention Plan Services (PPPS)                                                          Prints to [facility]                                          Health Risk Factors and Interventions  \"x\" Indicates risk is associated with this patient Risk Factor Recommended Interventions    N/A   Obesity     x Hypertension Low sodium diet and take medication      N/A  Diabetes     N/A  Fall Risk     N/A  Tobacco Use                   Problem List Items Addressed This Visit       Medicare annual wellness visit, subsequent - Primary     The patient is currently stable.   Bloodwork was up to date .   Further recommendations will be provided to the patient based on the results of the blood tests.   The patient should continue to walk as tolerated.   All the recommend vaccinations are not up to date. FLU shot given today. Should get RSV and COVID vaccines at the pharmacy.  The patient should be evaluated by the " ophthalmologist every 5 weeks and dentist every 6 months.   The patient was advised to protect the skin by applying suntan lotion containing an SPF > 30 every 2 hours while in sun or after swimming. Instructed to avoid prolonged direct sun exposure as much as possible.   The patient's body mass index is normal   The patient's DEXA scan is up to date.   Advised to consume 1000 mg of calcium daily through the diet. If the patient is unable to consume 1000 mg through the diet, then taking calcium supplements is an acceptable alternative. The patient was informed not to consume more than 500 mg of a calcium supplement at a time.     Foods that are rich in calcium  Many foods can help increase calcium in your diet, even if you have lactose intolerance or eat a vegan diet. Try eating a variety of these foods to meet your daily calcium requirements:  Dairy products  Milk and dairy products are among the best calcium sources. Just a few servings per day can give you all the calcium you need. When choosing dairy products, don’t forget to look at the calories, fat and sodium, which can be high in some dairy foods.  Food Serving Size Calcium   Vanilla yogurt (low-fat) 8 ounces 388 mg   Milk, 1% 1 cup 310 mg   Ricotta cheese, whole milk 1/2 cup 289 mg   Greek yogurt, plain (low-fat) 8 ounces 261 mg   Cottage cheese, 2% fat 1 cup 227 mg   Cheddar cheese 1 ounce 200 mg   Vegetables and fruit juices  Leafy greens and broccoli are excellent sources of calcium. Generally, fruits aren’t calcium-rich, but some juices are fortified. This means the  has added calcium to the product. Fortified orange juice has more calcium than a glass of milk.  Food Serving Size Calcium   Orange juice, calcium-fortified 1 cup 349 mg   Vlad greens, cooked 1 cup 268 mg   Spinach, cooked 1 cup 245 mg   Bok dariusz, cooked 1 cup 158 mg   Kale, cooked 1 cup 177 mg   Broccoli, cooked 1 cup 62 mg   Protein-rich foods  Some proteins, such as tofu  with added calcium sulfate, and canned fish with bones, are high in calcium. You may need to read the labels closely to decipher which foods can deliver your daily calcium dose.  Food Serving Size Calcium   Tofu, prepared with calcium sulfate 1/2 cup 434 mg   Sardines, canned, with bones 3 ounces 324 mg   Soy milk, fortified 1 cup 299 mg   Black beans, canned 1 cup 239 mg   [place]es[place]us, canned, with bones 3 ounces 181 mg   Grains  Grains aren’t usually rich in calcium but are an important part of a balanced diet. Some breads and cereals are fortified and can be a great option for boosting your calcium intake. Check packaging and labels to find fortified options that you might enjoy.  Nuts and seeds  Some nuts and seeds contain a robust amount of calcium. Try incorporating brandon or sesame seeds into a smoothie, salad or your morning cereal.  Food Serving Size Calcium   [place] milk, unsweetened 1 cup 482 mg   Almonds 1/4 cup 92 mg   Sesame seeds 1 tablespoon 88 mg   Brandon seeds 1 tablespoon 78 mg   Tahini (sesame butter or paste) 1 tablespoon 64 mg            Osteoporosis (Chronic)     Continue with yearly Reclast          Other Visit Diagnoses       Need for influenza vaccination        Relevant Orders    Influenza vaccine high dose trivalent 65+ IM (Fluzone High Dose)             Health Risk Factors with Personalized Education:  ----------------------------------------------------------------------------------------------------------------------  Stress management:  Understanding Your Stress  Think about how you know when you’re stressed.  Think about how your thoughts or behaviors are different when you’re stressed.  Think about what triggers stress for you.  Think about how you handle stress, and whether you’re making unhealthy choices in response to stress (like smoking, drinking alcohol or overeating).  Managing Stress  Take a break from the stressful situation.  Reduce your stress levels by exercising, talking with  family/friends, ensuring adequate sleep.  Consider meditation or yoga.  Make sure you plan time to do things you enjoy.  Let your PCP know if you’re having problems limiting your stress.  ----------------------------------------------------------------------------------------------------------------------  Controlling Your Blood Pressure  Maintain a normal weight (body mass index between 18.5 and 24.9).  Eat more fruit, vegetables and low-fat dairy.  Eat less saturated fat and total fat.  Lower your sodium (salt) intake.  Try to stay under 1500 mg per day, but if you cannot get your intake to be that low, at least lower it by 1000 mg.  Stay active.  Try to get at least 90 to 150 minutes of exercise per week.  Try brisk walking, swimming, bicycling or dancing.  Limit alcohol intake.  When you do consume alcohol, drink no more than 1 drink per day.  If you have been prescribed medication, take it regularly and exactly as prescribed.  Let your PCP know if you have any problems or questions about your medication.  Check your blood pressure at home or at the store.  Write down your readings and share them with your PCP  ----------------------------------------------------------------------------------------------------------------------  Controlling Your Cholesterol  Reduce the amount of saturated and trans fat in your diet.  Limit intake of red meat.  Consume only low-fat or non-fat/skim dairy.  Limit fried food.  Cook with vegetable oils.  Reduce your intake of sugary foods, sugary drinks and alcohol.  Eat a diet high in fruit, vegetables and whole grains.  Get protein from fish, poultry and a small portion of nuts.  Stay active.  Try to get at least 90 to 150 minutes of exercise per week.  Try brisk walking, swimming, bicycling or dancing.  Maintain a healthy weight by balancing your diet and exercise.  If you have been prescribed medication, take it regularly and exactly as prescribed. Let your PCP know if you have  any problems or questions about your medication.  It’s important to know your cholesterol numbers.  When recommended by your PCP, get the cholesterol blood test.  ---------------------------------------------------------------------------------------------------------------------   Controlling Your Osteoporosis, Strengthening Your Bones  Try to get at least 90 to 150 minutes of weight-bearing exercise per week.  Ensure intake of at least 1200mg of calcium per day.  Eat foods high in calcium like milk and other dairy, green vegetables, fruit, canned fish with soft and edible bones, nuts, calcium-set tofu.  Some foods are calcium-fortified, like bread, cereal, fruit juices and mineral water.  Help your body make vitamin D by getting 10-15 minutes per day of sunlight.    Ensure intake of at least 600IU of vitamin D per day.  Eat foods high in vitamin D like oily fish (salmon, sardines, mackerel) and eggs.  Some foods are fortified with vitamin D, like dairy and cereals.  Avoid high amounts of caffeine and salt, since they can cause the body to loose calcium.  Limit alcohol intake, since it is associated with weaker bones and is associated with falls and fractures.  Limit intake of fizzy drinks.  If you have been prescribed medication, take it regularly and exactly as prescribed.  Let your PCP know if you have any problems or questions about your medication  ----------------------------------------------------------------------------------------------------------------------  Reducing Your Risk of Falls  Tell your PCP if any of your medications make you feel tired, dizzy, lightheaded or off-balance.  Maintain coordination, flexibility and balance by ensuring regular physical activity.  Limit alcohol intake to 1 drink per day.  Consider avoiding all alcohol intake.  Ensure good vision.  Visit an ophthalmologist or optometrist regularly for vision screening or to make sure your glasses / contact lens prescription is  correct.  If you need glasses or contacts, wear them.  When you get new glasses or contacts, take time to get used to them.  Do not wear sunglasses or tinted lenses when indoors.  Ensure good hearing.  Have your hearing checked if you are having trouble hearing, or family and friends think you cannot hear them.  If you need a hearing aid, be sure it fits well and wear it.  Get enough rest.  Ensure about 7-9 hours of sleep every day.  Get up slowly from your bed or chairs.  Do not start walking until you are sure you feel steady.  Wear non-skid, rubber-soled, low-heeled shoes.  Do not walk in socks, or in shoes and slippers with smooth soles.  If your PCP or therapist recommends using a cane or walker, use it regularly.  Make your home safer.  Increase lighting throughout the house, especially at the top and bottom of stairs.  Ensure lighting is easily turned on when getting up in the middle of the night.  Make sure there are two secure rails on all stairs.  Install grab bars in the bathtub / shower and near the toilet.  Consider using a shower chair and / or a hand-held shower.  Spread sand or salt on icy surfaces.  Beware of wet surfaces, which can be icy.  Tell your PCP if you have fallen.

## 2024-10-18 NOTE — ASSESSMENT & PLAN NOTE
The patient is currently stable.   Bloodwork was up to date .   Further recommendations will be provided to the patient based on the results of the blood tests.   The patient should continue to walk as tolerated.   All the recommend vaccinations are not up to date. FLU shot given today. Should get RSV and COVID vaccines at the pharmacy.  The patient should be evaluated by the ophthalmologist every 5 weeks and dentist every 6 months.   The patient was advised to protect the skin by applying suntan lotion containing an SPF > 30 every 2 hours while in sun or after swimming. Instructed to avoid prolonged direct sun exposure as much as possible.   The patient's body mass index is normal   The patient's DEXA scan is up to date.   Advised to consume 1000 mg of calcium daily through the diet. If the patient is unable to consume 1000 mg through the diet, then taking calcium supplements is an acceptable alternative. The patient was informed not to consume more than 500 mg of a calcium supplement at a time.     Foods that are rich in calcium  Many foods can help increase calcium in your diet, even if you have lactose intolerance or eat a vegan diet. Try eating a variety of these foods to meet your daily calcium requirements:  Dairy products  Milk and dairy products are among the best calcium sources. Just a few servings per day can give you all the calcium you need. When choosing dairy products, don’t forget to look at the calories, fat and sodium, which can be high in some dairy foods.  Food Serving Size Calcium   Vanilla yogurt (low-fat) 8 ounces 388 mg   Milk, 1% 1 cup 310 mg   Ricotta cheese, whole milk 1/2 cup 289 mg   Greek yogurt, plain (low-fat) 8 ounces 261 mg   Cottage cheese, 2% fat 1 cup 227 mg   Cheddar cheese 1 ounce 200 mg   Vegetables and fruit juices  Leafy greens and broccoli are excellent sources of calcium. Generally, fruits aren’t calcium-rich, but some juices are fortified. This means the  has  added calcium to the product. Fortified orange juice has more calcium than a glass of milk.  Food Serving Size Calcium   Orange juice, calcium-fortified 1 cup 349 mg   Vlad greens, cooked 1 cup 268 mg   Spinach, cooked 1 cup 245 mg   Bok dariusz, cooked 1 cup 158 mg   Kale, cooked 1 cup 177 mg   Broccoli, cooked 1 cup 62 mg   Protein-rich foods  Some proteins, such as tofu with added calcium sulfate, and canned fish with bones, are high in calcium. You may need to read the labels closely to decipher which foods can deliver your daily calcium dose.  Food Serving Size Calcium   Tofu, prepared with calcium sulfate 1/2 cup 434 mg   Sardines, canned, with bones 3 ounces 324 mg   Soy milk, fortified 1 cup 299 mg   Black beans, canned 1 cup 239 mg   [place], canned, with bones 3 ounces 181 mg   Grains  Grains aren’t usually rich in calcium but are an important part of a balanced diet. Some breads and cereals are fortified and can be a great option for boosting your calcium intake. Check packaging and labels to find fortified options that you might enjoy.  Nuts and seeds  Some nuts and seeds contain a robust amount of calcium. Try incorporating flaco or sesame seeds into a smoothie, salad or your morning cereal.  Food Serving Size Calcium   [place] milk, unsweetened 1 cup 482 mg   Almonds 1/4 cup 92 mg   Sesame seeds 1 tablespoon 88 mg   Flaco seeds 1 tablespoon 78 mg   Tahini (sesame butter or paste) 1 tablespoon 64 mg

## 2024-10-21 ENCOUNTER — TELEPHONE (OUTPATIENT)
Dept: PRIMARY CARE | Facility: CLINIC | Age: 89
End: 2024-10-21
Payer: MEDICARE

## 2024-10-21 ENCOUNTER — CLINICAL SUPPORT (OUTPATIENT)
Dept: PRIMARY CARE | Facility: CLINIC | Age: 89
End: 2024-10-21
Payer: MEDICARE

## 2024-10-21 DIAGNOSIS — R52 PAIN MANAGEMENT: ICD-10-CM

## 2024-10-21 DIAGNOSIS — M79.18 MUSCLE PAIN, LUMBAR: ICD-10-CM

## 2024-10-21 LAB
AMPHET UR QL SCN: NOT DETECTED
BARBITURATES UR QL SCN: NOT DETECTED
BENZODIAZ UR QL SCN: NOT DETECTED
CANNABINOIDS UR QL SCN: NOT DETECTED
COCAINE UR QL SCN: NOT DETECTED
FENTANYL URINE SCR: NOT DETECTED
OPIATES UR QL SCN: NOT DETECTED
PCP UR QL SCN: NOT DETECTED

## 2024-10-21 PROCEDURE — 80307 DRUG TEST PRSMV CHEM ANLYZR: CPT | Performed by: FAMILY MEDICINE

## 2024-10-21 PROCEDURE — 99999 PR OFFICE/OUTPT VISIT,PROCEDURE ONLY: CPT | Performed by: FAMILY MEDICINE

## 2024-10-22 ENCOUNTER — TELEPHONE (OUTPATIENT)
Dept: PRIMARY CARE | Facility: CLINIC | Age: 89
End: 2024-10-22
Payer: MEDICARE

## 2024-10-22 NOTE — TELEPHONE ENCOUNTER
----- Message from Henry Linares sent at 10/22/2024  7:16 AM EDT -----  Let patient know urine drug screen was negative. Send a copy to Dr. You.  I faxed it to Dr You via the system [phone] I spoke with kenia shelley .

## 2024-11-04 ENCOUNTER — TELEPHONE (OUTPATIENT)
Dept: PRIMARY CARE | Facility: CLINIC | Age: 89
End: 2024-11-04
Payer: MEDICARE

## 2024-11-04 DIAGNOSIS — R53.81 DEBILITY: Primary | ICD-10-CM

## 2024-11-06 ENCOUNTER — NURSE TRIAGE (OUTPATIENT)
Dept: PRIMARY CARE | Facility: CLINIC | Age: 89
End: 2024-11-06
Payer: MEDICARE

## 2024-11-06 NOTE — TELEPHONE ENCOUNTER
Patient transferred to the Primary Care Telephonic Nurse Triage Line with complaints of headache and shaking    HPI:  Patient's daughter Shelley called on behalf of patient who is present  Reports mild headache and shaking mostly of her head but a little in the rest of her body as well  Started about 10am and still going  Has happened a couple times in the past but this is the longest episode and she has not seen a provider for it as it usually goes away rather quickly   Patient thinks it has to do with not having enough water but she is drinking water throughout the day but doesn't drink as much sometimes because she doesn't want to have to go to the bathroom  and takes a lot of meds  No dizziness, blurred vision, speech changes, confusion, chest pain, breathing issues or any other symptoms  Does not want to go to  as they don't do anything for her  Says the cell service is not good where they are as to why they can't do a telemed visit, calling from a landline.   Says she can get her there in 10-15mins, patient lives in [facility], hoping someone can see her while it'sactively happening.    No appts avail, message sent to pcp and  to see if any openings    Disposition:  Discuss With PCP and Callback by Nurse Within 1 Hour (overriding See Today in Office)    Care Advice:  Care Advice Given    Patient/Caregiver understands and will follow care advice?: Yes, plans to follow advice      Given By Given At Modified    Edita Oneil 11/6/2024  1:17 PM Yes           SEE IN OFFICE TODAY:   * no appts, message sent to office for f/u    Edita Oneil 11/6/2024  1:17 PM No           CALL BACK IF:  * Severe headache persists over 2 hours after pain medicine  * Headache lasts over 24 hours despite using a pain medicine  * You become worse               Reason for Disposition   Patient wants to be seen    Protocols used: Headache-ADULT-[place]

## 2024-11-06 NOTE — TELEPHONE ENCOUNTER
Regarding: shaking head pain  ----- Message from Adali ENNIS sent at 11/6/2024 12:46 PM EST -----  Patient called today with red flag complaint of  shaking head pain .  Call transferred to Primary Care Nurse Triage Line

## 2024-11-06 NOTE — TELEPHONE ENCOUNTER
See if any provider can see her in the next 1-2 days.    Henry Linares MD   Can you schedule whe\n you are available TY

## 2024-11-07 NOTE — TELEPHONE ENCOUNTER
I spoke with kenia patients daughter she does not want to make an apt she estrellita her mom didn't have a headache she said periodically her mom's head shakes she went over the day it occurred she didn't want a future apt it would be after the fact Kenia would like to discuss this with you when you are available .

## 2024-11-21 RX ORDER — MIDODRINE HYDROCHLORIDE 5 MG/1
TABLET ORAL
Qty: 270 TABLET | Refills: 3 | Status: SHIPPED | OUTPATIENT
Start: 2024-11-21

## 2024-11-21 RX ORDER — METOPROLOL TARTRATE 25 MG/1
25 TABLET, FILM COATED ORAL 2 TIMES DAILY
Qty: 180 TABLET | Refills: 3 | Status: SHIPPED | OUTPATIENT
Start: 2024-11-21

## 2024-11-21 NOTE — TELEPHONE ENCOUNTER
Medicine Refill Request    Last Office Visit: 10/18/2024   Last Consult Visit: 11/12/2021  Last Telemedicine Visit: Visit date not found    Next Appointment: 10/22/2025      Current Outpatient Medications:     acetaminophen (TYLENOL ARTHRITIS PAIN ORAL), Take 600 mg by mouth once daily., Disp: , Rfl:     apixaban (ELIQUIS) 2.5 mg tablet, Take 1 tablet (2.5 mg total) by mouth 2 (two) times a day., Disp: 180 tablet, Rfl: 3    azithromycin (ZITHROMAX) 250 mg tablet, TAKE 1 TABLET (250 MG TOTAL) BY MOUTH 3 (THREE) TIMES A WEEK (MON, WED, FRI)., Disp: 12 tablet, Rfl: 5    calcium carbonate 600 mg calcium (1,500 mg) tablet, Take 1 tablet by mouth daily., Disp: , Rfl:     cholecalciferol, vitamin D3, 25 mcg (1,000 unit) capsule, Take 1 tablet by mouth daily., Disp: , Rfl:     diltiazem LA (CARDIZEM LA) 360 mg 24 hr tablet, Take 1 tablet (360 mg total) by mouth daily., Disp: 90 tablet, Rfl: 3    docusate sodium (COLACE) 100 mg capsule, Take 100 mg by mouth 2 (two) times a day as needed for constipation., Disp: , Rfl:     DULoxetine (CYMBALTA) 60 mg capsule, Take 60 mg by mouth at bedtime. , Disp: , Rfl: 0    fexofenadine (ALLEGRA) 180 mg tablet, Take 180 mg by mouth nightly., Disp: , Rfl:     lidocaine (LIDODERM) 5 % patch, Place 1 patch on the skin daily. Remove & discard patch within 12 hours or as directed by prescriber., Disp: 30 patch, Rfl: 3    LORazepam (ATIVAN) 1 mg tablet, Take 1 tablet (1 mg total) by mouth nightly as needed for anxiety., Disp: 30 tablet, Rfl: 5    melatonin ODT, Take 2 tablets (6 mg total) by mouth nightly., Disp: , Rfl:     metoprolol tartrate (LOPRESSOR) 25 mg tablet, TAKE 1 TABLET BY MOUTH TWICE A DAY, Disp: 180 tablet, Rfl: 3    midodrine (PROAMATINE) 5 mg tablet, Take 1 tablet (5 mg total) by mouth 3 (three) times a day before meals., Disp: 270 tablet, Rfl: 3    pregabalin (LYRICA) 25 mg capsule, Take 25 mg by mouth daily with lunch. PRN (Patient taking differently: Take 25 mg by mouth  daily with lunch.), Disp: , Rfl:     pregabalin (LYRICA) 50 mg capsule, Take 50 mg by mouth 2 (two) times a day. Morning and Evening, Disp: , Rfl:     propylene glycol/peg 400/PF (SYSTANE, PF, OPHT), Administer 1 drop into affected eye(s) as needed (dry eyes)., Disp: , Rfl:     SPIRIVA RESPIMAT 2.5 mcg/actuation mist inhaler, Inhale 2 puffs daily., Disp: , Rfl:     SYMBICORT 80-4.5 mcg/actuation inhaler, Inhale 2 puffs 2 (two) times a day., Disp: , Rfl:     traMADoL (ULTRAM) 50 mg tablet, Take 1 tablet (50 mg total) by mouth every 8 (eight) hours as needed for pain. Pt also takes it as needed at lunch time. (Patient taking differently: Take 50 mg by mouth 3 (three) times a day. Pt also takes it as needed at lunch time.), Disp: , Rfl:       BP Readings from Last 3 Encounters:   10/18/24 (!) 172/94   09/18/24 120/70   05/14/24 136/78       Recent Lab results:  Lab Results   Component Value Date    CHOL 227 (H) 01/27/2023   ,   Lab Results   Component Value Date    HDL 62 01/27/2023   ,   Lab Results   Component Value Date    LDLCALC 141 (H) 01/27/2023   ,   Lab Results   Component Value Date    TRIG 120 01/27/2023        Lab Results   Component Value Date    GLUCOSE 72 10/16/2024   ,   Lab Results   Component Value Date    HGBA1C 5.6 11/18/2015         Lab Results   Component Value Date    CREATININE 0.8 10/16/2024       Lab Results   Component Value Date    TSH 3.63 10/19/2023           Lab Results   Component Value Date    HGBA1C 5.6 11/18/2015

## 2024-11-30 DIAGNOSIS — M25.551 BILATERAL HIP PAIN: ICD-10-CM

## 2024-11-30 DIAGNOSIS — M25.552 BILATERAL HIP PAIN: ICD-10-CM

## 2024-11-30 DIAGNOSIS — S32.10XS CLOSED FRACTURE OF SACRUM, UNSPECIFIED FRACTURE MORPHOLOGY, SEQUELA: ICD-10-CM

## 2024-12-02 ENCOUNTER — NURSE TRIAGE (OUTPATIENT)
Dept: PRIMARY CARE | Facility: CLINIC | Age: 89
End: 2024-12-02
Payer: MEDICARE

## 2024-12-02 ENCOUNTER — TELEPHONE (OUTPATIENT)
Dept: PRIMARY CARE | Facility: CLINIC | Age: 89
End: 2024-12-02

## 2024-12-02 RX ORDER — LIDOCAINE 50 MG/G
PATCH TOPICAL
Qty: 30 PATCH | Refills: 3 | Status: SHIPPED | OUTPATIENT
Start: 2024-12-02

## 2024-12-02 NOTE — TELEPHONE ENCOUNTER
Department: [facility] PRIMARY CARE [facility]   Clinical Pool: [facility] PRIMARY CARE [facility] CLINICAL SUPPORT P   PSR Pool: [facility] PRIMARY CARE [facility] PSR P   [facility] Appointment Request   Provider: blaire   Appointment Type: sds   Reason for Visit: fell and wound on calf   Available Day and Time: tomrrow   Tahir Contact Number:   [phone]   The practice will reach out to schedule your appointment within the next 2 business days.

## 2024-12-02 NOTE — TELEPHONE ENCOUNTER
"Per Bailey Aragon, called patient to further triage symptoms.  Daughter Shelley answered, authorized on [facility], who reports patient fell 3 days ago (unwitnessed fall) while attempting to stand up from a chair holding onto her rollator.  She now has a skin tear on her left shin and daughter states \"my mother says she hit her head but there are no cuts, bumps or bruises on her head.\"  Daughter also states patient \"did not fall all the way down to the floor, but just onto her knees and thinks her foot might have gotten caught on the rollator.\"  Patient is on Eliquis.      Denies headache, dizziness, lightheadedness, nausea, vomiting, visual issues, cuts, bruises or swelling anywhere other than left shin or any other symptoms at this time.  Daughter has been cleaning the wound with water, applying neosporin and covering wound with non-stick gauze.  Daughter states \"wound looks the same as it did when injury occurred; no better or worse\" and wants to know if there's any medication, other than neosporin that she should be applying to the wound.    Appointment scheduled with Dr. Linares on 12/6/24 (1st available) prior to this triage call.    Advised to refer to ED for evaluation of head trauma and wound evaluation.  Daughter states \"they're not going to admit her and we've been there, done that in the past.\"        Will inform PCP per daughter's request and will inform patient/patient's daughter accordingly if other/preferred recommendations.    Please advise.    DISPOSITION:    GO TO ED NOW (OR TO OFFICE WITH PCP APPROVAL):    Advised patient to refer to ED; however, patient was already given 1st available appointment on 12/6/24 for evaluation of wound prior to this triage call.  Will inform PCP, and if other/preferred recommendations, will inform patient/patient's daughter accordingly.    Please advise.    Reason for Disposition   Taking Coumadin (warfarin) or other strong blood thinner, or known bleeding disorder (e.g., " thrombocytopenia)    Protocols used: Head Injury-Adult-[place]

## 2024-12-02 NOTE — TELEPHONE ENCOUNTER
Medicine Refill Request    Last Office Visit: 10/18/2024   Last Consult Visit: 11/12/2021  Last Telemedicine Visit: Visit date not found    Next Appointment: 10/22/2025      Current Outpatient Medications:     acetaminophen (TYLENOL ARTHRITIS PAIN ORAL), Take 600 mg by mouth once daily., Disp: , Rfl:     apixaban (ELIQUIS) 2.5 mg tablet, Take 1 tablet (2.5 mg total) by mouth 2 (two) times a day., Disp: 180 tablet, Rfl: 3    azithromycin (ZITHROMAX) 250 mg tablet, TAKE 1 TABLET (250 MG TOTAL) BY MOUTH 3 (THREE) TIMES A WEEK (MON, WED, FRI)., Disp: 12 tablet, Rfl: 5    calcium carbonate 600 mg calcium (1,500 mg) tablet, Take 1 tablet by mouth daily., Disp: , Rfl:     cholecalciferol, vitamin D3, 25 mcg (1,000 unit) capsule, Take 1 tablet by mouth daily., Disp: , Rfl:     diltiazem LA (CARDIZEM LA) 360 mg 24 hr tablet, Take 1 tablet (360 mg total) by mouth daily., Disp: 90 tablet, Rfl: 3    docusate sodium (COLACE) 100 mg capsule, Take 100 mg by mouth 2 (two) times a day as needed for constipation., Disp: , Rfl:     DULoxetine (CYMBALTA) 60 mg capsule, Take 60 mg by mouth at bedtime. , Disp: , Rfl: 0    fexofenadine (ALLEGRA) 180 mg tablet, Take 180 mg by mouth nightly., Disp: , Rfl:     lidocaine (LIDODERM) 5 % patch, Place 1 patch on the skin daily. Remove & discard patch within 12 hours or as directed by prescriber., Disp: 30 patch, Rfl: 3    LORazepam (ATIVAN) 1 mg tablet, Take 1 tablet (1 mg total) by mouth nightly as needed for anxiety., Disp: 30 tablet, Rfl: 5    melatonin ODT, Take 2 tablets (6 mg total) by mouth nightly., Disp: , Rfl:     metoprolol tartrate (LOPRESSOR) 25 mg tablet, TAKE 1 TABLET BY MOUTH TWICE A DAY, Disp: 180 tablet, Rfl: 3    midodrine (PROAMATINE) 5 mg tablet, TAKE 1 TABLET BY MOUTH 3 TIMES A DAY BEFORE MEALS., Disp: 270 tablet, Rfl: 3    pregabalin (LYRICA) 25 mg capsule, Take 25 mg by mouth daily with lunch. PRN (Patient taking differently: Take 25 mg by mouth daily with lunch.), Disp:  , Rfl:     pregabalin (LYRICA) 50 mg capsule, Take 50 mg by mouth 2 (two) times a day. Morning and Evening, Disp: , Rfl:     propylene glycol/peg 400/PF (SYSTANE, PF, OPHT), Administer 1 drop into affected eye(s) as needed (dry eyes)., Disp: , Rfl:     SPIRIVA RESPIMAT 2.5 mcg/actuation mist inhaler, Inhale 2 puffs daily., Disp: , Rfl:     SYMBICORT 80-4.5 mcg/actuation inhaler, Inhale 2 puffs 2 (two) times a day., Disp: , Rfl:     traMADoL (ULTRAM) 50 mg tablet, Take 1 tablet (50 mg total) by mouth every 8 (eight) hours as needed for pain. Pt also takes it as needed at lunch time. (Patient taking differently: Take 50 mg by mouth 3 (three) times a day. Pt also takes it as needed at lunch time.), Disp: , Rfl:       BP Readings from Last 3 Encounters:   10/18/24 (!) 172/94   09/18/24 120/70   05/14/24 136/78       Recent Lab results:  Lab Results   Component Value Date    CHOL 227 (H) 01/27/2023   ,   Lab Results   Component Value Date    HDL 62 01/27/2023   ,   Lab Results   Component Value Date    LDLCALC 141 (H) 01/27/2023   ,   Lab Results   Component Value Date    TRIG 120 01/27/2023        Lab Results   Component Value Date    GLUCOSE 72 10/16/2024   ,   Lab Results   Component Value Date    HGBA1C 5.6 11/18/2015         Lab Results   Component Value Date    CREATININE 0.8 10/16/2024       Lab Results   Component Value Date    TSH 3.63 10/19/2023           Lab Results   Component Value Date    HGBA1C 5.6 11/18/2015

## 2024-12-06 ENCOUNTER — OFFICE VISIT (OUTPATIENT)
Dept: PRIMARY CARE | Facility: CLINIC | Age: 89
End: 2024-12-06
Payer: MEDICARE

## 2024-12-06 VITALS
OXYGEN SATURATION: 96 % | RESPIRATION RATE: 16 BRPM | SYSTOLIC BLOOD PRESSURE: 132 MMHG | HEART RATE: 67 BPM | TEMPERATURE: 97.6 F | DIASTOLIC BLOOD PRESSURE: 80 MMHG

## 2024-12-06 DIAGNOSIS — S81.812A SKIN TEAR OF LEFT LOWER LEG WITHOUT COMPLICATION, INITIAL ENCOUNTER: Primary | ICD-10-CM

## 2024-12-06 PROCEDURE — 99213 OFFICE O/P EST LOW 20 MIN: CPT | Performed by: FAMILY MEDICINE

## 2024-12-06 ASSESSMENT — ENCOUNTER SYMPTOMS
FEVER: 0
CHILLS: 0
WOUND: 1

## 2024-12-06 NOTE — PROGRESS NOTES
Henry Linares MD    [facility]  [address], [address]  [place] [address]  Phone: [phone]  Fax: [phone]     History of Present Illness     Subjective     Patient ID: Madelyn Ruiz is a 89 y.o. female.    Patient fell last Saturday. Tripped on Rollator. Scraped left lower leg. No head injury. Was bleeding. Using Neosporin and cleaning with water. Tender. No fever, chills, or sweats. Here for evaluation.              Past Medical/Surgical/Family/Social History       The following have been reviewed and updated as appropriate in this visit:          Past Medical History:   Diagnosis Date    A-fib (CMS/[facility]) 08/22/2023    Managed on Apixaban and Metoprolol.   Echocardiogram 03/2024:    Left Ventricle: Ventricle not well visualized. Normal ventricle size. Normal wall thickness. Preserved systolic function. Estimated EF 65-70%. Indeterminate diastolic filling pattern. Unable to assess left atrial pressure.    Right Ventricle: Normal ventricle size. Normal systolic function.    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 Arriaza.  Stable FEV1 in 8/2015.  IgE levels and eosinophils stable in 8/2015.  Managed with Fasenra, Azithromycin and Flovent.    Allergic rhinitis 04/02/2018    Pulmonologist: Dr. Walker. Managed with Flonase.    Asthma     Atrial flutter with rapid ventricular response (CMS/[facility]) 02/21/2023    Diagnosed in 02/2023 and hospitalized Managed with Apixaban, Metoprolol, and Diltiazem Echocardiogram 02/2023   Left Ventricle: Normal ventricle size. Mild concentric left ventricular hypertrophy. Preserved systolic function. Estimated EF 65-70%. No regional wall motion abnormalities. Grade I diastolic dysfunction.   Right Ventricle: Normal ventricle size. Normal systolic function.   Left Atriu    Calcification of aorta (CMS/[facility]) 06/29/2020    Seen incidentally on CT scan.    Chronic  obstructive pulmonary disease (CMS/[facility]) 04/02/2018    Pulmonologist: Dr. Walker.  Seen on PFTs in hospital in 2014.  Controlled with Spiriva and FLovent.    COPD (chronic obstructive pulmonary disease) (CMS/[facility]) 10/19/2023    Managed with Symbicort and Spiriva    GERD (gastroesophageal reflux disease) 06/08/2019    Managed with Esomeprazole.  Should take medication 30 minutes prior to meal.  Advised to avoid caffeine, carbonated beverages, and should not eat within 3 hours of going to sleep.  Advised to reduce BMI < 25.     Insomnia 04/02/2018    Managed with Lorazepam as needed.    Lumbar spinal stenosis 04/02/2018    Neurosurgeon: Dr. Miguel. MRI with Central and lateral recess spinal stenosis. Has Spondylolisthesis at L4/L5. S/P epidural injection by Dr. Martin with some relief in past. Had Lumbar fusion and Lumbar laminectomy by Dr. Miguel in 4/2015.    Macular degeneration disease     Obstructive sleep apnea syndrome 04/02/2018    Mild STACIA noted in sleep study 7/2015. Treated with nasal CPAP automatic with pressure range 5-10 CM H2Ox couple months. Off now    Orthostatic hypotension 10/23/2023    Managed on Midodrine    Osteoporosis 04/02/2018    Rheumatologist: Dr. Bradley.     Dexa scan 7/2016 with osteoporosis of lumbar spine LS-2.5, LH-2.1, RH -2.2.     Has been on Fosamax in the past for 8 years.     Worsened by Prednisone in past.     Continue vitamin D, calcium and weight bearing exercise.    DEXA in 7/2017 with LS -1.7, LH -2.2, and RH -2.0.     Next due in 7/2019.     Seen by Dr. Bradley in 8/2015 who agreed with initiating Prolia    Primary hypertension 04/02/2018    Managed on Diltiazem  Advised to follow a low sodium diet and keep BMI < 25.  Advised to exercise for 2.5 hours per week.  Instructed to take home blood pressure readings and call if consistently above 140/90.      Pulmonary embolism (CMS/[facility]) 06/09/2019    Hematologist: Dr. Bhakta Diagnosed in 06/2019 in right upper lobe. Vascular  ultrasound negative of bilateral lower extremities. Managed on Eliquis. Hypercoagulable workup was negative for beta-2 glycoprotein's, RIGO, Antithrombin III, lupus anticoagulant, protein C activity protein S activity, and prothrombin gene mutation, and factor V Leiden. Now off Apixaban as CT scan in 12/2019 was without pu    Sacral fracture, closed (CMS/HCC) 08/21/2023    SVT (supraventricular tachycardia) (CMS/HCC)     Vertebral compression fracture (CMS/HCC)     At T12 level       Past Surgical History   Procedure Laterality Date    Appendectomy      Carpal tunnel release Bilateral     Cataract extraction w/  intraocular lens implant Bilateral     Hysterectomy  1972    Lumbar laminectomy  04/21/2015    S/P lumbar fusion    Tonsillectomy         Family History   Problem Relation Name Age of Onset    Glaucoma Biological Mother      Macular degeneration Biological Mother      Hypertension Biological Mother      Lung cancer Biological Father      Heart disease Biological Brother      Breast cancer Mother's Sister      No Known Problems Biological Son      No Known Problems Biological Son      No Known Problems Biological Daughter      No Known Problems Biological Daughter      No Known Problems Biological Daughter         Social History     Tobacco Use    Smoking status: Never    Smokeless tobacco: Never   Vaping Use    Vaping status: Never Used   Substance Use Topics    Alcohol use: Yes     Comment: Rarely    Drug use: No       Patient Care Team:  Henry Linares MD as PCP - General  Ray Arriaza MD as Referring Physician  Aye Bhakta MD as Consulting Physician (Hematology and Oncology)  Wesley Walker MD as Consulting Physician (Pulmonary)  Fausto Bradley MD as Consulting Physician (Rheumatology)  Kleiner, Robert C, MD as Referring Physician  Alfie Torres MD as Consulting Physician (Dermatology)  Fanny Pelaez MD as Consulting Physician (Orthopedic Surgery)  Keenan Krause DO as Consulting  Physician (Family Medicine)  Keenan Krause DO as Consulting Physician (Family Medicine)  Wesley You DO as Consulting Physician (Physical Medicine and Rehabilitation)  Jessika Zelaya MD as Surgeon (Neurosurgery)  Henrietta Crespo MD as Cardiologist (Interventional Cardiology)  Dinesh Goel MD as Surgeon (Otolaryngology)  Valentino, Michael A, MD PhD as Cardiologist (Cardiology)  Alejo Saavedra MD as Consulting Physician (Orthopedic Surgery)  Karolina Hauser RN as Care Manager (Care Management)     Allergies and Medications       Allergies   Allergen Reactions    Mepolizumab Rash    Morphine      Other reaction(s): Vomiting    Oxycodone      Other reaction(s): Vomiting       Current Outpatient Medications   Medication Sig Dispense Refill    acetaminophen (TYLENOL ARTHRITIS PAIN ORAL) Take 600 mg by mouth once daily.      apixaban (ELIQUIS) 2.5 mg tablet Take 1 tablet (2.5 mg total) by mouth 2 (two) times a day. 180 tablet 3    azithromycin (ZITHROMAX) 250 mg tablet TAKE 1 TABLET (250 MG TOTAL) BY MOUTH 3 (THREE) TIMES A WEEK (MON, WED, FRI). 12 tablet 5    calcium carbonate 600 mg calcium (1,500 mg) tablet Take 1 tablet by mouth daily.      cholecalciferol, vitamin D3, 25 mcg (1,000 unit) capsule Take 1 tablet by mouth daily.      diltiazem LA (CARDIZEM LA) 360 mg 24 hr tablet Take 1 tablet (360 mg total) by mouth daily. 90 tablet 3    docusate sodium (COLACE) 100 mg capsule Take 100 mg by mouth 2 (two) times a day as needed for constipation.      DULoxetine (CYMBALTA) 60 mg capsule Take 60 mg by mouth at bedtime.   0    fexofenadine (ALLEGRA) 180 mg tablet Take 180 mg by mouth nightly.      lidocaine (LIDODERM) 5 % patch APPLY 1 PATCH ON THE SKIN DAILY REMOVE & DISCARD WITHIN 12 HOURS OR AS DIRECTED BY PRESCRIBER 30 patch 3    LORazepam (ATIVAN) 1 mg tablet Take 1 tablet (1 mg total) by mouth nightly as needed for anxiety. 30 tablet 5    melatonin ODT Take 2 tablets (6 mg total) by mouth  "nightly.      metoprolol tartrate (LOPRESSOR) 25 mg tablet TAKE 1 TABLET BY MOUTH TWICE A  tablet 3    midodrine (PROAMATINE) 5 mg tablet TAKE 1 TABLET BY MOUTH 3 TIMES A DAY BEFORE MEALS. 270 tablet 3    pregabalin (LYRICA) 25 mg capsule Take 25 mg by mouth daily with lunch. PRN (Patient taking differently: Take 25 mg by mouth daily with lunch.)      pregabalin (LYRICA) 50 mg capsule Take 50 mg by mouth 2 (two) times a day. Morning and Evening      propylene glycol/peg 400/PF (SYSTANE, PF, OPHT) Administer 1 drop into affected eye(s) as needed (dry eyes).      SPIRIVA RESPIMAT 2.5 mcg/actuation mist inhaler Inhale 2 puffs daily.      SYMBICORT 80-4.5 mcg/actuation inhaler Inhale 2 puffs 2 (two) times a day.      traMADoL (ULTRAM) 50 mg tablet Take 1 tablet (50 mg total) by mouth every 8 (eight) hours as needed for pain. Pt also takes it as needed at lunch time. (Patient taking differently: Take 50 mg by mouth 3 (three) times a day. Pt also takes it as needed at lunch time.)       No current facility-administered medications for this visit.          Review of Systems       Review of Systems   Constitutional:  Negative for chills and fever.   Skin:  Positive for wound.        Physical Examination       Objective     Vitals:    12/06/24 1403   BP: 132/80   Pulse: 67   Resp: 16   Temp: 36.4 °C (97.6 °F)   SpO2: 96%       Pulse Readings from Last 5 Encounters:   12/06/24 67   10/18/24 86   09/18/24 (!) 57   05/14/24 64   03/27/24 84       Wt Readings from Last 5 Encounters:   10/18/24 49.4 kg (108 lb 12.8 oz)   09/18/24 48.5 kg (107 lb)   05/14/24 49.2 kg (108 lb 6.4 oz)   03/27/24 49.4 kg (109 lb)   03/27/24 48 kg (105 lb 12.8 oz)       Ht Readings from Last 5 Encounters:   10/18/24 1.549 m (5' 1\")   09/18/24 1.524 m (5')   03/27/24 1.524 m (5')   03/27/24 1.575 m (5' 2\")   03/04/24 1.524 m (5')       BP Readings from Last 5 Encounters:   12/06/24 132/80   10/18/24 (!) 172/94   09/18/24 120/70   05/14/24 136/78 "   03/27/24 (!) 151/96       BMI Readings from Last 4 Encounters:   10/18/24 20.56 kg/m²   09/18/24 20.90 kg/m²   05/14/24 19.83 kg/m²   03/27/24 21.29 kg/m²       Physical Exam  Constitutional:       Appearance: Normal appearance.   Musculoskeletal:      Comments: Avulsion skin tear with skin clumped up at the 1 o'clock position of the circular would. Skin is fixed and unable to be dettached to straighten it out. No purulent discharge and minimal bleeding.    Neurological:      Mental Status: She is alert.   Psychiatric:         Mood and Affect: Mood normal.         Behavior: Behavior normal.          Laboratory Results     Lab Results   Component Value Date    WBC 5.26 10/16/2024    WBC 5.81 12/21/2023    WBC 5.82 12/20/2023    HGB 12.5 10/16/2024    HGB 11.7 (L) 12/21/2023    HGB 10.4 (L) 12/20/2023    HCT 39.3 10/16/2024    HCT 37.5 12/21/2023    HCT 32.6 (L) 12/20/2023    .0 (H) 10/16/2024    MCV 91.5 12/21/2023    MCV 90.1 12/20/2023     10/16/2024     12/21/2023     12/20/2023        Lab Results   Component Value Date    GLUCOSE 72 10/16/2024    GLUCOSE 88 12/21/2023    GLUCOSE 96 12/20/2023    CALCIUM 9.6 10/16/2024    CALCIUM 8.8 12/21/2023    CALCIUM 8.8 12/20/2023     10/16/2024     12/21/2023     12/20/2023    K 3.5 10/16/2024    K 4.1 12/21/2023    K 3.7 12/20/2023    CO2 29 10/16/2024    CO2 25 12/21/2023    CO2 24 12/20/2023     10/16/2024     (H) 12/21/2023     12/20/2023    BUN 19 10/16/2024    BUN 16 12/21/2023    BUN 17 12/20/2023    CREATININE 0.8 10/16/2024    CREATININE 0.7 12/21/2023    CREATININE 0.7 12/20/2023    ALKPHOS 47 10/16/2024    ALKPHOS 58 11/22/2023    ALKPHOS 56 10/19/2023    AST 12 (L) 10/16/2024    AST 15 11/22/2023    AST 17 10/19/2023    ALT 16 10/16/2024    ALT 15 11/22/2023    ALT 17 10/19/2023    BILITOT 0.4 10/16/2024    BILITOT 0.5 11/22/2023    BILITOT 0.5 10/19/2023    ALBUMIN 4.0 10/16/2024    ALBUMIN 4.1  11/22/2023    ALBUMIN 4.0 10/19/2023       Lab Results   Component Value Date    CHOL 227 (H) 01/27/2023    CHOL 248 (H) 11/18/2015     Lab Results   Component Value Date    HDL 62 01/27/2023    HDL 92 11/18/2015     Lab Results   Component Value Date    LDLCALC 141 (H) 01/27/2023    LDLCALC 133 (H) 11/18/2015     Lab Results   Component Value Date    TRIG 120 01/27/2023    TRIG 117 11/18/2015       Lab Results   Component Value Date    TSH 3.63 10/19/2023    TSH 2.87 01/26/2023    TSH 1.91 10/14/2020     Lab Results   Component Value Date    FREET4 0.79 11/18/2015       Lab Results   Component Value Date    HGBA1C 5.6 11/18/2015    HGBA1C 5.6 04/08/2015       Lab Results   Component Value Date    HEPCAB Nonreactive 07/12/2018       Lab Results   Component Value Date    MG 2.1 12/20/2023    MG 2.3 10/24/2023    MG 2.2 10/23/2023        Immunization History   Administered Date(s) Administered    INFLUENZA VACCINE QUAD ADJUVANTED 65 and OLDER 10/06/2021, 10/19/2022, 10/11/2023    Influenza Trivalent High Dose Preservative Free 65 yrs and up IM 11/03/2011, 11/13/2012, 10/09/2013, 10/30/2014, 11/17/2015, 09/30/2016, 09/25/2017, 10/18/2024    Influenza Vaccine 65 And Older Preservative Free 10/25/2019, 10/02/2020    Influenza Vaccine Quadrivalent 3 Yr And Older 09/18/2017, 10/22/2018    Influenza, Unspecified 10/30/2014, 11/17/2015, 09/30/2016, 09/25/2017    MMRV 06/05/2014    Pneumococcal Conjugate 08/09/1996    Pneumococcal Conjugate 13-Valent 11/17/2015    Pneumococcal Polysaccharide 10/30/2014, 09/11/2017    RESPIRATORY SYNCYTIAL VIRUS (RSV), VACCINE, RECOMB, AREXVY 11/19/2024    SARS-COV-2 (COVID-19) VACCINE PFIZER BIVALENT 12 years and older ([place]) 09/15/2022    SARS-COV-2 (COVID-19) VACCINE, MODERNA MONOVALENT 02/05/2021, 03/05/2021, 11/17/2021    SARS-COV-2 (COVID-19) VACCINE, MODERNA MONOVALENT BOOSTER 11/17/2021    SARS-COV-2 (COVID19) VACCINE, PFIZER MONOVALENT 09/15/2022    Tdap 03/22/2012, 08/08/2022     Varicella 06/05/2014    Zoster 01/02/2006, 01/02/2016    Zoster Vaccine Recombinant Adjuvanted (Shingrix) 05/24/2019, 09/20/2019         Health Maintenance Topics with due status: Overdue       Topic Date Due    COVID-19 Vaccine 09/01/2024     Health Maintenance Topics with due status: Not Due       Topic Last Completion Date    DTaP, Tdap, and Td Vaccines 08/08/2022    DEXA Scan 10/16/2024    Medicare Annual Wellness Visit 10/18/2024    Depression Screening 10/18/2024    Falls Risk Screening 10/18/2024     Health Maintenance Topics with due status: Completed       Topic Last Completion Date    Pneumococcal (65 years and older) 09/11/2017    Zoster Vaccine 09/20/2019    Influenza Vaccine 10/18/2024     Health Maintenance Topics with due status: Aged Out       Topic Date Due    Meningococcal ACWY Aged Out    RSV <20 months Aged Out    HIB Vaccines Aged Out    Hepatitis B Vaccines Aged Out    IPV Vaccines Aged Out    HPV Vaccines Aged Out     Health Maintenance Topics with due status: Discontinued       Topic Date Due    RSV Vaccine Discontinued    Breast Cancer Screening Discontinued        Assessment and Plan       Assessment/Plan     Problem List Items Addressed This Visit       Skin tear of left lower leg without complication - Primary    Current Assessment & Plan     Referred to wound care for further evaluation and treatment. Dead skin needs to be debrided. For now use nonstick bandage and polysporin daily.             Return if symptoms worsen or fail to improve.    No orders of the defined types were placed in this encounter.             Henry Linares MD    12/6/2024

## 2024-12-06 NOTE — ASSESSMENT & PLAN NOTE
Referred to wound care for further evaluation and treatment. Dead skin needs to be debrided. For now use nonstick bandage and polysporin daily.

## 2024-12-06 NOTE — PATIENT INSTRUCTIONS
Problem List Items Addressed This Visit       Skin tear of left lower leg without complication - Primary     Referred to wound care for further evaluation and treatment. Dead skin needs to be debrided. For now use nonstick bandage and polysporin daily.

## 2024-12-11 RX ORDER — APIXABAN 2.5 MG/1
2.5 TABLET, FILM COATED ORAL 2 TIMES DAILY
Qty: 180 TABLET | Refills: 3 | Status: SHIPPED | OUTPATIENT
Start: 2024-12-11

## 2024-12-16 ENCOUNTER — OFFICE VISIT (OUTPATIENT)
Dept: WOUND CARE | Facility: HOSPITAL | Age: 89
End: 2024-12-16
Payer: MEDICARE

## 2024-12-16 VITALS
WEIGHT: 102 LBS | TEMPERATURE: 97.2 F | DIASTOLIC BLOOD PRESSURE: 70 MMHG | HEART RATE: 69 BPM | RESPIRATION RATE: 16 BRPM | BODY MASS INDEX: 20.03 KG/M2 | SYSTOLIC BLOOD PRESSURE: 150 MMHG | HEIGHT: 60 IN

## 2024-12-16 DIAGNOSIS — S81.812A SKIN TEAR OF LEFT LOWER LEG WITHOUT COMPLICATION, INITIAL ENCOUNTER: Primary | ICD-10-CM

## 2024-12-16 PROCEDURE — 27200112 HC AQUACELL SILVER 4X4

## 2024-12-16 PROCEDURE — A6212 FOAM DRG <=16 SQ IN W/BORDER: HCPCS

## 2024-12-16 PROCEDURE — G0463 HOSPITAL OUTPT CLINIC VISIT: HCPCS

## 2024-12-16 PROCEDURE — 27200114 HC PROMOGRAN MATRIX SMALL

## 2024-12-16 NOTE — PROGRESS NOTES
Subjective     Patient ID: Madelyn Ruiz is a 89 y.o. female.    HPI  Pt presents with her daughter for an initial evaluation of a new skin tear to her RT anterior shin. Pt has been a pt in the [facility] in the past and was referred back to us by her PCP Dr. Linares.  Review of Systems   Cardiovascular:  Positive for leg swelling.   Musculoskeletal:  Positive for arthralgias, back pain and gait problem.   Skin:  Positive for color change and wound.   Neurological:  Positive for weakness.       Objective     Vitals:    12/16/24 1433   BP: (!) 150/70   BP Location: Left upper arm   Patient Position: Sitting   Pulse: 69   Resp: 16   Temp: 36.2 °C (97.2 °F)   TempSrc: Temporal   Weight: 46.3 kg (102 lb)   Height: 1.524 m (5')     Body mass index is 19.92 kg/m².    Physical Exam              Assessment & Plan  Skin tear of left lower leg without complication, initial encounter  Pt presents with her daughter for an initial evaluation of a new skin tear to her RT anterior shin. Pt was referred by her PCP Dr. Linares. Pt has been a pt in the [facility] in the past. The torn skin is bunched and not able to be repositioned. After obtaining verbal consent and application of a topical anesthetic agent, the site was debrided and the loose skin removed. Bleeding was controlled with an application of silver nitrate and direct pressure. The remaining wound is fairly superficial and granular. I will order Rahel application with Aquacel Ag and a bordered silicone dressing. Dressing changes can happen 3x per week. I advise the pt elevate her leg and use a tubigrip compression stocking over the dressing to aid in edema control. Home nursing will be ordered for the pt. I advise follow in 2 weeks. Pt's daughter states she is leaving town and her other sister will take over management of the wound. I advise pt or her daughters call if any questions or concerns arise.                 Arnulfo Bravo DPM

## 2024-12-16 NOTE — PATIENT INSTRUCTIONS
Wound Healing Center Instructions    MEDICATIONS     Medication Note  Continue present medications as prescribed by the Wound Healing Center or other physicians you see. To avoid any problems keep the Wound Healing Center informed each visit of any medications changes that occur.     WOUND CARE     Clean Wound with: Soap and Water and Showering   Treatment 1   Location: left anterior leg wound  Dressing: Apply Rahel to the wound base, then cover with Opticell Ag, and cover with a bordered silicone dressing. Apply tubigrip stocking in the am and may remove the tubigrip at bedtime.  Dressing Care Frequency: 3x per Week  Care Provider: Visiting Nurse and Wound Center Staff       Basic Principles      Wash your hands thoroughly with soap and water and after each dressing change. If someone other than the patient changes the dressing, it’s best to wear disposable gloves.   Do not get the wound or dressing wet.   To shower: remove the dressing, shower with soap and water (including washing the wound - do not use a washcloth), air dry, then redress the wound.  Do not take a tub bath  Keep all your dressings in a clean, covered container at home to avoid dust and contamination.  Discard used dressings in a plastic bag or covered trash container.  Check your wound and the surrounding skin at each dressing change for redness, warmth, swelling, increased pain, foul odor, fever, pus or abnormal drainage or discharge.  Notify Wound Healing Center if any of these changes occur - [phone].        Nutrition  Eat a well, balanced diet with adequate protein to support wound healing.   Take a multivitamin every day. Adequate nutrition supports healing and new tissue growth.  All diabetic patients should strive to keep blood sugars within a normal, practical range.   Elevated blood sugars can delay your wound healing.        ACTIVITY     Elevate legs above level of heart   Elevate your legs above the level of your heart for  specific time periods during the day on several firm pillows or a foam leg wedge  Use of a recliner chair at home can often help in the appropriate elevation of your legs above the level of your heart  Normal activity then rest and elevation  May shower  May walk with rollator    MOBILITY     Wheelchair and Walker

## 2024-12-23 ENCOUNTER — OFFICE VISIT (OUTPATIENT)
Dept: WOUND CARE | Facility: HOSPITAL | Age: 89
End: 2024-12-23
Payer: MEDICARE

## 2024-12-23 VITALS — TEMPERATURE: 97.5 F | HEART RATE: 58 BPM

## 2024-12-23 DIAGNOSIS — S81.812D SKIN TEAR OF LEFT LOWER LEG WITHOUT COMPLICATION, SUBSEQUENT ENCOUNTER: Primary | ICD-10-CM

## 2024-12-23 PROCEDURE — G0463 HOSPITAL OUTPT CLINIC VISIT: HCPCS

## 2024-12-23 PROCEDURE — 27200114 HC PROMOGRAN MATRIX SMALL

## 2024-12-23 PROCEDURE — 27200106 HC AQUA CELL 2X2

## 2024-12-23 ASSESSMENT — ENCOUNTER SYMPTOMS
BACK PAIN: 1
ARTHRALGIAS: 1
WOUND: 1
WEAKNESS: 1
COLOR CHANGE: 1

## 2024-12-23 NOTE — ASSESSMENT & PLAN NOTE
Pt presents with her daughter for an initial evaluation of a new skin tear to her RT anterior shin. Pt was referred by her PCP Dr. Linares. Pt has been a pt in the [facility] in the past. The torn skin is bunched and not able to be repositioned. After obtaining verbal consent and application of a topical anesthetic agent, the site was debrided and the loose skin removed. Bleeding was controlled with an application of silver nitrate and direct pressure. The remaining wound is fairly superficial and granular. I will order Rahel application with Aquacel Ag and a bordered silicone dressing. Dressing changes can happen 3x per week. I advise the pt elevate her leg and use a tubigrip compression stocking over the dressing to aid in edema control. Home nursing will be ordered for the pt. I advise follow in 2 weeks. Pt's daughter states she is leaving town and her other sister will take over management of the wound. I advise pt or her daughters call if any questions or concerns arise.

## 2024-12-23 NOTE — PATIENT INSTRUCTIONS
Wound Healing Center Instructions    MEDICATIONS     Medication Note  Continue present medications as prescribed by the Wound Healing Center or other physicians you see. To avoid any problems keep the Wound Healing Center informed each visit of any medications changes that occur.     WOUND CARE     Clean Wound with: Soap and Water and Showering   Treatment 1   Location: left anterior leg wound  Dressing: Apply Rahel to the wound base, then cover with Opticell Ag, and secure with gauze and peterson. Apply tubigrip stocking in the am and may remove the tubigrip at bedtime.  Dressing Care Frequency: 3x per Week  Care Provider: Visiting Nurse and Wound Center Staff       Basic Principles      Wash your hands thoroughly with soap and water and after each dressing change. If someone other than the patient changes the dressing, it’s best to wear disposable gloves.   Do not get the wound or dressing wet.   To shower: remove the dressing, shower with soap and water (including washing the wound - do not use a washcloth), air dry, then redress the wound.  Do not take a tub bath  Keep all your dressings in a clean, covered container at home to avoid dust and contamination.  Discard used dressings in a plastic bag or covered trash container.  Check your wound and the surrounding skin at each dressing change for redness, warmth, swelling, increased pain, foul odor, fever, pus or abnormal drainage or discharge.  Notify Wound Healing Center if any of these changes occur - [phone].        Nutrition  Eat a well, balanced diet with adequate protein to support wound healing.   Take a multivitamin every day. Adequate nutrition supports healing and new tissue growth.  All diabetic patients should strive to keep blood sugars within a normal, practical range.   Elevated blood sugars can delay your wound healing.        ACTIVITY     Elevate legs above level of heart   Elevate your legs above the level of your heart for specific time  periods during the day on several firm pillows or a foam leg wedge  Use of a recliner chair at home can often help in the appropriate elevation of your legs above the level of your heart  Normal activity then rest and elevation  May shower  May walk with rollator    MOBILITY     Wheelchair and Walker

## 2024-12-30 ASSESSMENT — ENCOUNTER SYMPTOMS
ARTHRALGIAS: 1
WEAKNESS: 1
COLOR CHANGE: 1
WOUND: 1

## 2024-12-30 NOTE — PROGRESS NOTES
Subjective     Patient ID: Madelyn Ruiz is a 89 y.o. female.    HPI  Pt presents with her daughter for ongoing care to a traumatic wound at her RT anterior shin.  Review of Systems   Cardiovascular:  Positive for leg swelling.   Musculoskeletal:  Positive for arthralgias and gait problem.   Skin:  Positive for color change and wound.   Neurological:  Positive for weakness.       Objective     Vitals:    12/23/24 1558   Pulse: (!) 58   Temp: 36.4 °C (97.5 °F)     There is no height or weight on file to calculate BMI.    Physical Exam          Assessment & Plan  Skin tear of left lower leg without complication, subsequent encounter  Pt presents with her daughter for a follow up evaluation of a new skin tear to her RT anterior shin. Pt was referred by her PCP Dr. Linares. Pt has been a pt in the [facility] in the past. The torn skin is bunched and not able to be repositioned. After obtaining verbal consent and application of a topical anesthetic agent, the site was debrided and the loose skin removed. Bleeding was controlled with an application of silver nitrate and direct pressure. The remaining wound is fairly superficial and granular. I will order Rahel application with Aquacel Ag and a bordered silicone dressing. Dressing changes can happen 3x per week. I advise the pt elevate her leg and use a tubigrip compression stocking over the dressing to aid in edema control. Home nursing will be ordered for the pt. I advise follow in 2 weeks. Pt and her daughter are in agreement with this ongoing tx plan.                 Arnulfo Bravo DPM

## 2024-12-30 NOTE — ASSESSMENT & PLAN NOTE
Pt presents with her daughter for a follow up evaluation of a new skin tear to her RT anterior shin. Pt was referred by her PCP Dr. Linares. Pt has been a pt in the [facility] in the past. The torn skin is bunched and not able to be repositioned. After obtaining verbal consent and application of a topical anesthetic agent, the site was debrided and the loose skin removed. Bleeding was controlled with an application of silver nitrate and direct pressure. The remaining wound is fairly superficial and granular. I will order Rahel application with Aquacel Ag and a bordered silicone dressing. Dressing changes can happen 3x per week. I advise the pt elevate her leg and use a tubigrip compression stocking over the dressing to aid in edema control. Home nursing will be ordered for the pt. I advise follow in 2 weeks. Pt and her daughter are in agreement with this ongoing tx plan.

## 2025-01-03 ENCOUNTER — TELEPHONE (OUTPATIENT)
Dept: WOUND CARE | Facility: HOSPITAL | Age: 89
End: 2025-01-03
Payer: MEDICARE

## 2025-01-03 NOTE — TELEPHONE ENCOUNTER
Received a voicemail from Jennifer with [facility] that patient's wound is nearly healed, now able to be covered with bandaid only, and patient has requested to be discharged from home care.

## 2025-01-13 ENCOUNTER — OFFICE VISIT (OUTPATIENT)
Dept: WOUND CARE | Facility: HOSPITAL | Age: 89
End: 2025-01-13
Payer: MEDICARE

## 2025-01-13 VITALS — TEMPERATURE: 97.5 F

## 2025-01-13 DIAGNOSIS — S81.812D SKIN TEAR OF LEFT LOWER LEG WITHOUT COMPLICATION, SUBSEQUENT ENCOUNTER: Primary | ICD-10-CM

## 2025-01-13 PROCEDURE — A6212 FOAM DRG <=16 SQ IN W/BORDER: HCPCS

## 2025-01-13 PROCEDURE — G0463 HOSPITAL OUTPT CLINIC VISIT: HCPCS

## 2025-01-13 NOTE — PATIENT INSTRUCTIONS
Wound Healing Center Instructions    MEDICATIONS     Medication Note  Continue present medications as prescribed by the Wound Healing Center or other physicians you see. To avoid any problems keep the Wound Healing Center informed each visit of any medications changes that occur.     WOUND CARE     Clean Wound with: Soap and Water and Showering   Treatment 1   Location: left leg wounds   Dressing: Cover each with a silicone bordered dressing. Apply mupirocin to lower wound prior to bandage.   Dressing Care Frequency: 3x per Week  Care Provider: family      Basic Principles      Wash your hands thoroughly with soap and water and after each dressing change. If someone other than the patient changes the dressing, it’s best to wear disposable gloves.   Do not get the wound or dressing wet.   To shower: remove the dressing, shower with soap and water (including washing the wound - do not use a washcloth), air dry, then redress the wound.  Do not take a tub bath  Keep all your dressings in a clean, covered container at home to avoid dust and contamination.  Discard used dressings in a plastic bag or covered trash container.  Check your wound and the surrounding skin at each dressing change for redness, warmth, swelling, increased pain, foul odor, fever, pus or abnormal drainage or discharge.  Notify Wound Healing Center if any of these changes occur - [phone].      Nutrition  Eat a well, balanced diet with adequate protein to support wound healing.   Take a multivitamin every day. Adequate nutrition supports healing and new tissue growth.  All diabetic patients should strive to keep blood sugars within a normal, practical range.   Elevated blood sugars can delay your wound healing.      ACTIVITY     Elevate legs above level of heart   Elevate your legs above the level of your heart for specific time periods during the day on several firm pillows or a foam leg wedge  Use of a recliner chair at home can often help in  the appropriate elevation of your legs above the level of your heart  Normal activity then rest and elevation  May shower  May walk with rollator    MOBILITY     Wheelchair and Walker

## 2025-01-13 NOTE — LETTER
[facility]  W[facility]ALING CENTER AT [facility]  [address]  [facility] [id number] [phone]       25    PHYSICIAN ORDER FOR:    Name: Madelyn Ruiz   : 1930  Patient Phone: [phone]  Patient Address:   [address] [place] [id number]     Primary Insurance Name: MEDICARE PART A & B   Primary Insurance #: [id number]  Secondary Insurance: [employer]  Secondary Insurance #: [id number]     Order Date: 25  Supply Order Length of Time: 2 weeks  Frequency of Change: 3x week     Supplies Needed:    - silicone bordered foam dressing (FOR WOUNDS  1 and 2)     Supplies can be filled with an equivalent or comparable substitute if brand specific item is not carried or available.     Primary Diagnosis: S81.812D    NATASHA Alvarez DPM     Return Fax:  Phone [phone]  Fax  [phone]     Wound(s) cleansed/mechanically debrided with gauze and wound wash/saline prior to assessment.    Wound Care Documentation:    Wound Traumatic Posterior;Right Elbow (Active)       Wound Laceration Left;Posterior Head (Active)       Wound Traumatic Left Pretibial (Active)   Wound Image   25 1500   Dressing Appearance moist drainage 25 1500   Wound Appearance clean;full thickness;granulating;moist;pink 25 1500   Periwound Appearance [place] 25 1500   Drainage Characteristics/Odor serous 25 1500   Drainage Amount moderate 25 1500   Wound Care cleansed with;soap and water 25 1500   Wound Interventions -- (lidocaine gel) 24 1500   Dressing Foam, mepilex in office  25 1500   Frequency of Dressing Change 3 times a week 25 1500   Wound Length (cm) 0.5 cm 25 1500   Wound Width (cm) 0.5 cm 25 1500   Wound Depth (cm) 0.1 cm 25 1500   Wound Surface Area (cm^2) 0.25 cm^2 25 1500   Wound Volume (cm^3) 0.025 cm^3 25 1500   % Healing 80 % 25 1500    Granulation % 100 12/16/24 1400   Other Tissue Other (ability to free text) (see photos) 01/13/25 1500   Recommendation leg elevation, compression 12/16/24 1400       Wound Traumatic Distal;Left Pretibial (Active)   Wound Image   01/13/25 1500   Dressing Appearance moist drainage 01/13/25 1500   Wound Appearance clean;full thickness;granulating;moist 01/13/25 1500   Periwound Appearance Gem 01/13/25 1500   Drainage Characteristics/Odor serous 01/13/25 1500   Drainage Amount moderate 01/13/25 1500   Wound Care cleansed with;soap and water 01/13/25 1500   Dressing Foam, mepilex in office  01/13/25 1500   Frequency of Dressing Change 3 times a week 01/13/25 1500   Wound Length (cm) 3 cm 01/13/25 1500   Wound Width (cm) 0.5 cm 01/13/25 1500   Wound Depth (cm) 0.1 cm 01/13/25 1500   Wound Surface Area (cm^2) 1.5 cm^2 01/13/25 1500   Wound Volume (cm^3) 0.15 cm^3 01/13/25 1500   Other Tissue Other (ability to free text) (see photos) 01/13/25 1500

## 2025-01-13 NOTE — PROGRESS NOTES
Subjective     Patient ID: Madelyn Ruiz is a 89 y.o. female.    HPI  Pt presents with her daughter for ongoing care to a skin tear at her RT LE. This site appears nearly fully healed. There is a new small skin laceration distal from this site and pt unaware what the mechanism was for the new wound present.  Review of Systems   Cardiovascular:  Positive for leg swelling.   Musculoskeletal:  Positive for arthralgias and gait problem.   Skin:  Positive for color change and wound.   Neurological:  Positive for weakness.       Objective     Vitals:    01/13/25 1537   Temp: 36.4 °C (97.5 °F)     There is no height or weight on file to calculate BMI.    Physical Exam          Assessment & Plan  Skin tear of left lower leg without complication, subsequent encounter  Main wound being evaluated is nearly fully epithelialized. There is an area of darker skin hyperpigmentation present and some of that may fade with time. Pt also has a new small skin tear/laceration distal to this site. Again pt is unsure how this occurred. No debridement is needed at either site. We can stop use of Rahel. I advise both sites be cleansed and dressed with Mupirocin ointment and bordered silicone dressings every other day. Both sites can be wet in shower. Updated care plan d/w pt and her daughter.                 Arnulfo Bravo DPM

## 2025-01-16 RX ORDER — MUPIROCIN 20 MG/G
1 OINTMENT TOPICAL DAILY
Qty: 22 G | Refills: 1 | Status: SHIPPED | OUTPATIENT
Start: 2025-01-16 | End: 2025-01-30

## 2025-01-22 ASSESSMENT — ENCOUNTER SYMPTOMS
WOUND: 1
COLOR CHANGE: 1
WEAKNESS: 1
ARTHRALGIAS: 1

## 2025-01-31 RX ORDER — DILTIAZEM HYDROCHLORIDE 360 MG/1
360 TABLET, EXTENDED RELEASE ORAL DAILY
Qty: 90 TABLET | Refills: 3 | Status: SHIPPED | OUTPATIENT
Start: 2025-01-31

## 2025-01-31 NOTE — TELEPHONE ENCOUNTER
Medicine Refill Request    Last Office Visit: Visit date not found   Last Consult Visit: Visit date not found  Last Telemedicine Visit: Visit date not found    Next Appointment: Visit date not found      Current Outpatient Medications:     acetaminophen (TYLENOL ARTHRITIS PAIN ORAL), Take 600 mg by mouth once daily., Disp: , Rfl:     azithromycin (ZITHROMAX) 250 mg tablet, TAKE 1 TABLET (250 MG TOTAL) BY MOUTH 3 (THREE) TIMES A WEEK (MON, WED, FRI). (Patient not taking: Reported on 12/16/2024), Disp: 12 tablet, Rfl: 5    calcium carbonate 600 mg calcium (1,500 mg) tablet, Take 1 tablet by mouth daily., Disp: , Rfl:     cholecalciferol, vitamin D3, 25 mcg (1,000 unit) capsule, Take 1 tablet by mouth daily., Disp: , Rfl:     diltiazem LA (CARDIZEM LA) 360 mg 24 hr tablet, Take 1 tablet (360 mg total) by mouth daily., Disp: 90 tablet, Rfl: 3    docusate sodium (COLACE) 100 mg capsule, Take 100 mg by mouth 2 (two) times a day as needed for constipation., Disp: , Rfl:     DULoxetine (CYMBALTA) 60 mg capsule, Take 60 mg by mouth at bedtime. , Disp: , Rfl: 0    ELIQUIS 2.5 mg tablet, TAKE 1 TABLET BY MOUTH TWICE A DAY, Disp: 180 tablet, Rfl: 3    fexofenadine (ALLEGRA) 180 mg tablet, Take 180 mg by mouth nightly., Disp: , Rfl:     lidocaine (LIDODERM) 5 % patch, APPLY 1 PATCH ON THE SKIN DAILY REMOVE & DISCARD WITHIN 12 HOURS OR AS DIRECTED BY PRESCRIBER, Disp: 30 patch, Rfl: 3    LORazepam (ATIVAN) 1 mg tablet, Take 1 tablet (1 mg total) by mouth nightly as needed for anxiety., Disp: 30 tablet, Rfl: 5    melatonin ODT, Take 2 tablets (6 mg total) by mouth nightly., Disp: , Rfl:     metoprolol tartrate (LOPRESSOR) 25 mg tablet, TAKE 1 TABLET BY MOUTH TWICE A DAY, Disp: 180 tablet, Rfl: 3    midodrine (PROAMATINE) 5 mg tablet, TAKE 1 TABLET BY MOUTH 3 TIMES A DAY BEFORE MEALS., Disp: 270 tablet, Rfl: 3    pregabalin (LYRICA) 25 mg capsule, Take 25 mg by mouth daily with lunch. PRN (Patient taking differently: Take 25 mg by  mouth daily with lunch.), Disp: , Rfl:     pregabalin (LYRICA) 50 mg capsule, Take 50 mg by mouth 2 (two) times a day. Morning and Evening, Disp: , Rfl:     propylene glycol/peg 400/PF (SYSTANE, PF, OPHT), Administer 1 drop into affected eye(s) as needed (dry eyes)., Disp: , Rfl:     SPIRIVA RESPIMAT 2.5 mcg/actuation mist inhaler, Inhale 2 puffs daily., Disp: , Rfl:     SYMBICORT 80-4.5 mcg/actuation inhaler, Inhale 2 puffs 2 (two) times a day., Disp: , Rfl:     traMADoL (ULTRAM) 50 mg tablet, Take 1 tablet (50 mg total) by mouth every 8 (eight) hours as needed for pain. Pt also takes it as needed at lunch time. (Patient taking differently: Take 50 mg by mouth 3 (three) times a day. Pt also takes it as needed at lunch time.), Disp: , Rfl:       BP Readings from Last 3 Encounters:   12/16/24 (!) 150/70   12/06/24 132/80   10/18/24 (!) 172/94       Recent Lab results:  Lab Results   Component Value Date    CHOL 227 (H) 01/27/2023   ,   Lab Results   Component Value Date    HDL 62 01/27/2023   ,   Lab Results   Component Value Date    LDLCALC 141 (H) 01/27/2023   ,   Lab Results   Component Value Date    TRIG 120 01/27/2023        Lab Results   Component Value Date    GLUCOSE 72 10/16/2024   ,   Lab Results   Component Value Date    HGBA1C 5.6 11/18/2015         Lab Results   Component Value Date    CREATININE 0.8 10/16/2024       Lab Results   Component Value Date    TSH 3.63 10/19/2023           Lab Results   Component Value Date    HGBA1C 5.6 11/18/2015

## 2025-02-03 ENCOUNTER — OFFICE VISIT (OUTPATIENT)
Dept: WOUND CARE | Facility: HOSPITAL | Age: 89
End: 2025-02-03
Payer: MEDICARE

## 2025-02-03 VITALS — TEMPERATURE: 97.5 F | HEART RATE: 57 BPM

## 2025-02-03 DIAGNOSIS — S81.812D SKIN TEAR OF LEFT LOWER LEG WITHOUT COMPLICATION, SUBSEQUENT ENCOUNTER: Primary | ICD-10-CM

## 2025-02-03 PROCEDURE — A6212 FOAM DRG <=16 SQ IN W/BORDER: HCPCS

## 2025-02-03 PROCEDURE — G0463 HOSPITAL OUTPT CLINIC VISIT: HCPCS

## 2025-02-03 NOTE — PROGRESS NOTES
Subjective     Patient ID: Madelyn Ruiz is a 89 y.o. female.    HPI  Pt presents with her daughter today for reevaluation of the RT LE. Original wound present appears to have reopened. New laceration noted at last appt appears closed.  Review of Systems   Cardiovascular:  Positive for leg swelling.   Musculoskeletal:  Positive for arthralgias and gait problem.   Skin:  Positive for color change and wound.   Neurological:  Positive for weakness.   Psychiatric/Behavioral:  Positive for confusion.        Objective     Vitals:    02/03/25 1355   Pulse: (!) 57   Temp: 36.4 °C (97.5 °F)     There is no height or weight on file to calculate BMI.    Physical Exam          Assessment & Plan  Skin tear of left lower leg without complication, subsequent encounter  At last check initial wound appeared epithelialized. Pt also had new small laceration to skin more distal on same leg at last check. Today this more distal laceration appears closed. The more proximal lesion appears open superficially as a thin portion of previously healed skin has peeled away. No debridement is needed. There are no s/s of infxn. Site can be dressed with topical Mupirocin and bordered silicone dressing. Pt should continue to elevate her leg and use compression daily. Advise f/u in 2 weeks. Plan d/w pt and her daughter today. Advise notification of [facility] asap if any acute changes noted to wound.                 Arnulfo Bravo DPM

## 2025-02-03 NOTE — PATIENT INSTRUCTIONS
Wound Healing Center Instructions    MEDICATIONS     Medication Note  Continue present medications as prescribed by the Wound Healing Center or other physicians you see. To avoid any problems keep the Wound Healing Center informed each visit of any medications changes that occur.     WOUND CARE     Clean Wound with: Soap and Water and Showering   Treatment 1   Location: left anterior leg wound  Dressing: Apply mupirocin to the wound base, then cover with bordered silicone foam. Apply tubigrip stocking in the am and may remove the tubigrip at bedtime.  Dressing Care Frequency: 3x per Week  Care Provider: Self and family       Basic Principles      Wash your hands thoroughly with soap and water and after each dressing change. If someone other than the patient changes the dressing, it’s best to wear disposable gloves.   Do not get the wound or dressing wet.   To shower: remove the dressing, shower with soap and water (including washing the wound - do not use a washcloth), air dry, then redress the wound.  Do not take a tub bath  Keep all your dressings in a clean, covered container at home to avoid dust and contamination.  Discard used dressings in a plastic bag or covered trash container.  Check your wound and the surrounding skin at each dressing change for redness, warmth, swelling, increased pain, foul odor, fever, pus or abnormal drainage or discharge.  Notify Wound Healing Center if any of these changes occur - [phone].        Nutrition  Eat a well, balanced diet with adequate protein to support wound healing.   Take a multivitamin every day. Adequate nutrition supports healing and new tissue growth.  All diabetic patients should strive to keep blood sugars within a normal, practical range.   Elevated blood sugars can delay your wound healing.        ACTIVITY     Elevate legs above level of heart   Elevate your legs above the level of your heart for specific time periods during the day on several firm  pillows or a foam leg wedge  Use of a recliner chair at home can often help in the appropriate elevation of your legs above the level of your heart  Normal activity then rest and elevation  May shower  May walk with rollator    MOBILITY     Wheelchair and Walker

## 2025-02-05 ASSESSMENT — ENCOUNTER SYMPTOMS
ARTHRALGIAS: 1
WOUND: 1
WEAKNESS: 1
COLOR CHANGE: 1
CONFUSION: 1

## 2025-02-05 NOTE — ASSESSMENT & PLAN NOTE
At last check initial wound appeared epithelialized. Pt also had new small laceration to skin more distal on same leg at last check. Today this more distal laceration appears closed. The more proximal lesion appears open superficially as a thin portion of previously healed skin has peeled away. No debridement is needed. There are no s/s of infxn. Site can be dressed with topical Mupirocin and bordered silicone dressing. Pt should continue to elevate her leg and use compression daily. Advise f/u in 2 weeks. Plan d/w pt and her daughter today. Advise notification of [facility] asap if any acute changes noted to wound.

## 2025-02-15 DIAGNOSIS — F41.9 ANXIETY: ICD-10-CM

## 2025-02-17 ENCOUNTER — OFFICE VISIT (OUTPATIENT)
Dept: WOUND CARE | Facility: HOSPITAL | Age: 89
End: 2025-02-17
Payer: MEDICARE

## 2025-02-17 VITALS — HEART RATE: 69 BPM | RESPIRATION RATE: 18 BRPM | TEMPERATURE: 96 F | OXYGEN SATURATION: 97 %

## 2025-02-17 DIAGNOSIS — S81.812D SKIN TEAR OF LEFT LOWER LEG WITHOUT COMPLICATION, SUBSEQUENT ENCOUNTER: Primary | ICD-10-CM

## 2025-02-17 PROCEDURE — G0463 HOSPITAL OUTPT CLINIC VISIT: HCPCS | Performed by: PODIATRIST

## 2025-02-17 PROCEDURE — G0463 HOSPITAL OUTPT CLINIC VISIT: HCPCS

## 2025-02-17 RX ORDER — LORAZEPAM 1 MG/1
1 TABLET ORAL NIGHTLY PRN
Qty: 30 TABLET | Refills: 5 | Status: SHIPPED | OUTPATIENT
Start: 2025-02-17

## 2025-02-17 NOTE — PATIENT INSTRUCTIONS
Wound Healing Center Instructions    MEDICATIONS     Medication Note  Continue present medications as prescribed by the Wound Healing Center or other physicians you see. To avoid any problems keep the Wound Healing Center informed each visit of any medications changes that occur.     WOUND CARE     Clean Wound with: Soap and Water and Showering   Treatment 1   Location: left anterior leg wound  May continue to wear bordered dressing for protection(change every 2-3 days), and tubigrip stockings-until the 2 small blood blisters heal.  Apply MOISTURIZER DAILY to leg  Care Provider: Self and family     COMPRESSION THERAPY     Tubigrip Stockings  Left Leg size D  Apply Tubigrips (cotton/compression stockinette) DAILY.  Apply stockings upon arising in the morning to obtain the best results.  Remove stockings at bedtime once your legs are elevated.  Do not allow the stockinette to roll down as it can reduce or interrupt the blood flow in your limb.  Always wear the appropriate size that is recommended for you at the [facility].  Tubigrips can be washed by hand with soap and water and hang to dry overnight.   Basic Principles      Wash your hands thoroughly with soap and water and after each dressing change. If someone other than the patient changes the dressing, it’s best to wear disposable gloves.   Do not get the wound or dressing wet.   To shower: remove the dressing, shower with soap and water (including washing the wound - do not use a washcloth), air dry, then redress the wound.  Do not take a tub bath  Keep all your dressings in a clean, covered container at home to avoid dust and contamination.  Discard used dressings in a plastic bag or covered trash container.  Check your wound and the surrounding skin at each dressing change for redness, warmth, swelling, increased pain, foul odor, fever, pus or abnormal drainage or discharge.  Notify Wound Healing Center if any of these changes occur -  [phone].        Nutrition  Eat a well, balanced diet with adequate protein to support wound healing.   Take a multivitamin every day. Adequate nutrition supports healing and new tissue growth.  All diabetic patients should strive to keep blood sugars within a normal, practical range.   Elevated blood sugars can delay your wound healing.        ACTIVITY     Elevate legs above level of heart   Elevate your legs above the level of your heart for specific time periods during the day on several firm pillows or a foam leg wedge  Use of a recliner chair at home can often help in the appropriate elevation of your legs above the level of your heart  Normal activity then rest and elevation  May shower  May walk with rollator    MOBILITY     Wheelchair and Walker

## 2025-02-17 NOTE — PROGRESS NOTES
Subjective     Patient ID: Madelyn Ruiz is a 89 y.o. female.    HPI  Patient presents to the wound healing center with her daughter for ongoing care to a skin tear at her left anterior shin  Review of Systems   Cardiovascular:  Positive for leg swelling.   Musculoskeletal:  Positive for arthralgias and gait problem.   Skin:  Positive for color change and wound.   Neurological:  Positive for weakness.   Psychiatric/Behavioral:  Positive for confusion.        Objective     Vitals:    02/17/25 1359   Pulse: 69   Resp: 18   Temp: (!) 35.6 °C (96 °F)   SpO2: 97%     There is no height or weight on file to calculate BMI.    Physical Exam          Assessment & Plan  Skin tear of left lower leg without complication, subsequent encounter [U95.902N]  The wound site appears stable and almost fully epithelialized at this point in time.  There are 2 small stable blood blisters present within the wound bed.  The sites are dry and stable, without active drainage.  I advise the area be covered with a dry silicone border dressing 3 times per week until skin is fully healed.  It is important to continue to control edema on the leg.  We will leave follow-up as needed going forward, however if there is any evidence of repeat skin breakdown the patient should return to the wound healing center as soon as her schedule allows.               Arnulfo Bravo DPM

## 2025-02-24 ASSESSMENT — ENCOUNTER SYMPTOMS
COLOR CHANGE: 1
CONFUSION: 1
ARTHRALGIAS: 1
WOUND: 1
WEAKNESS: 1

## 2025-02-24 NOTE — ASSESSMENT & PLAN NOTE
The wound site appears stable and almost fully epithelialized at this point in time.  There are 2 small stable blood blisters present within the wound bed.  The sites are dry and stable, without active drainage.  I advise the area be covered with a dry silicone border dressing 3 times per week until skin is fully healed.  It is important to continue to control edema on the leg.  We will leave follow-up as needed going forward, however if there is any evidence of repeat skin breakdown the patient should return to the wound healing center as soon as her schedule allows.

## 2025-02-26 ENCOUNTER — TELEPHONE (OUTPATIENT)
Dept: PRIMARY CARE | Facility: CLINIC | Age: 89
End: 2025-02-26

## 2025-02-26 ENCOUNTER — TELEPHONE (OUTPATIENT)
Dept: PRIMARY CARE | Facility: CLINIC | Age: 89
End: 2025-02-26
Payer: MEDICARE

## 2025-02-26 NOTE — TELEPHONE ENCOUNTER
Jonatan from [facility] is returning your call about a walker.  Please call jonatan at [phone].

## 2025-02-26 NOTE — TELEPHONE ENCOUNTER
CALLER: BLANE Vera      REASON FOR CALL: Please return call as soon as you have a minute     [phone]

## 2025-03-12 ENCOUNTER — TELEPHONE (OUTPATIENT)
Dept: PRIMARY CARE | Facility: CLINIC | Age: 89
End: 2025-03-12
Payer: MEDICARE

## 2025-03-12 NOTE — TELEPHONE ENCOUNTER
FYI    PT has nausea  that caused vomiting yesterday,no vomiting today just nausea. PT had a headache that was treat with tylenol .     160/84  168/86 both readings are a little lolis for her.

## 2025-03-24 RX ORDER — AZITHROMYCIN 250 MG/1
250 TABLET, FILM COATED ORAL 3 TIMES WEEKLY
Qty: 12 TABLET | Refills: 5 | Status: SHIPPED | OUTPATIENT
Start: 2025-03-24

## 2025-03-24 NOTE — TELEPHONE ENCOUNTER
Medicine Refill Request    Last Office Visit: 12/6/2024   Last Consult Visit: 11/12/2021  Last Telemedicine Visit: Visit date not found    Next Appointment: 10/22/2025      Current Outpatient Medications:     acetaminophen (TYLENOL ARTHRITIS PAIN ORAL), Take 600 mg by mouth once daily., Disp: , Rfl:     azithromycin (ZITHROMAX) 250 mg tablet, TAKE 1 TABLET (250 MG TOTAL) BY MOUTH 3 (THREE) TIMES A WEEK (MON, WED, FRI). (Patient not taking: Reported on 12/16/2024), Disp: 12 tablet, Rfl: 5    calcium carbonate 600 mg calcium (1,500 mg) tablet, Take 1 tablet by mouth daily., Disp: , Rfl:     cholecalciferol, vitamin D3, 25 mcg (1,000 unit) capsule, Take 1 tablet by mouth daily., Disp: , Rfl:     docusate sodium (COLACE) 100 mg capsule, Take 100 mg by mouth 2 (two) times a day as needed for constipation., Disp: , Rfl:     DULoxetine (CYMBALTA) 60 mg capsule, Take 60 mg by mouth at bedtime. , Disp: , Rfl: 0    ELIQUIS 2.5 mg tablet, TAKE 1 TABLET BY MOUTH TWICE A DAY, Disp: 180 tablet, Rfl: 3    fexofenadine (ALLEGRA) 180 mg tablet, Take 180 mg by mouth nightly., Disp: , Rfl:     lidocaine (LIDODERM) 5 % patch, APPLY 1 PATCH ON THE SKIN DAILY REMOVE & DISCARD WITHIN 12 HOURS OR AS DIRECTED BY PRESCRIBER, Disp: 30 patch, Rfl: 3    LORazepam (ATIVAN) 1 mg tablet, TAKE 1 TABLET BY MOUTH NIGHTLY AS NEEDED FOR ANXIETY, Disp: 30 tablet, Rfl: 5    MATZIM  mg 24 hr tablet, TAKE 1 TABLET BY MOUTH DAILY., Disp: 90 tablet, Rfl: 3    melatonin ODT, Take 2 tablets (6 mg total) by mouth nightly., Disp: , Rfl:     metoprolol tartrate (LOPRESSOR) 25 mg tablet, TAKE 1 TABLET BY MOUTH TWICE A DAY, Disp: 180 tablet, Rfl: 3    midodrine (PROAMATINE) 5 mg tablet, TAKE 1 TABLET BY MOUTH 3 TIMES A DAY BEFORE MEALS., Disp: 270 tablet, Rfl: 3    pregabalin (LYRICA) 25 mg capsule, Take 25 mg by mouth daily with lunch. PRN (Patient taking differently: Take 25 mg by mouth daily with lunch.), Disp: , Rfl:     pregabalin (LYRICA) 50 mg  capsule, Take 50 mg by mouth 2 (two) times a day. Morning and Evening, Disp: , Rfl:     propylene glycol/peg 400/PF (SYSTANE, PF, OPHT), Administer 1 drop into affected eye(s) as needed (dry eyes)., Disp: , Rfl:     SPIRIVA RESPIMAT 2.5 mcg/actuation mist inhaler, Inhale 2 puffs daily., Disp: , Rfl:     SYMBICORT 80-4.5 mcg/actuation inhaler, Inhale 2 puffs 2 (two) times a day., Disp: , Rfl:     traMADoL (ULTRAM) 50 mg tablet, Take 1 tablet (50 mg total) by mouth every 8 (eight) hours as needed for pain. Pt also takes it as needed at lunch time. (Patient taking differently: Take 50 mg by mouth 3 (three) times a day. Pt also takes it as needed at lunch time.), Disp: , Rfl:     BP Readings from Last 3 Encounters:   12/16/24 (!) 150/70   12/06/24 132/80   10/18/24 (!) 172/94       Recent Lab results:  Lab Results   Component Value Date    CHOL 227 (H) 01/27/2023   ,   Lab Results   Component Value Date    HDL 62 01/27/2023   ,   Lab Results   Component Value Date    LDLCALC 141 (H) 01/27/2023   ,   Lab Results   Component Value Date    TRIG 120 01/27/2023        Lab Results   Component Value Date    GLUCOSE 72 10/16/2024   ,   Lab Results   Component Value Date    HGBA1C 5.6 11/18/2015         Lab Results   Component Value Date    CREATININE 0.8 10/16/2024       Lab Results   Component Value Date    TSH 3.63 10/19/2023           Lab Results   Component Value Date    HGBA1C 5.6 11/18/2015

## 2025-04-02 ENCOUNTER — HOSPITAL ENCOUNTER (OUTPATIENT)
Dept: CARDIOLOGY | Facility: CLINIC | Age: 89
Discharge: HOME | End: 2025-04-02
Attending: INTERNAL MEDICINE
Payer: MEDICARE

## 2025-04-02 ENCOUNTER — RESULTS FOLLOW-UP (OUTPATIENT)
Dept: CARDIOLOGY | Facility: CLINIC | Age: 89
End: 2025-04-02

## 2025-04-02 ENCOUNTER — OFFICE VISIT (OUTPATIENT)
Dept: CARDIOLOGY | Facility: CLINIC | Age: 89
End: 2025-04-02
Payer: MEDICARE

## 2025-04-02 VITALS
BODY MASS INDEX: 21.6 KG/M2 | SYSTOLIC BLOOD PRESSURE: 120 MMHG | DIASTOLIC BLOOD PRESSURE: 70 MMHG | HEIGHT: 60 IN | HEART RATE: 51 BPM | OXYGEN SATURATION: 99 % | WEIGHT: 110 LBS

## 2025-04-02 VITALS
DIASTOLIC BLOOD PRESSURE: 82 MMHG | SYSTOLIC BLOOD PRESSURE: 132 MMHG | WEIGHT: 110 LBS | HEIGHT: 60 IN | BODY MASS INDEX: 21.6 KG/M2

## 2025-04-02 DIAGNOSIS — I48.92 ATRIAL FIBRILLATION AND FLUTTER (CMS/HCC): Primary | ICD-10-CM

## 2025-04-02 DIAGNOSIS — I48.91 ATRIAL FIBRILLATION AND FLUTTER (CMS/HCC): Primary | ICD-10-CM

## 2025-04-02 DIAGNOSIS — I48.91 ATRIAL FIBRILLATION AND FLUTTER (CMS/HCC): ICD-10-CM

## 2025-04-02 DIAGNOSIS — E78.2 MIXED HYPERLIPIDEMIA: ICD-10-CM

## 2025-04-02 DIAGNOSIS — I45.10 RBBB: ICD-10-CM

## 2025-04-02 DIAGNOSIS — I10 PRIMARY HYPERTENSION: ICD-10-CM

## 2025-04-02 DIAGNOSIS — I48.92 ATRIAL FIBRILLATION AND FLUTTER (CMS/HCC): ICD-10-CM

## 2025-04-02 DIAGNOSIS — R29.6 RECURRENT FALLS: ICD-10-CM

## 2025-04-02 LAB
AORTIC ROOT ANNULUS: 2.3 CM
AORTIC VALVE MEAN VELOCITY: 0.85 M/S
AORTIC VALVE VELOCITY TIME INTEGRAL: 28.2 CM
ASCENDING AORTA: 2.6 CM
ATRIAL RATE: 51
AV MEAN GRADIENT: 3 MMHG
AV PEAK GRADIENT: 6 MMHG
AV PEAK VELOCITY-S: 1.24 M/S
AV VALVE AREA INDEX: 1.61
AV VALVE AREA: 1.61 CM2
AV VELOCITY RATIO: 0.91
AVA (VTI): 2.33 CM2
BSA FOR ECHO PROCEDURE: 1.45 M2
CUSP SEPARATION: 1.3 CM
DOP CALC LVOT STROKE VOLUME: 65.62 CM3
E WAVE DECELERATION TIME: 236 MS
E/A RATIO: 1.8
E/E' RATIO: 13.5
E/LAT E' RATIO: 11.7
EDV (BP): 59 CM3
EF (A4C): 63 %
EF A2C: 58.6 %
EJECTION FRACTION: 60.7 %
EST RIGHT VENT SYSTOLIC PRESSURE BY TRICUSPID REGURGITATION JET: 29 MMHG
ESV (BP): 23.2 CM3
FRACTIONAL SHORTENING: 30.63 %
INTERVENTRICULAR SEPTUM: 0.72 CM
LA ESV (BP): 74.4 CM3
LA ESV INDEX (A2C): 55.31 CM3/M2
LA ESV INDEX (BP): 51.31 CM3/M2
LA/AORTA RATIO: 1.96
LAAS-AP2: 26 CM2
LAAS-AP4: 24.1 CM2
LAD 2D: 4.5 CM
LAL MED-LAT (A4C): 6.73 CM
LAV-S: 80.2 CM3
LEFT ATRIAL LENGTH SUPERIOR-INFERIOR (APICAL 2-CHAMBER VIEW): 6.8 CM
LEFT ATRIUM VOLUME INDEX: 47.24 CM3/M2
LEFT ATRIUM VOLUME: 68.5 CM3
LEFT INTERNAL DIMENSION IN SYSTOLE: 2.65 CM (ref 2.22–3.35)
LEFT VENTRICLE DIASTOLIC VOLUME INDEX: 44 CM3/M2
LEFT VENTRICLE DIASTOLIC VOLUME: 63.8 CM3
LEFT VENTRICLE SYSTOLIC VOLUME INDEX: 16.28 CM3/M2
LEFT VENTRICLE SYSTOLIC VOLUME: 23.6 CM3
LEFT VENTRICULAR INTERNAL DIMENSION IN DIASTOLE: 3.82 CM (ref 3.72–5.17)
LEFT VENTRICULAR POSTERIOR WALL IN END DIASTOLE: 0.81 CM (ref 0.47–0.87)
LV DIASTOLIC VOLUME: 49.5 CM3
LV ESV (APICAL 2 CHAMBER): 20.5 CM3
LVAD-AP2: 18.4 CM2
LVAD-AP4: 22 CM2
LVAS-AP2: 10.7 CM2
LVAS-AP4: 12.5 CM2
LVEDVI(A2C): 34.14 CM3/M2
LVEDVI(BP): 40.69 CM3/M2
LVESVI(A2C): 14.14 CM3/M2
LVESVI(BP): 16 CM3/M2
LVLD-AP2: 5.83 CM
LVLD-AP4: 6.47 CM
LVLS-AP2: 5.06 CM
LVLS-AP4: 5.69 CM
LVOT 2D: 1.8 CM
LVOT A: 2.54 CM2
LVOT MG: 2 MMHG
LVOT MV: 0.7 M/S
LVOT PEAK VELOCITY: 1 M/S
LVOT PG: 4 MMHG
LVOT STROKE VOLUME INDEX: 45.25 ML/M2
LVOT VTI: 25.8 CM
MITRAL VALVE MEAN INFLOW VELOCITY: 3.73 M/S
MLH CV ECHO AVA INDEX VELOCITY RATIO: 1.1
MR FLOW: 0.95 ML/S
MR VTI: 177 CM
MV D: 2.3 CM
MV E'TISSUE VEL-LAT: 0.1 M/S
MV E'TISSUE VEL-MED: 0.08 M/S
MV PEAK A VEL: 0.62 M/S
MV PEAK E VEL: 1.11 M/S
MV REGURGITANT FRACTION: 23 %
MV VALVE AREA BY CONTINUITY EQUATION: 1.83 CM2
MV VTI: 35.8 CM
MVA (PISA): 3.08 CM2
P AXIS: 82
PISA ALIAS VEL MV: 0.31 M/S
PISA MRMAX VEL: 4.92 M/S
PISA RADIUS: 0.7 CM
POSTERIOR WALL: 0.81 CM
PR INTERVAL: 174
PV MEAN GRADIENT: 1 MMHG
PV MEAN VELOCITY: 0.51 M/S
PV PEAK GRADIENT: 2 MMHG
PV PV: 0.79 M/S
QRS DURATION: 116
QT INTERVAL: 498
QTC CALCULATION(BAZETT): 458
R AXIS: 42
RAP: 3 MMHG
RIGHT ATRIAL LENGTH MEDIAL-LATERAL (APICAL 4-CHAMBER VIEW): 5.56 CM
RIGHT VENTRICULAR LENGTH IN DIASTOLE (APICAL 4-CHAMBER VIEW): 5.92 CM
RV AP4 BASE: 3.92 CM
RV AP4 MID: 2.75 CM
RVOT VMAX: 0.47 M/S
RVOT VTI: 10.8 CM
SEPTAL TISSUE DOPPLER FREE WALL LATE DIA VELOCITY (APICAL 4 CHAMBER VIEW): 0.11 M/S
T WAVE AXIS: 13
TAPSE: 1.62 CM
TR MAX PG: 26.01 MMHG
TRICUSPID VALVE PEAK REGURGITATION VELOCITY: 2.55 M/S
VENTRICULAR RATE: 51
Z-SCORE OF LEFT VENTRICULAR DIMENSION IN END DIASTOLE: -1.38
Z-SCORE OF LEFT VENTRICULAR DIMENSION IN END SYSTOLE: -0.21
Z-SCORE OF LEFT VENTRICULAR POSTERIOR WALL IN END DIASTOLE: 1.24

## 2025-04-02 PROCEDURE — 93306 TTE W/DOPPLER COMPLETE: CPT | Performed by: INTERNAL MEDICINE

## 2025-04-02 PROCEDURE — G2211 COMPLEX E/M VISIT ADD ON: HCPCS | Performed by: INTERNAL MEDICINE

## 2025-04-02 PROCEDURE — 99214 OFFICE O/P EST MOD 30 MIN: CPT | Performed by: INTERNAL MEDICINE

## 2025-04-02 PROCEDURE — 93000 ELECTROCARDIOGRAM COMPLETE: CPT | Performed by: INTERNAL MEDICINE

## 2025-04-02 NOTE — LETTER
April 2, 2025     Henry Linares MD  [address]  [facility] [address]    Patient: Madelyn Ruiz  YOB: 1930  Date of Visit: 4/2/2025      Dear Dr. Linares:    Thank you for referring Madelyn Ruiz to me for evaluation. Below are my notes for this consultation.    If you have questions, please do not hesitate to call me. I look forward to following your patient along with you.         Sincerely,        Michael A. Valentino, MD PhD        CC: No Recipients    Valentino, Michael A, MD PhD  4/2/2025  3:11 PM  Sign when Signing Visit     [facility]  [place] Office Visit       Patient ID: Madelyn Ruiz 89 y.o. female 8/9/1930  PCP: Henry Linares MD      [facility]     Madelyn Ruiz is a 89 y.o. female with a past medical history significant for allergic bronchopulmonary aspergillosis, COPD, hypertension, hyperlipidemia, paroxysmal atrial flutter, left SCA aneurysm, GERD, pulmonary embolism in 2019, recurrent falls, and mild sleep apnea.    She was admitted to [facility] from 8/21/2023-8/24/2023 with a fall complicated by right pubic ramus and bilateral sacral fractures. She was treated conservatively. She was evaluated by cardiology during her stay given paroxysmal atrial fibrillation and risk versus benefit of continued anticoagulation.      She was then admitted to [facility] from 10/19-10/24/2023 with fall. She was found on the ground by her home physical therapist, covered in blood and still in her night gown. She typically drinks vodka with water in the evening and also uses Ativan. She sustained a small laceration to the posterior scalp which did not require staples or sutures. Orthostatic vitals were positive. Losartan and Lasix were held. She was started on midodrine 5 mg TID. Diltiazem was increased from 300 mg daily to 360 mg daily due to rapid atrial fibrillation. Safety of Eliquis was again discussed and she was ultimately  discharged back on Eliquis to [facility].     I last saw her in September 2024.  At that time she had not had any recurrent falls and was living at home with a live-in caretaker providing 24-hour care.  We have discussed the benefits versus risks of continuing anticoagulation and decided to continue for now.    She is accompanied by her daughter to today's visit.  Since she was last seen she scraped her shin on her rollator and had to be seen by wound care.  The wound is now well-healed.  She continues to have in-home care but did have 1 fall since she was last seen when she was having her hair done and the caretaker could not hold her up.  She was seen by EMS but ultimately did not seek medical attention.    Otherwise she has been doing well since her last visit.  She denies any palpitations or lightheaded/dizziness.  In addition she denies any chest pain or shortness of breath.       Medications     Current Outpatient Medications   Medication Instructions    acetaminophen (TYLENOL ARTHRITIS PAIN ORAL) 600 mg, As needed    azithromycin (ZITHROMAX) 250 mg, oral, 3 times weekly    benralizumab (FASENRA PEN SUBQ) Inject under the skin. Every 8 weeks    calcium carbonate 600 mg calcium (1,500 mg) tablet 1 tablet, Daily    cholecalciferol, vitamin D3, 25 mcg (1,000 unit) capsule 1 tablet, Daily    docusate sodium (COLACE) 100 mg, 2 times daily PRN    DULoxetine (CYMBALTA) 60 mg, Nightly    ELIQUIS 2.5 mg, oral, 2 times daily    fexofenadine (ALLEGRA) 180 mg, Nightly    lidocaine (LIDODERM) 5 % patch APPLY 1 PATCH ON THE SKIN DAILY REMOVE & DISCARD WITHIN 12 HOURS OR AS DIRECTED BY PRESCRIBER    LORazepam (ATIVAN) 1 mg, oral, Nightly PRN, for anxiety    MATZIM  mg, oral, Daily    melatonin 6 mg, oral, Nightly    metoprolol tartrate (LOPRESSOR) 25 mg, oral, 2 times daily    midodrine (PROAMATINE) 5 mg tablet TAKE 1 TABLET BY MOUTH 3 TIMES A DAY BEFORE MEALS.    pregabalin (LYRICA) 50 mg, 2 times  daily    pregabalin (LYRICA) 25 mg, Daily with lunch    propylene glycol/peg 400/PF (SYSTANE, PF, OPHT) 1 drop, As needed    SPIRIVA RESPIMAT 2.5 mcg/actuation mist inhaler 2 puffs, Daily    SYMBICORT 80-4.5 mcg/actuation inhaler 2 puffs, 2 times daily    traMADoL (ULTRAM) 50 mg, oral, Every 8 hours PRN, Pt also takes it as needed at lunch time.         Allergies     Mepolizumab, Morphine, and Oxycodone     Vital Signs/Exam     Vitals:    04/02/25 1446   BP: 120/70   BP Location: Left upper arm   Patient Position: Sitting   Pulse: (!) 51   SpO2: 99%   Weight: 49.9 kg (110 lb)   Height: 1.524 m (5')     BP Readings from Last 3 Encounters:   04/02/25 120/70   04/02/25 132/82   12/16/24 (!) 150/70     Wt Readings from Last 3 Encounters:   04/02/25 49.9 kg (110 lb)   04/02/25 49.9 kg (110 lb)   12/16/24 46.3 kg (102 lb)      Body mass index is 21.48 kg/m².    Physical Exam  Constitutional:       Appearance: She is well-developed.   Eyes:      Conjunctiva/sclera: Conjunctivae normal.   Cardiovascular:      Rate and Rhythm: Normal rate and regular rhythm.      Heart sounds: Normal heart sounds.   Pulmonary:      Effort: Pulmonary effort is normal.      Breath sounds: Normal breath sounds.   Abdominal:      General: There is no distension.      Palpations: Abdomen is soft.      Tenderness: There is no abdominal tenderness.   Musculoskeletal:      Right lower leg: Edema (trace) present.      Left lower leg: Edema (trace) present.   Skin:     General: Skin is warm and dry.   Neurological:      General: No focal deficit present.      Mental Status: She is alert.   Psychiatric:         Mood and Affect: Mood normal.         Behavior: Behavior normal.            Diagnostic Data   Diagnostic data was personally reviewed. Available medical records were reviewed and counseling/education was performed where appropriate.  Speech recognition software was used. Typographical errors may be present.    Labs:  Lab Results   Component  Value Date    CHOL 227 (H) 01/27/2023    CHOL 248 (H) 11/18/2015     Lab Results   Component Value Date    HDL 62 01/27/2023    HDL 92 11/18/2015     Lab Results   Component Value Date    LDLCALC 141 (H) 01/27/2023    LDLCALC 133 (H) 11/18/2015     Lab Results   Component Value Date    TRIG 120 01/27/2023    TRIG 117 11/18/2015     Lab Results   Component Value Date    WBC 5.26 10/16/2024    HGB 12.5 10/16/2024     10/16/2024     Lab Results   Component Value Date    GLUCOSE 72 10/16/2024    CALCIUM 9.6 10/16/2024     10/16/2024    K 3.5 10/16/2024    CO2 29 10/16/2024     10/16/2024    BUN 19 10/16/2024    CREATININE 0.8 10/16/2024     Lab Results   Component Value Date    EGFR >60.0 10/16/2024     Lab Results   Component Value Date    ALBUMIN 4.0 10/16/2024    BILITOT 0.4 10/16/2024    ALKPHOS 47 10/16/2024    ALT 16 10/16/2024    AST 12 (L) 10/16/2024     Lab Results   Component Value Date    HGBA1C 5.6 11/18/2015     High Sens Troponin I   Date Value Ref Range Status   08/21/2023 10.9 <15.0 pg/mL Final   08/21/2023 8.7 <15.0 pg/mL Final   02/21/2023 10.6 <15.0 pg/mL Final   02/20/2023 7.7 <15.0 pg/mL Final   01/26/2023 5.3 <15.0 pg/mL Final     Lab Results   Component Value Date    TSH 3.63 10/19/2023     CrCl cannot be calculated (Patient's most recent lab result is older than the maximum 28 days allowed.).    Echocardiogram:  Results for orders placed during the hospital encounter of 04/02/25    Transthoracic echo (TTE) complete    Interpretation Summary    Left Ventricle: Normal ventricle size. Normal wall thickness. Estimated EF 60%. Wall motion appears grossly normal. Indeterminate diastolic filling pattern.    Right Ventricle: Normal ventricle size. Normal systolic function.    Left Atrium: Severely dilated atrium.    Right Atrium: Moderately dilated atrium.    Aortic Valve: Tricuspid valve.  Sclerotic leaflets. No regurgitation. No stenosis. Calculated dimensionless index =  0.91.    Mitral Valve: Sclerotic mitral valve. Moderate mitral annular calcification. Mild to moderate regurgitation. No stenosis.    Tricuspid Valve: Structure is grossly normal. Mild regurgitation. Estimated RVSP = 29 mmHg. No significant stenosis.    Prior Study: Prior study available for comparison. Prior study date: 03.27.24. No significant changes noted compared to the prior study.      LE Venous Ultrasound:  Results for orders placed during the hospital encounter of 06/08/19    Ultrasound venous leg bilateral    Interpretation Summary  No evidence of deep vein thrombus (DVT) in either lower extremity.        ECG:            Assessment and Plan      Madelyn Ruiz is a 89 y.o. female seen for the following conditions:      Diagnosis Plan   1. Atrial fibrillation and flutter (CMS/[facility])  [facility] MUSE ECG 12 lead (clinic performed)      2. Primary hypertension        3. RBBB        4. Mixed hyperlipidemia        5. Recurrent falls          Plan:     We again discussed the risks versus benefits of continuing anticoagulation.  She did have 1 mild injury to her shin since her last visit.  However I am reassured that it is been over a year since any significant falls resulting in hospitalization.  She has an in-home caretaker which has greatly reduce her frequency of falls.  For now we will plan to continue on her current dosing of Eliquis.    She will continue on metoprolol and diltiazem for her paroxysmal atrial fibrillation/flutter.  She is also on midodrine given her orthostatic hypotension which she will continue.    I will plan to see her in follow-up in approximately 6 months.    I attest that this visit supports the complexity inherent to evaluation and management associated with medical care services that serve as the continuing focal point for all needed health care services and/or medical care services that are part of ongoing care related to this patient's single, serious condition or a complex  condition.      Michael A. Valentino, MD PhD   [facility] [place] [place] Office: [phone]  [facility]: [facility]   4/2/2025

## 2025-04-02 NOTE — PROGRESS NOTES
[facility]  [place] Office Visit       Patient ID: Madelyn Ruiz 89 y.o. female 8/9/1930  PCP: Henry Linares MD      HPI     Madelyn Ruiz is a 89 y.o. female with a past medical history significant for allergic bronchopulmonary aspergillosis, COPD, hypertension, hyperlipidemia, paroxysmal atrial flutter, left SCA aneurysm, GERD, pulmonary embolism in 2019, recurrent falls, and mild sleep apnea.    She was admitted to [facility] from 8/21/2023-8/24/2023 with a fall complicated by right pubic ramus and bilateral sacral fractures. She was treated conservatively. She was evaluated by cardiology during her stay given paroxysmal atrial fibrillation and risk versus benefit of continued anticoagulation.      She was then admitted to [facility] from 10/19-10/24/2023 with fall. She was found on the ground by her home physical therapist, covered in blood and still in her night gown. She typically drinks vodka with water in the evening and also uses Ativan. She sustained a small laceration to the posterior scalp which did not require staples or sutures. Orthostatic vitals were positive. Losartan and Lasix were held. She was started on midodrine 5 mg TID. Diltiazem was increased from 300 mg daily to 360 mg daily due to rapid atrial fibrillation. Safety of Eliquis was again discussed and she was ultimately discharged back on Eliquis to [facility].     I last saw her in September 2024.  At that time she had not had any recurrent falls and was living at home with a live-in caretaker providing 24-hour care.  We have discussed the benefits versus risks of continuing anticoagulation and decided to continue for now.    She is accompanied by her daughter to today's visit.  Since she was last seen she scraped her shin on her rollator and had to be seen by wound care.  The wound is now well-healed.  She continues to have in-home care but did have 1 fall since she was last seen when she  was having her hair done and the caretaker could not hold her up.  She was seen by EMS but ultimately did not seek medical attention.    Otherwise she has been doing well since her last visit.  She denies any palpitations or lightheaded/dizziness.  In addition she denies any chest pain or shortness of breath.       Medications     Current Outpatient Medications   Medication Instructions    acetaminophen (TYLENOL ARTHRITIS PAIN ORAL) 600 mg, As needed    azithromycin (ZITHROMAX) 250 mg, oral, 3 times weekly    benralizumab (FASENRA PEN SUBQ) Inject under the skin. Every 8 weeks    calcium carbonate 600 mg calcium (1,500 mg) tablet 1 tablet, Daily    cholecalciferol, vitamin D3, 25 mcg (1,000 unit) capsule 1 tablet, Daily    docusate sodium (COLACE) 100 mg, 2 times daily PRN    DULoxetine (CYMBALTA) 60 mg, Nightly    ELIQUIS 2.5 mg, oral, 2 times daily    fexofenadine (ALLEGRA) 180 mg, Nightly    lidocaine (LIDODERM) 5 % patch APPLY 1 PATCH ON THE SKIN DAILY REMOVE & DISCARD WITHIN 12 HOURS OR AS DIRECTED BY PRESCRIBER    LORazepam (ATIVAN) 1 mg, oral, Nightly PRN, for anxiety    MATZIM  mg, oral, Daily    melatonin 6 mg, oral, Nightly    metoprolol tartrate (LOPRESSOR) 25 mg, oral, 2 times daily    midodrine (PROAMATINE) 5 mg tablet TAKE 1 TABLET BY MOUTH 3 TIMES A DAY BEFORE MEALS.    pregabalin (LYRICA) 50 mg, 2 times daily    pregabalin (LYRICA) 25 mg, Daily with lunch    propylene glycol/peg 400/PF (SYSTANE, PF, OPHT) 1 drop, As needed    SPIRIVA RESPIMAT 2.5 mcg/actuation mist inhaler 2 puffs, Daily    SYMBICORT 80-4.5 mcg/actuation inhaler 2 puffs, 2 times daily    traMADoL (ULTRAM) 50 mg, oral, Every 8 hours PRN, Pt also takes it as needed at lunch time.         Allergies     Mepolizumab, Morphine, and Oxycodone     Vital Signs/Exam     Vitals:    04/02/25 1446   BP: 120/70   BP Location: Left upper arm   Patient Position: Sitting   Pulse: (!) 51   SpO2: 99%   Weight: 49.9 kg (110 lb)   Height: 1.524 m  (5')     BP Readings from Last 3 Encounters:   04/02/25 120/70   04/02/25 132/82   12/16/24 (!) 150/70     Wt Readings from Last 3 Encounters:   04/02/25 49.9 kg (110 lb)   04/02/25 49.9 kg (110 lb)   12/16/24 46.3 kg (102 lb)      Body mass index is 21.48 kg/m².    Physical Exam  Constitutional:       Appearance: She is well-developed.   Eyes:      Conjunctiva/sclera: Conjunctivae normal.   Cardiovascular:      Rate and Rhythm: Normal rate and regular rhythm.      Heart sounds: Normal heart sounds.   Pulmonary:      Effort: Pulmonary effort is normal.      Breath sounds: Normal breath sounds.   Abdominal:      General: There is no distension.      Palpations: Abdomen is soft.      Tenderness: There is no abdominal tenderness.   Musculoskeletal:      Right lower leg: Edema (trace) present.      Left lower leg: Edema (trace) present.   Skin:     General: Skin is warm and dry.   Neurological:      General: No focal deficit present.      Mental Status: She is alert.   Psychiatric:         Mood and Affect: Mood normal.         Behavior: Behavior normal.            Diagnostic Data   Diagnostic data was personally reviewed. Available medical records were reviewed and counseling/education was performed where appropriate.  Speech recognition software was used. Typographical errors may be present.    Labs:  Lab Results   Component Value Date    CHOL 227 (H) 01/27/2023    CHOL 248 (H) 11/18/2015     Lab Results   Component Value Date    HDL 62 01/27/2023    HDL 92 11/18/2015     Lab Results   Component Value Date    LDLCALC 141 (H) 01/27/2023    LDLCALC 133 (H) 11/18/2015     Lab Results   Component Value Date    TRIG 120 01/27/2023    TRIG 117 11/18/2015     Lab Results   Component Value Date    WBC 5.26 10/16/2024    HGB 12.5 10/16/2024     10/16/2024     Lab Results   Component Value Date    GLUCOSE 72 10/16/2024    CALCIUM 9.6 10/16/2024     10/16/2024    K 3.5 10/16/2024    CO2 29 10/16/2024      10/16/2024    BUN 19 10/16/2024    CREATININE 0.8 10/16/2024     Lab Results   Component Value Date    EGFR >60.0 10/16/2024     Lab Results   Component Value Date    ALBUMIN 4.0 10/16/2024    BILITOT 0.4 10/16/2024    ALKPHOS 47 10/16/2024    ALT 16 10/16/2024    AST 12 (L) 10/16/2024     Lab Results   Component Value Date    HGBA1C 5.6 11/18/2015     High Sens Troponin I   Date Value Ref Range Status   08/21/2023 10.9 <15.0 pg/mL Final   08/21/2023 8.7 <15.0 pg/mL Final   02/21/2023 10.6 <15.0 pg/mL Final   02/20/2023 7.7 <15.0 pg/mL Final   01/26/2023 5.3 <15.0 pg/mL Final     Lab Results   Component Value Date    TSH 3.63 10/19/2023     CrCl cannot be calculated (Patient's most recent lab result is older than the maximum 28 days allowed.).    Echocardiogram:  Results for orders placed during the hospital encounter of 04/02/25    Transthoracic echo (TTE) complete    Interpretation Summary    Left Ventricle: Normal ventricle size. Normal wall thickness. Estimated EF 60%. Wall motion appears grossly normal. Indeterminate diastolic filling pattern.    Right Ventricle: Normal ventricle size. Normal systolic function.    Left Atrium: Severely dilated atrium.    Right Atrium: Moderately dilated atrium.    Aortic Valve: Tricuspid valve.  Sclerotic leaflets. No regurgitation. No stenosis. Calculated dimensionless index = 0.91.    Mitral Valve: Sclerotic mitral valve. Moderate mitral annular calcification. Mild to moderate regurgitation. No stenosis.    Tricuspid Valve: Structure is grossly normal. Mild regurgitation. Estimated RVSP = 29 mmHg. No significant stenosis.    Prior Study: Prior study available for comparison. Prior study date: 03.27.24. No significant changes noted compared to the prior study.      LE Venous Ultrasound:  Results for orders placed during the hospital encounter of 06/08/19    Ultrasound venous leg bilateral    Interpretation Summary  No evidence of deep vein thrombus (DVT) in either lower  extremity.        ECG:            Assessment and Plan      Madelyn Ruiz is a 89 y.o. female seen for the following conditions:      Diagnosis Plan   1. Atrial fibrillation and flutter (CMS/HCC)  [facility] MUSE ECG 12 lead (clinic performed)      2. Primary hypertension        3. RBBB        4. Mixed hyperlipidemia        5. Recurrent falls          Plan:     We again discussed the risks versus benefits of continuing anticoagulation.  She did have 1 mild injury to her shin since her last visit.  However I am reassured that it is been over a year since any significant falls resulting in hospitalization.  She has an in-home caretaker which has greatly reduce her frequency of falls.  For now we will plan to continue on her current dosing of Eliquis.    She will continue on metoprolol and diltiazem for her paroxysmal atrial fibrillation/flutter.  She is also on midodrine given her orthostatic hypotension which she will continue.    I will plan to see her in follow-up in approximately 6 months.    I attest that this visit supports the complexity inherent to evaluation and management associated with medical care services that serve as the continuing focal point for all needed health care services and/or medical care services that are part of ongoing care related to this patient's single, serious condition or a complex condition.      Michael A. Valentino, MD PhD   [place] Office: [phone]  Primary [facility] [place]: [facility]   4/2/2025

## 2025-04-09 DIAGNOSIS — R29.898 LEG WEAKNESS, BILATERAL: Primary | ICD-10-CM

## 2025-04-09 DIAGNOSIS — I10 PRIMARY HYPERTENSION: ICD-10-CM

## 2025-04-09 NOTE — PROGRESS NOTES
Spoke with patient's daughter.  Has relatively new onset of inability to walk.  Denies any pain at this time in the legs or back.  Has fluctuated with confusion and then back to baseline.  No chest pain or shortness of breath.  Has appoint with Dr. You tomorrow for follow-up.  Will have him examine the patient to see whether there is true leg weakness and if so can proceed with MRI of the Lumbar spine. Check blood work and further recommendations to follow.

## 2025-04-10 ENCOUNTER — APPOINTMENT (OUTPATIENT)
Dept: LAB | Facility: CLINIC | Age: 89
End: 2025-04-10
Attending: FAMILY MEDICINE
Payer: MEDICARE

## 2025-04-10 DIAGNOSIS — I10 PRIMARY HYPERTENSION: ICD-10-CM

## 2025-04-10 DIAGNOSIS — R29.898 LEG WEAKNESS, BILATERAL: ICD-10-CM

## 2025-04-10 LAB
ALBUMIN SERPL-MCNC: 3.9 G/DL (ref 3.5–5.7)
ALP SERPL-CCNC: 56 IU/L (ref 34–125)
ALT SERPL-CCNC: 9 IU/L (ref 7–52)
ANION GAP SERPL CALC-SCNC: 8 MEQ/L (ref 3–15)
AST SERPL-CCNC: 10 IU/L (ref 13–39)
BASOPHILS # BLD: 0 K/UL (ref 0.01–0.1)
BASOPHILS NFR BLD: 0 %
BILIRUB SERPL-MCNC: 0.3 MG/DL (ref 0.3–1.2)
BUN SERPL-MCNC: 22 MG/DL (ref 7–25)
CALCIUM SERPL-MCNC: 9 MG/DL (ref 8.6–10.3)
CHLORIDE SERPL-SCNC: 106 MEQ/L (ref 98–107)
CO2 SERPL-SCNC: 27 MEQ/L (ref 21–31)
CREAT SERPL-MCNC: 0.8 MG/DL (ref 0.6–1.2)
DIFFERENTIAL METHOD BLD: ABNORMAL
EGFRCR SERPLBLD CKD-EPI 2021: >60 ML/MIN/1.73M*2
EOSINOPHIL # BLD: 0 K/UL (ref 0.04–0.36)
EOSINOPHIL NFR BLD: 0 %
ERYTHROCYTE [DISTWIDTH] IN BLOOD BY AUTOMATED COUNT: 14.6 % (ref 11.7–14.4)
GLUCOSE SERPL-MCNC: 80 MG/DL (ref 70–99)
HCT VFR BLD AUTO: 37.7 % (ref 35–45)
HGB BLD-MCNC: 12.2 G/DL (ref 11.8–15.7)
IMM GRANULOCYTES # BLD AUTO: 0.02 K/UL (ref 0–0.08)
IMM GRANULOCYTES NFR BLD AUTO: 0.4 %
LYMPHOCYTES # BLD: 1.46 K/UL (ref 1.2–3.5)
LYMPHOCYTES NFR BLD: 29.6 %
MCH RBC QN AUTO: 30.9 PG (ref 28–33.2)
MCHC RBC AUTO-ENTMCNC: 32.4 G/DL (ref 32.2–35.5)
MCV RBC AUTO: 95.4 FL (ref 83–98)
MONOCYTES # BLD: 0.62 K/UL (ref 0.28–0.8)
MONOCYTES NFR BLD: 12.6 %
NEUTROPHILS # BLD: 2.84 K/UL (ref 1.7–7)
NEUTS SEG NFR BLD: 57.4 %
NRBC BLD-RTO: 0 %
PLATELET # BLD AUTO: 299 K/UL (ref 150–369)
PMV BLD AUTO: 10.8 FL (ref 9.4–12.3)
POTASSIUM SERPL-SCNC: 4.2 MEQ/L (ref 3.5–5.1)
PROT SERPL-MCNC: 5.9 G/DL (ref 6–8.2)
RBC # BLD AUTO: 3.95 M/UL (ref 3.93–5.22)
SODIUM SERPL-SCNC: 141 MEQ/L (ref 136–145)
TSH SERPL DL<=0.05 MIU/L-ACNC: 3.05 MIU/L (ref 0.34–5.6)
WBC # BLD AUTO: 4.94 K/UL (ref 3.8–10.5)

## 2025-04-10 PROCEDURE — 84443 ASSAY THYROID STIM HORMONE: CPT

## 2025-04-10 PROCEDURE — 80053 COMPREHEN METABOLIC PANEL: CPT

## 2025-04-10 PROCEDURE — 85025 COMPLETE CBC W/AUTO DIFF WBC: CPT

## 2025-04-10 PROCEDURE — 36415 COLL VENOUS BLD VENIPUNCTURE: CPT

## 2025-04-11 ENCOUNTER — RESULTS FOLLOW-UP (OUTPATIENT)
Dept: PRIMARY CARE | Facility: CLINIC | Age: 89
End: 2025-04-11

## 2025-04-11 NOTE — RESULT ENCOUNTER NOTE
Let daughter know blood work is stable. Has minimal low protein level. Can use o can of ensure or boost per day to help elevated the levels. Otherwise all other tests look good.

## 2025-04-11 NOTE — TELEPHONE ENCOUNTER
----- Message from Henry Linares sent at 4/11/2025  8:12 AM EDT -----  Let daughter know blood work is stable. Has minimal low protein level. Can use o can of ensure or boost per day to help elevated the levels. Otherwise all other tests look good.   ----- Message -----  From: Lab, Background User  Sent: 4/10/2025   9:48 PM EDT  To: Henry Linares MD  I spoke with kneia informed she said that she saw Dr You yesterday & he didn't see any neurologically wrong he fells it may be a compression fracture.

## 2025-04-13 ENCOUNTER — APPOINTMENT (EMERGENCY)
Dept: RADIOLOGY | Facility: HOSPITAL | Age: 89
End: 2025-04-13
Payer: MEDICARE

## 2025-04-13 ENCOUNTER — HOSPITAL ENCOUNTER (EMERGENCY)
Facility: HOSPITAL | Age: 89
Discharge: HOME | End: 2025-04-13
Attending: EMERGENCY MEDICINE | Admitting: EMERGENCY MEDICINE
Payer: MEDICARE

## 2025-04-13 VITALS
HEIGHT: 60 IN | HEART RATE: 84 BPM | RESPIRATION RATE: 20 BRPM | WEIGHT: 110 LBS | OXYGEN SATURATION: 97 % | DIASTOLIC BLOOD PRESSURE: 118 MMHG | BODY MASS INDEX: 21.6 KG/M2 | TEMPERATURE: 97.1 F | SYSTOLIC BLOOD PRESSURE: 177 MMHG

## 2025-04-13 DIAGNOSIS — S22.42XA CLOSED FRACTURE OF MULTIPLE RIBS OF LEFT SIDE, INITIAL ENCOUNTER: ICD-10-CM
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
## 2025-04-13 LAB
BACTERIA URNS QL MICRO: ABNORMAL /HPF
BILIRUB UR QL STRIP.AUTO: NEGATIVE MG/DL
CLARITY UR REFRACT.AUTO: CLEAR
COLOR UR AUTO: COLORLESS
GLUCOSE UR STRIP.AUTO-MCNC: NEGATIVE MG/DL
HGB UR QL STRIP.AUTO: ABNORMAL
HYALINE CASTS #/AREA URNS LPF: ABNORMAL /LPF
KETONES UR STRIP.AUTO-MCNC: ABNORMAL MG/DL
LEUKOCYTE ESTERASE UR QL STRIP.AUTO: NEGATIVE
MUCOUS THREADS URNS QL MICRO: ABNORMAL /LPF
NITRITE UR QL STRIP.AUTO: NEGATIVE
PH UR STRIP.AUTO: 7.5 [PH]
PROT UR QL STRIP.AUTO: NEGATIVE
RBC #/AREA URNS HPF: ABNORMAL /HPF
SP GR UR REFRACT.AUTO: 1.01
SQUAMOUS URNS QL MICRO: ABNORMAL /HPF
UROBILINOGEN UR STRIP-ACNC: 0.2 EU/DL
WBC #/AREA URNS HPF: ABNORMAL /HPF

## 2025-04-13 PROCEDURE — 3E033GC INTRODUCTION OF OTHER THERAPEUTIC SUBSTANCE INTO PERIPHERAL VEIN, PERCUTANEOUS APPROACH: ICD-10-PCS | Performed by: EMERGENCY MEDICINE

## 2025-04-13 PROCEDURE — 63600000 HC DRUGS/DETAIL CODE: Mod: JZ | Performed by: EMERGENCY MEDICINE

## 2025-04-13 PROCEDURE — 72125 CT NECK SPINE W/O DYE: CPT

## 2025-04-13 PROCEDURE — 93005 ELECTROCARDIOGRAM TRACING: CPT | Performed by: EMERGENCY MEDICINE

## 2025-04-13 PROCEDURE — 81001 URINALYSIS AUTO W/SCOPE: CPT | Performed by: EMERGENCY MEDICINE

## 2025-04-13 PROCEDURE — 97162 PT EVAL MOD COMPLEX 30 MIN: CPT | Mod: GP,KX

## 2025-04-13 PROCEDURE — 96374 THER/PROPH/DIAG INJ IV PUSH: CPT

## 2025-04-13 PROCEDURE — 97530 THERAPEUTIC ACTIVITIES: CPT | Mod: GP,KX

## 2025-04-13 PROCEDURE — 99284 EMERGENCY DEPT VISIT MOD MDM: CPT | Mod: 25

## 2025-04-13 PROCEDURE — 71101 X-RAY EXAM UNILAT RIBS/CHEST: CPT | Mod: LT

## 2025-04-13 PROCEDURE — 70450 CT HEAD/BRAIN W/O DYE: CPT

## 2025-04-13 PROCEDURE — 93005 ELECTROCARDIOGRAM TRACING: CPT

## 2025-04-13 PROCEDURE — 63700000 HC SELF-ADMINISTRABLE DRUG

## 2025-04-13 RX ORDER — METOPROLOL TARTRATE 25 MG/1
25 TABLET, FILM COATED ORAL ONCE
Status: DISCONTINUED | OUTPATIENT
Start: 2025-04-13 | End: 2025-04-13

## 2025-04-13 RX ORDER — ACETAMINOPHEN 325 MG/1
650 TABLET ORAL ONCE
Status: COMPLETED | OUTPATIENT
Start: 2025-04-13 | End: 2025-04-13

## 2025-04-13 RX ORDER — LABETALOL HCL 20 MG/4 ML
20 SYRINGE (ML) INTRAVENOUS ONCE
Status: COMPLETED | OUTPATIENT
Start: 2025-04-13 | End: 2025-04-13

## 2025-04-13 RX ADMIN — LABETALOL HYDROCHLORIDE 20 MG: 5 INJECTION, SOLUTION INTRAVENOUS at 15:55

## 2025-04-13 RX ADMIN — ACETAMINOPHEN 650 MG: 325 TABLET, FILM COATED ORAL at 17:30

## 2025-04-13 ASSESSMENT — COGNITIVE AND FUNCTIONAL STATUS - GENERAL
CLIMB 3 TO 5 STEPS WITH RAILING: 2 - A LOT
STANDING UP FROM CHAIR USING ARMS: 2 - A LOT
WALKING IN HOSPITAL ROOM: 2 - A LOT
AFFECT: WFL
MOVING TO AND FROM BED TO CHAIR: 2 - A LOT

## 2025-04-13 NOTE — HOSPITAL COURSE
Madelyn is a 89 y.o. female admitted on 4/13/2025 with No admission diagnoses are documented for this encounter.. Principal problem is No Principal Problem: There is no principal problem currently on the Problem List. Please update the Problem List and refresh..    Past Medical History  Madelyn has a past medical history of A-fib (CMS/[facility]) (08/22/2023), Allergic bronchopulmonary aspergillosis (CMS/[facility]) (04/02/2018), Allergic rhinitis (04/02/2018), Asthma, Atrial flutter with rapid ventricular response (CMS/[facility]) (02/21/2023), Calcification of aorta (CMS/HCC) (06/29/2020), Chronic obstructive pulmonary disease (CMS/HCC) (04/02/2018), COPD (chronic obstructive pulmonary disease) (CMS/HCC) (10/19/2023), GERD (gastroesophageal reflux disease) (06/08/2019), Insomnia (04/02/2018), Lumbar spinal stenosis (04/02/2018), Macular degeneration disease, Obstructive sleep apnea syndrome (04/02/2018), Orthostatic hypotension (10/23/2023), Osteoporosis (04/02/2018), Primary hypertension (04/02/2018), Pulmonary embolism (CMS/HCC) (06/09/2019), Sacral fracture, closed (CMS/HCC) (08/21/2023), SVT (supraventricular tachycardia) (CMS/HCC), and Vertebral compression fracture (CMS/HCC).    History of Present Illness   The patient presents having slipped and fallen in her bathroom this morning.  She apparently hit the back of her head and also complained of pain in her back.  She has a history of DVT and is currently anticoagulated.  She also has a history of atrial fibrillation.  Exam: The patient was alert in no acute distress.  She was normocephalic.  There was some erythema or bruising over the right parietal occipital area without tenderness or hematoma.  Her neck was nontender in the midline.  There was no tenderness in her midline thoracic or lumbar spine.  Her ribs were intact without tenderness.  Her heart was regular and her lungs were clear and equal bilaterally.  The patient was moving all extremities equally without ataxia.   She was neurovascularly intact throughout.    Per discussion with team, CT head and neck negative, (+) rib fx

## 2025-04-13 NOTE — PROGRESS NOTES
Physical Therapy -  Initial Evaluation     Patient: Madelyn Ruiz  Location: [facility] Emergency Department ED07  MRN: [id number]  Today's date: 4/13/2025    HISTORY OF PRESENT ILLNESS     Madelyn is a 89 y.o. female admitted on 4/13/2025 with No admission diagnoses are documented for this encounter.. Principal problem is No Principal Problem: There is no principal problem currently on the Problem List. Please update the Problem List and refresh..    Past Medical History  Madelyn has a past medical history of A-fib (CMS/[facility]) (08/22/2023), Allergic bronchopulmonary aspergillosis (CMS/[facility]) (04/02/2018), Allergic rhinitis (04/02/2018), Asthma, Atrial flutter with rapid ventricular response (CMS/[facility]) (02/21/2023), Calcification of aorta (CMS/[facility]) (06/29/2020), Chronic obstructive pulmonary disease (CMS/[facility]) (04/02/2018), COPD (chronic obstructive pulmonary disease) (CMS/[facility]) (10/19/2023), GERD (gastroesophageal reflux disease) (06/08/2019), Insomnia (04/02/2018), Lumbar spinal stenosis (04/02/2018), Macular degeneration disease, Obstructive sleep apnea syndrome (04/02/2018), Orthostatic hypotension (10/23/2023), Osteoporosis (04/02/2018), Primary hypertension (04/02/2018), Pulmonary embolism (CMS/[facility]) (06/09/2019), Sacral fracture, closed (CMS/[facility]) (08/21/2023), SVT (supraventricular tachycardia) (CMS/HCC), and Vertebral compression fracture (CMS/HCC).    History of Present Illness   The patient presents having slipped and fallen in her bathroom this morning.  She apparently hit the back of her head and also complained of pain in her back.  She has a history of DVT and is currently anticoagulated.  She also has a history of atrial fibrillation.  Exam: The patient was alert in no acute distress.  She was normocephalic.  There was some erythema or bruising over the right parietal occipital area without tenderness or hematoma.  Her neck was nontender in the midline.  There was no tenderness in her midline  thoracic or lumbar spine.  Her ribs were intact without tenderness.  Her heart was regular and her lungs were clear and equal bilaterally.  The patient was moving all extremities equally without ataxia.  She was neurovascularly intact throughout.    Per discussion with team, CT head and neck negative, (+) rib fx  PRIOR LEVEL OF FUNCTION AND LIVING ENVIRONMENT     Prior Level of Function      Flowsheet Row Most Recent Value   Dominant Hand right   Ambulation assistive equipment and person   Transferring assistive equipment and person   Toileting assistive equipment and person   Bathing assistive equipment and person   Dressing assistive person   Eating independent   IADLs assistive person   Driving/Transportation friends/family   Prior Level of Function Comment Pt reports utilizing a Rollator with assist x1 for transfers and ambulation at baseline. Frequent falls at home. 24/7 care. Assist for ADLs and IADLs.   Assistive Device Currently Used at Home walker, 4-wheeled, commode, 3-in-1, shower chair, grab bar        History of Falls: Two or more falls in the past year    Prior Living Environment      Flowsheet Row Most Recent Value   Current Living Arrangements home   Living Environment Comment Pt resides in [facility] with a [facility], [address] [facility]. [facility] with SC/GB. 24/7 assistance at home          VITALS AND PAIN     PT Vitals      Date/Time Pulse Resp SpO2 Pt Activity O2 Therapy BP MAP BP Location BP Method Pt Position [facility]   04/13/25 1505 86 -- 98 % At rest None (Room air) 192/97 -- Right upper arm Automatic Lying LF   04/13/25 1515 -- -- -- -- -- -- -- Right upper arm Automatic Lying LF   04/13/25 1515 98 22 95 % -- -- 192/97 139 mmHg -- -- -- GJ   04/13/25 1520 98 -- 93 % At rest None (Room air) 239/120 -- Right upper arm Automatic Sitting LF          PT Pain      Date/Time Pain Type Rating: Rest Rating: Activity [facility]   04/13/25 1515 Pain Assessment 0 - no pain 0 - no pain LF             Objective   OBJECTIVE     Start  time:  1459  End time:  1522  Session Length: 23 min       General Observations  Patient received supine, in bed. She was agreeable to therapy, no issues or concerns identified by nurse prior to session. daughter at bedside    Precautions: fall       Limitations/Impairments: safety/cognitive, hearing   Services  Do You Speak a Language Other Than English at Home?: no      PT Eval and Treat - 04/13/25 1459          Cognition    Orientation Status oriented to;person;situation;place     Affect/Mental Status WFL     Follows Commands follows one-step commands;75-90% accuracy;increased processing time needed;repetition of directions required     Cognitive Function attention deficit;safety deficit;executive function deficit     Attention Deficit minimal deficit;concentration;focused/sustained attention     Executive Function Deficit minimal deficit;insight/awareness of deficits;planning/decision-making;judgment;problem-solving/reasoning;organization/sequencing;abstract thinking     Safety Deficit minimal deficit;awareness of need for assistance;judgment;problem-solving;insight into deficits/self-awareness     Comment, Cognition pleasant and receptive, daughter providing majority of the history        Vision Assessment/Intervention    Vision Assessment corrective lenses for distance        Hearing Assessment    Hearing Status hearing aid, bilateral     Impact of Hearing Impairment on Functional Status bilateral hearing aides not present at time of eval        Sensory Assessment    Sensory Assessment sensation intact, lower extremities        Lower Extremity Assessment    LE Assessment ROM and Strength WFL except        Lower Extremity Strength    Hip, Left (Strength) at least 3/5 hip flexion     Knee, Left (Strength) at least 3/5     Hip, Right (Strength) at least 3/5 hip flexion     Knee, Right (Strength) at least 3/5        Bed Mobility    Bed Mobility Activities right;supine to sit;sit to supine      [place] minimum assist (75% or more patient effort);1 person assist     Safety/Cues sequencing;technique     Assistive Device head of bed elevated     Comment OOB to the R, deferred further due to elevated BP, RN notified. returning to supine min A x 1        Mobility Belt    Mobility Belt Used During Session no - patient did not mobilize out of bed or stand        Sit/Stand Transfer    Transfer Comments deferred transfers due to elevated BP sitting EOB        Gait Training    [place], Gait not tested        Balance    Static Sitting Balance WFL;unsupported;sitting, edge of bed     Dynamic Sitting Balance not tested     Sit to Stand Dynamic Balance not tested     Static Standing Balance not tested     Dynamic Standing Balance not tested     Comment, Balance sitting EOB statically only at CS level.deferred further due to elevated BP        Impairments/Safety Issues    Impairments Affecting Function balance;endurance/activity tolerance;strength;cognition     Cognitive Impairments, Safety/Performance awareness, need for assistance     Safety Issues Affecting Function insight into deficits/self-awareness;problem-solving;awareness of need for assistance                                              Education Documentation  Risk Factors, taught by Mojgan Marcelino PT at 4/13/2025  4:05 PM.  Learner: Patient  Readiness: Acceptance  Method: Explanation  Response: Needs Reinforcement  Comment: PT role, POC, goals, limitations due to BP        Session Outcome  Patient supine, in bed at end of session, bed alarm on, all needs met, call light in reach, personal items in reach. Nursing notified about patient's performance, patient's position, and change in vital signs.    AM-PAC - Mobility (Current Function)     Turning form your back to your side while in flat bed without using bedrails 3 - A Little   Moving from lying on your back to sitting on the side of a flat bed without using bedrails 3 - A Little   Moving to  and from a bed to a chair 2 - A Lot   Standing up from a chair using your arms 2 - A Lot   To walk in a hospital room 2 - A Lot   Climbing 3-5 steps with a railing 2 - A Lot   AM-PAC Mobility Score 14      ASSESSMENT AND PLAN     Progress Summary  Pt seen for PT initial le[facility] 14. Pt limited this session due to elevated BP. Pt denies pain throughout session. Pt requires min a x 1 supine <> sit, unable to progress further due to BP. Pt reports assist x1 at baseline with use of Rollator. At this time, rec continued skilled PT services during acute stay. Rec SNF vs home with assist/home health pending progress.    Patient/Family Therapy Goals Statement: return home    PT Plan      Flowsheet Row Most Recent Value   Rehab Potential good, to achieve stated therapy goals at 04/13/2025 1459   Therapy Frequency 3 times/wk at 04/13/2025 1459   Planned Therapy Interventions balance training, bed mobility training, gait training, patient/family education, stair training, strengthening, transfer training at 04/13/2025 1459            PT Discharge Recommendations      Flowsheet Row Most Recent Value   PT Recommended Discharge Disposition skilled nursing facility, home with assistance, home with home health at 04/13/2025 1459   Anticipated Equipment Needs if Discharged Home (PT) none at 04/13/2025 1459                 PT Goals      Flowsheet Row Most Recent Value   Bed Mobility Goal 1    Activity/Assistive Device bed mobility activities, all at 04/13/2025 1459   [place] minimum assist (75% or more patient effort) at 04/13/2025 1459   Time Frame by discharge at 04/13/2025 1459   Progress/Outcome goal ongoing at 04/13/2025 1459   Transfer Goal 1    Activity/Assistive Device sit-to-stand/stand-to-sit, bed-to-chair/chair-to-bed, walker, front-wheeled at 04/13/2025 1459   [place] minimum assist (75% or more patient effort) at 04/13/2025 1459   Time Frame by discharge at 04/13/2025 1459   Progress/Outcome goal ongoing at  04/13/2025 1459   Gait Training Goal 1    Activity/Assistive Device gait (walking locomotion), assistive device use, walker, front-wheeled at 04/13/2025 1459   [place] minimum assist (75% or more patient effort) at 04/13/2025 1459   Distance 50 at 04/13/2025 1459   Time Frame by discharge at 04/13/2025 1459   Progress/Outcome goal ongoing at 04/13/2025 1459   Stairs Goal 1    Activity/Assistive Device ascending stairs, descending stairs at 04/13/2025 1459   [place] minimum assist (75% or more patient effort) at 04/13/2025 1459   Number of Stairs 3 at 04/13/2025 1459   Time Frame by discharge at 04/13/2025 1459   Progress/Outcome goal ongoing at 04/13/2025 1459

## 2025-04-13 NOTE — ED PROVIDER NOTES
Emergency Medicine Note  HPI   HISTORY OF PRESENT ILLNESS     Patient is a 89 y.o. female with PMH allergic bronchopulmonary aspergillosis, COPD, hypertension, hyperlipidemia, paroxysmal atrial flutter, left SCA aneurysm, GERD, pulmonary embolism in 2019, recurrent falls, and mild sleep apnea presenting for fall from home, patient has history of frequent falls, this time patient states that she slipped on the bathroom floor, uses rollator to walk.  Describes that she fell to the ground, hit her but then back and head.  No loss consciousness, is on Eliquis.  Patient describes pain in upper left back without difficulty breathing        Patient History   PAST HISTORY     Reviewed from Nursing Triage:       Past Medical History:   Diagnosis Date    A-fib (CMS/[facility]) 08/22/2023    Managed on Apixaban and Metoprolol.     Echocardiogram 03/2025:        Left Ventricle: Normal ventricle size. Normal wall thickness. Estimated EF 60%. Wall motion appears grossly normal. Indeterminate diastolic filling pattern.      Right Ventricle: Normal ventricle size. Normal systolic function.      Left Atrium: Severely dilated atrium.      Right Atrium: Moderately dilated atrium.      Ao    Allergic bronchopulmonary aspergillosis (CMS/[facility]) 04/02/2018    Allergist: Dr. Arriaza.  Stable FEV1 in 8/2015.  IgE levels and eosinophils stable in 8/2015.  Managed with Fasenra, Azithromycin and Flovent.    Allergic rhinitis 04/02/2018    Pulmonologist: Dr. Walker. Managed with Flonase.    Asthma     Atrial flutter with rapid ventricular response (CMS/[facility]) 02/21/2023    Diagnosed in 02/2023 and hospitalized Managed with Apixaban, Metoprolol, and Diltiazem Echocardiogram 02/2023   Left Ventricle: Normal ventricle size. Mild concentric left ventricular hypertrophy. Preserved systolic function. Estimated EF 65-70%. No regional wall motion abnormalities. Grade I diastolic dysfunction.   Right Ventricle: Normal ventricle size. Normal systolic function.    Left Atriu    Calcification of aorta (CMS/[facility]) 06/29/2020    Seen incidentally on CT scan.    Chronic obstructive pulmonary disease (CMS/[facility]) 04/02/2018    Pulmonologist: Dr. Walker.  Seen on PFTs in hospital in 2014.  Controlled with Spiriva and FLovent.    COPD (chronic obstructive pulmonary disease) (CMS/[facility]) 10/19/2023    Managed with Symbicort and Spiriva    GERD (gastroesophageal reflux disease) 06/08/2019    Managed with Esomeprazole.  Should take medication 30 minutes prior to meal.  Advised to avoid caffeine, carbonated beverages, and should not eat within 3 hours of going to sleep.  Advised to reduce BMI < 25.     Insomnia 04/02/2018    Managed with Lorazepam as needed.    Lumbar spinal stenosis 04/02/2018    Neurosurgeon: Dr. Miguel. MRI with Central and lateral recess spinal stenosis. Has Spondylolisthesis at L4/L5. S/P epidural injection by Dr. Martin with some relief in past. Had Lumbar fusion and Lumbar laminectomy by Dr. Miguel in 4/2015.    Macular degeneration disease     Obstructive sleep apnea syndrome 04/02/2018    Mild STACIA noted in sleep study 7/2015. Treated with nasal CPAP automatic with pressure range 5-10 CM H2Ox couple months. Off now    Orthostatic hypotension 10/23/2023    Managed on Midodrine    Osteoporosis 04/02/2018    Rheumatologist: Dr. Bradley.     Dexa scan 7/2016 with osteoporosis of lumbar spine LS-2.5, LH-2.1, RH -2.2.     Has been on Fosamax in the past for 8 years.     Worsened by Prednisone in past.     Continue vitamin D, calcium and weight bearing exercise.    DEXA in 7/2017 with LS -1.7, LH -2.2, and RH -2.0.     Next due in 7/2019.     Seen by Dr. Bradley in 8/2015 who agreed with initiating Prolia    Primary hypertension 04/02/2018    Managed on Diltiazem  Advised to follow a low sodium diet and keep BMI < 25.  Advised to exercise for 2.5 hours per week.  Instructed to take home blood pressure readings and call if consistently above 140/90.      Pulmonary embolism  (CMS/HCC) 06/09/2019    Hematologist: Dr. Bhakta Diagnosed in 06/2019 in right upper lobe. Vascular ultrasound negative of bilateral lower extremities. Managed on Eliquis. Hypercoagulable workup was negative for beta-2 glycoprotein's, RIGO, Antithrombin III, lupus anticoagulant, protein C activity protein S activity, and prothrombin gene mutation, and factor V Leiden. Now off Apixaban as CT scan in 12/2019 was without pu    Sacral fracture, closed (CMS/HCC) 08/21/2023    SVT (supraventricular tachycardia) (CMS/HCC)     Vertebral compression fracture (CMS/HCC)     At T12 level       Past Surgical History   Procedure Laterality Date    Appendectomy      Carpal tunnel release Bilateral     Cataract extraction w/  intraocular lens implant Bilateral     Hysterectomy  1972    Lumbar laminectomy  04/21/2015    S/P lumbar fusion    Tonsillectomy         Family History   Problem Relation Name Age of Onset    Glaucoma Biological Mother      Macular degeneration Biological Mother      Hypertension Biological Mother      Lung cancer Biological Father      Heart disease Biological Brother      Breast cancer Mother's Sister      No Known Problems Biological Son      No Known Problems Biological Son      No Known Problems Biological Daughter      No Known Problems Biological Daughter      No Known Problems Biological Daughter         Social History     Tobacco Use    Smoking status: Never     Passive exposure: Never    Smokeless tobacco: Never   Vaping Use    Vaping status: Never Used   Substance Use Topics    Alcohol use: Yes     Comment: Rarely    Drug use: No         Review of Systems   REVIEW OF SYSTEMS     Review of Systems      VITALS     ED Vitals      Date/Time Temp Pulse Resp BP SpO2 [facility]   04/13/25 1122 -- -- 17 -- -- VR                         Physical Exam   PHYSICAL EXAM     Physical Exam  Vitals and nursing note reviewed.   Constitutional:       General: She is not in acute distress.     Appearance: She is  well-developed. She is not ill-appearing, toxic-appearing or diaphoretic.   HENT:      Head: Normocephalic and atraumatic.      Nose: No congestion or rhinorrhea.      Mouth/Throat:      Mouth: Mucous membranes are moist.   Eyes:      Extraocular Movements: Extraocular movements intact.      Conjunctiva/sclera: Conjunctivae normal.      Pupils: Pupils are equal, round, and reactive to light.   Cardiovascular:      Rate and Rhythm: Normal rate and regular rhythm.      Heart sounds: Normal heart sounds.   Pulmonary:      Effort: Pulmonary effort is normal. No respiratory distress.      Breath sounds: Normal breath sounds.   Abdominal:      Palpations: Abdomen is soft.      Tenderness: There is no abdominal tenderness.   Musculoskeletal:         General: No tenderness.      Right lower leg: No edema.      Left lower leg: No edema.      Comments: Tenderness palpation of her left upper back of her rib cage.  No C-spine, T-spine, L-spine tenderness palpation or step-offs   Skin:     General: Skin is warm and dry.      Capillary Refill: Capillary refill takes less than 2 seconds.   Neurological:      Mental Status: She is alert and oriented to person, place, and time. Mental status is at baseline.      Cranial Nerves: No cranial nerve deficit.      Sensory: No sensory deficit.      Motor: No weakness.           PROCEDURES     Procedures     DATA     Results       None            Imaging Results    None         No orders to display       Scoring tools                                  ED Course & MDM   MDM / ED COURSE / CLINICAL IMPRESSION / DISPO     Medical Decision Making  Fall    Patient fall on Eliquis, states that she slipped today, no weakness, loss of consciousness before the fall.  Also consciousness after head strike.  Patient made tier 3 secondary to fall on Eliquis with head strike.  Patient with benign exam at bedside, neurologically benign, does have tenderness palpation over the left posterior back, will  evaluate for rib fractures.  Believe mechanical fall over weakness fall    Plan  CT head  CT C-spine  Rib series    Chronic care urine sinus patient did not have urine from most recent lab draw, without signs of infection.  Patient with amatory difficulties secondary to fall.  Patient also has rib fractures which appear well-healed based on cortication.  Will involve PT OT and case management for possible physical therapy rehab placement.      PT assessed the patient, believe that she is okay for home, patient has 24-hour home health    Patient discharged    Amount and/or Complexity of Data Reviewed  Labs: ordered. Decision-making details documented in ED Course.  Radiology: ordered. Decision-making details documented in ED Course.  ECG/medicine tests: ordered.    Risk  OTC drugs.  Prescription drug management.        ED Course as of 04/13/25 1637   Sun Apr 13, 2025   1256 X-RAY RIBS LEFT WITH PA CHEST  Tai has L sided rib fractures based on imaging [GR]   1408 CT HEAD WITHOUT IV CONTRAST   No acute intracranial abnormality.  Mildly progressive mucosal disease is noted in the left maxillary sinus compared  to the 2024 head CT.  Chronic inspissated secretions in the right left sphenoid and maxillary sinuses  are again noted.  No acute cervical spine fracture or traumatic subluxation. See comment for  details.   [GR]   1636 WBC, Urine: 0 TO 3 [GR]   1637 Bacteria, Urine: None Seen [GR]      ED Course User Index  [GR] Jessica Hoffman MD     Clinical Impression      None                 Jessica Hoffman MD  Resident  04/13/25 8499

## 2025-04-13 NOTE — CONSULTS
LAMIN met with patient and daughter at bedside regarding PT recommendation. Patient currently has 24 hour caregivers in the home and is active with [facility]. SW will send referral back to [facility] for continued services. Daughter would like patient to return back home with her caregivers and home therapy.

## 2025-04-13 NOTE — ED ATTESTATION NOTE
Procedures  Physical Exam  Review of Systems    4/13/20251:44 PM  I have personally seen and examined the patient.  I reviewed and agree with the PA/NP/Resident's assessment and plan of care.    My examination, assessment, and plan of care of Madelyn Ruiz is as follows:  The patient presents having slipped and fallen in her bathroom this morning.  She apparently hit the back of her head and also complained of pain in her back.  She has a history of DVT and is currently anticoagulated.  She also has a history of atrial fibrillation.  Exam: The patient was alert in no acute distress.  She was normocephalic.  There was some erythema or bruising over the right parietal occipital area without tenderness or hematoma.  Her neck was nontender in the midline.  There was no tenderness in her midline thoracic or lumbar spine.  Her ribs were intact without tenderness.  Her heart was regular and her lungs were clear and equal bilaterally.  The patient was moving all extremities equally without ataxia.  She was neurovascularly intact throughout.  Impression/Plan: The patient is being evaluated with CT scans of the brain and cervical spine and she will have x-rays of her left ribs.  We are also going to check a urinalysis as the patient's legs have been weak recently.  The patient's daughter states that the patient had lab work done by her PCP in the last day or 2.    Vital Sign Review: Vital signs have been ordered and reviewed. The oxygen saturation is 98% on room air, normal.    Medical Decision Making  Amount and/or Complexity of Data Reviewed  Radiology: ordered. Decision-making details documented in ED Course.  ECG/medicine tests: ordered.         I was physically present for the key/critical portions of the following procedures: None    This document was created using dragon dictation software.  There might be some typographical errors due to this technology.     Tim Tang,   04/13/25 0668

## 2025-04-14 ENCOUNTER — PATIENT OUTREACH (OUTPATIENT)
Dept: PRIMARY CARE | Facility: CLINIC | Age: 89
End: 2025-04-14
Payer: MEDICARE

## 2025-04-14 LAB
ATRIAL RATE: 78
P AXIS: 84
PR INTERVAL: 126
QRS DURATION: 120
QT INTERVAL: 444
QTC CALCULATION(BAZETT): 506
R AXIS: 65
T WAVE AXIS: 38
VENTRICULAR RATE: 78

## 2025-04-14 PROCEDURE — 93010 ELECTROCARDIOGRAM REPORT: CPT | Performed by: INTERNAL MEDICINE

## 2025-04-14 NOTE — PROGRESS NOTES
NAME: Madelyn Ruiz    MRN: [id number]    YOB: 1930    Event Review:    Patient Outreach Date: 04/14/25  Assessment completed with: Other     The reason for this patient outreach call is to follow up on the patient's discharge from the Emergency Department.    Date of ED Admission: 04/13/25  Date of ED Discharge: 04/13/25  Discharging Facility: [facility]    Patient stated event and/or symptoms that led to the ED visit: Fall  Reason patient presented to the ED: Clinical emergency requiring 911, or transported by car  Patient directed to the ED by: Family/Friend  Patient seen in the ED in the last 30 days: No  Patient admitted in the last 30 days: No  Patient feels symptoms were addressed: No (Pt daughter was worried about her BP not being addressed, once complained they were able to get her medication.)     Patient's perception of their health status since discharged: Improving    Patient received a copy of their discharge instructions: Yes  Were the instructions reviewed with patient prior to discharge: Yes  Patient understands discharge instructions: Yes    New medications prescribed at discharge: No     Medication reconciliation completed: No  Medication education provided: No       Assistance needed with scheduling PCP appointment: No    Patient screened for SDOH: No  Does the patient request any SDOH assistance: No       Patient Scheduling:                         Tamela Gates MA    Spoke with pt daughter who said pt is starting to fell better, she declined f/u visit with Dr. Linares at this time as she is seeing other specialists but will f/u later. She said pt BP was high in the ED and they were frustrated as it did not seem like the nurse was concerned about it. They did eventually get medication for it while they were there.

## 2025-04-15 ENCOUNTER — TELEPHONE (OUTPATIENT)
Dept: PRIMARY CARE | Facility: CLINIC | Age: 89
End: 2025-04-15
Payer: MEDICARE

## 2025-04-15 DIAGNOSIS — R53.81 DEBILITY: Primary | ICD-10-CM

## 2025-04-15 DIAGNOSIS — J43.9 PULMONARY EMPHYSEMA, UNSPECIFIED EMPHYSEMA TYPE (CMS/HCC): Chronic | ICD-10-CM

## 2025-04-15 DIAGNOSIS — M25.551 BILATERAL HIP PAIN: ICD-10-CM

## 2025-04-15 DIAGNOSIS — M25.562 CHRONIC PAIN OF LEFT KNEE: ICD-10-CM

## 2025-04-15 DIAGNOSIS — G89.29 CHRONIC PAIN OF LEFT KNEE: ICD-10-CM

## 2025-04-15 DIAGNOSIS — M48.061 SPINAL STENOSIS OF LUMBAR REGION, UNSPECIFIED WHETHER NEUROGENIC CLAUDICATION PRESENT: Chronic | ICD-10-CM

## 2025-04-15 DIAGNOSIS — M25.552 BILATERAL HIP PAIN: ICD-10-CM

## 2025-04-15 DIAGNOSIS — I95.1 ORTHOSTATIC HYPOTENSION: Chronic | ICD-10-CM

## 2025-04-15 NOTE — TELEPHONE ENCOUNTER
Jody OT reaching out from PT for a script for PT and OT to go to [facility] outpatient OT/PT post ED visit. Script can be placed in the chart.

## 2025-04-16 NOTE — TELEPHONE ENCOUNTER
I spoke with alem because jonatan not available  ( [phone]) told her I would fax over the orders she wanted to know if she needed nursing care from being discharged from the [facility] I spoke with kenia at this point because patient is weak she wants to do PT/ OT through [facility] I told her I would follow up with them .I spoke with carlo [facility] central access she said the orders need to be redone for [facility] not outpatient they will get notification of so no need to fax

## 2025-04-25 ENCOUNTER — TRANSCRIBE ORDERS (OUTPATIENT)
Dept: PRIMARY CARE | Facility: CLINIC | Age: 89
End: 2025-04-25

## 2025-04-25 DIAGNOSIS — R41.89 COGNITIVE DECLINE: ICD-10-CM

## 2025-04-25 DIAGNOSIS — R29.6 MULTIPLE FALLS: Primary | ICD-10-CM

## 2025-04-30 ENCOUNTER — HOME VISIT (OUTPATIENT)
Dept: PALLIATIVE CARE | Facility: CLINIC | Age: 89
End: 2025-04-30
Attending: FAMILY MEDICINE
Payer: MEDICARE

## 2025-04-30 VITALS
RESPIRATION RATE: 18 BRPM | OXYGEN SATURATION: 95 % | DIASTOLIC BLOOD PRESSURE: 78 MMHG | TEMPERATURE: 97 F | HEART RATE: 64 BPM | SYSTOLIC BLOOD PRESSURE: 140 MMHG

## 2025-04-30 DIAGNOSIS — J44.9 CHRONIC OBSTRUCTIVE PULMONARY DISEASE, UNSPECIFIED COPD TYPE (CMS/HCC): Chronic | ICD-10-CM

## 2025-04-30 DIAGNOSIS — H35.3211 EXUDATIVE AGE-RELATED MACULAR DEGENERATION, RIGHT EYE, WITH ACTIVE CHOROIDAL NEOVASCULARIZATION (CMS/HCC): ICD-10-CM

## 2025-04-30 DIAGNOSIS — G47.00 INSOMNIA, UNSPECIFIED TYPE: Chronic | ICD-10-CM

## 2025-04-30 DIAGNOSIS — K59.03 DRUG-INDUCED CONSTIPATION: ICD-10-CM

## 2025-04-30 DIAGNOSIS — R53.81 DEBILITY: ICD-10-CM

## 2025-04-30 DIAGNOSIS — I48.0 PAROXYSMAL ATRIAL FIBRILLATION (CMS/HCC): Chronic | ICD-10-CM

## 2025-04-30 DIAGNOSIS — I95.1 ORTHOSTATIC HYPOTENSION: Chronic | ICD-10-CM

## 2025-04-30 DIAGNOSIS — G89.29 OTHER CHRONIC PAIN: ICD-10-CM

## 2025-04-30 DIAGNOSIS — Z51.5 ENCOUNTER FOR PALLIATIVE CARE IN HOME, NON-HOSPICE: Primary | ICD-10-CM

## 2025-04-30 DIAGNOSIS — B44.81 ALLERGIC BRONCHOPULMONARY ASPERGILLOSIS (CMS/HCC): Chronic | ICD-10-CM

## 2025-04-30 PROCEDURE — G0318 PROLONGED SERVICES FOR HOME/RESIDENCE SERVICES: HCPCS

## 2025-04-30 PROCEDURE — 99350 HOME/RES VST EST HIGH MDM 60: CPT | Mod: 25

## 2025-04-30 RX ORDER — MIDODRINE HYDROCHLORIDE 5 MG/1
5 TABLET ORAL 2 TIMES DAILY
COMMUNITY

## 2025-04-30 RX ORDER — POLYETHYLENE GLYCOL 3350 17 G/17G
17 POWDER, FOR SOLUTION ORAL DAILY
COMMUNITY
Start: 2025-04-30 | End: 2025-05-30

## 2025-04-30 RX ORDER — CETIRIZINE HYDROCHLORIDE 10 MG/1
10 TABLET ORAL DAILY
COMMUNITY

## 2025-04-30 RX ORDER — DOCUSATE SODIUM 100 MG/1
100 CAPSULE, LIQUID FILLED ORAL DAILY
COMMUNITY

## 2025-04-30 RX ORDER — MELATONIN 10 MG
10 CAPSULE ORAL NIGHTLY
COMMUNITY

## 2025-04-30 RX ORDER — SENNOSIDES 8.6 MG/1
1 TABLET ORAL DAILY
COMMUNITY

## 2025-05-02 ENCOUNTER — TELEPHONE (OUTPATIENT)
Dept: PRIMARY CARE | Facility: CLINIC | Age: 89
End: 2025-05-02

## 2025-05-02 NOTE — TELEPHONE ENCOUNTER
I spoke with jasbir she said that it pops up in parachute when you put her name in I did ask for specifics full electric hospital bed standard with not full rails standard mattress

## 2025-05-02 NOTE — TELEPHONE ENCOUNTER
I took a call from jennifer OT home care [facility]

## 2025-05-02 NOTE — TELEPHONE ENCOUNTER
Home care hospice call stated that they send over forms to be sign earlier this week and would like to know if it was done

## 2025-05-02 NOTE — TELEPHONE ENCOUNTER
Since they ordered it, I don't have access to view the order. Ask her to request the signature via fax. It'll send a paper via fax for Dr. Linares to sign.   I spoke with jasbir informed her she will do that she said that was not an approved method for the Dr Nolan

## 2025-05-02 NOTE — TELEPHONE ENCOUNTER
[facility] I received the confirmation .

## 2025-05-16 ENCOUNTER — APPOINTMENT (EMERGENCY)
Dept: RADIOLOGY | Facility: HOSPITAL | Age: 89
End: 2025-05-16
Payer: MEDICARE

## 2025-05-16 ENCOUNTER — HOSPITAL ENCOUNTER (EMERGENCY)
Facility: HOSPITAL | Age: 89
Discharge: HOME | End: 2025-05-16
Attending: EMERGENCY MEDICINE | Admitting: EMERGENCY MEDICINE
Payer: MEDICARE

## 2025-05-16 ENCOUNTER — TELEPHONE (OUTPATIENT)
Dept: PALLIATIVE CARE | Facility: CLINIC | Age: 89
End: 2025-05-16
Payer: MEDICARE

## 2025-05-16 ENCOUNTER — TELEPHONE (OUTPATIENT)
Dept: PRIMARY CARE | Facility: CLINIC | Age: 89
End: 2025-05-16
Payer: MEDICARE

## 2025-05-16 VITALS
SYSTOLIC BLOOD PRESSURE: 164 MMHG | OXYGEN SATURATION: 98 % | HEART RATE: 79 BPM | TEMPERATURE: 98.1 F | DIASTOLIC BLOOD PRESSURE: 72 MMHG | RESPIRATION RATE: 15 BRPM

## 2025-05-16 DIAGNOSIS — R26.89 BALANCE PROBLEM: ICD-10-CM

## 2025-05-16 DIAGNOSIS — M62.81 MUSCLE WEAKNESS (GENERALIZED): Primary | ICD-10-CM

## 2025-05-16 DIAGNOSIS — R53.83 FATIGUE, UNSPECIFIED TYPE: ICD-10-CM

## 2025-05-16 DIAGNOSIS — R07.9 CHEST PAIN, UNSPECIFIED TYPE: Primary | ICD-10-CM

## 2025-05-16 LAB
ALBUMIN SERPL-MCNC: 4.2 G/DL (ref 3.5–5.7)
ALP SERPL-CCNC: 78 IU/L (ref 34–125)
ALT SERPL-CCNC: 10 IU/L (ref 7–52)
ANION GAP SERPL CALC-SCNC: 12 MEQ/L (ref 3–15)
AST SERPL-CCNC: 12 IU/L (ref 13–39)
ATRIAL RATE: 88
BASOPHILS # BLD: 0 K/UL (ref 0.01–0.1)
BASOPHILS NFR BLD: 0 %
BILIRUB SERPL-MCNC: 0.5 MG/DL (ref 0.3–1.2)
BUN SERPL-MCNC: 10 MG/DL (ref 7–25)
CALCIUM SERPL-MCNC: 9.8 MG/DL (ref 8.6–10.3)
CHLORIDE SERPL-SCNC: 105 MEQ/L (ref 98–107)
CO2 SERPL-SCNC: 26 MEQ/L (ref 21–31)
CREAT SERPL-MCNC: 0.6 MG/DL (ref 0.6–1.2)
DIFFERENTIAL METHOD BLD: ABNORMAL
EGFRCR SERPLBLD CKD-EPI 2021: >60 ML/MIN/1.73M*2
EOSINOPHIL # BLD: 0 K/UL (ref 0.04–0.36)
EOSINOPHIL NFR BLD: 0 %
ERYTHROCYTE [DISTWIDTH] IN BLOOD BY AUTOMATED COUNT: 14.6 % (ref 11.7–14.4)
GLUCOSE SERPL-MCNC: 96 MG/DL (ref 70–99)
HCT VFR BLD AUTO: 38.9 % (ref 35–45)
HGB BLD-MCNC: 12.5 G/DL (ref 11.8–15.7)
IMM GRANULOCYTES # BLD AUTO: 0.01 K/UL (ref 0–0.08)
IMM GRANULOCYTES NFR BLD AUTO: 0.2 %
LYMPHOCYTES # BLD: 1.27 K/UL (ref 1.2–3.5)
LYMPHOCYTES NFR BLD: 31.2 %
MCH RBC QN AUTO: 30.2 PG (ref 28–33.2)
MCHC RBC AUTO-ENTMCNC: 32.1 G/DL (ref 32.2–35.5)
MCV RBC AUTO: 94 FL (ref 83–98)
MONOCYTES # BLD: 0.46 K/UL (ref 0.28–0.8)
MONOCYTES NFR BLD: 11.3 %
NEUTROPHILS # BLD: 2.33 K/UL (ref 1.7–7)
NEUTS SEG NFR BLD: 57.3 %
NRBC BLD-RTO: 0 %
P AXIS: 83
PLATELET # BLD AUTO: 322 K/UL (ref 150–369)
PMV BLD AUTO: 9.9 FL (ref 9.4–12.3)
POTASSIUM SERPL-SCNC: 3.5 MEQ/L (ref 3.5–5.1)
PR INTERVAL: 152
PROT SERPL-MCNC: 7.2 G/DL (ref 6–8.2)
QRS DURATION: 112
QT INTERVAL: 408
QTC CALCULATION(BAZETT): 493
R AXIS: 18
RBC # BLD AUTO: 4.14 M/UL (ref 3.93–5.22)
SODIUM SERPL-SCNC: 143 MEQ/L (ref 136–145)
T WAVE AXIS: -4
TROPONIN I SERPL HS-MCNC: 7.3 PG/ML
TROPONIN I SERPL HS-MCNC: 7.4 PG/ML
VENTRICULAR RATE: 88
WBC # BLD AUTO: 4.07 K/UL (ref 3.8–10.5)

## 2025-05-16 PROCEDURE — 71045 X-RAY EXAM CHEST 1 VIEW: CPT

## 2025-05-16 PROCEDURE — 99283 EMERGENCY DEPT VISIT LOW MDM: CPT | Mod: 25

## 2025-05-16 PROCEDURE — 84484 ASSAY OF TROPONIN QUANT: CPT | Mod: 91 | Performed by: EMERGENCY MEDICINE

## 2025-05-16 PROCEDURE — 80053 COMPREHEN METABOLIC PANEL: CPT | Performed by: EMERGENCY MEDICINE

## 2025-05-16 PROCEDURE — 36415 COLL VENOUS BLD VENIPUNCTURE: CPT | Performed by: EMERGENCY MEDICINE

## 2025-05-16 PROCEDURE — 84484 ASSAY OF TROPONIN QUANT: CPT | Performed by: EMERGENCY MEDICINE

## 2025-05-16 PROCEDURE — 93005 ELECTROCARDIOGRAM TRACING: CPT | Performed by: EMERGENCY MEDICINE

## 2025-05-16 PROCEDURE — 85025 COMPLETE CBC W/AUTO DIFF WBC: CPT | Performed by: EMERGENCY MEDICINE

## 2025-05-19 ENCOUNTER — HOME VISIT (OUTPATIENT)
Dept: PALLIATIVE CARE | Facility: CLINIC | Age: 89
End: 2025-05-19
Payer: MEDICARE

## 2025-05-19 VITALS
TEMPERATURE: 98 F | OXYGEN SATURATION: 94 % | SYSTOLIC BLOOD PRESSURE: 120 MMHG | DIASTOLIC BLOOD PRESSURE: 70 MMHG | RESPIRATION RATE: 18 BRPM | HEART RATE: 62 BPM

## 2025-05-19 DIAGNOSIS — J44.9 CHRONIC OBSTRUCTIVE PULMONARY DISEASE, UNSPECIFIED COPD TYPE (CMS/HCC): Chronic | ICD-10-CM

## 2025-05-19 DIAGNOSIS — I95.1 ORTHOSTATIC HYPOTENSION: Chronic | ICD-10-CM

## 2025-05-19 DIAGNOSIS — R53.81 DEBILITY: ICD-10-CM

## 2025-05-19 DIAGNOSIS — I48.0 PAROXYSMAL ATRIAL FIBRILLATION (CMS/HCC): Chronic | ICD-10-CM

## 2025-05-19 DIAGNOSIS — K59.03 DRUG-INDUCED CONSTIPATION: ICD-10-CM

## 2025-05-19 DIAGNOSIS — G89.29 OTHER CHRONIC PAIN: ICD-10-CM

## 2025-05-19 DIAGNOSIS — Z51.5 ENCOUNTER FOR PALLIATIVE CARE IN HOME, NON-HOSPICE: Primary | ICD-10-CM

## 2025-05-19 PROCEDURE — 99350 HOME/RES VST EST HIGH MDM 60: CPT | Mod: 25

## 2025-05-19 PROCEDURE — 99497 ADVNCD CARE PLAN 30 MIN: CPT | Mod: 25

## 2025-05-21 ENCOUNTER — TELEPHONE (OUTPATIENT)
Dept: PRIMARY CARE | Facility: CLINIC | Age: 89
End: 2025-05-21
Payer: MEDICARE

## 2025-06-11 ENCOUNTER — HOME VISIT (OUTPATIENT)
Dept: PALLIATIVE CARE | Facility: CLINIC | Age: 89
End: 2025-06-11
Payer: MEDICARE

## 2025-06-11 VITALS
RESPIRATION RATE: 18 BRPM | OXYGEN SATURATION: 98 % | DIASTOLIC BLOOD PRESSURE: 70 MMHG | TEMPERATURE: 97.6 F | HEART RATE: 72 BPM | SYSTOLIC BLOOD PRESSURE: 120 MMHG

## 2025-06-11 DIAGNOSIS — J43.9 PULMONARY EMPHYSEMA, UNSPECIFIED EMPHYSEMA TYPE (CMS/HCC): Chronic | ICD-10-CM

## 2025-06-11 DIAGNOSIS — Z51.5 ENCOUNTER FOR PALLIATIVE CARE IN HOME, NON-HOSPICE: Primary | ICD-10-CM

## 2025-06-11 DIAGNOSIS — B44.81 ALLERGIC BRONCHOPULMONARY ASPERGILLOSIS (CMS/HCC): Chronic | ICD-10-CM

## 2025-06-11 DIAGNOSIS — I48.0 PAROXYSMAL ATRIAL FIBRILLATION (CMS/HCC): Chronic | ICD-10-CM

## 2025-06-11 DIAGNOSIS — R53.81 DEBILITY: ICD-10-CM

## 2025-06-11 DIAGNOSIS — G89.29 OTHER CHRONIC PAIN: ICD-10-CM

## 2025-06-11 DIAGNOSIS — K59.03 DRUG-INDUCED CONSTIPATION: ICD-10-CM

## 2025-06-11 DIAGNOSIS — I95.1 ORTHOSTATIC HYPOTENSION: Chronic | ICD-10-CM

## 2025-06-11 PROCEDURE — 99350 HOME/RES VST EST HIGH MDM 60: CPT | Mod: 25

## 2025-06-11 RX ORDER — TRAMADOL HYDROCHLORIDE 50 MG/1
50 TABLET ORAL EVERY 8 HOURS PRN
Qty: 90 TABLET | Refills: 0 | Status: SHIPPED | OUTPATIENT
Start: 2025-06-11 | End: 2025-07-11

## 2025-06-11 ASSESSMENT — ENCOUNTER SYMPTOMS
NAUSEA: 0
TROUBLE SWALLOWING: 0
CONFUSION: 1
CONSTIPATION: 1
WOUND: 0
BACK PAIN: 1
APPETITE CHANGE: 1
FATIGUE: 1
SHORTNESS OF BREATH: 0
SLEEP DISTURBANCE: 0
ACTIVITY CHANGE: 1
BRUISES/BLEEDS EASILY: 1
WEAKNESS: 1
VOMITING: 0
DYSURIA: 0
PALPITATIONS: 0
HEMATURIA: 0
DIZZINESS: 0
COUGH: 0
WHEEZING: 0
ARTHRALGIAS: 1
LIGHT-HEADEDNESS: 0

## 2025-06-11 NOTE — ASSESSMENT & PLAN NOTE
Controlled  Follows with [facility] Dr. Arriaza  Continues on Fasenra every 8 weeks  Continues on Azithromycin 250 mg 3 times weekly  Monitor for s/s of flare up

## 2025-06-11 NOTE — ASSESSMENT & PLAN NOTE
Follows with Pulmonologist Dr. Walker   Continue inhalers (Spiriva, Symbicort)  Not ordered any supplemental oxygen  Educated on Energy Conservation  Monitor for worsening dyspnea

## 2025-06-11 NOTE — Clinical Note
Good evening Dr. Linares and Dr. You, Patient was seen today for palliative care follow-up visit. She is hanging in there, remains weak and lethargic. She is having difficulty getting out to appointments. We discussed today that she was not able to make it to her appointment with Dr. You for chronic pain management. I will be taking over prescribing her tramadol as I don't think she'll be able to make any future appointments with Dr. You due to debility. PT/OT is to start in the next few weeks through Medicare Part B services. I plan to see patient again in about a month. If you have any questions please let me know.  Thank you, [facility] [phone]

## 2025-06-11 NOTE — ASSESSMENT & PLAN NOTE
Improved  The goal for the bowel regimen should be an unforced bowel movement at least every other day  Continue Senna 8.6 mg daily  Continue Colace 100 mg daily  Continue Miralax 17 grams daily   Monitor bowels

## 2025-06-11 NOTE — PROGRESS NOTES
Home Based Palliative Medicine Established Visit    Patient ID: Madelyn Ruiz is a 89 y.o. female  MRN: [id number]  Date of Visit: 6/11/2025  Referring Physician: No referring provider defined for this encounter.  Primary Care Physician: eHnry Linares MD  Reason for Visit: Palliative Care Follow-up      History of Present Illness   Source: Patient, Caregiver, Medical Records, and Daughter    Madelyn Ruiz is a 89 y.o. female seen in the home for Palliative Care Follow-up visit with a PMH of COPD, atrial fibrillation, orthostatic hypotension, STACIA, HTN, GERD, allergic bronchopulmonary aspergillosis, osteoporosis, anemia, and Debility.    Patient last seen by Palliative Care on 5/19/25.    Met with patient & daughter Shelley Raya in living room. Paid caregiver Ella also present. Patient remains a questionable historian, Shelley Raya provides most of history. Reports patient has been relatively stable. Is able to ambulate small distances with walker and human assistance. When feeling weak or with longer distances, is sitting on rollator or in wheelchair. Remains very fatigued, sleeping on and off throughout the day. Appetite is small. Breakfast remains best meal. Constipation controlled with current regimen. Last BM yesterday. Missed pain management appointment due to difficulty getting out of the house.     No falls, ED visits, or hospitalizations since last visit.        Assessment/Plan   Diagnoses and all orders for this visit:    Encounter for palliative care in home, non-hospice (Primary)  Assessment & Plan:  Goals are: Palliative  Code Status: Do not resuscitate  Patient identifies daughter Shelley Bingham & son Venkat Ruiz as their health care power of   if they are unable to make their own medical decisions.   Patient has health care living will, health care power of  paperwork, and POLST in home and EHR.   Patient has  intact capacity to make complex medical decision making but due to  intermittent confusion / forgetfulness recommend health care agents daughter Shelley Bingham & son Venkat Ruiz are included in medical decision making.  Focus on getting stronger, remaining comfortable      Paroxysmal atrial fibrillation (CMS/HCC)  Assessment & Plan:  Follows with Cardiologist Dr. Valentino   Continues on anticoagulation with Eliquis  Continues on Metoprolol 25 mg 2 times daily  Continues on Diltiazem 360 mg daily  Rate controlled on exam  Monitor for lightheadedness, palpitations, weakness, dyspnea  Monitor for bleeding with Eliquis         Orthostatic hypotension  Assessment & Plan:  Managed by PCP  Midodrine frequency recently changed from 5 mg TID to 2 times daily  Counseled on slow position changes   Monitor Bps         Pulmonary emphysema, unspecified emphysema type (CMS/[facility])  Assessment & Plan:  Follows with Pulmonologist Dr. Walker   Continue inhalers (Spiriva, Symbicort)  Not ordered any supplemental oxygen  Educated on Energy Conservation  Monitor for worsening dyspnea        Allergic bronchopulmonary aspergillosis (CMS/[facility])  Assessment & Plan:  Controlled  Follows with [facility] Dr. Arriaza  Continues on Fasenra every 8 weeks  Continues on Azithromycin 250 mg 3 times weekly  Monitor for s/s of flare up      Other chronic pain  Assessment & Plan:  Chronic back & left knee pain  Followed with pain management Dr. You (unable to make appointments due to debility)  Received Epidural 2/2025  Continues on Tramadol 50 mg every 8 hours as needed (utilizing ATC due to severe pain)  Continues on Lyrica 50 mg every AM and PM, 25 mg at lunch  Continues on Duloxetine 60 mg daily  Monitor pain    6/11 - discussed Palliative Care will take over Tramadol prescribing as patient is unable to get out to pain management appointments.  ORT - 0    Counseled patient/caregiver on safe opioid prescribing practices, including: one prescriber will handle all opioid prescribing, need for monthly  Palliative Practice visits, safe keeping of opioids to prevent medications from being lost or stolen, and that requests for early refills will require a patient visit.     Discussed potential risks and benefits of opioid medication including sedation, impaired concentration, and dependence.    Orders:  -     traMADoL (ULTRAM) 50 mg tablet; Take 1 tablet (50 mg total) by mouth every 8 (eight) hours as needed for pain.    Drug-induced constipation  Assessment & Plan:  Improved  The goal for the bowel regimen should be an unforced bowel movement at least every other day  Continue Senna 8.6 mg daily  Continue Colace 100 mg daily  Continue Miralax 17 grams daily   Monitor bowels      Debility  Assessment & Plan:  Due to chronic pain, repeated falls, advanced age, & co-morbidities   At risk for further decline and debility due to advancing age and multimorbidity  Monitor for falls  Continue use of assistive devices (walker)  Recently discharged from [facility] HC services  Continue 24/7 paid caregivers  PPS: 50%          Vitals:    06/11/25 1612   BP: 120/70   BP Location: Right upper arm   Pulse: 72   Resp: 18   Temp: 36.4 °C (97.6 °F)   TempSrc: Temporal   SpO2: 98%       There is no height or weight on file to calculate BMI.  Review of Systems   Constitutional:  Positive for activity change, appetite change and fatigue.   HENT:  Positive for hearing loss. Negative for trouble swallowing.    Eyes:  Positive for visual disturbance.   Respiratory:  Negative for cough, shortness of breath and wheezing.    Cardiovascular:  Negative for chest pain, palpitations and leg swelling.   Gastrointestinal:  Positive for constipation. Negative for nausea and vomiting.   Genitourinary:  Positive for enuresis and urgency. Negative for dysuria and hematuria.   Musculoskeletal:  Positive for arthralgias, back pain and gait problem.   Skin:  Negative for rash and wound.   Neurological:  Positive for weakness. Negative for dizziness and  light-headedness.   Hematological:  Bruises/bleeds easily.   Psychiatric/Behavioral:  Positive for confusion. Negative for sleep disturbance.      Physical Exam  Constitutional:       General: She is awake. She is not in acute distress.     Appearance: She is underweight. She is not ill-appearing.      Comments: Older adult female  Seated on couch   HENT:      Head: Normocephalic and atraumatic.      Jaw: There is normal jaw occlusion.      Right Ear: External ear normal.      Left Ear: External ear normal.      Nose: Nose normal.      Mouth/Throat:      Mouth: Mucous membranes are moist.   Eyes:      General: Lids are normal. No scleral icterus.     Extraocular Movements: Extraocular movements intact.      Conjunctiva/sclera: Conjunctivae normal.   Cardiovascular:      Rate and Rhythm: Normal rate and regular rhythm.      Pulses:           Radial pulses are 2+ on the right side and 2+ on the left side.        Dorsalis pedis pulses are 1+ on the right side and 1+ on the left side.      Heart sounds: No murmur heard.  Pulmonary:      Effort: Pulmonary effort is normal. No tachypnea or accessory muscle usage.      Breath sounds: Examination of the right-lower field reveals decreased breath sounds. Examination of the left-lower field reveals decreased breath sounds. Decreased breath sounds present. No wheezing, rhonchi or rales.   Abdominal:      General: Abdomen is flat. Bowel sounds are normal. There is no distension.      Palpations: Abdomen is soft.      Tenderness: There is no abdominal tenderness.   Musculoskeletal:      Cervical back: Neck supple.      Right lower leg: No edema.      Comments: Mostly chairfast   Feet:      Right foot:      Skin integrity: Skin integrity normal.      Toenail Condition: Right toenails are normal.      Left foot:      Skin integrity: Skin integrity normal.      Toenail Condition: Left toenails are abnormally thick.   Skin:     General: Skin is warm and dry.      Coloration: Skin is  not pale.      Findings: No rash or wound.   Neurological:      Mental Status: She is alert. She is disoriented.      Motor: Weakness present.      Gait: Gait abnormal.      Comments: Disoriented to city and year  Confusion and forgetfulness   Psychiatric:         Attention and Perception: Attention normal.         Mood and Affect: Mood and affect normal.         Speech: Speech normal.         Behavior: Behavior normal. Behavior is cooperative.         Thought Content: Thought content normal.         Cognition and Memory: Cognition is impaired. Memory is impaired.         Judgment: Judgment normal.          Data Review  Labs Reviewed: No new labs  Radiology and Imaging Reviewed: No new Imaging  Cardiac Studies Reviewed: No new Studies  Medicine Tests Reviewed: No New Tests  [facility] AWARxE: Query reviewed and no concerns    Palliative Assessment  FAST Score:  N/A patient does not have alzheimer's dementia  Palliative Performance Scale: 50% (unchanged from 5/19/25)    Mid Upper Arm Circumference (MUAC):  N/a  ESASr: Pain 3 Tiredness 8 Drowsiness 8 Nausea 0 Lack of Appetite 7 Shortness of Breath 0 Depression 0 Anxiety 0 Best Well Being 8  Palliative Disease State: At high risk for hospitalization and At high risk for emergency department visits  Patient Prognostic Awareness: Demonstrates understanding of disease process, Demonstrates understanding of prognosis, and Demonstrates understanding of resuscitation status  Palliative Care Risk Stratification: Level Three- High risk.  Requires highest level of intervention.  Not likely to return to usual care  Spiritual Assessment: No Spiritual concerns identified   Environmental Assessment:  Home environment safe and suitable for care  Caregiver Brandon: Caregiver willing and able to safely provide needed care    Goals of Care  Code Status: Do not resuscitate  Capacity: Patient has capacity to make medical decisions but due to intermittent confusion /  forgetfulness recommend health care agents daughter Shelley Bingham & christie Ruiz are included in medical decision making  Healthcare Proxy Name, Contact Information, and Relationship: Daughter and Son Shelley Ruiz   Advance Directives: Document type(s) Durable Power of  and Living Will  Goals of Care Discussion: No  Change in medical orders resulting from advance care planning discussions: No change in medical orders    MICA Ramsey  6/11/2025 6:17 PM     The start time of this Face to Face visit was 3:30 pm. The end time of this Face to Face visit was 4:30 pm. I spent 75 minutes on this date of service performing the following activities: obtaining history, performing examination, entering orders, documenting, preparing for visit, obtaining / reviewing records, providing counseling and education, independently reviewing study/studies, and coordinating care. This time excludes time spent discussing ACP.

## 2025-06-11 NOTE — ASSESSMENT & PLAN NOTE
Goals are: Palliative  Code Status: Do not resuscitate  Patient identifies kate Bingham & christie Ruiz as their health care power of   if they are unable to make their own medical decisions.   Patient has health care living will, health care power of  paperwork, and POLST in home and EHR.   Patient has  intact capacity to make complex medical decision making but due to intermittent confusion / forgetfulness recommend health care agents kate Bingham & christie Ruiz are included in medical decision making.  Focus on getting stronger, remaining comfortable

## 2025-06-11 NOTE — ASSESSMENT & PLAN NOTE
Chronic back & left knee pain  Followed with pain management Dr. You (unable to make appointments due to debility)  Received Epidural 2/2025  Continues on Tramadol 50 mg every 8 hours as needed (utilizing ATC due to severe pain)  Continues on Lyrica 50 mg every AM and PM, 25 mg at lunch  Continues on Duloxetine 60 mg daily  Monitor pain    6/11 - discussed Palliative Care will take over Tramadol prescribing as patient is unable to get out to pain management appointments.  ORT - 0    Counseled patient/caregiver on safe opioid prescribing practices, including: one prescriber will handle all opioid prescribing, need for monthly Palliative Practice visits, safe keeping of opioids to prevent medications from being lost or stolen, and that requests for early refills will require a patient visit.     Discussed potential risks and benefits of opioid medication including sedation, impaired concentration, and dependence.

## 2025-06-11 NOTE — ASSESSMENT & PLAN NOTE
Managed by PCP  Midodrine frequency recently changed from 5 mg TID to 2 times daily  Counseled on slow position changes   Monitor Bps

## 2025-06-11 NOTE — ASSESSMENT & PLAN NOTE
Due to chronic pain, repeated falls, advanced age, & co-morbidities   At risk for further decline and debility due to advancing age and multimorbidity  Monitor for falls  Continue use of assistive devices (walker)  Recently discharged from [facility] HC services  Continue 24/7 paid caregivers  PPS: 50%

## 2025-06-11 NOTE — ASSESSMENT & PLAN NOTE
Follows with Cardiologist Dr. Valentino   Continues on anticoagulation with Eliquis  Continues on Metoprolol 25 mg 2 times daily  Continues on Diltiazem 360 mg daily  Rate controlled on exam  Monitor for lightheadedness, palpitations, weakness, dyspnea  Monitor for bleeding with Eliquis

## 2025-06-16 ENCOUNTER — TELEPHONE (OUTPATIENT)
Dept: PALLIATIVE CARE | Facility: CLINIC | Age: 89
End: 2025-06-16
Payer: MEDICARE

## 2025-06-16 DIAGNOSIS — R39.89 SUSPECTED UTI: Primary | ICD-10-CM

## 2025-06-16 NOTE — TELEPHONE ENCOUNTER
"Daughter Shelley Raya reports she is supposed to take patient to [facility] on 6/18 for Fasenra injection, but patient cannot stand, and Shelley Raya is not sure how to get the paitent to appointment. Notes that to her understanding, injection would only be covered by Medicare if given in doctor's office. Inquires if team has resources available.  Shelley Raya also reports that patient has been \"really confused\", with examples noted as to where she is, does not think she is in home. Shelley Raya reports she is wondering if patient has UTI, which would cause confusion. Requests follow up.   "

## 2025-06-17 PROBLEM — R39.89 SUSPECTED UTI: Status: ACTIVE | Noted: 2025-06-17

## 2025-06-17 NOTE — TELEPHONE ENCOUNTER
Phone call placed to daughter Shelley Raya to follow-up. Shelley Raya will plan to reschedule patient's Fasenra injection to a later date so patient can be evaluated by PT/OT through [facility] and potentially get ramp ordered to get patient in and out of home via wheelchair. Discussed concerns for UTI due to worsening confusion. Shelley Raya will obtain urine sample and drop it off to [facility] lab when able for urinalysis with culture and sensitivity testing. Discussed CRNP will call with results of urine test. Encouraged to call Palliative Care with any further questions or concerns.    MICA Phoenix  Palliative Care [facility]  June 17, 2025 at 10:57 AM

## 2025-06-18 ENCOUNTER — APPOINTMENT (OUTPATIENT)
Dept: LAB | Facility: CLINIC | Age: 89
End: 2025-06-18
Payer: MEDICARE

## 2025-06-18 ENCOUNTER — RESULTS FOLLOW-UP (OUTPATIENT)
Dept: PALLIATIVE CARE | Facility: CLINIC | Age: 89
End: 2025-06-18
Payer: MEDICARE

## 2025-06-18 DIAGNOSIS — R39.89 SUSPECTED UTI: Primary | ICD-10-CM

## 2025-06-18 DIAGNOSIS — R39.89 SUSPECTED UTI: ICD-10-CM

## 2025-06-18 LAB
BILIRUB UR QL STRIP.AUTO: NEGATIVE MG/DL
CLARITY UR REFRACT.AUTO: CLEAR
COLOR UR AUTO: COLORLESS
GLUCOSE UR STRIP.AUTO-MCNC: NEGATIVE MG/DL
HGB UR QL STRIP.AUTO: NEGATIVE
KETONES UR STRIP.AUTO-MCNC: NEGATIVE MG/DL
LEUKOCYTE ESTERASE UR QL STRIP.AUTO: NEGATIVE
NITRITE UR QL STRIP.AUTO: NEGATIVE
PH UR STRIP.AUTO: 7.5 [PH]
PROT UR QL STRIP.AUTO: NEGATIVE
SP GR UR REFRACT.AUTO: 1.01
UROBILINOGEN UR STRIP-ACNC: 0.2 EU/DL

## 2025-06-18 PROCEDURE — 81003 URINALYSIS AUTO W/O SCOPE: CPT

## 2025-06-18 PROCEDURE — 87086 URINE CULTURE/COLONY COUNT: CPT

## 2025-06-18 NOTE — TELEPHONE ENCOUNTER
Phone call placed to daughter Shelley Raya to review UA results which came back normal, No signs of UTI. Shelley Raya thankful for update.    MICA Phoenix  Palliative Care [facility]  June 18, 2025 at 3:46 PM

## 2025-06-19 ENCOUNTER — NURSE TRIAGE (OUTPATIENT)
Dept: PRIMARY CARE | Facility: CLINIC | Age: 89
End: 2025-06-19
Payer: MEDICARE

## 2025-06-19 ENCOUNTER — TELEPHONE (OUTPATIENT)
Dept: PRIMARY CARE | Facility: CLINIC | Age: 89
End: 2025-06-19
Payer: MEDICARE

## 2025-06-19 ENCOUNTER — TELEPHONE (OUTPATIENT)
Dept: PALLIATIVE CARE | Facility: CLINIC | Age: 89
End: 2025-06-19
Payer: MEDICARE

## 2025-06-19 RX ORDER — NITROFURANTOIN 25; 75 MG/1; MG/1
100 CAPSULE ORAL 2 TIMES DAILY
Qty: 14 CAPSULE | Refills: 0 | Status: SHIPPED | OUTPATIENT
Start: 2025-06-19 | End: 2025-06-26

## 2025-06-19 NOTE — TELEPHONE ENCOUNTER
"Yanira, PT with [facility], reported that during visit with patient, patient had \"pretty severe, full body shaking\", along with nausea, with some vomit coming out. At time of call, Yanira reported that patient's BP was 158/92 and heart rate at rest was 124. On return call to Yanira to let her know message was being relayed to team, Yanira reported she had gotten a hold of PCP's office, and patient's vitals had come down a little bit- at time of initial call, patient had not taken blood pressure medication. Requests call to patient's daughter for follow up. NP notified.  "

## 2025-06-19 NOTE — TELEPHONE ENCOUNTER
Phone call placed to Shelley Raya to follow-up regarding positive urine culture. 7 day course of Macrobid sent to [facility]. Will change antibiotic if needed when complete culture results are back. Discussed patient can continue prophylactic Azithromycin while on Macobid. Encouraged to call Palliative Care with any other questions or concerns.    MICA Phoenix  Palliative Care [facility]  June 19, 2025 at 4:24 PM

## 2025-06-19 NOTE — TELEPHONE ENCOUNTER
Phone call placed to Shelley Raya to follow-up. Shelley Raya states patient is now sleeping comfortably. In light of patient's urine culture result being positive, 7 day course of Macrobid sent to [facility]. Discussed ED eval, Kassy declines for now but will monitor patient, encourage fluids and intake. Encouraged to call Palliative Care with any further questions or concerns.    MICA Phoenix  Palliative Care [facility]  June 19, 2025 at 4:32 PM

## 2025-06-19 NOTE — TELEPHONE ENCOUNTER
Elvia Ramsey      6/19/25 12:40 PM  Note     Call stated that she is with pt and pt is having body shakes and voice shakes, body ache no fever        --------------------------------------------    Spoke with home health physical therapist, Yanira who's currently with patient and reports onset of the following symptoms which presented earlier today per patient's family member:    Whole body is shaky  Voice is shaky  Confusion  Nausea  Vomiting xs 2 today  Weakness    Vital signs:    Temp - unknown  Resting -125  Pulse Ox is 96%  BP is 158/92    Per therapist, patient did not take her BP medication today, has not eaten yet today and is only drinking small sips of water.  Denies body aches, chills, SOB, chest pain, headache, dizziness or any other symptoms at this time.      DISPOSITION:    Go to ED Now for:    Vomiting or Headache    Refer to ED for evaluation of acute onset of symptoms.  Therapist will inform family of advice given; no other questions at this time.

## 2025-06-19 NOTE — TELEPHONE ENCOUNTER
Call stated that she is with pt and pt is having body shakes and voice shakes, body ache no fever

## 2025-06-21 LAB
BACTERIA UR CULT: ABNORMAL

## 2025-07-05 DIAGNOSIS — S32.10XS CLOSED FRACTURE OF SACRUM, UNSPECIFIED FRACTURE MORPHOLOGY, SEQUELA: ICD-10-CM

## 2025-07-05 DIAGNOSIS — M25.552 BILATERAL HIP PAIN: ICD-10-CM

## 2025-07-05 DIAGNOSIS — M25.551 BILATERAL HIP PAIN: ICD-10-CM

## 2025-07-07 RX ORDER — LIDOCAINE 50 MG/G
PATCH TOPICAL
Qty: 30 PATCH | Refills: 3 | Status: SHIPPED | OUTPATIENT
Start: 2025-07-07

## 2025-07-07 NOTE — TELEPHONE ENCOUNTER
Medicine Refill Request    Last Office Visit: 12/6/2024   Last Consult Visit: 11/12/2021  Last Telemedicine Visit: Visit date not found    Next Appointment: 10/22/2025      Current Outpatient Medications:     acetaminophen (TYLENOL ARTHRITIS PAIN ORAL), Take 600 mg by mouth as needed., Disp: , Rfl:     antiox #8/om3/dha/epa/lut/zeax (PRESERVISION AREDS 2, OMEGA-3, ORAL), Take 1 capsule by mouth 2 (two) times a day., Disp: , Rfl:     azithromycin (ZITHROMAX) 250 mg tablet, TAKE 1 TABLET (250 MG TOTAL) BY MOUTH 3 (THREE) TIMES A WEEK (MON, WED, FRI)., Disp: 12 tablet, Rfl: 5    benralizumab (FASENRA PEN SUBQ), Inject under the skin. Every 8 weeks, Disp: , Rfl:     cetirizine (ZyrTEC) 10 mg tablet, Take 10 mg by mouth daily., Disp: , Rfl:     docusate sodium (COLACE) 100 mg capsule, Take 100 mg by mouth daily., Disp: , Rfl:     DULoxetine (CYMBALTA) 60 mg capsule, Take 60 mg by mouth at bedtime. , Disp: , Rfl: 0    ELIQUIS 2.5 mg tablet, TAKE 1 TABLET BY MOUTH TWICE A DAY, Disp: 180 tablet, Rfl: 3    lidocaine (LIDODERM) 5 % patch, APPLY 1 PATCH ON THE SKIN DAILY REMOVE & DISCARD WITHIN 12 HOURS OR AS DIRECTED BY PRESCRIBER, Disp: 30 patch, Rfl: 3    LORazepam (ATIVAN) 1 mg tablet, TAKE 1 TABLET BY MOUTH NIGHTLY AS NEEDED FOR ANXIETY, Disp: 30 tablet, Rfl: 5    MATZIM  mg 24 hr tablet, TAKE 1 TABLET BY MOUTH DAILY., Disp: 90 tablet, Rfl: 3    melatonin 10 mg capsule, Take 10 mg by mouth nightly., Disp: , Rfl:     metoprolol tartrate (LOPRESSOR) 25 mg tablet, TAKE 1 TABLET BY MOUTH TWICE A DAY, Disp: 180 tablet, Rfl: 3    midodrine (PROAMATINE) 5 mg tablet, Take 5 mg by mouth 2 (two) times a day., Disp: , Rfl:     polyethylene glycol (MIRALAX) 17 gram/dose powder, Take 17 g by mouth daily., Disp: , Rfl:     pregabalin (LYRICA) 25 mg capsule, Take 25 mg by mouth daily with lunch. PRN, Disp: , Rfl:     pregabalin (LYRICA) 50 mg capsule, Take 50 mg by mouth 2 (two) times a day. Morning and Evening, Disp: , Rfl:      propylene glycol/peg 400/PF (SYSTANE, PF, OPHT), Administer 1 drop into affected eye(s) as needed (dry eyes)., Disp: , Rfl:     senna (SENOKOT) 8.6 mg tablet, Take 1 tablet by mouth daily., Disp: , Rfl:     SPIRIVA RESPIMAT 2.5 mcg/actuation mist inhaler, Inhale 2 puffs daily. (Patient not taking: Reported on 4/30/2025), Disp: , Rfl:     SYMBICORT 80-4.5 mcg/actuation inhaler, Inhale 2 puffs 2 (two) times a day. (Patient not taking: Reported on 4/30/2025), Disp: , Rfl:     traMADoL (ULTRAM) 50 mg tablet, Take 1 tablet (50 mg total) by mouth every 8 (eight) hours as needed for pain., Disp: 90 tablet, Rfl: 0    BP Readings from Last 3 Encounters:   06/11/25 120/70   05/19/25 120/70   05/16/25 (!) 164/72       Recent Lab results:  Lab Results   Component Value Date    CHOL 227 (H) 01/27/2023   ,   Lab Results   Component Value Date    HDL 62 01/27/2023   ,   Lab Results   Component Value Date    LDLCALC 141 (H) 01/27/2023   ,   Lab Results   Component Value Date    TRIG 120 01/27/2023        Lab Results   Component Value Date    GLUCOSE 96 05/16/2025   ,   Lab Results   Component Value Date    HGBA1C 5.6 11/18/2015         Lab Results   Component Value Date    CREATININE 0.6 05/16/2025       Lab Results   Component Value Date    TSH 3.05 04/10/2025           Lab Results   Component Value Date    HGBA1C 5.6 11/18/2015

## 2025-07-08 NOTE — PROGRESS NOTES
Home Based Palliative Medicine Established Visit    Patient ID: Madelyn Ruiz is a 89 y.o. female  MRN: [id number]  Date of Visit: 7/9/2025  Referring Physician: No referring provider defined for this encounter.  Primary Care Physician: Henry Linares MD  Reason for Visit: Palliative Care Follow-up      History of Present Illness   Source: Patient, Caregiver, Medical Records, and Daughter    Madelyn Ruiz is a 89 y.o. female seen in the home for Palliative Care Follow-up visit with a PMH of COPD, atrial fibrillation, orthostatic hypotension, STACIA, HTN, GERD, allergic bronchopulmonary aspergillosis, osteoporosis, anemia, and Debility.    Patient last seen by Palliative Care on 6/11/25.    Met with patient & daughter Shelley Raya in living room. Paid caregiver Ella also present. Patient remains a questionable historian, Shelley Raya provides most of history. Reports patient has been having a good last few days.Was able to get out to appointment for Fesenra injection. Has been working with [facility] and able to ambulate with walker slightly longer distances. Appetite somewhat improved. Breakfast remains best meal. Constipation controlled with current regimen. Pain well controlled with current regimen.    No falls, ED visits, or hospitalizations since last visit.        Assessment/Plan   Diagnoses and all orders for this visit:    Encounter for palliative care in home, non-hospice (Primary)  Assessment & Plan:  Goals are: Palliative  Code Status: Do not resuscitate  Patient identifies daughter Shelley Bingham & christie Ruiz as their health care power of   if they are unable to make their own medical decisions.   Patient has health care living will, health care power of  paperwork, and POLST in home and EHR.   Patient has  intact capacity to make complex medical decision making but due to intermittent confusion / forgetfulness recommend health care agents daughter Shelley Bingham & christie Ruiz  are included in medical decision making.  Focus on getting stronger, remaining comfortable      Pulmonary emphysema, unspecified emphysema type (CMS/HCC)  Assessment & Plan:  Follows with Pulmonologist Dr. Walker   Continue inhalers (Spiriva, Symbicort)  Not ordered any supplemental oxygen  Educated on Energy Conservation  Monitor for worsening dyspnea        Allergic bronchopulmonary aspergillosis (CMS/[facility])  Assessment & Plan:  Controlled  Follows with [facility] Dr. Arriaza  Continues on Fasenra every 8 weeks  Continues on Azithromycin 250 mg 3 times weekly  Monitor for s/s of flare up      Orthostatic hypotension  Assessment & Plan:  Managed by PCP  Midodrine frequency recently changed from 5 mg TID to 2 times daily  Counseled on slow position changes   Monitor Bps         Paroxysmal atrial fibrillation (CMS/[facility])  Assessment & Plan:  Follows with Cardiologist Dr. Valentino   Continues on anticoagulation with Eliquis  Continues on Metoprolol 25 mg 2 times daily  Continues on Diltiazem 360 mg daily  Rate controlled on exam  Monitor for lightheadedness, palpitations, weakness, dyspnea  Monitor for bleeding with Eliquis         Other chronic pain  Assessment & Plan:  Chronic back & left knee pain  Followed with pain management Dr. You (unable to make appointments due to debility)  Received Epidural 2/2025  Continues on Tramadol 50 mg every 8 hours as needed (utilizing ATC due to severe pain)  Continues on Lyrica 50 mg every AM and PM, 25 mg at lunch  Continues on Duloxetine 60 mg daily  Monitor pain    6/11 - discussed Palliative Care will take over Tramadol prescribing as patient is unable to get out to pain management appointments.  ORT - 0    Counseled patient/caregiver on safe opioid prescribing practices, including: one prescriber will handle all opioid prescribing, need for monthly Palliative Practice visits, safe keeping of opioids to prevent medications from being lost or stolen, and that  requests for early refills will require a patient visit.     Discussed potential risks and benefits of opioid medication including sedation, impaired concentration, and dependence.      Debility  Assessment & Plan:  Due to chronic pain, repeated falls, advanced age, & co-morbidities   At risk for further decline and debility due to advancing age and multimorbidity  Monitor for falls  Continue use of assistive devices (walker)  Recently discharged from [facility] HC services  Continue PT/OT through [facility]  Continue 24/7 paid caregivers  PPS: 50%          Vitals:    07/09/25 1551   BP: (!) 140/70   BP Location: Left upper arm   Pulse: 61   Resp: 18   Temp: 36.7 °C (98 °F)   TempSrc: Temporal   SpO2: 96%         There is no height or weight on file to calculate BMI.  Review of Systems   Constitutional:  Positive for activity change, appetite change and fatigue.   HENT:  Positive for hearing loss. Negative for trouble swallowing.    Eyes:  Positive for visual disturbance.   Respiratory:  Negative for cough, shortness of breath and wheezing.    Cardiovascular:  Negative for chest pain, palpitations and leg swelling.   Gastrointestinal:  Positive for constipation. Negative for nausea and vomiting.   Genitourinary:  Positive for enuresis and urgency. Negative for dysuria and hematuria.   Musculoskeletal:  Positive for arthralgias, back pain and gait problem.   Skin:  Negative for rash and wound.   Neurological:  Positive for weakness. Negative for dizziness and light-headedness.   Hematological:  Bruises/bleeds easily.   Psychiatric/Behavioral:  Positive for confusion. Negative for sleep disturbance.      Physical Exam  Constitutional:       General: She is awake. She is not in acute distress.     Appearance: She is underweight. She is not ill-appearing.      Comments: Older adult female  Seated on couch   HENT:      Head: Normocephalic and atraumatic.      Jaw: There is normal jaw occlusion.      Right Ear: External ear  normal.      Left Ear: External ear normal.      Nose: Nose normal.      Mouth/Throat:      Mouth: Mucous membranes are moist.   Eyes:      General: Lids are normal. No scleral icterus.     Extraocular Movements: Extraocular movements intact.      Conjunctiva/sclera: Conjunctivae normal.   Cardiovascular:      Rate and Rhythm: Normal rate and regular rhythm.      Pulses:           Radial pulses are 2+ on the right side and 2+ on the left side.        Dorsalis pedis pulses are 1+ on the right side and 1+ on the left side.      Heart sounds: No murmur heard.     Comments: Trace bilateral lower extremity edema  Pulmonary:      Effort: Pulmonary effort is normal. No tachypnea or accessory muscle usage.      Breath sounds: Examination of the right-lower field reveals decreased breath sounds. Examination of the left-lower field reveals decreased breath sounds. Decreased breath sounds present. No wheezing, rhonchi or rales.   Abdominal:      General: Abdomen is flat. Bowel sounds are normal. There is no distension.      Palpations: Abdomen is soft.      Tenderness: There is no abdominal tenderness.   Musculoskeletal:      Cervical back: Neck supple.      Right lower leg: Edema present.      Left lower leg: Edema present.      Comments: Mostly chairfast   Feet:      Right foot:      Skin integrity: Skin integrity normal.      Toenail Condition: Right toenails are normal.      Left foot:      Skin integrity: Skin integrity normal.      Toenail Condition: Left toenails are abnormally thick.   Skin:     General: Skin is warm and dry.      Coloration: Skin is not pale.      Findings: No rash or wound.   Neurological:      Mental Status: She is alert. She is disoriented.      Motor: Weakness present.      Gait: Gait abnormal.      Comments: Disoriented to city and year  Confusion and forgetfulness   Psychiatric:         Attention and Perception: Attention normal.         Mood and Affect: Mood and affect normal.         Speech:  Speech normal.         Behavior: Behavior normal. Behavior is cooperative.         Thought Content: Thought content normal.         Cognition and Memory: Cognition is impaired. Memory is impaired.         Judgment: Judgment normal.        Data Review  Labs Reviewed: No new labs  Radiology and Imaging Reviewed: No new Imaging  Cardiac Studies Reviewed: No new Studies  Medicine Tests Reviewed: No New Tests  [facility]  AWARxE: Query reviewed and no concerns    Palliative Assessment  FAST Score:  N/A patient does not have alzheimer's dementia  Palliative Performance Scale: 50% (unchanged from 6/11/25)    Mid Upper Arm Circumference (MUAC):  N/a  ESASr: Pain 3 Tiredness 8 Drowsiness 8 Nausea 0 Lack of Appetite 7 Shortness of Breath 0 Depression 0 Anxiety 0 Best Well Being 8  Palliative Disease State: At high risk for hospitalization and At high risk for emergency department visits  Patient Prognostic Awareness: Demonstrates understanding of disease process, Demonstrates understanding of prognosis, and Demonstrates understanding of resuscitation status  Palliative Care Risk Stratification: Level Three- High risk.  Requires highest level of intervention.  Not likely to return to usual care  Spiritual Assessment: No Spiritual concerns identified   Environmental Assessment:  Home environment safe and suitable for care  Caregiver [place]: Caregiver willing and able to safely provide needed care    Goals of Care  Code Status: Do not resuscitate  Capacity: Patient has capacity to make medical decisions but due to intermittent confusion / forgetfulness recommend health care agents daughter Shelley Bingham & son Venkat Ruiz are included in medical decision making  Healthcare Proxy Name, Contact Information, and Relationship: Daughter and Son Shelley Bingham & Venkat Ruiz   Advance Directives: Document type(s) Durable Power of  and Living Will  Goals of Care Discussion: No  Change in medical orders  resulting from advance care planning discussions: No change in medical orders    MICA Ramsey  7/9/2025 4:28 PM     The start time of this Face to Face visit was 3:30 pm. The end time of this Face to Face visit was 4:15 pm. I spent 60 minutes on this date of service performing the following activities: obtaining history, performing examination, documenting, preparing for visit, providing counseling and education, and coordinating care. This time excludes time spent discussing ACP.

## 2025-07-09 ENCOUNTER — HOME VISIT (OUTPATIENT)
Dept: PALLIATIVE CARE | Facility: CLINIC | Age: 89
End: 2025-07-09
Payer: MEDICARE

## 2025-07-09 VITALS
TEMPERATURE: 98 F | SYSTOLIC BLOOD PRESSURE: 140 MMHG | HEART RATE: 61 BPM | OXYGEN SATURATION: 96 % | DIASTOLIC BLOOD PRESSURE: 70 MMHG | RESPIRATION RATE: 18 BRPM

## 2025-07-09 DIAGNOSIS — R53.81 DEBILITY: ICD-10-CM

## 2025-07-09 DIAGNOSIS — J43.9 PULMONARY EMPHYSEMA, UNSPECIFIED EMPHYSEMA TYPE (CMS/HCC): Chronic | ICD-10-CM

## 2025-07-09 DIAGNOSIS — I48.0 PAROXYSMAL ATRIAL FIBRILLATION (CMS/HCC): Chronic | ICD-10-CM

## 2025-07-09 DIAGNOSIS — Z51.5 ENCOUNTER FOR PALLIATIVE CARE IN HOME, NON-HOSPICE: Primary | ICD-10-CM

## 2025-07-09 DIAGNOSIS — I95.1 ORTHOSTATIC HYPOTENSION: Chronic | ICD-10-CM

## 2025-07-09 DIAGNOSIS — G89.29 OTHER CHRONIC PAIN: ICD-10-CM

## 2025-07-09 DIAGNOSIS — B44.81 ALLERGIC BRONCHOPULMONARY ASPERGILLOSIS (CMS/HCC): Chronic | ICD-10-CM

## 2025-07-09 PROBLEM — S81.812A SKIN TEAR OF LEFT LOWER LEG WITHOUT COMPLICATION: Status: RESOLVED | Noted: 2024-12-06 | Resolved: 2025-07-09

## 2025-07-09 PROCEDURE — 99350 HOME/RES VST EST HIGH MDM 60: CPT | Mod: 25

## 2025-07-09 ASSESSMENT — ENCOUNTER SYMPTOMS
WEAKNESS: 1
BACK PAIN: 1
WHEEZING: 0
HEMATURIA: 0
COUGH: 0
DYSURIA: 0
VOMITING: 0
CONFUSION: 1
ARTHRALGIAS: 1
TROUBLE SWALLOWING: 0
BRUISES/BLEEDS EASILY: 1
FATIGUE: 1
DIZZINESS: 0
PALPITATIONS: 0
APPETITE CHANGE: 1
ACTIVITY CHANGE: 1
WOUND: 0
NAUSEA: 0
SLEEP DISTURBANCE: 0
SHORTNESS OF BREATH: 0
CONSTIPATION: 1
LIGHT-HEADEDNESS: 0

## 2025-07-09 NOTE — ASSESSMENT & PLAN NOTE
Due to chronic pain, repeated falls, advanced age, & co-morbidities   At risk for further decline and debility due to advancing age and multimorbidity  Monitor for falls  Continue use of assistive devices (walker)  Recently discharged from [facility] HC services  Continue PT/OT through [facility]  Continue 24/7 paid caregivers  PPS: 50%

## 2025-07-09 NOTE — ASSESSMENT & PLAN NOTE
Controlled  Follows with [facility] Dr. Arriaza  Continues on Fasenra every 8 weeks  Continues on Azithromycin 250 mg 3 times weekly  Monitor for s/s of flare up

## 2025-07-31 DIAGNOSIS — G89.29 OTHER CHRONIC PAIN: ICD-10-CM

## 2025-07-31 RX ORDER — TRAMADOL HYDROCHLORIDE 50 MG/1
50 TABLET, FILM COATED ORAL EVERY 8 HOURS PRN
Qty: 90 TABLET | Refills: 0 | Status: SHIPPED | OUTPATIENT
Start: 2025-07-31 | End: 2025-08-30

## 2025-07-31 RX ORDER — TRAMADOL HYDROCHLORIDE 50 MG/1
50 TABLET, FILM COATED ORAL EVERY 8 HOURS PRN
Qty: 90 TABLET | Refills: 0 | OUTPATIENT
Start: 2025-07-31 | End: 2025-08-30

## 2025-08-08 ENCOUNTER — TELEPHONE (OUTPATIENT)
Dept: PALLIATIVE CARE | Facility: CLINIC | Age: 89
End: 2025-08-08
Payer: MEDICARE

## 2025-08-08 NOTE — TELEPHONE ENCOUNTER
Patient's daughter called requesting refill of midodrine 5 mg. She notes PCP usually orders medication, and is reaching out to PCP's office for refill. NP notified.

## 2025-08-21 ENCOUNTER — HOME VISIT (OUTPATIENT)
Dept: PALLIATIVE CARE | Facility: CLINIC | Age: 89
End: 2025-08-21
Payer: MEDICARE

## 2025-08-21 VITALS
HEART RATE: 58 BPM | SYSTOLIC BLOOD PRESSURE: 140 MMHG | DIASTOLIC BLOOD PRESSURE: 82 MMHG | RESPIRATION RATE: 18 BRPM | OXYGEN SATURATION: 99 % | TEMPERATURE: 97.6 F

## 2025-08-21 DIAGNOSIS — I48.0 PAROXYSMAL ATRIAL FIBRILLATION (CMS/HCC): Chronic | ICD-10-CM

## 2025-08-21 DIAGNOSIS — M25.562 CHRONIC PAIN OF LEFT KNEE: ICD-10-CM

## 2025-08-21 DIAGNOSIS — R53.81 DEBILITY: ICD-10-CM

## 2025-08-21 DIAGNOSIS — B44.81 ALLERGIC BRONCHOPULMONARY ASPERGILLOSIS (CMS/HCC): Chronic | ICD-10-CM

## 2025-08-21 DIAGNOSIS — G89.29 CHRONIC PAIN OF LEFT KNEE: ICD-10-CM

## 2025-08-21 DIAGNOSIS — J43.9 PULMONARY EMPHYSEMA, UNSPECIFIED EMPHYSEMA TYPE (CMS/HCC): Chronic | ICD-10-CM

## 2025-08-21 DIAGNOSIS — I95.1 ORTHOSTATIC HYPOTENSION: Chronic | ICD-10-CM

## 2025-08-21 DIAGNOSIS — G89.29 OTHER CHRONIC PAIN: ICD-10-CM

## 2025-08-21 DIAGNOSIS — Z51.5 ENCOUNTER FOR PALLIATIVE CARE IN HOME, NON-HOSPICE: Primary | ICD-10-CM

## 2025-08-21 PROCEDURE — 99350 HOME/RES VST EST HIGH MDM 60: CPT | Mod: 25

## 2025-08-21 RX ORDER — MIDODRINE HYDROCHLORIDE 5 MG/1
5 TABLET ORAL 2 TIMES DAILY
Qty: 60 TABLET | Refills: 11 | Status: SHIPPED | OUTPATIENT
Start: 2025-08-21

## 2025-08-21 RX ORDER — FLUTICASONE FUROATE AND VILANTEROL TRIFENATATE 100; 25 UG/1; UG/1
1 POWDER RESPIRATORY (INHALATION) DAILY
COMMUNITY
Start: 2025-08-18

## 2025-08-21 ASSESSMENT — ENCOUNTER SYMPTOMS
TROUBLE SWALLOWING: 0
VOMITING: 0
WOUND: 0
COUGH: 0
BACK PAIN: 1
WEAKNESS: 1
SHORTNESS OF BREATH: 0
LIGHT-HEADEDNESS: 0
DYSURIA: 0
SLEEP DISTURBANCE: 0
WHEEZING: 0
BRUISES/BLEEDS EASILY: 1
APPETITE CHANGE: 1
FATIGUE: 1
ARTHRALGIAS: 1
ACTIVITY CHANGE: 1
PALPITATIONS: 0
NAUSEA: 0
HEMATURIA: 0
CONSTIPATION: 1
DIZZINESS: 0
CONFUSION: 1

## 2025-09-03 DIAGNOSIS — G89.29 OTHER CHRONIC PAIN: ICD-10-CM

## 2025-09-04 RX ORDER — TRAMADOL HYDROCHLORIDE 50 MG/1
50 TABLET, FILM COATED ORAL EVERY 8 HOURS PRN
Qty: 90 TABLET | Refills: 0 | Status: SHIPPED | OUTPATIENT
Start: 2025-09-04 | End: 2025-10-04